# Patient Record
Sex: FEMALE | Race: WHITE | NOT HISPANIC OR LATINO | URBAN - METROPOLITAN AREA
[De-identification: names, ages, dates, MRNs, and addresses within clinical notes are randomized per-mention and may not be internally consistent; named-entity substitution may affect disease eponyms.]

---

## 2017-03-21 ENCOUNTER — OUTPATIENT (OUTPATIENT)
Dept: OUTPATIENT SERVICES | Facility: HOSPITAL | Age: 82
LOS: 1 days | Discharge: HOME | End: 2017-03-21

## 2017-06-27 DIAGNOSIS — R31.9 HEMATURIA, UNSPECIFIED: ICD-10-CM

## 2017-06-27 DIAGNOSIS — Z79.899 OTHER LONG TERM (CURRENT) DRUG THERAPY: ICD-10-CM

## 2017-07-11 ENCOUNTER — OUTPATIENT (OUTPATIENT)
Dept: OUTPATIENT SERVICES | Facility: HOSPITAL | Age: 82
LOS: 1 days | Discharge: HOME | End: 2017-07-11

## 2017-07-11 DIAGNOSIS — C22.0 LIVER CELL CARCINOMA: ICD-10-CM

## 2017-07-20 ENCOUNTER — OUTPATIENT (OUTPATIENT)
Dept: OUTPATIENT SERVICES | Facility: HOSPITAL | Age: 82
LOS: 1 days | Discharge: HOME | End: 2017-07-20

## 2017-07-20 DIAGNOSIS — Z02.9 ENCOUNTER FOR ADMINISTRATIVE EXAMINATIONS, UNSPECIFIED: ICD-10-CM

## 2017-07-20 DIAGNOSIS — C22.0 LIVER CELL CARCINOMA: ICD-10-CM

## 2017-07-25 ENCOUNTER — OUTPATIENT (OUTPATIENT)
Dept: OUTPATIENT SERVICES | Facility: HOSPITAL | Age: 82
LOS: 1 days | Discharge: HOME | End: 2017-07-25

## 2017-07-25 DIAGNOSIS — C22.0 LIVER CELL CARCINOMA: ICD-10-CM

## 2017-08-22 ENCOUNTER — OUTPATIENT (OUTPATIENT)
Dept: OUTPATIENT SERVICES | Facility: HOSPITAL | Age: 82
LOS: 1 days | Discharge: HOME | End: 2017-08-22

## 2017-08-22 DIAGNOSIS — R31.9 HEMATURIA, UNSPECIFIED: ICD-10-CM

## 2017-08-22 DIAGNOSIS — E03.9 HYPOTHYROIDISM, UNSPECIFIED: ICD-10-CM

## 2017-08-29 ENCOUNTER — EMERGENCY (EMERGENCY)
Facility: HOSPITAL | Age: 82
LOS: 0 days | Discharge: HOME | End: 2017-08-29
Admitting: INTERNAL MEDICINE

## 2017-08-29 DIAGNOSIS — E03.9 HYPOTHYROIDISM, UNSPECIFIED: ICD-10-CM

## 2017-08-29 DIAGNOSIS — I10 ESSENTIAL (PRIMARY) HYPERTENSION: ICD-10-CM

## 2017-08-29 DIAGNOSIS — Z79.899 OTHER LONG TERM (CURRENT) DRUG THERAPY: ICD-10-CM

## 2017-08-29 DIAGNOSIS — R06.02 SHORTNESS OF BREATH: ICD-10-CM

## 2017-08-29 DIAGNOSIS — K21.9 GASTRO-ESOPHAGEAL REFLUX DISEASE WITHOUT ESOPHAGITIS: ICD-10-CM

## 2017-08-29 DIAGNOSIS — Z91.041 RADIOGRAPHIC DYE ALLERGY STATUS: ICD-10-CM

## 2017-08-29 DIAGNOSIS — R07.9 CHEST PAIN, UNSPECIFIED: ICD-10-CM

## 2017-08-29 DIAGNOSIS — R35.0 FREQUENCY OF MICTURITION: ICD-10-CM

## 2017-11-14 ENCOUNTER — OUTPATIENT (OUTPATIENT)
Dept: OUTPATIENT SERVICES | Facility: HOSPITAL | Age: 82
LOS: 1 days | Discharge: HOME | End: 2017-11-14

## 2017-11-14 DIAGNOSIS — E56.9 VITAMIN DEFICIENCY, UNSPECIFIED: ICD-10-CM

## 2017-11-14 DIAGNOSIS — N18.2 CHRONIC KIDNEY DISEASE, STAGE 2 (MILD): ICD-10-CM

## 2017-11-14 DIAGNOSIS — Z02.9 ENCOUNTER FOR ADMINISTRATIVE EXAMINATIONS, UNSPECIFIED: ICD-10-CM

## 2017-11-14 DIAGNOSIS — Z79.899 OTHER LONG TERM (CURRENT) DRUG THERAPY: ICD-10-CM

## 2017-11-14 DIAGNOSIS — E11.9 TYPE 2 DIABETES MELLITUS WITHOUT COMPLICATIONS: ICD-10-CM

## 2017-12-05 ENCOUNTER — OUTPATIENT (OUTPATIENT)
Dept: OUTPATIENT SERVICES | Facility: HOSPITAL | Age: 82
LOS: 1 days | Discharge: HOME | End: 2017-12-05

## 2017-12-05 DIAGNOSIS — E03.9 HYPOTHYROIDISM, UNSPECIFIED: ICD-10-CM

## 2017-12-05 DIAGNOSIS — E78.00 PURE HYPERCHOLESTEROLEMIA, UNSPECIFIED: ICD-10-CM

## 2017-12-05 DIAGNOSIS — C22.0 LIVER CELL CARCINOMA: ICD-10-CM

## 2018-04-24 ENCOUNTER — OUTPATIENT (OUTPATIENT)
Dept: OUTPATIENT SERVICES | Facility: HOSPITAL | Age: 83
LOS: 1 days | Discharge: HOME | End: 2018-04-24

## 2018-04-24 DIAGNOSIS — C22.0 LIVER CELL CARCINOMA: ICD-10-CM

## 2018-04-24 DIAGNOSIS — E11.9 TYPE 2 DIABETES MELLITUS WITHOUT COMPLICATIONS: ICD-10-CM

## 2018-04-24 DIAGNOSIS — E03.9 HYPOTHYROIDISM, UNSPECIFIED: ICD-10-CM

## 2018-04-24 DIAGNOSIS — R31.9 HEMATURIA, UNSPECIFIED: ICD-10-CM

## 2018-04-24 DIAGNOSIS — Z79.899 OTHER LONG TERM (CURRENT) DRUG THERAPY: ICD-10-CM

## 2018-04-24 DIAGNOSIS — N18.2 CHRONIC KIDNEY DISEASE, STAGE 2 (MILD): ICD-10-CM

## 2018-07-17 ENCOUNTER — OUTPATIENT (OUTPATIENT)
Dept: OUTPATIENT SERVICES | Facility: HOSPITAL | Age: 83
LOS: 1 days | Discharge: HOME | End: 2018-07-17

## 2018-07-17 DIAGNOSIS — M12.9 ARTHROPATHY, UNSPECIFIED: ICD-10-CM

## 2018-07-17 DIAGNOSIS — E03.9 HYPOTHYROIDISM, UNSPECIFIED: ICD-10-CM

## 2018-07-17 DIAGNOSIS — Z13.9 ENCOUNTER FOR SCREENING, UNSPECIFIED: ICD-10-CM

## 2018-09-11 ENCOUNTER — OUTPATIENT (OUTPATIENT)
Dept: OUTPATIENT SERVICES | Facility: HOSPITAL | Age: 83
LOS: 1 days | Discharge: HOME | End: 2018-09-11

## 2018-09-11 DIAGNOSIS — Z79.899 OTHER LONG TERM (CURRENT) DRUG THERAPY: ICD-10-CM

## 2018-09-11 DIAGNOSIS — N18.2 CHRONIC KIDNEY DISEASE, STAGE 2 (MILD): ICD-10-CM

## 2018-09-11 DIAGNOSIS — M12.9 ARTHROPATHY, UNSPECIFIED: ICD-10-CM

## 2018-09-11 DIAGNOSIS — E56.9 VITAMIN DEFICIENCY, UNSPECIFIED: ICD-10-CM

## 2018-09-11 DIAGNOSIS — Z13.9 ENCOUNTER FOR SCREENING, UNSPECIFIED: ICD-10-CM

## 2018-09-11 DIAGNOSIS — R31.9 HEMATURIA, UNSPECIFIED: ICD-10-CM

## 2018-09-11 DIAGNOSIS — E03.9 HYPOTHYROIDISM, UNSPECIFIED: ICD-10-CM

## 2018-09-11 DIAGNOSIS — E53.8 DEFICIENCY OF OTHER SPECIFIED B GROUP VITAMINS: ICD-10-CM

## 2018-09-11 DIAGNOSIS — E78.00 PURE HYPERCHOLESTEROLEMIA, UNSPECIFIED: ICD-10-CM

## 2018-11-01 ENCOUNTER — OUTPATIENT (OUTPATIENT)
Dept: OUTPATIENT SERVICES | Facility: HOSPITAL | Age: 83
LOS: 1 days | Discharge: HOME | End: 2018-11-01

## 2018-11-01 DIAGNOSIS — Z02.9 ENCOUNTER FOR ADMINISTRATIVE EXAMINATIONS, UNSPECIFIED: ICD-10-CM

## 2018-11-06 ENCOUNTER — OUTPATIENT (OUTPATIENT)
Dept: OUTPATIENT SERVICES | Facility: HOSPITAL | Age: 83
LOS: 1 days | Discharge: HOME | End: 2018-11-06

## 2018-11-06 DIAGNOSIS — E53.8 DEFICIENCY OF OTHER SPECIFIED B GROUP VITAMINS: ICD-10-CM

## 2018-11-06 DIAGNOSIS — E55.9 VITAMIN D DEFICIENCY, UNSPECIFIED: ICD-10-CM

## 2018-11-06 DIAGNOSIS — E78.2 MIXED HYPERLIPIDEMIA: ICD-10-CM

## 2018-11-06 DIAGNOSIS — D63.8 ANEMIA IN OTHER CHRONIC DISEASES CLASSIFIED ELSEWHERE: ICD-10-CM

## 2018-11-06 DIAGNOSIS — E06.3 AUTOIMMUNE THYROIDITIS: ICD-10-CM

## 2019-03-05 ENCOUNTER — OUTPATIENT (OUTPATIENT)
Dept: OUTPATIENT SERVICES | Facility: HOSPITAL | Age: 84
LOS: 1 days | Discharge: HOME | End: 2019-03-05

## 2019-03-05 DIAGNOSIS — Z02.9 ENCOUNTER FOR ADMINISTRATIVE EXAMINATIONS, UNSPECIFIED: ICD-10-CM

## 2019-03-07 ENCOUNTER — OUTPATIENT (OUTPATIENT)
Dept: OUTPATIENT SERVICES | Facility: HOSPITAL | Age: 84
LOS: 1 days | Discharge: HOME | End: 2019-03-07

## 2019-03-07 DIAGNOSIS — Z79.899 OTHER LONG TERM (CURRENT) DRUG THERAPY: ICD-10-CM

## 2019-03-07 DIAGNOSIS — E78.00 PURE HYPERCHOLESTEROLEMIA, UNSPECIFIED: ICD-10-CM

## 2019-03-07 DIAGNOSIS — R31.9 HEMATURIA, UNSPECIFIED: ICD-10-CM

## 2019-03-07 DIAGNOSIS — N18.2 CHRONIC KIDNEY DISEASE, STAGE 2 (MILD): ICD-10-CM

## 2019-03-07 DIAGNOSIS — E03.9 HYPOTHYROIDISM, UNSPECIFIED: ICD-10-CM

## 2019-03-07 DIAGNOSIS — M81.0 AGE-RELATED OSTEOPOROSIS WITHOUT CURRENT PATHOLOGICAL FRACTURE: ICD-10-CM

## 2019-03-07 DIAGNOSIS — E53.8 DEFICIENCY OF OTHER SPECIFIED B GROUP VITAMINS: ICD-10-CM

## 2019-03-07 DIAGNOSIS — N39.0 URINARY TRACT INFECTION, SITE NOT SPECIFIED: ICD-10-CM

## 2019-03-12 ENCOUNTER — OUTPATIENT (OUTPATIENT)
Dept: OUTPATIENT SERVICES | Facility: HOSPITAL | Age: 84
LOS: 1 days | Discharge: HOME | End: 2019-03-12

## 2019-03-12 DIAGNOSIS — N18.2 CHRONIC KIDNEY DISEASE, STAGE 2 (MILD): ICD-10-CM

## 2019-03-12 DIAGNOSIS — E03.9 HYPOTHYROIDISM, UNSPECIFIED: ICD-10-CM

## 2019-03-12 DIAGNOSIS — Z79.899 OTHER LONG TERM (CURRENT) DRUG THERAPY: ICD-10-CM

## 2019-03-12 DIAGNOSIS — E53.8 DEFICIENCY OF OTHER SPECIFIED B GROUP VITAMINS: ICD-10-CM

## 2019-03-12 DIAGNOSIS — E78.00 PURE HYPERCHOLESTEROLEMIA, UNSPECIFIED: ICD-10-CM

## 2019-03-12 DIAGNOSIS — R31.9 HEMATURIA, UNSPECIFIED: ICD-10-CM

## 2019-03-12 DIAGNOSIS — M81.0 AGE-RELATED OSTEOPOROSIS WITHOUT CURRENT PATHOLOGICAL FRACTURE: ICD-10-CM

## 2019-08-20 ENCOUNTER — OUTPATIENT (OUTPATIENT)
Dept: OUTPATIENT SERVICES | Facility: HOSPITAL | Age: 84
LOS: 1 days | Discharge: HOME | End: 2019-08-20

## 2019-08-20 DIAGNOSIS — E78.2 MIXED HYPERLIPIDEMIA: ICD-10-CM

## 2019-08-20 DIAGNOSIS — E06.3 AUTOIMMUNE THYROIDITIS: ICD-10-CM

## 2019-08-20 DIAGNOSIS — E11.65 TYPE 2 DIABETES MELLITUS WITH HYPERGLYCEMIA: ICD-10-CM

## 2019-08-20 DIAGNOSIS — D53.9 NUTRITIONAL ANEMIA, UNSPECIFIED: ICD-10-CM

## 2019-08-20 DIAGNOSIS — E53.8 DEFICIENCY OF OTHER SPECIFIED B GROUP VITAMINS: ICD-10-CM

## 2019-09-09 ENCOUNTER — OUTPATIENT (OUTPATIENT)
Dept: OUTPATIENT SERVICES | Facility: HOSPITAL | Age: 84
LOS: 1 days | Discharge: HOME | End: 2019-09-09

## 2019-09-09 DIAGNOSIS — E06.3 AUTOIMMUNE THYROIDITIS: ICD-10-CM

## 2019-09-09 DIAGNOSIS — Z02.9 ENCOUNTER FOR ADMINISTRATIVE EXAMINATIONS, UNSPECIFIED: ICD-10-CM

## 2019-09-30 ENCOUNTER — OUTPATIENT (OUTPATIENT)
Dept: OUTPATIENT SERVICES | Facility: HOSPITAL | Age: 84
LOS: 1 days | Discharge: HOME | End: 2019-09-30

## 2019-09-30 DIAGNOSIS — D53.9 NUTRITIONAL ANEMIA, UNSPECIFIED: ICD-10-CM

## 2019-09-30 DIAGNOSIS — E78.2 MIXED HYPERLIPIDEMIA: ICD-10-CM

## 2019-09-30 DIAGNOSIS — E55.9 VITAMIN D DEFICIENCY, UNSPECIFIED: ICD-10-CM

## 2019-09-30 DIAGNOSIS — E53.8 DEFICIENCY OF OTHER SPECIFIED B GROUP VITAMINS: ICD-10-CM

## 2019-09-30 DIAGNOSIS — E11.65 TYPE 2 DIABETES MELLITUS WITH HYPERGLYCEMIA: ICD-10-CM

## 2019-10-04 ENCOUNTER — OUTPATIENT (OUTPATIENT)
Dept: OUTPATIENT SERVICES | Facility: HOSPITAL | Age: 84
LOS: 1 days | Discharge: HOME | End: 2019-10-04

## 2019-10-04 DIAGNOSIS — E11.65 TYPE 2 DIABETES MELLITUS WITH HYPERGLYCEMIA: ICD-10-CM

## 2019-10-04 DIAGNOSIS — Z02.9 ENCOUNTER FOR ADMINISTRATIVE EXAMINATIONS, UNSPECIFIED: ICD-10-CM

## 2020-01-07 ENCOUNTER — EMERGENCY (EMERGENCY)
Facility: HOSPITAL | Age: 85
LOS: 0 days | Discharge: HOME | End: 2020-01-07
Attending: EMERGENCY MEDICINE | Admitting: EMERGENCY MEDICINE
Payer: MEDICARE

## 2020-01-07 VITALS
TEMPERATURE: 98 F | OXYGEN SATURATION: 100 % | WEIGHT: 130.07 LBS | RESPIRATION RATE: 18 BRPM | DIASTOLIC BLOOD PRESSURE: 71 MMHG | HEART RATE: 88 BPM | SYSTOLIC BLOOD PRESSURE: 147 MMHG

## 2020-01-07 DIAGNOSIS — Z23 ENCOUNTER FOR IMMUNIZATION: ICD-10-CM

## 2020-01-07 DIAGNOSIS — S00.03XA CONTUSION OF SCALP, INITIAL ENCOUNTER: ICD-10-CM

## 2020-01-07 DIAGNOSIS — S01.81XA LACERATION WITHOUT FOREIGN BODY OF OTHER PART OF HEAD, INITIAL ENCOUNTER: ICD-10-CM
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
## 2020-01-07 DIAGNOSIS — S01.01XA LACERATION WITHOUT FOREIGN BODY OF SCALP, INITIAL ENCOUNTER: ICD-10-CM
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
## 2020-01-07 PROCEDURE — 99284 EMERGENCY DEPT VISIT MOD MDM: CPT | Mod: 25,GC

## 2020-01-07 PROCEDURE — 71046 X-RAY EXAM CHEST 2 VIEWS: CPT | Mod: 26

## 2020-01-07 PROCEDURE — 12002 RPR S/N/AX/GEN/TRNK2.6-7.5CM: CPT | Mod: GC

## 2020-01-07 PROCEDURE — 71045 X-RAY EXAM CHEST 1 VIEW: CPT | Mod: 26,59

## 2020-01-07 PROCEDURE — 70450 CT HEAD/BRAIN W/O DYE: CPT | Mod: 26

## 2020-01-07 PROCEDURE — 72125 CT NECK SPINE W/O DYE: CPT | Mod: 26

## 2020-01-07 RX ORDER — TETANUS TOXOID, REDUCED DIPHTHERIA TOXOID AND ACELLULAR PERTUSSIS VACCINE, ADSORBED 5; 2.5; 8; 8; 2.5 [IU]/.5ML; [IU]/.5ML; UG/.5ML; UG/.5ML; UG/.5ML
0.5 SUSPENSION INTRAMUSCULAR ONCE
Refills: 0 | Status: COMPLETED | OUTPATIENT
Start: 2020-01-07 | End: 2020-01-07

## 2020-01-07 RX ADMIN — TETANUS TOXOID, REDUCED DIPHTHERIA TOXOID AND ACELLULAR PERTUSSIS VACCINE, ADSORBED 0.5 MILLILITER(S): 5; 2.5; 8; 8; 2.5 SUSPENSION INTRAMUSCULAR at 05:28

## 2020-01-07 NOTE — ED PROVIDER NOTE - PROGRESS NOTE DETAILS
Tetanus updated. Lac repaired. Tetanus updated. Will repair posterior scalp lac. Patient is refusing to stand for CXR. States she has outpatient rehab. SPOKEN TO FAMILY, shared decision making took place pt has family support and outpatient PT. will discharge and follow up

## 2020-01-07 NOTE — ED PROVIDER NOTE - CLINICAL SUMMARY MEDICAL DECISION MAKING FREE TEXT BOX
scalp hematoma & lac s/p mechanical fall - wound care & lac repaired - all results incl incidental findings d/w pt & son @ bedside, copies given, strict return precautions discussed, rec outpt PMD v ED f/u for suture removal

## 2020-01-07 NOTE — ED PROVIDER NOTE - ATTENDING CONTRIBUTION TO CARE
86y f h.o htn no blood thinners/asa p/w head trauma s/p fall. Tried to get up from recliner and hit R post parietal skull against a piece of furniture. No loc. SUstained large hematoma w/overlying lac. Son lives downstairs, renetta her fall and found her sitting up on floor. Denies blurry vision, neck pain, cp/sob, nv, abd pain, back or hip pain, focal numbness or weakness. Currently uunderoing OP PT/Rehab.    PE:  nad  skin- see head exam below, otherwise warm, dry  head- large R post parietal scal phematoma w/overlying lac, no depressions  eac/tms clear  neck supple nt  chest atraumatic  rrr nl s1s2 no mrg  ctab no wrr  abd soft ntnd no palpable masses no rgr  back non-tender no cvat  ext no cce dpi  neuro aaox3 grossly nf exam

## 2020-01-07 NOTE — ED ADULT TRIAGE NOTE - CHIEF COMPLAINT QUOTE
pt biba; pt is AXOX4, sts she was getting up to go to the bathroom, tried reaching for her walker and fell and hit head on her entertainment center; pt with approx 4 inch laceration to right side of head; denies LOC, not on blood thinners, no other injuries or pain

## 2020-01-07 NOTE — ED PROVIDER NOTE - PHYSICAL EXAMINATION
PHYSICAL EXAM: I have reviewed current vital signs.  GENERAL: NAD, elderly.  HEAD:  Normocephalic, large posterior scalp laceration, large scalp hematoma.  EYES: EOMI, PERRL, conjunctiva and sclera clear.  ENT: MMM, no erythema/exudates, no dental/tongue trauma.  NECK: Supple, no midline TTP.  CHEST/LUNG: Clear to auscultation bilaterally; no wheezes, rales, or rhonchi.  HEART: Regular rate and rhythm, normal S1 and S2; no murmurs, rubs, or gallops.  ABDOMEN: Soft, nontender, nondistended.  EXTREMITIES:  2+ peripheral pulses; FROM.  MSK: No spinal midline TTP.  NEUROLOGY: A&O x 3. Motor 5/5. No focal neurological deficits. CN II - XII grossly intact.  SKIN: Warm and dry.

## 2020-01-07 NOTE — ED ADULT NURSE NOTE - OBJECTIVE STATEMENT
BIBA from home post fall. A,Ox4, no SOB or distress noted.  States she was getting up to go to the bathroom, tried reaching for her walker and fell sidewise. Hit head on her entertainment center. Laceration noticed to right side of head. Denies LOC. States she is not on blood thinners, denies any other injury.

## 2020-01-07 NOTE — ED PROVIDER NOTE - CARE PLAN
Principal Discharge DX:	Fall  Secondary Diagnosis:	Scalp hematoma  Secondary Diagnosis:	Scalp laceration

## 2020-01-07 NOTE — ED PROVIDER NOTE - OBJECTIVE STATEMENT
85yo F with PMH of thyroid disease, HTN, and overactive bladder presenting to ED s/p mechanical fall that occurred just PTA. Patient was leaning forward out of recliner and hit her head on a piece of furniture sustaining laceration/hematoma to R posterior scalp. No LOC, f/c, CP, SOB, abd pain, neck/back pain, extremity pain, or dental/tongue trauma. Able to ambulate. 87yo F with PMH of thyroid disease, HTN, and overactive bladder presenting to ED s/p mechanical fall that occurred just PTA. Patient was leaning forward out of recliner and hit her head on a piece of furniture sustaining laceration/hematoma to R posterior scalp. No LOC, f/c, CP, SOB, abd pain, neck/back pain, extremity pain, or dental/tongue trauma. Moving all extremities. States she has outpatient rehab.

## 2020-01-07 NOTE — ED PROVIDER NOTE - NS ED ROS FT
Constitutional:  No fevers or chills.  Eyes:  No visual changes, eye pain, or discharge.  ENT:  No sore throat.  Neck:  No neck pain.  Cardiac:  No CP or edema.  Resp:  No cough or SOB.   GI:  No nausea, vomiting, diarrhea, or abdominal pain.  :  No dysuria, frequency, or hematuria.  MSK:  No myalgias or joint pain/swelling.  Neuro:  No headache, dizziness, or weakness.   Skin:  No skin rash. +Scalp hematoma w/ lac.

## 2020-01-07 NOTE — ED PROVIDER NOTE - NSFOLLOWUPINSTRUCTIONS_ED_ALL_ED_FT
Please follow-up with your doctor and have staples removed in 5-7 days.    Laceration    A laceration is a cut that goes through all of the layers of the skin and into the tissue that is right under the skin. Some lacerations heal on their own. Others need to be closed with skin adhesive strips, skin glue, stitches (sutures), or staples. Proper laceration care minimizes the risk of infection and helps the laceration to heal better.  If non-absorbable stitches or staples have been placed, they must be taken out within the time frame instructed by your healthcare provider.    SEEK IMMEDIATE MEDICAL CARE IF YOU HAVE ANY OF THE FOLLOWING SYMPTOMS: swelling around the wound, worsening pain, drainage from the wound, red streaking going away from your wound, inability to move finger or toe near the laceration, or discoloration of skin near the laceration.      Hematoma    A hematoma is a collection of blood under the skin, in an organ, in a body space, in a joint space, or in other tissue. The blood can clot to form a lump that you can see and feel. The lump is often firm and may sometimes become sore and tender. Most hematomas get better in a few days to weeks. However, some hematomas may be serious and require medical care. Hematomas can range in size from very small to very large.     CAUSES  A hematoma can be caused by a blunt or penetrating injury. It can also be caused by spontaneous leakage from a blood vessel under the skin. Spontaneous leakage from a blood vessel is more likely to occur in older people, especially those taking blood thinners. Sometimes, a hematoma can develop after certain medical procedures.    SIGNS AND SYMPTOMS  A firm lump on the body.   Possible pain and tenderness in the area.  Bruising. Blue, dark blue, purple-red, or yellowish skin may appear at the site of the hematoma if the hematoma is close to the surface of the skin.     For hematomas in deeper tissues or body spaces, the signs and symptoms may be subtle. For example, an intra-abdominal hematoma may cause abdominal pain, weakness, fainting, and shortness of breath. An intracranial hematoma may cause a headache or symptoms such as weakness, trouble speaking, or a change in consciousness.    DIAGNOSIS  A hematoma can usually be diagnosed based on your medical history and a physical exam. Imaging tests may be needed if your health care provider suspects a hematoma in deeper tissues or body spaces, such as the abdomen, head, or chest. These tests may include ultrasonography or a CT scan.     TREATMENT  Hematomas usually go away on their own over time. Rarely does the blood need to be drained out of the body. Large hematomas or those that may affect vital organs will sometimes need surgical drainage or monitoring.    HOME CARE INSTRUCTIONS  Apply ice to the injured area:    Put ice in a plastic bag.    Place a towel between your skin and the bag.    Leave the ice on for 20 minutes, 2–3 times a day for the first 1 to 2 days.    After the first 2 days, switch to using warm compresses on the hematoma.    Elevate the injured area to help decrease pain and swelling. Wrapping the area with an elastic bandage may also be helpful. Compression helps to reduce swelling and promotes shrinking of the hematoma. Make sure the bandage is not wrapped too tight.    If your hematoma is on a lower extremity and is painful, crutches may be helpful for a couple days.    Only take over-the-counter or prescription medicines as directed by your health care provider.     SEEK IMMEDIATE MEDICAL CARE IF:  You have increasing pain, or your pain is not controlled with medicine.    You have a fever.    You have worsening swelling or discoloration.    Your skin over the hematoma breaks or starts bleeding.    Your hematoma is in your chest or abdomen and you have weakness, shortness of breath, or a change in consciousness.  Your hematoma is on your scalp (caused by a fall or injury) and you have a worsening headache or a change in alertness or consciousness.    MAKE SURE YOU:  Understand these instructions.   Will watch your condition.  Will get help right away if you are not doing well or get worse.    ADDITIONAL NOTES AND INSTRUCTIONS    Please follow up with your Primary MD in 24-48 hr.  Seek immediate medical care for any new/worsening signs or symptoms.      Contusion  A contusion is a deep bruise. Contusions are the result of a blunt injury to tissues and muscle fibers under the skin. The skin overlying the contusion may turn blue, purple, or yellow. Symptoms also include pain and swelling in the injured area.    SEEK IMMEDIATE MEDICAL CARE IF YOU HAVE ANY OF THE FOLLOWING SYMPTOMS: severe pain, numbness, tingling, pain, weakness, or skin color/temperature change in any part of your body distal to the injury. Laceration- You have 8 sutures. Please have them removed in 10 days.     A laceration is a cut that goes through all of the layers of the skin and into the tissue that is right under the skin. Some lacerations heal on their own. Others need to be closed with skin adhesive strips, skin glue, stitches (sutures), or staples. Proper laceration care minimizes the risk of infection and helps the laceration to heal better.  If non-absorbable stitches or staples have been placed, they must be taken out within the time frame instructed by your healthcare provider.    SEEK IMMEDIATE MEDICAL CARE IF YOU HAVE ANY OF THE FOLLOWING SYMPTOMS: swelling around the wound, worsening pain, drainage from the wound, red streaking going away from your wound, inability to move finger or toe near the laceration, or discoloration of skin near the laceration.        Hematoma    A hematoma is a collection of blood under the skin, in an organ, in a body space, in a joint space, or in other tissue. The blood can clot to form a lump that you can see and feel. The lump is often firm and may sometimes become sore and tender. Most hematomas get better in a few days to weeks. However, some hematomas may be serious and require medical care. Hematomas can range in size from very small to very large.     CAUSES  A hematoma can be caused by a blunt or penetrating injury. It can also be caused by spontaneous leakage from a blood vessel under the skin. Spontaneous leakage from a blood vessel is more likely to occur in older people, especially those taking blood thinners. Sometimes, a hematoma can develop after certain medical procedures.    SIGNS AND SYMPTOMS  A firm lump on the body.   Possible pain and tenderness in the area.  Bruising. Blue, dark blue, purple-red, or yellowish skin may appear at the site of the hematoma if the hematoma is close to the surface of the skin.     For hematomas in deeper tissues or body spaces, the signs and symptoms may be subtle. For example, an intra-abdominal hematoma may cause abdominal pain, weakness, fainting, and shortness of breath. An intracranial hematoma may cause a headache or symptoms such as weakness, trouble speaking, or a change in consciousness.    DIAGNOSIS  A hematoma can usually be diagnosed based on your medical history and a physical exam. Imaging tests may be needed if your health care provider suspects a hematoma in deeper tissues or body spaces, such as the abdomen, head, or chest. These tests may include ultrasonography or a CT scan.     TREATMENT  Hematomas usually go away on their own over time. Rarely does the blood need to be drained out of the body. Large hematomas or those that may affect vital organs will sometimes need surgical drainage or monitoring.    HOME CARE INSTRUCTIONS  Apply ice to the injured area:    Put ice in a plastic bag.    Place a towel between your skin and the bag.    Leave the ice on for 20 minutes, 2–3 times a day for the first 1 to 2 days.    After the first 2 days, switch to using warm compresses on the hematoma.    Elevate the injured area to help decrease pain and swelling. Wrapping the area with an elastic bandage may also be helpful. Compression helps to reduce swelling and promotes shrinking of the hematoma. Make sure the bandage is not wrapped too tight.    If your hematoma is on a lower extremity and is painful, crutches may be helpful for a couple days.    Only take over-the-counter or prescription medicines as directed by your health care provider.     SEEK IMMEDIATE MEDICAL CARE IF:  You have increasing pain, or your pain is not controlled with medicine.    You have a fever.    You have worsening swelling or discoloration.    Your skin over the hematoma breaks or starts bleeding.    Your hematoma is in your chest or abdomen and you have weakness, shortness of breath, or a change in consciousness.  Your hematoma is on your scalp (caused by a fall or injury) and you have a worsening headache or a change in alertness or consciousness.    MAKE SURE YOU:  Understand these instructions.   Will watch your condition.  Will get help right away if you are not doing well or get worse.    ADDITIONAL NOTES AND INSTRUCTIONS    Please follow up with your Primary MD in 24-48 hr.  Seek immediate medical care for any new/worsening signs or symptoms.      Contusion  A contusion is a deep bruise. Contusions are the result of a blunt injury to tissues and muscle fibers under the skin. The skin overlying the contusion may turn blue, purple, or yellow. Symptoms also include pain and swelling in the injured area.    SEEK IMMEDIATE MEDICAL CARE IF YOU HAVE ANY OF THE FOLLOWING SYMPTOMS: severe pain, numbness, tingling, pain, weakness, or skin color/temperature change in any part of your body distal to the injury.

## 2021-05-16 ENCOUNTER — INPATIENT (INPATIENT)
Facility: HOSPITAL | Age: 86
LOS: 13 days | Discharge: HOME HEALTH | End: 2021-05-30
Attending: INTERNAL MEDICINE | Admitting: INTERNAL MEDICINE
Payer: MEDICARE

## 2021-05-16 VITALS
OXYGEN SATURATION: 100 % | WEIGHT: 130.07 LBS | SYSTOLIC BLOOD PRESSURE: 136 MMHG | DIASTOLIC BLOOD PRESSURE: 63 MMHG | HEART RATE: 96 BPM | TEMPERATURE: 97 F | RESPIRATION RATE: 18 BRPM | HEIGHT: 64 IN

## 2021-05-16 DIAGNOSIS — Z90.49 ACQUIRED ABSENCE OF OTHER SPECIFIED PARTS OF DIGESTIVE TRACT: Chronic | ICD-10-CM

## 2021-05-16 DIAGNOSIS — Z86.79 PERSONAL HISTORY OF OTHER DISEASES OF THE CIRCULATORY SYSTEM: Chronic | ICD-10-CM

## 2021-05-16 PROBLEM — I10 ESSENTIAL (PRIMARY) HYPERTENSION: Chronic | Status: ACTIVE | Noted: 2020-01-07

## 2021-05-16 LAB
ALBUMIN SERPL ELPH-MCNC: 3.7 G/DL — SIGNIFICANT CHANGE UP (ref 3.5–5.2)
ALP SERPL-CCNC: 99 U/L — SIGNIFICANT CHANGE UP (ref 30–115)
ALT FLD-CCNC: 19 U/L — SIGNIFICANT CHANGE UP (ref 0–41)
ANION GAP SERPL CALC-SCNC: 10 MMOL/L — SIGNIFICANT CHANGE UP (ref 7–14)
APTT BLD: 26.4 SEC — LOW (ref 27–39.2)
AST SERPL-CCNC: 53 U/L — HIGH (ref 0–41)
BASE EXCESS BLDV CALC-SCNC: 1.7 MMOL/L — SIGNIFICANT CHANGE UP (ref -2–2)
BASOPHILS # BLD AUTO: 0.01 K/UL — SIGNIFICANT CHANGE UP (ref 0–0.2)
BASOPHILS NFR BLD AUTO: 0.2 % — SIGNIFICANT CHANGE UP (ref 0–1)
BILIRUB SERPL-MCNC: 0.4 MG/DL — SIGNIFICANT CHANGE UP (ref 0.2–1.2)
BUN SERPL-MCNC: 37 MG/DL — HIGH (ref 10–20)
CA-I SERPL-SCNC: 1.24 MMOL/L — SIGNIFICANT CHANGE UP (ref 1.12–1.3)
CALCIUM SERPL-MCNC: 9.2 MG/DL — SIGNIFICANT CHANGE UP (ref 8.5–10.1)
CHLORIDE SERPL-SCNC: 105 MMOL/L — SIGNIFICANT CHANGE UP (ref 98–110)
CO2 SERPL-SCNC: 23 MMOL/L — SIGNIFICANT CHANGE UP (ref 17–32)
CREAT SERPL-MCNC: 0.8 MG/DL — SIGNIFICANT CHANGE UP (ref 0.7–1.5)
D DIMER BLD IA.RAPID-MCNC: 505 NG/ML DDU — HIGH (ref 0–230)
EOSINOPHIL # BLD AUTO: 0.02 K/UL — SIGNIFICANT CHANGE UP (ref 0–0.7)
EOSINOPHIL NFR BLD AUTO: 0.3 % — SIGNIFICANT CHANGE UP (ref 0–8)
FLUAV AG NPH QL: NEGATIVE COUNTS — SIGNIFICANT CHANGE UP
FLUBV AG NPH QL: NEGATIVE COUNTS — SIGNIFICANT CHANGE UP
GAS PNL BLDV: 142 MMOL/L — SIGNIFICANT CHANGE UP (ref 136–145)
GAS PNL BLDV: SIGNIFICANT CHANGE UP
GLUCOSE SERPL-MCNC: 119 MG/DL — HIGH (ref 70–99)
HCO3 BLDV-SCNC: 27 MMOL/L — SIGNIFICANT CHANGE UP (ref 22–29)
HCT VFR BLD CALC: 29.9 % — LOW (ref 37–47)
HCT VFR BLDA CALC: 33.7 % — LOW (ref 34–44)
HGB BLD CALC-MCNC: 11 G/DL — LOW (ref 14–18)
HGB BLD-MCNC: 9.5 G/DL — LOW (ref 12–16)
HOROWITZ INDEX BLDV+IHG-RTO: 21 — SIGNIFICANT CHANGE UP
IMM GRANULOCYTES NFR BLD AUTO: 0.3 % — SIGNIFICANT CHANGE UP (ref 0.1–0.3)
INR BLD: 1.11 RATIO — SIGNIFICANT CHANGE UP (ref 0.65–1.3)
LACTATE BLDV-MCNC: 1.2 MMOL/L — SIGNIFICANT CHANGE UP (ref 0.5–1.6)
LYMPHOCYTES # BLD AUTO: 0.5 K/UL — LOW (ref 1.2–3.4)
LYMPHOCYTES # BLD AUTO: 8.5 % — LOW (ref 20.5–51.1)
MCHC RBC-ENTMCNC: 29.9 PG — SIGNIFICANT CHANGE UP (ref 27–31)
MCHC RBC-ENTMCNC: 31.8 G/DL — LOW (ref 32–37)
MCV RBC AUTO: 94 FL — SIGNIFICANT CHANGE UP (ref 81–99)
MONOCYTES # BLD AUTO: 0.34 K/UL — SIGNIFICANT CHANGE UP (ref 0.1–0.6)
MONOCYTES NFR BLD AUTO: 5.8 % — SIGNIFICANT CHANGE UP (ref 1.7–9.3)
NEUTROPHILS # BLD AUTO: 5.01 K/UL — SIGNIFICANT CHANGE UP (ref 1.4–6.5)
NEUTROPHILS NFR BLD AUTO: 84.9 % — HIGH (ref 42.2–75.2)
NRBC # BLD: 0 /100 WBCS — SIGNIFICANT CHANGE UP (ref 0–0)
NT-PROBNP SERPL-SCNC: 5291 PG/ML — HIGH (ref 0–300)
PCO2 BLDV: 43 MMHG — SIGNIFICANT CHANGE UP (ref 41–51)
PH BLDV: 7.4 — SIGNIFICANT CHANGE UP (ref 7.26–7.43)
PLATELET # BLD AUTO: 151 K/UL — SIGNIFICANT CHANGE UP (ref 130–400)
PO2 BLDV: 26 MMHG — SIGNIFICANT CHANGE UP (ref 20–40)
POTASSIUM BLDV-SCNC: 4 MMOL/L — SIGNIFICANT CHANGE UP (ref 3.3–5.6)
POTASSIUM SERPL-MCNC: 6 MMOL/L — CRITICAL HIGH (ref 3.5–5)
POTASSIUM SERPL-SCNC: 6 MMOL/L — CRITICAL HIGH (ref 3.5–5)
PROT SERPL-MCNC: 6.9 G/DL — SIGNIFICANT CHANGE UP (ref 6–8)
PROTHROM AB SERPL-ACNC: 12.8 SEC — SIGNIFICANT CHANGE UP (ref 9.95–12.87)
RBC # BLD: 3.18 M/UL — LOW (ref 4.2–5.4)
RBC # FLD: 13.6 % — SIGNIFICANT CHANGE UP (ref 11.5–14.5)
RSV RNA NPH QL NAA+NON-PROBE: NEGATIVE COUNTS — SIGNIFICANT CHANGE UP
SAO2 % BLDV: 42 % — SIGNIFICANT CHANGE UP
SARS-COV-2 RNA SPEC QL NAA+PROBE: NEGATIVE COUNTS — SIGNIFICANT CHANGE UP
SODIUM SERPL-SCNC: 138 MMOL/L — SIGNIFICANT CHANGE UP (ref 135–146)
TROPONIN T SERPL-MCNC: 0.12 NG/ML — CRITICAL HIGH
WBC # BLD: 5.9 K/UL — SIGNIFICANT CHANGE UP (ref 4.8–10.8)
WBC # FLD AUTO: 5.9 K/UL — SIGNIFICANT CHANGE UP (ref 4.8–10.8)

## 2021-05-16 PROCEDURE — ZZZZZ: CPT

## 2021-05-16 PROCEDURE — 71275 CT ANGIOGRAPHY CHEST: CPT | Mod: 26,MA

## 2021-05-16 PROCEDURE — 71045 X-RAY EXAM CHEST 1 VIEW: CPT | Mod: 26

## 2021-05-16 PROCEDURE — 99285 EMERGENCY DEPT VISIT HI MDM: CPT | Mod: CS

## 2021-05-16 PROCEDURE — 99221 1ST HOSP IP/OBS SF/LOW 40: CPT

## 2021-05-16 PROCEDURE — 99222 1ST HOSP IP/OBS MODERATE 55: CPT

## 2021-05-16 RX ORDER — LEVOTHYROXINE SODIUM 125 MCG
100 TABLET ORAL DAILY
Refills: 0 | Status: DISCONTINUED | OUTPATIENT
Start: 2021-05-16 | End: 2021-05-30

## 2021-05-16 RX ORDER — ENOXAPARIN SODIUM 100 MG/ML
30 INJECTION SUBCUTANEOUS DAILY
Refills: 0 | Status: DISCONTINUED | OUTPATIENT
Start: 2021-05-16 | End: 2021-05-30

## 2021-05-16 RX ORDER — FUROSEMIDE 40 MG
20 TABLET ORAL ONCE
Refills: 0 | Status: COMPLETED | OUTPATIENT
Start: 2021-05-16 | End: 2021-05-16

## 2021-05-16 RX ORDER — PANTOPRAZOLE SODIUM 20 MG/1
40 TABLET, DELAYED RELEASE ORAL
Refills: 0 | Status: DISCONTINUED | OUTPATIENT
Start: 2021-05-16 | End: 2021-05-18

## 2021-05-16 RX ORDER — CHLORHEXIDINE GLUCONATE 213 G/1000ML
1 SOLUTION TOPICAL EVERY 12 HOURS
Refills: 0 | Status: DISCONTINUED | OUTPATIENT
Start: 2021-05-16 | End: 2021-05-30

## 2021-05-16 RX ADMIN — Medication 20 MILLIGRAM(S): at 23:11

## 2021-05-16 NOTE — ED ADULT NURSE NOTE - NSIMPLEMENTINTERV_GEN_ALL_ED
Implemented All Fall Risk Interventions:  [place] to call system. Call bell, personal items and telephone within reach. Instruct patient to call for assistance. Room bathroom lighting operational. Non-slip footwear when patient is off stretcher. Physically safe environment: no spills, clutter or unnecessary equipment. Stretcher in lowest position, wheels locked, appropriate side rails in place. Provide visual cue, wrist band, yellow gown, etc. Monitor gait and stability. Monitor for mental status changes and reorient to person, place, and time. Review medications for side effects contributing to fall risk. Reinforce activity limits and safety measures with patient and family.

## 2021-05-16 NOTE — ED PROVIDER NOTE - CARE PLAN
Principal Discharge DX:	Pleural effusion  Secondary Diagnosis:	Shortness of breath  Secondary Diagnosis:	Elevated troponin  Secondary Diagnosis:	Anemia

## 2021-05-16 NOTE — ED PROVIDER NOTE - PROGRESS NOTE DETAILS
Rene- Patient hemodynamically stable. Signed out pending ICU eval. +Pleural effusion left side and R pulm nodule. Rene- Patient hemodynamically stable. Signed out pending ICU eval. +Pleural effusion b/l (left greater than right) and R pulm nodule.

## 2021-05-16 NOTE — ED PROVIDER NOTE - PMH
Acid reflux    Arthritis    Diverticulosis    Enlarged heart    Hiatal hernia    HTN (hypertension)    HTN (hypertension)    Hyperthyroidism    Liver cancer    Lung cancer    Mitral valve prolapse    Murmur

## 2021-05-16 NOTE — H&P ADULT - ASSESSMENT
1. bilateral pleural effusions L >R. w/ elevated troponins, BNP,  CHF exacerbation vs AMI  2. hx- hypothyroidism, CHF,  severe aortic stenosis (aortic valve replacement suggested by cardiologist @ [facility])       discussed w/ Dr carpio.  admit to ICU telemetry   trend troponins,   pt is non compliant to diuretics due to urinary incontinence  restart diuretisc, trend troponins  cardio eval  pulmonary eval for possible need for thoracentesis  cont. usual meds for hypothyroidism,   GI, DVT prophilaxis

## 2021-05-16 NOTE — PATIENT PROFILE ADULT - NSTRANSFERDENTURES_GEN_A_NUR
Patient does not have his own PCP and is not interested at this time in establishing care  upper/lower

## 2021-05-16 NOTE — ED PROVIDER NOTE - CLINICAL SUMMARY MEDICAL DECISION MAKING FREE TEXT BOX
Pt with pleural effusion on CXR.  Ct obtained.  Result noted.  Family has copy of old labs from March.  Hgb was 11.8 at that time.  ICU consulted,  will admit

## 2021-05-16 NOTE — H&P ADULT - HISTORY OF PRESENT ILLNESS
87 yo W female w/PMH as noted below.  Pt has hx severe aortic valve disease, that requires replacement but pt. refused.   Pt c/o progressive dyspnea that  started 2 months ago , but today pt could no longer ambulate and tolerated her SOB.  so pt family called EMS.  In ER pt CXR showed bilateral pleural effusions( L>R)  requiring thoracentesis in past. Pt w/ elevated : BNP, troponins,.  Ct angio chest did not show any PE .

## 2021-05-16 NOTE — H&P ADULT - NSICDXPASTMEDICALHX_GEN_ALL_CORE_FT
PAST MEDICAL HISTORY:  Acid reflux     Aortic stenosis     Arthritis     Diverticulosis     Enlarged heart     Hiatal hernia     HTN (hypertension)     HTN (hypertension)     Hyperthyroidism     Liver cancer s/p resection and s/p chemo and radiation    Mitral valve prolapse     Murmur

## 2021-05-16 NOTE — H&P ADULT - NSHPLABSRESULTS_GEN_ALL_CORE
9.5    5.90  )-----------( 151      ( 16 May 2021 14:43 )             29.9       05-16    138  |  105  |  37<H>  ----------------------------<  119<H>  6.0<HH>   |  23  |  0.8    Ca    9.2      16 May 2021 14:43    TPro  6.9  /  Alb  3.7  /  TBili  0.4  /  DBili  x   /  AST  53<H>  /  ALT  19  /  AlkPhos  99  05-16                  PT/INR - ( 16 May 2021 14:43 )   PT: 12.80 sec;   INR: 1.11 ratio         PTT - ( 16 May 2021 14:43 )  PTT:26.4 sec    Lactate Trend      CARDIAC MARKERS ( 16 May 2021 14:43 )  x     / 0.12 ng/mL / x     / x     / x            CAPILLARY BLOOD GLUCOSE

## 2021-05-16 NOTE — ED PROVIDER NOTE - OBJECTIVE STATEMENT
89yo F with PMH of heart murmur, cardiomegaly, pulmonary nodule, HTN, scoliosis, overactive bladder, GERD, diverticulosis, hyperthyroidism, HCC s/p partial liver resection, and MVP presenting to ED with increased SOB x 1-2 weeks. Patient recently being worked up outpt for pulmonary nodule on R side and pleural effusion on left. Also states she had episode of mild substernal CP last night that lasted several hours, no CP currently in ED. No f/c, abd pain, back pain, n/v/d, calf pain/swelling, or injuries/traumas/falls/syncope.

## 2021-05-16 NOTE — ED ADULT TRIAGE NOTE - ACCOMPANIED BY
EMT/paramedic I have personally performed a face to face diagnostic evaluation on this patient. I have reviewed the ACP note and agree with the history, exam and plan of care, except as noted.

## 2021-05-16 NOTE — ED PROVIDER NOTE - NS ED ROS FT
Constitutional:  No fevers or chills.  Eyes:  No visual changes.  ENT:  No sore throat.  Neck:  No neck pain or stiffness.  Cardiac:  +CP.   Resp:  +SOB.  GI:  No nausea, vomiting, diarrhea, or abdominal pain.  :  No dysuria, frequency, or hematuria.  MSK:  No myalgias.  Neuro:  No headache, dizziness, or weakness.  Skin:  No skin rash.

## 2021-05-16 NOTE — ED PROVIDER NOTE - EKG ADDITIONAL QUESTION - PERFORMED INDEPENDENT VISUALIZATION
Message was sent to call center from e-message from patient regarding blood in stool.  Office Rn requested to call and triage patient.    Onset: 2 weeks ago  Location / description:  Has had twice today.  going on still and is each time has a BM, stool is soft.  Happens every time has a BM, maybe about twice a day.  Blood is not dark black or maroon, is red.  Does show in toilet water and with wiping on paper.  Toilet water color varies pink to red each time and did have red toilet water today  Precipitating Factors: with BM, had internal and external hemorrhoids, but that had been with straining and now things are soft and no straining  Pain Scale (1 - 10), 10 highest: not painful  Associated Symptoms: tired more than usual  What improves/worsens symptoms: nothing, stools are already normal and soft with no straining so pt is concerned  Symptom specifc medications: none  LMP : No LMP recorded. Patient has had an ablation.  Are you pregnant or breast feeding:   Recent Care: 2/24/17    Pt advised per care advice per protocol and after triage informed RN she is on vacation.  Pt still advised to find Urgent Care or ER near her [facility] for evaluation.  Pt verbalized understanding and encouraged to return a call to contact center for worsening symptoms, questions, or concerns.  Pt verbalized understanding and aware not to let symptoms go especially since she stated she will still be gone a while.      Reason for Disposition  • Toilet water turned red  (Exceptions: turned a little pink, few drops of blood)    Protocols used: RECTAL BLEEDING-A-AH      
Yes

## 2021-05-16 NOTE — ED PROVIDER NOTE - PHYSICAL EXAMINATION
PHYSICAL EXAM: I have reviewed current vital signs.  GENERAL: NAD, well-nourished; well-developed.  HEAD:  Normocephalic, atraumatic.  EYES: Conjunctiva and sclera clear.  ENT: MMM, no erythema/exudates.  NECK: Supple, FROM.  CHEST/LUNG: Clear to auscultation bilaterally; no wheezes. Decreased BS left lower lung field. Symmetric expansion, good respiratory effort. Speaking in full sentences.  HEART: Regular rate and rhythm, normal S1 and S2; no murmurs, rubs, or gallops.  ABDOMEN: Soft, nontender, nondistended.  EXTREMITIES:  2+ peripheral pulses; FROM, no calf pain or edema.  PSYCH: Cooperative, appropriate, normal mood and affect.  NEUROLOGY: A&O x 3. Motor 5/5. No focal neurological deficits.   SKIN: Warm and dry. PHYSICAL EXAM: I have reviewed current vital signs.  GENERAL: NAD, elderly.  HEAD:  Normocephalic, atraumatic.  EYES: Conjunctiva and sclera clear.  ENT: MMM, no erythema/exudates.  NECK: Supple, FROM.  CHEST/LUNG: Clear to auscultation bilaterally; no wheezes. Decreased BS left lower lung field. Symmetric expansion, good respiratory effort. Speaking in full sentences. No hypoxia.  HEART: Regular rate and rhythm, normal S1 and S2; no murmurs, rubs, or gallops.  ABDOMEN: Soft, nontender, nondistended.  EXTREMITIES:  2+ peripheral pulses; FROM, no calf pain or edema.  PSYCH: Cooperative, appropriate, normal mood and affect.  NEUROLOGY: A&O x 3. Motor 5/5. No focal neurological deficits.   SKIN: Warm and dry.

## 2021-05-16 NOTE — ED PROVIDER NOTE - ATTENDING CONTRIBUTION TO CARE
89 yo F PMHx noted presents with son with c/o progressive SOB over last few weeks.  Pt had outpatient CT in April that revealed pleural effusion.  Per son pt required thoracocentesis in the past, Had some chest pressure yesterday.  no fevers, no cough.  On exam pt in NAD AAO x 3, able to speak full clear sentences, Lungs with decreased BS b/l L>R, + mur, no edema, chronic skin changes RLE, abd soft nt nd

## 2021-05-17 LAB
AFP-TM SERPL-MCNC: 3.6 NG/ML — SIGNIFICANT CHANGE UP
ALBUMIN SERPL ELPH-MCNC: 3.8 G/DL — SIGNIFICANT CHANGE UP (ref 3.5–5.2)
ALP SERPL-CCNC: 106 U/L — SIGNIFICANT CHANGE UP (ref 30–115)
ALT FLD-CCNC: 14 U/L — SIGNIFICANT CHANGE UP (ref 0–41)
ANION GAP SERPL CALC-SCNC: 12 MMOL/L — SIGNIFICANT CHANGE UP (ref 7–14)
AST SERPL-CCNC: 22 U/L — SIGNIFICANT CHANGE UP (ref 0–41)
BASOPHILS # BLD AUTO: 0.02 K/UL — SIGNIFICANT CHANGE UP (ref 0–0.2)
BASOPHILS NFR BLD AUTO: 0.4 % — SIGNIFICANT CHANGE UP (ref 0–1)
BILIRUB SERPL-MCNC: 0.5 MG/DL — SIGNIFICANT CHANGE UP (ref 0.2–1.2)
BUN SERPL-MCNC: 31 MG/DL — HIGH (ref 10–20)
CALCIUM SERPL-MCNC: 9.2 MG/DL — SIGNIFICANT CHANGE UP (ref 8.5–10.1)
CHLORIDE SERPL-SCNC: 104 MMOL/L — SIGNIFICANT CHANGE UP (ref 98–110)
CO2 SERPL-SCNC: 25 MMOL/L — SIGNIFICANT CHANGE UP (ref 17–32)
COVID-19 SPIKE DOMAIN AB INTERP: NEGATIVE — SIGNIFICANT CHANGE UP
COVID-19 SPIKE DOMAIN ANTIBODY RESULT: 0.4 U/ML — SIGNIFICANT CHANGE UP
CREAT SERPL-MCNC: 0.9 MG/DL — SIGNIFICANT CHANGE UP (ref 0.7–1.5)
EOSINOPHIL # BLD AUTO: 0.09 K/UL — SIGNIFICANT CHANGE UP (ref 0–0.7)
EOSINOPHIL NFR BLD AUTO: 1.8 % — SIGNIFICANT CHANGE UP (ref 0–8)
GLUCOSE SERPL-MCNC: 85 MG/DL — SIGNIFICANT CHANGE UP (ref 70–99)
HCT VFR BLD CALC: 30.5 % — LOW (ref 37–47)
HGB BLD-MCNC: 9.6 G/DL — LOW (ref 12–16)
IMM GRANULOCYTES NFR BLD AUTO: 0.2 % — SIGNIFICANT CHANGE UP (ref 0.1–0.3)
LIDOCAIN IGE QN: 28 U/L — SIGNIFICANT CHANGE UP (ref 7–60)
LYMPHOCYTES # BLD AUTO: 0.72 K/UL — LOW (ref 1.2–3.4)
LYMPHOCYTES # BLD AUTO: 14.5 % — LOW (ref 20.5–51.1)
MCHC RBC-ENTMCNC: 29.6 PG — SIGNIFICANT CHANGE UP (ref 27–31)
MCHC RBC-ENTMCNC: 31.5 G/DL — LOW (ref 32–37)
MCV RBC AUTO: 94.1 FL — SIGNIFICANT CHANGE UP (ref 81–99)
MONOCYTES # BLD AUTO: 0.49 K/UL — SIGNIFICANT CHANGE UP (ref 0.1–0.6)
MONOCYTES NFR BLD AUTO: 9.9 % — HIGH (ref 1.7–9.3)
NEUTROPHILS # BLD AUTO: 3.62 K/UL — SIGNIFICANT CHANGE UP (ref 1.4–6.5)
NEUTROPHILS NFR BLD AUTO: 73.2 % — SIGNIFICANT CHANGE UP (ref 42.2–75.2)
NRBC # BLD: 0 /100 WBCS — SIGNIFICANT CHANGE UP (ref 0–0)
PHOSPHATE SERPL-MCNC: 3.8 MG/DL — SIGNIFICANT CHANGE UP (ref 2.1–4.9)
PLATELET # BLD AUTO: 154 K/UL — SIGNIFICANT CHANGE UP (ref 130–400)
POTASSIUM SERPL-MCNC: 4.5 MMOL/L — SIGNIFICANT CHANGE UP (ref 3.5–5)
POTASSIUM SERPL-SCNC: 4.5 MMOL/L — SIGNIFICANT CHANGE UP (ref 3.5–5)
PROT SERPL-MCNC: 6.5 G/DL — SIGNIFICANT CHANGE UP (ref 6–8)
RBC # BLD: 3.24 M/UL — LOW (ref 4.2–5.4)
RBC # FLD: 13.4 % — SIGNIFICANT CHANGE UP (ref 11.5–14.5)
SARS-COV-2 IGG+IGM SERPL QL IA: 0.4 U/ML — SIGNIFICANT CHANGE UP
SARS-COV-2 IGG+IGM SERPL QL IA: NEGATIVE — SIGNIFICANT CHANGE UP
SODIUM SERPL-SCNC: 141 MMOL/L — SIGNIFICANT CHANGE UP (ref 135–146)
TROPONIN T SERPL-MCNC: 0.13 NG/ML — CRITICAL HIGH
WBC # BLD: 4.95 K/UL — SIGNIFICANT CHANGE UP (ref 4.8–10.8)
WBC # FLD AUTO: 4.95 K/UL — SIGNIFICANT CHANGE UP (ref 4.8–10.8)

## 2021-05-17 PROCEDURE — 99497 ADVNCD CARE PLAN 30 MIN: CPT | Mod: 25

## 2021-05-17 PROCEDURE — 99223 1ST HOSP IP/OBS HIGH 75: CPT

## 2021-05-17 PROCEDURE — 93306 TTE W/DOPPLER COMPLETE: CPT | Mod: 26

## 2021-05-17 PROCEDURE — 99233 SBSQ HOSP IP/OBS HIGH 50: CPT

## 2021-05-17 PROCEDURE — 93010 ELECTROCARDIOGRAM REPORT: CPT

## 2021-05-17 PROCEDURE — 71045 X-RAY EXAM CHEST 1 VIEW: CPT | Mod: 26

## 2021-05-17 RX ORDER — FUROSEMIDE 40 MG
40 TABLET ORAL DAILY
Refills: 0 | Status: DISCONTINUED | OUTPATIENT
Start: 2021-05-18 | End: 2021-05-19

## 2021-05-17 RX ORDER — ACETAMINOPHEN 500 MG
650 TABLET ORAL EVERY 6 HOURS
Refills: 0 | Status: DISCONTINUED | OUTPATIENT
Start: 2021-05-17 | End: 2021-05-30

## 2021-05-17 RX ORDER — SENNA PLUS 8.6 MG/1
2 TABLET ORAL AT BEDTIME
Refills: 0 | Status: DISCONTINUED | OUTPATIENT
Start: 2021-05-17 | End: 2021-05-30

## 2021-05-17 RX ORDER — FUROSEMIDE 40 MG
40 TABLET ORAL ONCE
Refills: 0 | Status: COMPLETED | OUTPATIENT
Start: 2021-05-17 | End: 2021-05-17

## 2021-05-17 RX ORDER — LACTULOSE 10 G/15ML
5 SOLUTION ORAL
Refills: 0 | Status: COMPLETED | OUTPATIENT
Start: 2021-05-17 | End: 2021-05-18

## 2021-05-17 RX ADMIN — Medication 40 MILLIGRAM(S): at 09:44

## 2021-05-17 RX ADMIN — PANTOPRAZOLE SODIUM 40 MILLIGRAM(S): 20 TABLET, DELAYED RELEASE ORAL at 05:47

## 2021-05-17 RX ADMIN — Medication 10 MILLIGRAM(S): at 18:34

## 2021-05-17 RX ADMIN — CHLORHEXIDINE GLUCONATE 1 APPLICATION(S): 213 SOLUTION TOPICAL at 05:46

## 2021-05-17 RX ADMIN — Medication 650 MILLIGRAM(S): at 23:10

## 2021-05-17 RX ADMIN — CHLORHEXIDINE GLUCONATE 1 APPLICATION(S): 213 SOLUTION TOPICAL at 18:34

## 2021-05-17 RX ADMIN — LACTULOSE 5 GRAM(S): 10 SOLUTION ORAL at 14:19

## 2021-05-17 RX ADMIN — Medication 650 MILLIGRAM(S): at 22:26

## 2021-05-17 RX ADMIN — ENOXAPARIN SODIUM 30 MILLIGRAM(S): 100 INJECTION SUBCUTANEOUS at 11:58

## 2021-05-17 RX ADMIN — Medication 100 MICROGRAM(S): at 05:46

## 2021-05-17 RX ADMIN — SENNA PLUS 2 TABLET(S): 8.6 TABLET ORAL at 21:08

## 2021-05-17 NOTE — CONSULT NOTE ADULT - ASSESSMENT
IMPRESSION:  acute resp failure improved secondary CHF exacerbation   pleural effusion       PLAN:    CNS: no sedation     HEENT: oral care     PULMONARY: keep pox > 92 % cxr [place] morning   start lasix 40 mg Q 12 hrs if no improvement in pleural effusion will talk to patient and family for thoracentesis   now comfortable will diurese     CARDIOVASCULAR: lasix   cardiology consult   BNP   echo     GI: GI prophylaxis.  Feeding     RENAL: follow is and os lytes     INFECTIOUS DISEASE: no abx for now   follow procal     HEMATOLOGICAL:  DVT prophylaxis.    ENDOCRINE:  Follow up FS.  Insulin protocol if needed.       IMPRESSION:  acute resp failure improved secondary CHF exacerbation   pleural effusion       PLAN:    CNS: no sedation     HEENT: oral care     PULMONARY: keep pox > 92 % cxr [place] morning   start lasix 40 mg Q 24 hrs if no improvement in pleural effusion will talk to patient and family for thoracentesis   now comfortable will diurese     CARDIOVASCULAR: lasix   cardiology consult   BNP   echo     GI: GI prophylaxis.  Feeding     RENAL: follow is and os lytes     INFECTIOUS DISEASE: no abx for now   follow procal     HEMATOLOGICAL:  DVT prophylaxis.    ENDOCRINE:  Follow up FS.  Insulin protocol if needed.

## 2021-05-17 NOTE — PHYSICAL THERAPY INITIAL EVALUATION ADULT - GENERAL OBSERVATIONS, REHAB EVAL
13:05 - 13:30. Chart reviewed. Patient available to be seen for physical therapy, confirmed with nurse. Patient encountered semi-reclined in bed, +4L/min O2 via nasal cannula, +tele, +prima fit. Denies pain at rest and is ready to get OOB with PT now , son at bedside.

## 2021-05-17 NOTE — PHYSICAL THERAPY INITIAL EVALUATION ADULT - GAIT DEVIATIONS NOTED, PT EVAL
stooped posture , unsteady gait , decreased heel strike / push off/decreased carin/increased time in double stance/decreased step length/decreased weight-shifting ability

## 2021-05-17 NOTE — PROGRESS NOTE ADULT - SUBJECTIVE AND OBJECTIVE BOX
DILIP LUCAS 88y Female  MRN#: [id number]       SUBJECTIVE  88 year old female PMH of hepatocellular carcinoma and severe aortic valve disease presents with dyspnea on exertion and sometimes at rest.  Pt has history of severe aortic valve disease, that requires replacement but patient  refused in the past. She presented with progressive dyspnea that started 2 months ago , but yesterday patient could no longer ambulate so the family called EMS.  Patient today remains to be on nasal canula, feeling slightly improved. She says that given her incontinence and frequent urinary problems, her dose of lasix was changes to only tues and thurs. The son attributes her symptoms to be related to lowering the lasix which contributed to her SOB as fluid accumulated rapidly.     Currently she feels mildly constipated and requesting suppository.       OBJECTIVE  PAST MEDICAL & SURGICAL HISTORY  HTN (hypertension)    Mitral valve prolapse    Enlarged heart    Murmur    Hyperthyroidism    Arthritis    HTN (hypertension)    Diverticulosis    Hiatal hernia    Acid reflux    Liver cancer  s/p resection and s/p chemo and radiation    Aortic stenosis    H/O resection of liver    Status post cholecystectomy      ALLERGIES:  Cipro (Other)  Levaquin (Rash)  tetracycline (Hives)    MEDICATIONS:  STANDING MEDICATIONS  chlorhexidine 4% Liquid 1 Application(s) Topical every 12 hours  enalapril 2.5 milliGRAM(s) Oral daily  enoxaparin Injectable 30 milliGRAM(s) SubCutaneous daily  lactulose Syrup 5 Gram(s) Oral two times a day  levothyroxine 100 MICROGram(s) Oral daily  pantoprazole    Tablet 40 milliGRAM(s) Oral before breakfast    PRN MEDICATIONS      VITAL SIGNS: Last 24 Hours  T(C): 36.2 (17 May 2021 07:05), Max: 37.1 (16 May 2021 22:19)  T(F): 97.2 (17 May 2021 07:05), Max: 98.8 (16 May 2021 22:19)  HR: 68 (17 May 2021 07:19) (68 - 96)  BP: 99/67 (17 May 2021 07:19) (99/67 - 140/60)  BP(mean): 78 (17 May 2021 07:19) (76 - 84)  RR: 21 (17 May 2021 07:19) (18 - 26)  SpO2: 100% (17 May 2021 07:19) (98% - 100%)    LABS:                        9.6    4.95  )-----------( 154      ( 17 May 2021 06:29 )             30.5     05-17    141  |  104  |  31<H>  ----------------------------<  85  4.5   |  25  |  0.9    Ca    9.2      17 May 2021 06:29  Phos  3.8     05-17    TPro  6.5  /  Alb  3.8  /  TBili  0.5  /  DBili  x   /  AST  22  /  ALT  14  /  AlkPhos  106  05-17    PT/INR - ( 16 May 2021 14:43 )   PT: 12.80 sec;   INR: 1.11 ratio         PTT - ( 16 May 2021 14:43 )  PTT:26.4 sec      Troponin T, Serum: 0.13 ng/mL *HH* (05-17-21 @ 06:29)      CARDIAC MARKERS ( 17 May 2021 06:29 )  x     / 0.13 ng/mL / x     / x     / x      CARDIAC MARKERS ( 16 May 2021 14:43 )  x     / 0.12 ng/mL / x     / x     / x          RADIOLOGY:  < from: CT Angio Chest PE Protocol w/ IV Cont (05.16.21 @ 18:41) >  Cardiomegaly.    Right upper lobe pulmonary micronodules measuring up to 2-3 mm (3-164), may represent small airways disease.    Biapical lung calcified pleural plaques.    < end of copied text >  < from: CT Angio Chest PE Protocol w/ IV Cont (05.16.21 @ 18:41) >  IMPRESSION:    No CTA evidence of acute pulmonary embolus.    Bilateral pleural effusions, left greater than right, as well as loculated effusions within the right major and minor fissures. Near complete collapse of the left lung lower lobe.    < end of copied text >    < from: TTE Echo Complete w/ Contrast w/ Doppler (05.17.21 @ 09:31) >  Summary:   1. Severe aortic valve stenosis.   2. Left ventricular ejection fraction, by visual estimation, is 55 to 60%.   3. Mild left ventricular hypertrophy.   4. Mild to moderate mitral valve regurgitation.   5. Mild aortic regurgitation.    < end of copied text >    PHYSICAL EXAM:  GENERAL: NAD, AAOx3, frail looking  HEENT:  Atraumatic, Normocephalic.  PULMONARY: Bilateral pulmonary congestion L>R  CARDIOVASCULAR: Systolic murmur  GASTROINTESTINAL: Soft, Nontender, Nondistended  MUSCULOSKELETAL:  2+ Peripheral Pulses, No edema      ADMISSION SUMMARY  Patient is a 88y old Female who presents with a chief complaint of acute on chronic CHF (17 May 2021 08:47)      ASSESSMENT & PLAN    # Acute respiratory failure secondary to fluid overload in light of severe aortic stenosis.  -Bilateral pleural effusions L >R. w/ elevated troponin- Possible NSTEMI type 2  -Elevated BNP >5,000  -Continue with strict intake and output.   -Echo consistent with severe aortic stenosis.  -Continue with IV diuresis. Repeat Chest Xray in AM. If not improving, may need a thoracentesis.     # History of hypothyroidism,   -Continue with levothyroxine    # Anemia- stable at baseline  -Normocytic. Can check iron profile.   -Maintain active type and screen.     # DVT prophylaxis  -On lovenox     Discussed with the son and patient at bedside. Patient is adamant not to undergo any invasive procedure such as valve replacement. Informed the patient about options suggested by cardiology- such as valvular repair. Patient understands she is a poor candidate for any invasive procedures. Also informed about palliative care and initiated goals of care conversation. Son says he will discuss with his sister as well.    DILIP LUCAS 88y Female  MRN#: [id number]       SUBJECTIVE  88 year old female PMH of hepatocellular carcinoma and severe aortic valve disease presents with dyspnea on exertion and sometimes at rest.  Pt has history of severe aortic valve disease, that requires replacement but patient  refused in the past. She presented with progressive dyspnea that started 2 months ago , but yesterday patient could no longer ambulate so the family called EMS.  Patient today remains to be on nasal canula, feeling slightly improved. She says that given her incontinence and frequent urinary problems, her dose of lasix was changes to only tues and thurs. The son attributes her symptoms to be related to lowering the lasix which contributed to her SOB as fluid accumulated rapidly.     Currently she feels mildly constipated and requesting suppository. Offers no new complains. States her sob is improving.       OBJECTIVE  PAST MEDICAL & SURGICAL HISTORY  HTN (hypertension)    Mitral valve prolapse    Enlarged heart    Murmur    Hyperthyroidism    Arthritis    HTN (hypertension)    Diverticulosis    Hiatal hernia    Acid reflux    Liver cancer  s/p resection and s/p chemo and radiation    Aortic stenosis    H/O resection of liver    Status post cholecystectomy      ALLERGIES:  Cipro (Other)  Levaquin (Rash)  tetracycline (Hives)    MEDICATIONS:  STANDING MEDICATIONS  chlorhexidine 4% Liquid 1 Application(s) Topical every 12 hours  enalapril 2.5 milliGRAM(s) Oral daily  enoxaparin Injectable 30 milliGRAM(s) SubCutaneous daily  lactulose Syrup 5 Gram(s) Oral two times a day  levothyroxine 100 MICROGram(s) Oral daily  pantoprazole    Tablet 40 milliGRAM(s) Oral before breakfast    PRN MEDICATIONS      VITAL SIGNS: Last 24 Hours  T(C): 36.2 (17 May 2021 07:05), Max: 37.1 (16 May 2021 22:19)  T(F): 97.2 (17 May 2021 07:05), Max: 98.8 (16 May 2021 22:19)  HR: 68 (17 May 2021 07:19) (68 - 96)  BP: 99/67 (17 May 2021 07:19) (99/67 - 140/60)  BP(mean): 78 (17 May 2021 07:19) (76 - 84)  RR: 21 (17 May 2021 07:19) (18 - 26)  SpO2: 100% (17 May 2021 07:19) (98% - 100%)    LABS:                        9.6    4.95  )-----------( 154      ( 17 May 2021 06:29 )             30.5     05-17    141  |  104  |  31<H>  ----------------------------<  85  4.5   |  25  |  0.9    Ca    9.2      17 May 2021 06:29  Phos  3.8     05-17    TPro  6.5  /  Alb  3.8  /  TBili  0.5  /  DBili  x   /  AST  22  /  ALT  14  /  AlkPhos  106  05-17    PT/INR - ( 16 May 2021 14:43 )   PT: 12.80 sec;   INR: 1.11 ratio         PTT - ( 16 May 2021 14:43 )  PTT:26.4 sec      Troponin T, Serum: 0.13 ng/mL *HH* (05-17-21 @ 06:29)      CARDIAC MARKERS ( 17 May 2021 06:29 )  x     / 0.13 ng/mL / x     / x     / x      CARDIAC MARKERS ( 16 May 2021 14:43 )  x     / 0.12 ng/mL / x     / x     / x          RADIOLOGY:  < from: CT Angio Chest PE Protocol w/ IV Cont (05.16.21 @ 18:41) >  Cardiomegaly.    Right upper lobe pulmonary micronodules measuring up to 2-3 mm (3-164), may represent small airways disease.    Biapical lung calcified pleural plaques.    < end of copied text >  < from: CT Angio Chest PE Protocol w/ IV Cont (05.16.21 @ 18:41) >  IMPRESSION:    No CTA evidence of acute pulmonary embolus.    Bilateral pleural effusions, left greater than right, as well as loculated effusions within the right major and minor fissures. Near complete collapse of the left lung lower lobe.    < end of copied text >    < from: TTE Echo Complete w/ Contrast w/ Doppler (05.17.21 @ 09:31) >  Summary:   1. Severe aortic valve stenosis.   2. Left ventricular ejection fraction, by visual estimation, is 55 to 60%.   3. Mild left ventricular hypertrophy.   4. Mild to moderate mitral valve regurgitation.   5. Mild aortic regurgitation.    < end of copied text >    PHYSICAL EXAM:  GENERAL: NAD, AAOx3, frail looking  HEENT:  Atraumatic, Normocephalic.  PULMONARY: Bilateral pulmonary congestion L>R  CARDIOVASCULAR: Systolic murmur  GASTROINTESTINAL: Soft, Nontender, Nondistended  MUSCULOSKELETAL:  2+ Peripheral Pulses, No edema

## 2021-05-17 NOTE — PHYSICAL THERAPY INITIAL EVALUATION ADULT - ADDITIONAL COMMENTS
As per patient, resides in a private home with her son.  4 steps to enter , patient remains on first floor of home.  Ambulates with a rollator at baseline.

## 2021-05-17 NOTE — PROGRESS NOTE ADULT - ASSESSMENT
88 yr old male with hx of HTN, severe AS, hypothyroid, HFpEF admitted for progressive sob for past few days.     # AHRF secondary to Acute on chronic HFpEF likely secondary to severe aortic stenosis.  - euvolemic on exam   - CXR: Bilateral pleural effusions L >R  - Elevated BNP >5,000  - Patient is adamant not to undergo any invasive procedure such as valve replacement.   -Continue with strict intake and output.   -Echo consistent with severe aortic stenosis.  -Continue with IV diuresis. Repeat Chest Xray in AM. If not improving, may need a thoracentesis.     # NSTEMI type 2  - no need to trend trop    # History of hypothyroidism  - c/w levothyroxine    # Chronic Anemia  - stable at baseline    # DVT prophylaxis  - on Lovenox     # Full Code  - son updated

## 2021-05-17 NOTE — PROGRESS NOTE ADULT - CONVERSATION DETAILS
Spoke with pt regarding living will and what her wishes would be if his heart were to stop beating or her respiratory status worsened. pt states at this point she would want all life sustaining methods to be carried out and therefore remain FULL CODE

## 2021-05-17 NOTE — CONSULT NOTE ADULT - SUBJECTIVE AND OBJECTIVE BOX
Patient is a 88y old  Female who presents with a chief complaint of acute on chronic CHF (17 May 2021 07:33)      HPI:  87 yo W female w/PMH as noted below.  Pt has hx severe aortic valve disease, that requires replacement but pt. refused.   Pt c/o progressive dyspnea that  started 2 months ago , but today pt could no longer ambulate and tolerated her SOB.  so pt family called EMS.  In ER pt CXR showed bilateral pleural effusions( L>R)  requiring thoracentesis in past. Pt w/ elevated : BNP, troponins,.  Ct angio chest did not show any PE .  (16 May 2021 23:00)  now on 3 liter comfortable     PAST MEDICAL & SURGICAL HISTORY:  HTN (hypertension)    Mitral valve prolapse    Enlarged heart    Murmur    Hyperthyroidism    Arthritis    HTN (hypertension)    Diverticulosis    Hiatal hernia    Acid reflux    Liver cancer  s/p resection and s/p chemo and radiation    Aortic stenosis    H/O resection of liver    Status post cholecystectomy      Allergies    Cipro (Other)  Levaquin (Rash)  tetracycline (Hives)    Intolerances      Family history : no cardiovscular family history   Home Medications:  enalapril 2.5 mg oral tablet: 1 tab(s) orally once a day (16 May 2021 14:29)  furosemide 20 mg oral tablet: 1 tab(s) orally once a day (16 May 2021 14:29)  levothyroxine 100 mcg (0.1 mg) oral tablet: 1 tab(s) orally once a day (16 May 2021 14:29)    Occupation:  Alochol: Denied  Smoking: Denied  Drug Use: Denied  Marital Status:         ROS: as in HPI; All other systems reviewed are negative    ICU Vital Signs Last 24 Hrs  T(C): 36.2 (17 May 2021 07:05), Max: 37.1 (16 May 2021 22:19)  T(F): 97.2 (17 May 2021 07:05), Max: 98.8 (16 May 2021 22:19)  HR: 68 (17 May 2021 07:19) (68 - 96)  BP: 99/67 (17 May 2021 07:19) (99/67 - 140/60)  BP(mean): 78 (17 May 2021 07:19) (76 - 84)  ABP: --  ABP(mean): --  RR: 21 (17 May 2021 07:19) (18 - 26)  SpO2: 100% (17 May 2021 07:19) (98% - 100%)        Physical Examination:    General: No acute distress.  Alert, oriented, interactive, nonfocal    HEENT: Pupils equal, reactive to light.  Symmetric.    PULM: decrease LLL    CVS: Regular rate and rhythm, no murmurs, rubs, or gallops    ABD: Soft, nondistended, nontender, normoactive bowel sounds, no masses    EXT: 1 edema, nontender, no clubbing     SKIN: Warm and well perfused, no rashes noted.    Neurology : no motor or sensory deficit     Musculoskeletal : move all extremity     Lymphatic system: no Palpable node               I&O's Detail    16 May 2021 07:01  -  17 May 2021 07:00  --------------------------------------------------------  IN:  Total IN: 0 mL    OUT:    Voided (mL): 1650 mL  Total OUT: 1650 mL    Total NET: -1650 mL            LABS:                        9.6    4.95  )-----------( 154      ( 17 May 2021 06:29 )             30.5     17 May 2021 06:29    141    |  104    |  31     ----------------------------<  85     4.5     |  25     |  0.9      Ca    9.2        17 May 2021 06:29  Phos  3.8       17 May 2021 06:29    TPro  6.5    /  Alb  3.8    /  TBili  0.5    /  DBili  x      /  AST  22     /  ALT  14     /  AlkPhos  106    17 May 2021 06:29  Amylase x     lipase 28         CARDIAC MARKERS ( 17 May 2021 06:29 )  x     / 0.13 ng/mL / x     / x     / x      CARDIAC MARKERS ( 16 May 2021 14:43 )  x     / 0.12 ng/mL / x     / x     / x          CAPILLARY BLOOD GLUCOSE        PT/INR - ( 16 May 2021 14:43 )   PT: 12.80 sec;   INR: 1.11 ratio         PTT - ( 16 May 2021 14:43 )  PTT:26.4 sec    Culture        MEDICATIONS  (STANDING):  chlorhexidine 4% Liquid 1 Application(s) Topical every 12 hours  enalapril 2.5 milliGRAM(s) Oral daily  enoxaparin Injectable 30 milliGRAM(s) SubCutaneous daily  levothyroxine 100 MICROGram(s) Oral daily  pantoprazole    Tablet 40 milliGRAM(s) Oral before breakfast    MEDICATIONS  (PRN):        RADIOLOGY: ***   b/l effusion more left side   CXR:  TLC:  OG:  ET tube:        [facility] ICU:  ECHO:

## 2021-05-17 NOTE — CONSULT NOTE ADULT - SUBJECTIVE AND OBJECTIVE BOX
CARDIOLOGY CONSULT NOTE     CHIEF COMPLAINT/REASON FOR CONSULT:    HPI:  87 yo W female w/PMH as noted below.  Pt has hx severe aortic valve disease, that requires replacement but pt. refused.   Pt c/o progressive dyspnea that  started 2 months ago , but yesterday pt could no longer ambulate and tolerated her SOB.  so pt family called EMS.  In ER pt CXR showed bilateral pleural effusions( L>R)  requiring thoracentesis in past. Pt w/ elevated : BNP, troponins,.  Ct angio chest did not show any PE .  (16 May 2021 23:00)      PAST MEDICAL & SURGICAL HISTORY:  HTN (hypertension)    Mitral valve prolapse    Enlarged heart    Murmur    Hyperthyroidism    Arthritis    HTN (hypertension)    Diverticulosis    Hiatal hernia    Acid reflux    Liver cancer  s/p resection and s/p chemo and radiation    Aortic stenosis    H/O resection of liver    Status post cholecystectomy        Cardiac Risks:   [x ]HTN, [ ] DM, [ ] Smoking, [ x] FH,  [x ] Lipids        MEDICATIONS:  MEDICATIONS  (STANDING):  chlorhexidine 4% Liquid 1 Application(s) Topical every 12 hours  enalapril 2.5 milliGRAM(s) Oral daily  enoxaparin Injectable 30 milliGRAM(s) SubCutaneous daily  levothyroxine 100 MICROGram(s) Oral daily  pantoprazole    Tablet 40 milliGRAM(s) Oral before breakfast      FAMILY HISTORY:      SOCIAL HISTORY:      [ ] Marital status   Allergies    Cipro (Other)  Levaquin (Rash)  tetracycline (Hives)        	    REVIEW OF SYSTEMS:  CONSTITUTIONAL: No fever, weight loss, or fatigue  EYES: No eye pain, visual disturbances, or discharge  ENMT:  No difficulty hearing, tinnitus, vertigo; No sinus or throat pain  NECK: No pain or stiffness  RESPIRATORY: No cough, wheezing, chills or hemoptysis  CARDIOVASCULAR: See above  GASTROINTESTINAL: No abdominal or epigastric pain. No nausea, vomiting, or hematemesis; No diarrhea or constipation. No melena or hematochezia.  GENITOURINARY: No dysuria, frequency, hematuria, or incontinence  NEUROLOGICAL: No headaches, memory loss, loss of strength, numbness, or tremors  SKIN: No itching, burning, rashes, or lesions   	      PHYSICAL EXAM:  T(C): 36.4 (05-16-21 @ 23:00), Max: 37.1 (05-16-21 @ 22:19)  HR: 79 (05-17-21 @ 05:45) (79 - 96)  BP: 106/67 (05-17-21 @ 05:45) (106/67 - 140/60)  RR: 26 (05-17-21 @ 05:45) (18 - 26)  SpO2: 98% (05-17-21 @ 05:45) (98% - 100%)  Wt(kg): --  I&O's Summary    16 May 2021 07:01  -  17 May 2021 07:00  --------------------------------------------------------  IN: 0 mL / OUT: 1650 mL / NET: -1650 mL        Appearance: Normal	  Psychiatry: A & O x 3, Mood & affect appropriate  HEENT:   Normal oral mucosa, PERRL, EOMI	  Lymphatic: No lymphadenopathy  Cardiovascular: Normal S1 Decreased s2 ,RRR, No JVD, II/VI july aortic area  Respiratory: Lungs clear to auscultation	Decreased bs bases  Gastrointestinal:  Soft, Non-tender, + BS	  Skin: No rashes, No ecchymoses, No cyanosis	  Neurologic: Non-focal  Extremities: Normal range of motion, No clubbing, cyanosis or edema  Vascular: Peripheral pulses palpable 2+ bilaterally      ECG:  	ST  lvh tucker rbbb    	  LABS:	 	    CARDIAC MARKERS:          Serum Pro-Brain Natriuretic Peptide: 5291 pg/mL (05-16 @ 14:43)                            9.6    4.95  )-----------( 154      ( 17 May 2021 06:29 )             30.5     05-16    138  |  105  |  37<H>  ----------------------------<  119<H>  6.0<HH>   |  23  |  0.8    Ca    9.2      16 May 2021 14:43    TPro  6.9  /  Alb  3.7  /  TBili  0.4  /  DBili  x   /  AST  53<H>  /  ALT  19  /  AlkPhos  99  05-16    PT/INR - ( 16 May 2021 14:43 )   PT: 12.80 sec;   INR: 1.11 ratio         PTT - ( 16 May 2021 14:43 )  PTT:26.4 sec  proBNP: Serum Pro-Brain Natriuretic Peptide: 5291 pg/mL (05-16 @ 14:43)

## 2021-05-17 NOTE — CONSULT NOTE ADULT - ASSESSMENT
Patient with hx liver ca. She had resection and chemo. She was 2 years ago told at [facility] she needs avr. She refused. Recent VALENTINE on minimal exertion Also vague chest pain and light headed on minimal exertion. Her symptoms suggest severe as. Need review cxr. Check caty feta protein , She needs a echo. prognosis guarded patient aware

## 2021-05-18 LAB
ALBUMIN SERPL ELPH-MCNC: 3.9 G/DL — SIGNIFICANT CHANGE UP (ref 3.5–5.2)
ALP SERPL-CCNC: 117 U/L — HIGH (ref 30–115)
ALT FLD-CCNC: 19 U/L — SIGNIFICANT CHANGE UP (ref 0–41)
ANION GAP SERPL CALC-SCNC: 12 MMOL/L — SIGNIFICANT CHANGE UP (ref 7–14)
AST SERPL-CCNC: 31 U/L — SIGNIFICANT CHANGE UP (ref 0–41)
BASOPHILS # BLD AUTO: 0.02 K/UL — SIGNIFICANT CHANGE UP (ref 0–0.2)
BASOPHILS NFR BLD AUTO: 0.2 % — SIGNIFICANT CHANGE UP (ref 0–1)
BILIRUB SERPL-MCNC: 0.6 MG/DL — SIGNIFICANT CHANGE UP (ref 0.2–1.2)
BLD GP AB SCN SERPL QL: SIGNIFICANT CHANGE UP
BUN SERPL-MCNC: 33 MG/DL — HIGH (ref 10–20)
CALCIUM SERPL-MCNC: 9.4 MG/DL — SIGNIFICANT CHANGE UP (ref 8.5–10.1)
CHLORIDE SERPL-SCNC: 100 MMOL/L — SIGNIFICANT CHANGE UP (ref 98–110)
CO2 SERPL-SCNC: 27 MMOL/L — SIGNIFICANT CHANGE UP (ref 17–32)
CREAT SERPL-MCNC: 1.2 MG/DL — SIGNIFICANT CHANGE UP (ref 0.7–1.5)
EOSINOPHIL # BLD AUTO: 0.01 K/UL — SIGNIFICANT CHANGE UP (ref 0–0.7)
EOSINOPHIL NFR BLD AUTO: 0.1 % — SIGNIFICANT CHANGE UP (ref 0–8)
FERRITIN SERPL-MCNC: 22 NG/ML — SIGNIFICANT CHANGE UP (ref 15–150)
FOLATE SERPL-MCNC: 8.3 NG/ML — SIGNIFICANT CHANGE UP
GLUCOSE SERPL-MCNC: 113 MG/DL — HIGH (ref 70–99)
HCT VFR BLD CALC: 31.9 % — LOW (ref 37–47)
HGB BLD-MCNC: 10.4 G/DL — LOW (ref 12–16)
IMM GRANULOCYTES NFR BLD AUTO: 0.3 % — SIGNIFICANT CHANGE UP (ref 0.1–0.3)
IRON SATN MFR SERPL: 14 % — LOW (ref 15–50)
IRON SATN MFR SERPL: 43 UG/DL — SIGNIFICANT CHANGE UP (ref 35–150)
LYMPHOCYTES # BLD AUTO: 0.56 K/UL — LOW (ref 1.2–3.4)
LYMPHOCYTES # BLD AUTO: 4.5 % — LOW (ref 20.5–51.1)
MCHC RBC-ENTMCNC: 30.1 PG — SIGNIFICANT CHANGE UP (ref 27–31)
MCHC RBC-ENTMCNC: 32.6 G/DL — SIGNIFICANT CHANGE UP (ref 32–37)
MCV RBC AUTO: 92.2 FL — SIGNIFICANT CHANGE UP (ref 81–99)
MONOCYTES # BLD AUTO: 0.71 K/UL — HIGH (ref 0.1–0.6)
MONOCYTES NFR BLD AUTO: 5.8 % — SIGNIFICANT CHANGE UP (ref 1.7–9.3)
NEUTROPHILS # BLD AUTO: 10.98 K/UL — HIGH (ref 1.4–6.5)
NEUTROPHILS NFR BLD AUTO: 89.1 % — HIGH (ref 42.2–75.2)
NRBC # BLD: 0 /100 WBCS — SIGNIFICANT CHANGE UP (ref 0–0)
PLATELET # BLD AUTO: 129 K/UL — LOW (ref 130–400)
POTASSIUM SERPL-MCNC: 4.1 MMOL/L — SIGNIFICANT CHANGE UP (ref 3.5–5)
POTASSIUM SERPL-SCNC: 4.1 MMOL/L — SIGNIFICANT CHANGE UP (ref 3.5–5)
PROT SERPL-MCNC: 6.5 G/DL — SIGNIFICANT CHANGE UP (ref 6–8)
RBC # BLD: 3.46 M/UL — LOW (ref 4.2–5.4)
RBC # FLD: 13.3 % — SIGNIFICANT CHANGE UP (ref 11.5–14.5)
SODIUM SERPL-SCNC: 139 MMOL/L — SIGNIFICANT CHANGE UP (ref 135–146)
T4 AB SER-ACNC: 9.2 UG/DL — SIGNIFICANT CHANGE UP (ref 4.6–12)
TIBC SERPL-MCNC: 316 UG/DL — SIGNIFICANT CHANGE UP (ref 220–430)
TRANSFERRIN SERPL-MCNC: 259 MG/DL — SIGNIFICANT CHANGE UP (ref 200–360)
TSH SERPL-MCNC: 4.14 UIU/ML — SIGNIFICANT CHANGE UP (ref 0.27–4.2)
UIBC SERPL-MCNC: 273 UG/DL — SIGNIFICANT CHANGE UP (ref 110–370)
VIT B12 SERPL-MCNC: 1262 PG/ML — HIGH (ref 232–1245)
WBC # BLD: 12.32 K/UL — HIGH (ref 4.8–10.8)
WBC # FLD AUTO: 12.32 K/UL — HIGH (ref 4.8–10.8)

## 2021-05-18 PROCEDURE — 99232 SBSQ HOSP IP/OBS MODERATE 35: CPT

## 2021-05-18 PROCEDURE — 71045 X-RAY EXAM CHEST 1 VIEW: CPT | Mod: 26

## 2021-05-18 PROCEDURE — 99233 SBSQ HOSP IP/OBS HIGH 50: CPT

## 2021-05-18 RX ADMIN — LACTULOSE 5 GRAM(S): 10 SOLUTION ORAL at 05:19

## 2021-05-18 RX ADMIN — ENOXAPARIN SODIUM 30 MILLIGRAM(S): 100 INJECTION SUBCUTANEOUS at 11:54

## 2021-05-18 RX ADMIN — CHLORHEXIDINE GLUCONATE 1 APPLICATION(S): 213 SOLUTION TOPICAL at 18:30

## 2021-05-18 RX ADMIN — Medication 2.5 MILLIGRAM(S): at 05:19

## 2021-05-18 RX ADMIN — Medication 40 MILLIGRAM(S): at 05:19

## 2021-05-18 RX ADMIN — SENNA PLUS 2 TABLET(S): 8.6 TABLET ORAL at 22:01

## 2021-05-18 RX ADMIN — PANTOPRAZOLE SODIUM 40 MILLIGRAM(S): 20 TABLET, DELAYED RELEASE ORAL at 06:06

## 2021-05-18 RX ADMIN — Medication 100 MICROGRAM(S): at 05:19

## 2021-05-18 NOTE — PROGRESS NOTE ADULT - SUBJECTIVE AND OBJECTIVE BOX
SUBJECTIVE:    Patient is a 88y old Female who presents with a chief complaint of acute on chronic CHF (18 May 2021 08:43)    Currently admitted to medicine with the primary diagnosis of Pleural effusion    Today is hospital day 2d. This morning she is resting comfortably in bed and reports no new issues or overnight events. Patient states she feels better today. Wants to ambulate.     PAST MEDICAL & SURGICAL HISTORY  HTN (hypertension)    Mitral valve prolapse    Enlarged heart    Murmur    Hyperthyroidism    Arthritis    HTN (hypertension)    Diverticulosis    Hiatal hernia    Acid reflux    Liver cancer  s/p resection and s/p chemo and radiation    Aortic stenosis    H/O resection of liver    Status post cholecystectomy      SOCIAL HISTORY:  Negative for smoking/alcohol/drug use.     ALLERGIES:  Cipro (Other)  Levaquin (Rash)  tetracycline (Hives)    MEDICATIONS:  STANDING MEDICATIONS  chlorhexidine 4% Liquid 1 Application(s) Topical every 12 hours  enalapril 2.5 milliGRAM(s) Oral daily  enoxaparin Injectable 30 milliGRAM(s) SubCutaneous daily  furosemide   Injectable 40 milliGRAM(s) IV Push daily  levothyroxine 100 MICROGram(s) Oral daily  senna 2 Tablet(s) Oral at bedtime    PRN MEDICATIONS  acetaminophen   Tablet .. 650 milliGRAM(s) Oral every 6 hours PRN  bisacodyl Suppository 10 milliGRAM(s) Rectal daily PRN    VITALS:   T(F): 99.4  HR: 86  BP: 110/61  RR: 24  SpO2: 98%    LABS:                        10.4   12.32 )-----------( 129      ( 18 May 2021 05:38 )             31.9     05-18    139  |  100  |  33<H>  ----------------------------<  113<H>  4.1   |  27  |  1.2    Ca    9.4      18 May 2021 05:38  Phos  3.8     05-17    TPro  6.5  /  Alb  3.9  /  TBili  0.6  /  DBili  x   /  AST  31  /  ALT  19  /  AlkPhos  117<H>  05-18    PT/INR - ( 16 May 2021 14:43 )   PT: 12.80 sec;   INR: 1.11 ratio         PTT - ( 16 May 2021 14:43 )  PTT:26.4 sec      CARDIAC MARKERS ( 17 May 2021 06:29 )  x     / 0.13 ng/mL / x     / x     / x      CARDIAC MARKERS ( 16 May 2021 14:43 )  x     / 0.12 ng/mL / x     / x     / x          RADIOLOGY:  Xray Chest 1 View- PORTABLE-Routine (Xray Chest 1 View- PORTABLE-Routine in AM.) (05.18.21 @ 06:48) >  Right opacity/pleural effusion, decreased. Right minor fissure fluid loculation, decreased. Right major fissure fluid loculation, unchanged. Left opacity/pleural effusion, decreased.      PHYSICAL EXAM:  GEN: No acute distress  LUNGS: Clear to auscultation bilaterally   HEART: S1/S2 present. RRR.   ABD: Soft, non-tender, non-distended. Bowel sounds present  EXT: NC/NC/NE/2+PP/BEASLEY  NEURO: AAOX3

## 2021-05-18 NOTE — ADVANCED PRACTICE NURSE CONSULT - RECOMMEDATIONS
Plan: Discontinue pressure injury perimeter   Pressure ulcer preventive  measures  skin care   Asses wound and inform primary provider of any changes   Case discussed with primary Rn  Wound/ ostomy specialist  to f/u as needed   Offloading: [ ] Frequent position changes [ ] Devices/Equipment  Cleansing: [ ] Saline [ x] Soap/Water [ ] Other: ______  Topicals: [x ] Barrier Cream [ ] Antimicrobial [ ] Enzymatic Wound Debridement  Dressings: [ ] Dry, sterile [ ] Foam [ ] Absorbant Pads [ ] Collagenase

## 2021-05-18 NOTE — PROGRESS NOTE ADULT - ASSESSMENT
Patient by echo severe as. Told patient she needs valvuloplasty if she is a candidate. She is considering. She is aware poor prognosis with severe as

## 2021-05-18 NOTE — ADVANCED PRACTICE NURSE CONSULT - ASSESSMENT
Patient is a t88 yo W female w/PMH as noted below.  Pt has hx severe aortic valve disease, that requires replacement but pt. refused.  Pt c/o progressive dyspnea that  started 2 months ago , but today pt could no longer ambulate and tolerated her SOB.  so pt family called EMS.  In ER pt CXR showed bilateral pleural effusions( L>R)  requiring thoracentesis in past. Pt w/ elevated : BNP, troponins,.  Ct angio chest did not show any PE .   PAST MEDICAL & SURGICAL HISTORY:  HTN (hypertension)  Mitral valve prolapse  Enlarged heart  Murmur  Hyperthyroidism  Arthritis  HTN (hypertension)  Diverticulosis  Hiatal hernia  Acid reflux  Liver cancer  s/p resection and s/p chemo and radiation  Aortic stenosis  H/O resection of liver  Status post cholecystectomy    Assessment:  Patient received in chair. A/O responds appropriately to verbal command.                      Skin assessed-   B/L buttock healed ulcer with epithelium and scare tissue formation .                                              No pressure injury noted at time of assessment , but pt at high risk of developing pressure injury due to incontinence and history of pressure injury.     Wound #1  Location: ****  Size: Length: **** Width: **** Depth: ****    Tissue Description (Place "x" if applicable/present):   [ ] Necrotic   [ ] Slough   [ ] Infected   [ ] Granulation (firm, beefy red tissue)   [ ] Hypergranulation (soft, gelatinous)  [ ] Poor-Quality Granulation (pale, grey/brown/red granulation tissue)   [ ] Epithelium (pink/mauve at wound edges)  [ ] Macerated  [ ] Other: _______  Wound Exudate :   Wound Edge):   [ ] Epithelisation [ ] Maceration [ ] Dehydration [ ] Undermining (use clock position) [ ] Rolled Edges [ ] Other: _____  Periwound Condition (area that extends 4cm from the edge of the wound):   [ ] Maceration [ ] Excoriation [ ] Dry skin [ ] Hyperkeratosis [ ] Callus [ ] Eczema [ ] Other: _______    Pressure due to: [ ] Immobility [ ] Medical Device   [ ] Stage I  [ ]  Stage II  [ ]  Stage III  [ ]  Stage VI  [ ]  Suspected Deep Tissue Injury (DTI)  [ ]  Unstageable    Other Etiology:  [ ] Aterial  [ ] Venous   [ ] Surgical Incision  [ ] Other: ________

## 2021-05-18 NOTE — PROGRESS NOTE ADULT - ASSESSMENT
88 yr old male with hx of HTN, severe AS, hypothyroid, HFpEF admitted for progressive sob for past few days.     # AHRF secondary to Acute on chronic HFpEF likely secondary to severe aortic stenosis.  - euvolemic on exam   - Elevated BNP >5,000  - Echo consistent with severe aortic stenosis.  - Xray Chest (05.18.21): Right opacity/pleural effusion, decreased. Right minor fissure fluid loculation, decreased. Right major fissure fluid loculation, unchanged. Left opacity/pleural effusion, decreased.  - Cr slightly rising --> decrease Lasix to daily  - strict intake and output.   - pt considering valve replacement.   - cardiology following     # NSTEMI type 2  - no need to trend trop    # History of hypothyroidism  - c/w levothyroxine    # Chronic Anemia  - stable at baseline    # DVT prophylaxis  - on Lovenox     # Full Code  - son updated  88 yr old male with hx of HTN, severe AS, hypothyroid, HFpEF admitted for progressive sob for past few days.     # AHRF secondary to Acute on chronic HFpEF likely secondary to severe aortic stenosis.  - euvolemic on exam   - Elevated BNP >5,000  - Echo consistent with severe aortic stenosis.  - Xray Chest (05.18.21): Right opacity/pleural effusion, decreased. Right minor fissure fluid loculation, decreased. Right major fissure fluid loculation, unchanged. Left opacity/pleural effusion, decreased.  - Cr slightly rising --> decrease Lasix to daily  - strict intake and output.   - pt considering valve replacement.   - cardiology following     # NSTEMI type 2  - no need to trend trop    # Mild ANNETTE  -Creatinine trended up slightly.   -Will D/C enalapril for now.   -Continue with gentle diuresis for now.     # History of hypothyroidism  - c/w levothyroxine    # Chronic Anemia  - stable at baseline    # DVT prophylaxis  - on Lovenox     # Full Code  - son updated

## 2021-05-18 NOTE — PROGRESS NOTE ADULT - ASSESSMENT
IMPRESSION:  acute resp failure improved secondary CHF exacerbation   pleural effusion   severe aortic stenosis     PLAN:    CNS: no sedation     HEENT: oral care     PULMONARY: keep pox > 92 % cxr chelsea morning   continue asix 40 mg Q 24 hrs cxr improved   now comfortable will diurese     CARDIOVASCULAR: lasix   cardiology consult   BNP   echo     GI: GI prophylaxis.  Feeding     RENAL: follow is and os lytes     INFECTIOUS DISEASE: no abx for now       HEMATOLOGICAL:  DVT prophylaxis.    ENDOCRINE:  Follow up FS.  Insulin protocol if needed.       IMPRESSION:  acute resp failure improved secondary CHF exacerbation   pleural effusion   severe aortic stenosis     PLAN:    CNS: no sedation     HEENT: oral care     PULMONARY: keep pox > 92 % cxr chelsea morning   lower Lasix 40 mg Q 24 hrs cxr improved   now comfortable will diurese     CARDIOVASCULAR: lasix   cardiology consult   BNP   echo     GI: GI prophylaxis.  Feeding     RENAL: follow is and os lytes     INFECTIOUS DISEASE: no abx for now       HEMATOLOGICAL:  DVT prophylaxis.    ENDOCRINE:  Follow up FS.  Insulin protocol if needed.

## 2021-05-18 NOTE — PROGRESS NOTE ADULT - SUBJECTIVE AND OBJECTIVE BOX
Patient is a 88y old  Female who presents with a chief complaint of acute on chronic CHF (18 May 2021 08:34)      Over Night Events:  Patient seen and examined.   feel better     ROS:  See HPI    PHYSICAL EXAM    ICU Vital Signs Last 24 Hrs  T(C): 37.4 (18 May 2021 07:01), Max: 37.4 (18 May 2021 07:01)  T(F): 99.4 (18 May 2021 07:01), Max: 99.4 (18 May 2021 07:01)  HR: 86 (18 May 2021 07:10) (69 - 86)  BP: 110/61 (18 May 2021 07:10) (102/62 - 110/61)  BP(mean): 78 (18 May 2021 07:10) (71 - 78)  ABP: --  ABP(mean): --  RR: 24 (18 May 2021 07:10) (20 - 25)  SpO2: 98% (18 May 2021 07:10) (98% - 100%)      General:awake   HEENT:      criss          Lymph Nodes: NO cervical LN   Lungs: Bilateral   decreased   Cardiovascular: Regular   Abdomen: Soft, Positive BS  Extremities: No clubbing   Skin: warm   Neurological: no focal   Musculoskeletal: move all ext     I&O's Detail    17 May 2021 07:01  -  18 May 2021 07:00  --------------------------------------------------------  IN:    Oral Fluid: 740 mL  Total IN: 740 mL    OUT:    Voided (mL): 2050 mL  Total OUT: 2050 mL    Total NET: -1310 mL          LABS:                          10.4   12.32 )-----------( 129      ( 18 May 2021 05:38 )             31.9         18 May 2021 05:38    139    |  100    |  33     ----------------------------<  113    4.1     |  27     |  1.2      Ca    9.4        18 May 2021 05:38  Phos  3.8       17 May 2021 06:29    TPro  6.5    /  Alb  3.9    /  TBili  0.6    /  DBili  x      /  AST  31     /  ALT  19     /  AlkPhos  117    18 May 2021 05:38  Amylase x     lipase x                                                 PT/INR - ( 16 May 2021 14:43 )   PT: 12.80 sec;   INR: 1.11 ratio         PTT - ( 16 May 2021 14:43 )  PTT:26.4 sec                                             CARDIAC MARKERS ( 17 May 2021 06:29 )  x     / 0.13 ng/mL / x     / x     / x      CARDIAC MARKERS ( 16 May 2021 14:43 )  x     / 0.12 ng/mL / x     / x     / x                                                                                                                                                 MEDICATIONS  (STANDING):  chlorhexidine 4% Liquid 1 Application(s) Topical every 12 hours  enalapril 2.5 milliGRAM(s) Oral daily  enoxaparin Injectable 30 milliGRAM(s) SubCutaneous daily  furosemide   Injectable 40 milliGRAM(s) IV Push daily  levothyroxine 100 MICROGram(s) Oral daily  pantoprazole    Tablet 40 milliGRAM(s) Oral before breakfast  senna 2 Tablet(s) Oral at bedtime    MEDICATIONS  (PRN):  acetaminophen   Tablet .. 650 milliGRAM(s) Oral every 6 hours PRN Temp greater or equal to 38C (100.4F), Mild Pain (1 - 3)  bisacodyl Suppository 10 milliGRAM(s) Rectal daily PRN Constipation          Xrays:  TLC:  OG:  ET tube:                                                                                    decrease left effusion    ECHO:  [facility] ICU:

## 2021-05-18 NOTE — PROGRESS NOTE ADULT - SUBJECTIVE AND OBJECTIVE BOX
Patient is a 88y old  Female who presents with a chief complaint of acute on chronic CHF (17 May 2021 14:43)      T(F): 99.4 (05-18-21 @ 07:01), Max: 99.4 (05-18-21 @ 07:01)  HR: 86 (05-18-21 @ 07:10)  BP: 110/61 (05-18-21 @ 07:10)  RR: 24 (05-18-21 @ 07:10)  SpO2: 98% (05-18-21 @ 07:10) (98% - 100%)    PHYSICAL EXAM:  GENERAL: NAD, well-groomed, well-developed  HEAD:  Atraumatic, Normocephalic  EYES: EOMI, PERRLA, conjunctiva and sclera clear  ENMT: No tonsillar erythema, exudates, or enlargement; Moist mucous membranes, Good dentition, No lesions  NECK: Supple, No JVD, Normal thyroid  NERVOUS SYSTEM:  Alert & Oriented X3,  Motor Strength 5/5 B/L upper and lower extremities  CHEST/LUNG: Clear to percussion bilaterally; No rales, rhonchi, wheezing, or rubs  HEART: Regular rate and rhythm; II/VI july aortic area.  rubs, or gallops  ABDOMEN: Soft, Nontender, Nondistended; Bowel sounds present  EXTREMITIES:   No clubbing, cyanosis, or edema  LYMPH: No lymphadenopathy noted  SKIN: No rashes or lesions    labs  05-18    139  |  100  |  33<H>  ----------------------------<  113<H>  4.1   |  27  |  1.2    Ca    9.4      18 May 2021 05:38  Phos  3.8     05-17    TPro  6.5  /  Alb  3.9  /  TBili  0.6  /  DBili  x   /  AST  31  /  ALT  19  /  AlkPhos  117<H>  05-18                          10.4   12.32 )-----------( 129      ( 18 May 2021 05:38 )             31.9       PT/INR - ( 16 May 2021 14:43 )   PT: 12.80 sec;   INR: 1.11 ratio         PTT - ( 16 May 2021 14:43 )  PTT:26.4 sec        acetaminophen   Tablet .. 650 milliGRAM(s) Oral every 6 hours PRN  bisacodyl Suppository 10 milliGRAM(s) Rectal daily PRN  chlorhexidine 4% Liquid 1 Application(s) Topical every 12 hours  enalapril 2.5 milliGRAM(s) Oral daily  enoxaparin Injectable 30 milliGRAM(s) SubCutaneous daily  furosemide   Injectable 40 milliGRAM(s) IV Push daily  levothyroxine 100 MICROGram(s) Oral daily  pantoprazole    Tablet 40 milliGRAM(s) Oral before breakfast  senna 2 Tablet(s) Oral at bedtime

## 2021-05-19 LAB
ANION GAP SERPL CALC-SCNC: 12 MMOL/L — SIGNIFICANT CHANGE UP (ref 7–14)
BUN SERPL-MCNC: 36 MG/DL — HIGH (ref 10–20)
CALCIUM SERPL-MCNC: 8.8 MG/DL — SIGNIFICANT CHANGE UP (ref 8.5–10.1)
CHLORIDE SERPL-SCNC: 98 MMOL/L — SIGNIFICANT CHANGE UP (ref 98–110)
CO2 SERPL-SCNC: 27 MMOL/L — SIGNIFICANT CHANGE UP (ref 17–32)
CREAT SERPL-MCNC: 0.9 MG/DL — SIGNIFICANT CHANGE UP (ref 0.7–1.5)
GLUCOSE SERPL-MCNC: 100 MG/DL — HIGH (ref 70–99)
HCT VFR BLD CALC: 30.2 % — LOW (ref 37–47)
HGB BLD-MCNC: 9.7 G/DL — LOW (ref 12–16)
MCHC RBC-ENTMCNC: 29.3 PG — SIGNIFICANT CHANGE UP (ref 27–31)
MCHC RBC-ENTMCNC: 32.1 G/DL — SIGNIFICANT CHANGE UP (ref 32–37)
MCV RBC AUTO: 91.2 FL — SIGNIFICANT CHANGE UP (ref 81–99)
NRBC # BLD: 0 /100 WBCS — SIGNIFICANT CHANGE UP (ref 0–0)
PLATELET # BLD AUTO: 126 K/UL — LOW (ref 130–400)
POTASSIUM SERPL-MCNC: 3.5 MMOL/L — SIGNIFICANT CHANGE UP (ref 3.5–5)
POTASSIUM SERPL-SCNC: 3.5 MMOL/L — SIGNIFICANT CHANGE UP (ref 3.5–5)
RBC # BLD: 3.31 M/UL — LOW (ref 4.2–5.4)
RBC # FLD: 13.2 % — SIGNIFICANT CHANGE UP (ref 11.5–14.5)
SODIUM SERPL-SCNC: 137 MMOL/L — SIGNIFICANT CHANGE UP (ref 135–146)
WBC # BLD: 10.33 K/UL — SIGNIFICANT CHANGE UP (ref 4.8–10.8)
WBC # FLD AUTO: 10.33 K/UL — SIGNIFICANT CHANGE UP (ref 4.8–10.8)

## 2021-05-19 PROCEDURE — 99232 SBSQ HOSP IP/OBS MODERATE 35: CPT

## 2021-05-19 PROCEDURE — 99233 SBSQ HOSP IP/OBS HIGH 50: CPT

## 2021-05-19 PROCEDURE — 71045 X-RAY EXAM CHEST 1 VIEW: CPT | Mod: 26

## 2021-05-19 PROCEDURE — 93010 ELECTROCARDIOGRAM REPORT: CPT

## 2021-05-19 RX ORDER — FUROSEMIDE 40 MG
20 TABLET ORAL DAILY
Refills: 0 | Status: DISCONTINUED | OUTPATIENT
Start: 2021-05-20 | End: 2021-05-22

## 2021-05-19 RX ADMIN — Medication 40 MILLIGRAM(S): at 05:10

## 2021-05-19 RX ADMIN — ENOXAPARIN SODIUM 30 MILLIGRAM(S): 100 INJECTION SUBCUTANEOUS at 11:25

## 2021-05-19 RX ADMIN — Medication 100 MICROGRAM(S): at 05:10

## 2021-05-19 NOTE — PROGRESS NOTE ADULT - ASSESSMENT
IMPRESSION:  acute resp failure improved secondary CHF exacerbation   pleural effusion   severe aortic stenosis     PLAN:    CNS: no sedation     HEENT: oral care     PULMONARY: keep pox > 92 % cxr chelsea morning   lasix 40 mg po   now comfortable will diurese     CARDIOVASCULAR: lasix Po as per cardiology   cardiology follow        GI: GI prophylaxis.  Feeding     RENAL: follow is and os lytes     INFECTIOUS DISEASE: no abx for now       HEMATOLOGICAL:  DVT prophylaxis.    ENDOCRINE:  Follow up FS.  Insulin protocol if needed.  from pulmonary can be transferred to floor follow cardiology

## 2021-05-19 NOTE — PROGRESS NOTE ADULT - SUBJECTIVE AND OBJECTIVE BOX
DILIP LUCAS 88y Female  MRN#: [id number]       SUBJECTIVE  Patient is a 88y old Female who presents with a chief complaint of acute on chronic CHF (19 May 2021 08:46)    OBJECTIVE  PAST MEDICAL & SURGICAL HISTORY  HTN (hypertension)    Mitral valve prolapse    Enlarged heart    Murmur    Hyperthyroidism    Arthritis    HTN (hypertension)    Diverticulosis    Hiatal hernia    Acid reflux    Liver cancer  s/p resection and s/p chemo and radiation    Aortic stenosis    H/O resection of liver    Status post cholecystectomy      ALLERGIES:  Cipro (Other)  Levaquin (Rash)  tetracycline (Hives)    MEDICATIONS:  STANDING MEDICATIONS  chlorhexidine 4% Liquid 1 Application(s) Topical every 12 hours  enoxaparin Injectable 30 milliGRAM(s) SubCutaneous daily  levothyroxine 100 MICROGram(s) Oral daily  senna 2 Tablet(s) Oral at bedtime    PRN MEDICATIONS  acetaminophen   Tablet .. 650 milliGRAM(s) Oral every 6 hours PRN  bisacodyl Suppository 10 milliGRAM(s) Rectal daily PRN      VITAL SIGNS: Last 24 Hours  T(C): 36.6 (19 May 2021 07:01), Max: 37.3 (18 May 2021 15:45)  T(F): 97.9 (19 May 2021 07:01), Max: 99.1 (18 May 2021 15:45)  HR: 79 (19 May 2021 07:36) (72 - 89)  BP: 90/52 (19 May 2021 07:36) (90/52 - 112/57)  BP(mean): 65 (19 May 2021 07:36) (65 - 81)  RR: 31 (19 May 2021 07:36) (18 - 31)  SpO2: 95% (19 May 2021 07:36) (95% - 99%)    LABS:                        9.7    10.33 )-----------( 126      ( 19 May 2021 05:35 )             30.2     05-19    137  |  98  |  36<H>  ----------------------------<  100<H>  3.5   |  27  |  0.9    Ca    8.8      19 May 2021 05:35    TPro  6.5  /  Alb  3.9  /  TBili  0.6  /  DBili  x   /  AST  31  /  ALT  19  /  AlkPhos  117<H>  05-18      RADIOLOGY:  < from: Xray Chest 1 View- PORTABLE-Routine (Xray Chest 1 View- PORTABLE-Routine in AM.) (05.19.21 @ 07:09) >  Findings:    Support devices: None.    Cardiac/mediastinum/hilum: Unchanged    Lung parenchyma/Pleura: Left basilar opacity/effusion similar to prior. Right-sided pleural effusion also noted with likely loculated fluid along the fissure. No pneumothorax.    Skeleton/soft tissues: Unchanged    Impression:    Left basilar opacity/effusion similar to prior. Right-sided pleural effusion also noted with likely loculated fluid along the fissure.    < end of copied text >      PHYSICAL EXAM:  GEN: No acute distress  LUNGS: Clear to auscultation bilaterally   HEART: S1/S2 present. RRR.   ABD: Soft, non-tender, non-distended. Bowel sounds present  EXT: NC/NC/NE/2+PP/BEASLEY  NEURO: AAOX3    Assessment and Plan:   · Assessment	  88 yr old male with hx of HTN, severe AS, hypothyroid, HFpEF admitted for progressive sob for past few days.     # AHRF secondary to Acute on chronic HFpEF likely secondary to severe aortic stenosis.  - euvolemic on exam   - Elevated BNP >5,000  - Echo consistent with severe aortic stenosis.  - Cr slightly rising --> decrease Lasix to daily  - strict intake and output.   - pt considering valve replacement.   - cardiology following     # NSTEMI type 2  - no need to trend trop    # Mild ANNETTE  -Keep holding enalapril for now.   -Continue with gentle diuresis for now.     # Mild Thrombocytopenia  -Trend CBC for now    # History of hypothyroidism  - c/w levothyroxine    # Chronic Anemia  - stable at baseline    # DVT prophylaxis  - on Lovenox     # Full Code  - son updated

## 2021-05-19 NOTE — PROGRESS NOTE ADULT - ASSESSMENT
88 yr old male with hx of HTN, severe AS, hypothyroid, HFpEF admitted for progressive sob for past few days.     #  Acute on chronic HFpEF secondary to severe aortic stenosis / NSTEMI possibly type 2    - Elevated BNP >5,000  - Echo consistent with severe aortic stenosis.  - continue oral Lasix  - strict intake and output.   - pt is agreeable for [place] and evaluation by TAVR team  - transfer to [facility] tele as per Dr Kwon   - case signed out to hospitalist [facility] (Dr Silevstre)        # History of hypothyroidism  - c/w levothyroxine    # Chronic Anemia  - stable at baseline    # DVT prophylaxis  - on Lovenox     # Full Code

## 2021-05-19 NOTE — CONSULT NOTE ADULT - SUBJECTIVE AND OBJECTIVE BOX
HPI:  87 yo W female w/PMH as noted below.  Pt has hx severe aortic valve disease, that requires replacement but pt. refused.   Pt c/o progressive dyspnea that  started 2 months ago , but today pt could no longer ambulate and tolerated her SOB.  so pt family called EMS.  In ER pt CXR showed bilateral pleural effusions( L>R)  requiring thoracentesis in past. Pt w/ elevated : BNP, troponins,.  Ct angio chest did not show any PE .  (16 May 2021 23:00)    Cardiology HPI:  Pt. with hx of severe AS, presented from [facility] with cc of sob. Pt. reportedly was told that she needed AVR 2 years ago at [facility] but she refused at the time as she was afraid of the procedure. Pt. reports that she has been progressively getting worse, and reports having a recent admission 6 weeks ago fo [facility]. Pt. lives in  with her son, and she is having difficulty doing her activities of daily living, including cooking, cleaning, washing, bathing, and eating. Denies any hx of prior MI.     PAST MEDICAL & SURGICAL HISTORY  HTN (hypertension)    Mitral valve prolapse    Enlarged heart    Murmur    Hyperthyroidism    Arthritis    HTN (hypertension)    Diverticulosis    Hiatal hernia    Acid reflux    Liver cancer  s/p resection and s/p chemo and radiation    Aortic stenosis    H/O resection of liver    Status post cholecystectomy        FAMILY HISTORY:  FAMILY HISTORY:  no hx of premature CAD    SOCIAL HISTORY:  [-]smoker  [-]Alcohol  [-]Drug    ALLERGIES:  Cipro (Other)  Levaquin (Rash)  tetracycline (Hives)      MEDICATIONS:  MEDICATIONS  (STANDING):  chlorhexidine 4% Liquid 1 Application(s) Topical every 12 hours  enoxaparin Injectable 30 milliGRAM(s) SubCutaneous daily  levothyroxine 100 MICROGram(s) Oral daily  senna 2 Tablet(s) Oral at bedtime    MEDICATIONS  (PRN):  acetaminophen   Tablet .. 650 milliGRAM(s) Oral every 6 hours PRN Temp greater or equal to 38C (100.4F), Mild Pain (1 - 3)  bisacodyl Suppository 10 milliGRAM(s) Rectal daily PRN Constipation      HOME MEDICATIONS:  Home Medications:  enalapril 2.5 mg oral tablet: 1 tab(s) orally once a day (16 May 2021 14:29)  furosemide 20 mg oral tablet: 1 tab(s) orally once a day (16 May 2021 14:29)  levothyroxine 100 mcg (0.1 mg) oral tablet: 1 tab(s) orally once a day (16 May 2021 14:29)      VITALS:   T(F): 98.5 ( @ 15:06), Max: 99.4 ( @ 07:01)  HR: 88 ( @ 15:06) (68 - 96)  BP: 113/52 ( @ 15:06) (90/52 - 140/60)  BP(mean): 65 ( @ 07:36) (65 - 84)  RR: 22 ( @ 15:06) (18 - 31)  SpO2: 95% ( @ 07:36) (95% - 100%)    I&O's Summary    18 May 2021 07:01  -  19 May 2021 07:00  --------------------------------------------------------  IN: 420 mL / OUT: 950 mL / NET: -530 mL    19 May 2021 07:01  -  19 May 2021 19:58  --------------------------------------------------------  IN: 450 mL / OUT: 700 mL / NET: -250 mL        REVIEW OF SYSTEMS:  CONSTITUTIONAL: No weakness, fevers or chills  EYES/ENT: No visual changes;  No vertigo or throat pain   NECK: No pain or stiffness  RESPIRATORY: No cough, wheezing, hemoptysis  CARDIOVASCULAR: No chest pain or palpitations  GASTROINTESTINAL: No abdominal or epigastric pain. No nausea, vomiting, or hematemesis; No diarrhea or constipation. No melena or hematochezia.  GENITOURINARY: No dysuria, frequency or hematuria  NEUROLOGICAL: No numbness or weakness  SKIN: No itching, no rashes    PHYSICAL EXAM:  NEURO: patient is awake , alert and oriented  GEN: Not in acute distress  NECK: no thyroid enlargement, no JVD  LUNGS: decreased air entry bilaterally   CARDIOVASCULAR: S1/S2 present, RRR , no murmurs or rubs, no carotid bruits,  + PP bilaterally  ABD: Soft, non-tender, non-distended, +BS  EXT: No MICHELLE  SKIN: Intact    LABS:                        9.7    10.33 )-----------( 126      ( 19 May 2021 05:35 )             30.2         137  |  98  |  36<H>  ----------------------------<  100<H>  3.5   |  27  |  0.9    Ca    8.8      19 May 2021 05:35    TPro  6.5  /  Alb  3.9  /  TBili  0.6  /  DBili  x   /  AST  31  /  ALT  19  /  AlkPhos  117<H>  05          RADIOLOGY:  -CXR:  < from: Xray Chest 1 View- PORTABLE-Routine (Xray Chest 1 View- PORTABLE-Routine in AM.) (21 @ 07:09) >  Impression:    Left basilar opacity/effusion similar to prior. Right-sided pleural effusion also noted with likely loculated fluid along the fissure.          -TTE:  < from: TTE Echo Complete w/ Contrast w/ Doppler (21 @ 09:31) >  Summary:   1. Severe aortic valve stenosis.   2. Left ventricular ejection fraction, by visual estimation, is 55 to 60%.   3. Mild left ventricular hypertrophy.   4. Mild to moderate mitral valve regurgitation.   5. Mild aortic regurgitation.    PHYSICIAN INTERPRETATION:  Left Ventricle: The left ventricular internal cavity size is normal. There is mild left ventricular hypertrophy. Left ventricular ejection fraction, by visual estimation, is 55 to 60%.  Mitral Valve: Mild to moderate mitral valve regurgitation is seen.  Aortic Valve: Severe aortic stenosis is present. Mild aortic valve regurgitation is seen.      2D AND M-MODE MEASUREMENTS (normal ranges within parentheses):  Left                  Normal   Aorta/Left             Normal  Ventricle:                     Atrium:  IVSd        0.99 cm  (0.7-1.1) AoV Cusp       0.62  (1.5-2.6)  (Mmode):                       Separation:     cm  LVPWd       0.74 cm  (0.7-1.1) Left Atrium    3.61  (1.9-4.0)  (Mmode):                       (Mmode):        cm  LVIDd       4.78 cm(3.4-5.7) LA Volume      36.1  (Mmode):                       Index         ml/m²  LVIDs       3.63 cm  (Mmode):  LV FS       24.0 %    (>25%)  (Mmode):  Rel. Wall    0.31     (<0.42)  Thickness  Mm  LV Mass    90.1 g/m²  Index:  Mmode    SPECTRAL DOPPLER ANALYSIS:  LV DIASTOLIC FUNCTION:  MV Peak E: 0.87 m/s Decel Time: 266 msec  MV Peak A: 0.87 m/s  E/A Ratio: 0.99    Aortic Valve:  AoV VMax:    5.88 m/s   AoV Area, Vmax: 0.38 cm² Vmax Indx: 0.25 cm²/m²  AoV VTI:     1.46 m     AoV Area, VTI:  0.35 cm² VTI Indx:  0.23 cm²/m²  AoV Pk Grad: 138.1 mmHg  AoV Mn Grad: 86.2 mmHg    LVOT Vmax: 0.70 m/s  LVOT VTI:  0.16 m  LVOT Diam: 2.03 cm    Aortic Insufficiency:  AI Half-time:  481 msec  AI Decel Rate: 2.69 m/s²    Mitral Valve:  MV P1/2 Time: 77.21 msec  MV Area, PHT: 2.85 cm²    Tricuspid Valve and PA/RV Systolic Pressure: TR Max Velocity: 2.90 m/s RA Pressure:  RVSP/PASP: 23.3 mmHg    Pulmonic Valve:  PV Max Velocity: 0.76 m/s PV Max P.3 mmHg PV Mean PG:      [id number] Juan Antonio Kwon M.D., Electronically signed on 2021 at 1:20:00 PM      *** Final ***          -CCTA:  -STRESS TEST:  -CATHETERIZATION:    ECG:  NSR@74bpm, LAFB, RBBB, PAC's    TELEMETRY EVENTS:  SVT 6 beats

## 2021-05-19 NOTE — PROGRESS NOTE ADULT - SUBJECTIVE AND OBJECTIVE BOX
Patient feels less SOB today      T(F): 97.9 (05-19-21 @ 07:01), Max: 99.1 (05-18-21 @ 15:45)  HR: 79 (05-19-21 @ 07:36)  BP: 90/52 (05-19-21 @ 07:36)  RR: 18  SpO2: 95% (05-19-21 @ 07:36) (95% - 99%)    PHYSICAL EXAM:  GENERAL: NAD  HEAD:  Atraumatic, Normocephalic  NERVOUS SYSTEM:  no focal deficits   CHEST/LUNG:  bilateral rhonchi  HEART: Regular rate and rhythm; PRIYA murmur  ABDOMEN: Soft, Nontender, Nondistended; Bowel sounds present  EXTREMITIES:  2+ Peripheral Pulses, No clubbing, cyanosis, or edema      LABS  05-19    137  |  98  |  36<H>  ----------------------------<  100<H>  3.5   |  27  |  0.9    Ca    8.8      19 May 2021 05:35    TPro  6.5  /  Alb  3.9  /  TBili  0.6  /  DBili  x   /  AST  31  /  ALT  19  /  AlkPhos  117<H>  05-18                          9.7    10.33 )-----------( 126      ( 19 May 2021 05:35 )             30.2     < from: 12 Lead ECG (05.17.21 @ 10:09) >  Diagnosis Line Normal sinus rhythm  Right bundle branch block  Left anterior fascicular block  *** Bifascicular block ***  Voltage criteria for left ventricular hypertrophy  Abnormal ECG    < end of copied text >      CARDIAC ENZYMES      Troponin T, Serum: 0.13 ng/mL (05-17-21 @ 06:29)  Troponin T, Serum: 0.12 ng/mL (05-16-21 @ 14:43)    RADIOLOGY  < from: Xray Chest 1 View- PORTABLE-Routine (Xray Chest 1 View- PORTABLE-Routine in AM.) (05.19.21 @ 07:09) >  Impression:    Left basilar opacity/effusion similar to prior. Right-sided pleural effusion also noted with likely loculated fluid along the fissure.      < end of copied text >    MEDICATIONS  (STANDING):  chlorhexidine 4% Liquid 1 Application(s) Topical every 12 hours  enoxaparin Injectable 30 milliGRAM(s) SubCutaneous daily  levothyroxine 100 MICROGram(s) Oral daily  senna 2 Tablet(s) Oral at bedtime    MEDICATIONS  (PRN):  acetaminophen   Tablet .. 650 milliGRAM(s) Oral every 6 hours PRN Temp greater or equal to 38C (100.4F), Mild Pain (1 - 3)  bisacodyl Suppository 10 milliGRAM(s) Rectal daily PRN Constipation

## 2021-05-19 NOTE — CONSULT NOTE ADULT - ATTENDING COMMENTS
Seen / examined and above reviewed.    Severe AS  Evaluated at [facility].  TAVR offered / declined.    Comorbidities as above.  Notably, liver cancer (last treated 2003).    Progressive decline in functional capacity.  Now experiencing dyspnea, chest discomfort, and lightheadedness walking short distances.  Lives with son.  Independent ADL's.  Apparently good memory / provided detailed history.    Hemodynamics stable.  Brief SVT on telemetry (likely AT).    ECHO: nL LVSF.  Severe AS.    Plan for CTA +/- cath.  Obtain records from [facility].  Continue low-dose Lasix to maintain euvolemic.  BAV v. TAVR pending testing / clinical course. Seen / examined and above reviewed.    Severe AS  Evaluated at [facility].  TAVR offered / declined.    Comorbidities as above.  Notably, liver cancer (last treated 2003).    Progressive decline in functional capacity.  Now experiencing dyspnea, chest discomfort, and lightheadedness walking short distances.  Lives with son.  Independent ADL's.  Apparently good memory / provided detailed history.    Hemodynamics stable.  Brief SVT on telemetry (likely AT).  Left pleural effusion.  ECHO: nL LVSF.  Severe AS.    Plan for CTA +/- cath.  Obtain records from [facility].  Continue low-dose Lasix to maintain euvolemic.  US / evaluate for thoracentesis.  BAV v. TAVR pending testing / clinical course. Seen / examined and above reviewed.    Severe AS  Evaluated at [facility].  TAVR offered / declined.    Comorbidities as above.  Notably, liver cancer (last treated 2003).    Progressive decline in functional capacity.  Now experiencing dyspnea, chest discomfort, and lightheadedness walking short distances.  Lives with son.  Independent ADL's.  Severe scoliosis.  Bladder incontinence.  Apparently good memory / provided detailed history.    Hemodynamics stable.  Brief SVT on telemetry (likely AT).  Left pleural effusion.  ECHO: nL LVSF.  Severe AS.    Plan for CTA +/- cath.  Obtain records from [facility].  Continue low-dose Lasix to maintain euvolemic.  US / evaluate for thoracentesis.  BAV v. TAVR pending testing / clinical course.    Long discussion with sonJoão. Seen / examined and above reviewed.    Severe AS  Evaluated at [facility].  TAVR offered / declined.    Comorbidities as above.  Notably, liver cancer (last treated 2009 / 10).    Progressive decline in functional capacity.  Now experiencing dyspnea, chest discomfort, and lightheadedness walking short distances.  Lives with son.  Independent ADL's.  Severe scoliosis.  Bladder incontinence.  Apparently good memory / provided detailed history.    Hemodynamics stable.  Brief SVT on telemetry (likely AT).  Left pleural effusion.  ECHO: nL LVSF.  Severe AS.    Plan for CTA +/- cath.  Obtain records from [facility].  Continue low-dose Lasix to maintain euvolemic.  US / evaluate for thoracentesis.  BAV v. TAVR pending testing / clinical course.    Long discussion with sonJoão.

## 2021-05-19 NOTE — PROGRESS NOTE ADULT - ASSESSMENT
Patient by echo severe as. She now will consider intervention for as. Will contact TAVR team . Correct k if needed. Lasix

## 2021-05-19 NOTE — PROGRESS NOTE ADULT - SUBJECTIVE AND OBJECTIVE BOX
Patient is a 88y old  Female who presents with a chief complaint of acute on chronic CHF (18 May 2021 10:24)      T(F): 97.9 (05-19-21 @ 07:01), Max: 99.1 (05-18-21 @ 15:45)  HR: 79 (05-19-21 @ 05:38)  BP: 103/51 (05-19-21 @ 05:38)  RR: 20 (05-19-21 @ 07:01)  SpO2: 97% (05-19-21 @ 05:38) (96% - 99%)    PHYSICAL EXAM:  GENERAL: NAD, well-groomed, well-developed  HEAD:  Atraumatic, Normocephalic  EYES: EOMI, PERRLA, conjunctiva and sclera clear  ENMT: No tonsillar erythema, exudates, or enlargement; Moist mucous membranes, Good dentition, No lesions  NECK: Supple, No JVD, Normal thyroid  NERVOUS SYSTEM:  Alert & Oriented X3,  Motor Strength 5/5 B/L upper and lower extremities  CHEST/LUNG: Clear to percussion bilaterally; No rales, rhonchi, wheezing, or rubs  HEART: Regular rate and rhythm; II/VI july lsb, rubs, or gallops  ABDOMEN: Soft, Nontender, Nondistended; Bowel sounds present  EXTREMITIES:   No clubbing, cyanosis, or edema  LYMPH: No lymphadenopathy noted  SKIN: No rashes or lesions    labs  05-19    137  |  98  |  36<H>  ----------------------------<  100<H>  3.5   |  27  |  0.9    Ca    8.8      19 May 2021 05:35    TPro  6.5  /  Alb  3.9  /  TBili  0.6  /  DBili  x   /  AST  31  /  ALT  19  /  AlkPhos  117<H>  05-18                          9.7    10.33 )-----------( 126      ( 19 May 2021 05:35 )             30.2               acetaminophen   Tablet .. 650 milliGRAM(s) Oral every 6 hours PRN  bisacodyl Suppository 10 milliGRAM(s) Rectal daily PRN  chlorhexidine 4% Liquid 1 Application(s) Topical every 12 hours  enoxaparin Injectable 30 milliGRAM(s) SubCutaneous daily  furosemide   Injectable 40 milliGRAM(s) IV Push daily  levothyroxine 100 MICROGram(s) Oral daily  senna 2 Tablet(s) Oral at bedtime

## 2021-05-19 NOTE — CONSULT NOTE ADULT - ASSESSMENT
Impression:    Symptomatic severe high gradient Aortic stenosis  (CESIA 0.4cm2, mean PG 94mmHg, Vmax 5.8m/sec)  Subacute decompensated heart failure - improving  Poor functional status at baseline, METS ~2, now needs help with activities of daily living    Recommend:    - Continue with lasix 20mg PO Q24  - Monitor renal function, strict input and out put  - Monitor electrolytes, keep K >4, and Mg >2  - Advanced age, with poor functional status, will possibly need BAV  - Will discuss with Attending

## 2021-05-19 NOTE — PROGRESS NOTE ADULT - SUBJECTIVE AND OBJECTIVE BOX
Patient is a 88y old  Female who presents with a chief complaint of acute on chronic CHF (19 May 2021 08:11)      Over Night Events:  Patient seen and examined.   feel better on RA pox 95%    ROS:  See HPI    PHYSICAL EXAM    ICU Vital Signs Last 24 Hrs  T(C): 36.6 (19 May 2021 07:01), Max: 37.3 (18 May 2021 15:45)  T(F): 97.9 (19 May 2021 07:01), Max: 99.1 (18 May 2021 15:45)  HR: 79 (19 May 2021 07:36) (72 - 89)  BP: 90/52 (19 May 2021 07:36) (90/52 - 112/57)  BP(mean): 65 (19 May 2021 07:36) (65 - 81)  ABP: --  ABP(mean): --  RR: 31 (19 May 2021 07:36) (18 - 31)  SpO2: 95% (19 May 2021 07:36) (95% - 99%)      General: aOx3  HEENT:  criss              Lymph Nodes: NO cervical LN   Lungs: Bilateral BS  Cardiovascular: Regular   Abdomen: Soft, Positive BS  Extremities: No clubbing   Skin: warm   Neurological: no focal   Musculoskeletal: move all ext     I&O's Detail    18 May 2021 07:01  -  19 May 2021 07:00  --------------------------------------------------------  IN:    Oral Fluid: 420 mL  Total IN: 420 mL    OUT:    Voided (mL): 950 mL  Total OUT: 950 mL    Total NET: -530 mL          LABS:                          9.7    10.33 )-----------( 126      ( 19 May 2021 05:35 )             30.2         19 May 2021 05:35    137    |  98     |  36     ----------------------------<  100    3.5     |  27     |  0.9      Ca    8.8        19 May 2021 05:35                                                                                                                                                                                                                                       MEDICATIONS  (STANDING):  chlorhexidine 4% Liquid 1 Application(s) Topical every 12 hours  enoxaparin Injectable 30 milliGRAM(s) SubCutaneous daily  furosemide   Injectable 40 milliGRAM(s) IV Push daily  levothyroxine 100 MICROGram(s) Oral daily  senna 2 Tablet(s) Oral at bedtime    MEDICATIONS  (PRN):  acetaminophen   Tablet .. 650 milliGRAM(s) Oral every 6 hours PRN Temp greater or equal to 38C (100.4F), Mild Pain (1 - 3)  bisacodyl Suppository 10 milliGRAM(s) Rectal daily PRN Constipation          Xrays:  TLC:  OG:  ET tube:                                                                                    decrease left effusion can visualize the border of the heart    ECHO:  [facility] ICU:

## 2021-05-20 LAB
ANION GAP SERPL CALC-SCNC: 13 MMOL/L — SIGNIFICANT CHANGE UP (ref 7–14)
BLD GP AB SCN SERPL QL: SIGNIFICANT CHANGE UP
BUN SERPL-MCNC: 44 MG/DL — HIGH (ref 10–20)
CALCIUM SERPL-MCNC: 9.1 MG/DL — SIGNIFICANT CHANGE UP (ref 8.5–10.1)
CHLORIDE SERPL-SCNC: 96 MMOL/L — LOW (ref 98–110)
CO2 SERPL-SCNC: 28 MMOL/L — SIGNIFICANT CHANGE UP (ref 17–32)
CREAT SERPL-MCNC: 1.1 MG/DL — SIGNIFICANT CHANGE UP (ref 0.7–1.5)
GLUCOSE SERPL-MCNC: 101 MG/DL — HIGH (ref 70–99)
HCT VFR BLD CALC: 32.8 % — LOW (ref 37–47)
HGB BLD-MCNC: 10.7 G/DL — LOW (ref 12–16)
MAGNESIUM SERPL-MCNC: 2.2 MG/DL — SIGNIFICANT CHANGE UP (ref 1.8–2.4)
MCHC RBC-ENTMCNC: 29.6 PG — SIGNIFICANT CHANGE UP (ref 27–31)
MCHC RBC-ENTMCNC: 32.6 G/DL — SIGNIFICANT CHANGE UP (ref 32–37)
MCV RBC AUTO: 90.9 FL — SIGNIFICANT CHANGE UP (ref 81–99)
NRBC # BLD: 0 /100 WBCS — SIGNIFICANT CHANGE UP (ref 0–0)
PLATELET # BLD AUTO: 149 K/UL — SIGNIFICANT CHANGE UP (ref 130–400)
POTASSIUM SERPL-MCNC: 3.4 MMOL/L — LOW (ref 3.5–5)
POTASSIUM SERPL-SCNC: 3.4 MMOL/L — LOW (ref 3.5–5)
RBC # BLD: 3.61 M/UL — LOW (ref 4.2–5.4)
RBC # FLD: 13.3 % — SIGNIFICANT CHANGE UP (ref 11.5–14.5)
SODIUM SERPL-SCNC: 137 MMOL/L — SIGNIFICANT CHANGE UP (ref 135–146)
WBC # BLD: 6.91 K/UL — SIGNIFICANT CHANGE UP (ref 4.8–10.8)
WBC # FLD AUTO: 6.91 K/UL — SIGNIFICANT CHANGE UP (ref 4.8–10.8)

## 2021-05-20 PROCEDURE — 71045 X-RAY EXAM CHEST 1 VIEW: CPT | Mod: 26

## 2021-05-20 PROCEDURE — 99233 SBSQ HOSP IP/OBS HIGH 50: CPT

## 2021-05-20 PROCEDURE — 99222 1ST HOSP IP/OBS MODERATE 55: CPT

## 2021-05-20 RX ORDER — IPRATROPIUM/ALBUTEROL SULFATE 18-103MCG
3 AEROSOL WITH ADAPTER (GRAM) INHALATION ONCE
Refills: 0 | Status: COMPLETED | OUTPATIENT
Start: 2021-05-20 | End: 2021-05-20

## 2021-05-20 RX ORDER — POTASSIUM CHLORIDE 20 MEQ
20 PACKET (EA) ORAL
Refills: 0 | Status: COMPLETED | OUTPATIENT
Start: 2021-05-20 | End: 2021-05-20

## 2021-05-20 RX ORDER — FUROSEMIDE 40 MG
40 TABLET ORAL ONCE
Refills: 0 | Status: COMPLETED | OUTPATIENT
Start: 2021-05-20 | End: 2021-05-20

## 2021-05-20 RX ADMIN — Medication 20 MILLIGRAM(S): at 05:41

## 2021-05-20 RX ADMIN — CHLORHEXIDINE GLUCONATE 1 APPLICATION(S): 213 SOLUTION TOPICAL at 05:38

## 2021-05-20 RX ADMIN — CHLORHEXIDINE GLUCONATE 1 APPLICATION(S): 213 SOLUTION TOPICAL at 16:09

## 2021-05-20 RX ADMIN — Medication 3 MILLILITER(S): at 12:06

## 2021-05-20 RX ADMIN — Medication 50 MILLIEQUIVALENT(S): at 14:35

## 2021-05-20 RX ADMIN — Medication 50 MILLIEQUIVALENT(S): at 11:57

## 2021-05-20 RX ADMIN — ENOXAPARIN SODIUM 30 MILLIGRAM(S): 100 INJECTION SUBCUTANEOUS at 12:14

## 2021-05-20 RX ADMIN — Medication 100 MICROGRAM(S): at 05:41

## 2021-05-20 RX ADMIN — Medication 40 MILLIGRAM(S): at 11:57

## 2021-05-20 RX ADMIN — SENNA PLUS 2 TABLET(S): 8.6 TABLET ORAL at 21:28

## 2021-05-20 RX ADMIN — Medication 50 MILLIEQUIVALENT(S): at 16:09

## 2021-05-20 NOTE — PROGRESS NOTE ADULT - ASSESSMENT
89 yo M PMHx HTN, severe AS, hypothyroid, HFpEF admitted for progressive sob. Pt found to have acute on chronic HFpEF in setting of severe AS. Pt was admitted to [facility] CCU and transferred to [facility] for TAVR evaluation.    Acute on chronic HFpEF in setting severe aortic stenosis / NSTEMI possibly type 2  - pending structural cardiology evaluation  - Elevated BNP >5,000  - Echo consistent with severe aortic stenosis.  - continue oral Lasix, appears euvolemic  - strict intake and output.     NSTEMI  - more likely type 2   - cardiology on board  - not on aspirin or plavix-discuss with cardiology    SVT   - seen on telemetry  - check TSH   - no on any rate controlling agents  - speak with cardiology    Hypothyroidism  - c/w levothyroxine    Chronic Anemia  - unclear etiology  - stable at baseline    DVT prophylaxis  - on Lovenox     #Progress Note Handoff:  Pending (specify):  Structural cardiology eval, HR monitoring,   Family discussion: discussed pending structural cardiology evaluation  Disposition: Home__x_/SNF___/Other________/Unknown at this time________

## 2021-05-20 NOTE — PROGRESS NOTE ADULT - SUBJECTIVE AND OBJECTIVE BOX
DILIP LUCAS   [id number]  88y   Female   SUBJECTIVE:  Patient is a 88y old Female who presents with a chief complaint of acute on chronic CHF (19 May 2021 19:57)    Today is hospital day 4d. This morning she is resting comfortably in bed and reports no new issues or overnight events.   Currently admitted to medicine with the primary diagnosis of Pleural effusion       PAST MEDICAL & SURGICAL HISTORY  HTN (hypertension)    Mitral valve prolapse    Enlarged heart    Murmur    Hyperthyroidism    Arthritis    HTN (hypertension)    Diverticulosis    Hiatal hernia    Acid reflux    Liver cancer  s/p resection and s/p chemo and radiation    Aortic stenosis    H/O resection of liver    Status post cholecystectomy    ALLERGIES:  Cipro (Other)  Levaquin (Rash)  tetracycline (Hives)    MEDICATIONS:  STANDING MEDICATIONS  albuterol/ipratropium for Nebulization. 3 milliLiter(s) Nebulizer once  chlorhexidine 4% Liquid 1 Application(s) Topical every 12 hours  enoxaparin Injectable 30 milliGRAM(s) SubCutaneous daily  furosemide    Tablet 20 milliGRAM(s) Oral daily  levothyroxine 100 MICROGram(s) Oral daily  senna 2 Tablet(s) Oral at bedtime    PRN MEDICATIONS  acetaminophen   Tablet .. 650 milliGRAM(s) Oral every 6 hours PRN  bisacodyl Suppository 10 milliGRAM(s) Rectal daily PRN    VITALS:   T(F): 97.1 (05-20-21 @ 05:51)  HR: 76 (05-20-21 @ 05:51)  BP: 125/70 (05-20-21 @ 05:51)  BP(mean): --  RR: 14 (05-20-21 @ 05:51)  SpO2: --    LABS:                        10.7   6.91  )-----------( 149      ( 20 May 2021 06:42 )             32.8     Hemoglobin: 10.7 g/dL (05-20 @ 06:42)  Hemoglobin: 9.7 g/dL (05-19 @ 05:35)  Hemoglobin: 10.4 g/dL (05-18 @ 05:38)  Hemoglobin: 9.6 g/dL (05-17 @ 06:29)  Hemoglobin: 9.5 g/dL (05-16 @ 14:43)    05-20    137  |  96<L>  |  44<H>  ----------------------------<  101<H>  3.4<L>   |  28  |  1.1    Ca    9.1      20 May 2021 06:42  Mg     2.2     05-20      Potassium Trend: 3.4<--, 3.5<--, 4.1<--, 4.5<--, 6.0<--  SODIUM TREND:  Sodium 137 [05-20 @ 06:42]  Sodium 137 [05-19 @ 05:35]  Sodium 139 [05-18 @ 05:38]  Sodium 141 [05-17 @ 06:29]  Sodium 138 [05-16 @ 14:43]    Creatinine Trend: 1.1<--, 0.9<--, 1.2<--, 0.9<--, 0.8<--        Calcium, Total Serum: 9.1 mg/dL (05-20-21 @ 06:42)  Magnesium, Serum: 2.2 mg/dL (05-20-21 @ 06:42)          COVID      05-19-21 @ 07:01  -  05-20-21 @ 07:00  --------------------------------------------------------  IN: 450 mL / OUT: 700 mL / NET: -250 mL    05-20-21 @ 07:01  -  05-20-21 @ 10:57  --------------------------------------------------------  IN: 0 mL / OUT: 150 mL / NET: -150 mL      RADIOLOGY:  < from: CT Angio Chest PE Protocol w/ IV Cont (05.16.21 @ 18:41) >  IMPRESSION:    No CTA evidence of acute pulmonary embolus.    Bilateral pleural effusions, left greater than right, as well as loculated effusions within the right major and minor fissures. Near complete collapse of the left lung lower lobe.    Cardiomegaly.    Right upper lobe pulmonary micronodules measuring up to 2-3 mm (3-164), may represent small airways disease.    Biapical lung calcified pleural plaques.    < end of copied text >  < from: Xray Chest 1 View- PORTABLE-Routine (Xray Chest 1 View- PORTABLE-Routine in AM.) (05.20.21 @ 06:01) >  Impression:    Unchanged left basilar opacity/effusion and effusion in the horizontal fissure. No pneumothorax.    < end of copied text >    CARDIOLOGY IMAGING:  < from: TTE Echo Complete w/ Contrast w/ Doppler (05.17.21 @ 09:31) >  Summary:   1. Severe aortic valve stenosis.   2. Left ventricular ejection fraction, by visual estimation, is 55 to 60%.   3. Mild left ventricular hypertrophy.   4. Mild to moderate mitral valve regurgitation.   5. Mild aortic regurgitation.    PHYSICIAN INTERPRETATION:  Left Ventricle: The left ventricular internal cavity size is normal. There is mild left ventricular hypertrophy. Left ventricular ejection fraction, by visual estimation, is 55 to 60%.  Mitral Valve: Mild to moderate mitral valve regurgitation is seen.  Aortic Valve: Severe aortic stenosis is present. Mild aortic valve regurgitation is seen.    < end of copied text >  < from: 12 Lead ECG (05.17.21 @ 10:09) >    Ventricular Rate 78 BPM    Atrial Rate 78 BPM    P-R Interval 156 ms    QRS Duration 126 ms    Q-T Interval 444 ms    QTC Calculation(Bazett) 506 ms    P Axis 37 degrees    R Axis -48 degrees    T Axis 11 degrees    Diagnosis Line Normal sinus rhythm  Right bundle branch block  Left anterior fascicular block  *** Bifascicular block ***  Voltage criteria for left ventricular hypertrophy  Abnormal ECG    < end of copied text >  PHYSICAL EXAM:  GEN: Anxious  HEENT: normocephalic, atraumatic, aniceteric  LUNGS: bibasilar crackles, + scattered end-exp wheezing bilat   HEART: S1/S2 present. RRR, + III/VI systolic ejection murmur heard best at R 2ICS @ RSB  ABD: Soft, non-tender, non-distended. + hepatic resection scar noted   EXT: Warm, well perfused, skin color appropriate for ethnicity  NEURO: AAOX3, normal affect

## 2021-05-20 NOTE — PROGRESS NOTE ADULT - SUBJECTIVE AND OBJECTIVE BOX
CHIEF COMPLAINT:    Patient is a 88y old  Female who presents with a chief complaint of acute on chronic CHF    INTERVAL HPI/OVERNIGHT EVENTS:    Patient seen and examined at bedside. No acute overnight events occurred.    ROS: Reports SOB with movement. All other systems are negative.    Vital Signs:    T(F): 98.2 (21 @ 14:16), Max: 99.1 (21 @ 21:26)  HR: 84 (21 @ 14:16) (76 - 88)  BP: 114/68 (21 @ 14:16) (102/54 - 125/70)  RR: 18 (21 @ 14:16) (14 - 22)  SpO2: 94% (21 @ 14:16) (94% - 94%)  I&O's Summary    19 May 2021 07:01  -  20 May 2021 07:00  --------------------------------------------------------  IN: 450 mL / OUT: 700 mL / NET: -250 mL    20 May 2021 07:01  -  20 May 2021 14:58  --------------------------------------------------------  IN: 0 mL / OUT: 150 mL / NET: -150 mL      Daily Height in cm: 152.4 (19 May 2021 15:06)    Daily Weight in k.7 (20 May 2021 05:51)  CAPILLARY BLOOD GLUCOSE          PHYSICAL EXAM:  GENERAL:  NAD  SKIN: No rashes or lesions  HEENT: Atraumatic. Normocephalic. Anicteric  NECK:  No JVD.   PULMONARY: Clear to ausculation bilaterally. No wheezing. No rales  CVS: Normal S1, S2. Regular rate and rhythm. systolic murmurs.  ABDOMEN/GI: Soft, Nontender, Nondistended; Bowel sounds are present  EXTREMITIES:  No edema B/L LE.  NEUROLOGIC:  No motor deficit.  PSYCH: Alert & oriented x 3, normal affect        LABS:                        10.7   6.91  )-----------( 149      ( 20 May 2021 06:42 )             32.8     05    137  |  96<L>  |  44<H>  ----------------------------<  101<H>  3.4<L>   |  28  |  1.1    Ca    9.1      20 May 2021 06:42  Mg     2.2             Serum Pro-Brain Natriuretic Peptide: 5291 pg/mL (21 @ 14:43)          RADIOLOGY & ADDITIONAL TESTS:  Imaging or report Personally Reviewed:  [ ] YES  [ ] NO    Telemetry reviewed independnetly- SVT noted    Medications:  Standing  chlorhexidine 4% Liquid 1 Application(s) Topical every 12 hours  enoxaparin Injectable 30 milliGRAM(s) SubCutaneous daily  furosemide    Tablet 20 milliGRAM(s) Oral daily  levothyroxine 100 MICROGram(s) Oral daily  potassium chloride  20 mEq/100 mL IVPB 20 milliEquivalent(s) IV Intermittent every 2 hours  senna 2 Tablet(s) Oral at bedtime    PRN Meds  acetaminophen   Tablet .. 650 milliGRAM(s) Oral every 6 hours PRN  bisacodyl Suppository 10 milliGRAM(s) Rectal daily PRN      Case discussed with resident  Care discussed with pt

## 2021-05-20 NOTE — PROGRESS NOTE ADULT - ASSESSMENT
ASSESSMENT:  88 yr old male with hx of HTN, severe AS, hypothyroid, HFpEF admitted for progressive sob for past few days.     PLAN/ACTIVE PROBLEMS:  # AHRF secondary to Acute on chronic HFpEF likely secondary to severe aortic stenosis.  - bibasilar crackles on exam, IVC not distended, is collapsible with inspiration on POCUS  - Elevated BNP >5,000  - Echo consistent with severe aortic stenosis.  - strict intake and output.   - structural cardiology following   - c/w IV Diuresis in moderation to avoid excessive preload reduction in the setting of severe aortic stenosis    # Suspected Obstructive Lung Disease -   - CT w/findings suspicious of such  - wheezing on exam this AM   - + smoking Hx   - Duonebs PRN  - Pulm follow up ( was seen by [facility] pulmonologist)  - will need PFT's for definitive diagnosis     # NSTEMI type 2  - no need to trend trop    # Mild ANNETTE - Prerenal vs. Cardiorenal II  - Creatinine Trend: 1.1<--, 0.9<--, 1.2<--, 0.9<--, 0.8<--  - continue holding enalapril for now.   - Continue with gentle diuresis for now.   - Replete K+    # Mild Thrombocytopenia  -Trend CBC for now    # History of hypothyroidism  - c/w levothyroxine    # Chronic Anemia  - stable at baseline    # DVT prophylaxis  - on Lovenox     # Full Code  DISPOSITION: acute

## 2021-05-21 LAB
ALBUMIN SERPL ELPH-MCNC: 4 G/DL — SIGNIFICANT CHANGE UP (ref 3.5–5.2)
ALP SERPL-CCNC: 132 U/L — HIGH (ref 30–115)
ALT FLD-CCNC: 23 U/L — SIGNIFICANT CHANGE UP (ref 0–41)
ANION GAP SERPL CALC-SCNC: 18 MMOL/L — HIGH (ref 7–14)
APPEARANCE UR: CLEAR — SIGNIFICANT CHANGE UP
AST SERPL-CCNC: 28 U/L — SIGNIFICANT CHANGE UP (ref 0–41)
BACTERIA # UR AUTO: NEGATIVE — SIGNIFICANT CHANGE UP
BASOPHILS # BLD AUTO: 0.02 K/UL — SIGNIFICANT CHANGE UP (ref 0–0.2)
BASOPHILS NFR BLD AUTO: 0.3 % — SIGNIFICANT CHANGE UP (ref 0–1)
BILIRUB SERPL-MCNC: 0.4 MG/DL — SIGNIFICANT CHANGE UP (ref 0.2–1.2)
BILIRUB UR-MCNC: NEGATIVE — SIGNIFICANT CHANGE UP
BUN SERPL-MCNC: 47 MG/DL — HIGH (ref 10–20)
CALCIUM SERPL-MCNC: 9.6 MG/DL — SIGNIFICANT CHANGE UP (ref 8.5–10.1)
CHLORIDE SERPL-SCNC: 95 MMOL/L — LOW (ref 98–110)
CO2 SERPL-SCNC: 28 MMOL/L — SIGNIFICANT CHANGE UP (ref 17–32)
COLOR SPEC: YELLOW — SIGNIFICANT CHANGE UP
CREAT SERPL-MCNC: 1 MG/DL — SIGNIFICANT CHANGE UP (ref 0.7–1.5)
DIFF PNL FLD: NEGATIVE — SIGNIFICANT CHANGE UP
EOSINOPHIL # BLD AUTO: 0.09 K/UL — SIGNIFICANT CHANGE UP (ref 0–0.7)
EOSINOPHIL NFR BLD AUTO: 1.3 % — SIGNIFICANT CHANGE UP (ref 0–8)
EPI CELLS # UR: 7 /HPF — HIGH (ref 0–5)
GLUCOSE SERPL-MCNC: 98 MG/DL — SIGNIFICANT CHANGE UP (ref 70–99)
GLUCOSE UR QL: NEGATIVE — SIGNIFICANT CHANGE UP
HCT VFR BLD CALC: 36.3 % — LOW (ref 37–47)
HGB BLD-MCNC: 11.5 G/DL — LOW (ref 12–16)
HYALINE CASTS # UR AUTO: 7 /LPF — SIGNIFICANT CHANGE UP (ref 0–7)
IMM GRANULOCYTES NFR BLD AUTO: 0.3 % — SIGNIFICANT CHANGE UP (ref 0.1–0.3)
KETONES UR-MCNC: SIGNIFICANT CHANGE UP
LEUKOCYTE ESTERASE UR-ACNC: NEGATIVE — SIGNIFICANT CHANGE UP
LYMPHOCYTES # BLD AUTO: 1.05 K/UL — LOW (ref 1.2–3.4)
LYMPHOCYTES # BLD AUTO: 15.7 % — LOW (ref 20.5–51.1)
MAGNESIUM SERPL-MCNC: 2.3 MG/DL — SIGNIFICANT CHANGE UP (ref 1.8–2.4)
MCHC RBC-ENTMCNC: 29.2 PG — SIGNIFICANT CHANGE UP (ref 27–31)
MCHC RBC-ENTMCNC: 31.7 G/DL — LOW (ref 32–37)
MCV RBC AUTO: 92.1 FL — SIGNIFICANT CHANGE UP (ref 81–99)
MONOCYTES # BLD AUTO: 0.54 K/UL — SIGNIFICANT CHANGE UP (ref 0.1–0.6)
MONOCYTES NFR BLD AUTO: 8.1 % — SIGNIFICANT CHANGE UP (ref 1.7–9.3)
MRSA PCR RESULT.: NEGATIVE — SIGNIFICANT CHANGE UP
NEUTROPHILS # BLD AUTO: 4.95 K/UL — SIGNIFICANT CHANGE UP (ref 1.4–6.5)
NEUTROPHILS NFR BLD AUTO: 74.3 % — SIGNIFICANT CHANGE UP (ref 42.2–75.2)
NITRITE UR-MCNC: NEGATIVE — SIGNIFICANT CHANGE UP
NRBC # BLD: 0 /100 WBCS — SIGNIFICANT CHANGE UP (ref 0–0)
PH UR: 7 — SIGNIFICANT CHANGE UP (ref 5–8)
PHOSPHATE SERPL-MCNC: 3.6 MG/DL — SIGNIFICANT CHANGE UP (ref 2.1–4.9)
PLATELET # BLD AUTO: 174 K/UL — SIGNIFICANT CHANGE UP (ref 130–400)
POTASSIUM SERPL-MCNC: 4.1 MMOL/L — SIGNIFICANT CHANGE UP (ref 3.5–5)
POTASSIUM SERPL-SCNC: 4.1 MMOL/L — SIGNIFICANT CHANGE UP (ref 3.5–5)
PROT SERPL-MCNC: 7.4 G/DL — SIGNIFICANT CHANGE UP (ref 6–8)
PROT UR-MCNC: ABNORMAL
RBC # BLD: 3.94 M/UL — LOW (ref 4.2–5.4)
RBC # FLD: 13.2 % — SIGNIFICANT CHANGE UP (ref 11.5–14.5)
RBC CASTS # UR COMP ASSIST: 4 /HPF — SIGNIFICANT CHANGE UP (ref 0–4)
SODIUM SERPL-SCNC: 141 MMOL/L — SIGNIFICANT CHANGE UP (ref 135–146)
SP GR SPEC: >1.05 (ref 1.01–1.03)
UROBILINOGEN FLD QL: SIGNIFICANT CHANGE UP
WBC # BLD: 6.67 K/UL — SIGNIFICANT CHANGE UP (ref 4.8–10.8)
WBC # FLD AUTO: 6.67 K/UL — SIGNIFICANT CHANGE UP (ref 4.8–10.8)
WBC UR QL: 3 /HPF — SIGNIFICANT CHANGE UP (ref 0–5)

## 2021-05-21 PROCEDURE — 75574 CT ANGIO HRT W/3D IMAGE: CPT | Mod: 26

## 2021-05-21 PROCEDURE — 99232 SBSQ HOSP IP/OBS MODERATE 35: CPT

## 2021-05-21 PROCEDURE — 93880 EXTRACRANIAL BILAT STUDY: CPT | Mod: 26

## 2021-05-21 PROCEDURE — 99233 SBSQ HOSP IP/OBS HIGH 50: CPT

## 2021-05-21 PROCEDURE — 99223 1ST HOSP IP/OBS HIGH 75: CPT

## 2021-05-21 PROCEDURE — 74174 CTA ABD&PLVS W/CONTRAST: CPT | Mod: 26

## 2021-05-21 RX ADMIN — Medication 100 MICROGRAM(S): at 06:10

## 2021-05-21 RX ADMIN — SENNA PLUS 2 TABLET(S): 8.6 TABLET ORAL at 21:51

## 2021-05-21 RX ADMIN — CHLORHEXIDINE GLUCONATE 1 APPLICATION(S): 213 SOLUTION TOPICAL at 06:10

## 2021-05-21 RX ADMIN — ENOXAPARIN SODIUM 30 MILLIGRAM(S): 100 INJECTION SUBCUTANEOUS at 14:27

## 2021-05-21 RX ADMIN — CHLORHEXIDINE GLUCONATE 1 APPLICATION(S): 213 SOLUTION TOPICAL at 17:07

## 2021-05-21 NOTE — PROGRESS NOTE ADULT - SUBJECTIVE AND OBJECTIVE BOX
CHIEF COMPLAINT:    Patient is a 88y old  Female who presents with a chief complaint of acute on chronic CHF (21 May 2021 11:55)      INTERVAL HPI/OVERNIGHT EVENTS:    Patient seen and examined at bedside. No acute overnight events occurred.    ROS: Reports feeling anxious about being transported to CT scan. All other systems are negative.    Vital Signs:    T(F): 96.7 (21 @ 12:43), Max: 98.2 (21 @ 14:16)  HR: 94 (21 @ 12:43) (80 - 94)  BP: 138/63 (21 @ 12:43) (111/89 - 138/63)  RR: 18 (21 @ 12:43) (18 - 20)  SpO2: 99% (21 @ 06:10) (94% - 99%)  I&O's Summary    20 May 2021 07:01  -  21 May 2021 07:00  --------------------------------------------------------  IN: 710 mL / OUT: 275 mL / NET: 435 mL      Daily     Daily Weight in k.2 (21 May 2021 04:59)  CAPILLARY BLOOD GLUCOSE    PHYSICAL EXAM:  GENERAL:  NAD  SKIN: No rashes or lesions  HEENT: Atraumatic. Normocephalic. Anicteric  NECK:  No JVD.   PULMONARY: Clear to ausculation bilaterally. No wheezing. No rales  CVS: Normal S1, S2. Regular rate and rhythm. + murmurs.  ABDOMEN/GI: Soft, Nontender, Nondistended; Bowel sounds are present  EXTREMITIES:  No edema B/L LE.  NEUROLOGIC:  No motor deficit.  PSYCH: Alert & oriented x 3, normal affect    Consultant(s) Notes Reviewed:  [x ] YES  [ ] NO  Care Discussed with Consultants/Other Providers [ x] YES  [ ] NO - cardiology    LABS:                        11.5   6.67  )-----------( 174      ( 21 May 2021 05:35 )             36.3     -    141  |  95<L>  |  47<H>  ----------------------------<  98  4.1   |  28  |  1.0    Ca    9.6      21 May 2021 05:35  Phos  3.6       Mg     2.3         TPro  7.4  /  Alb  4.0  /  TBili  0.4  /  DBili  x   /  AST  28  /  ALT  23  /  AlkPhos  132<H>        Serum Pro-Brain Natriuretic Peptide: 5291 pg/mL (21 @ 14:43)          RADIOLOGY & ADDITIONAL TESTS:  Imaging or report Personally Reviewed:  [ ] YES  [ ] NO    Telemetry reviewed independently - No further episodes of SVT noted    Medications:  Standing  chlorhexidine 4% Liquid 1 Application(s) Topical every 12 hours  enoxaparin Injectable 30 milliGRAM(s) SubCutaneous daily  furosemide    Tablet 20 milliGRAM(s) Oral daily  levothyroxine 100 MICROGram(s) Oral daily  senna 2 Tablet(s) Oral at bedtime    PRN Meds  acetaminophen   Tablet .. 650 milliGRAM(s) Oral every 6 hours PRN  bisacodyl Suppository 10 milliGRAM(s) Rectal daily PRN      Case discussed with resident  Care discussed with pt

## 2021-05-21 NOTE — CONSULT NOTE ADULT - SUBJECTIVE AND OBJECTIVE BOX
Patient is a 88y old  Female who presents with a chief complaint of acute on chronic CHF (21 May 2021 13:04)    HPI:  87 yo W female w/PMH as noted below.  Pt has hx severe aortic valve disease, that requires replacement but pt. refused.   Pt c/o progressive dyspnea that  started 2 months ago , but today pt could no longer ambulate and tolerated her SOB.  so pt family called EMS.  In ER pt CXR showed bilateral pleural effusions( L>R)  requiring thoracentesis in past. Pt w/ elevated : BNP, troponins,.  Ct angio chest did not show any PE .  (16 May 2021 23:00)    * DENTAL CONSULT PRE OP AVR     PAST MEDICAL & SURGICAL HISTORY:  HTN (hypertension)  Mitral valve prolapse  Enlarged heart  Murmur  Hyperthyroidism  Arthritis  HTN (hypertension)  Diverticulosis  Hiatal hernia  Acid reflux  Liver cancer  s/p resection and s/p chemo and radiation  Aortic stenosis  H/O resection of liver  Status post cholecystectomy    MEDICATIONS  (STANDING):  chlorhexidine 4% Liquid 1 Application(s) Topical every 12 hours  enoxaparin Injectable 30 milliGRAM(s) SubCutaneous daily  furosemide    Tablet 20 milliGRAM(s) Oral daily  levothyroxine 100 MICROGram(s) Oral daily  senna 2 Tablet(s) Oral at bedtime    MEDICATIONS  (PRN):  acetaminophen   Tablet .. 650 milliGRAM(s) Oral every 6 hours PRN Temp greater or equal to 38C (100.4F), Mild Pain (1 - 3)  bisacodyl Suppository 10 milliGRAM(s) Rectal daily PRN Constipation    Allergies  Cipro (Other)  Levaquin (Rash)  tetracycline (Hives)    Vital Signs Last 24 Hrs  T(C): 35.9 (21 May 2021 12:43), Max: 36.7 (20 May 2021 20:22)  T(F): 96.7 (21 May 2021 12:43), Max: 98.1 (20 May 2021 20:22)  HR: 94 (21 May 2021 12:43) (80 - 94)  BP: 138/63 (21 May 2021 12:43) (111/89 - 138/63)  BP(mean): --  RR: 18 (21 May 2021 12:43) (18 - 20)  SpO2: 99% (21 May 2021 06:10) (97% - 99%)    LABS:                        11.5   6.67  )-----------( 174      ( 21 May 2021 05:35 )             36.3     05-21    141  |  95<L>  |  47<H>  ----------------------------<  98  4.1   |  28  |  1.0    Ca    9.6      21 May 2021 05:35  Phos  3.6     05-21  Mg     2.3     05-21    TPro  7.4  /  Alb  4.0  /  TBili  0.4  /  DBili  x   /  AST  28  /  ALT  23  /  AlkPhos  132<H>  05-21    WBC Count: 6.67 K/uL [4.80 - 10.80] (05-21 @ 05:35)  Platelet Count - Automated: 174 K/uL [130 - 400] (05-21 @ 05:35)  WBC Count: 6.91 K/uL [4.80 - 10.80] (05-20 @ 06:42)  Platelet Count - Automated: 149 K/uL [130 - 400] (05-20 @ 06:42)  WBC Count: 10.33 K/uL [4.80 - 10.80] (05-19 @ 05:35)  Platelet Count - Automated: 126 K/uL *L* [130 - 400] (05-19 @ 05:35)    Urinalysis Basic - ( 21 May 2021 14:55 )    Color: Yellow / Appearance: Clear / SG: >1.050 / pH: x  Gluc: x / Ketone: Trace  / Bili: Negative / Urobili: <2 mg/dL   Blood: x / Protein: 30 mg/dL / Nitrite: Negative   Leuk Esterase: Negative / RBC: 4 /HPF / WBC 3 /HPF   Sq Epi: x / Non Sq Epi: 7 /HPF / Bacteria: Negative    EOE:  TMJ ( -  ) clicks                     ( -  ) pops                     ( -  ) crepitus             Mandible <<FROM>>             Facial bones and MOM <<grossly intact>>             ( -  ) trismus             ( -  ) lymphadenopathy             ( -  ) swelling             ( -  ) asymmetry             ( -  ) palpation             ( -  ) dyspnea             ( -  ) dysphagia             ( -  ) loss of consciousness    IOE:  <<permanent>> dentition:  <<multiple missing teeth>>           hard/soft palate:  ( -  ) palatal torus, <<No pathology noted>>           tongue/FOM <<No pathology noted>>           labial/buccal mucosa <<No pathology noted>>           ( -  ) percussion           ( -  ) palpation           ( -  ) swelling            ( -  ) abscess           ( -  ) sinus tract    *DENTAL RADIOGRAPHS: 7 periapical radiographs taken     *ASSESSMENT: Bridge 3-X-X-6. Restorations presents on 7, 8, 9, 10, 11. Crowns on 12, 13. Lower mandibular partial denture. Crowns on 21, 28. No sign of infection, periapical pathology. Generalized radiographic calculus. Patient is cleared from dental standpoint.     RECOMMENDATIONS:  Dental F/U with outpatient dentist for comprehensive dental care.     Resident Name, pager #  Nitin Mae, DDS   [phone]

## 2021-05-21 NOTE — PROGRESS NOTE ADULT - SUBJECTIVE AND OBJECTIVE BOX
Patient is a 88y old  Female who presents with a chief complaint of acute on chronic CHF (21 May 2021 11:09)    SUBJECTIVE:  Comfortable on room air, POx 99%    REVIEW OF SYSTEMS:  All Pertinent ROS are negative except per HPI       PHYSICAL EXAM  Vital Signs Last 24 Hrs  T(C): 36 (21 May 2021 04:59), Max: 36.8 (20 May 2021 14:16)  T(F): 96.8 (21 May 2021 04:59), Max: 98.2 (20 May 2021 14:16)  HR: 81 (21 May 2021 04:59) (80 - 84)  BP: 122/80 (21 May 2021 04:59) (111/89 - 122/80)  BP(mean): --  RR: 20 (21 May 2021 04:59) (18 - 20)  SpO2: 99% (21 May 2021 06:10) (94% - 99%)    CONSTITUTIONAL:  Well nourished.  NAD    ENT:   Airway patent,   No thrush    CARDIAC:   Normal rate,   regular rhythm.    no edema      RESPIRATORY:   Bilateral breath sounds  No Wheezing  Normal chest expansion  Not tachypneic,  No use of accessory muscles    GASTROINTESTINAL:  Abdomen soft,   non-tender,   no guarding,   + BS    MUSCULOSKELETAL:   range of motion is not limited,  no clubbing, cyanosis    NEUROLOGICAL:   Alert and oriented   no motor or deficits.    SKIN:   Skin normal color for race,   warm, dry   No evidence of rash.      05-20-21 @ 07:01  -  05-21-21 @ 07:00  --------------------------------------------------------  IN:    Oral Fluid: 710 mL  Total IN: 710 mL    OUT:    Voided (mL): 275 mL  Total OUT: 275 mL    Total NET: 435 mL          LABS:                          11.5   6.67  )-----------( 174      ( 21 May 2021 05:35 )             36.3                                               05-21    141  |  95<L>  |  47<H>  ----------------------------<  98  4.1   |  28  |  1.0    Ca    9.6      21 May 2021 05:35  Phos  3.6     05-21  Mg     2.3     05-21    TPro  7.4  /  Alb  4.0  /  TBili  0.4  /  DBili  x   /  AST  28  /  ALT  23  /  AlkPhos  132<H>  05-21    LIVER FUNCTIONS - ( 21 May 2021 05:35 )  Alb: 4.0 g/dL / Pro: 7.4 g/dL / ALK PHOS: 132 U/L / ALT: 23 U/L / AST: 28 U/L / GGT: x                                              MEDICATIONS  (STANDING):  chlorhexidine 4% Liquid 1 Application(s) Topical every 12 hours  enoxaparin Injectable 30 milliGRAM(s) SubCutaneous daily  furosemide    Tablet 20 milliGRAM(s) Oral daily  levothyroxine 100 MICROGram(s) Oral daily  senna 2 Tablet(s) Oral at bedtime    MEDICATIONS  (PRN):  acetaminophen   Tablet .. 650 milliGRAM(s) Oral every 6 hours PRN Temp greater or equal to 38C (100.4F), Mild Pain (1 - 3)  bisacodyl Suppository 10 milliGRAM(s) Rectal daily PRN Constipation

## 2021-05-21 NOTE — PROGRESS NOTE ADULT - ASSESSMENT
ASSESSMENT:  88 yr old male with hx of HTN, severe AS, hypothyroid, HFpEF admitted for progressive sob for past few days.     PLAN/ACTIVE PROBLEMS:  # AHRF secondary to Acute on chronic HFpEF likely secondary to severe aortic stenosis.  - bibasilar crackles on exam, IVC not distended, is collapsible with inspiration on POCUS  - Elevated BNP >5,000  - Echo consistent with severe aortic stenosis.  - strict intake and output.   - structural cardiology following   - c/w IV Diuresis in moderation to avoid excessive preload reduction in the setting of severe aortic stenosis    # Suspected Obstructive Lung Disease -  # L Pleural Effusion  - CT w/findings suspicious of such  - wheezing on exam this AM   - + smoking Hx   - Duonebs PRN  - Pulm consult  - will need PFT's for definitive diagnosis       # NSTEMI type 2  - no need to trend trop    # Mild ANNETTE - Prerenal vs. Cardiorenal II  - Creatinine Trend: 1.1<--, 0.9<--, 1.2<--, 0.9<--, 0.8<--  - continue holding enalapril for now.   - Continue with gentle diuresis for now.   - Replete K+    # Mild Thrombocytopenia  -Trend CBC for now    # History of hypothyroidism  - c/w levothyroxine    # Chronic Anemia  - stable at baseline    # DVT prophylaxis  - on Lovenox     # Full Code  DISPOSITION: acute

## 2021-05-21 NOTE — PROGRESS NOTE ADULT - ASSESSMENT
89 yo M PMHx HTN, severe AS, hypothyroid, HFpEF admitted for progressive sob. Pt found to have acute on chronic HFpEF in setting of severe AS. Pt was admitted to [facility] CCU and transferred to [facility] for TAVR evaluation.    Acute on chronic HFpEF in setting severe aortic stenosis / NSTEMI possibly type 2  - structural cardiology evaluation appreciated-performing pre-TAVR evaluation  - Elevated BNP >5,000  - Echo consistent with severe aortic stenosis.  - change PO lasix to IV given pleural effusion (need to be careful given AS)  - strict intake and output.     NSTEMI  - more likely type 2   - cardiology on board  - not on aspirin or plavix-cardiology aware    SVT   - seen on telemetry  - check TSH   - resolved  - d/w cardiology, hold off beta blockade    ANxiety  - pt declining anxiolytic agents/psychiatry    Hypothyroidism  - c/w levothyroxine    Chronic Anemia  - unclear etiology  - stable at baseline    Left pleural effusion  - underwent prior thoracentesis  - pulm evaluation appreciated-diuresis for now    DVT prophylaxis  - on Lovenox     #Progress Note Handoff:  Pending (specify):  Structural cardiology eval, HR monitoring,   Family discussion: discussed pending pre tavr evaluation  Disposition: Home__x_/SNF___/Other________/Unknown at this time________

## 2021-05-21 NOTE — CONSULT NOTE ADULT - SUBJECTIVE AND OBJECTIVE BOX
Surgeon: /Roxanne/ Grant    Consult requesting by: Chun    HISTORY OF PRESENT ILLNESS:  89 yo W female w/PMH as noted below.  Pt has hx severe aortic valve disease, that requires replacement but pt. refused.   Pt c/o progressive dyspnea that  started 2 months ago , but today pt could no longer ambulate and tolerated her SOB.  so pt family called EMS.  In ER pt CXR showed bilateral pleural effusions( L>R)  requiring thoracentesis in past. Pt w/ elevated : BNP, troponins,.  Ct angio chest did not show any PE .  (16 May 2021 23:00)    CTS HPI:  hx of severe AS, presented from [facility] with cc of sob. Needed AVR 2 years ago at [facility] but she refused. Pt. state's that she has been progressively getting worse, and reports having a recent admission 6 weeks ago fo PNA. Pt. lives with her son, and she is having difficulty doing her activities of daily living, including cooking, cleaning, washing, bathing, and eating. Denies any hx of prior MI. No CP at current time breathing better. Anxious about getting CTA of chest abdomen and pelvis this AM. Called by structural heart team for surgical evaluation of AS    Home Medications:  enalapril 2.5 mg oral tablet: 1 tab(s) orally once a day (16 May 2021 14:29)  furosemide 20 mg oral tablet: 1 tab(s) orally once a day (16 May 2021 14:29)  levothyroxine 100 mcg (0.1 mg) oral tablet: 1 tab(s) orally once a day (16 May 2021 14:29)    NYHA functional class    [ ] Class I (no limitation) [ ] Class II (slight limitation) [ x ] Class III (marked limitation) [ ] Class IV (symptoms at rest)    PAST MEDICAL & SURGICAL HISTORY:  h/o Pleural effusions  HTN (hypertension)  Mitral valve prolapse  Enlarged heart  Hyperthyroidism  Arthritis  Diverticulosis  Hiatal hernia  GERD  Liver cancer - s/p resection and s/p chemo and radiation  Aortic stenosis  Status post cholecystectomy    MEDICATIONS  (STANDING):  chlorhexidine 4% Liquid 1 Application(s) Topical every 12 hours  enoxaparin Injectable 30 milliGRAM(s) SubCutaneous daily  furosemide    Tablet 20 milliGRAM(s) Oral daily  levothyroxine 100 MICROGram(s) Oral daily  senna 2 Tablet(s) Oral at bedtime    MEDICATIONS  (PRN):  acetaminophen   Tablet .. 650 milliGRAM(s) Oral every 6 hours PRN Temp greater or equal to 38C (100.4F), Mild Pain (1 - 3)  bisacodyl Suppository 10 milliGRAM(s) Rectal daily PRN Constipation    Allergies    Cipro (Other)  Levaquin (Rash)  tetracycline (Hives)    Intolerances    SOCIAL HISTORY:  denies recent drug, alcohol or tobacco abuse  Lives with: with son  Assisted device use: yes rolling walker    FAMILY HISTORY: none contrib       Review of Systems  CONSTITUTIONAL: no fever, chills or night sweats]   NEURO:  denies seizures, paralysis or paresthesias                                                                                EYES: wears glasses, no double vision, no blurry vision                                                                          ENMT: no difficulty hearing, vertigo, dysphagia, epistaxis recent dental work [ ]                                      CV:  denies chest pain, + VALENTINE                                                                                             RESPIRATORY:  no cough or hemoptysis                                                                GI:  no nausea, vomiting, constipation or diarrhea   : denies dysuria, hematuria, + incontinence or retention                                                                                           MUSKULOSKELETAL:  denies joint swelling or muscle weakness, + joint pain  PSYCH:  denies dementia, depression anxiety   ENDOCRINE:  cold intolerance[ ] heat intolerance[ ] polydipsia[ ]                                                                                                                                                                                                PHYSICAL EXAM  Vital Signs Last 24 Hrs  T(C): 36 (21 May 2021 04:59), Max: 36.8 (20 May 2021 14:16)  T(F): 96.8 (21 May 2021 04:59), Max: 98.2 (20 May 2021 14:16)  HR: 81 (21 May 2021 04:59) (80 - 84)  BP: 122/80 (21 May 2021 04:59) (111/89 - 122/80)  RR: 20 (21 May 2021 04:59) (18 - 20)  SpO2: 99% (21 May 2021 06:10) (94% - 99%)  Right arm bp: Left arm bp;    CONSTITUTIONAL:    Thin female of stated age in NAD                                                                       Neuro: oriented to person/place & time with no focal motor or sensory  deficits     Eyes: PERRLA, EOMI, no nystagmus, sclera anicteric  ENT:  nasal/oral mucosa with absence of cyanosis, fair dentition  Neck: no jugular vein distention, trachea midline, no goiter   Chest: bilateral breath sounds decreased at bases L>R                                                                        CV:  RSR, S1S2, + significant murmur appreciated  GI:  soft, non-tender non-distended, + bowel sounds, + scar                                                                                                         Extremities:  no evidence of cyanosis or deformity, no pedal edema   Extremity Pulses: right / left:  femorals 2+/ 2+; DP's +/+; radials +/+      SKIN : no rashes                                                          LABS:                        11.5   6.67  )-----------( 174      ( 21 May 2021 05:35 )             36.3     05-21    141  |  95<L>  |  47<H>  ----------------------------<  98  4.1   |  28  |  1.0    Ca    9.6      21 May 2021 05:35  Phos  3.6     05-21  Mg     2.3     05-21    TPro  7.4  /  Alb  4.0  /  TBili  0.4  /  DBili  x   /  AST  28  /  ALT  23  /  AlkPhos  132<H>  05-21    COVID-19:  negative on admission    Cardiac Cath: awaiting results from [place]    Xray Chest 1 View- PORTABLE-Routine (Xray Chest 1 View- PORTABLE-Routine in AM.) (05.20.21 @ 06:01) >  Unchanged left basilar opacity/effusion and effusion in the horizontal fissure. No pneumothorax.      < from: CT Angio Chest PE Protocol w/ IV Cont (05.16.21 @ 18:41) >  No CTA evidence of acute pulmonary embolus.    Bilateral pleural effusions, left greater than right, as well as loculated effusions within the right major and minor fissures. Near complete collapse of the left lung lower lobe.    Cardiomegaly.    Right upper lobe pulmonary micronodules measuring up to 2-3 mm (3-164), may represent small airways disease.    Biapical lung calcified pleural plaques.    < from: TTE Echo Complete w/ Contrast w/ Doppler (05.17.21 @ 09:31) >  Summary:   1. Severe aortic valve stenosis.   2. Left ventricular ejection fraction, by visual estimation, is 55 to 60%.   3. Mild left ventricular hypertrophy.   4. Mild to moderate mitral valve regurgitation.   5. Mild aortic regurgitation.    PHYSICIAN INTERPRETATION:  Left Ventricle: The left ventricular internal cavity size is normal. There is mild left ventricular hypertrophy. Left ventricular ejection fraction, by visual estimation, is 55 to 60%.  Mitral Valve: Mild to moderate mitral valve regurgitation is seen.  Aortic Valve: Severe aortic stenosis is present. Mild aortic valve regurgitation is seen.    < from: 12 Lead ECG (05.19.21 @ 07:27) >  Ventricular Rate 74 BPM    Atrial Rate 74 BPM    P-R Interval 102 ms    QRS Duration 138 ms    Q-T Interval 462 ms    QTC Calculation(Bazett) 512 ms    P Axis -73 degrees    R Axis -50 degrees    T Axis 1 degrees    Diagnosis Line Unusual P axis and short IA, probable junctional rhythm with Premature atrial  complexes  Right bundle branch block  Left anterior fascicular block  *** Bifascicular block ***  Voltage criteria for left ventricular hypertrophy  Abnormal ECG    < end of copied text >        STS Score:   Isolated AVR  Risk of Mortality:  8.088%  Renal Failure:  4.851%  Permanent Stroke:  1.486%  Prolonged Ventilation:  18.799%  DSW Infection:  0.118%  Reoperation:  5.757%  Morbidity or Mortality:  24.963%  Short Length of Stay:  12.855%  Long Length of Stay:  20.219%    Assessment/ Plan:    89 yo female with multiple medical problems being evaluated for possible TAVR vs BAV. Was told she needed procedure 2 years ago but didn't go due to fear.  symptoms of HF have become worse now agreeable to a procedue  Seen by structural heart cardiology  - note reviewed  For CTA of chest / abdomen / and pelvis utilizing TAVR protocol today  Will need dental evaluation prior to surgery  Needs PFT;s and carotid duplex  Needs Hemoglobin A1c and Thyroid profile   Will need cardiac catheterization results from [facility] -  if unable to obtain may need repeat Cath  Will discuss with structural heart.  Attending note to follow             Surgeon: /Roxanne/ Grant    Consult requesting by: Chun    HISTORY OF PRESENT ILLNESS:  89 yo W female w/PMH as noted below.  Pt has hx severe aortic valve disease, that requires replacement but pt. refused.   Pt c/o progressive dyspnea that  started 2 months ago , but today pt could no longer ambulate and tolerated her SOB.  so pt family called EMS.  In ER pt CXR showed bilateral pleural effusions( L>R)  requiring thoracentesis in past. Pt w/ elevated : BNP, troponins,.  Ct angio chest did not show any PE .  (16 May 2021 23:00)    CTS HPI:  hx of severe AS, presented from [facility] with cc of sob. Needed AVR 2 years ago at [facility] but she refused. Pt. state's that she has been progressively getting worse, and reports having a recent admission 6 weeks ago fo PNA. Pt. lives with her son, and she is having difficulty doing her activities of daily living, including cooking, cleaning, washing, bathing, and eating. Denies any hx of prior MI. No CP at current time breathing better. Anxious about getting CTA of chest abdomen and pelvis this AM. Called by structural heart team for surgical evaluation of AS    Home Medications:  enalapril 2.5 mg oral tablet: 1 tab(s) orally once a day (16 May 2021 14:29)  furosemide 20 mg oral tablet: 1 tab(s) orally once a day (16 May 2021 14:29)  levothyroxine 100 mcg (0.1 mg) oral tablet: 1 tab(s) orally once a day (16 May 2021 14:29)    NYHA functional class    [ ] Class I (no limitation) [ ] Class II (slight limitation) [ x ] Class III (marked limitation) [ ] Class IV (symptoms at rest)    PAST MEDICAL & SURGICAL HISTORY:  h/o Pleural effusions  HTN (hypertension)  Mitral valve prolapse  Enlarged heart  Hyperthyroidism  Arthritis  Diverticulosis  Hiatal hernia  GERD  Liver cancer - s/p resection and s/p chemo and radiation  Aortic stenosis  Status post cholecystectomy    MEDICATIONS  (STANDING):  chlorhexidine 4% Liquid 1 Application(s) Topical every 12 hours  enoxaparin Injectable 30 milliGRAM(s) SubCutaneous daily  furosemide    Tablet 20 milliGRAM(s) Oral daily  levothyroxine 100 MICROGram(s) Oral daily  senna 2 Tablet(s) Oral at bedtime    MEDICATIONS  (PRN):  acetaminophen   Tablet .. 650 milliGRAM(s) Oral every 6 hours PRN Temp greater or equal to 38C (100.4F), Mild Pain (1 - 3)  bisacodyl Suppository 10 milliGRAM(s) Rectal daily PRN Constipation    Allergies    Cipro (Other)  Levaquin (Rash)  tetracycline (Hives)    Intolerances    SOCIAL HISTORY:  denies recent drug, alcohol or tobacco abuse  Lives with: with son  Assisted device use: yes rolling walker    FAMILY HISTORY: none contrib       Review of Systems  CONSTITUTIONAL: no fever, chills or night sweats]   NEURO:  denies seizures, paralysis or paresthesias                                                                                EYES: wears glasses, no double vision, no blurry vision                                                                          ENMT: no difficulty hearing, vertigo, dysphagia, epistaxis recent dental work [ ]                                      CV:  denies chest pain, + VALENTINE                                                                                             RESPIRATORY:  no cough or hemoptysis                                                                GI:  no nausea, vomiting, constipation or diarrhea   : denies dysuria, hematuria, + incontinence or retention                                                                                           MUSKULOSKELETAL:  denies joint swelling or muscle weakness, + joint pain  PSYCH:  denies dementia, depression anxiety   ENDOCRINE:  cold intolerance[ ] heat intolerance[ ] polydipsia[ ]                                                                                                                                                                                                PHYSICAL EXAM  Vital Signs Last 24 Hrs  T(C): 36 (21 May 2021 04:59), Max: 36.8 (20 May 2021 14:16)  T(F): 96.8 (21 May 2021 04:59), Max: 98.2 (20 May 2021 14:16)  HR: 81 (21 May 2021 04:59) (80 - 84)  BP: 122/80 (21 May 2021 04:59) (111/89 - 122/80)  RR: 20 (21 May 2021 04:59) (18 - 20)  SpO2: 99% (21 May 2021 06:10) (94% - 99%)  Right arm bp: Left arm bp;    CONSTITUTIONAL:    Thin female of stated age in NAD                                                                       Neuro: oriented to person/place & time with no focal motor or sensory  deficits     Eyes: PERRLA, EOMI, no nystagmus, sclera anicteric  ENT:  nasal/oral mucosa with absence of cyanosis, fair dentition  Neck: no jugular vein distention, trachea midline, no goiter   Chest: bilateral breath sounds decreased at bases L>R                                                                        CV:  RSR, S1S2, + significant murmur appreciated  GI:  soft, non-tender non-distended, + bowel sounds, + scar                                                                                                         Extremities:  no evidence of cyanosis or deformity, no pedal edema   Extremity Pulses: right / left:  femorals 2+/ 2+; DP's +/+; radials +/+      SKIN : no rashes                                                          LABS:                        11.5   6.67  )-----------( 174      ( 21 May 2021 05:35 )             36.3     05-21    141  |  95<L>  |  47<H>  ----------------------------<  98  4.1   |  28  |  1.0    Ca    9.6      21 May 2021 05:35  Phos  3.6     05-21  Mg     2.3     05-21    TPro  7.4  /  Alb  4.0  /  TBili  0.4  /  DBili  x   /  AST  28  /  ALT  23  /  AlkPhos  132<H>  05-21    COVID-19:  negative on admission    Cardiac Cath: awaiting results from [place]    Xray Chest 1 View- PORTABLE-Routine (Xray Chest 1 View- PORTABLE-Routine in AM.) (05.20.21 @ 06:01) >  Unchanged left basilar opacity/effusion and effusion in the horizontal fissure. No pneumothorax.      < from: CT Angio Chest PE Protocol w/ IV Cont (05.16.21 @ 18:41) >  No CTA evidence of acute pulmonary embolus.    Bilateral pleural effusions, left greater than right, as well as loculated effusions within the right major and minor fissures. Near complete collapse of the left lung lower lobe.    Cardiomegaly.    Right upper lobe pulmonary micronodules measuring up to 2-3 mm (3-164), may represent small airways disease.    Biapical lung calcified pleural plaques.    < from: TTE Echo Complete w/ Contrast w/ Doppler (05.17.21 @ 09:31) >  Summary:   1. Severe aortic valve stenosis.   2. Left ventricular ejection fraction, by visual estimation, is 55 to 60%.   3. Mild left ventricular hypertrophy.   4. Mild to moderate mitral valve regurgitation.   5. Mild aortic regurgitation.    PHYSICIAN INTERPRETATION:  Left Ventricle: The left ventricular internal cavity size is normal. There is mild left ventricular hypertrophy. Left ventricular ejection fraction, by visual estimation, is 55 to 60%.  Mitral Valve: Mild to moderate mitral valve regurgitation is seen.  Aortic Valve: Severe aortic stenosis is present. Mild aortic valve regurgitation is seen.    < from: 12 Lead ECG (05.19.21 @ 07:27) >  Ventricular Rate 74 BPM    Atrial Rate 74 BPM    P-R Interval 102 ms    QRS Duration 138 ms    Q-T Interval 462 ms    QTC Calculation(Bazett) 512 ms    P Axis -73 degrees    R Axis -50 degrees    T Axis 1 degrees    Diagnosis Line Unusual P axis and short SC, probable junctional rhythm with Premature atrial  complexes  Right bundle branch block  Left anterior fascicular block  *** Bifascicular block ***  Voltage criteria for left ventricular hypertrophy  Abnormal ECG    < end of copied text >        STS Score:   Isolated AVR  Risk of Mortality:  8.088%  Renal Failure:  4.851%  Permanent Stroke:  1.486%  Prolonged Ventilation:  18.799%  DSW Infection:  0.118%  Reoperation:  5.757%  Morbidity or Mortality:  24.963%  Short Length of Stay:  12.855%  Long Length of Stay:  20.219%    Assessment/ Plan:    89 yo female with multiple medical problems being evaluated for possible TAVR vs BAV. Was told she needed procedure 2 years ago but didn't go due to fear.  symptoms of HF have become worse now agreeable to a procedue  Seen by structural heart cardiology  - note reviewed  For CTA of chest / abdomen / and pelvis utilizing TAVR protocol today  Will need dental evaluation prior to surgery  Needs PFT;s and carotid duplex  Needs Hemoglobin A1c   Pulmonary evaluation for pleural effusion  Will need cardiac catheterization results from [facility] -  if unable to obtain may need repeat Cath  Will discuss with structural heart.  Attending note to follow

## 2021-05-21 NOTE — PROGRESS NOTE ADULT - SUBJECTIVE AND OBJECTIVE BOX
DILIP LUCAS   [id number]  88y   Female   SUBJECTIVE:  Patient is a 88y old Female who presents with a chief complaint of acute on chronic CHF (20 May 2021 14:58)    Today is hospital day 5d. This morning she is resting comfortably in bed and reports no new issues or overnight events.   Currently admitted to medicine with the primary diagnosis of Pleural effusion       PAST MEDICAL & SURGICAL HISTORY  HTN (hypertension)    Mitral valve prolapse    Enlarged heart    Murmur    Hyperthyroidism    Arthritis    HTN (hypertension)    Diverticulosis    Hiatal hernia    Acid reflux    Liver cancer  s/p resection and s/p chemo and radiation    Aortic stenosis    H/O resection of liver    Status post cholecystectomy      FAMILY HISTORY:    SOCIAL HISTORY:  Marital Status:  Living Situation:  Occupation:  Tobacco Use:  Alcohol Use:  Drug Use:  Sexual History:    ALLERGIES:  Cipro (Other)  Levaquin (Rash)  tetracycline (Hives)    MEDICATIONS:  STANDING MEDICATIONS  chlorhexidine 4% Liquid 1 Application(s) Topical every 12 hours  enoxaparin Injectable 30 milliGRAM(s) SubCutaneous daily  furosemide    Tablet 20 milliGRAM(s) Oral daily  levothyroxine 100 MICROGram(s) Oral daily  senna 2 Tablet(s) Oral at bedtime    PRN MEDICATIONS  acetaminophen   Tablet .. 650 milliGRAM(s) Oral every 6 hours PRN  bisacodyl Suppository 10 milliGRAM(s) Rectal daily PRN    VITALS:   T(F): 96.8 (05-21-21 @ 04:59)  HR: 81 (05-21-21 @ 04:59)  BP: 122/80 (05-21-21 @ 04:59)  BP(mean): --  RR: 20 (05-21-21 @ 04:59)  SpO2: 99% (05-21-21 @ 06:10)    LABS:                        11.5   6.67  )-----------( 174      ( 21 May 2021 05:35 )             36.3     Hemoglobin: 11.5 g/dL (05-21 @ 05:35)  Hemoglobin: 10.7 g/dL (05-20 @ 06:42)  Hemoglobin: 9.7 g/dL (05-19 @ 05:35)  Hemoglobin: 10.4 g/dL (05-18 @ 05:38)  Hemoglobin: 9.6 g/dL (05-17 @ 06:29)    05-21    141  |  95<L>  |  47<H>  ----------------------------<  98  4.1   |  28  |  1.0    Ca    9.6      21 May 2021 05:35  Phos  3.6     05-21  Mg     2.3     05-21    TPro  7.4  /  Alb  4.0  /  TBili  0.4  /  DBili  x   /  AST  28  /  ALT  23  /  AlkPhos  132<H>  05-21    Potassium Trend: 4.1<--, 3.4<--, 3.5<--, 4.1<--, 4.5<--  SODIUM TREND:  Sodium 141 [05-21 @ 05:35]  Sodium 137 [05-20 @ 06:42]  Sodium 137 [05-19 @ 05:35]  Sodium 139 [05-18 @ 05:38]  Sodium 141 [05-17 @ 06:29]  Sodium 138 [05-16 @ 14:43]    Creatinine Trend: 1.0<--, 1.1<--, 0.9<--, 1.2<--, 0.9<--, 0.8<--        Calcium, Total Serum: 9.6 mg/dL (05-21-21 @ 05:35)  Phosphorus Level, Serum: 3.6 mg/dL (05-21-21 @ 05:35)  Magnesium, Serum: 2.3 mg/dL (05-21-21 @ 05:35)          COVID      05-20-21 @ 07:01  -  05-21-21 @ 07:00  --------------------------------------------------------  IN: 710 mL / OUT: 275 mL / NET: 435 mL    RADIOLOGY:  < from: CT Angio Chest PE Protocol w/ IV Cont (05.16.21 @ 18:41) >  IMPRESSION:    No CTA evidence of acute pulmonary embolus.    Bilateral pleural effusions, left greater than right, as well as loculated effusions within the right major and minor fissures. Near complete collapse of the left lung lower lobe.    Cardiomegaly.    Right upper lobe pulmonary micronodules measuring up to 2-3 mm (3-164), may represent small airways disease.    Biapical lung calcified pleural plaques.    < end of copied text >  < from: Xray Chest 1 View- PORTABLE-Routine (Xray Chest 1 View- PORTABLE-Routine in AM.) (05.20.21 @ 06:01) >  Impression:    Unchanged left basilar opacity/effusion and effusion in the horizontal fissure. No pneumothorax.    < end of copied text >    CARDIOLOGY IMAGING:  < from: TTE Echo Complete w/ Contrast w/ Doppler (05.17.21 @ 09:31) >  Summary:   1. Severe aortic valve stenosis.   2. Left ventricular ejection fraction, by visual estimation, is 55 to 60%.   3. Mild left ventricular hypertrophy.   4. Mild to moderate mitral valve regurgitation.   5. Mild aortic regurgitation.    PHYSICIAN INTERPRETATION:  Left Ventricle: The left ventricular internal cavity size is normal. There is mild left ventricular hypertrophy. Left ventricular ejection fraction, by visual estimation, is 55 to 60%.  Mitral Valve: Mild to moderate mitral valve regurgitation is seen.  Aortic Valve: Severe aortic stenosis is present. Mild aortic valve regurgitation is seen.    < end of copied text >  < from: 12 Lead ECG (05.17.21 @ 10:09) >    Ventricular Rate 78 BPM    Atrial Rate 78 BPM    P-R Interval 156 ms    QRS Duration 126 ms    Q-T Interval 444 ms    QTC Calculation(Bazett) 506 ms    P Axis 37 degrees    R Axis -48 degrees    T Axis 11 degrees    Diagnosis Line Normal sinus rhythm  Right bundle branch block  Left anterior fascicular block  *** Bifascicular block ***  Voltage criteria for left ventricular hypertrophy  Abnormal ECG    < end of copied text >  PHYSICAL EXAM:  GEN: Anxious  HEENT: normocephalic, atraumatic, aniceteric  LUNGS: bibasilar crackles, + scattered end-exp wheezing bilat   HEART: S1/S2 present. RRR, + III/VI systolic ejection murmur heard best at R 2ICS @ RSB  ABD: Soft, non-tender, non-distended. + hepatic resection scar noted   EXT: Warm, well perfused, skin color appropriate for ethnicity  NEURO: AAOX3, normal affect

## 2021-05-22 LAB
A1C WITH ESTIMATED AVERAGE GLUCOSE RESULT: 5.2 % — SIGNIFICANT CHANGE UP (ref 4–5.6)
ESTIMATED AVERAGE GLUCOSE: 103 MG/DL — SIGNIFICANT CHANGE UP (ref 68–114)

## 2021-05-22 PROCEDURE — 99233 SBSQ HOSP IP/OBS HIGH 50: CPT

## 2021-05-22 PROCEDURE — 99232 SBSQ HOSP IP/OBS MODERATE 35: CPT

## 2021-05-22 PROCEDURE — 99231 SBSQ HOSP IP/OBS SF/LOW 25: CPT

## 2021-05-22 RX ORDER — FUROSEMIDE 40 MG
40 TABLET ORAL ONCE
Refills: 0 | Status: COMPLETED | OUTPATIENT
Start: 2021-05-22 | End: 2021-05-22

## 2021-05-22 RX ORDER — FUROSEMIDE 40 MG
20 TABLET ORAL ONCE
Refills: 0 | Status: DISCONTINUED | OUTPATIENT
Start: 2021-05-22 | End: 2021-05-22

## 2021-05-22 RX ADMIN — Medication 40 MILLIGRAM(S): at 11:15

## 2021-05-22 RX ADMIN — ENOXAPARIN SODIUM 30 MILLIGRAM(S): 100 INJECTION SUBCUTANEOUS at 11:15

## 2021-05-22 RX ADMIN — CHLORHEXIDINE GLUCONATE 1 APPLICATION(S): 213 SOLUTION TOPICAL at 06:00

## 2021-05-22 RX ADMIN — Medication 20 MILLIGRAM(S): at 06:01

## 2021-05-22 RX ADMIN — Medication 100 MICROGRAM(S): at 06:01

## 2021-05-22 RX ADMIN — CHLORHEXIDINE GLUCONATE 1 APPLICATION(S): 213 SOLUTION TOPICAL at 17:31

## 2021-05-22 NOTE — PROGRESS NOTE ADULT - SUBJECTIVE AND OBJECTIVE BOX
CHIEF COMPLAINT:    Patient is a 88y old  Female who presents with a chief complaint of acute on chronic CHF (22 May 2021 11:00)      INTERVAL HPI/OVERNIGHT EVENTS:    Patient seen and examined at bedside. No acute overnight events occurred.    ROS: All other systems are negative.    Vital Signs:    T(F): 97.8 (21 @ 13:18), Max: 97.8 (21 @ 20:17)  HR: 77 (21 @ 13:18) (70 - 86)  BP: 124/56 (21 @ 13:18) (109/56 - 131/62)  RR: 18 (21 @ 13:18) (18 - 18)  SpO2: 99% (21 @ 06:04) (98% - 99%)  I&O's Summary    21 May 2021 07:01  -  22 May 2021 07:00  --------------------------------------------------------  IN: 830 mL / OUT: 500 mL / NET: 330 mL      Daily     Daily Weight in k (22 May 2021 05:05)  CAPILLARY BLOOD GLUCOSE          PHYSICAL EXAM:  GENERAL:  NAD  SKIN: No rashes or lesions  HEENT: Atraumatic. Normocephalic. Anicteric  NECK:  No JVD.   PULMONARY: Clear to ausculation bilaterally. No wheezing. No rales  CVS: Normal S1, S2. Regular rate and rhythm. No murmurs.  ABDOMEN/GI: Soft, Nontender, Nondistended; Bowel sounds are present  EXTREMITIES:  No edema B/L LE.  NEUROLOGIC:  No motor deficit.  PSYCH: Alert & oriented x 3, normal affect    Consultant(s) Notes Reviewed:  [x ] YES  [ ] NO  Care Discussed with Consultants/Other Providers [ x] YES  [ ] NO    LABS:                        11.5   6.67  )-----------( 174      ( 21 May 2021 05:35 )             36.3         141  |  95<L>  |  47<H>  ----------------------------<  98  4.1   |  28  |  1.0    Ca    9.6      21 May 2021 05:35  Phos  3.6       Mg     2.3         TPro  7.4  /  Alb  4.0  /  TBili  0.4  /  DBili  x   /  AST  28  /  ALT  23  /  AlkPhos  132<H>        Serum Pro-Brain Natriuretic Peptide: 5291 pg/mL (21 @ 14:43)          RADIOLOGY & ADDITIONAL TESTS:  Imaging or report Personally Reviewed:  [ ] YES  [ ] NO    Telemetry reviewed independently - SVT    Medications:  Standing  chlorhexidine 4% Liquid 1 Application(s) Topical every 12 hours  enoxaparin Injectable 30 milliGRAM(s) SubCutaneous daily  furosemide   Injectable 20 milliGRAM(s) IV Push once  levothyroxine 100 MICROGram(s) Oral daily  senna 2 Tablet(s) Oral at bedtime    PRN Meds  acetaminophen   Tablet .. 650 milliGRAM(s) Oral every 6 hours PRN  bisacodyl Suppository 10 milliGRAM(s) Rectal daily PRN      Case discussed with resident  Care discussed with pt

## 2021-05-22 NOTE — PROGRESS NOTE ADULT - SUBJECTIVE AND OBJECTIVE BOX
Patient is a 88y old  Female who presents with a chief complaint of acute on chronic CHF (22 May 2021 09:51)      Over Night Events:  Patient seen and examined.   on RA feel good she want to walk   ct small b/l effusion left more then right     ROS:  See HPI    PHYSICAL EXAM    ICU Vital Signs Last 24 Hrs  T(C): 36.4 (22 May 2021 05:05), Max: 36.6 (21 May 2021 20:17)  T(F): 97.5 (22 May 2021 05:05), Max: 97.8 (21 May 2021 20:17)  HR: 70 (22 May 2021 05:05) (70 - 94)  BP: 114/57 (22 May 2021 05:05) (114/57 - 138/63)  BP(mean): --  ABP: --  ABP(mean): --  RR: 18 (21 May 2021 20:17) (18 - 18)  SpO2: 99% (22 May 2021 06:04) (98% - 99%)      General: AO  HEENT: criss               Lymph Nodes: NO cervical LN   Lungs: Bilateral BS  Cardiovascular: Regular   Abdomen: Soft, Positive BS  Extremities: No clubbing   Skin: warm   Neurological:   Musculoskeletal: move all ext     I&O's Detail    21 May 2021 07:01  -  22 May 2021 07:00  --------------------------------------------------------  IN:    Oral Fluid: 830 mL  Total IN: 830 mL    OUT:    Voided (mL): 500 mL  Total OUT: 500 mL    Total NET: 330 mL          LABS:                          11.5   6.67  )-----------( 174      ( 21 May 2021 05:35 )             36.3         21 May 2021 05:35    141    |  95     |  47     ----------------------------<  98     4.1     |  28     |  1.0      Ca    9.6        21 May 2021 05:35  Phos  3.6       21 May 2021 05:35  Mg     2.3       21 May 2021 05:35                                                                                      Urinalysis Basic - ( 21 May 2021 14:55 )    Color: Yellow / Appearance: Clear / SG: >1.050 / pH: x  Gluc: x / Ketone: Trace  / Bili: Negative / Urobili: <2 mg/dL   Blood: x / Protein: 30 mg/dL / Nitrite: Negative   Leuk Esterase: Negative / RBC: 4 /HPF / WBC 3 /HPF   Sq Epi: x / Non Sq Epi: 7 /HPF / Bacteria: Negative                                                                                                                                                     MEDICATIONS  (STANDING):  chlorhexidine 4% Liquid 1 Application(s) Topical every 12 hours  enoxaparin Injectable 30 milliGRAM(s) SubCutaneous daily  furosemide    Tablet 20 milliGRAM(s) Oral daily  furosemide   Injectable 40 milliGRAM(s) IV Push once  levothyroxine 100 MICROGram(s) Oral daily  senna 2 Tablet(s) Oral at bedtime    MEDICATIONS  (PRN):  acetaminophen   Tablet .. 650 milliGRAM(s) Oral every 6 hours PRN Temp greater or equal to 38C (100.4F), Mild Pain (1 - 3)  bisacodyl Suppository 10 milliGRAM(s) Rectal daily PRN Constipation          Xrays:  TLC:  OG:  ET tube:                                                                                       ECHO:  [facility] ICU:

## 2021-05-22 NOTE — PROGRESS NOTE ADULT - SUBJECTIVE AND OBJECTIVE BOX
DILIP LUCAS   [id number]  88y   Female   SUBJECTIVE:  Patient is a 88y old Female who presents with a chief complaint of CHF (22 May 2021 08:23)    Today is hospital day 6d. This morning she is resting comfortably in bed and reports no new issues or overnight events.   Currently admitted to medicine with the primary diagnosis of Pleural effusion       PAST MEDICAL & SURGICAL HISTORY  HTN (hypertension)    Mitral valve prolapse    Enlarged heart    Murmur    Hyperthyroidism    Arthritis    HTN (hypertension)    Diverticulosis    Hiatal hernia    Acid reflux    Liver cancer  s/p resection and s/p chemo and radiation    Aortic stenosis    H/O resection of liver    Status post cholecystectomy      ALLERGIES:  Cipro (Other)  Levaquin (Rash)  tetracycline (Hives)    MEDICATIONS:  STANDING MEDICATIONS  chlorhexidine 4% Liquid 1 Application(s) Topical every 12 hours  enoxaparin Injectable 30 milliGRAM(s) SubCutaneous daily  furosemide    Tablet 20 milliGRAM(s) Oral daily  levothyroxine 100 MICROGram(s) Oral daily  senna 2 Tablet(s) Oral at bedtime    PRN MEDICATIONS  acetaminophen   Tablet .. 650 milliGRAM(s) Oral every 6 hours PRN  bisacodyl Suppository 10 milliGRAM(s) Rectal daily PRN    VITALS:   T(F): 97.5 (05-22-21 @ 05:05)  HR: 70 (05-22-21 @ 05:05)  BP: 114/57 (05-22-21 @ 05:05)  BP(mean): --  RR: 18 (05-21-21 @ 20:17)  SpO2: 99% (05-22-21 @ 06:04)    LABS:                        11.5   6.67  )-----------( 174      ( 21 May 2021 05:35 )             36.3     Hemoglobin: 11.5 g/dL (05-21 @ 05:35)  Hemoglobin: 10.7 g/dL (05-20 @ 06:42)  Hemoglobin: 9.7 g/dL (05-19 @ 05:35)  Hemoglobin: 10.4 g/dL (05-18 @ 05:38)    05-21    141  |  95<L>  |  47<H>  ----------------------------<  98  4.1   |  28  |  1.0    Ca    9.6      21 May 2021 05:35  Phos  3.6     05-21  Mg     2.3     05-21    TPro  7.4  /  Alb  4.0  /  TBili  0.4  /  DBili  x   /  AST  28  /  ALT  23  /  AlkPhos  132<H>  05-21    Potassium Trend: 4.1<--, 3.4<--, 3.5<--, 4.1<--, 4.5<--  SODIUM TREND:  Sodium 141 [05-21 @ 05:35]  Sodium 137 [05-20 @ 06:42]  Sodium 137 [05-19 @ 05:35]  Sodium 139 [05-18 @ 05:38]  Sodium 141 [05-17 @ 06:29]  Sodium 138 [05-16 @ 14:43]    Creatinine Trend: 1.0<--, 1.1<--, 0.9<--, 1.2<--, 0.9<--, 0.8<--    Urinalysis Basic - ( 21 May 2021 14:55 )    Color: Yellow / Appearance: Clear / SG: >1.050 / pH: x  Gluc: x / Ketone: Trace  / Bili: Negative / Urobili: <2 mg/dL   Blood: x / Protein: 30 mg/dL / Nitrite: Negative   Leuk Esterase: Negative / RBC: 4 /HPF / WBC 3 /HPF   Sq Epi: x / Non Sq Epi: 7 /HPF / Bacteria: Negative                COVID      05-21-21 @ 07:01  -  05-22-21 @ 07:00  --------------------------------------------------------  IN: 830 mL / OUT: 500 mL / NET: 330 mL    RADIOLOGY:  < from: CT Angio Chest PE Protocol w/ IV Cont (05.16.21 @ 18:41) >  IMPRESSION:    No CTA evidence of acute pulmonary embolus.    Bilateral pleural effusions, left greater than right, as well as loculated effusions within the right major and minor fissures. Near complete collapse of the left lung lower lobe.    Cardiomegaly.    Right upper lobe pulmonary micronodules measuring up to 2-3 mm (3-164), may represent small airways disease.    Biapical lung calcified pleural plaques.    < end of copied text >  < from: Xray Chest 1 View- PORTABLE-Routine (Xray Chest 1 View- PORTABLE-Routine in AM.) (05.20.21 @ 06:01) >  Impression:    Unchanged left basilar opacity/effusion and effusion in the horizontal fissure. No pneumothorax.    < end of copied text >  CARDIAC TELEMETRY: NO EVENTS  CARDIOLOGY IMAGING:  < from: TTE Echo Complete w/ Contrast w/ Doppler (05.17.21 @ 09:31) >  Summary:   1. Severe aortic valve stenosis.   2. Left ventricular ejection fraction, by visual estimation, is 55 to 60%.   3. Mild left ventricular hypertrophy.   4. Mild to moderate mitral valve regurgitation.   5. Mild aortic regurgitation.    PHYSICIAN INTERPRETATION:  Left Ventricle: The left ventricular internal cavity size is normal. There is mild left ventricular hypertrophy. Left ventricular ejection fraction, by visual estimation, is 55 to 60%.  Mitral Valve: Mild to moderate mitral valve regurgitation is seen.  Aortic Valve: Severe aortic stenosis is present. Mild aortic valve regurgitation is seen.    < end of copied text >  < from: 12 Lead ECG (05.17.21 @ 10:09) >    Ventricular Rate 78 BPM    Atrial Rate 78 BPM    P-R Interval 156 ms    QRS Duration 126 ms    Q-T Interval 444 ms    QTC Calculation(Bazett) 506 ms    P Axis 37 degrees    R Axis -48 degrees    T Axis 11 degrees    Diagnosis Line Normal sinus rhythm  Right bundle branch block  Left anterior fascicular block  *** Bifascicular block ***  Voltage criteria for left ventricular hypertrophy  Abnormal ECG    < end of copied text >  PHYSICAL EXAM:  GEN: Anxious  HEENT: normocephalic, atraumatic, aniceteric  LUNGS: bibasilar crackles, + scattered end-exp wheezing bilat   HEART: S1/S2 present. RRR, + III/VI systolic ejection murmur heard best at R 2ICS @ RSB  ABD: Soft, non-tender, non-distended. + hepatic resection scar noted   EXT: Warm, well perfused, skin color appropriate for ethnicity  NEURO: AAOX3, normal affect

## 2021-05-22 NOTE — PROGRESS NOTE ADULT - SUBJECTIVE AND OBJECTIVE BOX
CTA reviewed.    Anatomy suitable for BAV.  Access is marginal for transfemoral TAVR secondary to PAD.  May require alternative access.    Plan for BAV next week (likely Monday or Tuesday).  Will consider elective high-risk TAVR pending clinical course.

## 2021-05-22 NOTE — PROGRESS NOTE ADULT - ASSESSMENT
ASSESSMENT:  88 yr old male with hx of HTN, severe AS, hypothyroid, HFpEF admitted for progressive sob for past few days.     PLAN/ACTIVE PROBLEMS:  # AHRF secondary to Acute on chronic HFpEF likely secondary to severe aortic stenosis.  - Echo consistent with severe aortic stenosis.  - strict intake and output.   - structural cardiology following   - c/w IV Diuresis in moderation to avoid excessive preload reduction in the setting of severe aortic stenosis  - anatomy favorable for valvuloplasty however potential high-risk TAVR pending SDM between cardio and patient    # Suspected Obstructive Lung Disease -  # L Pleural Effusion  - CT w/findings suspicious of such  - + smoking Hx   - Duonebs PRN  - Pulm consult appreciated, no tap  - will need PFT's for definitive diagnosis       # NSTEMI type 2  - no need to trend trop    # Mild ANNETTE - Prerenal vs. Cardiorenal II  - Creatinine Trend: 1.0<--, 1.1<--, 0.9<--, 1.2<--, 0.9<--, 0.8<--  - continue holding enalapril for now.   - Continue with gentle diuresis for now.     # Mild Thrombocytopenia  -Trend CBC for now    # History of hypothyroidism  - c/w levothyroxine    # Chronic Anemia  - stable at baseline    # DVT prophylaxis  - on Lovenox     # Full Code

## 2021-05-22 NOTE — PROGRESS NOTE ADULT - ASSESSMENT
89 yo M PMHx HTN, severe AS, hypothyroid, HFpEF admitted for progressive sob. Pt found to have acute on chronic HFpEF in setting of severe AS. Pt was admitted to [facility] CCU and transferred to [facility] for TAVR evaluation.    Acute on chronic HFpEF in setting severe aortic stenosis / NSTEMI possibly type 2  - BAV planned for 5/24 or 5/25, cardiology considering TAVR  - Elevated BNP >5,000  - Echo consistent with severe aortic stenosis.  - change PO lasix to IV given pleural effusion (need to be careful given AS)  - strict intake and output.     NSTEMI  - more likely type 2   - cardiology on board  - not on aspirin or plavix-cardiology aware    SVT   - present again today  - as per cardiology hold off beta blockade  - TSH 4.14    Anxiety  - pt declining anxiolytic agents/psychiatry    Hypothyroidism  - c/w levothyroxine    Chronic Anemia  - unclear etiology  - stable at baseline    Left pleural effusion  - underwent prior thoracentesis  - pulm evaluation appreciated-diuresis for now-repeat cxr in AM    DVT prophylaxis  - on Lovenox     #Progress Note Handoff:  Pending (specify): BAV  Family discussion: discussed pending BAV  Disposition: Home__x_/SNF___/Other________/Unknown at this time________

## 2021-05-22 NOTE — PROGRESS NOTE ADULT - ASSESSMENT
IMPRESSION:  acute resp failure improved secondary CHF exacerbation   pleural effusion improved with lasix   severe aortic stenosis     PLAN:    CNS: no sedation     HEENT: oral care     PULMONARY: keep pox > 92 %   consider lasix 40 mg iv once and then switch to PO if ok by cardiology   effusion improved with lasix beginning of this week also on RA and patient refuse thoracentesis before  cxr [place]  will follow   no wheezing     CARDIOVASCULAR: l  cardiology follow        GI: GI prophylaxis.  Feeding     RENAL: follow is and os lytes     INFECTIOUS DISEASE: no abx for now       HEMATOLOGICAL:  DVT prophylaxis.    ENDOCRINE:  Follow up FS.  Insulin protocol if needed

## 2021-05-23 PROCEDURE — 99233 SBSQ HOSP IP/OBS HIGH 50: CPT

## 2021-05-23 PROCEDURE — 99232 SBSQ HOSP IP/OBS MODERATE 35: CPT

## 2021-05-23 PROCEDURE — 71045 X-RAY EXAM CHEST 1 VIEW: CPT | Mod: 26

## 2021-05-23 RX ORDER — FUROSEMIDE 40 MG
20 TABLET ORAL DAILY
Refills: 0 | Status: DISCONTINUED | OUTPATIENT
Start: 2021-05-23 | End: 2021-05-30

## 2021-05-23 RX ADMIN — ENOXAPARIN SODIUM 30 MILLIGRAM(S): 100 INJECTION SUBCUTANEOUS at 11:09

## 2021-05-23 RX ADMIN — CHLORHEXIDINE GLUCONATE 1 APPLICATION(S): 213 SOLUTION TOPICAL at 05:56

## 2021-05-23 RX ADMIN — CHLORHEXIDINE GLUCONATE 1 APPLICATION(S): 213 SOLUTION TOPICAL at 17:00

## 2021-05-23 RX ADMIN — SENNA PLUS 2 TABLET(S): 8.6 TABLET ORAL at 22:08

## 2021-05-23 RX ADMIN — Medication 100 MICROGRAM(S): at 05:55

## 2021-05-23 RX ADMIN — Medication 20 MILLIGRAM(S): at 11:09

## 2021-05-23 NOTE — PROGRESS NOTE ADULT - SUBJECTIVE AND OBJECTIVE BOX
CHIEF COMPLAINT:    Patient is a 88y old  Female who presents with a chief complaint of acute on chronic CHF     INTERVAL HPI/OVERNIGHT EVENTS:    Patient seen and examined at bedside. No acute overnight events occurred.    ROS: Reports sob with movement. All other systems are negative.    Vital Signs:    T(F): 98.9 (05-23-21 @ 05:17), Max: 98.9 (05-23-21 @ 05:17)  HR: 67 (05-23-21 @ 05:17) (67 - 77)  BP: 101/53 (05-23-21 @ 05:17) (99/52 - 124/56)  RR: 18 (05-23-21 @ 05:17) (18 - 18)  SpO2: --  I&O's Summary    22 May 2021 07:01  -  23 May 2021 07:00  --------------------------------------------------------  IN: 360 mL / OUT: 800 mL / NET: -440 mL    PHYSICAL EXAM:  GENERAL:  NAD  SKIN: No rashes or lesions  HEENT: Atraumatic. Normocephalic. Anicteric  NECK:  No JVD.   PULMONARY: Clear to ausculation bilaterally. No wheezing. No rales  CVS: Normal S1, S2. Regular rate and rhythm. murmur  ABDOMEN/GI: Soft, Nontender, Nondistended; Bowel sounds are present  EXTREMITIES:  No edema B/L LE.  NEUROLOGIC:  No motor deficit.  PSYCH: Alert & oriented x 3, normal affect    Consultant(s) Notes Reviewed:  [x ] YES  [ ] NO  Care Discussed with Consultants/Other Providers [ x] YES  [ ] NO    LABS:            Serum Pro-Brain Natriuretic Peptide: 5291 pg/mL (05-16-21 @ 14:43)          RADIOLOGY & ADDITIONAL TESTS:  Imaging or report Personally Reviewed:  [ ] YES  [ ] NO    Telemetry reviewed independently - svt    Medications:  Standing  chlorhexidine 4% Liquid 1 Application(s) Topical every 12 hours  enoxaparin Injectable 30 milliGRAM(s) SubCutaneous daily  levothyroxine 100 MICROGram(s) Oral daily  senna 2 Tablet(s) Oral at bedtime    PRN Meds  acetaminophen   Tablet .. 650 milliGRAM(s) Oral every 6 hours PRN  bisacodyl Suppository 10 milliGRAM(s) Rectal daily PRN      Case discussed with resident  Care discussed with pt         CHIEF COMPLAINT:    Patient is a 88y old  Female who presents with a chief complaint of acute on chronic CHF     INTERVAL HPI/OVERNIGHT EVENTS:    Patient seen and examined at bedside. No acute overnight events occurred.    ROS: Reports sob with movement. All other systems are negative.    Vital Signs:    T(F): 98.9 (05-23-21 @ 05:17), Max: 98.9 (05-23-21 @ 05:17)  HR: 67 (05-23-21 @ 05:17) (67 - 77)  BP: 101/53 (05-23-21 @ 05:17) (99/52 - 124/56)  RR: 18 (05-23-21 @ 05:17) (18 - 18)  SpO2: --  I&O's Summary    22 May 2021 07:01  -  23 May 2021 07:00  --------------------------------------------------------  IN: 360 mL / OUT: 800 mL / NET: -440 mL    PHYSICAL EXAM:  GENERAL:  NAD  SKIN: No rashes or lesions  HEENT: Atraumatic. Normocephalic. Anicteric  NECK:  No JVD.   PULMONARY: Clear to ausculation bilaterally. No wheezing. No rales  CVS: Normal S1, S2. Regular rate and rhythm. murmur  ABDOMEN/GI: Soft, Nontender, Nondistended; Bowel sounds are present  EXTREMITIES:  No edema B/L LE.  NEUROLOGIC:  No motor deficit.  PSYCH: Alert & oriented x 3, normal affect    Consultant(s) Notes Reviewed:  [x ] YES  [ ] NO  Care Discussed with Consultants/Other Providers [ x] YES  [ ] NO    LABS:      Serum Pro-Brain Natriuretic Peptide: 5291 pg/mL (05-16-21 @ 14:43)      RADIOLOGY & ADDITIONAL TESTS:  Imaging or report Personally Reviewed:  [ ] YES  [ ] NO    Telemetry reviewed independently - svt    Medications:  Standing  chlorhexidine 4% Liquid 1 Application(s) Topical every 12 hours  enoxaparin Injectable 30 milliGRAM(s) SubCutaneous daily  levothyroxine 100 MICROGram(s) Oral daily  senna 2 Tablet(s) Oral at bedtime    PRN Meds  acetaminophen   Tablet .. 650 milliGRAM(s) Oral every 6 hours PRN  bisacodyl Suppository 10 milliGRAM(s) Rectal daily PRN

## 2021-05-23 NOTE — PROGRESS NOTE ADULT - ASSESSMENT
87 yo M PMHx HTN, severe AS, hypothyroid, HFpEF admitted for progressive sob. Pt found to have acute on chronic HFpEF in setting of severe AS. Pt was admitted to [facility] CCU and transferred to [facility] for TAVR evaluation.    Acute on chronic HFpEF in setting severe aortic stenosis / NSTEMI possibly type 2  - BAV planned for 5/24 or 5/25, cardiology considering TAVR  - Elevated BNP >5,000  - Echo consistent with severe aortic stenosis.  - strict intake and output.   - resume PO lasix (received IV yesterday)    NSTEMI  - more likely type 2   - cardiology on board  - not on aspirin or plavix-cardiology aware    SVT   - present again today  - as per cardiology hold off beta blockade  - TSH 4.14    Anxiety  - pt declining anxiolytic agents/psychiatry    Hypothyroidism  - c/w levothyroxine    Chronic Anemia  - unclear etiology  - stable at baseline    Left pleural effusion  - underwent prior thoracentesis  - improved on CXR this morning  - hold IV lasix, can resume PO    DVT prophylaxis  - on Lovenox     #Progress Note Handoff:  Pending (specify): BAV  Family discussion: discussed pending BAV  Disposition: Home__x_/SNF___/Other________/Unknown at this time________ 87 yo M PMHx HTN, severe AS, hypothyroid, HFpEF admitted for progressive sob. Pt found to have acute on chronic HFpEF in setting of severe AS. Pt was admitted to [facility] CCU and transferred to [facility] for TAVR evaluation.    Acute on chronic HFpEF in setting severe aortic stenosis / NSTEMI possibly type 2  - BAV planned for 5/24 or 5/25, cardiology considering TAVR  - Elevated BNP >5,000  - Echo consistent with severe aortic stenosis.  - strict intake and output.   - resume PO lasix (received IV yesterday)    NSTEMI  - more likely type 2   - cardiology on board  - not on aspirin or plavix-cardiology aware    SVT   - present again overnight  - as per cardiology hold off beta blockade, a/c  - TSH 4.14    Anxiety  - pt declining anxiolytic agents/psychiatry    Hypothyroidism  - c/w levothyroxine    Chronic Anemia  - unclear etiology  - stable at baseline    Left pleural effusion  - underwent prior thoracentesis  - improved on CXR this morning  - hold IV lasix, can resume PO    DVT prophylaxis  - on Lovenox     #Progress Note Handoff:  Pending (specify): BAV  Family discussion: discussed pending BAV  Disposition: Home__x_/SNF___/Other________/Unknown at this time________

## 2021-05-23 NOTE — DIETITIAN INITIAL EVALUATION ADULT. - OTHER CALCULATIONS
Estimated Calorie Needs: [id number] x AF 1.2-1.3=1087-1178kcal/day;  Estimated Protein Needs: 55-66grams/day (1-1.2grams/kg of admit weight) -Due to age;  Estimated Fluid Needs: Fluids per LIP -Due to CHF

## 2021-05-23 NOTE — PROGRESS NOTE ADULT - SUBJECTIVE AND OBJECTIVE BOX
CHIEF COMPLAINT:    Patient is a 88y old  Female who presents with a chief complaint of acute on chronic CHF     INTERVAL HPI/OVERNIGHT EVENTS:    Patient seen and examined at bedside. No acute overnight events occurred.    ROS: Denies dizziness, lightheadedness. All other systems are negative.    Vital Signs:    T(F): 98.9 (05-23-21 @ 05:17), Max: 98.9 (05-23-21 @ 05:17)  HR: 67 (05-23-21 @ 05:17) (67 - 77)  BP: 101/53 (05-23-21 @ 05:17) (99/52 - 124/56)  RR: 18 (05-23-21 @ 05:17) (18 - 18)  SpO2: --  I&O's Summary    22 May 2021 07:01  -  23 May 2021 07:00  --------------------------------------------------------  IN: 360 mL / OUT: 800 mL / NET: -440 mL      Daily     Daily   CAPILLARY BLOOD GLUCOSE          PHYSICAL EXAM:  GENERAL:  NAD  SKIN: No rashes or lesions  HEENT: Atraumatic. Normocephalic. Anicteric  NECK:  No JVD.   PULMONARY: Clear to ausculation bilaterally. No wheezing. No rales  CVS: Normal S1, S2. Regular rate and rhythm. No murmurs.  ABDOMEN/GI: Soft, Nontender, Nondistended; Bowel sounds are present  EXTREMITIES:  No edema B/L LE.  NEUROLOGIC:  No motor deficit.  PSYCH: Alert & oriented x 3, normal affect    Consultant(s) Notes Reviewed:  [x ] YES  [ ] NO  Care Discussed with Consultants/Other Providers [ x] YES  [ ] NO    LABS:            Serum Pro-Brain Natriuretic Peptide: 5291 pg/mL (05-16-21 @ 14:43)          RADIOLOGY & ADDITIONAL TESTS:  Imaging or report Personally Reviewed:  [ ] YES  [ ] NO    EKG reviewed independently    Medications:  Standing  chlorhexidine 4% Liquid 1 Application(s) Topical every 12 hours  enoxaparin Injectable 30 milliGRAM(s) SubCutaneous daily  furosemide    Tablet 20 milliGRAM(s) Oral daily  levothyroxine 100 MICROGram(s) Oral daily  senna 2 Tablet(s) Oral at bedtime    PRN Meds  acetaminophen   Tablet .. 650 milliGRAM(s) Oral every 6 hours PRN  bisacodyl Suppository 10 milliGRAM(s) Rectal daily PRN      Case discussed with resident  Care discussed with pt

## 2021-05-23 NOTE — DIETITIAN INITIAL EVALUATION ADULT. - OTHER INFO
Dx: 89y/o female with pmhx noted above admitted with acute on chronic CHF. Noted to have a Stage I Pressure Injury located at the buttocks (Luis Score-18).

## 2021-05-23 NOTE — PROGRESS NOTE ADULT - ASSESSMENT
IMPRESSION:  acute resp failure improved secondary CHF exacerbation   pleural effusion improved with lasix   severe aortic stenosis     PLAN:  no need for thoracentesis cxr improved   adjust lasix as per cardiology alvin with aortic stenosis   follow cardiology for TAVR ??  oob to chair    DVT prophylaxis.  case discussed with hospitalist and spoke to son over the phone

## 2021-05-23 NOTE — PROGRESS NOTE ADULT - SUBJECTIVE AND OBJECTIVE BOX
Patient is a 88y old  Female who presents with a chief complaint of acute on chronic CHF (23 May 2021 07:58)      INTERVAL HISTORY/overnight events  in bed comfortable no sob on RA      Vital Signs Last 24 Hrs  T(C): 37.2 (23 May 2021 05:17), Max: 37.2 (23 May 2021 05:17)  T(F): 98.9 (23 May 2021 05:17), Max: 98.9 (23 May 2021 05:17)  HR: 67 (23 May 2021 05:17) (67 - 77)  BP: 101/53 (23 May 2021 05:17) (99/52 - 124/56)  BP(mean): --  RR: 18 (23 May 2021 05:17) (18 - 18)  SpO2: --  Daily     Daily   I&O's Summary    22 May 2021 07:01  -  23 May 2021 07:00  --------------------------------------------------------  IN: 360 mL / OUT: 800 mL / NET: -440 mL        Physical Examination:    General: No acute distress.  Alert, oriented, interactive, nonfocal    HEENT: Pupils equal, reactive to light.  Symmetric.    PULM: Clear to auscultation bilaterally, no significant sputum production    CVS: Regular rate and rhythm, no murmurs, rubs, or gallops    ABD: Soft, nondistended, nontender, normoactive bowel sounds, no masses    EXT: No edema, nontender    SKIN: Warm and well perfused, no rashes noted.    Neurology:    Musculoskeletal : Move all extremety         Lab Results:            Urinalysis Basic - ( 21 May 2021 14:55 )    Color: Yellow / Appearance: Clear / SG: >1.050 / pH: x  Gluc: x / Ketone: Trace  / Bili: Negative / Urobili: <2 mg/dL   Blood: x / Protein: 30 mg/dL / Nitrite: Negative   Leuk Esterase: Negative / RBC: 4 /HPF / WBC 3 /HPF   Sq Epi: x / Non Sq Epi: 7 /HPF / Bacteria: Negative            Microbiology      Medication:  MEDICATIONS  (STANDING):  chlorhexidine 4% Liquid 1 Application(s) Topical every 12 hours  enoxaparin Injectable 30 milliGRAM(s) SubCutaneous daily  furosemide    Tablet 20 milliGRAM(s) Oral daily  levothyroxine 100 MICROGram(s) Oral daily  senna 2 Tablet(s) Oral at bedtime    MEDICATIONS  (PRN):  acetaminophen   Tablet .. 650 milliGRAM(s) Oral every 6 hours PRN Temp greater or equal to 38C (100.4F), Mild Pain (1 - 3)  bisacodyl Suppository 10 milliGRAM(s) Rectal daily PRN Constipation        IMAGING STUDIES:  cxr reviewed by me significant decrease in left effusion almost resolved

## 2021-05-23 NOTE — PROGRESS NOTE ADULT - SUBJECTIVE AND OBJECTIVE BOX
OPERATIVE PROCEDURE(s): pre-op for tavr/bav                SURGEON(s):     SUBJECTIVE ASSESSMENT: pt is requesting to ambulate more and complains she has been in bed for days    Vital Signs Last 24 Hrs  T(C): 36.5 (23 May 2021 13:13), Max: 37.2 (23 May 2021 05:17)  T(F): 97.7 (23 May 2021 13:13), Max: 98.9 (23 May 2021 05:17)  HR: 74 (23 May 2021 13:13) (67 - 74)  BP: 104/67 (23 May 2021 13:13) (99/52 - 108/62)  BP(mean): --  RR: 18 (23 May 2021 13:13) (18 - 18)  SpO2: --  21 @ 07:01  -  21 @ 07:00  --------------------------------------------------------  IN: 360 mL / OUT: 800 mL / NET: -440 mL    21 @ 07:01  -  21 @ 17:36  --------------------------------------------------------  IN: 360 mL / OUT: 350 mL / NET: 10 mL            PHYSICAL EXAM:  GENERAL:  NAD  SKIN: No rashes or lesions  HEENT: Atraumatic. Normocephalic. Anicteric  NECK:  No JVD.   PULMONARY: Clear to ausculation bilaterally. No wheezing. No rales  CVS: Normal S1, S2. Regular rate and rhythm. No murmurs.  ABDOMEN/GI: Soft, Nontender, Nondistended; Bowel sounds are present  EXTREMITIES:  No edema B/L LE.  NEUROLOGIC:  No motor deficit.  PSYCH: Alert & oriented x 3, normal affect    MEDICATIONS  (STANDING):  chlorhexidine 4% Liquid 1 Application(s) Topical every 12 hours  enoxaparin Injectable 30 milliGRAM(s) SubCutaneous daily  furosemide    Tablet 20 milliGRAM(s) Oral daily  levothyroxine 100 MICROGram(s) Oral daily  senna 2 Tablet(s) Oral at bedtime    MEDICATIONS  (PRN):  acetaminophen   Tablet .. 650 milliGRAM(s) Oral every 6 hours PRN Temp greater or equal to 38C (100.4F), Mild Pain (1 - 3)  bisacodyl Suppository 10 milliGRAM(s) Rectal daily PRN Constipation      Allergies    Cipro (Other)  Levaquin (Rash)  tetracycline (Hives)    Intolerances        Ambulation/Activity Status:    ambulates with min assistance     RADIOLOGY & ADDITIONAL TESTS:  EXAM:  CT ANGIO ABD PELV (W)AW IC        EXAM:  CT ANGIO HEART STRUCTURAL IC            PROCEDURE DATE:  2021            INTERPRETATION:  Clinical History / Reason for exam: CLINICAL INDICATION: Aortic stenosis, evaluate for transcatheter aortic valve implantation.    TECHNIQUE: CT angiogram of the chest, abdomen and pelvis was performed after administration of intravenous contrast.  ECG-gated acquisition through the chest and ungated acquisition through the abdomen and pelvis in the arterial phase of contrast enhancement. Sagittal and coronal reformats were performed as well as 3D reconstructions.    COMPARISON: CT chest 2021. CT abdomen 2007    FINDINGS:    ANNULUS/AORTA:  Annular measurements were performed using images reconstructed during systole - RR cycle - 20%    Annulus area is 459 mm2, bi-plane diameter of elliptical cross section 27 x 22 mm and annulus perimeter is 79 mm.      The aortic valve is trileaflet.    VALVULAR CALCIFICATION:  The aortic valve is severely calcified.  Protrusion of aortic valve calcifications into the outflow tract: No  Calcifications of the aorto-mitral continuity: Yes  Mitral annular calcifications: No    The left main coronary artery arises 7 mm from the aortic annulus.  The right coronary artery arises 15 mm from the aortic annulus.    AORTA:  The ascending aorta is moderately calcified.    Mean Sinuses of Valsalva diameter= 30 mm (sinus to commissure)  Left ventricular outflow tract = 26 x 21 mm  Sinotubular junction:  26mm  Mid ascending aorta: 29 mm  Mid descending aorta: 23 mm  Aorta at the hiatus: 22 mm  Aorta at renal arteries: 16 mm  Aorta at the terminus: 14 mm    AXILLARY & ILIOFEMORAL RUNOFF:  Right axillary/subclavian minimum diameter: 5 mm    Left axillary/subclavian minimum diameter: 5 mm    Right-sided tortuosity: mild  Right-sided calcification: severe    Right common iliac minimum diameter: 6 mm  Right external iliac minimum diameter: 4 mm  Right common femoral diameter:4 mm    Left-sided tortuosity: moderate  Left-sided calcification: severe      Left common iliac minimum diameter: 7 mm  Left external iliac minimum diameter: 4 mm  Left common femoral diameter:3 mm    AIRWAYS AND LUNGS: The central tracheobronchial tree is patent.  There is biapical scarring.    MEDIASTINUM AND PLEURA: There are no enlarged mediastinal, hilar or axillary lymph nodes. There is no pneumothorax. There is interval resolution of the right pleural effusion. There is interval decrease of left pleural effusion. There is decreased adjacent left lower lobe atelectasis/consolidation.  There is a 7 mm irregular nodule within the right upper lobe, .    HEART AND CORONARY ARTERIES: The heart is normal in size. Mild left ventricular hypertrophy. There is no pericardial effusion. There is coronary atherosclerosis.    LIVER: Partial hepatectomy. Pneumobilia.  BILIARY SYSTEM: There is no biliary ductal dilatation.  GALLBLADDER: Not visualized    PANCREAS: Within normal limits.  SPLEEN: Within normal limits.  ADRENALS: Mild nonspecific thickening of the adrenal glands.    KIDNEYS/URETERS: No hydronephrosis or obstructing stones. Right renal cyst.    BOWEL/MESENTERY: No bowel obstruction. There is nonspecific mild thickening of the rectum and adjacent fat stranding    URINARY BLADDER: Within normal limits.  REPRODUCTIVE ORGANS: Calcified uterine fibroid    PERITONEUM/RETROPERITONEUM: No free air. No free or loculated fluid.  LYMPH NODES: Nonspecific 9 mm right external iliac lymph node.  VESSELS: Atherosclerotic calcifications are present.    BONES: There are degenerative changes of the spine.  SOFT TISSUES: Fatty replacement of the pelvic musculature.        IMPRESSION:    1.  Annulus area is 459 mm2, bi-plane diameter of elliptical cross section 27 x 22 mm and annulus perimeter is 79 mm.  2. The smallest vessel diameter in the pelvis is 4 mm on the right and 3 mm on the left.  3. The smallest vessel diameter in the axilla is 5 mm on the right and 5 mm on the left.  4. There is interval resolution of the right pleural effusion. There is interval decrease of left pleural effusion. There is decreased adjacent left lower lobe atelectasis/consolidation.  5. There is a 7 mm irregular nodule within the right upper lobe. Noncontrast CT chest recommended in 3-6 months to assess for stability.  6. Nonspecific mild thickening of the rectum and adjacent fat stranding.      Spoke with KACY ACEVES on 2021 5:43 PM with readback.         EXAM:  ECHO TTE WITH CON COMP W DOPP        PROCEDURE DATE:  2021      INTERPRETATION:  REPORT:  TRANSTHORACIC ECHOCARDIOGRAM REPORT        Patient Name:   DILIP LUCAS Accession #: [id number]  Medical Rec #:  [id number]    Height:      60.0 in 152.4 cm  YOB: 1933   Weight:      130.0 lb 58.97 kg  Patient Age:    88 years    BSA:         1.55 m²  Patient Gender: F           BP:          106/66 mmHg      Date of Exam:        2021 9:31:41 AM  Referring Physician: [id number]  Sonographer:         JACKELYN  Reading Physician:   Juan Antonio Kwon M.D.    Procedure:   2D Echo/Doppler/Color Doppler Complete.  Indications: R06.00 - Dyspnea, unspecified  Diagnosis:   Aortic stenosis        Summary:   1. Severe aortic valve stenosis.   2. Left ventricular ejection fraction, by visual estimation, is 55 to 60%.   3. Mild left ventricular hypertrophy.   4. Mild to moderate mitral valve regurgitation.   5. Mild aortic regurgitation.    PHYSICIAN INTERPRETATION:  Left Ventricle: The left ventricular internal cavity size is normal. There is mild left ventricular hypertrophy. Left ventricular ejection fraction, by visual estimation, is 55 to 60%.  Mitral Valve: Mild to moderate mitral valve regurgitation is seen.  Aortic Valve: Severe aortic stenosis is present. Mild aortic valve regurgitation is seen.      2D AND M-MODE MEASUREMENTS (normal ranges within parentheses):  Left                  Normal   Aorta/Left             Normal  Ventricle:                     Atrium:  IVSd        0.99 cm  (0.7-1.1) AoV Cusp       0.62  (1.5-2.6)  (Mmode):                       Separation:     cm  LVPWd       0.74 cm  (0.7-1.1) Left Atrium    3.61  (1.9-4.0)  (Mmode):                       (Mmode):        cm  LVIDd       4.78 cm(3.4-5.7) LA Volume      36.1  (Mmode):                       Index         ml/m²  LVIDs       3.63 cm  (Mmode):  LV FS       24.0 %    (>25%)  (Mmode):  Rel. Wall    0.31     (<0.42)  Thickness  Mm  LV Mass    90.1 g/m²  Index:  Mmode    SPECTRAL DOPPLER ANALYSIS:  LV DIASTOLIC FUNCTION:  MV Peak E: 0.87 m/s Decel Time: 266 msec  MV Peak A: 0.87 m/s  E/A Ratio: 0.99    Aortic Valve:  AoV VMax:    5.88 m/s   AoV Area, Vmax: 0.38 cm² Vmax Indx: 0.25 cm²/m²  AoV VTI:     1.46 m     AoV Area, VTI:  0.35 cm² VTI Indx:  0.23 cm²/m²  AoV Pk Grad: 138.1 mmHg  AoV Mn Grad: 86.2 mmHg    LVOT Vmax: 0.70 m/s  LVOT VTI:  0.16 m  LVOT Diam: 2.03 cm    Aortic Insufficiency:  AI Half-time:  481 msec  AI Decel Rate: 2.69 m/s²    Mitral Valve:  MV P1/2 Time: 77.21 msec  MV Area, PHT: 2.85 cm²    Tricuspid Valve and PA/RV Systolic Pressure: TR Max Velocity: 2.90 m/s RA Pressure:  RVSP/PASP: 23.3 mmHg    Pulmonic Valve:  PV Max Velocity: 0.76 m/s PV Max P.3 mmHg PV Mean PG:      [id number] Juan Antonio Kwon M.D., Electronically signed on 2021 at 1:20:00 PM      *** Final ***        Plan:  Patient is a 89 yo female with severe aortic stenosis who is scheduled for TAVR or BAV this week with Dr Mccollum, most likely Wed   cont present tx as per medicine and card  pt was encouraged to ambulate and rn was asked to make sure pt is walking regularly to maximize her prior to procedure    Social Service Disposition:

## 2021-05-23 NOTE — DIETITIAN INITIAL EVALUATION ADULT. - ORAL INTAKE PTA/DIET HISTORY
The patient reports following a regular diet at home; consumed two meals at 100%; took Vitamin D, Vitamin E and a probiotic daily.

## 2021-05-23 NOTE — DIETITIAN INITIAL EVALUATION ADULT. - RD TO REMAIN AVAILABLE
Intervention: 1.Meals and Snacks 2.Coordination of Care    Monitor/Evaluate: Diet order, energy intake, nutrition focused physical findings, anemia profile    Goals: 1.Continue to consume/tolerate 75%-100% of meals in 7 days/yes

## 2021-05-23 NOTE — DIETITIAN INITIAL EVALUATION ADULT. - FACTORS AFF FOOD INTAKE
The patient reports consuming >75% of meals. C/o swallowing difficulty secondary to thyroid nodule; agreed to soft textured diet. C/o constipation (5/22)./difficulty swallowing/persistent constipation

## 2021-05-24 LAB
ALBUMIN SERPL ELPH-MCNC: 3.8 G/DL — SIGNIFICANT CHANGE UP (ref 3.5–5.2)
ALP SERPL-CCNC: 109 U/L — SIGNIFICANT CHANGE UP (ref 30–115)
ALT FLD-CCNC: 17 U/L — SIGNIFICANT CHANGE UP (ref 0–41)
ANION GAP SERPL CALC-SCNC: 9 MMOL/L — SIGNIFICANT CHANGE UP (ref 7–14)
AST SERPL-CCNC: 23 U/L — SIGNIFICANT CHANGE UP (ref 0–41)
BASOPHILS # BLD AUTO: 0.02 K/UL — SIGNIFICANT CHANGE UP (ref 0–0.2)
BASOPHILS NFR BLD AUTO: 0.4 % — SIGNIFICANT CHANGE UP (ref 0–1)
BILIRUB SERPL-MCNC: 0.3 MG/DL — SIGNIFICANT CHANGE UP (ref 0.2–1.2)
BUN SERPL-MCNC: 43 MG/DL — HIGH (ref 10–20)
CALCIUM SERPL-MCNC: 9.3 MG/DL — SIGNIFICANT CHANGE UP (ref 8.5–10.1)
CHLORIDE SERPL-SCNC: 96 MMOL/L — LOW (ref 98–110)
CO2 SERPL-SCNC: 31 MMOL/L — SIGNIFICANT CHANGE UP (ref 17–32)
CREAT SERPL-MCNC: 0.9 MG/DL — SIGNIFICANT CHANGE UP (ref 0.7–1.5)
EOSINOPHIL # BLD AUTO: 0.14 K/UL — SIGNIFICANT CHANGE UP (ref 0–0.7)
EOSINOPHIL NFR BLD AUTO: 3.1 % — SIGNIFICANT CHANGE UP (ref 0–8)
GLUCOSE SERPL-MCNC: 92 MG/DL — SIGNIFICANT CHANGE UP (ref 70–99)
HCT VFR BLD CALC: 32.1 % — LOW (ref 37–47)
HGB BLD-MCNC: 10.4 G/DL — LOW (ref 12–16)
IMM GRANULOCYTES NFR BLD AUTO: 0.2 % — SIGNIFICANT CHANGE UP (ref 0.1–0.3)
LYMPHOCYTES # BLD AUTO: 1.06 K/UL — LOW (ref 1.2–3.4)
LYMPHOCYTES # BLD AUTO: 23.5 % — SIGNIFICANT CHANGE UP (ref 20.5–51.1)
MCHC RBC-ENTMCNC: 29.3 PG — SIGNIFICANT CHANGE UP (ref 27–31)
MCHC RBC-ENTMCNC: 32.4 G/DL — SIGNIFICANT CHANGE UP (ref 32–37)
MCV RBC AUTO: 90.4 FL — SIGNIFICANT CHANGE UP (ref 81–99)
MONOCYTES # BLD AUTO: 0.44 K/UL — SIGNIFICANT CHANGE UP (ref 0.1–0.6)
MONOCYTES NFR BLD AUTO: 9.8 % — HIGH (ref 1.7–9.3)
NEUTROPHILS # BLD AUTO: 2.84 K/UL — SIGNIFICANT CHANGE UP (ref 1.4–6.5)
NEUTROPHILS NFR BLD AUTO: 63 % — SIGNIFICANT CHANGE UP (ref 42.2–75.2)
NRBC # BLD: 0 /100 WBCS — SIGNIFICANT CHANGE UP (ref 0–0)
PLATELET # BLD AUTO: 147 K/UL — SIGNIFICANT CHANGE UP (ref 130–400)
POTASSIUM SERPL-MCNC: 4.2 MMOL/L — SIGNIFICANT CHANGE UP (ref 3.5–5)
POTASSIUM SERPL-SCNC: 4.2 MMOL/L — SIGNIFICANT CHANGE UP (ref 3.5–5)
PROT SERPL-MCNC: 6.5 G/DL — SIGNIFICANT CHANGE UP (ref 6–8)
RBC # BLD: 3.55 M/UL — LOW (ref 4.2–5.4)
RBC # FLD: 13 % — SIGNIFICANT CHANGE UP (ref 11.5–14.5)
SARS-COV-2 RNA SPEC QL NAA+PROBE: SIGNIFICANT CHANGE UP
SODIUM SERPL-SCNC: 136 MMOL/L — SIGNIFICANT CHANGE UP (ref 135–146)
WBC # BLD: 4.51 K/UL — LOW (ref 4.8–10.8)
WBC # FLD AUTO: 4.51 K/UL — LOW (ref 4.8–10.8)

## 2021-05-24 PROCEDURE — 99233 SBSQ HOSP IP/OBS HIGH 50: CPT

## 2021-05-24 PROCEDURE — 99232 SBSQ HOSP IP/OBS MODERATE 35: CPT

## 2021-05-24 RX ADMIN — ENOXAPARIN SODIUM 30 MILLIGRAM(S): 100 INJECTION SUBCUTANEOUS at 11:53

## 2021-05-24 RX ADMIN — CHLORHEXIDINE GLUCONATE 1 APPLICATION(S): 213 SOLUTION TOPICAL at 05:31

## 2021-05-24 RX ADMIN — Medication 20 MILLIGRAM(S): at 05:31

## 2021-05-24 RX ADMIN — Medication 100 MICROGRAM(S): at 05:31

## 2021-05-24 NOTE — PROGRESS NOTE ADULT - ASSESSMENT
IMPRESSION:  acute resp failure improved secondary CHF exacerbation   pleural effusion improved with lasix   severe aortic stenosis     PLAN:  no need for thoracentesis cxr improved   adjust lasix as per cardiology alvin with aortic stenosis   follow cardiology for TAVR ??  oob to chair    DVT prophylaxis.  recall prn

## 2021-05-24 NOTE — PROGRESS NOTE ADULT - SUBJECTIVE AND OBJECTIVE BOX
Hospital Day:  8d    Subjective: Patient is a 88y old  Female who presents with a chief complaint of acute on chronic CHF (24 May 2021 08:02)      Pt seen and evaluated at bedside.   Complaints: none   Over the night Events: none      Past Medical Hx:   HTN (hypertension)    Lung cancer    Mitral valve prolapse    Enlarged heart    Murmur    Hyperthyroidism    Arthritis    HTN (hypertension)    Diverticulosis    Hiatal hernia    Acid reflux    Liver cancer    Liver cancer    Aortic stenosis      Past Sx:  No significant past surgical history    H/O resection of liver    Status post cholecystectomy    H/O aortic valve stenosis      Allergies:  Cipro (Other)  Levaquin (Rash)  tetracycline (Hives)    Current Meds:   Standng Meds:  chlorhexidine 4% Liquid 1 Application(s) Topical every 12 hours  enoxaparin Injectable 30 milliGRAM(s) SubCutaneous daily  furosemide    Tablet 20 milliGRAM(s) Oral daily  levothyroxine 100 MICROGram(s) Oral daily  senna 2 Tablet(s) Oral at bedtime    PRN Meds:  acetaminophen   Tablet .. 650 milliGRAM(s) Oral every 6 hours PRN Temp greater or equal to 38C (100.4F), Mild Pain (1 - 3)  bisacodyl Suppository 10 milliGRAM(s) Rectal daily PRN Constipation      Vital Signs:   T(F): 96.9 (05-24-21 @ 04:58), Max: 97.8 (05-23-21 @ 19:51)  HR: 65 (05-24-21 @ 04:58) (65 - 75)  BP: 110/56 (05-24-21 @ 04:58) (101/55 - 110/56)  RR: 18 (05-24-21 @ 04:58) (18 - 18)  SpO2: 96% (05-23-21 @ 20:52) (96% - 96%)    Physical Exam:   GEN: Anxious  HEENT: normocephalic, atraumatic, aniceteric  LUNGS: bibasilar crackles, + scattered end-exp wheezing bilat   HEART: S1/S2 present. RRR, + III/VI systolic ejection murmur heard best at R 2ICS @ RSB  ABD: Soft, non-tender, non-distended. + hepatic resection scar noted   EXT: Warm, well perfused, skin color appropriate for ethnicity  NEURO: AAOX3, normal affect      FLUID BALANCE    05-22-21 @ 07:01 - 05-23-21 @ 07:00  --------------------------------------------------------  IN: 360 mL / OUT: 800 mL / NET: -440 mL    05-23-21 @ 07:01  -  05-24-21 @ 07:00  --------------------------------------------------------  IN: 760 mL / OUT: 550 mL / NET: 210 mL        Labs:                         10.4   4.51  )-----------( 147      ( 24 May 2021 06:12 )             32.1     Neutophil% 63.0, Lymphocyte% 23.5, Monocyte% 9.8, Bands% 0.2 05-24-21 @ 06:12    24 May 2021 06:12    136    |  96     |  43     ----------------------------<  92     4.2     |  31     |  0.9      Ca    9.3        24 May 2021 06:12    TPro  6.5    /  Alb  3.8    /  TBili  0.3    /  DBili  x      /  AST  23     /  ALT  17     /  AlkPhos  109    24 May 2021 06:12            Serum Pro-Brain Natriuretic Peptide: 5291 pg/mL (05-16-21 @ 14:43)          Urinalysis Basic - ( 21 May 2021 14:55 )    Color: Yellow / Appearance: Clear / SG: >1.050 / pH: x  Gluc: x / Ketone: Trace  / Bili: Negative / Urobili: <2 mg/dL   Blood: x / Protein: 30 mg/dL / Nitrite: Negative   Leuk Esterase: Negative / RBC: 4 /HPF / WBC 3 /HPF   Sq Epi: x / Non Sq Epi: 7 /HPF / Bacteria: Negative              Ferritin, Serum: 22 ng/mL (05-18-21 @ 05:38)

## 2021-05-24 NOTE — PROGRESS NOTE ADULT - ASSESSMENT
89 yo M PMHx HTN, severe AS, hypothyroid, HFpEF admitted for progressive sob. Pt found to have acute on chronic HFpEF in setting of severe AS. Pt was admitted to [facility] CCU and transferred to [facility] ([place] for TAVR evaluation.    Acute on chronic HFpEF in setting severe aortic stenosis / NSTEMI possibly type 2  - BAV planned for 5/24 or 5/25, cardiology considering TAVR  - Elevated BNP >5,000  - Echo consistent with severe aortic stenosis.  - strict intake and output.   - resume PO lasix (received IV yesterday)    NSTEMI  - more likely type 2   - cardiology on board  - not on aspirin or plavix-cardiology aware    SVT   - present again overnight  - as per cardiology hold off beta blockade, a/c  - TSH 4.14    Anxiety  - pt declining anxiolytic agents/psychiatry    Hypothyroidism  - c/w levothyroxine    Chronic Anemia  - unclear etiology  - stable at baseline    Left pleural effusion  - improved on CXR   - c/w home PO lasix    DVT prophylaxis  - on Lovenox     #Progress Note Handoff:  Pending (specify): BAV  Family discussion: discussed pending BAV  Disposition: Home__x_/SNF___/Other________/Unknown at this time________

## 2021-05-24 NOTE — PROGRESS NOTE ADULT - ASSESSMENT
88 yr old male with hx of HTN, severe AS, hypothyroid, HFpEF admitted for progressive sob for past few days.     # AHRF secondary to Acute on chronic HFpEF likely secondary to severe aortic stenosis.  - Echo consistent with severe aortic stenosis.  - strict intake and output.   - structural cardiology following   - switch to po lasix  - BAV likely on 5/26    # Suspected Obstructive Lung Disease -  # L Pleural Effusion  - CT w/findings suspicious of such  - + smoking Hx   - Duonebs PRN  - Pulm consult appreciated, no tap  - will need PFT's for definitive diagnosis     # NSTEMI type 2  - no need to trend trop    # Mild ANNETTE - Prerenal vs. Cardiorenal II  - Creatinine Trend: 1.0<--, 1.1<--, 0.9<--, 1.2<--, 0.9<--, 0.8<--  - continue holding enalapril for now.   - Continue with gentle diuresis for now.     # Mild Thrombocytopenia  -Trend CBC for now    # History of hypothyroidism  - c/w levothyroxine    # Chronic Anemia  - stable at baseline    Diet: soft, high fiber, dash   DVT ppx: lovenox   GI ppx: none  Code Status: Full Code  Dispo: BAV most likely 5/26 wednesday

## 2021-05-24 NOTE — PROGRESS NOTE ADULT - SUBJECTIVE AND OBJECTIVE BOX
Patient is a 88y old  Female who presents with a chief complaint of acute on chronic CHF (23 May 2021 23:46)      Over Night Events:  Patient seen and examined.   on RA no sob     ROS:  See HPI    PHYSICAL EXAM    ICU Vital Signs Last 24 Hrs  T(C): 36.1 (24 May 2021 04:58), Max: 36.6 (23 May 2021 19:51)  T(F): 96.9 (24 May 2021 04:58), Max: 97.8 (23 May 2021 19:51)  HR: 65 (24 May 2021 04:58) (65 - 75)  BP: 110/56 (24 May 2021 04:58) (101/55 - 110/56)  BP(mean): --  ABP: --  ABP(mean): --  RR: 18 (24 May 2021 04:58) (18 - 18)  SpO2: 96% (23 May 2021 20:52) (96% - 96%)      General: aOx3  HEENT:       criss         Lymph Nodes: NO cervical LN   Lungs: Bilateral BS  Cardiovascular: Regular   Abdomen: Soft, Positive BS  Extremities: No clubbing   Skin: warm   Neurological: no focal   Musculoskeletal: move all ext     I&O's Detail    23 May 2021 07:01  -  24 May 2021 07:00  --------------------------------------------------------  IN:    Oral Fluid: 760 mL  Total IN: 760 mL    OUT:    Voided (mL): 550 mL  Total OUT: 550 mL    Total NET: 210 mL          LABS:                          10.4   4.51  )-----------( 147      ( 24 May 2021 06:12 )             32.1                                                                                                                                                                                                                                                MEDICATIONS  (STANDING):  chlorhexidine 4% Liquid 1 Application(s) Topical every 12 hours  enoxaparin Injectable 30 milliGRAM(s) SubCutaneous daily  furosemide    Tablet 20 milliGRAM(s) Oral daily  levothyroxine 100 MICROGram(s) Oral daily  senna 2 Tablet(s) Oral at bedtime    MEDICATIONS  (PRN):  acetaminophen   Tablet .. 650 milliGRAM(s) Oral every 6 hours PRN Temp greater or equal to 38C (100.4F), Mild Pain (1 - 3)  bisacodyl Suppository 10 milliGRAM(s) Rectal daily PRN Constipation          Xrays:  TLC:  OG:  ET tube:                                                                                       ECHO:  [facility] ICU:

## 2021-05-24 NOTE — PROGRESS NOTE ADULT - SUBJECTIVE AND OBJECTIVE BOX
CHIEF COMPLAINT:    Patient is a 88y old  Female who presents with a chief complaint of acute on chronic CHF     INTERVAL HPI/OVERNIGHT EVENTS:    Patient seen and examined at bedside. No acute overnight events occurred.    ROS: All other systems are negative.    Vital Signs:    T(F): 96.9 (05-24-21 @ 04:58), Max: 97.8 (05-23-21 @ 19:51)  HR: 65 (05-24-21 @ 04:58) (65 - 75)  BP: 110/56 (05-24-21 @ 04:58) (101/55 - 110/56)  RR: 18 (05-24-21 @ 04:58) (18 - 18)  SpO2: 97% (05-24-21 @ 08:00) (96% - 97%)  I&O's Summary    23 May 2021 07:01  -  24 May 2021 07:00  --------------------------------------------------------  IN: 760 mL / OUT: 550 mL / NET: 210 mL      PHYSICAL EXAM:  GENERAL:  NAD  SKIN: No rashes or lesions  HEENT: Atraumatic. Normocephalic. Anicteric  NECK:  No JVD.   PULMONARY: Clear to ausculation bilaterally. No wheezing. No rales  CVS: Normal S1, S2. Regular rate and rhythm. No murmurs.  ABDOMEN/GI: Soft, Nontender, Nondistended; Bowel sounds are present  EXTREMITIES:  No edema B/L LE.  NEUROLOGIC:  No motor deficit.  PSYCH: Alert & oriented x 3, normal affect    LABS:                        10.4   4.51  )-----------( 147      ( 24 May 2021 06:12 )             32.1     05-24    136  |  96<L>  |  43<H>  ----------------------------<  92  4.2   |  31  |  0.9    Ca    9.3      24 May 2021 06:12    TPro  6.5  /  Alb  3.8  /  TBili  0.3  /  DBili  x   /  AST  23  /  ALT  17  /  AlkPhos  109  05-24    RADIOLOGY & ADDITIONAL TESTS:  Imaging or report Personally Reviewed:  [ ] YES  [ ] NO    Telemetry reviewed independently - SVT intermittently    Medications:  Standing  chlorhexidine 4% Liquid 1 Application(s) Topical every 12 hours  enoxaparin Injectable 30 milliGRAM(s) SubCutaneous daily  furosemide    Tablet 20 milliGRAM(s) Oral daily  levothyroxine 100 MICROGram(s) Oral daily  senna 2 Tablet(s) Oral at bedtime    PRN Meds  acetaminophen   Tablet .. 650 milliGRAM(s) Oral every 6 hours PRN  bisacodyl Suppository 10 milliGRAM(s) Rectal daily PRN      Case discussed with resident  Care discussed with pt

## 2021-05-25 LAB
ANION GAP SERPL CALC-SCNC: 12 MMOL/L — SIGNIFICANT CHANGE UP (ref 7–14)
BLD GP AB SCN SERPL QL: SIGNIFICANT CHANGE UP
BLD GP AB SCN SERPL QL: SIGNIFICANT CHANGE UP
BUN SERPL-MCNC: 40 MG/DL — HIGH (ref 10–20)
CALCIUM SERPL-MCNC: 9.6 MG/DL — SIGNIFICANT CHANGE UP (ref 8.5–10.1)
CHLORIDE SERPL-SCNC: 97 MMOL/L — LOW (ref 98–110)
CO2 SERPL-SCNC: 30 MMOL/L — SIGNIFICANT CHANGE UP (ref 17–32)
CREAT SERPL-MCNC: 1 MG/DL — SIGNIFICANT CHANGE UP (ref 0.7–1.5)
GLUCOSE SERPL-MCNC: 98 MG/DL — SIGNIFICANT CHANGE UP (ref 70–99)
HCT VFR BLD CALC: 32.7 % — LOW (ref 37–47)
HGB BLD-MCNC: 10.4 G/DL — LOW (ref 12–16)
MAGNESIUM SERPL-MCNC: 2.3 MG/DL — SIGNIFICANT CHANGE UP (ref 1.8–2.4)
MCHC RBC-ENTMCNC: 29 PG — SIGNIFICANT CHANGE UP (ref 27–31)
MCHC RBC-ENTMCNC: 31.8 G/DL — LOW (ref 32–37)
MCV RBC AUTO: 91.1 FL — SIGNIFICANT CHANGE UP (ref 81–99)
NRBC # BLD: 0 /100 WBCS — SIGNIFICANT CHANGE UP (ref 0–0)
PLATELET # BLD AUTO: 134 K/UL — SIGNIFICANT CHANGE UP (ref 130–400)
POTASSIUM SERPL-MCNC: 4.2 MMOL/L — SIGNIFICANT CHANGE UP (ref 3.5–5)
POTASSIUM SERPL-SCNC: 4.2 MMOL/L — SIGNIFICANT CHANGE UP (ref 3.5–5)
RBC # BLD: 3.59 M/UL — LOW (ref 4.2–5.4)
RBC # FLD: 13.1 % — SIGNIFICANT CHANGE UP (ref 11.5–14.5)
SODIUM SERPL-SCNC: 139 MMOL/L — SIGNIFICANT CHANGE UP (ref 135–146)
WBC # BLD: 5.05 K/UL — SIGNIFICANT CHANGE UP (ref 4.8–10.8)
WBC # FLD AUTO: 5.05 K/UL — SIGNIFICANT CHANGE UP (ref 4.8–10.8)

## 2021-05-25 PROCEDURE — 92986 REVISION OF AORTIC VALVE: CPT

## 2021-05-25 PROCEDURE — 99233 SBSQ HOSP IP/OBS HIGH 50: CPT

## 2021-05-25 RX ORDER — PHENYLEPHRINE HYDROCHLORIDE 10 MG/ML
0.3 INJECTION INTRAVENOUS
Qty: 40 | Refills: 0 | Status: DISCONTINUED | OUTPATIENT
Start: 2021-05-25 | End: 2021-05-28

## 2021-05-25 RX ORDER — ASPIRIN/CALCIUM CARB/MAGNESIUM 324 MG
81 TABLET ORAL DAILY
Refills: 0 | Status: DISCONTINUED | OUTPATIENT
Start: 2021-05-25 | End: 2021-05-30

## 2021-05-25 RX ADMIN — Medication 20 MILLIGRAM(S): at 06:14

## 2021-05-25 RX ADMIN — SENNA PLUS 2 TABLET(S): 8.6 TABLET ORAL at 21:09

## 2021-05-25 RX ADMIN — Medication 100 MICROGRAM(S): at 06:14

## 2021-05-25 RX ADMIN — CHLORHEXIDINE GLUCONATE 1 APPLICATION(S): 213 SOLUTION TOPICAL at 06:12

## 2021-05-25 RX ADMIN — CHLORHEXIDINE GLUCONATE 1 APPLICATION(S): 213 SOLUTION TOPICAL at 19:02

## 2021-05-25 NOTE — PROGRESS NOTE ADULT - SUBJECTIVE AND OBJECTIVE BOX
Hospital Day:  9d    Subjective: Patient is a 88y old  Female who presents with a chief complaint of acute on chronic CHF (24 May 2021 13:22)      Pt seen and evaluated at bedside.   Complaints: none  Over the night Events: none    Past Medical Hx:   HTN (hypertension)    Lung cancer    Mitral valve prolapse    Enlarged heart    Murmur    Hyperthyroidism    Arthritis    HTN (hypertension)    Diverticulosis    Hiatal hernia    Acid reflux    Liver cancer    Liver cancer    Aortic stenosis      Past Sx:  No significant past surgical history    H/O resection of liver    Status post cholecystectomy    H/O aortic valve stenosis      Allergies:  Cipro (Other)  Levaquin (Rash)  tetracycline (Hives)    Current Meds:   Standng Meds:  chlorhexidine 4% Liquid 1 Application(s) Topical every 12 hours  enoxaparin Injectable 30 milliGRAM(s) SubCutaneous daily  furosemide    Tablet 20 milliGRAM(s) Oral daily  levothyroxine 100 MICROGram(s) Oral daily  senna 2 Tablet(s) Oral at bedtime    PRN Meds:  acetaminophen   Tablet .. 650 milliGRAM(s) Oral every 6 hours PRN Temp greater or equal to 38C (100.4F), Mild Pain (1 - 3)  bisacodyl Suppository 10 milliGRAM(s) Rectal daily PRN Constipation      Vital Signs:   T(F): 96.1 (05-25-21 @ 05:00), Max: 97.5 (05-24-21 @ 13:00)  HR: 67 (05-25-21 @ 05:00) (67 - 74)  BP: 109/55 (05-25-21 @ 05:00) (109/55 - 114/53)  RR: 19 (05-24-21 @ 14:24) (19 - 20)  SpO2: 98% (05-25-21 @ 05:00) (97% - 98%)    Physical Exam:   GEN: Anxious  HEENT: normocephalic, atraumatic, aniceteric  LUNGS: bibasilar crackles, + scattered end-exp wheezing bilat   HEART: S1/S2 present. RRR, + III/VI systolic ejection murmur heard best at R 2ICS @ RSB  ABD: Soft, non-tender, non-distended. + hepatic resection scar noted   EXT: Warm, well perfused, skin color appropriate for ethnicity  NEURO: AAOX3, normal affect    FLUID BALANCE    05-22-21 @ 07:01  -  05-23-21 @ 07:00  --------------------------------------------------------  IN: 360 mL / OUT: 800 mL / NET: -440 mL    05-23-21 @ 07:01  -  05-24-21 @ 07:00  --------------------------------------------------------  IN: 760 mL / OUT: 550 mL / NET: 210 mL    05-24-21 @ 07:01  -  05-25-21 @ 06:14  --------------------------------------------------------  IN: 0 mL / OUT: 1050 mL / NET: -1050 mL        Labs:                         10.4   4.51  )-----------( 147      ( 24 May 2021 06:12 )             32.1       24 May 2021 06:12    136    |  96     |  43     ----------------------------<  92     4.2     |  31     |  0.9      Ca    9.3        24 May 2021 06:12    TPro  6.5    /  Alb  3.8    /  TBili  0.3    /  DBili  x      /  AST  23     /  ALT  17     /  AlkPhos  109    24 May 2021 06:12            Serum Pro-Brain Natriuretic Peptide: 5291 pg/mL (05-16-21 @ 14:43)          Urinalysis Basic - ( 21 May 2021 14:55 )    Color: Yellow / Appearance: Clear / SG: >1.050 / pH: x  Gluc: x / Ketone: Trace  / Bili: Negative / Urobili: <2 mg/dL   Blood: x / Protein: 30 mg/dL / Nitrite: Negative   Leuk Esterase: Negative / RBC: 4 /HPF / WBC 3 /HPF   Sq Epi: x / Non Sq Epi: 7 /HPF / Bacteria: Negative                       Hospital Day:  9d    Subjective: Patient is a 88y old  Female who presents with a chief complaint of acute on chronic CHF (24 May 2021 13:22)      Pt seen and evaluated at bedside.   Complaints: none; patient to go for BAV today (not tomorrow)   Over the night Events: none    Past Medical Hx:   HTN (hypertension)    Lung cancer    Mitral valve prolapse    Enlarged heart    Murmur    Hyperthyroidism    Arthritis    HTN (hypertension)    Diverticulosis    Hiatal hernia    Acid reflux    Liver cancer    Liver cancer    Aortic stenosis      Past Sx:  No significant past surgical history    H/O resection of liver    Status post cholecystectomy    H/O aortic valve stenosis      Allergies:  Cipro (Other)  Levaquin (Rash)  tetracycline (Hives)    Current Meds:   Standng Meds:  chlorhexidine 4% Liquid 1 Application(s) Topical every 12 hours  enoxaparin Injectable 30 milliGRAM(s) SubCutaneous daily  furosemide    Tablet 20 milliGRAM(s) Oral daily  levothyroxine 100 MICROGram(s) Oral daily  senna 2 Tablet(s) Oral at bedtime    PRN Meds:  acetaminophen   Tablet .. 650 milliGRAM(s) Oral every 6 hours PRN Temp greater or equal to 38C (100.4F), Mild Pain (1 - 3)  bisacodyl Suppository 10 milliGRAM(s) Rectal daily PRN Constipation      Vital Signs:   T(F): 96.1 (05-25-21 @ 05:00), Max: 97.5 (05-24-21 @ 13:00)  HR: 67 (05-25-21 @ 05:00) (67 - 74)  BP: 109/55 (05-25-21 @ 05:00) (109/55 - 114/53)  RR: 19 (05-24-21 @ 14:24) (19 - 20)  SpO2: 98% (05-25-21 @ 05:00) (97% - 98%)    Physical Exam:   GEN: Anxious  HEENT: normocephalic, atraumatic, aniceteric  LUNGS: bibasilar crackles, + scattered end-exp wheezing bilat   HEART: S1/S2 present. RRR, + III/VI systolic ejection murmur heard best at R 2ICS @ RSB  ABD: Soft, non-tender, non-distended. + hepatic resection scar noted   EXT: Warm, well perfused, skin color appropriate for ethnicity  NEURO: AAOX3, normal affect    FLUID BALANCE    05-22-21 @ 07:01  -  05-23-21 @ 07:00  --------------------------------------------------------  IN: 360 mL / OUT: 800 mL / NET: -440 mL    05-23-21 @ 07:01  -  05-24-21 @ 07:00  --------------------------------------------------------  IN: 760 mL / OUT: 550 mL / NET: 210 mL    05-24-21 @ 07:01  -  05-25-21 @ 06:14  --------------------------------------------------------  IN: 0 mL / OUT: 1050 mL / NET: -1050 mL        Labs:                         10.4   4.51  )-----------( 147      ( 24 May 2021 06:12 )             32.1       24 May 2021 06:12    136    |  96     |  43     ----------------------------<  92     4.2     |  31     |  0.9      Ca    9.3        24 May 2021 06:12    TPro  6.5    /  Alb  3.8    /  TBili  0.3    /  DBili  x      /  AST  23     /  ALT  17     /  AlkPhos  109    24 May 2021 06:12            Serum Pro-Brain Natriuretic Peptide: 5291 pg/mL (05-16-21 @ 14:43)          Urinalysis Basic - ( 21 May 2021 14:55 )    Color: Yellow / Appearance: Clear / SG: >1.050 / pH: x  Gluc: x / Ketone: Trace  / Bili: Negative / Urobili: <2 mg/dL   Blood: x / Protein: 30 mg/dL / Nitrite: Negative   Leuk Esterase: Negative / RBC: 4 /HPF / WBC 3 /HPF   Sq Epi: x / Non Sq Epi: 7 /HPF / Bacteria: Negative

## 2021-05-25 NOTE — PROGRESS NOTE ADULT - ASSESSMENT
87 yo M PMHx HTN, severe AS, hypothyroid, HFpEF admitted for progressive sob. Pt found to have acute on chronic HFpEF in setting of severe AS. Pt was admitted to [facility] CCU and transferred to [facility] for TAVR evaluation.    1. Acute on chronic HFpEF in setting of severe aortic stenosis / NSTEMI possibly type 2  - BAV planned for 5/25, cardiology considering TAVR  - strict intake and output.   - resumed PO lasix     2. NSTEMI  - more likely type 2   - cardiology on board  - not on aspirin or plavix-cardiology aware    3. paroxysmal SVT   - as per cardiology hold off beta blockade, a/c  - TSH 4.14    4. Anxiety  - pt declining anxiolytic agents/psychiatry    5. Hypothyroidism  - c/w levothyroxine    6. Chronic Anemia  - stable at baseline    7. Left pleural effusion  - improved on CXR   - c/w home PO lasix    8. DVT prophylaxis  - on Lovenox       PROGRESS NOTE HANDOFF    Pending: BAV    pt aware of plan of care    Disposition: to be determined

## 2021-05-25 NOTE — PRE-ANESTHESIA EVALUATION ADULT - NSANTHOSAYNRD_GEN_A_CORE
No. ZEKE screening performed.  STOP BANG Legend: 0-2 = LOW Risk; 3-4 = INTERMEDIATE Risk; 5-8 = HIGH Risk
+ abdominal pain, + diarrhea, + nausea and + vomiting.

## 2021-05-25 NOTE — PROGRESS NOTE ADULT - SUBJECTIVE AND OBJECTIVE BOX
LUCASJUAN MANUEL MARIEA  88y Female    INTERVAL HPI/OVERNIGHT EVENTS:    No complaints - awaiting BAV.     T(F): 96.1 (05-25-21 @ 06:32), Max: 97.5 (05-24-21 @ 14:24)  HR: 67 (05-25-21 @ 06:32) (67 - 74)  BP: 109/55 (05-25-21 @ 06:32) (109/55 - 114/53)  RR: 19 (05-24-21 @ 14:24) (19 - 19)  SpO2: 98% (05-25-21 @ 06:32) (98% - 98%) on RA    I&O's Summary    24 May 2021 07:01  -  25 May 2021 07:00  --------------------------------------------------------  IN: 0 mL / OUT: 1150 mL / NET: -1150 mL      PHYSICAL EXAM:  GENERAL: NAD  HEAD:  Normocephalic  EYES:  conjunctiva and sclera clear  ENMT: Moist mucous membranes  NECK: Supple  NERVOUS SYSTEM:  Alert, awake, Good concentration  CHEST/LUNG: Clear to percussion bilaterally  HEART: Regular rate and rhythm; + 3/6 PRIYA  ABDOMEN: Soft, Nontender, Nondistended  EXTREMITIES:  No edema       Consultant(s) Notes Reviewed:  [x ] YES  [ ] NO  Care Discussed with Consultants/Other Providers [ x] YES  [ ] NO    MEDICATIONS  (STANDING):  chlorhexidine 4% Liquid 1 Application(s) Topical every 12 hours  enoxaparin Injectable 30 milliGRAM(s) SubCutaneous daily  furosemide    Tablet 20 milliGRAM(s) Oral daily  levothyroxine 100 MICROGram(s) Oral daily  senna 2 Tablet(s) Oral at bedtime    MEDICATIONS  (PRN):  acetaminophen   Tablet .. 650 milliGRAM(s) Oral every 6 hours PRN Temp greater or equal to 38C (100.4F), Mild Pain (1 - 3)  bisacodyl Suppository 10 milliGRAM(s) Rectal daily PRN Constipation    Telemetry reviewed    LABS:                        10.4   5.05  )-----------( 134      ( 25 May 2021 06:13 )             32.7     05-25    139  |  97<L>  |  40<H>  ----------------------------<  98  4.2   |  30  |  1.0    Ca    9.6      25 May 2021 06:13  Mg     2.3     05-25    TPro  6.5  /  Alb  3.8  /  TBili  0.3  /  DBili  x   /  AST  23  /  ALT  17  /  AlkPhos  109  05-24              RADIOLOGY & ADDITIONAL TESTS:    Imaging or report Personally Reviewed:  [ ] YES  [ ] NO    < from: TTE Echo Complete w/ Contrast w/ Doppler (05.17.21 @ 09:31) >  Summary:   1. Severe aortic valve stenosis.   2. Left ventricular ejection fraction, by visual estimation, is 55 to 60%.   3. Mild left ventricular hypertrophy.   4. Mild to moderate mitral valve regurgitation.   5. Mild aortic regurgitation.    < end of copied text >      Case discussed with residents    Care discussed with pt

## 2021-05-25 NOTE — PROGRESS NOTE ADULT - ASSESSMENT
88 yr old male with hx of HTN, severe AS, hypothyroid, HFpEF admitted for progressive sob for past few days.     # AHRF secondary to Acute on chronic HFpEF likely secondary to severe aortic stenosis.  - Echo 5/17: EF 55-60%, severe aortic stenosis.  - strict intake and output.   - structural cardiology following   - switch to po lasix  - BAV likely on 5/26    # Suspected Obstructive Lung Disease   # L Pleural Effusion  - CT w/findings suspicious of such  - + smoking Hx   - Duonebs PRN  - Pulm consult appreciated, no tap  - PFTs done pending read      # NSTEMI type 2  - no need to trend trop    # Mild ANNETTE - Prerenal vs. Cardiorenal - resolved   - Creatinine stable  - continue holding enalapril for now.   - Continue with gentle diuresis for now.     # Mild Thrombocytopenia  -Trend CBC for now    # History of hypothyroidism  - c/w levothyroxine    # Chronic Anemia  - stable at baseline    Diet: soft, high fiber, dash   DVT ppx: lovenox   GI ppx: none  Code Status: Full Code  Dispo: BAV most likely 5/26 wednesday            88 yr old male with hx of HTN, severe AS, hypothyroid, HFpEF admitted for progressive sob for past few days.     # AHRF secondary to Acute on chronic HFpEF likely secondary to severe aortic stenosis.  - Echo 5/17: EF 55-60%, severe aortic stenosis.  - BNP 5291  - strict intake and output, daily weight   - structural cardiology following   - switched to po lasix 20mg daily   - BAV today, transfer to CCU after   - repeat PT eval after BAV (patient wants home care/home PT)     # Suspected Obstructive Lung Disease   # L Pleural Effusion  - CT w/findings suspicious of such; + smoking Hx   - Duonebs PRN  - Pulm consult appreciated, no thora needed   - PFTs done pending read      # NSTEMI type 2  - no need to trend trop    # Mild ANNETTE - Prerenal vs. Cardiorenal - resolved   - Creatinine stable  - continue holding enalapril for now.   - Continue with gentle diuresis for now.     # Mild Thrombocytopenia  -Trend CBC for now    # History of hypothyroidism  - c/w levothyroxine    # Chronic Anemia  - stable at baseline    Diet: soft, high fiber, dash   DVT ppx: lovenox   GI ppx: none  Code Status: Full Code  Dispo: BAV today, transfer to CCU today; f/u PFTs; get repeat PT eval after procedure as patient wants home care/home PT

## 2021-05-26 LAB
ANION GAP SERPL CALC-SCNC: 9 MMOL/L — SIGNIFICANT CHANGE UP (ref 7–14)
BASOPHILS # BLD AUTO: 0.03 K/UL — SIGNIFICANT CHANGE UP (ref 0–0.2)
BASOPHILS NFR BLD AUTO: 0.4 % — SIGNIFICANT CHANGE UP (ref 0–1)
BUN SERPL-MCNC: 30 MG/DL — HIGH (ref 10–20)
CALCIUM SERPL-MCNC: 8.6 MG/DL — SIGNIFICANT CHANGE UP (ref 8.5–10.1)
CHLORIDE SERPL-SCNC: 103 MMOL/L — SIGNIFICANT CHANGE UP (ref 98–110)
CO2 SERPL-SCNC: 27 MMOL/L — SIGNIFICANT CHANGE UP (ref 17–32)
CREAT SERPL-MCNC: 0.7 MG/DL — SIGNIFICANT CHANGE UP (ref 0.7–1.5)
EOSINOPHIL # BLD AUTO: 0.13 K/UL — SIGNIFICANT CHANGE UP (ref 0–0.7)
EOSINOPHIL NFR BLD AUTO: 1.6 % — SIGNIFICANT CHANGE UP (ref 0–8)
GLUCOSE SERPL-MCNC: 82 MG/DL — SIGNIFICANT CHANGE UP (ref 70–99)
HCT VFR BLD CALC: 31.6 % — LOW (ref 37–47)
HGB BLD-MCNC: 10.1 G/DL — LOW (ref 12–16)
IMM GRANULOCYTES NFR BLD AUTO: 0.5 % — HIGH (ref 0.1–0.3)
LYMPHOCYTES # BLD AUTO: 0.89 K/UL — LOW (ref 1.2–3.4)
LYMPHOCYTES # BLD AUTO: 10.9 % — LOW (ref 20.5–51.1)
MAGNESIUM SERPL-MCNC: 2.2 MG/DL — SIGNIFICANT CHANGE UP (ref 1.8–2.4)
MCHC RBC-ENTMCNC: 29.1 PG — SIGNIFICANT CHANGE UP (ref 27–31)
MCHC RBC-ENTMCNC: 32 G/DL — SIGNIFICANT CHANGE UP (ref 32–37)
MCV RBC AUTO: 91.1 FL — SIGNIFICANT CHANGE UP (ref 81–99)
MONOCYTES # BLD AUTO: 0.63 K/UL — HIGH (ref 0.1–0.6)
MONOCYTES NFR BLD AUTO: 7.7 % — SIGNIFICANT CHANGE UP (ref 1.7–9.3)
NEUTROPHILS # BLD AUTO: 6.47 K/UL — SIGNIFICANT CHANGE UP (ref 1.4–6.5)
NEUTROPHILS NFR BLD AUTO: 78.9 % — HIGH (ref 42.2–75.2)
NRBC # BLD: 0 /100 WBCS — SIGNIFICANT CHANGE UP (ref 0–0)
PLATELET # BLD AUTO: 213 K/UL — SIGNIFICANT CHANGE UP (ref 130–400)
POTASSIUM SERPL-MCNC: 4 MMOL/L — SIGNIFICANT CHANGE UP (ref 3.5–5)
POTASSIUM SERPL-SCNC: 4 MMOL/L — SIGNIFICANT CHANGE UP (ref 3.5–5)
RBC # BLD: 3.47 M/UL — LOW (ref 4.2–5.4)
RBC # FLD: 13.2 % — SIGNIFICANT CHANGE UP (ref 11.5–14.5)
SODIUM SERPL-SCNC: 139 MMOL/L — SIGNIFICANT CHANGE UP (ref 135–146)
WBC # BLD: 8.19 K/UL — SIGNIFICANT CHANGE UP (ref 4.8–10.8)
WBC # FLD AUTO: 8.19 K/UL — SIGNIFICANT CHANGE UP (ref 4.8–10.8)

## 2021-05-26 PROCEDURE — 93306 TTE W/DOPPLER COMPLETE: CPT | Mod: 26

## 2021-05-26 PROCEDURE — 93010 ELECTROCARDIOGRAM REPORT: CPT

## 2021-05-26 PROCEDURE — 99223 1ST HOSP IP/OBS HIGH 75: CPT

## 2021-05-26 PROCEDURE — 99231 SBSQ HOSP IP/OBS SF/LOW 25: CPT

## 2021-05-26 PROCEDURE — 71045 X-RAY EXAM CHEST 1 VIEW: CPT | Mod: 26

## 2021-05-26 PROCEDURE — 99232 SBSQ HOSP IP/OBS MODERATE 35: CPT

## 2021-05-26 RX ORDER — MIDODRINE HYDROCHLORIDE 2.5 MG/1
10 TABLET ORAL EVERY 8 HOURS
Refills: 0 | Status: DISCONTINUED | OUTPATIENT
Start: 2021-05-26 | End: 2021-05-28

## 2021-05-26 RX ORDER — MIDODRINE HYDROCHLORIDE 2.5 MG/1
10 TABLET ORAL EVERY 8 HOURS
Refills: 0 | Status: DISCONTINUED | OUTPATIENT
Start: 2021-05-26 | End: 2021-05-26

## 2021-05-26 RX ADMIN — MIDODRINE HYDROCHLORIDE 10 MILLIGRAM(S): 2.5 TABLET ORAL at 15:43

## 2021-05-26 RX ADMIN — CHLORHEXIDINE GLUCONATE 1 APPLICATION(S): 213 SOLUTION TOPICAL at 05:11

## 2021-05-26 RX ADMIN — Medication 100 MICROGRAM(S): at 05:12

## 2021-05-26 RX ADMIN — ENOXAPARIN SODIUM 30 MILLIGRAM(S): 100 INJECTION SUBCUTANEOUS at 11:28

## 2021-05-26 RX ADMIN — MIDODRINE HYDROCHLORIDE 10 MILLIGRAM(S): 2.5 TABLET ORAL at 22:00

## 2021-05-26 RX ADMIN — SENNA PLUS 2 TABLET(S): 8.6 TABLET ORAL at 22:00

## 2021-05-26 RX ADMIN — MIDODRINE HYDROCHLORIDE 10 MILLIGRAM(S): 2.5 TABLET ORAL at 10:43

## 2021-05-26 RX ADMIN — CHLORHEXIDINE GLUCONATE 1 APPLICATION(S): 213 SOLUTION TOPICAL at 17:19

## 2021-05-26 RX ADMIN — Medication 20 MILLIGRAM(S): at 05:12

## 2021-05-26 RX ADMIN — PHENYLEPHRINE HYDROCHLORIDE 6.17 MICROGRAM(S)/KG/MIN: 10 INJECTION INTRAVENOUS at 05:12

## 2021-05-26 RX ADMIN — Medication 81 MILLIGRAM(S): at 11:28

## 2021-05-26 NOTE — PROGRESS NOTE ADULT - SUBJECTIVE AND OBJECTIVE BOX
HPI:  87 yo W female w/PMH as noted below.  Pt has hx severe aortic valve disease, that requires replacement but pt. refused.   Pt c/o progressive dyspnea that  started 2 months ago , but today pt could no longer ambulate and tolerated her SOB.  so pt family called EMS.  In ER pt CXR showed bilateral pleural effusions( L>R)  requiring thoracentesis in past. Pt w/ elevated : BNP, troponins,.  Ct angio chest did not show any PE .  (16 May 2021 23:00)      INTERVAL HISTORY:    PAST MEDICAL & SURGICAL HISTORY  HTN (hypertension)    Mitral valve prolapse    Enlarged heart    Murmur    Hyperthyroidism    Arthritis    HTN (hypertension)    Diverticulosis    Hiatal hernia    Acid reflux    Liver cancer  s/p resection and s/p chemo and radiation    Aortic stenosis    H/O resection of liver    Status post cholecystectomy        ALLERGIES:  Cipro (Other)  Levaquin (Rash)  tetracycline (Hives)      MEDICATIONS:  MEDICATIONS  (STANDING):  aspirin  chewable 81 milliGRAM(s) Oral daily  chlorhexidine 4% Liquid 1 Application(s) Topical every 12 hours  enoxaparin Injectable 30 milliGRAM(s) SubCutaneous daily  furosemide    Tablet 20 milliGRAM(s) Oral daily  levothyroxine 100 MICROGram(s) Oral daily  midodrine 10 milliGRAM(s) Oral every 8 hours  phenylephrine    Infusion 0.3 MICROgram(s)/kG/Min (6.17 mL/Hr) IV Continuous <Continuous>  senna 2 Tablet(s) Oral at bedtime    MEDICATIONS  (PRN):  acetaminophen   Tablet .. 650 milliGRAM(s) Oral every 6 hours PRN Temp greater or equal to 38C (100.4F), Mild Pain (1 - 3)  bisacodyl Suppository 10 milliGRAM(s) Rectal daily PRN Constipation      HOME MEDICATIONS:  Home Medications:  enalapril 2.5 mg oral tablet: 1 tab(s) orally once a day (16 May 2021 14:29)  furosemide 20 mg oral tablet: 1 tab(s) orally once a day (16 May 2021 14:29)  levothyroxine 100 mcg (0.1 mg) oral tablet: 1 tab(s) orally once a day (16 May 2021 14:29)        OBJECTIVE:  ICU Vital Signs Last 24 Hrs  T(C): 36.6 (26 May 2021 08:00), Max: 36.6 (26 May 2021 06:00)  T(F): 97.9 (26 May 2021 08:00), Max: 97.9 (26 May 2021 08:00)  HR: 62 (26 May 2021 10:00) (58 - 70)  BP: 97/74 (25 May 2021 17:15) (97/74 - 127/51)  BP(mean): 86 (25 May 2021 17:15) (86 - 86)  ABP: 112/48 (26 May 2021 10:00) (86/44 - 148/68)  ABP(mean): 70 (26 May 2021 10:00) (58 - 96)  RR: 43 (26 May 2021 10:00) (16 - 44)  SpO2: 97% (26 May 2021 10:00) (94% - 100%)      Adult Advanced Hemodynamics Last 24 Hrs  CVP(mm Hg): --  CVP(cm H2O): --  CO: --  CI: --  PA: --  PA(mean): --  PCWP: --  SVR: --  SVRI: --  PVR: --  PVRI: --  I&O's Summary    25 May 2021 07:01  -  26 May 2021 07:00  --------------------------------------------------------  IN: 162 mL / OUT: 1400 mL / NET: -1238 mL    26 May 2021 07:01  -  26 May 2021 11:12  --------------------------------------------------------  IN: 288 mL / OUT: 0 mL / NET: 288 mL      Daily Height in cm: 152.4 (25 May 2021 13:42)    Daily Weight in k.7 (26 May 2021 04:00)    PHYSICAL EXAM:  NEURO: patient is awake , alert and oriented  GEN: Not in acute distress  NECK: no thyroid enlargement, no JVD  LUNGS: Clear to auscultation bilaterally   CARDIOVASCULAR: S1/S2 present, RRR , 3/6 systolic murmur at LUSB, no carotid bruits,  + PP bilaterally  ABD: Soft, non-tender, non-distended, +BS  EXT: No MICHELLE  SKIN: Intact  ACCESS Site: no hematoma, venous sheath in place with TVP    LABS:                        10.1   8.19  )-----------( 213      ( 26 May 2021 04:15 )             31.6         139  |  103  |  30<H>  ----------------------------<  82  4.0   |  27  |  0.7    Ca    8.6      26 May 2021 04:15  Mg     2.2               RADIOLOGY:  -CXR: L basilar opacity     -TTE:  < from: TTE Echo Complete w/ Contrast w/ Doppler (21 @ 09:31) >  Summary:   1. Severe aortic valve stenosis.   2. Left ventricular ejection fraction, by visual estimation, is 55 to 60%.   3. Mild left ventricular hypertrophy.   4. Mild to moderate mitral valve regurgitation.   5. Mild aortic regurgitation.    PHYSICIAN INTERPRETATION:  Left Ventricle: The left ventricular internal cavity size is normal. There is mild left ventricular hypertrophy. Left ventricular ejection fraction, by visual estimation, is 55 to 60%.  Mitral Valve: Mild to moderate mitral valve regurgitation is seen.  Aortic Valve: Severe aortic stenosis is present. Mild aortic valve regurgitation is seen.    < end of copied text >    ECG:  Sinus RBBB, LAFB    TELEMETRY EVENTS:  Sinus rhythm

## 2021-05-26 NOTE — CONSULT NOTE ADULT - ATTENDING COMMENTS
Transient complete HB requiring TVP, in the setting of valvuloplasty  TVP off; intact conduction   monitor on tely while inpatient; MCOT at discharge  PPM in case of recurrence of CHB

## 2021-05-26 NOTE — PROGRESS NOTE ADULT - ASSESSMENT
IMPRESSION:  acute resp failure improved secondary CHF exacerbation   pleural effusion improved with lasix   severe aortic stenosis     PLAN:  adjust lasix as per cardiology alvin with aortic stenosis   cxr improved   follow cardiology   oob to chair    DVT prophylaxis.  recall prn

## 2021-05-26 NOTE — PROGRESS NOTE ADULT - ASSESSMENT
IMPRESSION:    HFpEF 2/2 severe AS   Severe AS s/p BAV, complicated by transient CHB in cath lab  Bifascicular block   HTN    PLAN:    CNS: avoid sedatives     HEENT: Oral care    PULMONARY:  HOB @ 45 degrees    CARDIOVASCULAR:  - CCU monitoring  - wean down jess, start midodrine 5 mg q8h  - monitor for few hours, if no AVB will take out sheath and TVP  - c/w lasix 20 mg PO daily   - c/w ASA 81 mg daily   - EP evaluation for PPM     GI: GI prophylaxis.  Feeding DASH diet     RENAL:  Follow up lytes.  Correct as needed    INFECTIOUS DISEASE: Follow up cultures    HEMATOLOGICAL:  DVT prophylaxis.    ENDOCRINE:  Follow up FS.  Insulin protocol if needed.    MUSCULOSKELETAL: bedrest 4 h after sheath removal     DISPO: CCU, EP eval.

## 2021-05-26 NOTE — CONSULT NOTE ADULT - ASSESSMENT
88y Female with h/o HTN, CAD, hypothyroidism, MVP, severe AS, baseline bifascicular block  s/p BAV c/b CHB intraop, requiring TVP  now back at baseline    bifascicular block  CHB intraop  severe valvular disease  CAD    con't tele  back to baseline, no need for PPM at this time, unless develops another incident of CHB  will benefit form MCOT on discharge   patient is not sure she wants monitor at this time  Maintain electrolytes K>4.0 Mg >2.0  might require PPM in the future if proceed with TAVR      88y Female with h/o HTN, CAD, hypothyroidism, MVP, severe AS, baseline bifascicular block  s/p BAV c/b CHB intraop, requiring TVP  now back at baseline    bifascicular block  CHB intraop  severe valvular disease  CAD    con't tele  back to baseline, no need for PPM at this time, unless develops another incident of CHB  will benefit form MCOT on discharge   patient is not sure she wants monitor at this time  Maintain electrolytes K>4.0 Mg >2.0

## 2021-05-26 NOTE — CONSULT NOTE ADULT - REASON FOR ADMISSION
acute on chronic CHF
- HD as per nephrology appreciated

## 2021-05-26 NOTE — PROGRESS NOTE ADULT - SUBJECTIVE AND OBJECTIVE BOX
Patient is a 88y old  Female who presents with a chief complaint of acute on chronic CHF (25 May 2021 13:13)      Over Night Events:  Patient seen and examined.   s/p cath and balloon dilatation   has episodes of av block   now feel good no resp distress     ROS:  See HPI    PHYSICAL EXAM    ICU Vital Signs Last 24 Hrs  T(C): 36.6 (26 May 2021 06:00), Max: 36.6 (26 May 2021 06:00)  T(F): 97.8 (26 May 2021 06:00), Max: 97.8 (26 May 2021 06:00)  HR: 58 (26 May 2021 06:00) (58 - 70)  BP: 97/74 (25 May 2021 17:15) (97/74 - 127/51)  BP(mean): 86 (25 May 2021 17:15) (86 - 86)  ABP: 90/46 (26 May 2021 06:00) (86/44 - 148/68)  ABP(mean): 60 (26 May 2021 06:00) (58 - 96)  RR: 35 (26 May 2021 06:00) (16 - 44)  SpO2: 98% (26 May 2021 06:00) (94% - 99%)      General: Aox3  HEENT:       criss         Lymph Nodes: NO cervical LN   Lungs: Bilateral BS  Cardiovascular: Regular   Abdomen: Soft, Positive BS  Extremities: No clubbing   Skin: warm   Neurological: no focal   Musculoskeletal: move all ext     I&O's Detail    25 May 2021 07:01  -  26 May 2021 07:00  --------------------------------------------------------  IN:    Phenylephrine: 144 mL  Total IN: 144 mL    OUT:    Voided (mL): 1400 mL  Total OUT: 1400 mL    Total NET: -1256 mL          LABS:                          10.1   8.19  )-----------( 213      ( 26 May 2021 04:15 )             31.6         26 May 2021 04:15    139    |  103    |  30     ----------------------------<  82     4.0     |  27     |  0.7      Ca    8.6        26 May 2021 04:15  Mg     2.2       26 May 2021 04:15                                                                                                                                                                                                                                   ABG - ( 25 May 2021 15:54 )  pH, Arterial: 7.41  pH, Blood: x     /  pCO2: 51    /  pO2: 343   / HCO3: 33    / Base Excess: 6.8   /  SaO2: x                   MEDICATIONS  (STANDING):  aspirin  chewable 81 milliGRAM(s) Oral daily  chlorhexidine 4% Liquid 1 Application(s) Topical every 12 hours  enoxaparin Injectable 30 milliGRAM(s) SubCutaneous daily  furosemide    Tablet 20 milliGRAM(s) Oral daily  levothyroxine 100 MICROGram(s) Oral daily  phenylephrine    Infusion 0.3 MICROgram(s)/kG/Min (6.17 mL/Hr) IV Continuous <Continuous>  senna 2 Tablet(s) Oral at bedtime    MEDICATIONS  (PRN):  acetaminophen   Tablet .. 650 milliGRAM(s) Oral every 6 hours PRN Temp greater or equal to 38C (100.4F), Mild Pain (1 - 3)  bisacodyl Suppository 10 milliGRAM(s) Rectal daily PRN Constipation          Xrays:  TLC:  OG:  ET tube:                                                                                    decrease left effusion almost resolved    ECHO:  [facility] ICU:    I

## 2021-05-26 NOTE — CONSULT NOTE ADULT - CONSULT REQUESTED DATE/TIME
21-May-2021 11:31
17-May-2021 08:47
21-May-2021 16:24
17-May-2021 07:34
26-May-2021 10:44
19-May-2021 19:58

## 2021-05-26 NOTE — CONSULT NOTE ADULT - SUBJECTIVE AND OBJECTIVE BOX
Patient is a 88y old  Female who presents with a chief complaint of acute on chronic CHF (26 May 2021 11:11)        HPI:  89 yo W female w/PMH as noted below.  Pt has hx severe aortic valve disease, that requires replacement but pt. refused.   Pt c/o progressive dyspnea that  started 2 months ago , but today pt could no longer ambulate and tolerated her SOB.  so pt family called EMS.  In ER pt CXR showed bilateral pleural effusions( L>R)  requiring thoracentesis in past. Pt w/ elevated : BNP, troponins,.  Ct angio chest did not show any PE .  (16 May 2021 23:00)      Electrophysiology:  88y Female with h/o HTN, CAD, hypothyroidism, MVP, severe AS, presented to ED c/o progressively worsening dyspnea was found to have b/l effusions, noted to have severe AS. Underwent BAV as bridge to future TAVR, intraop during inflation noted to have CHB (no strips available, no report available). TVP was placed and patient was paced throughout the night post op. This AM TVP was turned down to back up at 40bpm and patient was  back at baseline since then. However baseline is bifascular block with: RBBB and LAFB block at baseline.    REVIEW OF SYSTEMS    [ x] A ten-point review of systems was otherwise negative except as noted.  [ ] Due to altered mental status/intubation, subjective information were not able to be obtained from the patient. History was obtained, to the extent possible, from review of the chart and collateral sources of information.      PAST MEDICAL & SURGICAL HISTORY:  HTN (hypertension)    Mitral valve prolapse    Enlarged heart    Murmur    Hyperthyroidism    Arthritis    HTN (hypertension)    Diverticulosis    Hiatal hernia    Acid reflux    Liver cancer  s/p resection and s/p chemo and radiation    Aortic stenosis    H/O resection of liver    Status post cholecystectomy        Home Medications:  enalapril 2.5 mg oral tablet: 1 tab(s) orally once a day (16 May 2021 14:29)  furosemide 20 mg oral tablet: 1 tab(s) orally once a day (16 May 2021 14:29)  levothyroxine 100 mcg (0.1 mg) oral tablet: 1 tab(s) orally once a day (16 May 2021 14:29)      Allergies:  Cipro (Other)  Levaquin (Rash)  tetracycline (Hives)      FAMILY HISTORY:      SOCIAL HISTORY:    CIGARETTES:  ALCOHOL:        PREVIOUS DIAGNOSTIC TESTING:      ECHO  FINDINGS:    < from: TTE Echo Complete w/o Contrast w/ Doppler (21 @ 08:32) >  Summary:   1. Left ventricular ejection fraction, by visual estimation, is 55 to 60%.   2. Mildly increased LV wall thickness.   3. Spectral Doppler shows impaired relaxation pattern of left ventricular myocardial filling (Grade I diastolic dysfunction).   4. Normal left atrial size.   5. Normal right atrial size.   6. Mild mitral annular calcification.   7. No evidence of mitral valve regurgitation.   8. Mild tricuspid regurgitation.   9. Aortic valve thickening with decreased leaflet opening.  10. Mild aortic regurgitation.  11. Peak transaortic gradient equals 135.1 mmHg, mean transaortic gradient equals 66.7 mmHg, the calculated aortic valve area equals 0.47 cm² by the continuity equation consistent with severe aortic stenosis.  12. There is mild aortic root calcification.    < end of copied text >    < from: TTE Echo Complete w/ Contrast w/ Doppler (21 @ 09:31) >  Summary:   1. Severe aortic valve stenosis.   2. Left ventricular ejection fraction, by visual estimation, is 55 to 60%.   3. Mild left ventricular hypertrophy.   4. Mild to moderate mitral valve regurgitation.   5. Mild aortic regurgitation.    PHYSICIAN INTERPRETATION:  Left Ventricle: The left ventricular internal cavity size is normal. There is mild left ventricular hypertrophy. Left ventricular ejection fraction, by visual estimation, is 55 to 60%.  Mitral Valve: Mild to moderate mitral valve regurgitation is seen.  Aortic Valve: Severe aortic stenosis is present. Mild aortic valve regurgitation is seen.    Aortic Valve:  AoV VMax:    5.88 m/s   AoV Area, Vmax: 0.38 cm² Vmax Indx: 0.25 cm²/m²  AoV VTI:     1.46 m     AoV Area, VTI:  0.35 cm² VTI Indx:  0.23 cm²/m²  AoV Pk Grad: 138.1 mmHg  AoV Mn Grad: 86.2 mmHg    < end of copied text >    STRESS  FINDINGS:    CATHETERIZATION  FINDINGS:    ELECTROPHYSIOLOGY STUDY  FINDINGS:    CAROTID ULTRASOUND:  FINDINGS  < from: VA Duplex Carotid, Bilat (21 @ 14:11) >  IMPRESSION: No significant hemodynamic stenosis of either carotid artery.    < end of copied text >    VENOUS DUPLEX SCAN:  FINDINGS:    CHEST CT PULMONARY ANGIO with IV Contrast:  FINDINGS:      MEDICATIONS  (STANDING):  aspirin  chewable 81 milliGRAM(s) Oral daily  chlorhexidine 4% Liquid 1 Application(s) Topical every 12 hours  enoxaparin Injectable 30 milliGRAM(s) SubCutaneous daily  furosemide    Tablet 20 milliGRAM(s) Oral daily  levothyroxine 100 MICROGram(s) Oral daily  midodrine 10 milliGRAM(s) Oral every 8 hours  phenylephrine    Infusion 0.3 MICROgram(s)/kG/Min (6.17 mL/Hr) IV Continuous <Continuous>  senna 2 Tablet(s) Oral at bedtime    MEDICATIONS  (PRN):  acetaminophen   Tablet .. 650 milliGRAM(s) Oral every 6 hours PRN Temp greater or equal to 38C (100.4F), Mild Pain (1 - 3)  bisacodyl Suppository 10 milliGRAM(s) Rectal daily PRN Constipation      Vital Signs Last 24 Hrs  T(C): 36.9 (26 May 2021 16:00), Max: 36.9 (26 May 2021 16:00)  T(F): 98.4 (26 May 2021 16:00), Max: 98.4 (26 May 2021 16:00)  HR: 66 (26 May 2021 18:00) (54 - 68)  BP: --  BP(mean): --  RR: 35 (26 May 2021 18:00) (18 - 44)  SpO2: 99% (26 May 2021 18:00) (94% - 100%)    PHYSICAL EXAM:    GENERAL: In no apparent distress, well nourished, and hydrated.  HEAD:  Atraumatic, Normocephalic  EYES: EOMI, PERRLA, conjunctiva and sclera clear  NECK: Supple and normal thyroid.  No JVD or carotid bruit.  Carotid pulse is 2+ bilaterally.  HEART: Regular rate and rhythm; No murmurs, rubs, or gallops.  PULMONARY: Clear to auscultation and perfusion.  No rales, wheezing, or rhonchi bilaterally.  ABDOMEN: Soft, Nontender, Nondistended; Bowel sounds present  EXTREMITIES:  2+ Peripheral Pulses, No clubbing, cyanosis, or edema  groins c/d/i  NEUROLOGICAL: Grossly nonfocal    I&O's Detail    25 May 2021 07:01  -  26 May 2021 07:00  --------------------------------------------------------  IN:    Phenylephrine: 162 mL  Total IN: 162 mL    OUT:    Voided (mL): 1400 mL  Total OUT: 1400 mL    Total NET: -1238 mL      26 May 2021 07:01  -  26 May 2021 18:44  --------------------------------------------------------  IN:    Oral Fluid: 720 mL    Phenylephrine: 178 mL  Total IN: 898 mL    OUT:    Voided (mL): 900 mL  Total OUT: 900 mL    Total NET: -2 mL        Daily     Daily Weight in k.7 (26 May 2021 04:00)    INTERPRETATION OF TELEMETRY:    EC Lead ECG:   Ventricular Rate 63 BPM    Atrial Rate 63 BPM    P-R Interval 168 ms    QRS Duration 138 ms    Q-T Interval 492 ms    QTC Calculation(Bazett) 503 ms    P Axis 54 degrees    R Axis -46 degrees    T Axis 33 degrees    Diagnosis Line Normal sinus rhythm  Right bundle branch block  Left anterior fascicular block  *** Bifascicular block ***  Voltage criteria for left ventricular hypertrophy  Abnormal ECG    Confirmed by Lino Moser ([id number]) on 2021 7:58:35 AM (21 @ 06:19)        LABS:                        10.1   8.19  )-----------( 213      ( 26 May 2021 04:15 )             31.6         139  |  103  |  30<H>  ----------------------------<  82  4.0   |  27  |  0.7    Ca    8.6      26 May 2021 04:15  Mg     2.2             Thyroid Stimulating Hormone, Serum: 4.14 uIU/mL (21 @ 05:38)          BNP            RADIOLOGY & ADDITIONAL STUDIES:

## 2021-05-27 LAB
ALBUMIN SERPL ELPH-MCNC: 3.4 G/DL — LOW (ref 3.5–5.2)
ALP SERPL-CCNC: 99 U/L — SIGNIFICANT CHANGE UP (ref 30–115)
ALT FLD-CCNC: 19 U/L — SIGNIFICANT CHANGE UP (ref 0–41)
ANION GAP SERPL CALC-SCNC: 9 MMOL/L — SIGNIFICANT CHANGE UP (ref 7–14)
AST SERPL-CCNC: 30 U/L — SIGNIFICANT CHANGE UP (ref 0–41)
BASOPHILS # BLD AUTO: 0.03 K/UL — SIGNIFICANT CHANGE UP (ref 0–0.2)
BASOPHILS NFR BLD AUTO: 0.4 % — SIGNIFICANT CHANGE UP (ref 0–1)
BILIRUB SERPL-MCNC: 0.3 MG/DL — SIGNIFICANT CHANGE UP (ref 0.2–1.2)
BUN SERPL-MCNC: 26 MG/DL — HIGH (ref 10–20)
CALCIUM SERPL-MCNC: 8.6 MG/DL — SIGNIFICANT CHANGE UP (ref 8.5–10.1)
CHLORIDE SERPL-SCNC: 105 MMOL/L — SIGNIFICANT CHANGE UP (ref 98–110)
CO2 SERPL-SCNC: 25 MMOL/L — SIGNIFICANT CHANGE UP (ref 17–32)
CREAT SERPL-MCNC: 0.7 MG/DL — SIGNIFICANT CHANGE UP (ref 0.7–1.5)
EOSINOPHIL # BLD AUTO: 0.23 K/UL — SIGNIFICANT CHANGE UP (ref 0–0.7)
EOSINOPHIL NFR BLD AUTO: 3.2 % — SIGNIFICANT CHANGE UP (ref 0–8)
GLUCOSE SERPL-MCNC: 85 MG/DL — SIGNIFICANT CHANGE UP (ref 70–99)
HCT VFR BLD CALC: 30.4 % — LOW (ref 37–47)
HGB BLD-MCNC: 9.6 G/DL — LOW (ref 12–16)
IMM GRANULOCYTES NFR BLD AUTO: 0.4 % — HIGH (ref 0.1–0.3)
LYMPHOCYTES # BLD AUTO: 1.22 K/UL — SIGNIFICANT CHANGE UP (ref 1.2–3.4)
LYMPHOCYTES # BLD AUTO: 17.2 % — LOW (ref 20.5–51.1)
MAGNESIUM SERPL-MCNC: 2.1 MG/DL — SIGNIFICANT CHANGE UP (ref 1.8–2.4)
MCHC RBC-ENTMCNC: 29.1 PG — SIGNIFICANT CHANGE UP (ref 27–31)
MCHC RBC-ENTMCNC: 31.6 G/DL — LOW (ref 32–37)
MCV RBC AUTO: 92.1 FL — SIGNIFICANT CHANGE UP (ref 81–99)
MONOCYTES # BLD AUTO: 0.79 K/UL — HIGH (ref 0.1–0.6)
MONOCYTES NFR BLD AUTO: 11.1 % — HIGH (ref 1.7–9.3)
NEUTROPHILS # BLD AUTO: 4.8 K/UL — SIGNIFICANT CHANGE UP (ref 1.4–6.5)
NEUTROPHILS NFR BLD AUTO: 67.7 % — SIGNIFICANT CHANGE UP (ref 42.2–75.2)
NRBC # BLD: 0 /100 WBCS — SIGNIFICANT CHANGE UP (ref 0–0)
PLATELET # BLD AUTO: 186 K/UL — SIGNIFICANT CHANGE UP (ref 130–400)
POTASSIUM SERPL-MCNC: 4.2 MMOL/L — SIGNIFICANT CHANGE UP (ref 3.5–5)
POTASSIUM SERPL-SCNC: 4.2 MMOL/L — SIGNIFICANT CHANGE UP (ref 3.5–5)
PROT SERPL-MCNC: 5.6 G/DL — LOW (ref 6–8)
RBC # BLD: 3.3 M/UL — LOW (ref 4.2–5.4)
RBC # FLD: 13.3 % — SIGNIFICANT CHANGE UP (ref 11.5–14.5)
SODIUM SERPL-SCNC: 139 MMOL/L — SIGNIFICANT CHANGE UP (ref 135–146)
WBC # BLD: 7.1 K/UL — SIGNIFICANT CHANGE UP (ref 4.8–10.8)
WBC # FLD AUTO: 7.1 K/UL — SIGNIFICANT CHANGE UP (ref 4.8–10.8)

## 2021-05-27 PROCEDURE — 99232 SBSQ HOSP IP/OBS MODERATE 35: CPT

## 2021-05-27 PROCEDURE — 93010 ELECTROCARDIOGRAM REPORT: CPT

## 2021-05-27 RX ADMIN — CHLORHEXIDINE GLUCONATE 1 APPLICATION(S): 213 SOLUTION TOPICAL at 05:22

## 2021-05-27 RX ADMIN — PHENYLEPHRINE HYDROCHLORIDE 6.17 MICROGRAM(S)/KG/MIN: 10 INJECTION INTRAVENOUS at 05:22

## 2021-05-27 RX ADMIN — Medication 81 MILLIGRAM(S): at 12:51

## 2021-05-27 RX ADMIN — CHLORHEXIDINE GLUCONATE 1 APPLICATION(S): 213 SOLUTION TOPICAL at 17:13

## 2021-05-27 RX ADMIN — MIDODRINE HYDROCHLORIDE 10 MILLIGRAM(S): 2.5 TABLET ORAL at 13:00

## 2021-05-27 RX ADMIN — Medication 20 MILLIGRAM(S): at 05:23

## 2021-05-27 RX ADMIN — Medication 100 MICROGRAM(S): at 05:23

## 2021-05-27 RX ADMIN — MIDODRINE HYDROCHLORIDE 10 MILLIGRAM(S): 2.5 TABLET ORAL at 21:56

## 2021-05-27 RX ADMIN — ENOXAPARIN SODIUM 30 MILLIGRAM(S): 100 INJECTION SUBCUTANEOUS at 12:51

## 2021-05-27 RX ADMIN — Medication 10 MILLIGRAM(S): at 16:15

## 2021-05-27 RX ADMIN — MIDODRINE HYDROCHLORIDE 10 MILLIGRAM(S): 2.5 TABLET ORAL at 05:23

## 2021-05-27 NOTE — PROGRESS NOTE ADULT - ATTENDING COMMENTS
patient seen and examined consider lasix 40 mg iv times one and then switch to PO   her effusion improved on iv lasix before   cxr cheslea
s/p BAV due to AS  Nl EF   will eventually need TAVR and if planned will likley need PPM  await structural
no pauses - no AV block  continue to monitor on Tely  MCOT at discharge
wean off jess and tx to tele and ambulate

## 2021-05-27 NOTE — PROGRESS NOTE ADULT - ASSESSMENT
IMPRESSION:    HFpEF 2/2 severe AS   Severe AS s/p BAV, complicated by transient CHB in cath lab  Bifascicular block   HTN    PLAN:    CNS: avoid sedatives     HEENT: Oral care    PULMONARY:  HOB @ 45 degrees    CARDIOVASCULAR:  - CCU monitoring  - wean down jess, c/w midodrine 10 mg q8h  - c/w lasix 20 mg PO daily   - c/w ASA 81 mg daily   - EP evaluation: no PPM for now, MCOT on dischrage    GI: GI prophylaxis.  Feeding DASH diet     RENAL:  Follow up lytes.  Correct as needed    INFECTIOUS DISEASE: Follow up cultures    HEMATOLOGICAL:  DVT prophylaxis.    ENDOCRINE:  Follow up FS.  Insulin protocol if needed.    MUSCULOSKELETAL: bedrest 4 h after sheath removal     DISPO: CCU, wean off jess, telemetry in 24h IMPRESSION:    HFpEF 2/2 severe AS   Severe AS s/p BAV, complicated by transient CHB in cath lab  Bifascicular block   HTN    PLAN:    CNS: avoid sedatives     HEENT: Oral care    PULMONARY:  HOB @ 45 degrees    CARDIOVASCULAR:  - CCU monitoring  - wean down jess, c/w midodrine 10 mg q8h then tx to tele  - c/w lasix 20 mg PO daily   - c/w ASA 81 mg daily   - EP evaluation: no PPM for now, MCOT on dischrage    GI: GI prophylaxis.  Feeding DASH diet     RENAL:  Follow up lytes.  Correct as needed    INFECTIOUS DISEASE: Follow up cultures    HEMATOLOGICAL:  DVT prophylaxis.    ENDOCRINE:  Follow up FS.  Insulin protocol if needed.    MUSCULOSKELETAL: bedrest 4 h after sheath removal     DISPO: CCU, wean off jess, telemetry in 24h

## 2021-05-27 NOTE — PROGRESS NOTE ADULT - ASSESSMENT
Assessment: 88y Female with h/o HTN, CAD, hypothyroidism, MVP, severe AS, baseline bifascicular block  s/p BAV c/b CHB intraop, requiring TVP  now back at baseline    Impression:  bifascicular block  CHB intraop  severe valvular disease  CAD    Plan:  con't tele  back to baseline, no need for PPM at this time, unless develops another incident of CHB  will benefit form MCOT on discharge   please call EP ~24 hours prior to discharge  Maintain electrolytes K>4.0 Mg >2.0

## 2021-05-27 NOTE — PROGRESS NOTE ADULT - SUBJECTIVE AND OBJECTIVE BOX
INTERVAL HPI/OVERNIGHT EVENTS: No acute tele events.     MEDICATIONS  (STANDING):  aspirin  chewable 81 milliGRAM(s) Oral daily  chlorhexidine 4% Liquid 1 Application(s) Topical every 12 hours  enoxaparin Injectable 30 milliGRAM(s) SubCutaneous daily  furosemide    Tablet 20 milliGRAM(s) Oral daily  levothyroxine 100 MICROGram(s) Oral daily  midodrine 10 milliGRAM(s) Oral every 8 hours  phenylephrine    Infusion 0.3 MICROgram(s)/kG/Min (6.17 mL/Hr) IV Continuous <Continuous>  senna 2 Tablet(s) Oral at bedtime    MEDICATIONS  (PRN):  acetaminophen   Tablet .. 650 milliGRAM(s) Oral every 6 hours PRN Temp greater or equal to 38C (100.4F), Mild Pain (1 - 3)  bisacodyl Suppository 10 milliGRAM(s) Rectal daily PRN Constipation      Allergies    Cipro (Other)  Levaquin (Rash)  tetracycline (Hives)    Intolerances    REVIEW OF SYSTEMS    [ x] A ten-point review of systems was otherwise negative except as noted.  [ ] Due to altered mental status/intubation, subjective information were not able to be obtained from the patient. History was obtained, to the extent possible, from review of the chart and collateral sources of information.      Vital Signs Last 24 Hrs  T(C): 36.4 (27 May 2021 12:00), Max: 36.9 (26 May 2021 16:00)  T(F): 97.6 (27 May 2021 12:00), Max: 98.4 (26 May 2021 16:00)  HR: 66 (27 May 2021 12:00) (42 - 66)  BP: 118/91 (27 May 2021 12:00) (82/56 - 142/71)  BP(mean): 112 (27 May 2021 12:00) (60 - 115)  RR: 38 (27 May 2021 12:00) (20 - 41)  SpO2: 96% (27 May 2021 12:00) (95% - 99%)    05-26-21 @ 07:01  -  05-27-21 @ 07:00  --------------------------------------------------------  IN: 1256 mL / OUT: 1500 mL / NET: -244 mL    05-27-21 @ 07:01  -  05-27-21 @ 12:50  --------------------------------------------------------  IN: 270 mL / OUT: 0 mL / NET: 270 mL        PHYSICAL EXAM:  GENERAL: In no apparent distress, well nourished, and hydrated.  HEAD:  Atraumatic, Normocephalic  EYES: EOMI, PERRLA, conjunctiva and sclera clear  NECK: Supple and normal thyroid.  No JVD or carotid bruit.  Carotid pulse is 2+ bilaterally.  HEART: Regular rate and rhythm; No murmurs, rubs, or gallops.  PULMONARY: Clear to auscultation and perfusion.  No rales, wheezing, or rhonchi bilaterally.  ABDOMEN: Soft, Nontender, Nondistended; Bowel sounds present  EXTREMITIES:  2+ Peripheral Pulses, No clubbing, cyanosis, or edema  groins c/d/i  NEUROLOGICAL: Grossly nonfocal  LABS:                        9.6    7.10  )-----------( 186      ( 27 May 2021 05:05 )             30.4     05-27    139  |  105  |  26<H>  ----------------------------<  85  4.2   |  25  |  0.7    Ca    8.6      27 May 2021 05:05  Mg     2.1     05-27    TPro  5.6<L>  /  Alb  3.4<L>  /  TBili  0.3  /  DBili  x   /  AST  30  /  ALT  19  /  AlkPhos  99  05-27          RADIOLOGY & ADDITIONAL TESTS:

## 2021-05-27 NOTE — PROGRESS NOTE ADULT - SUBJECTIVE AND OBJECTIVE BOX
Patient is a 88y old  Female who presents with a chief complaint of acute on chronic CHF (27 May 2021 06:51)      INTERVAL HISTORY/overnight events  on Ra no resp distress no sob       Vital Signs Last 24 Hrs  T(C): 36.3 (27 May 2021 08:00), Max: 36.9 (26 May 2021 16:00)  T(F): 97.4 (27 May 2021 08:00), Max: 98.4 (26 May 2021 16:00)  HR: 66 (27 May 2021 08:00) (42 - 66)  BP: 118/59 (27 May 2021 08:00) (82/56 - 142/71)  BP(mean): 70 (27 May 2021 08:00) (60 - 115)  RR: 30 (27 May 2021 08:00) (20 - 43)  SpO2: 99% (27 May 2021 08:00) (95% - 99%)  Daily     Daily Weight in k.1 (27 May 2021 06:00)  I&O's Summary    26 May 2021 07:01  -  27 May 2021 07:00  --------------------------------------------------------  IN: 1256 mL / OUT: 1500 mL / NET: -244 mL        Physical Examination:    General: No acute distress.  Alert, oriented, interactive, nonfocal    HEENT: Pupils equal, reactive to light.  Symmetric.    PULM: Clear to auscultation bilaterally, no significant sputum production    CVS: Regular rate and rhythm, no murmurs, rubs, or gallops    ABD: Soft, nondistended, nontender, normoactive bowel sounds, no masses    EXT: No edema, nontender    SKIN: Warm and well perfused, no rashes noted.    Neurology:    Musculoskeletal : Move all extremety     ABG - ( 25 May 2021 15:54 )  pH, Arterial: 7.41  pH, Blood: x     /  pCO2: 51    /  pO2: 343   / HCO3: 33    / Base Excess: 6.8   /  SaO2: x                   Lab Results:                        9.6    7.10  )-----------( 186      ( 27 May 2021 05:05 )             30.4     27 May 2021 05:05    139    |  105    |  26     ----------------------------<  85     4.2     |  25     |  0.7      Ca    8.6        27 May 2021 05:05  Mg     2.1       27 May 2021 05:05    TPro  5.6    /  Alb  3.4    /  TBili  0.3    /  DBili  x      /  AST  30     /  ALT  19     /  AlkPhos  99     27 May 2021 05:05  Amylase x     lipase x                  Microbiology      Medication:  MEDICATIONS  (STANDING):  aspirin  chewable 81 milliGRAM(s) Oral daily  chlorhexidine 4% Liquid 1 Application(s) Topical every 12 hours  enoxaparin Injectable 30 milliGRAM(s) SubCutaneous daily  furosemide    Tablet 20 milliGRAM(s) Oral daily  levothyroxine 100 MICROGram(s) Oral daily  midodrine 10 milliGRAM(s) Oral every 8 hours  phenylephrine    Infusion 0.3 MICROgram(s)/kG/Min (6.17 mL/Hr) IV Continuous <Continuous>  senna 2 Tablet(s) Oral at bedtime    MEDICATIONS  (PRN):  acetaminophen   Tablet .. 650 milliGRAM(s) Oral every 6 hours PRN Temp greater or equal to 38C (100.4F), Mild Pain (1 - 3)  bisacodyl Suppository 10 milliGRAM(s) Rectal daily PRN Constipation        IMAGING STUDIES:  mild increase in left effusion   Impression:    Plan:

## 2021-05-27 NOTE — PROGRESS NOTE ADULT - ASSESSMENT
IMPRESSION:  acute resp failure improved secondary CHF exacerbation   pleural effusion improved with lasix   severe aortic stenosis     PLAN:  try to keep gave lasix 40 mg today instead of 20   adjust lasix as per cardiology alvin with aortic stenosis   cxr  today   follow cardiology   oob to chair    DVT prophylaxis.

## 2021-05-27 NOTE — PROGRESS NOTE ADULT - SUBJECTIVE AND OBJECTIVE BOX
HPI:  87 yo W female w/PMH as noted below.  Pt has hx severe aortic valve disease, that requires replacement but pt. refused.   Pt c/o progressive dyspnea that  started 2 months ago , but today pt could no longer ambulate and tolerated her SOB.  so pt family called EMS.  In ER pt CXR showed bilateral pleural effusions( L>R)  requiring thoracentesis in past. Pt w/ elevated : BNP, troponins,.  Ct angio chest did not show any PE .  (16 May 2021 23:00)      INTERVAL HISTORY:    S/P BVP  no overnight events.    PAST MEDICAL & SURGICAL HISTORY  HTN (hypertension)    Mitral valve prolapse    Enlarged heart    Murmur    Hyperthyroidism    Arthritis    HTN (hypertension)    Diverticulosis    Hiatal hernia    Acid reflux    Liver cancer  s/p resection and s/p chemo and radiation    Aortic stenosis    H/O resection of liver    Status post cholecystectomy        ALLERGIES:  Cipro (Other)  Levaquin (Rash)  tetracycline (Hives)      MEDICATIONS:  MEDICATIONS  (STANDING):  aspirin  chewable 81 milliGRAM(s) Oral daily  chlorhexidine 4% Liquid 1 Application(s) Topical every 12 hours  enoxaparin Injectable 30 milliGRAM(s) SubCutaneous daily  furosemide    Tablet 20 milliGRAM(s) Oral daily  levothyroxine 100 MICROGram(s) Oral daily  midodrine 10 milliGRAM(s) Oral every 8 hours  phenylephrine    Infusion 0.3 MICROgram(s)/kG/Min (6.17 mL/Hr) IV Continuous <Continuous>  senna 2 Tablet(s) Oral at bedtime    MEDICATIONS  (PRN):  acetaminophen   Tablet .. 650 milliGRAM(s) Oral every 6 hours PRN Temp greater or equal to 38C (100.4F), Mild Pain (1 - 3)  bisacodyl Suppository 10 milliGRAM(s) Rectal daily PRN Constipation      HOME MEDICATIONS:  Home Medications:  enalapril 2.5 mg oral tablet: 1 tab(s) orally once a day (16 May 2021 14:29)  furosemide 20 mg oral tablet: 1 tab(s) orally once a day (16 May 2021 14:29)  levothyroxine 100 mcg (0.1 mg) oral tablet: 1 tab(s) orally once a day (16 May 2021 14:29)        OBJECTIVE:  ICU Vital Signs Last 24 Hrs  T(C): 36.2 (27 May 2021 04:00), Max: 36.9 (26 May 2021 16:00)  T(F): 97.1 (27 May 2021 04:00), Max: 98.4 (26 May 2021 16:00)  HR: 62 (27 May 2021 06:00) (42 - 66)  BP: 131/96 (27 May 2021 06:00) (82/56 - 131/96)  BP(mean): 115 (27 May 2021 06:00) (60 - 115)  ABP: 74/58 (26 May 2021 19:00) (74/58 - 148/62)  ABP(mean): 68 (26 May 2021 19:00) (62 - 92)  RR: 40 (27 May 2021 06:00) (20 - 43)  SpO2: 97% (27 May 2021 06:00) (95% - 100%)      Adult Advanced Hemodynamics Last 24 Hrs  CVP(mm Hg): --  CVP(cm H2O): --  CO: --  CI: --  PA: --  PA(mean): --  PCWP: --  SVR: --  SVRI: --  PVR: --  PVRI: --  I&O's Summary    25 May 2021 07:01  -  26 May 2021 07:00  --------------------------------------------------------  IN: 162 mL / OUT: 1400 mL / NET: -1238 mL    26 May 2021 07:01  -  27 May 2021 06:51  --------------------------------------------------------  IN: 1256 mL / OUT: 1500 mL / NET: -244 mL      Daily     Daily Weight in k.1 (27 May 2021 06:00)    PHYSICAL EXAM:  NEURO: patient is awake , alert and oriented  GEN: Not in acute distress  NECK: no thyroid enlargement, no JVD  LUNGS: Clear to auscultation bilaterally   CARDIOVASCULAR: S1/S2 present, RRR , no murmurs or rubs, no carotid bruits,  + PP bilaterally  ABD: Soft, non-tender, non-distended, +BS  EXT: No MICHELLE  SKIN: Intact.  former TVP access site with no hematoma or erythema.  ACCESS Site: Peripheral IV access    LABS:                        9.6    7.10  )-----------( 186      ( 27 May 2021 05:05 )             30.4         139  |  105  |  26<H>  ----------------------------<  85  4.2   |  25  |  0.7    Ca    8.6      27 May 2021 05:05  Mg     2.1         TPro  5.6<L>  /  Alb  3.4<L>  /  TBili  0.3  /  DBili  x   /  AST  30  /  ALT  19  /  AlkPhos  99                Troponin trend:            RADIOLOGY:  -CXR: < from: Xray Chest 1 View-PORTABLE IMMEDIATE (Xray Chest 1 View-PORTABLE IMMEDIATE .) (21 @ 09:33) >  Impression:    Lead arising from the abdomen projecting over the heart.    Left basilar opacity/effusion similar to prior.    < end of copied text >    -TTE:  < from: TTE Echo Complete w/o Contrast w/ Doppler (21 @ 08:32) >  Summary:   1. Left ventricular ejection fraction, by visual estimation, is 55 to 60%.   2. Mildly increased LV wall thickness.   3. Spectral Doppler shows impaired relaxation pattern of left ventricular myocardial filling (Grade I diastolic dysfunction).   4. Normal left atrial size.   5. Normal right atrial size.   6. Mild mitral annular calcification.   7. No evidence of mitral valve regurgitation.   8. Mild tricuspid regurgitation.   9. Aortic valve thickening with decreased leaflet opening.  10. Mild aortic regurgitation.  11. Peak transaortic gradient equals 135.1 mmHg, mean transaortic gradient equals 66.7 mmHg, the calculated aortic valve area equals 0.47 cm² by the continuity equation consistent with severe aortic stenosis.  12. There is mild aortic root calcification.    PHYSICIAN INTERPRETATION:  Left Ventricle: The left ventricular internal cavity size is normal. Left ventricular wall thickness is mildly increased. Left ventricular ejection fraction, by visual estimation, is 55 to 60%. Spectral Doppler shows impaired relaxation pattern of left ventricular myocardial filling (Grade I diastolic dysfunction).  Right Ventricle: Normal right ventricular size and function.  Left Atrium: Normal left atrial size.  Right Atrium: Normal right atrial size.  Pericardium: Trivial pericardial effusion is present. There is a small pleural effusion in the left lateral region.  Mitral Valve: Structurally normal mitral valve, with normal leaflet excursion. There is mild mitral annular calcification. No evidence of mitral valve regurgitation is seen.  Tricuspid Valve: Structurally normal tricuspid valve, with normal leaflet excursion. Mild tricuspid regurgitation is visualized.  Aortic Valve: The aortic valve is trileaflet. Aortic valve thickening with decreased leaflet opening. Peak transaortic gradient equals 135.1 mmHg, mean transaortic gradient equals 66.7 mmHg, the calculated aortic valve area equals 0.47 cm² by the continuity equation consistent with severe aortic stenosis. Mild aortic valve regurgitation is seen.  Pulmonic Valve: Structurally normal pulmonic valve, with normal leaflet excursion. The pulmonic valve was not well visualized. Trace pulmonic valve regurgitation.  Aorta: The aortic root is normal in size and structure. There is mild aortic root calcification.  Additional Comments: A venous catheter is visualized in the right atrium.    < end of copied text >      -STRESS TEST:  -CATHETERIZATION:    ECG:        TELEMETRY EVENTS:    no overnight tele events   HPI:  87 yo W female w/PMH as noted below.  Pt has hx severe aortic valve disease, that requires replacement but pt. refused.   Pt c/o progressive dyspnea that  started 2 months ago , but today pt could no longer ambulate and tolerated her SOB.  so pt family called EMS.  In ER pt CXR showed bilateral pleural effusions( L>R)  requiring thoracentesis in past. Pt w/ elevated : BNP, troponins,.  Ct angio chest did not show any PE .  (16 May 2021 23:00)      INTERVAL HISTORY:    S/P BAV  no overnight events.    PAST MEDICAL & SURGICAL HISTORY  HTN (hypertension)    Mitral valve prolapse    Enlarged heart    Murmur    Hyperthyroidism    Arthritis    HTN (hypertension)    Diverticulosis    Hiatal hernia    Acid reflux    Liver cancer  s/p resection and s/p chemo and radiation    Aortic stenosis    H/O resection of liver    Status post cholecystectomy        ALLERGIES:  Cipro (Other)  Levaquin (Rash)  tetracycline (Hives)      MEDICATIONS:  MEDICATIONS  (STANDING):  aspirin  chewable 81 milliGRAM(s) Oral daily  chlorhexidine 4% Liquid 1 Application(s) Topical every 12 hours  enoxaparin Injectable 30 milliGRAM(s) SubCutaneous daily  furosemide    Tablet 20 milliGRAM(s) Oral daily  levothyroxine 100 MICROGram(s) Oral daily  midodrine 10 milliGRAM(s) Oral every 8 hours  phenylephrine    Infusion 0.3 MICROgram(s)/kG/Min (6.17 mL/Hr) IV Continuous <Continuous>  senna 2 Tablet(s) Oral at bedtime    MEDICATIONS  (PRN):  acetaminophen   Tablet .. 650 milliGRAM(s) Oral every 6 hours PRN Temp greater or equal to 38C (100.4F), Mild Pain (1 - 3)  bisacodyl Suppository 10 milliGRAM(s) Rectal daily PRN Constipation      HOME MEDICATIONS:  Home Medications:  enalapril 2.5 mg oral tablet: 1 tab(s) orally once a day (16 May 2021 14:29)  furosemide 20 mg oral tablet: 1 tab(s) orally once a day (16 May 2021 14:29)  levothyroxine 100 mcg (0.1 mg) oral tablet: 1 tab(s) orally once a day (16 May 2021 14:29)        OBJECTIVE:  ICU Vital Signs Last 24 Hrs  T(C): 36.2 (27 May 2021 04:00), Max: 36.9 (26 May 2021 16:00)  T(F): 97.1 (27 May 2021 04:00), Max: 98.4 (26 May 2021 16:00)  HR: 62 (27 May 2021 06:00) (42 - 66)  BP: 131/96 (27 May 2021 06:00) (82/56 - 131/96)  BP(mean): 115 (27 May 2021 06:00) (60 - 115)  ABP: 74/58 (26 May 2021 19:00) (74/58 - 148/62)  ABP(mean): 68 (26 May 2021 19:00) (62 - 92)  RR: 40 (27 May 2021 06:00) (20 - 43)  SpO2: 97% (27 May 2021 06:00) (95% - 100%)      Adult Advanced Hemodynamics Last 24 Hrs  CVP(mm Hg): --  CVP(cm H2O): --  CO: --  CI: --  PA: --  PA(mean): --  PCWP: --  SVR: --  SVRI: --  PVR: --  PVRI: --  I&O's Summary    25 May 2021 07:01  -  26 May 2021 07:00  --------------------------------------------------------  IN: 162 mL / OUT: 1400 mL / NET: -1238 mL    26 May 2021 07:01  -  27 May 2021 06:51  --------------------------------------------------------  IN: 1256 mL / OUT: 1500 mL / NET: -244 mL      Daily     Daily Weight in k.1 (27 May 2021 06:00)    PHYSICAL EXAM:  NEURO: patient is awake , alert and oriented  GEN: Not in acute distress  NECK: no thyroid enlargement, no JVD  LUNGS: Clear to auscultation bilaterally   CARDIOVASCULAR: S1/S2 present, RRR , no murmurs or rubs, no carotid bruits,  + PP bilaterally  ABD: Soft, non-tender, non-distended, +BS  EXT: No MICHELLE  SKIN: Intact.  former TVP access site with no hematoma or erythema.  ACCESS Site: Peripheral IV access    LABS:                        9.6    7.10  )-----------( 186      ( 27 May 2021 05:05 )             30.4         139  |  105  |  26<H>  ----------------------------<  85  4.2   |  25  |  0.7    Ca    8.6      27 May 2021 05:05  Mg     2.1         TPro  5.6<L>  /  Alb  3.4<L>  /  TBili  0.3  /  DBili  x   /  AST  30  /  ALT  19  /  AlkPhos  99                RADIOLOGY:  -CXR: < from: Xray Chest 1 View-PORTABLE IMMEDIATE (Xray Chest 1 View-PORTABLE IMMEDIATE .) (21 @ 09:33) >  Impression:    Lead arising from the abdomen projecting over the heart.    Left basilar opacity/effusion similar to prior.    < end of copied text >    -TTE:  < from: TTE Echo Complete w/o Contrast w/ Doppler (21 @ 08:32) >  Summary:   1. Left ventricular ejection fraction, by visual estimation, is 55 to 60%.   2. Mildly increased LV wall thickness.   3. Spectral Doppler shows impaired relaxation pattern of left ventricular myocardial filling (Grade I diastolic dysfunction).   4. Normal left atrial size.   5. Normal right atrial size.   6. Mild mitral annular calcification.   7. No evidence of mitral valve regurgitation.   8. Mild tricuspid regurgitation.   9. Aortic valve thickening with decreased leaflet opening.  10. Mild aortic regurgitation.  11. Peak transaortic gradient equals 135.1 mmHg, mean transaortic gradient equals 66.7 mmHg, the calculated aortic valve area equals 0.47 cm² by the continuity equation consistent with severe aortic stenosis.  12. There is mild aortic root calcification.    PHYSICIAN INTERPRETATION:  Left Ventricle: The left ventricular internal cavity size is normal. Left ventricular wall thickness is mildly increased. Left ventricular ejection fraction, by visual estimation, is 55 to 60%. Spectral Doppler shows impaired relaxation pattern of left ventricular myocardial filling (Grade I diastolic dysfunction).  Right Ventricle: Normal right ventricular size and function.  Left Atrium: Normal left atrial size.  Right Atrium: Normal right atrial size.  Pericardium: Trivial pericardial effusion is present. There is a small pleural effusion in the left lateral region.  Mitral Valve: Structurally normal mitral valve, with normal leaflet excursion. There is mild mitral annular calcification. No evidence of mitral valve regurgitation is seen.  Tricuspid Valve: Structurally normal tricuspid valve, with normal leaflet excursion. Mild tricuspid regurgitation is visualized.  Aortic Valve: The aortic valve is trileaflet. Aortic valve thickening with decreased leaflet opening. Peak transaortic gradient equals 135.1 mmHg, mean transaortic gradient equals 66.7 mmHg, the calculated aortic valve area equals 0.47 cm² by the continuity equation consistent with severe aortic stenosis. Mild aortic valve regurgitation is seen.  Pulmonic Valve: Structurally normal pulmonic valve, with normal leaflet excursion. The pulmonic valve was not well visualized. Trace pulmonic valve regurgitation.  Aorta: The aortic root is normal in size and structure. There is mild aortic root calcification.  Additional Comments: A venous catheter is visualized in the right atrium.    < end of copied text >        ECG:  Sinus bradycardia, RBBB, LAFB      TELEMETRY EVENTS:    no overnight tele events, sinus clinton

## 2021-05-28 LAB
ALBUMIN SERPL ELPH-MCNC: 3.5 G/DL — SIGNIFICANT CHANGE UP (ref 3.5–5.2)
ALP SERPL-CCNC: 100 U/L — SIGNIFICANT CHANGE UP (ref 30–115)
ALT FLD-CCNC: 19 U/L — SIGNIFICANT CHANGE UP (ref 0–41)
ANION GAP SERPL CALC-SCNC: 8 MMOL/L — SIGNIFICANT CHANGE UP (ref 7–14)
AST SERPL-CCNC: 27 U/L — SIGNIFICANT CHANGE UP (ref 0–41)
BASOPHILS # BLD AUTO: 0.02 K/UL — SIGNIFICANT CHANGE UP (ref 0–0.2)
BASOPHILS NFR BLD AUTO: 0.4 % — SIGNIFICANT CHANGE UP (ref 0–1)
BILIRUB SERPL-MCNC: 0.2 MG/DL — SIGNIFICANT CHANGE UP (ref 0.2–1.2)
BUN SERPL-MCNC: 25 MG/DL — HIGH (ref 10–20)
CALCIUM SERPL-MCNC: 8.8 MG/DL — SIGNIFICANT CHANGE UP (ref 8.5–10.1)
CHLORIDE SERPL-SCNC: 103 MMOL/L — SIGNIFICANT CHANGE UP (ref 98–110)
CO2 SERPL-SCNC: 27 MMOL/L — SIGNIFICANT CHANGE UP (ref 17–32)
CREAT SERPL-MCNC: 0.7 MG/DL — SIGNIFICANT CHANGE UP (ref 0.7–1.5)
EOSINOPHIL # BLD AUTO: 0.13 K/UL — SIGNIFICANT CHANGE UP (ref 0–0.7)
EOSINOPHIL NFR BLD AUTO: 2.6 % — SIGNIFICANT CHANGE UP (ref 0–8)
GLUCOSE SERPL-MCNC: 85 MG/DL — SIGNIFICANT CHANGE UP (ref 70–99)
HCT VFR BLD CALC: 28.5 % — LOW (ref 37–47)
HGB BLD-MCNC: 9.1 G/DL — LOW (ref 12–16)
IMM GRANULOCYTES NFR BLD AUTO: 0.6 % — HIGH (ref 0.1–0.3)
LYMPHOCYTES # BLD AUTO: 0.86 K/UL — LOW (ref 1.2–3.4)
LYMPHOCYTES # BLD AUTO: 17.2 % — LOW (ref 20.5–51.1)
MAGNESIUM SERPL-MCNC: 2.1 MG/DL — SIGNIFICANT CHANGE UP (ref 1.8–2.4)
MCHC RBC-ENTMCNC: 29.3 PG — SIGNIFICANT CHANGE UP (ref 27–31)
MCHC RBC-ENTMCNC: 31.9 G/DL — LOW (ref 32–37)
MCV RBC AUTO: 91.6 FL — SIGNIFICANT CHANGE UP (ref 81–99)
MONOCYTES # BLD AUTO: 0.45 K/UL — SIGNIFICANT CHANGE UP (ref 0.1–0.6)
MONOCYTES NFR BLD AUTO: 9 % — SIGNIFICANT CHANGE UP (ref 1.7–9.3)
NEUTROPHILS # BLD AUTO: 3.51 K/UL — SIGNIFICANT CHANGE UP (ref 1.4–6.5)
NEUTROPHILS NFR BLD AUTO: 70.2 % — SIGNIFICANT CHANGE UP (ref 42.2–75.2)
NRBC # BLD: 0 /100 WBCS — SIGNIFICANT CHANGE UP (ref 0–0)
PLATELET # BLD AUTO: 131 K/UL — SIGNIFICANT CHANGE UP (ref 130–400)
POTASSIUM SERPL-MCNC: 4.7 MMOL/L — SIGNIFICANT CHANGE UP (ref 3.5–5)
POTASSIUM SERPL-SCNC: 4.7 MMOL/L — SIGNIFICANT CHANGE UP (ref 3.5–5)
PROT SERPL-MCNC: 5.7 G/DL — LOW (ref 6–8)
RBC # BLD: 3.11 M/UL — LOW (ref 4.2–5.4)
RBC # FLD: 13.4 % — SIGNIFICANT CHANGE UP (ref 11.5–14.5)
SODIUM SERPL-SCNC: 138 MMOL/L — SIGNIFICANT CHANGE UP (ref 135–146)
WBC # BLD: 5 K/UL — SIGNIFICANT CHANGE UP (ref 4.8–10.8)
WBC # FLD AUTO: 5 K/UL — SIGNIFICANT CHANGE UP (ref 4.8–10.8)

## 2021-05-28 PROCEDURE — 93010 ELECTROCARDIOGRAM REPORT: CPT

## 2021-05-28 RX ORDER — MIDODRINE HYDROCHLORIDE 2.5 MG/1
5 TABLET ORAL EVERY 8 HOURS
Refills: 0 | Status: DISCONTINUED | OUTPATIENT
Start: 2021-05-28 | End: 2021-05-29

## 2021-05-28 RX ADMIN — Medication 81 MILLIGRAM(S): at 12:13

## 2021-05-28 RX ADMIN — SENNA PLUS 2 TABLET(S): 8.6 TABLET ORAL at 21:53

## 2021-05-28 RX ADMIN — MIDODRINE HYDROCHLORIDE 10 MILLIGRAM(S): 2.5 TABLET ORAL at 06:12

## 2021-05-28 RX ADMIN — CHLORHEXIDINE GLUCONATE 1 APPLICATION(S): 213 SOLUTION TOPICAL at 18:23

## 2021-05-28 RX ADMIN — ENOXAPARIN SODIUM 30 MILLIGRAM(S): 100 INJECTION SUBCUTANEOUS at 12:13

## 2021-05-28 RX ADMIN — CHLORHEXIDINE GLUCONATE 1 APPLICATION(S): 213 SOLUTION TOPICAL at 06:12

## 2021-05-28 RX ADMIN — MIDODRINE HYDROCHLORIDE 5 MILLIGRAM(S): 2.5 TABLET ORAL at 21:53

## 2021-05-28 RX ADMIN — Medication 20 MILLIGRAM(S): at 06:12

## 2021-05-28 RX ADMIN — MIDODRINE HYDROCHLORIDE 5 MILLIGRAM(S): 2.5 TABLET ORAL at 15:18

## 2021-05-28 RX ADMIN — Medication 100 MICROGRAM(S): at 06:12

## 2021-05-28 NOTE — CHART NOTE - NSCHARTNOTEFT_GEN_A_CORE
CCU Transfer Note    Transfer from: CCU  Transfer to:  (  ) Medicine    ( X) Telemetry    (  ) RCU    (  ) Palliative    (  ) Stroke Unit    (  ) _______________  Accepting physican:      CCU COURSE:  87 yo W female w/PMH as noted below.  Pt has hx severe aortic valve disease, that requires replacement but pt. refused.   Pt c/o progressive dyspnea that  started 2 months ago , but today pt could no longer ambulate and tolerated her SOB.  so pt family called EMS.  In [facility] ER pt CXR showed bilateral pleural effusions( L>R)  requiring thoracentesis in past. Pt w/ elevated : BNP, troponins,.  Ct angio chest did not show any PE .  (16 May 2021 23:00)  chronic HFpEF in setting of severe AS with possible NSTEMI. Pt was admitted to [facility] CCU and transferred to [facility] for TAVR evaluation.    In CCU patient underwent BVP of aortic valve became midly hypotensive not fluid responsive post op placed on jess ggt titrated down and taken off am 5/28.  Need for TAVR discussed with patient does not have need for TAVR on this admission.          ASSESSMENT & PLAN:     patient requiring PT/OT evaluation prior to d/c.  no labs to follow today      For Follow-Up:      General: Not in distress; Pallor (-), Icterus (-), Cyanosis (-), Clubbing (-)  HEENT: Pupils equal, round and reactive to light symmetrically, EOM - Normal bilaterally; Hearing - b/l normal; No external discharge noted, JVD (-), Lymphadenopathy(-)  PULM: Bilaterally equal and clear breath sounds, no wheeze, rubs or crackles.   CVS: Normal S1 and S2, no murmurs, rubs, or gallops.   GI: Soft, nondistended, nontender, BS +  MSK: Edema (-), no joint or muscle tenderness  SKIN: Warm and well perfused, no rashes noted  NEURO:  Alert and Oriented x 3; No gross focal neurological deficit noted.      Vital Signs Last 24 Hrs  T(C): 36.4 (28 May 2021 08:00), Max: 36.9 (27 May 2021 16:00)  T(F): 97.5 (28 May 2021 08:00), Max: 98.4 (27 May 2021 16:00)  HR: 56 (28 May 2021 08:00) (44 - 66)  BP: 114/55 (28 May 2021 08:00) (96/57 - 145/69)  BP(mean): 78 (28 May 2021 08:00) (70 - 112)  RR: 22 (28 May 2021 08:00) (17 - 45)  SpO2: 99% (28 May 2021 08:00) (96% - 99%)    I&O's Summary    27 May 2021 07:01  -  28 May 2021 07:00  --------------------------------------------------------  IN: 1171 mL / OUT: 900 mL / NET: 271 mL          MEDICATIONS  (STANDING):  aspirin  chewable 81 milliGRAM(s) Oral daily  chlorhexidine 4% Liquid 1 Application(s) Topical every 12 hours  enoxaparin Injectable 30 milliGRAM(s) SubCutaneous daily  furosemide    Tablet 20 milliGRAM(s) Oral daily  levothyroxine 100 MICROGram(s) Oral daily  midodrine 10 milliGRAM(s) Oral every 8 hours  senna 2 Tablet(s) Oral at bedtime    MEDICATIONS  (PRN):  acetaminophen   Tablet .. 650 milliGRAM(s) Oral every 6 hours PRN Temp greater or equal to 38C (100.4F), Mild Pain (1 - 3)  bisacodyl Suppository 10 milliGRAM(s) Rectal daily PRN Constipation        LABS                                            9.1                   Neurophils% (auto):   70.2   (05-28 @ 05:09):    5.00 )-----------(131          Lymphocytes% (auto):  17.2                                          28.5                   Eosinphils% (auto):   2.6      Manual%: Neutrophils x    ; Lymphocytes x    ; Eosinophils x    ; Bands%: x    ; Blasts x                                    138    |  103    |  25                  Calcium: 8.8   / iCa: x      (05-28 @ 05:09)    ----------------------------<  85        Magnesium: 2.1                              4.7     |  27     |  0.7              Phosphorous: x        TPro  5.7    /  Alb  3.5    /  TBili  0.2    /  DBili  x      /  AST  27     /  ALT  19     /  AlkPhos  100    28 May 2021 05:09

## 2021-05-28 NOTE — PROGRESS NOTE ADULT - SUBJECTIVE AND OBJECTIVE BOX
HPI:  89 yo W female w/PMH as noted below.  Pt has hx severe aortic valve disease, that requires replacement but pt. refused.   Pt c/o progressive dyspnea that  started 2 months ago , but today pt could no longer ambulate and tolerated her SOB.  so pt family called EMS.  In ER pt CXR showed bilateral pleural effusions( L>R)  requiring thoracentesis in past. Pt w/ elevated : BNP, troponins,.  Ct angio chest did not show any PE .  (16 May 2021 23:00)      INTERVAL HISTORY:    no overnight events.  off jess    PAST MEDICAL & SURGICAL HISTORY  HTN (hypertension)    Mitral valve prolapse    Enlarged heart    Murmur    Hyperthyroidism    Arthritis    HTN (hypertension)    Diverticulosis    Hiatal hernia    Acid reflux    Liver cancer  s/p resection and s/p chemo and radiation    Aortic stenosis    H/O resection of liver    Status post cholecystectomy        ALLERGIES:  Cipro (Other)  Levaquin (Rash)  tetracycline (Hives)      MEDICATIONS:  MEDICATIONS  (STANDING):  aspirin  chewable 81 milliGRAM(s) Oral daily  chlorhexidine 4% Liquid 1 Application(s) Topical every 12 hours  enoxaparin Injectable 30 milliGRAM(s) SubCutaneous daily  furosemide    Tablet 20 milliGRAM(s) Oral daily  levothyroxine 100 MICROGram(s) Oral daily  midodrine 10 milliGRAM(s) Oral every 8 hours  phenylephrine    Infusion 0.3 MICROgram(s)/kG/Min (6.17 mL/Hr) IV Continuous <Continuous>  senna 2 Tablet(s) Oral at bedtime    MEDICATIONS  (PRN):  acetaminophen   Tablet .. 650 milliGRAM(s) Oral every 6 hours PRN Temp greater or equal to 38C (100.4F), Mild Pain (1 - 3)  bisacodyl Suppository 10 milliGRAM(s) Rectal daily PRN Constipation      HOME MEDICATIONS:  Home Medications:  enalapril 2.5 mg oral tablet: 1 tab(s) orally once a day (16 May 2021 14:29)  furosemide 20 mg oral tablet: 1 tab(s) orally once a day (16 May 2021 14:29)  levothyroxine 100 mcg (0.1 mg) oral tablet: 1 tab(s) orally once a day (16 May 2021 14:29)        OBJECTIVE:  ICU Vital Signs Last 24 Hrs  T(C): 36.5 (28 May 2021 04:00), Max: 36.9 (27 May 2021 16:00)  T(F): 97.7 (28 May 2021 04:00), Max: 98.4 (27 May 2021 16:00)  HR: 58 (28 May 2021 06:00) (44 - 66)  BP: 96/57 (28 May 2021 06:00) (96/57 - 145/69)  BP(mean): 74 (28 May 2021 06:00) (70 - 112)  ABP: --  ABP(mean): --  RR: 26 (28 May 2021 06:00) (17 - 45)  SpO2: 99% (28 May 2021 06:00) (96% - 99%)      Adult Advanced Hemodynamics Last 24 Hrs  CVP(mm Hg): --  CVP(cm H2O): --  CO: --  CI: --  PA: --  PA(mean): --  PCWP: --  SVR: --  SVRI: --  PVR: --  PVRI: --  I&O's Summary    26 May 2021 07:01  -  27 May 2021 07:00  --------------------------------------------------------  IN: 1256 mL / OUT: 1500 mL / NET: -244 mL    27 May 2021 07:01  -  28 May 2021 06:54  --------------------------------------------------------  IN: 1171 mL / OUT: 900 mL / NET: 271 mL      Daily     Daily Weight in k.3 (28 May 2021 06:00)    PHYSICAL EXAM:  NEURO: patient is awake , alert and oriented  GEN: Not in acute distress  NECK: no thyroid enlargement, no JVD  LUNGS: Clear to auscultation bilaterally   CARDIOVASCULAR: S1/S2 present, RRR , no murmurs or rubs, no carotid bruits,  + PP bilaterally  ABD: Soft, non-tender, non-distended, +BS  EXT: No MICHELLE  SKIN: Intact  ACCESS Site: peripheral iv access    LABS:                        9.1    5.00  )-----------( 131      ( 28 May 2021 05:09 )             28.5         138  |  103  |  25<H>  ----------------------------<  85  4.7   |  27  |  0.7    Ca    8.8      28 May 2021 05:09  Mg     2.1         TPro  5.7<L>  /  Alb  3.5  /  TBili  0.2  /  DBili  x   /  AST  27  /  ALT  19  /  AlkPhos  100                Troponin trend:            RADIOLOGY:  CXR: < from: Xray Chest 1 View-PORTABLE IMMEDIATE (Xray Chest 1 View-PORTABLE IMMEDIATE .) (21 @ 09:33) >  Impression:    Lead arising from the abdomen projecting over the heart.    Left basilar opacity/effusion similar to prior.    < end of copied text >    -TTE:  < from: TTE Echo Complete w/o Contrast w/ Doppler (21 @ 08:32) >  Summary:   1. Left ventricular ejection fraction, by visual estimation, is 55 to 60%.   2. Mildly increased LV wall thickness.   3. Spectral Doppler shows impaired relaxation pattern of left ventricular myocardial filling (Grade I diastolic dysfunction).   4. Normal left atrial size.   5. Normal right atrial size.   6. Mild mitral annular calcification.   7. No evidence of mitral valve regurgitation.   8. Mild tricuspid regurgitation.   9. Aortic valve thickening with decreased leaflet opening.  10. Mild aortic regurgitation.  11. Peak transaortic gradient equals 135.1 mmHg, mean transaortic gradient equals 66.7 mmHg, the calculated aortic valve area equals 0.47 cm² by the continuity equation consistent with severe aortic stenosis.  12. There is mild aortic root calcification.    PHYSICIAN INTERPRETATION:  Left Ventricle: The left ventricular internal cavity size is normal. Left ventricular wall thickness is mildly increased. Left ventricular ejection fraction, by visual estimation, is 55 to 60%. Spectral Doppler shows impaired relaxation pattern of left ventricular myocardial filling (Grade I diastolic dysfunction).  Right Ventricle: Normal right ventricular size and function.  Left Atrium: Normal left atrial size.  Right Atrium: Normal right atrial size.  Pericardium: Trivial pericardial effusion is present. There is a small pleural effusion in the left lateral region.  Mitral Valve: Structurally normal mitral valve, with normal leaflet excursion. There is mild mitral annular calcification. No evidence of mitral valve regurgitation is seen.  Tricuspid Valve: Structurally normal tricuspid valve, with normal leaflet excursion. Mild tricuspid regurgitation is visualized.  Aortic Valve: The aortic valve is trileaflet. Aortic valve thickening with decreased leaflet opening. Peak transaortic gradient equals 135.1 mmHg, mean transaortic gradient equals 66.7 mmHg, the calculated aortic valve area equals 0.47 cm² by the continuity equation consistent with severe aortic stenosis. Mild aortic valve regurgitation is seen.  Pulmonic Valve: Structurally normal pulmonic valve, with normal leaflet excursion. The pulmonic valve was not well visualized. Trace pulmonic valve regurgitation.  Aorta: The aortic root is normal in size and structure. There is mild aortic root calcification.  Additional Comments: A venous catheter is visualized in the right atrium.    < end of copied text >        ECG:    TELEMETRY EVENTS:    no overnight tele events.

## 2021-05-29 ENCOUNTER — TRANSCRIPTION ENCOUNTER (OUTPATIENT)
Age: 86
End: 2021-05-29

## 2021-05-29 LAB
ANION GAP SERPL CALC-SCNC: 11 MMOL/L — SIGNIFICANT CHANGE UP (ref 7–14)
BUN SERPL-MCNC: 28 MG/DL — HIGH (ref 10–20)
CALCIUM SERPL-MCNC: 9.4 MG/DL — SIGNIFICANT CHANGE UP (ref 8.5–10.1)
CHLORIDE SERPL-SCNC: 100 MMOL/L — SIGNIFICANT CHANGE UP (ref 98–110)
CO2 SERPL-SCNC: 27 MMOL/L — SIGNIFICANT CHANGE UP (ref 17–32)
CREAT SERPL-MCNC: 0.8 MG/DL — SIGNIFICANT CHANGE UP (ref 0.7–1.5)
GLUCOSE SERPL-MCNC: 96 MG/DL — SIGNIFICANT CHANGE UP (ref 70–99)
HCT VFR BLD CALC: 29 % — LOW (ref 37–47)
HGB BLD-MCNC: 9.1 G/DL — LOW (ref 12–16)
MAGNESIUM SERPL-MCNC: 2.1 MG/DL — SIGNIFICANT CHANGE UP (ref 1.8–2.4)
MCHC RBC-ENTMCNC: 28.8 PG — SIGNIFICANT CHANGE UP (ref 27–31)
MCHC RBC-ENTMCNC: 31.4 G/DL — LOW (ref 32–37)
MCV RBC AUTO: 91.8 FL — SIGNIFICANT CHANGE UP (ref 81–99)
NRBC # BLD: 0 /100 WBCS — SIGNIFICANT CHANGE UP (ref 0–0)
PLATELET # BLD AUTO: 139 K/UL — SIGNIFICANT CHANGE UP (ref 130–400)
POTASSIUM SERPL-MCNC: 5 MMOL/L — SIGNIFICANT CHANGE UP (ref 3.5–5)
POTASSIUM SERPL-SCNC: 5 MMOL/L — SIGNIFICANT CHANGE UP (ref 3.5–5)
RBC # BLD: 3.16 M/UL — LOW (ref 4.2–5.4)
RBC # FLD: 13.4 % — SIGNIFICANT CHANGE UP (ref 11.5–14.5)
SODIUM SERPL-SCNC: 138 MMOL/L — SIGNIFICANT CHANGE UP (ref 135–146)
WBC # BLD: 4.99 K/UL — SIGNIFICANT CHANGE UP (ref 4.8–10.8)
WBC # FLD AUTO: 4.99 K/UL — SIGNIFICANT CHANGE UP (ref 4.8–10.8)

## 2021-05-29 PROCEDURE — 99233 SBSQ HOSP IP/OBS HIGH 50: CPT

## 2021-05-29 RX ORDER — ASPIRIN/CALCIUM CARB/MAGNESIUM 324 MG
1 TABLET ORAL
Qty: 30 | Refills: 0
Start: 2021-05-29 | End: 2021-06-27

## 2021-05-29 RX ADMIN — Medication 100 MICROGRAM(S): at 05:48

## 2021-05-29 RX ADMIN — Medication 20 MILLIGRAM(S): at 05:48

## 2021-05-29 RX ADMIN — CHLORHEXIDINE GLUCONATE 1 APPLICATION(S): 213 SOLUTION TOPICAL at 18:08

## 2021-05-29 RX ADMIN — MIDODRINE HYDROCHLORIDE 5 MILLIGRAM(S): 2.5 TABLET ORAL at 05:48

## 2021-05-29 RX ADMIN — ENOXAPARIN SODIUM 30 MILLIGRAM(S): 100 INJECTION SUBCUTANEOUS at 13:21

## 2021-05-29 RX ADMIN — CHLORHEXIDINE GLUCONATE 1 APPLICATION(S): 213 SOLUTION TOPICAL at 05:48

## 2021-05-29 RX ADMIN — SENNA PLUS 2 TABLET(S): 8.6 TABLET ORAL at 21:34

## 2021-05-29 RX ADMIN — Medication 81 MILLIGRAM(S): at 11:44

## 2021-05-29 NOTE — DISCHARGE NOTE PROVIDER - CARE PROVIDER_API CALL
Talat Mccollum)  Cardiovascular Disease; Internal Medicine  [address] [place] [address]  Phone: [phone]  Fax: [phone]  Follow Up Time: 1 month    Mario Morales  Cardiology  [address] [place] [address]  Phone: [phone]  Fax: [phone]  Follow Up Time: 2 weeks

## 2021-05-29 NOTE — DISCHARGE NOTE PROVIDER - NSDCMRMEDTOKEN_GEN_ALL_CORE_FT
furosemide 20 mg oral tablet: 1 tab(s) orally once a day  levothyroxine 100 mcg (0.1 mg) oral tablet: 1 tab(s) orally once a day

## 2021-05-29 NOTE — DISCHARGE NOTE NURSING/CASE MANAGEMENT/SOCIAL WORK - PATIENT PORTAL LINK FT
You can access the FollowMyHealth Patient Portal offered by [facility] by registering at the following website: [email or web address]. By joining "[employer]’s FollowMyHealth portal, you will also be able to view your health information using other applications (apps) compatible with our system.

## 2021-05-29 NOTE — DISCHARGE NOTE PROVIDER - CARE PROVIDERS DIRECT ADDRESSES
[email or web address]

## 2021-05-29 NOTE — DISCHARGE NOTE PROVIDER - PROVIDER TOKENS
PROVIDER:[TOKEN:[[id number]],FOLLOWUP:[1 month]],PROVIDER:[TOKEN:[[id number]],FOLLOWUP:[2 weeks]]

## 2021-05-29 NOTE — PROGRESS NOTE ADULT - SUBJECTIVE AND OBJECTIVE BOX
DILIP LUCAS  88y, Female  Allergy: Cipro (Other)  Levaquin (Rash)  tetracycline (Hives)    Hospital Day: 13d    Patient seen and examined earlier today. No acute events overnight.    PMH/PSH:  PAST MEDICAL & SURGICAL HISTORY:  HTN (hypertension)    Mitral valve prolapse    Enlarged heart    Murmur    Hyperthyroidism    Arthritis    HTN (hypertension)    Diverticulosis    Hiatal hernia    Acid reflux    Liver cancer  s/p resection and s/p chemo and radiation    Aortic stenosis    H/O resection of liver    Status post cholecystectomy    VITALS:  T(F): 97.9 (05-29-21 @ 05:55), Max: 98.1 (05-28-21 @ 12:00)  HR: 60 (05-29-21 @ 05:55)  BP: 129/59 (05-29-21 @ 05:55) (103/77 - 148/72)  RR: 20 (05-29-21 @ 05:55)  SpO2: 100% (05-29-21 @ 05:55)    TESTS & MEASUREMENTS:  Weight (Kg): 59.1 (05-28-21 @ 20:47)  BMI (kg/m2): 25.4 (05-28)    05-27-21 @ 07:01  -  05-28-21 @ 07:00  --------------------------------------------------------  IN: 1171 mL / OUT: 900 mL / NET: 271 mL    05-28-21 @ 07:01  -  05-29-21 @ 07:00  --------------------------------------------------------  IN: 750 mL / OUT: 1650 mL / NET: -900 mL                       9.1    4.99  )-----------( 139      ( 29 May 2021 06:26 )             29.0     05-29    138  |  100  |  28<H>  ----------------------------<  96  5.0   |  27  |  0.8    Ca    9.4      29 May 2021 06:26  Mg     2.1     05-29    TPro  5.7<L>  /  Alb  3.5  /  TBili  0.2  /  DBili  x   /  AST  27  /  ALT  19  /  AlkPhos  100  05-28    LIVER FUNCTIONS - ( 28 May 2021 05:09 )  Alb: 3.5 g/dL / Pro: 5.7 g/dL / ALK PHOS: 100 U/L / ALT: 19 U/L / AST: 27 U/L / GGT: x           MEDICATIONS:  MEDICATIONS  (STANDING):  aspirin  chewable 81 milliGRAM(s) Oral daily  chlorhexidine 4% Liquid 1 Application(s) Topical every 12 hours  enoxaparin Injectable 30 milliGRAM(s) SubCutaneous daily  furosemide    Tablet 20 milliGRAM(s) Oral daily  levothyroxine 100 MICROGram(s) Oral daily  midodrine 5 milliGRAM(s) Oral every 8 hours  senna 2 Tablet(s) Oral at bedtime    MEDICATIONS  (PRN):  acetaminophen   Tablet .. 650 milliGRAM(s) Oral every 6 hours PRN Temp greater or equal to 38C (100.4F), Mild Pain (1 - 3)  bisacodyl Suppository 10 milliGRAM(s) Rectal daily PRN Constipation    HOME MEDICATIONS:  enalapril 2.5 mg oral tablet (05-16)  furosemide 20 mg oral tablet (05-16)  levothyroxine 100 mcg (0.1 mg) oral tablet (05-16)      REVIEW OF SYSTEMS:  All other review of systems is negative unless indicated above.     PHYSICAL EXAM:  GENERAL: NAD  HEENT: No Swelling  CHEST/LUNG: Good air entry, No wheezing  HEART: RRR, No murmurs  ABDOMEN: Soft, Bowel sounds present  EXTREMITIES:  No clubbing

## 2021-05-29 NOTE — PROGRESS NOTE ADULT - ASSESSMENT
87yo F with PMH of HFpEF, severe AS, HTN, hyperthyroidism, HCC s/p partial liver resection, and MVP presenting to ED with increased SOB x 1-2 weeks. She was admitted to [facility]U-S for acute decompensated HFpEF due to severe AS. In CCU patient underwent BVP of aortic valve became midly hypotensive not fluid responsive post op placed on jess ggt titrated down and taken off am 5/28. Hospital course was also complicated by CHB which required TVP. Now back to baseline. Pt was transferred to [facility] for TAVR evaluation, but at this time, she does not want the procedure.    #Acute HFpEF  #Severe AS  Pt does not want TAVR at this time  - Cont lasix 20mg qD 89yo F with PMH of HFpEF, severe AS, HTN, hyperthyroidism, HCC s/p partial liver resection, and MVP presenting to ED with increased SOB x 1-2 weeks. She was admitted to [facility] for acute decompensated HFpEF due to severe AS. In CCU patient underwent BVP of aortic valve became midly hypotensive not fluid responsive post op placed on jess ggt titrated down and taken off am 5/28. Hospital course was also complicated by CHB which required TVP. Now back to baseline. Pt was transferred to [facility] for TAVR evaluation, but at this time, she does not want the procedure.    #Acute HFpEF  #Severe AS  #Transient CHB  s/p BAV 05/26, c/b transient CHB requiring pressor support. Now off pressors  Pt does not want TAVR at this time  - Cont lasix 20mg qD  - Cont ASA 81mg qD  - Taper off midodrine  - PT/OT evaluation, DC planning  - No need for PPM per EP. Recommended MCOT placement, but pt refused at this time    #HTN  - Cont lasix as above  - DC midodrine and will restart home enalapril if needed    #Hypothyroidism  - Cont synthroid 100mcg qD    DVT PPX, Lovenox    #Progress Note Handoff  Pending (specify): DC planning, PT Eval  Family discussion: d/w pt regarding DC planning  Disposition: TBD based on PT eval

## 2021-05-29 NOTE — DISCHARGE NOTE PROVIDER - NSDCCPCAREPLAN_GEN_ALL_CORE_FT
PRINCIPAL DISCHARGE DIAGNOSIS  Diagnosis: Aortic stenosis  Assessment and Plan of Treatment: Continue lasix 20mg once a day and aspirin 81mg once a day. Follow-up with cardio, Dr. Mccollum within 1 month of discharge.      SECONDARY DISCHARGE DIAGNOSES  Diagnosis: Heart block AV complete  Assessment and Plan of Treatment: Follow up with EP cardio, Dr. Morales on scheduled date to evaluate MCOT.    Diagnosis: Elevated troponin  Assessment and Plan of Treatment:     Diagnosis: Anemia  Assessment and Plan of Treatment:

## 2021-05-29 NOTE — DISCHARGE NOTE PROVIDER - HOSPITAL COURSE
87yo F with PMH of HFpEF, severe AS, HTN, hyperthyroidism, HCC s/p partial liver resection, and MVP presenting to ED with increased SOB x 1-2 weeks. She was admitted to [facility] for acute decompensated HFpEF due to severe AS. In CCU patient underwent BVP of aortic valve became midly hypotensive not fluid responsive post op placed on jess ggt titrated down and taken off am 5/28. Hospital course was also complicated by CHB which required TVP. Now back to baseline. Pt was transferred to [facility] for TAVR evaluation, but at this time, she does not want the procedure. She will follow-up with cardiology o/p. Medically stable for discharge.

## 2021-05-30 VITALS
TEMPERATURE: 98 F | OXYGEN SATURATION: 98 % | SYSTOLIC BLOOD PRESSURE: 133 MMHG | DIASTOLIC BLOOD PRESSURE: 77 MMHG | HEART RATE: 65 BPM

## 2021-05-30 RX ADMIN — CHLORHEXIDINE GLUCONATE 1 APPLICATION(S): 213 SOLUTION TOPICAL at 06:05

## 2021-05-30 RX ADMIN — Medication 20 MILLIGRAM(S): at 06:05

## 2021-05-30 RX ADMIN — Medication 100 MICROGRAM(S): at 06:05

## 2021-05-30 NOTE — CHART NOTE - NSCHARTNOTEFT_GEN_A_CORE
Electrophysiology PA Note    Called by team about MCOT monitor on discharge as was recommended by us  Telemetry was followed by EP service on daily basis, no events since AM POD1 when TVP was decreased to back up rate 40bpm  Patient is in baseline bifascicular block, no events  Long discussion with patient and family, son and daughter, at the bedside.  Does not want monitor at this time  Patient and family advised to return to ED if develops dizziness, lightheadeness, weakness/tiredness, dyspnea  Patient and family will follow up with Dr Mccollum. They will research MCOT (all info provided) and will follow up as out patient is change their mind.  D/w Dr Hollis [phone]

## 2021-05-30 NOTE — PROGRESS NOTE ADULT - REASON FOR ADMISSION
acute on chronic CHF
CHF
acute on chronic CHF

## 2021-05-30 NOTE — PROGRESS NOTE ADULT - ASSESSMENT
87yo F with PMH of HFpEF, severe AS, HTN, hyperthyroidism, HCC s/p partial liver resection, and MVP presenting to ED with increased SOB x 1-2 weeks. She was admitted to CCU-S for acute decompensated HFpEF due to severe AS. In CCU patient underwent BVP of aortic valve became midly hypotensive not fluid responsive post op placed on jess ggt titrated down and taken off am 5/28. Hospital course was also complicated by CHB which required TVP. Now back to baseline. Pt was transferred to [facility] for TAVR evaluation, but at this time, she does not want the procedure.    #Acute HFpEF  #Severe AS  #Transient CHB  s/p BAV 05/26, c/b transient CHB requiring pressor support. Now off pressors  Pt does not want TAVR at this time  - Cont lasix 20mg qD  - Cont ASA 81mg qD  - Taper off midodrine  - PT/OT evaluation, DC planning  - No need for PPM per EP. Recommended MCOT placement, but pt refused at this time    #HTN  - Cont lasix as above  - DC midodrine and will restart home enalapril if needed    #Hypothyroidism  - Cont synthroid 100mcg qD    DVT PPX, Lovenox    DC home today with homecare

## 2021-05-30 NOTE — PROGRESS NOTE ADULT - SUBJECTIVE AND OBJECTIVE BOX
DILIP LUCAS  88y, Female  Allergy: Cipro (Other)  Levaquin (Rash)  tetracycline (Hives)    Hospital Day: 14d    Patient seen and examined earlier today. No acute events overnight.    PMH/PSH:  PAST MEDICAL & SURGICAL HISTORY:  HTN (hypertension)    Mitral valve prolapse    Enlarged heart    Murmur    Hyperthyroidism    Arthritis    HTN (hypertension)    Diverticulosis    Hiatal hernia    Acid reflux    Liver cancer  s/p resection and s/p chemo and radiation    Aortic stenosis    H/O resection of liver    Status post cholecystectomy        VITALS:  T(F): 97.6 (05-30-21 @ 05:15), Max: 97.8 (05-29-21 @ 21:00)  HR: 65 (05-30-21 @ 05:15)  BP: 133/77 (05-30-21 @ 05:15) (105/47 - 133/77)  RR: 18 (05-29-21 @ 21:00)  SpO2: 98% (05-30-21 @ 05:15)    TESTS & MEASUREMENTS:  Weight (Kg): 59.1 (05-28-21 @ 20:47)  BMI (kg/m2): 25.4 (05-28)    05-28-21 @ 07:01  -  05-29-21 @ 07:00  --------------------------------------------------------  IN: 750 mL / OUT: 1650 mL / NET: -900 mL    05-29-21 @ 07:01  -  05-30-21 @ 07:00  --------------------------------------------------------  IN: 420 mL / OUT: 1900 mL / NET: -1480 mL    05-30-21 @ 07:01  -  05-30-21 @ 12:35  --------------------------------------------------------  IN: 200 mL / OUT: 0 mL / NET: 200 mL                            9.1    4.99  )-----------( 139      ( 29 May 2021 06:26 )             29.0       05-29    138  |  100  |  28<H>  ----------------------------<  96  5.0   |  27  |  0.8    Ca    9.4      29 May 2021 06:26  Mg     2.1     05-29                  RADIOLOGY & ADDITIONAL TESTS:    RECENT DIAGNOSTIC ORDERS:      MEDICATIONS:  MEDICATIONS  (STANDING):  aspirin  chewable 81 milliGRAM(s) Oral daily  chlorhexidine 4% Liquid 1 Application(s) Topical every 12 hours  enoxaparin Injectable 30 milliGRAM(s) SubCutaneous daily  furosemide    Tablet 20 milliGRAM(s) Oral daily  levothyroxine 100 MICROGram(s) Oral daily  senna 2 Tablet(s) Oral at bedtime    MEDICATIONS  (PRN):  acetaminophen   Tablet .. 650 milliGRAM(s) Oral every 6 hours PRN Temp greater or equal to 38C (100.4F), Mild Pain (1 - 3)  bisacodyl Suppository 10 milliGRAM(s) Rectal daily PRN Constipation      HOME MEDICATIONS:  furosemide 20 mg oral tablet (05-16)  levothyroxine 100 mcg (0.1 mg) oral tablet (05-16)      REVIEW OF SYSTEMS:  All other review of systems is negative unless indicated above.     PHYSICAL EXAM:  GENERAL: NAD  HEENT: No Swelling  CHEST/LUNG: Good air entry, No wheezing  HEART: RRR, No murmurs  ABDOMEN: Soft, Bowel sounds present  EXTREMITIES:  No clubbing

## 2021-05-30 NOTE — PROGRESS NOTE ADULT - PROVIDER SPECIALTY LIST ADULT
Cardiology
Hospitalist
Internal Medicine
Pulmonology
Pulmonology
CT Surgery
Hospitalist
Internal Medicine
CCU
Cardiology
Electrophysiology
Hospitalist
Internal Medicine
Pulmonology
CCU
CCU
Hospitalist
Internal Medicine
Cardiology
Internal Medicine
Pulmonology

## 2021-05-30 NOTE — PROGRESS NOTE ADULT - NSICDXPILOT_GEN_ALL_CORE
[place]

## 2021-06-08 DIAGNOSIS — K21.9 GASTRO-ESOPHAGEAL REFLUX DISEASE WITHOUT ESOPHAGITIS: ICD-10-CM

## 2021-06-08 DIAGNOSIS — Z91.14 PATIENT'S OTHER NONCOMPLIANCE WITH MEDICATION REGIMEN: ICD-10-CM

## 2021-06-08 DIAGNOSIS — Z92.3 PERSONAL HISTORY OF IRRADIATION: ICD-10-CM

## 2021-06-08 DIAGNOSIS — Z92.21 PERSONAL HISTORY OF ANTINEOPLASTIC CHEMOTHERAPY: ICD-10-CM

## 2021-06-08 DIAGNOSIS — M41.9 SCOLIOSIS, UNSPECIFIED: ICD-10-CM

## 2021-06-08 DIAGNOSIS — I35.0 NONRHEUMATIC AORTIC (VALVE) STENOSIS: ICD-10-CM

## 2021-06-08 DIAGNOSIS — I50.33 ACUTE ON CHRONIC DIASTOLIC (CONGESTIVE) HEART FAILURE: ICD-10-CM

## 2021-06-08 DIAGNOSIS — I47.1 SUPRAVENTRICULAR TACHYCARDIA: ICD-10-CM

## 2021-06-08 DIAGNOSIS — R91.1 SOLITARY PULMONARY NODULE: ICD-10-CM

## 2021-06-08 DIAGNOSIS — I21.A1 MYOCARDIAL INFARCTION TYPE 2: ICD-10-CM

## 2021-06-08 DIAGNOSIS — D63.8 ANEMIA IN OTHER CHRONIC DISEASES CLASSIFIED ELSEWHERE: ICD-10-CM

## 2021-06-08 DIAGNOSIS — R01.1 CARDIAC MURMUR, UNSPECIFIED: ICD-10-CM

## 2021-06-08 DIAGNOSIS — E05.90 THYROTOXICOSIS, UNSPECIFIED WITHOUT THYROTOXIC CRISIS OR STORM: ICD-10-CM

## 2021-06-08 DIAGNOSIS — I25.10 ATHEROSCLEROTIC HEART DISEASE OF NATIVE CORONARY ARTERY WITHOUT ANGINA PECTORIS: ICD-10-CM

## 2021-06-08 DIAGNOSIS — I49.1 ATRIAL PREMATURE DEPOLARIZATION: ICD-10-CM

## 2021-06-08 DIAGNOSIS — J91.8 PLEURAL EFFUSION IN OTHER CONDITIONS CLASSIFIED ELSEWHERE: ICD-10-CM

## 2021-06-08 DIAGNOSIS — I95.9 HYPOTENSION, UNSPECIFIED: ICD-10-CM

## 2021-06-08 DIAGNOSIS — E03.9 HYPOTHYROIDISM, UNSPECIFIED: ICD-10-CM

## 2021-06-08 DIAGNOSIS — I97.790 OTHER INTRAOPERATIVE CARDIAC FUNCTIONAL DISTURBANCES DURING CARDIAC SURGERY: ICD-10-CM

## 2021-06-08 DIAGNOSIS — I45.2 BIFASCICULAR BLOCK: ICD-10-CM
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
## 2021-06-08 DIAGNOSIS — F41.9 ANXIETY DISORDER, UNSPECIFIED: ICD-10-CM

## 2021-06-08 DIAGNOSIS — I34.1 NONRHEUMATIC MITRAL (VALVE) PROLAPSE: ICD-10-CM

## 2021-06-08 DIAGNOSIS — I11.0 HYPERTENSIVE HEART DISEASE WITH HEART FAILURE: ICD-10-CM

## 2021-06-08 DIAGNOSIS — Z88.1 ALLERGY STATUS TO OTHER ANTIBIOTIC AGENTS STATUS: ICD-10-CM

## 2021-06-08 DIAGNOSIS — J96.01 ACUTE RESPIRATORY FAILURE WITH HYPOXIA: ICD-10-CM

## 2021-06-08 DIAGNOSIS — I44.2 ATRIOVENTRICULAR BLOCK, COMPLETE: ICD-10-CM

## 2021-06-10 PROBLEM — Z00.00 ENCOUNTER FOR PREVENTIVE HEALTH EXAMINATION: Status: ACTIVE | Noted: 2021-06-10

## 2021-06-11 NOTE — ED ADULT TRIAGE NOTE - IDEAL BODY WEIGHT(KG)
[FreeTextEntry1] : c/o increased fatigue x 3 months.\par Exhausted some days. Snores.\par Sleeps approx 5-6 hours/night uninterrupted.\par Falls asleep by early afternoon for up to 2 hours.\par Plans to see her cardiologist next month for routine f/u.\par No cp, palpitations or sob.
55

## 2021-07-03 DIAGNOSIS — K21.9 GASTRO-ESOPHAGEAL REFLUX DISEASE W/OUT ESOPHAGITIS: ICD-10-CM

## 2021-07-07 ENCOUNTER — INPATIENT (INPATIENT)
Facility: HOSPITAL | Age: 86
LOS: 6 days | Discharge: HOME HEALTH | End: 2021-07-14
Attending: STUDENT IN AN ORGANIZED HEALTH CARE EDUCATION/TRAINING PROGRAM | Admitting: STUDENT IN AN ORGANIZED HEALTH CARE EDUCATION/TRAINING PROGRAM
Payer: MEDICARE

## 2021-07-07 VITALS
DIASTOLIC BLOOD PRESSURE: 54 MMHG | HEIGHT: 60 IN | WEIGHT: 117.95 LBS | HEART RATE: 94 BPM | OXYGEN SATURATION: 96 % | TEMPERATURE: 98 F | SYSTOLIC BLOOD PRESSURE: 105 MMHG | RESPIRATION RATE: 24 BRPM

## 2021-07-07 DIAGNOSIS — L50.9 URTICARIA, UNSPECIFIED: ICD-10-CM

## 2021-07-07 DIAGNOSIS — C22.9 MALIGNANT NEOPLASM OF LIVER, NOT SPECIFIED AS PRIMARY OR SECONDARY: ICD-10-CM

## 2021-07-07 DIAGNOSIS — E05.90 THYROTOXICOSIS, UNSPECIFIED WITHOUT THYROTOXIC CRISIS OR STORM: ICD-10-CM

## 2021-07-07 DIAGNOSIS — D64.9 ANEMIA, UNSPECIFIED: ICD-10-CM

## 2021-07-07 DIAGNOSIS — L89.152 PRESSURE ULCER OF SACRAL REGION, STAGE 2: ICD-10-CM

## 2021-07-07 DIAGNOSIS — Z90.49 ACQUIRED ABSENCE OF OTHER SPECIFIED PARTS OF DIGESTIVE TRACT: Chronic | ICD-10-CM

## 2021-07-07 DIAGNOSIS — Z79.82 LONG TERM (CURRENT) USE OF ASPIRIN: ICD-10-CM

## 2021-07-07 DIAGNOSIS — K44.9 DIAPHRAGMATIC HERNIA WITHOUT OBSTRUCTION OR GANGRENE: ICD-10-CM

## 2021-07-07 DIAGNOSIS — I35.1 NONRHEUMATIC AORTIC (VALVE) INSUFFICIENCY: ICD-10-CM

## 2021-07-07 DIAGNOSIS — K92.2 GASTROINTESTINAL HEMORRHAGE, UNSPECIFIED: ICD-10-CM

## 2021-07-07 DIAGNOSIS — I21.A1 MYOCARDIAL INFARCTION TYPE 2: ICD-10-CM

## 2021-07-07 DIAGNOSIS — Z90.49 ACQUIRED ABSENCE OF OTHER SPECIFIED PARTS OF DIGESTIVE TRACT: ICD-10-CM

## 2021-07-07 DIAGNOSIS — E87.1 HYPO-OSMOLALITY AND HYPONATREMIA: ICD-10-CM

## 2021-07-07 DIAGNOSIS — I50.33 ACUTE ON CHRONIC DIASTOLIC (CONGESTIVE) HEART FAILURE: ICD-10-CM

## 2021-07-07 DIAGNOSIS — I73.9 PERIPHERAL VASCULAR DISEASE, UNSPECIFIED: ICD-10-CM

## 2021-07-07 DIAGNOSIS — Z85.05 PERSONAL HISTORY OF MALIGNANT NEOPLASM OF LIVER: ICD-10-CM

## 2021-07-07 DIAGNOSIS — Z87.891 PERSONAL HISTORY OF NICOTINE DEPENDENCE: ICD-10-CM

## 2021-07-07 DIAGNOSIS — I95.9 HYPOTENSION, UNSPECIFIED: ICD-10-CM

## 2021-07-07 DIAGNOSIS — K59.00 CONSTIPATION, UNSPECIFIED: ICD-10-CM

## 2021-07-07 DIAGNOSIS — Z88.1 ALLERGY STATUS TO OTHER ANTIBIOTIC AGENTS STATUS: ICD-10-CM

## 2021-07-07 DIAGNOSIS — I11.0 HYPERTENSIVE HEART DISEASE WITH HEART FAILURE: ICD-10-CM

## 2021-07-07 DIAGNOSIS — I34.1 NONRHEUMATIC MITRAL (VALVE) PROLAPSE: ICD-10-CM

## 2021-07-07 PROBLEM — K57.90 DIVERTICULOSIS OF INTESTINE, PART UNSPECIFIED, WITHOUT PERFORATION OR ABSCESS WITHOUT BLEEDING: Chronic | Status: ACTIVE | Noted: 2021-05-16

## 2021-07-07 PROBLEM — M19.90 UNSPECIFIED OSTEOARTHRITIS, UNSPECIFIED SITE: Chronic | Status: ACTIVE | Noted: 2021-05-16

## 2021-07-07 PROBLEM — I10 ESSENTIAL (PRIMARY) HYPERTENSION: Chronic | Status: ACTIVE | Noted: 2021-05-16

## 2021-07-07 PROBLEM — R01.1 CARDIAC MURMUR, UNSPECIFIED: Chronic | Status: ACTIVE | Noted: 2021-05-16

## 2021-07-07 PROBLEM — I51.7 CARDIOMEGALY: Chronic | Status: ACTIVE | Noted: 2021-05-16

## 2021-07-07 PROBLEM — K21.9 GASTRO-ESOPHAGEAL REFLUX DISEASE WITHOUT ESOPHAGITIS: Chronic | Status: ACTIVE | Noted: 2021-05-16

## 2021-07-07 LAB
ALBUMIN SERPL ELPH-MCNC: 3.9 G/DL — SIGNIFICANT CHANGE UP (ref 3.5–5.2)
ALP SERPL-CCNC: 100 U/L — SIGNIFICANT CHANGE UP (ref 30–115)
ALT FLD-CCNC: 15 U/L — SIGNIFICANT CHANGE UP (ref 0–41)
ANION GAP SERPL CALC-SCNC: 12 MMOL/L — SIGNIFICANT CHANGE UP (ref 7–14)
ANISOCYTOSIS BLD QL: SLIGHT — SIGNIFICANT CHANGE UP
APTT BLD: 25.3 SEC — LOW (ref 27–39.2)
AST SERPL-CCNC: 27 U/L — SIGNIFICANT CHANGE UP (ref 0–41)
BASE EXCESS BLDV CALC-SCNC: -0.3 MMOL/L — SIGNIFICANT CHANGE UP (ref -2–2)
BASOPHILS # BLD AUTO: 0.01 K/UL — SIGNIFICANT CHANGE UP (ref 0–0.2)
BASOPHILS NFR BLD AUTO: 0.1 % — SIGNIFICANT CHANGE UP (ref 0–1)
BILIRUB SERPL-MCNC: 0.2 MG/DL — SIGNIFICANT CHANGE UP (ref 0.2–1.2)
BLD GP AB SCN SERPL QL: SIGNIFICANT CHANGE UP
BUN SERPL-MCNC: 37 MG/DL — HIGH (ref 10–20)
CA-I SERPL-SCNC: 1.18 MMOL/L — SIGNIFICANT CHANGE UP (ref 1.12–1.3)
CALCIUM SERPL-MCNC: 9.2 MG/DL — SIGNIFICANT CHANGE UP (ref 8.5–10.1)
CHLORIDE SERPL-SCNC: 104 MMOL/L — SIGNIFICANT CHANGE UP (ref 98–110)
CO2 SERPL-SCNC: 23 MMOL/L — SIGNIFICANT CHANGE UP (ref 17–32)
CREAT SERPL-MCNC: 1 MG/DL — SIGNIFICANT CHANGE UP (ref 0.7–1.5)
EOSINOPHIL # BLD AUTO: 0.01 K/UL — SIGNIFICANT CHANGE UP (ref 0–0.7)
EOSINOPHIL NFR BLD AUTO: 0.1 % — SIGNIFICANT CHANGE UP (ref 0–8)
GAS PNL BLDV: 143 MMOL/L — SIGNIFICANT CHANGE UP (ref 136–145)
GAS PNL BLDV: SIGNIFICANT CHANGE UP
GLUCOSE SERPL-MCNC: 134 MG/DL — HIGH (ref 70–99)
HCO3 BLDV-SCNC: 25 MMOL/L — SIGNIFICANT CHANGE UP (ref 22–29)
HCT VFR BLD CALC: 18.2 % — LOW (ref 37–47)
HCT VFR BLDA CALC: 17.4 % — LOW (ref 34–44)
HGB BLD CALC-MCNC: 5.7 G/DL — LOW (ref 14–18)
HGB BLD-MCNC: 5.4 G/DL — CRITICAL LOW (ref 12–16)
HYPOCHROMIA BLD QL: SLIGHT — SIGNIFICANT CHANGE UP
IMM GRANULOCYTES NFR BLD AUTO: 0.3 % — SIGNIFICANT CHANGE UP (ref 0.1–0.3)
INR BLD: 1.04 RATIO — SIGNIFICANT CHANGE UP (ref 0.65–1.3)
LACTATE BLDV-MCNC: 3.1 MMOL/L — HIGH (ref 0.5–1.6)
LYMPHOCYTES # BLD AUTO: 0.46 K/UL — LOW (ref 1.2–3.4)
LYMPHOCYTES # BLD AUTO: 6.2 % — LOW (ref 20.5–51.1)
MACROCYTES BLD QL: SLIGHT — SIGNIFICANT CHANGE UP
MCHC RBC-ENTMCNC: 26.7 PG — LOW (ref 27–31)
MCHC RBC-ENTMCNC: 29.7 G/DL — LOW (ref 32–37)
MCV RBC AUTO: 90.1 FL — SIGNIFICANT CHANGE UP (ref 81–99)
MICROCYTES BLD QL: SLIGHT — SIGNIFICANT CHANGE UP
MONOCYTES # BLD AUTO: 0.61 K/UL — HIGH (ref 0.1–0.6)
MONOCYTES NFR BLD AUTO: 8.2 % — SIGNIFICANT CHANGE UP (ref 1.7–9.3)
NEUTROPHILS # BLD AUTO: 6.36 K/UL — SIGNIFICANT CHANGE UP (ref 1.4–6.5)
NEUTROPHILS NFR BLD AUTO: 85.1 % — HIGH (ref 42.2–75.2)
NRBC # BLD: 0 /100 WBCS — SIGNIFICANT CHANGE UP (ref 0–0)
NT-PROBNP SERPL-SCNC: 6605 PG/ML — HIGH (ref 0–300)
OVALOCYTES BLD QL SMEAR: SLIGHT — SIGNIFICANT CHANGE UP
PCO2 BLDV: 45 MMHG — SIGNIFICANT CHANGE UP (ref 41–51)
PH BLDV: 7.36 — SIGNIFICANT CHANGE UP (ref 7.26–7.43)
PLAT MORPH BLD: NORMAL — SIGNIFICANT CHANGE UP
PLATELET # BLD AUTO: 156 K/UL — SIGNIFICANT CHANGE UP (ref 130–400)
POTASSIUM BLDV-SCNC: 4.7 MMOL/L — SIGNIFICANT CHANGE UP (ref 3.3–5.6)
POTASSIUM SERPL-MCNC: 4.9 MMOL/L — SIGNIFICANT CHANGE UP (ref 3.5–5)
POTASSIUM SERPL-SCNC: 4.9 MMOL/L — SIGNIFICANT CHANGE UP (ref 3.5–5)
PROT SERPL-MCNC: 6.8 G/DL — SIGNIFICANT CHANGE UP (ref 6–8)
PROTHROM AB SERPL-ACNC: 12 SEC — SIGNIFICANT CHANGE UP (ref 9.95–12.87)
RBC # BLD: 2.02 M/UL — LOW (ref 4.2–5.4)
RBC # FLD: 15.6 % — HIGH (ref 11.5–14.5)
RBC BLD AUTO: ABNORMAL
SAO2 % BLDV: 29 % — SIGNIFICANT CHANGE UP
SARS-COV-2 RNA SPEC QL NAA+PROBE: SIGNIFICANT CHANGE UP
SODIUM SERPL-SCNC: 139 MMOL/L — SIGNIFICANT CHANGE UP (ref 135–146)
TROPONIN T SERPL-MCNC: 0.14 NG/ML — CRITICAL HIGH
TROPONIN T SERPL-MCNC: 0.14 NG/ML — CRITICAL HIGH
WBC # BLD: 7.47 K/UL — SIGNIFICANT CHANGE UP (ref 4.8–10.8)
WBC # FLD AUTO: 7.47 K/UL — SIGNIFICANT CHANGE UP (ref 4.8–10.8)

## 2021-07-07 PROCEDURE — 99222 1ST HOSP IP/OBS MODERATE 55: CPT

## 2021-07-07 PROCEDURE — 71045 X-RAY EXAM CHEST 1 VIEW: CPT | Mod: 26

## 2021-07-07 PROCEDURE — 76604 US EXAM CHEST: CPT | Mod: 26

## 2021-07-07 PROCEDURE — 99285 EMERGENCY DEPT VISIT HI MDM: CPT | Mod: 25

## 2021-07-07 PROCEDURE — 36556 INSERT NON-TUNNEL CV CATH: CPT

## 2021-07-07 PROCEDURE — ZZZZZ: CPT

## 2021-07-07 PROCEDURE — 93010 ELECTROCARDIOGRAM REPORT: CPT

## 2021-07-07 RX ORDER — HEPARIN SODIUM 5000 [USP'U]/ML
5000 INJECTION INTRAVENOUS; SUBCUTANEOUS EVERY 8 HOURS
Refills: 0 | Status: DISCONTINUED | OUTPATIENT
Start: 2021-07-07 | End: 2021-07-08

## 2021-07-07 RX ORDER — PANTOPRAZOLE SODIUM 20 MG/1
40 TABLET, DELAYED RELEASE ORAL
Refills: 0 | Status: DISCONTINUED | OUTPATIENT
Start: 2021-07-07 | End: 2021-07-08

## 2021-07-07 RX ORDER — CHLORHEXIDINE GLUCONATE 213 G/1000ML
1 SOLUTION TOPICAL EVERY 12 HOURS
Refills: 0 | Status: DISCONTINUED | OUTPATIENT
Start: 2021-07-07 | End: 2021-07-14

## 2021-07-07 RX ORDER — ALPRAZOLAM 0.25 MG
0.5 TABLET ORAL ONCE
Refills: 0 | Status: DISCONTINUED | OUTPATIENT
Start: 2021-07-07 | End: 2021-07-07

## 2021-07-07 RX ORDER — LEVOTHYROXINE SODIUM 125 MCG
100 TABLET ORAL DAILY
Refills: 0 | Status: DISCONTINUED | OUTPATIENT
Start: 2021-07-07 | End: 2021-07-14

## 2021-07-07 RX ORDER — FUROSEMIDE 40 MG
20 TABLET ORAL ONCE
Refills: 0 | Status: COMPLETED | OUTPATIENT
Start: 2021-07-07 | End: 2021-07-07

## 2021-07-07 RX ORDER — LACTULOSE 10 G/15ML
20 SOLUTION ORAL DAILY
Refills: 0 | Status: DISCONTINUED | OUTPATIENT
Start: 2021-07-07 | End: 2021-07-14

## 2021-07-07 RX ORDER — ASPIRIN/CALCIUM CARB/MAGNESIUM 324 MG
81 TABLET ORAL DAILY
Refills: 0 | Status: DISCONTINUED | OUTPATIENT
Start: 2021-07-07 | End: 2021-07-08

## 2021-07-07 RX ADMIN — Medication 20 MILLIGRAM(S): at 21:46

## 2021-07-07 RX ADMIN — CHLORHEXIDINE GLUCONATE 1 APPLICATION(S): 213 SOLUTION TOPICAL at 22:09

## 2021-07-07 RX ADMIN — Medication 0.5 MILLIGRAM(S): at 21:38

## 2021-07-07 RX ADMIN — HEPARIN SODIUM 5000 UNIT(S): 5000 INJECTION INTRAVENOUS; SUBCUTANEOUS at 21:57

## 2021-07-07 NOTE — H&P ADULT - ASSESSMENT
1. severe anemia requiring prbc transfusion   2. hypotension requiring central line  for prbc possible pressors    pt accepted to ICU by Dr Arechiga  BP now improved 100 systolic  CXR shows left pleural effusion  GI consult for possible EGD  clear liquid diet  cardio eval in am

## 2021-07-07 NOTE — ED ADULT NURSE NOTE - NSIMPLEMENTINTERV_GEN_ALL_ED
Implemented All Fall with Harm Risk Interventions:  [place] to call system. Call bell, personal items and telephone within reach. Instruct patient to call for assistance. Room bathroom lighting operational. Non-slip footwear when patient is off stretcher. Physically safe environment: no spills, clutter or unnecessary equipment. Stretcher in lowest position, wheels locked, appropriate side rails in place. Provide visual cue, wrist band, yellow gown, etc. Monitor gait and stability. Monitor for mental status changes and reorient to person, place, and time. Review medications for side effects contributing to fall risk. Reinforce activity limits and safety measures with patient and family. Provide visual clues: red socks.

## 2021-07-07 NOTE — H&P ADULT - HISTORY OF PRESENT ILLNESS
87 yo Female w/ extensive PMH as noted below.  Pt c/o worsening chest pressure discomfort this am & , worsening dyspnea (1-2 weeks) .  Pt is s/p aortic balloon valvuloplasty @ [facility] (5/25/21)   for bridging treatment for severe Aortic stenosis.  Pt had refused TAVR procedure in past.  Pt now w/ worsening dyspnea chest pressure pain. pt found to be severely anemic w/ Hgb-5.4 ,  Last Hgb was 9.1.  This is 3.7 gm drop since last adm. 2 months ago.  Pt stated she has constipation and went she has bowel movement she strains and sees blood in toilet paper. Pt denies- hemeatemesis, gross hematochezia or black stools, fever , chills, abdomenal pain.  Pt was brought to ICU for anemia, hypotension.  Pt recieved 2 units prbc in ER.

## 2021-07-07 NOTE — ED PROCEDURE NOTE - ATTENDING CONTRIBUTION TO CARE
I personally supervised the study.  I reviewed the images and interpretation by the resident/ACP and have edited where appropriate.
I personally supervised the study.  I reviewed the images and interpretation by the resident/ACP and have edited where appropriate.
I was present for and supervised the key critical aspects of the procedures performed during the care of the patient.

## 2021-07-07 NOTE — ED PROVIDER NOTE - PROGRESS NOTE DETAILS
ATTENDING NOTE: 87 y/o F PMHx as above presents with dyspnea on exertion and midsternal CP. No fever or chills. On exam: CON: ao x 3, no distress, anxious appearing, HENMT: clear oropharynx,  neck supple,  CV: s1s2 noted, equal pulses b/l, RESP: +diminished breath sounds at left lower lobe, GI:  soft, nontender, no rebound, no guarding, SKIN: no rash, MSK: no deformities, no leg swelling NEURO: no gross motor or sensory deficit Psychiatric: appropriate mood, appropriate affect. Imp: SOB and chest pain. Plan: pt found to have large left pleural effusion, but clinically no emergent tap needed. Additionally, pt with anemia of 5.4 and previously 9.1. Plan for transfusion slowly x2 units. Troponin elevated, but elevated to similar levels in the past. Admitted for further eval. pt resting comfortably, bp noted, aware blood for transfusion. pt no distress sat wnl off o2 pt being transfused, discussed with icu and hospitalist, initially tele, but with bp not improving, will place central line pt presented with chest pain, sob, found ot have trop of 0.14, anemia of 5.4 with negative PAVAN, and left large pleural effusion.  bp borderline systoic 80-90, being transfused, spoke with hospitalist and intensivist. central line placed, admit to icu. ATTENDING NOTE: 89 y/o F PMHx as above presents with dyspnea on exertion and midsternal CP. No fever or chills. On exam: CON: ao x 3, pale appearing, no distress, anxious appearing, HENMT: clear oropharynx,  neck supple,  CV: s1s2 noted, equal pulses b/l, RESP: +diminished breath sounds at left lower lobe, GI:  soft, nontender, no rebound, no guarding, SKIN: no rash, MSK: no deformities, no leg swelling NEURO: no gross motor or sensory deficit Psychiatric: appropriate mood, appropriate affect. Imp: SOB and chest pain. Plan: pt found to have large left pleural effusion, but clinically no emergent tap needed. Additionally, pt with anemia of 5.4 and previously 9.1. Plan for transfusion slowly x2 units. Troponin elevated, but elevated to similar levels in the past. Admitted for further eval.

## 2021-07-07 NOTE — ED PROVIDER NOTE - CLINICAL SUMMARY MEDICAL DECISION MAKING FREE TEXT BOX
pt presented with chest pain, sob, found ot have trop of 0.14, anemia of 5.4 with negative PAVAN, and left large pleural effusion.  bp borderline systoic 80-90, being transfused, spoke with hospitalist and intensivist. central line placed, admit to icu.

## 2021-07-07 NOTE — H&P ADULT - NSHPPHYSICALEXAM_GEN_ALL_CORE
GENERAL:  87y/o  white Female. pt is anxious agiatated SOB,   HEAD:  Atraumatic, Normocephalic  EYES: EOMI, PERRLA, conjunctiva and sclera clear  NECK: Supple, No JVD, no cervical lymphadenopathy, non-tender  CHEST/LUNG: diminished breath sounds bilaterally; No wheeze, rhonchi, or rales  HEART: Regular rate and rhythm; S1&S2  ABDOMEN: Soft, Nontender, Nondistended x 4 quadrants; Bowel sounds present  EXTREMITIES:   Peripheral Pulses Present, No clubbing, no cyanosis, or no edema, no calf tenderness  PSYCH: AAOx3, cooperative, appropriate  NEUROLOGY: WNL  SKIN: WNL

## 2021-07-07 NOTE — ED PROVIDER NOTE - PMH
Acid reflux    Aortic stenosis    Arthritis    Diverticulosis    Enlarged heart    Hiatal hernia    HTN (hypertension)    HTN (hypertension)    Hyperthyroidism    Liver cancer  s/p resection and s/p chemo and radiation  Mitral valve prolapse    Murmur

## 2021-07-07 NOTE — ED ADULT NURSE NOTE - CHIEF COMPLAINT QUOTE
BIBA via [facility] from home, for complaints of shortness of breath and chest pain, states present for "a couple of weeks" "I couldn't catch my breath"

## 2021-07-07 NOTE — ED PROVIDER NOTE - NS ED ROS FT
Review of Systems:  	•	CONSTITUTIONAL: no fever, no chills  	•	SKIN: no rash  	•	ENT: no sore throat  	•	RESPIRATORY: +shortness of breath, no cough  	•	CARDIAC: +chest pain, +palpitations  	•	GI: no abd pain, no nausea, no vomiting, no diarrhea, no bloody stools/dark stools   	•	GENITO-URINARY: no discharge, no dysuria; no hematuria, no increased urinary frequency  	•	MUSCULOSKELETAL: no joint paint, no swelling, no redness  	•	NEUROLOGIC: no headache, no syncope  	•	PSYCH: no anxiety, non suicidal, non homicidal, no hallucination, no depression

## 2021-07-07 NOTE — CONSULT NOTE ADULT - PROBLEM SELECTOR RECOMMENDATION 9
PRBC/ gi f/u/ hemo f/u/ anemia w/u/ PPI/ pt hemodynamically/ clinically stable/ no evidence of active bleeding/ hold antiplatelets/ AC

## 2021-07-07 NOTE — ED ADULT TRIAGE NOTE - CHIEF COMPLAINT QUOTE
BIBA via [facility] from home, for complaints of shortness of breath and chest pain, states present for "a couple of weeks" "I couldn't catch my breath"

## 2021-07-07 NOTE — ED PROVIDER NOTE - OBJECTIVE STATEMENT
88 year old female, past medical history as, chf, htn, mvf, who presents with chest pain/shortness of breath. patient endorses gradual worsening of VALENTINE as well as intermittent, non-radiating chest pressure. Patient with admission to [facility] 5/[facility]21, had CHB, TVP placed, no PPM placed at time. patient also w/ severe AS, recommended TAVR, however, patient refused. Patient follows w/ Dr Mccollum, cardiologist. reports hx blood transfusion during childbirth >30 years ago, denies dark stools/bloody stools. denies fever, chills, cough, URI symptoms, abd pain, nausea/vomiting/diarrhea, urinary symptoms.

## 2021-07-07 NOTE — ED PROVIDER NOTE - CARE PLAN
Principal Discharge DX:	Anemia  Secondary Diagnosis:	Hypotension  Secondary Diagnosis:	Shortness of breath  Secondary Diagnosis:	Pleural effusion

## 2021-07-07 NOTE — H&P ADULT - NSICDXPASTMEDICALHX_GEN_ALL_CORE_FT
PAST MEDICAL HISTORY:  Acid reflux     Aortic stenosis s/p ballon aortic valuloplasty 5/25/21    Arthritis     Diverticulosis     Enlarged heart     Hiatal hernia     HTN (hypertension)     HTN (hypertension)     Hyperthyroidism     Liver cancer s/p resection and s/p chemo and radiation    Mitral valve prolapse     Murmur

## 2021-07-07 NOTE — ED ADULT NURSE NOTE - NS ED NURSE RECORD ANOTHER HT AND WT
Spoke with Otis Killian to complete check out process from [id number] conducted today with Dr. Saige Connor. Next visit is scheduled for 7/1/20, lab orders mailed with appointment card. Yes

## 2021-07-07 NOTE — ED PROVIDER NOTE - PHYSICAL EXAMINATION
CONSTITUTIONAL: Well-developed; well-nourished; in no acute distress, nontoxic appearing  SKIN: pale appearing female  HEAD: Normocephalic; atraumatic.  EYES: PERRL, conjunctiva and sclera clear.  ENT: MMM  NECK: ROM intact.  CARD: S1, S2 normal, no murmur  RESP: Decreased BS L lower lobe, +increased work of breathing   ABD: soft; non-distended; non-tender. No Rebound, No guarding. PAVAN: brown stool in vault, no brbpr. no hemorrhoids. Chaperone: BEBE Freitas   EXT: Normal ROM.   NEURO: awake, alert, following commands, oriented, grossly unremarkable. No Focal deficits. GCS 15.   PSYCH: Cooperative, appropriate.

## 2021-07-08 ENCOUNTER — RESULT REVIEW (OUTPATIENT)
Age: 86
End: 2021-07-08

## 2021-07-08 LAB
ALBUMIN SERPL ELPH-MCNC: 3.5 G/DL — SIGNIFICANT CHANGE UP (ref 3.5–5.2)
ALP SERPL-CCNC: 86 U/L — SIGNIFICANT CHANGE UP (ref 30–115)
ALT FLD-CCNC: 13 U/L — SIGNIFICANT CHANGE UP (ref 0–41)
ANION GAP SERPL CALC-SCNC: 6 MMOL/L — LOW (ref 7–14)
APTT BLD: 31.4 SEC — SIGNIFICANT CHANGE UP (ref 27–39.2)
AST SERPL-CCNC: 18 U/L — SIGNIFICANT CHANGE UP (ref 0–41)
B PERT IGG+IGM PNL SER: ABNORMAL
BASOPHILS # BLD AUTO: 0.01 K/UL — SIGNIFICANT CHANGE UP (ref 0–0.2)
BASOPHILS NFR BLD AUTO: 0.2 % — SIGNIFICANT CHANGE UP (ref 0–1)
BILIRUB SERPL-MCNC: 0.7 MG/DL — SIGNIFICANT CHANGE UP (ref 0.2–1.2)
BUN SERPL-MCNC: 31 MG/DL — HIGH (ref 10–20)
CALCIUM SERPL-MCNC: 8.8 MG/DL — SIGNIFICANT CHANGE UP (ref 8.5–10.1)
CHLORIDE SERPL-SCNC: 102 MMOL/L — SIGNIFICANT CHANGE UP (ref 98–110)
CO2 SERPL-SCNC: 29 MMOL/L — SIGNIFICANT CHANGE UP (ref 17–32)
COLOR FLD: YELLOW — SIGNIFICANT CHANGE UP
COVID-19 SPIKE DOMAIN AB INTERP: POSITIVE
COVID-19 SPIKE DOMAIN ANTIBODY RESULT: >250 U/ML — HIGH
CREAT SERPL-MCNC: 0.8 MG/DL — SIGNIFICANT CHANGE UP (ref 0.7–1.5)
EOSINOPHIL # BLD AUTO: 0.06 K/UL — SIGNIFICANT CHANGE UP (ref 0–0.7)
EOSINOPHIL NFR BLD AUTO: 0.9 % — SIGNIFICANT CHANGE UP (ref 0–8)
FLUID INTAKE SUBSTANCE CLASS: SIGNIFICANT CHANGE UP
FLUID SEGMENTED GRANULOCYTES: 43 % — SIGNIFICANT CHANGE UP
GLUCOSE SERPL-MCNC: 92 MG/DL — SIGNIFICANT CHANGE UP (ref 70–99)
HCT VFR BLD CALC: 23.8 % — LOW (ref 37–47)
HCT VFR BLD CALC: 24.5 % — LOW (ref 37–47)
HCT VFR BLD CALC: 29 % — LOW (ref 37–47)
HGB BLD-MCNC: 7.7 G/DL — LOW (ref 12–16)
HGB BLD-MCNC: 7.7 G/DL — LOW (ref 12–16)
HGB BLD-MCNC: 9.3 G/DL — LOW (ref 12–16)
IMM GRANULOCYTES NFR BLD AUTO: 0.3 % — SIGNIFICANT CHANGE UP (ref 0.1–0.3)
INR BLD: 1.07 RATIO — SIGNIFICANT CHANGE UP (ref 0.65–1.3)
LDH SERPL L TO P-CCNC: 223 — SIGNIFICANT CHANGE UP (ref 50–242)
LYMPHOCYTES # BLD AUTO: 0.95 K/UL — LOW (ref 1.2–3.4)
LYMPHOCYTES # BLD AUTO: 14.7 % — LOW (ref 20.5–51.1)
LYMPHOCYTES # FLD: 39 % — SIGNIFICANT CHANGE UP
MCHC RBC-ENTMCNC: 27.8 PG — SIGNIFICANT CHANGE UP (ref 27–31)
MCHC RBC-ENTMCNC: 28.1 PG — SIGNIFICANT CHANGE UP (ref 27–31)
MCHC RBC-ENTMCNC: 28.5 PG — SIGNIFICANT CHANGE UP (ref 27–31)
MCHC RBC-ENTMCNC: 31.4 G/DL — LOW (ref 32–37)
MCHC RBC-ENTMCNC: 32.1 G/DL — SIGNIFICANT CHANGE UP (ref 32–37)
MCHC RBC-ENTMCNC: 32.4 G/DL — SIGNIFICANT CHANGE UP (ref 32–37)
MCV RBC AUTO: 87.6 FL — SIGNIFICANT CHANGE UP (ref 81–99)
MCV RBC AUTO: 88.1 FL — SIGNIFICANT CHANGE UP (ref 81–99)
MCV RBC AUTO: 88.4 FL — SIGNIFICANT CHANGE UP (ref 81–99)
MONOCYTES # BLD AUTO: 0.57 K/UL — SIGNIFICANT CHANGE UP (ref 0.1–0.6)
MONOCYTES NFR BLD AUTO: 8.8 % — SIGNIFICANT CHANGE UP (ref 1.7–9.3)
MONOS+MACROS # FLD: 18 % — SIGNIFICANT CHANGE UP
NEUTROPHILS # BLD AUTO: 4.87 K/UL — SIGNIFICANT CHANGE UP (ref 1.4–6.5)
NEUTROPHILS NFR BLD AUTO: 75.1 % — SIGNIFICANT CHANGE UP (ref 42.2–75.2)
NRBC # BLD: 0 /100 WBCS — SIGNIFICANT CHANGE UP (ref 0–0)
PLATELET # BLD AUTO: 115 K/UL — LOW (ref 130–400)
PLATELET # BLD AUTO: 118 K/UL — LOW (ref 130–400)
PLATELET # BLD AUTO: 120 K/UL — LOW (ref 130–400)
POTASSIUM SERPL-MCNC: 3.8 MMOL/L — SIGNIFICANT CHANGE UP (ref 3.5–5)
POTASSIUM SERPL-SCNC: 3.8 MMOL/L — SIGNIFICANT CHANGE UP (ref 3.5–5)
PROT SERPL-MCNC: 5.7 G/DL — LOW (ref 6–8)
PROTHROM AB SERPL-ACNC: 12.3 SEC — SIGNIFICANT CHANGE UP (ref 9.95–12.87)
RBC # BLD: 2.7 M/UL — LOW (ref 4.2–5.4)
RBC # BLD: 2.77 M/UL — LOW (ref 4.2–5.4)
RBC # BLD: 3.31 M/UL — LOW (ref 4.2–5.4)
RBC # FLD: 14.3 % — SIGNIFICANT CHANGE UP (ref 11.5–14.5)
RBC # FLD: 14.3 % — SIGNIFICANT CHANGE UP (ref 11.5–14.5)
RBC # FLD: 14.5 % — SIGNIFICANT CHANGE UP (ref 11.5–14.5)
RCV VOL RI: 0 /UL — SIGNIFICANT CHANGE UP (ref 0–0)
SARS-COV-2 IGG+IGM SERPL QL IA: >250 U/ML — HIGH
SARS-COV-2 IGG+IGM SERPL QL IA: POSITIVE
SODIUM SERPL-SCNC: 137 MMOL/L — SIGNIFICANT CHANGE UP (ref 135–146)
TOTAL NUCLEATED CELL COUNT, BODY FLUID: 120 /UL — SIGNIFICANT CHANGE UP
TROPONIN T SERPL-MCNC: 0.2 NG/ML — CRITICAL HIGH
TSH SERPL-MCNC: 3.54 UIU/ML — SIGNIFICANT CHANGE UP (ref 0.27–4.2)
TUBE TYPE: SIGNIFICANT CHANGE UP
WBC # BLD: 5.15 K/UL — SIGNIFICANT CHANGE UP (ref 4.8–10.8)
WBC # BLD: 5.86 K/UL — SIGNIFICANT CHANGE UP (ref 4.8–10.8)
WBC # BLD: 6.48 K/UL — SIGNIFICANT CHANGE UP (ref 4.8–10.8)
WBC # FLD AUTO: 5.15 K/UL — SIGNIFICANT CHANGE UP (ref 4.8–10.8)
WBC # FLD AUTO: 5.86 K/UL — SIGNIFICANT CHANGE UP (ref 4.8–10.8)
WBC # FLD AUTO: 6.48 K/UL — SIGNIFICANT CHANGE UP (ref 4.8–10.8)

## 2021-07-08 PROCEDURE — 93306 TTE W/DOPPLER COMPLETE: CPT | Mod: 26

## 2021-07-08 PROCEDURE — 93010 ELECTROCARDIOGRAM REPORT: CPT | Mod: 76

## 2021-07-08 PROCEDURE — 99232 SBSQ HOSP IP/OBS MODERATE 35: CPT

## 2021-07-08 PROCEDURE — 88112 CYTOPATH CELL ENHANCE TECH: CPT | Mod: 26

## 2021-07-08 PROCEDURE — 99222 1ST HOSP IP/OBS MODERATE 55: CPT

## 2021-07-08 PROCEDURE — 99233 SBSQ HOSP IP/OBS HIGH 50: CPT

## 2021-07-08 PROCEDURE — 71045 X-RAY EXAM CHEST 1 VIEW: CPT | Mod: 26

## 2021-07-08 PROCEDURE — 71250 CT THORAX DX C-: CPT | Mod: 26

## 2021-07-08 PROCEDURE — 88305 TISSUE EXAM BY PATHOLOGIST: CPT | Mod: 26

## 2021-07-08 PROCEDURE — 99221 1ST HOSP IP/OBS SF/LOW 40: CPT

## 2021-07-08 RX ORDER — IPRATROPIUM/ALBUTEROL SULFATE 18-103MCG
3 AEROSOL WITH ADAPTER (GRAM) INHALATION EVERY 6 HOURS
Refills: 0 | Status: DISCONTINUED | OUTPATIENT
Start: 2021-07-08 | End: 2021-07-14

## 2021-07-08 RX ORDER — FUROSEMIDE 40 MG
40 TABLET ORAL ONCE
Refills: 0 | Status: COMPLETED | OUTPATIENT
Start: 2021-07-08 | End: 2021-07-08

## 2021-07-08 RX ORDER — PANTOPRAZOLE SODIUM 20 MG/1
40 TABLET, DELAYED RELEASE ORAL EVERY 12 HOURS
Refills: 0 | Status: DISCONTINUED | OUTPATIENT
Start: 2021-07-08 | End: 2021-07-14

## 2021-07-08 RX ADMIN — CHLORHEXIDINE GLUCONATE 1 APPLICATION(S): 213 SOLUTION TOPICAL at 05:09

## 2021-07-08 RX ADMIN — LACTULOSE 20 GRAM(S): 10 SOLUTION ORAL at 05:09

## 2021-07-08 RX ADMIN — Medication 40 MILLIGRAM(S): at 11:12

## 2021-07-08 RX ADMIN — Medication 100 MICROGRAM(S): at 05:09

## 2021-07-08 RX ADMIN — CHLORHEXIDINE GLUCONATE 1 APPLICATION(S): 213 SOLUTION TOPICAL at 17:36

## 2021-07-08 RX ADMIN — PANTOPRAZOLE SODIUM 40 MILLIGRAM(S): 20 TABLET, DELAYED RELEASE ORAL at 06:05

## 2021-07-08 RX ADMIN — PANTOPRAZOLE SODIUM 40 MILLIGRAM(S): 20 TABLET, DELAYED RELEASE ORAL at 17:37

## 2021-07-08 RX ADMIN — HEPARIN SODIUM 5000 UNIT(S): 5000 INJECTION INTRAVENOUS; SUBCUTANEOUS at 05:08

## 2021-07-08 NOTE — PROGRESS NOTE ADULT - SUBJECTIVE AND OBJECTIVE BOX
SUBJECTIVE:    Patient is a 88y old Female who presents with a chief complaint of hypotensive w/ severe anemia (08 Jul 2021 10:46)    Currently admitted to medicine with the primary diagnosis of Anemia       Today is hospital day 1d. This morning she is resting comfortably in bed and reports no new issues or overnight events.     PAST MEDICAL & SURGICAL HISTORY  HTN (hypertension)  Mitral valve prolapse  Enlarged heart  Murmur  Hyperthyroidism  Arthritis  HTN (hypertension)  Diverticulosis  Hiatal hernia  Acid reflux  Liver cancer  s/p resection and s/p chemo and radiation  Aortic stenosis  s/p ballon aortic valuloplasty 5/25/21  H/O resection of liver  liver cancer  Status post cholecystectomy      SOCIAL HISTORY:  Negative for smoking/alcohol/drug use.     ALLERGIES:  Cipro (Other)  Levaquin (Rash)  tetracycline (Hives)    MEDICATIONS:  STANDING MEDICATIONS  chlorhexidine 4% Liquid 1 Application(s) Topical every 12 hours  levothyroxine 100 MICROGram(s) Oral daily  pantoprazole  Injectable 40 milliGRAM(s) IV Push every 12 hours    PRN MEDICATIONS  albuterol/ipratropium for Nebulization 3 milliLiter(s) Nebulizer every 6 hours PRN  lactulose Syrup 20 Gram(s) Oral daily PRN    VITALS:   T(F): 97.7  HR: 65  BP: 90/48  RR: 20  SpO2: 100%    LABS:                        7.7    5.15  )-----------( 118      ( 08 Jul 2021 11:00 )             24.5     07-08    137  |  102  |  31<H>  ----------------------------<  92  3.8   |  29  |  0.8    Ca    8.8      08 Jul 2021 01:50    TPro  5.7<L>  /  Alb  3.5  /  TBili  0.7  /  DBili  x   /  AST  18  /  ALT  13  /  AlkPhos  86  07-08    PT/INR - ( 08 Jul 2021 01:50 )   PT: 12.30 sec;   INR: 1.07 ratio         PTT - ( 08 Jul 2021 01:50 )  PTT:31.4 sec      Troponin T, Serum: 0.20 ng/mL *HH* (07-08-21 @ 05:49)  Troponin T, Serum: 0.14 ng/mL *HH* (07-07-21 @ 21:40)      CARDIAC MARKERS ( 08 Jul 2021 05:49 )  x     / 0.20 ng/mL / x     / x     / x      CARDIAC MARKERS ( 07 Jul 2021 21:40 )  x     / 0.14 ng/mL / x     / x     / x      CARDIAC MARKERS ( 07 Jul 2021 11:52 )  x     / 0.14 ng/mL / x     / x     / x          RADIOLOGY:  < from: Xray Chest 1 View- PORTABLE-Urgent (Xray Chest 1 View- PORTABLE-Urgent .) (07.08.21 @ 12:31) >  Impression:    New right midlung field rounded opacity measuring 5 cm. Further evaluation with chest CT is recommended.    Improved aeration at the left lung base with residual disease present.    Right lower lobe atelectasis    < end of copied text >    PHYSICAL EXAM:  GEN: No acute distress  LUNGS: Clear to auscultation bilaterally   HEART: S1/S2 present. RRR.   ABD: Soft, non-tender, non-distended. Bowel sounds present  EXT: NC/NC/NE/2+PP/BEASLEY  NEURO: AAOX3

## 2021-07-08 NOTE — PROGRESS NOTE ADULT - ASSESSMENT
88 years old Female with PMHx of HFpEF, severe AS s/p recent balloon valvuloplasty, HTN, hyperthyroidism, HCC s/p partial liver resection, and MVP c/o worsening chest pressure discomfort this am & , worsening dyspnea (1-2 weeks) .  Pt is s/p aortic balloon valvuloplasty @ [facility] (5/25/21)   for bridging treatment for severe Aortic stenosis.  Pt had refused TAVR procedure in past.  Pt now w/ worsening dyspnea chest pressure pain. pt found to be severely anemic w/ Hgb-5.4 ,  Last Hgb was 9.1.  This is 3.7 gm drop since last adm. 2 months ago.  Pt stated she has constipation and went she has bowel movement she strains and sees blood in toilet paper. Pt denies- hemeatemesis, gross hematochezia or black stools, fever , chills, abdomenal pain.  Pt was brought to ICU for anemia, hypotension.  Pt recieved 2 units prbc in ER.    88 years old Female with PMHx of HFpEF, severe AS s/p recent balloon valvuloplasty, HTN, hyperthyroidism, HCC s/p partial liver resection, and MVP c/o worsening chest pressure discomfort this am & , worsening dyspnea (1-2 weeks) . pt found to be severely anemic w/ Hgb-5.4 ,  Last Hgb was 9.1.  This is 3.7 gm drop since last adm. 2 months ago.  Pt stated she has constipation and went she has bowel movement she strains and sees blood in toilet paper. Pt denies- hemeatemesis, gross hematochezia or black stools, fever , chills, abdomenal pain.  Pt was brought to ICU for anemia, hypotension.  Pt recieved 2 units prbc in ER.     # Hypotension / Anemia ; r/o GI bleed vs hemolysis  - s/p 3 units PRBC  - check hemolyisis panel  - check b12, folate, iron studies  - GI on board   - 2 large bore IV, keep Hb > 8  - PPI bid  - pending cardio and pulmonary clearance  - lasix with PRBC transfusion]  - hold ASA for now, restart as per GI    # HFpEF / Severe AS s/p balloon valvuloplasty  - avoid volume overload  - repeat echo  - f/u cardio     # Left lung effusion  - s/o thora 670cc 7/8  - f/u fluid studies    # HO HCC - in remission  # HO HTN  # HO Hyperthyroidism    # Diet: clear liquids  # DVT Prophylaxis: SCD  # GI Prophylaxis: Protonix  # Activity: IAT  # Dispo: Acute  # Code Status: Fullcode  # CHG wash  88 years old Female with PMHx of HFpEF, severe AS s/p recent balloon valvuloplasty, HTN, hyperthyroidism, HCC s/p partial liver resection, and MVP c/o worsening chest pressure discomfort this am & , worsening dyspnea (1-2 weeks) . pt found to be severely anemic w/ Hgb-5.4 ,  Last Hgb was 9.1.  This is 3.7 gm drop since last adm. 2 months ago.  Pt stated she has constipation and went she has bowel movement she strains and sees blood in toilet paper. Pt denies- hemeatemesis, gross hematochezia or black stools, fever , chills, abdomenal pain.  Pt was brought to ICU for anemia, hypotension.  Pt recieved 2 units prbc in ER.     # Hypotension / Anemia ; r/o GI bleed vs hemolysis  - s/p 3 units PRBC  - check hemolyisis panel  - check b12, folate, iron studies  - GI on board   - 2 large bore IV, keep Hb > 8  - PPI bid  - pending cardio and pulmonary clearance  - lasix with PRBC transfusion]  - hold ASA for now, restart as per GI    # HFpEF / Severe AS s/p balloon valvuloplasty  - avoid volume overload  - repeat echo  - f/u cardio     # Left lung effusion  - s/o thora 670cc 7/8  - f/u fluid studies  - CT chest nc to evaluate for right lung opacity    # HO HCC - in remission  # HO HTN  # HO Hyperthyroidism    # Diet: clear liquids  # DVT Prophylaxis: SCD  # GI Prophylaxis: Protonix  # Activity: IAT  # Dispo: Acute  # Code Status: Fullcode  # CHG wash

## 2021-07-08 NOTE — CONSULT NOTE ADULT - ASSESSMENT
88yFemale female pmh MVP, HTN, liver cancer s/p resection and chemo and radiation about 20 years ago in remission, s/p aortic balloon valvuloplasty 5/25/21, severe aortic stenosis refused TAVR admitted with worsening dyspnea and chest pressure. GI consulted for anemia, patient reports table spoons of blood in toilet bowel when shes strains and blood on toilet paper when she wipes especially the past two months.    Problem 1-Anemia  blood on toilet paper when wiping  blood in stool when straining  sounds hemorrhoidal, r/o malignancy  Rec  - patient  will benefit from EGD/Colonoscopy but Patient will need  pulmonary risk stratification prior to GI workup. With pulm risk stratification we would like to know specifically if patient is at peak pulmonary optimization and if patient can be more optimized from a pulmonary standpoint for EGD/Colonoscopy as well as cardiac risk stratification   -Maintain Hemodynamic Stability   -Monitor CBC  -Negative COVID-19 Test within 3 days for intervention   -CMP,Optimize Electrolytes  -PT,PTT,INR  -EKG, Chest-Xray   -Transfuse prn to hgb >8  -Two large bore IV lines  -Continue PPI BID  -Monitor Vital Signs  -Monitor Stool For blood, frequency, consistency, melena  -Active Type and Screen      Problem 2-Left basilar opacity/moderate to large left pleural effusion, increased from 5/26/2021. Small right pleural effusion.  Rec  - Care as per primary team   88yFemale female pmh MVP, HTN, liver cancer s/p resection and chemo and radiation about 20 years ago in remission, s/p aortic balloon valvuloplasty 5/25/21, severe aortic stenosis refused TAVR admitted with worsening dyspnea and chest pressure. GI consulted for anemia, patient reports table spoons of blood in toilet bowel when shes strains and blood on toilet paper when she wipes especially the past two months.    Problem 1-Anemia  blood on toilet paper when wiping  blood in stool when straining  sounds hemorrhoidal, r/o malignancy  Rec  - patient  will benefit from EGD/Colonoscopy but Patient will need  pulmonary risk stratification prior to GI workup. With pulm risk stratification we would like to know specifically if patient is at peak pulmonary optimization and if patient can be more optimized from a pulmonary standpoint for EGD/Colonoscopy as well as cardiac risk stratification   -Optimize blood pressure   -Maintain Hemodynamic Stability   -Monitor CBC  -Negative COVID-19 Test within 3 days for intervention   -CMP,Optimize Electrolytes  -PT,PTT,INR  -EKG, Chest-Xray   -Transfuse prn to hgb >8  -Two large bore IV lines  -Continue PPI BID  -Monitor Vital Signs  -Monitor Stool For blood, frequency, consistency, melena  -Active Type and Screen      Problem 2-Left basilar opacity/moderate to large left pleural effusion, increased from 5/26/2021. Small right pleural effusion.  Rec  - Care as per primary team

## 2021-07-08 NOTE — PROCEDURE NOTE - NSPROCDETAILS_GEN_ALL_CORE
location identified, draped/prepped, sterile technique used, needle inserted/introduced/catheter inserted over needle/connection to syringe/connection to evacuated glass bottle/ultrasound assessment of effusion (localization)

## 2021-07-08 NOTE — CONSULT NOTE ADULT - ASSESSMENT
Patient with severe as. She refused TAVR. She had valvuloplasty in Mat 2021. She has been getting more VALENTINE. She presented anemic. She has l pleural effusion. Transfuse. Consider tap effusion. Lasix. Follow labs. Echo . When stable GI w/u. Prognosis guarded

## 2021-07-08 NOTE — PROGRESS NOTE ADULT - SUBJECTIVE AND OBJECTIVE BOX
Patient reports chest pressure associated with SOB with minimal exertion       T(F): 97.7 (07-08-21 @ 11:00), Max: 99.4 (07-07-21 @ 20:49)  HR: 65 (07-08-21 @ 11:01)  BP: 90/48 (07-08-21 @ 11:01)  RR: 20 (07-08-21 @ 11:01)  SpO2: 100% (07-08-21 @ 11:01) (96% - 100%)    PHYSICAL EXAM:  GENERAL: NAD  HEAD:  Atraumatic, Normocephalic  EYES: EOMI, PERRLA, conjunctiva and sclera clear  NERVOUS SYSTEM:  Alert & Oriented X3, no focal deficits   CHEST/LUNG:  bilateral rhonchi  HEART: PRIYA  ABDOMEN: Soft, Nontender, Nondistended; Bowel sounds present  EXTREMITIES:  2+ Peripheral Pulses, No clubbing, cyanosis, or edema    LABS  07-08    137  |  102  |  31<H>  ----------------------------<  92  3.8   |  29  |  0.8    Ca    8.8      08 Jul 2021 01:50    TPro  5.7<L>  /  Alb  3.5  /  TBili  0.7  /  DBili  x   /  AST  18  /  ALT  13  /  AlkPhos  86  07-08                          7.7    5.15  )-----------( 118      ( 08 Jul 2021 11:00 )             24.5     PT/INR - ( 08 Jul 2021 01:50 )   PT: 12.30 sec;   INR: 1.07 ratio         PTT - ( 08 Jul 2021 01:50 )  PTT:31.4 sec    CARDIAC ENZYMES      Troponin T, Serum: 0.20 ng/mL (07-08-21 @ 05:49)  Troponin T, Serum: 0.14 ng/mL (07-07-21 @ 21:40)  Troponin T, Serum: 0.14 ng/mL (07-07-21 @ 11:52)    < from: 12 Lead ECG (07.08.21 @ 11:15) >  Diagnosis Line Normal sinus rhythm  Left axis deviation  Right bundle branch block  Voltage criteria for left ventricular hypertrophy  Abnormal ECG    < end of copied text >  < from: TTE Echo Complete w/o Contrast w/ Doppler (05.26.21 @ 08:32) >  Summary:   1. Left ventricular ejection fraction, by visual estimation, is 55 to 60%.   2. Mildly increased LV wall thickness.   3. Spectral Doppler shows impaired relaxation pattern of left ventricular myocardial filling (Grade I diastolic dysfunction).   4. Normal left atrial size.   5. Normal right atrial size.   6. Mild mitral annular calcification.   7. No evidence of mitral valve regurgitation.   8. Mild tricuspid regurgitation.   9. Aortic valve thickening with decreased leaflet opening.  10. Mild aortic regurgitation.  11. Peak transaortic gradient equals 135.1 mmHg, mean transaortic gradient equals 66.7 mmHg, the calculated aortic valve area equals 0.47 cm² by the continuity equation consistent with severe aortic stenosis.  12. There is mild aortic root calcification.    < end of copied text >      RADIOLOGY  < from: Xray Chest 1 View- PORTABLE-Urgent (Xray Chest 1 View- PORTABLE-Urgent .) (07.08.21 @ 12:31) >  Impression:    New right midlung field rounded opacity measuring 5 cm. Further evaluation with chest CT is recommended.    Improved aeration at the left lung base with residual disease present.    Right lower lobe atelectasis      < end of copied text >    MEDICATIONS  (STANDING):  chlorhexidine 4% Liquid 1 Application(s) Topical every 12 hours  levothyroxine 100 MICROGram(s) Oral daily  pantoprazole  Injectable 40 milliGRAM(s) IV Push every 12 hours    MEDICATIONS  (PRN):  albuterol/ipratropium for Nebulization 3 milliLiter(s) Nebulizer every 6 hours PRN Shortness of Breath and/or Wheezing  lactulose Syrup 20 Gram(s) Oral daily PRN constipation

## 2021-07-08 NOTE — PROCEDURAL SAFETY CHECKLIST WITH OR WITHOUT SEDATION - NSPREANESCONSENT_GEN_ALL_CORE
Call to [facility] 
Wanted to advised she still haven't had any luck with her bowels moving.  States it has been 4 weeks
n/a
n/a

## 2021-07-08 NOTE — CONSULT NOTE ADULT - ASSESSMENT
IMPRESSION:  Severe anemia 2 units prbc  Severe AS s/p BAV (refused TAVR)  episode of CHB s/p valvulooplasty-resolved (refused loop)   HFpef  HO liver Ca in remission  HO PAD    PLAN:    CNS: avoid sedatives    HEENT: Oral care    PULMONARY:  HOB @ 45 degrees.  Aspiration precautions     CARDIOVASCULAR: lasix 40mg IV x1. ECHO    GI: GI prophylaxis.  Feeding with clear liquids.  Bowel regimen. GI eval, Check FOBT     RENAL:  Follow up lytes.  Correct as needed    INFECTIOUS DISEASE: Follow up cultures    HEMATOLOGICAL:  DVT prophylaxis with sequentials. Goal HGB. Check FOBT. Check hemolysis panel. check peripheral smear, hemolysis workup    ENDOCRINE:  Follow up FS.  Insulin protocol if needed.    MUSCULOSKELETAL: bedrest    Dispo: MICU                  IMPRESSION:  hypotension  Severe anemia 2 units prbc  Severe AS s/p BAV (refused TAVR)  episode of CHB s/p valvulooplasty-resolved (refused loop)   HFpef  HO liver Ca in remission  HO PAD  L pleural effusion due to above    PLAN:    CNS: avoid sedatives    HEENT: Oral care    PULMONARY:  HOB @ 45 degrees.  Aspiration precautions   consider thora    CARDIOVASCULAR: lasix 40mg IV x1. ECHO    GI: GI prophylaxis.  Feeding with clear liquids.  Bowel regimen. GI eval, Check FOBT     RENAL:  Follow up lytes.  Correct as needed    INFECTIOUS DISEASE: Follow up cultures    HEMATOLOGICAL:  DVT prophylaxis with sequentials.   Goal HGB >8.0  Check FOBT.   Check hemolysis panel.   check peripheral smear, hemolysis workup    ENDOCRINE:  Follow up FS.  Insulin protocol if needed.    MUSCULOSKELETAL: bedrest    Dispo: MICU

## 2021-07-08 NOTE — PROGRESS NOTE ADULT - ASSESSMENT
88 years old Female with PMHx of HFpEF, severe AS s/p recent balloon valvuloplasty, HTN, hyperthyroidism, HCC s/p partial liver resection, and MVP c/o worsening chest pressure discomfort  &  worsening dyspnea (1-2 weeks) . pt found to be severely anemic w/ Hgb-5.4 ,  Last Hgb was 9.1 ~ 2 months ago.  Pt stated she has constipation and when she has bowel movement she strains and sees blood in toilet paper. Pt denies- hematemesis, gross hematochezia or black stools, fever , chills, abdominal pain.      acute anemia / possible type 2 NSTEMI / pleural effusion / rounded lung mass / acute on chronic HFpEF / severe aortic stenosis     - s/p 2 units PRBC transfusion   - IV Protonix   - hold aspirin for now   - s/p thoracentesis    - transfuse and maintain HGB>8 give Lasix with blood   -  CT chest to evaluate rounded lung mass   - GI consult

## 2021-07-08 NOTE — PHARMACOTHERAPY INTERVENTION NOTE - COMMENTS
Recommended to d/c aspirin due to hgb being 7.7 as of 7/8, md wants to continue aspirin due to possibility of pt having CAD Pt Hg dropped yesterday requiring transfusion. Inquired if possible concern for bleeding. Informed the team that pt is on ASA, and may consider holding until bleeding ruled out.

## 2021-07-08 NOTE — PHARMACOTHERAPY INTERVENTION NOTE - COMMENTS
Recommended to change pantoprazole PO to IV BID Recommended to consider changing pantoprazole PO to IV BID while ruling out for possible bleed

## 2021-07-09 LAB
ALBUMIN FLD-MCNC: 1.4 G/DL — SIGNIFICANT CHANGE UP
ALBUMIN SERPL ELPH-MCNC: 3.3 G/DL — LOW (ref 3.5–5.2)
ALP SERPL-CCNC: 98 U/L — SIGNIFICANT CHANGE UP (ref 30–115)
ALT FLD-CCNC: 10 U/L — SIGNIFICANT CHANGE UP (ref 0–41)
ANION GAP SERPL CALC-SCNC: 7 MMOL/L — SIGNIFICANT CHANGE UP (ref 7–14)
AST SERPL-CCNC: 17 U/L — SIGNIFICANT CHANGE UP (ref 0–41)
BASOPHILS # BLD AUTO: 0.01 K/UL — SIGNIFICANT CHANGE UP (ref 0–0.2)
BASOPHILS NFR BLD AUTO: 0.2 % — SIGNIFICANT CHANGE UP (ref 0–1)
BILIRUB SERPL-MCNC: 0.7 MG/DL — SIGNIFICANT CHANGE UP (ref 0.2–1.2)
BUN SERPL-MCNC: 23 MG/DL — HIGH (ref 10–20)
CALCIUM SERPL-MCNC: 9.2 MG/DL — SIGNIFICANT CHANGE UP (ref 8.5–10.1)
CHLORIDE SERPL-SCNC: 99 MMOL/L — SIGNIFICANT CHANGE UP (ref 98–110)
CO2 SERPL-SCNC: 32 MMOL/L — SIGNIFICANT CHANGE UP (ref 17–32)
CREAT SERPL-MCNC: 0.8 MG/DL — SIGNIFICANT CHANGE UP (ref 0.7–1.5)
EOSINOPHIL # BLD AUTO: 0.12 K/UL — SIGNIFICANT CHANGE UP (ref 0–0.7)
EOSINOPHIL NFR BLD AUTO: 2.4 % — SIGNIFICANT CHANGE UP (ref 0–8)
GLUCOSE FLD-MCNC: 102 MG/DL — SIGNIFICANT CHANGE UP
GLUCOSE SERPL-MCNC: 86 MG/DL — SIGNIFICANT CHANGE UP (ref 70–99)
GRAM STN FLD: SIGNIFICANT CHANGE UP
HAPTOGLOB SERPL-MCNC: 149 MG/DL — SIGNIFICANT CHANGE UP (ref 34–200)
HAPTOGLOB SERPL-MCNC: 163 MG/DL — SIGNIFICANT CHANGE UP (ref 34–200)
HCT VFR BLD CALC: 30 % — LOW (ref 37–47)
HGB BLD-MCNC: 9.5 G/DL — LOW (ref 12–16)
IMM GRANULOCYTES NFR BLD AUTO: 0.4 % — HIGH (ref 0.1–0.3)
LDH SERPL L TO P-CCNC: 215 — SIGNIFICANT CHANGE UP (ref 50–242)
LDH SERPL L TO P-CCNC: 78 U/L — SIGNIFICANT CHANGE UP
LYMPHOCYTES # BLD AUTO: 0.83 K/UL — LOW (ref 1.2–3.4)
LYMPHOCYTES # BLD AUTO: 16.5 % — LOW (ref 20.5–51.1)
MAGNESIUM SERPL-MCNC: 2.2 MG/DL — SIGNIFICANT CHANGE UP (ref 1.8–2.4)
MCHC RBC-ENTMCNC: 27.8 PG — SIGNIFICANT CHANGE UP (ref 27–31)
MCHC RBC-ENTMCNC: 31.7 G/DL — LOW (ref 32–37)
MCV RBC AUTO: 87.7 FL — SIGNIFICANT CHANGE UP (ref 81–99)
MONOCYTES # BLD AUTO: 0.53 K/UL — SIGNIFICANT CHANGE UP (ref 0.1–0.6)
MONOCYTES NFR BLD AUTO: 10.6 % — HIGH (ref 1.7–9.3)
NEUTROPHILS # BLD AUTO: 3.51 K/UL — SIGNIFICANT CHANGE UP (ref 1.4–6.5)
NEUTROPHILS NFR BLD AUTO: 69.9 % — SIGNIFICANT CHANGE UP (ref 42.2–75.2)
NON-GYNECOLOGICAL CYTOLOGY STUDY: SIGNIFICANT CHANGE UP
NRBC # BLD: 0 /100 WBCS — SIGNIFICANT CHANGE UP (ref 0–0)
PLATELET # BLD AUTO: 117 K/UL — LOW (ref 130–400)
POTASSIUM SERPL-MCNC: 4 MMOL/L — SIGNIFICANT CHANGE UP (ref 3.5–5)
POTASSIUM SERPL-SCNC: 4 MMOL/L — SIGNIFICANT CHANGE UP (ref 3.5–5)
PROT FLD-MCNC: 2.3 G/DL — SIGNIFICANT CHANGE UP
PROT SERPL-MCNC: 5.8 G/DL — LOW (ref 6–8)
RBC # BLD: 3.42 M/UL — LOW (ref 4.2–5.4)
RBC # FLD: 14.2 % — SIGNIFICANT CHANGE UP (ref 11.5–14.5)
SODIUM SERPL-SCNC: 138 MMOL/L — SIGNIFICANT CHANGE UP (ref 135–146)
SPECIMEN SOURCE: SIGNIFICANT CHANGE UP
WBC # BLD: 5.02 K/UL — SIGNIFICANT CHANGE UP (ref 4.8–10.8)
WBC # FLD AUTO: 5.02 K/UL — SIGNIFICANT CHANGE UP (ref 4.8–10.8)

## 2021-07-09 PROCEDURE — 99233 SBSQ HOSP IP/OBS HIGH 50: CPT

## 2021-07-09 PROCEDURE — 93010 ELECTROCARDIOGRAM REPORT: CPT | Mod: 76

## 2021-07-09 PROCEDURE — 99232 SBSQ HOSP IP/OBS MODERATE 35: CPT

## 2021-07-09 PROCEDURE — 71045 X-RAY EXAM CHEST 1 VIEW: CPT | Mod: 26

## 2021-07-09 RX ORDER — DIPHENHYDRAMINE HCL 50 MG
25 CAPSULE ORAL ONCE
Refills: 0 | Status: DISCONTINUED | OUTPATIENT
Start: 2021-07-09 | End: 2021-07-09

## 2021-07-09 RX ORDER — DIPHENHYDRAMINE HCL 50 MG
25 CAPSULE ORAL EVERY 6 HOURS
Refills: 0 | Status: DISCONTINUED | OUTPATIENT
Start: 2021-07-09 | End: 2021-07-14

## 2021-07-09 RX ORDER — FUROSEMIDE 40 MG
40 TABLET ORAL ONCE
Refills: 0 | Status: COMPLETED | OUTPATIENT
Start: 2021-07-09 | End: 2021-07-09

## 2021-07-09 RX ORDER — SENNA PLUS 8.6 MG/1
2 TABLET ORAL AT BEDTIME
Refills: 0 | Status: DISCONTINUED | OUTPATIENT
Start: 2021-07-09 | End: 2021-07-14

## 2021-07-09 RX ORDER — POLYETHYLENE GLYCOL 3350 17 G/17G
17 POWDER, FOR SOLUTION ORAL DAILY
Refills: 0 | Status: DISCONTINUED | OUTPATIENT
Start: 2021-07-09 | End: 2021-07-14

## 2021-07-09 RX ORDER — HYDROCORTISONE 1 %
1 OINTMENT (GRAM) TOPICAL THREE TIMES A DAY
Refills: 0 | Status: DISCONTINUED | OUTPATIENT
Start: 2021-07-09 | End: 2021-07-14

## 2021-07-09 RX ORDER — DIPHENHYDRAMINE HCL 50 MG
25 CAPSULE ORAL ONCE
Refills: 0 | Status: COMPLETED | OUTPATIENT
Start: 2021-07-09 | End: 2021-07-09

## 2021-07-09 RX ADMIN — LACTULOSE 20 GRAM(S): 10 SOLUTION ORAL at 05:44

## 2021-07-09 RX ADMIN — CHLORHEXIDINE GLUCONATE 1 APPLICATION(S): 213 SOLUTION TOPICAL at 05:44

## 2021-07-09 RX ADMIN — PANTOPRAZOLE SODIUM 40 MILLIGRAM(S): 20 TABLET, DELAYED RELEASE ORAL at 17:13

## 2021-07-09 RX ADMIN — Medication 1 APPLICATION(S): at 21:29

## 2021-07-09 RX ADMIN — Medication 25 MILLIGRAM(S): at 10:40

## 2021-07-09 RX ADMIN — CHLORHEXIDINE GLUCONATE 1 APPLICATION(S): 213 SOLUTION TOPICAL at 17:12

## 2021-07-09 RX ADMIN — POLYETHYLENE GLYCOL 3350 17 GRAM(S): 17 POWDER, FOR SOLUTION ORAL at 12:20

## 2021-07-09 RX ADMIN — SENNA PLUS 2 TABLET(S): 8.6 TABLET ORAL at 12:20

## 2021-07-09 RX ADMIN — SENNA PLUS 2 TABLET(S): 8.6 TABLET ORAL at 21:29

## 2021-07-09 RX ADMIN — Medication 40 MILLIGRAM(S): at 11:21

## 2021-07-09 RX ADMIN — Medication 1 DROP(S): at 21:29

## 2021-07-09 RX ADMIN — Medication 1 APPLICATION(S): at 14:21

## 2021-07-09 RX ADMIN — PANTOPRAZOLE SODIUM 40 MILLIGRAM(S): 20 TABLET, DELAYED RELEASE ORAL at 05:44

## 2021-07-09 RX ADMIN — Medication 100 MICROGRAM(S): at 05:44

## 2021-07-09 NOTE — DIETITIAN INITIAL EVALUATION ADULT. - ADD RECOMMEND
continue current diet order as tolerated, encourage po intake, provide assistance with meals PRN. will consider the need for oral nutrition supplements if po intake consistently <50% upon f/u

## 2021-07-09 NOTE — PROGRESS NOTE ADULT - SUBJECTIVE AND OBJECTIVE BOX
Patient is a 88y old  Female who presents with a chief complaint of chest pain / Anemia (08 Jul 2021 14:21)      T(F): 97.6 (07-09-21 @ 07:01), Max: 98.1 (07-08-21 @ 23:00)  HR: 62 (07-09-21 @ 05:05)  BP: 97/50 (07-09-21 @ 05:05)  RR: 18 (07-09-21 @ 07:01)  SpO2: 96% (07-09-21 @ 05:09) (95% - 100%)    PHYSICAL EXAM:  GENERAL: NAD, well-groomed, well-developed  HEAD:  Atraumatic, Normocephalic  EYES: EOMI, PERRLA, conjunctiva and sclera clear  ENMT: No tonsillar erythema, exudates, or enlargement; Moist mucous membranes, Good dentition, No lesions  NECK: Supple, No JVD, Normal thyroid  NERVOUS SYSTEM:  Alert & Oriented X3,  Motor Strength 5/5 B/L upper and lower extremities  CHEST/LUNG: Clear to percussion bilaterally; No rales, rhonchi, wheezing, or rubs Decreased bs  HEART: Regular rate and rhythm; I-I/VI july lsb   ABDOMEN: Soft, Nontender, Nondistended; Bowel sounds present  EXTREMITIES:   No clubbing, cyanosis, or edema  LYMPH: No lymphadenopathy noted  SKIN: No rashes or lesions    labs  07-09    138  |  99  |  23<H>  ----------------------------<  86  4.0   |  32  |  0.8    Ca    9.2      09 Jul 2021 05:58  Mg     2.2     07-09    TPro  5.8<L>  /  Alb  3.3<L>  /  TBili  0.7  /  DBili  x   /  AST  17  /  ALT  10  /  AlkPhos  98  07-09                          9.5    5.02  )-----------( 117      ( 09 Jul 2021 05:58 )             30.0       Culture - Body Fluid with Gram Stain (collected 08 Jul 2021 12:21)  Source: .Body Fluid Pleural Fluid  Gram Stain (09 Jul 2021 02:22):    polymorphonuclear leukocytes seen    No organisms seen    by cytocentrifuge      PT/INR - ( 08 Jul 2021 01:50 )   PT: 12.30 sec;   INR: 1.07 ratio         PTT - ( 08 Jul 2021 01:50 )  PTT:31.4 sec        albuterol/ipratropium for Nebulization 3 milliLiter(s) Nebulizer every 6 hours PRN  chlorhexidine 4% Liquid 1 Application(s) Topical every 12 hours  lactulose Syrup 20 Gram(s) Oral daily PRN  levothyroxine 100 MICROGram(s) Oral daily  pantoprazole  Injectable 40 milliGRAM(s) IV Push every 12 hours

## 2021-07-09 NOTE — PROGRESS NOTE ADULT - ASSESSMENT
IMPRESSION:  hypotension  Severe anemia 2 units prbc  Critical AS AS s/p BAV (refused TAVR)  episode of CHB s/p valvulooplasty-resolved (refused loop)   HFpef  HO liver Ca in remission  HO PAD  L pleural effusion s/p thora 7/8- transudative    PLAN:    CNS: avoid sedatives    HEENT: Oral care    PULMONARY:  HOB @ 45 degrees.  Aspiration precautions     CARDIOVASCULAR: lasix 40mg IV x1. goal MAP >60.     GI: GI prophylaxis.  Feeding with clear liquids.  Bowel regimen. GI eval appreciated, fu fobt    RENAL:  Follow up lytes.  Correct as needed    INFECTIOUS DISEASE: Follow up cultures    HEMATOLOGICAL:  DVT prophylaxis with sequentials.  Goal HGB >8.0  F/U FOBT.   F/U peripheral smear, hemolysis workup    ENDOCRINE:  Follow up FS.  Insulin protocol if needed.    MUSCULOSKELETAL: bedrest    Dispo: MICU                  IMPRESSION:  hypotension  Severe anemia 2 units prbc  Critical AS AS s/p BAV (refused TAVR)  episode of CHB s/p valvulooplasty-resolved (refused loop)   HFpef  HO liver Ca in remission  HO PAD  Left pleural effusion s/p thora 7/8- transudative    PLAN:    CNS: avoid sedatives    HEENT: Oral care    PULMONARY:  HOB @ 45 degrees.  Aspiration precautions. Follow up offical ct read, appears to be pseudotumor.    CARDIOVASCULAR: lasix 40mg IV x1. goal MAP >60.     GI: GI prophylaxis.  Feeding with clear liquids.  Bowel regimen. GI eval appreciated, fu fobt    RENAL:  Follow up lytes.  Correct as needed    INFECTIOUS DISEASE: Follow up cultures    HEMATOLOGICAL:  DVT prophylaxis with sequentials.  Goal HGB >8.0  F/U FOBT.   F/U peripheral smear, hemolysis workup    ENDOCRINE:  Follow up FS.  Insulin protocol if needed. Benadryl, topical steroid on rash    MUSCULOSKELETAL: bedrest    Dispo: MICU    D/C TLC

## 2021-07-09 NOTE — PROGRESS NOTE ADULT - ASSESSMENT
88 years old Female with PMHx of HFpEF, severe AS s/p recent balloon valvuloplasty, HTN, hyperthyroidism, HCC s/p partial liver resection, and MVP c/o worsening chest pressure discomfort  &  worsening dyspnea (1-2 weeks) . pt found to be severely anemic w/ Hgb-5.4 ,  Last Hgb was 9.1 ~ 2 months ago.  Pt stated she has constipation and when she has bowel movement she strains and sees blood in toilet paper. Pt denies- hematemesis, gross hematochezia or black stools, fever , chills, abdominal pain.      acute anemia / possible type 2 NSTEMI / pleural effusion / rounded lung mass / acute on chronic HFpEF / severe aortic stenosis     - s/p 3 units PRBC transfusion   - IV Protonix   - hold aspirin for now   - s/p thoracentesis    - transfuse and maintain HGB>8 give Lasix with blood   -  CT chest to evaluate rounded lung mass   - GI consult   88 years old Female with PMHx of HFpEF, severe AS s/p recent balloon valvuloplasty, HTN, hyperthyroidism, HCC s/p partial liver resection, and MVP c/o worsening chest pressure discomfort  &  worsening dyspnea (1-2 weeks) . pt found to be severely anemic w/ Hgb-5.4 ,  Last Hgb was 9.1 ~ 2 months ago.  Pt stated she has constipation and when she has bowel movement she strains and sees blood in toilet paper. Pt denies- hematemesis, gross hematochezia or black stools, fever , chills, abdominal pain.      acute anemia / possible type 2 NSTEMI / pleural effusion / rounded lung mass / acute on chronic HFpEF / severe aortic stenosis     - s/p 3 units PRBC transfusion   - IV Protonix   - hold aspirin for now   - s/p thoracentesis    - transfuse and maintain HGB>8 give Lasix with blood   -  CT chest done - to evaluate rounded lung mass (not read yet) - appears as loculated effusion to me   - GI following

## 2021-07-09 NOTE — DIETITIAN INITIAL EVALUATION ADULT. - PERSON TAUGHT/METHOD
encouraged po intake; declined d/c diet education/verbal instruction/patient instructed/son instructed

## 2021-07-09 NOTE — PROGRESS NOTE ADULT - ASSESSMENT
Patient main complaint constipation. Echo severe as. But gradient less than pre valvuloplasty. Follow labs . Transfuse if needed. Check cxr. Will eventually need resume lasix

## 2021-07-09 NOTE — PROGRESS NOTE ADULT - SUBJECTIVE AND OBJECTIVE BOX
Patient is a 88y old  Female who presents with a chief complaint of hypotensive w/ severe anemia (09 Jul 2021 07:17)        Over Night Events:  no acute events overnight      ROS:     All ROS are negative except HPI         PHYSICAL EXAM    ICU Vital Signs Last 24 Hrs  T(C): 36.4 (09 Jul 2021 07:01), Max: 36.7 (08 Jul 2021 23:00)  T(F): 97.6 (09 Jul 2021 07:01), Max: 98.1 (08 Jul 2021 23:00)  HR: 57 (09 Jul 2021 08:00) (57 - 78)  BP: 94/44 (09 Jul 2021 08:00) (85/46 - 97/50)  BP(mean): 64 (09 Jul 2021 08:00) (61 - 71)  ABP: --  ABP(mean): --  RR: 16 (09 Jul 2021 08:00) (16 - 35)  SpO2: 99% (09 Jul 2021 08:00) (95% - 100%)      CONSTITUTIONAL:  Well nourished.  NAD    ENT:   Airway patent,   Mouth with normal mucosa.   No thrush    EYES:   Pupils equal,   Round and reactive to light.    CARDIAC:   Normal rate,   Regular rhythm.    No edema      Vascular:  Normal systolic impulse  No Carotid bruits    RESPIRATORY:   No wheezing  Bilateral BS  Normal chest expansion  Not tachypneic,  No use of accessory muscles    GASTROINTESTINAL:  Abdomen soft,   Non-tender,   No guarding,   + BS    MUSCULOSKELETAL:   Range of motion is not limited,  No clubbing, cyanosis    NEUROLOGICAL:   Alert and oriented   No motor  deficits.    SKIN:   Skin normal color for race,   Warm and dry and intact.   No evidence of rash.    PSYCHIATRIC:   Normal mood and affect.   No apparent risk to self or others.    HEMATOLOGICAL:  No cervical  lymphadenopathy.  no inguinal lymphadenopathy      07-08-21 @ 07:01  -  07-09-21 @ 07:00  --------------------------------------------------------  IN:    Oral Fluid: 100 mL    PRBCs (Packed Red Blood Cells): 278 mL  Total IN: 378 mL    OUT:    Voided (mL): 1400 mL  Total OUT: 1400 mL    Total NET: -1022 mL      07-09-21 @ 07:01  -  07-09-21 @ 09:56  --------------------------------------------------------  IN:    Oral Fluid: 210 mL  Total IN: 210 mL    OUT:    Voided (mL): 300 mL  Total OUT: 300 mL    Total NET: -90 mL          LABS:                            9.5    5.02  )-----------( 117      ( 09 Jul 2021 05:58 )             30.0                                               07-09    138  |  99  |  23<H>  ----------------------------<  86  4.0   |  32  |  0.8    Ca    9.2      09 Jul 2021 05:58  Mg     2.2     07-09    TPro  5.8<L>  /  Alb  3.3<L>  /  TBili  0.7  /  DBili  x   /  AST  17  /  ALT  10  /  AlkPhos  98  07-09      PT/INR - ( 08 Jul 2021 01:50 )   PT: 12.30 sec;   INR: 1.07 ratio         PTT - ( 08 Jul 2021 01:50 )  PTT:31.4 sec                                           CARDIAC MARKERS ( 08 Jul 2021 05:49 )  x     / 0.20 ng/mL / x     / x     / x      CARDIAC MARKERS ( 07 Jul 2021 21:40 )  x     / 0.14 ng/mL / x     / x     / x      CARDIAC MARKERS ( 07 Jul 2021 11:52 )  x     / 0.14 ng/mL / x     / x     / x                                                LIVER FUNCTIONS - ( 09 Jul 2021 05:58 )  Alb: 3.3 g/dL / Pro: 5.8 g/dL / ALK PHOS: 98 U/L / ALT: 10 U/L / AST: 17 U/L / GGT: x                                                  Culture - Fungal, Body Fluid (collected 08 Jul 2021 12:21)  Source: .Body Fluid Pleural Fluid  Preliminary Report (09 Jul 2021 07:53):    Testing in progress    Culture - Body Fluid with Gram Stain (collected 08 Jul 2021 12:21)  Source: .Body Fluid Pleural Fluid  Gram Stain (09 Jul 2021 02:22):    polymorphonuclear leukocytes seen    No organisms seen    by cytocentrifuge                                                                                           MEDICATIONS  (STANDING):  chlorhexidine 4% Liquid 1 Application(s) Topical every 12 hours  levothyroxine 100 MICROGram(s) Oral daily  pantoprazole  Injectable 40 milliGRAM(s) IV Push every 12 hours    MEDICATIONS  (PRN):  albuterol/ipratropium for Nebulization 3 milliLiter(s) Nebulizer every 6 hours PRN Shortness of Breath and/or Wheezing  lactulose Syrup 20 Gram(s) Oral daily PRN constipation      New X-rays reviewed:                                                                                  ECHO    CXR interpreted by me:       Patient is a 88y old  Female who presents with a chief complaint of hypotensive w/ severe anemia (09 Jul 2021 07:17)        Over Night Events:  no acute events overnight      ROS:     All ROS are negative except HPI         PHYSICAL EXAM    ICU Vital Signs Last 24 Hrs  T(C): 36.4 (09 Jul 2021 07:01), Max: 36.7 (08 Jul 2021 23:00)  T(F): 97.6 (09 Jul 2021 07:01), Max: 98.1 (08 Jul 2021 23:00)  HR: 57 (09 Jul 2021 08:00) (57 - 78)  BP: 94/44 (09 Jul 2021 08:00) (85/46 - 97/50)  BP(mean): 64 (09 Jul 2021 08:00) (61 - 71)  ABP: --  ABP(mean): --  RR: 16 (09 Jul 2021 08:00) (16 - 35)  SpO2: 99% (09 Jul 2021 08:00) (95% - 100%)      CONSTITUTIONAL:  Well nourished.  NAD    ENT:   Airway patent,   Mouth with normal mucosa.   No thrush    EYES:   Pupils equal,   Round and reactive to light.    CARDIAC:   Normal rate,   Regular rhythm.    No edema      Vascular:  Normal systolic impulse  No Carotid bruits    RESPIRATORY:   No wheezing  Bilateral BS  Normal chest expansion  Not tachypneic,  No use of accessory muscles    GASTROINTESTINAL:  Abdomen soft,   Non-tender,   No guarding,   + BS    MUSCULOSKELETAL:   Range of motion is not limited,  No clubbing, cyanosis    NEUROLOGICAL:   Alert and oriented   No motor  deficits.    SKIN:   hives on b/l forearm   Warm and dry and intact.   No evidence of rash.    PSYCHIATRIC:   Normal mood and affect.   No apparent risk to self or others.    HEMATOLOGICAL:  No cervical  lymphadenopathy.  no inguinal lymphadenopathy      07-08-21 @ 07:01  -  07-09-21 @ 07:00  --------------------------------------------------------  IN:    Oral Fluid: 100 mL    PRBCs (Packed Red Blood Cells): 278 mL  Total IN: 378 mL    OUT:    Voided (mL): 1400 mL  Total OUT: 1400 mL    Total NET: -1022 mL      07-09-21 @ 07:01  -  07-09-21 @ 09:56  --------------------------------------------------------  IN:    Oral Fluid: 210 mL  Total IN: 210 mL    OUT:    Voided (mL): 300 mL  Total OUT: 300 mL    Total NET: -90 mL          LABS:                            9.5    5.02  )-----------( 117      ( 09 Jul 2021 05:58 )             30.0                                               07-09    138  |  99  |  23<H>  ----------------------------<  86  4.0   |  32  |  0.8    Ca    9.2      09 Jul 2021 05:58  Mg     2.2     07-09    TPro  5.8<L>  /  Alb  3.3<L>  /  TBili  0.7  /  DBili  x   /  AST  17  /  ALT  10  /  AlkPhos  98  07-09      PT/INR - ( 08 Jul 2021 01:50 )   PT: 12.30 sec;   INR: 1.07 ratio         PTT - ( 08 Jul 2021 01:50 )  PTT:31.4 sec                                           CARDIAC MARKERS ( 08 Jul 2021 05:49 )  x     / 0.20 ng/mL / x     / x     / x      CARDIAC MARKERS ( 07 Jul 2021 21:40 )  x     / 0.14 ng/mL / x     / x     / x      CARDIAC MARKERS ( 07 Jul 2021 11:52 )  x     / 0.14 ng/mL / x     / x     / x                                                LIVER FUNCTIONS - ( 09 Jul 2021 05:58 )  Alb: 3.3 g/dL / Pro: 5.8 g/dL / ALK PHOS: 98 U/L / ALT: 10 U/L / AST: 17 U/L / GGT: x                                                  Culture - Fungal, Body Fluid (collected 08 Jul 2021 12:21)  Source: .Body Fluid Pleural Fluid  Preliminary Report (09 Jul 2021 07:53):    Testing in progress    Culture - Body Fluid with Gram Stain (collected 08 Jul 2021 12:21)  Source: .Body Fluid Pleural Fluid  Gram Stain (09 Jul 2021 02:22):    polymorphonuclear leukocytes seen    No organisms seen    by cytocentrifuge                                                                                           MEDICATIONS  (STANDING):  chlorhexidine 4% Liquid 1 Application(s) Topical every 12 hours  levothyroxine 100 MICROGram(s) Oral daily  pantoprazole  Injectable 40 milliGRAM(s) IV Push every 12 hours    MEDICATIONS  (PRN):  albuterol/ipratropium for Nebulization 3 milliLiter(s) Nebulizer every 6 hours PRN Shortness of Breath and/or Wheezing  lactulose Syrup 20 Gram(s) Oral daily PRN constipation      New X-rays reviewed:                                                                                  ECHO    CXR interpreted by me:

## 2021-07-09 NOTE — DIETITIAN INITIAL EVALUATION ADULT. - OTHER INFO
Pt admitted d/t anemia, hypotension, SOB and pleural effusion. CXR on 7/8 showed new R midlung rounded opacity, improved aeration @ L lung base, R lower lobe atelectasis. Pt is sp thoracentesis on 7/8 with 670 cc fluid removed. Pt is pending EGD/colonoscopy once medically optimized.

## 2021-07-09 NOTE — PROGRESS NOTE ADULT - ASSESSMENT
88 years old Female with PMHx of HFpEF, severe AS s/p recent balloon valvuloplasty, HTN, hyperthyroidism, HCC s/p partial liver resection, and MVP c/o worsening chest pressure discomfort this am & , worsening dyspnea (1-2 weeks) . pt found to be severely anemic w/ Hgb-5.4 ,  Last Hgb was 9.1.  This is 3.7 gm drop since last adm. 2 months ago.  Pt stated she has constipation and went she has bowel movement she strains and sees blood in toilet paper. Pt denies- hemeatemesis, gross hematochezia or black stools, fever , chills, abdomenal pain.  Pt was brought to ICU for anemia, hypotension.     # Hypotension / Anemia ; r/o GI bleed vs hemolysis  - s/p 3 units PRBC, negative for hemolysis, hemoglobin now stable, GI recommended observation for now, no scope scheduled as of now  - hold ASA for now, can restart if hgb continues to stay stable  - no bleeding noted, no melena/hematuria/vomiting blood reported  - FOBT pending    # HFpEF / Severe AS s/p balloon valvuloplasty  - avoid volume overload  - s/p Lasix 40mg x 1    # Left lung effusion. s/o thora 670cc 7/8 - transudative  - CT chest nc to evaluate for right lung opacity: pending official read    # Hives on arm - prn bendaryl/topical steroid  # constipation - c/w stool softener    # HO HCC - in remission  # HO HTN  # HO Hyperthyroidism    # Diet: clear liquids  # DVT Prophylaxis: SCD  # GI Prophylaxis: Protonix  # Activity: IAT  # Dispo: Acute  # Code Status: Fullcode  # CHG wash

## 2021-07-09 NOTE — DIETITIAN INITIAL EVALUATION ADULT. - RD TO REMAIN AVAILABLE
INTERVENTION: meals and snacks, coordination of care. ME: RD to monitor diet order, energy intake, body composition, NFPF, renal profile/yes

## 2021-07-09 NOTE — PROGRESS NOTE ADULT - ASSESSMENT
88yFemale female pmh MVP, HTN, liver cancer s/p resection and chemo and radiation about 20 years ago in remission, s/p aortic balloon valvuloplasty 5/25/21, severe aortic stenosis refused TAVR admitted with worsening dyspnea and chest pressure. GI consulted for anemia, patient reports table spoons of blood in toilet bowel when shes strains and blood on toilet paper when she wipes especially the past two months.    Problem 1-Anemia  blood on toilet paper when wiping  blood in stool when straining  sounds hemorrhoidal, r/o malignancy  Rec  -given high pulm and cardiac risk will not pursue EGD/Colonoscopy unless patient develops active GI bleeding, patient also not willing to pursue EGD/Colonoscopy given risks   -Diet as tolerated   -Optimize blood pressure   -Maintain Hemodynamic Stability   -Monitor CBC  -Negative COVID-19 Test within 3 days for intervention   -CMP,Optimize Electrolytes  -PT,PTT,INR  -EKG, Chest-Xray   -Transfuse prn to hgb >8  -Two large bore IV lines  -Continue PPI BID  -Monitor Vital Signs  -Monitor Stool For blood, frequency, consistency, melena  -Active Type and Screen      Problem 2-Left basilar opacity/moderate to large left pleural effusion, increased from 5/26/2021. Small right pleural effusion.  Rec  - Care as per primary team

## 2021-07-09 NOTE — DIETITIAN INITIAL EVALUATION ADULT. - FACTORS AFF FOOD INTAKE
Pt tolerating clear liquid diet well stating that she is hungry. Diet advanced today @ lunch time-- pt awaiting lunch tray. No nausea/abdominal pain at this time. Constipation noted; pt is on an aggressive bowel regimen./none

## 2021-07-09 NOTE — PROGRESS NOTE ADULT - SUBJECTIVE AND OBJECTIVE BOX
Patient is a 88y old  Female who presents with a chief complaint of hypotensive w/ severe anemia (09 Jul 2021 12:06)      T(F): 96.5 (07-09-21 @ 11:00), Max: 98.1 (07-08-21 @ 23:00)  HR: 77 (07-09-21 @ 13:00)  BP: 107/55 (07-09-21 @ 13:00)  RR: 38 (07-09-21 @ 13:00)  SpO2: 100% (07-09-21 @ 13:00) (95% - 100%)    PHYSICAL EXAM:  GENERAL: NAD  HEAD:  Atraumatic, Normocephalic  EYES: EOMI, PERRLA, conjunctiva and sclera clear  NERVOUS SYSTEM:  Alert & Oriented X3, no focal deficits   CHEST/LUNG: Clear to percussion bilaterally; No rales, rhonchi, wheezing, or rubs  HEART: Regular rate and rhythm; No murmurs, rubs, or gallops  ABDOMEN: Soft, Nontender, Nondistended; Bowel sounds present  EXTREMITIES:  2+ Peripheral Pulses, No clubbing, cyanosis, or edema    LABS  07-09    138  |  99  |  23<H>  ----------------------------<  86  4.0   |  32  |  0.8    Ca    9.2      09 Jul 2021 05:58  Mg     2.2     07-09    TPro  5.8<L>  /  Alb  3.3<L>  /  TBili  0.7  /  DBili  x   /  AST  17  /  ALT  10  /  AlkPhos  98  07-09                          9.5    5.02  )-----------( 117      ( 09 Jul 2021 05:58 )             30.0     PT/INR - ( 08 Jul 2021 01:50 )   PT: 12.30 sec;   INR: 1.07 ratio         PTT - ( 08 Jul 2021 01:50 )  PTT:31.4 sec    CARDIAC ENZYMES      Troponin T, Serum: 0.20 ng/mL (07-08-21 @ 05:49)  Troponin T, Serum: 0.14 ng/mL (07-07-21 @ 21:40)  Troponin T, Serum: 0.14 ng/mL (07-07-21 @ 11:52)      Culture Results:   Testing in progress (07-08-21)    RADIOLOGY  < from: Xray Chest 1 View- PORTABLE-Routine (Xray Chest 1 View- PORTABLE-Routine in AM.) (07.09.21 @ 06:14) >  Impression:    Stable left basilar opacity and effusion.    Loculated right pleural fluid, unchanged.    < end of copied text >    MEDICATIONS  (STANDING):  chlorhexidine 4% Liquid 1 Application(s) Topical every 12 hours  hydrocortisone 1% Cream 1 Application(s) Topical three times a day  levothyroxine 100 MICROGram(s) Oral daily  pantoprazole  Injectable 40 milliGRAM(s) IV Push every 12 hours  polyethylene glycol 3350 17 Gram(s) Oral daily  senna 2 Tablet(s) Oral at bedtime    MEDICATIONS  (PRN):  albuterol/ipratropium for Nebulization 3 milliLiter(s) Nebulizer every 6 hours PRN Shortness of Breath and/or Wheezing  diphenhydrAMINE 25 milliGRAM(s) Oral every 6 hours PRN Rash and/or Itching  lactulose Syrup 20 Gram(s) Oral daily PRN constipation      Patient seen and evaluated this am, feels improved but still sob      T(F): 96.5 (07-09-21 @ 11:00), Max: 98.1 (07-08-21 @ 23:00)  HR: 77 (07-09-21 @ 13:00)  BP: 107/55 (07-09-21 @ 13:00)  RR: 20  SpO2: 100% (07-09-21 @ 13:00) (95% - 100%)    PHYSICAL EXAM:  GENERAL: NAD  HEAD:  Atraumatic, Normocephalic  EYES: EOMI, PERRLA, conjunctiva and sclera clear  NERVOUS SYSTEM:  Alert & Oriented X3, no focal deficits   CHEST/LUNG:  bilateral rhonchi  HEART: Regular rate and rhythm; No murmurs, rubs, or gallops  ABDOMEN: Soft, Nontender, Nondistended; Bowel sounds present  EXTREMITIES:  2+ Peripheral Pulses, No clubbing, cyanosis, or edema    LABS  07-09    138  |  99  |  23<H>  ----------------------------<  86  4.0   |  32  |  0.8    Ca    9.2      09 Jul 2021 05:58  Mg     2.2     07-09    TPro  5.8<L>  /  Alb  3.3<L>  /  TBili  0.7  /  DBili  x   /  AST  17  /  ALT  10  /  AlkPhos  98  07-09                          9.5    5.02  )-----------( 117      ( 09 Jul 2021 05:58 )             30.0     PT/INR - ( 08 Jul 2021 01:50 )   PT: 12.30 sec;   INR: 1.07 ratio         PTT - ( 08 Jul 2021 01:50 )  PTT:31.4 sec    CARDIAC ENZYMES      Troponin T, Serum: 0.20 ng/mL (07-08-21 @ 05:49)  Troponin T, Serum: 0.14 ng/mL (07-07-21 @ 21:40)  Troponin T, Serum: 0.14 ng/mL (07-07-21 @ 11:52)    < from: 12 Lead ECG (07.09.21 @ 07:44) >  Diagnosis Line Normal sinus rhythm  Right bundle branch block  Left anterior fascicular block  *** Bifascicular block ***  Voltage criteria for left ventricular hypertrophy  Abnormal ECG    < end of copied text >  < from: TTE Echo Complete w/o Contrast w/ Doppler (07.08.21 @ 10:46) >    Summary:   1. Left ventricular ejection fraction, by visual estimation, is 50 to 55%.   2. Mild left ventricular hypertrophy.   3. Severe aortic valve stenosis.   4. Mildly enlarged left atrium.   5. Mildly enlarged right atrium.   6. Mild mitral annular calcification.   7. Mild to moderate mitral valve regurgitation.   8. Moderate tricuspid regurgitation.   9. Mild aortic regurgitation.  10. Estimated pulmonary artery systolic pressure is 56.0 mmHg assuming a right atrial pressure of 3 mmHg, which is consistent with moderate pulmonary hypertension.  11. Peak transaortic gradient equals 91.8 mmHg, mean transaortic gradient equals 44.9 mmHg, the calculated aortic valve area equals 0.45 cm² by the continuity equation consistent with severe aortic stenosis.  12. There is mild aortic root calcification.    < end of copied text >    Culture Results:   Testing in progress (07-08-21)    RADIOLOGY  < from: Xray Chest 1 View- PORTABLE-Routine (Xray Chest 1 View- PORTABLE-Routine in AM.) (07.09.21 @ 06:14) >  Impression:    Stable left basilar opacity and effusion.    Loculated right pleural fluid, unchanged.    < end of copied text >    MEDICATIONS  (STANDING):  chlorhexidine 4% Liquid 1 Application(s) Topical every 12 hours  hydrocortisone 1% Cream 1 Application(s) Topical three times a day  levothyroxine 100 MICROGram(s) Oral daily  pantoprazole  Injectable 40 milliGRAM(s) IV Push every 12 hours  polyethylene glycol 3350 17 Gram(s) Oral daily  senna 2 Tablet(s) Oral at bedtime    MEDICATIONS  (PRN):  albuterol/ipratropium for Nebulization 3 milliLiter(s) Nebulizer every 6 hours PRN Shortness of Breath and/or Wheezing  diphenhydrAMINE 25 milliGRAM(s) Oral every 6 hours PRN Rash and/or Itching  lactulose Syrup 20 Gram(s) Oral daily PRN constipation

## 2021-07-09 NOTE — DIETITIAN INITIAL EVALUATION ADULT. - ORAL INTAKE PTA/DIET HISTORY
Pt has a decent appetite at baseline, typically consumes 2 larger meals with snacks in between. NKFA/intolerances. No cultural/Adventism food preferences. Follows low sodium diet; daily wts taken + on lasix. Takes probiotic, benefiber, vitamin D, E and magnesium supplements. Prefer soft/easy to chew foods.

## 2021-07-09 NOTE — DIETITIAN INITIAL EVALUATION ADULT. - ENERGY INTAKE
appetite is adequate per pt; has not yet had a meal since advanced to solids from clear liquid diet.

## 2021-07-09 NOTE — PROGRESS NOTE ADULT - SUBJECTIVE AND OBJECTIVE BOX
SUBJECTIVE:    Patient is a 88y old Female who presents with a chief complaint of hypotensive w/ severe anemia (08 Jul 2021 10:46)    Currently admitted to medicine with the primary diagnosis of Anemia     No overnight events    PAST MEDICAL & SURGICAL HISTORY  HTN (hypertension)  Mitral valve prolapse  Enlarged heart  Murmur  Hyperthyroidism  Arthritis  HTN (hypertension)  Diverticulosis  Hiatal hernia  Acid reflux  Liver cancer  s/p resection and s/p chemo and radiation  Aortic stenosis  s/p ballon aortic valuloplasty 5/25/21  H/O resection of liver  liver cancer  Status post cholecystectomy      SOCIAL HISTORY:  Negative for smoking/alcohol/drug use.     ALLERGIES:  Cipro (Other)  Levaquin (Rash)  tetracycline (Hives)    MEDICATIONS:  MEDICATIONS  (STANDING):  chlorhexidine 4% Liquid 1 Application(s) Topical every 12 hours  hydrocortisone 1% Cream 1 Application(s) Topical three times a day  levothyroxine 100 MICROGram(s) Oral daily  pantoprazole  Injectable 40 milliGRAM(s) IV Push every 12 hours  polyethylene glycol 3350 17 Gram(s) Oral daily  senna 2 Tablet(s) Oral at bedtime    MEDICATIONS  (PRN):  albuterol/ipratropium for Nebulization 3 milliLiter(s) Nebulizer every 6 hours PRN Shortness of Breath and/or Wheezing  diphenhydrAMINE 25 milliGRAM(s) Oral every 6 hours PRN Rash and/or Itching  lactulose Syrup 20 Gram(s) Oral daily PRN constipation    VITALS:   Vital Signs Last 24 Hrs  T(C): 35.8 (09 Jul 2021 11:00), Max: 36.7 (08 Jul 2021 23:00)  T(F): 96.5 (09 Jul 2021 11:00), Max: 98.1 (08 Jul 2021 23:00)  HR: 57 (09 Jul 2021 08:00) (57 - 78)  BP: 94/44 (09 Jul 2021 08:00) (85/46 - 97/50)  BP(mean): 64 (09 Jul 2021 08:00) (61 - 71)  RR: 16 (09 Jul 2021 11:00) (16 - 31)  SpO2: 99% (09 Jul 2021 08:00) (95% - 100%)    LABS:                                   9.5    5.02  )-----------( 117      ( 09 Jul 2021 05:58 )             30.0                  7.7    5.15  )-----------( 118      ( 08 Jul 2021 11:00 )             24.5     07-09    138  |  99  |  23<H>  ----------------------------<  86  4.0   |  32  |  0.8    Ca    9.2      09 Jul 2021 05:58  Mg     2.2     07-09    TPro  5.8<L>  /  Alb  3.3<L>  /  TBili  0.7  /  DBili  x   /  AST  17  /  ALT  10  /  AlkPhos  98  07-09 07-08    137  |  102  |  31<H>  ----------------------------<  92  3.8   |  29  |  0.8    Ca    8.8      08 Jul 2021 01:50    TPro  5.7<L>  /  Alb  3.5  /  TBili  0.7  /  DBili  x   /  AST  18  /  ALT  13  /  AlkPhos  86  07-08    PT/INR - ( 08 Jul 2021 01:50 )   PT: 12.30 sec;   INR: 1.07 ratio         PTT - ( 08 Jul 2021 01:50 )  PTT:31.4 sec      Troponin T, Serum: 0.20 ng/mL *HH* (07-08-21 @ 05:49)  Troponin T, Serum: 0.14 ng/mL *HH* (07-07-21 @ 21:40)      CARDIAC MARKERS ( 08 Jul 2021 05:49 )  x     / 0.20 ng/mL / x     / x     / x      CARDIAC MARKERS ( 07 Jul 2021 21:40 )  x     / 0.14 ng/mL / x     / x     / x      CARDIAC MARKERS ( 07 Jul 2021 11:52 )  x     / 0.14 ng/mL / x     / x     / x          RADIOLOGY:  < from: Xray Chest 1 View- PORTABLE-Urgent (Xray Chest 1 View- PORTABLE-Urgent .) (07.08.21 @ 12:31) >  Impression:    New right midlung field rounded opacity measuring 5 cm. Further evaluation with chest CT is recommended.    Improved aeration at the left lung base with residual disease present.    Right lower lobe atelectasis    < end of copied text >    PHYSICAL EXAM:  GEN: No acute distress  LUNGS: Clear to auscultation bilaterally   HEART: S1/S2 present. RRR.   ABD: Soft, non-tender, non-distended. Bowel sounds present  EXT: NC/NC/NE/2+PP/BEASLEY  NEURO: [id number]

## 2021-07-09 NOTE — PROGRESS NOTE ADULT - SUBJECTIVE AND OBJECTIVE BOX
88yFemale  Being followed for anemia  Interval history: Patient denies nausea, vomiting, hematemesis, melena, blood in stool, diarrhea, constipation, abdominal pain. Patient hungry for food.      PAST MEDICAL & SURGICAL HISTORY:   HTN (hypertension)    Mitral valve prolapse    Enlarged heart    Murmur    Hyperthyroidism    Arthritis    HTN (hypertension)    Diverticulosis    Hiatal hernia    Acid reflux    Liver cancer  s/p resection and s/p chemo and radiation    Aortic stenosis  s/p ballon aortic valuloplasty 5/25/21    H/O resection of liver  liver cancer    Status post cholecystectomy            Social History: No smoking. No alcohol. No illegal drug use.            MEDICATIONS  (STANDING):  chlorhexidine 4% Liquid 1 Application(s) Topical every 12 hours  hydrocortisone 1% Cream 1 Application(s) Topical three times a day  levothyroxine 100 MICROGram(s) Oral daily  pantoprazole  Injectable 40 milliGRAM(s) IV Push every 12 hours  polyethylene glycol 3350 17 Gram(s) Oral daily  senna 2 Tablet(s) Oral at bedtime    MEDICATIONS  (PRN):  albuterol/ipratropium for Nebulization 3 milliLiter(s) Nebulizer every 6 hours PRN Shortness of Breath and/or Wheezing  diphenhydrAMINE 25 milliGRAM(s) Oral every 6 hours PRN Rash and/or Itching  lactulose Syrup 20 Gram(s) Oral daily PRN constipation      Allergies:   Cipro (Other)  Levaquin (Rash)  tetracycline (Hives)              REVIEW OF SYSTEMS:  General:  No weight loss, fevers, or chills.  Eyes:  No reported pain or visual changes  ENT:  No sore throat or runny nose.  NECK: No stiffness   CV:  No chest pain or palpitations.  Resp:  + shortness of breath, No cough  GI:  No abdominal pain, nausea, vomiting, dysphagia, diarrhea or constipation. No rectal bleeding, melena, or hematemesis.  Neuro:  No tingling, numbness         VITAL SIGNS:   T(F): 96.5 (07-09-21 @ 11:00), Max: 98.1 (07-08-21 @ 23:00)  HR: 57 (07-09-21 @ 08:00) (57 - 78)  BP: 94/44 (07-09-21 @ 08:00) (85/46 - 97/50)  RR: 16 (07-09-21 @ 11:00) (16 - 31)  SpO2: 99% (07-09-21 @ 08:00) (95% - 100%)    PHYSICAL EXAM:  GENERAL: AAOx3, no acute distress.  HEAD:  Atraumatic, Normocephalic  EYES: conjunctiva and sclera clear  NECK: Supple, no JVD or thyromegaly  CHEST/LUNG: b/l rales   HEART: Regular rate and rhythm; normal S1, S2, No murmurs.  ABDOMEN: Soft, nontender, nondistended; Bowel sounds present  NEUROLOGY: No asterixis or tremor.   SKIN: Intact, no jaundice            LABS:                        9.5    5.02  )-----------( 117      ( 09 Jul 2021 05:58 )             30.0     07-09    138  |  99  |  23<H>  ----------------------------<  86  4.0   |  32  |  0.8    Ca    9.2      09 Jul 2021 05:58  Mg     2.2     07-09    TPro  5.8<L>  /  Alb  3.3<L>  /  TBili  0.7  /  DBili  x   /  AST  17  /  ALT  10  /  AlkPhos  98  07-09    LIVER FUNCTIONS - ( 09 Jul 2021 05:58 )  Alb: 3.3 g/dL / Pro: 5.8 g/dL / ALK PHOS: 98 U/L / ALT: 10 U/L / AST: 17 U/L / GGT: x           PT/INR - ( 08 Jul 2021 01:50 )   PT: 12.30 sec;   INR: 1.07 ratio         PTT - ( 08 Jul 2021 01:50 )  PTT:31.4 sec    IMAGING:    < from: Xray Chest 1 View- PORTABLE-Routine (Xray Chest 1 View- PORTABLE-Routine in AM.) (07.09.21 @ 06:14) >    EXAM:  XR CHEST PORTABLE  ROUTINE 1V            PROCEDURE DATE:  07/09/2021            INTERPRETATION:  Clinical History / Reason for exam: Effusion    Comparison : Chest radiograph July 8, 2021. Correlation is made with CT chest July 8, 2021.    Technique/Positioning: Single AP view of the chest.    Findings:    Support devices: None.    Cardiac/mediastinum/hilum: Unchanged.    Lung parenchyma/Pleura: Stable left basilar opacity and effusion. Loculated right pleural fluid, unchanged.. No pneumothorax.    Skeleton/soft tissues: Unchanged.    Impression:    Stable left basilar opacity and effusion.    Loculated right pleural fluid, unchanged.    --- End of Report ---              ELLIOT LANDAU MD; Attending Radiologist  This document has been electronically signed. Jul 9 2021 10:25AM    < end of copied text >

## 2021-07-09 NOTE — DIETITIAN INITIAL EVALUATION ADULT. - OTHER CALCULATIONS
wt used: dry wt 52.1 kg; Kcal: 2828-8776 (30-35 kcal/kg) pressure ulcer considered; Protein: 63-73 g (1.2-1.4 g/kg) same as above; Fluid: per CCU team.

## 2021-07-10 LAB
ANION GAP SERPL CALC-SCNC: 9 MMOL/L — SIGNIFICANT CHANGE UP (ref 7–14)
BUN SERPL-MCNC: 32 MG/DL — HIGH (ref 10–20)
CALCIUM SERPL-MCNC: 9.4 MG/DL — SIGNIFICANT CHANGE UP (ref 8.5–10.1)
CHLORIDE SERPL-SCNC: 96 MMOL/L — LOW (ref 98–110)
CO2 SERPL-SCNC: 29 MMOL/L — SIGNIFICANT CHANGE UP (ref 17–32)
CREAT SERPL-MCNC: 1.3 MG/DL — SIGNIFICANT CHANGE UP (ref 0.7–1.5)
GLUCOSE SERPL-MCNC: 109 MG/DL — HIGH (ref 70–99)
HCT VFR BLD CALC: 33.3 % — LOW (ref 37–47)
HGB BLD-MCNC: 10.5 G/DL — LOW (ref 12–16)
MCHC RBC-ENTMCNC: 28.1 PG — SIGNIFICANT CHANGE UP (ref 27–31)
MCHC RBC-ENTMCNC: 31.5 G/DL — LOW (ref 32–37)
MCV RBC AUTO: 89 FL — SIGNIFICANT CHANGE UP (ref 81–99)
NRBC # BLD: 0 /100 WBCS — SIGNIFICANT CHANGE UP (ref 0–0)
PLATELET # BLD AUTO: 130 K/UL — SIGNIFICANT CHANGE UP (ref 130–400)
POTASSIUM SERPL-MCNC: 4.5 MMOL/L — SIGNIFICANT CHANGE UP (ref 3.5–5)
POTASSIUM SERPL-SCNC: 4.5 MMOL/L — SIGNIFICANT CHANGE UP (ref 3.5–5)
RBC # BLD: 3.74 M/UL — LOW (ref 4.2–5.4)
RBC # FLD: 14.1 % — SIGNIFICANT CHANGE UP (ref 11.5–14.5)
SODIUM SERPL-SCNC: 134 MMOL/L — LOW (ref 135–146)
WBC # BLD: 6.18 K/UL — SIGNIFICANT CHANGE UP (ref 4.8–10.8)
WBC # FLD AUTO: 6.18 K/UL — SIGNIFICANT CHANGE UP (ref 4.8–10.8)

## 2021-07-10 PROCEDURE — 99233 SBSQ HOSP IP/OBS HIGH 50: CPT

## 2021-07-10 PROCEDURE — 99232 SBSQ HOSP IP/OBS MODERATE 35: CPT

## 2021-07-10 RX ADMIN — Medication 1 DROP(S): at 17:12

## 2021-07-10 RX ADMIN — CHLORHEXIDINE GLUCONATE 1 APPLICATION(S): 213 SOLUTION TOPICAL at 17:10

## 2021-07-10 RX ADMIN — LACTULOSE 20 GRAM(S): 10 SOLUTION ORAL at 05:52

## 2021-07-10 RX ADMIN — Medication 1 DROP(S): at 05:50

## 2021-07-10 RX ADMIN — Medication 1 APPLICATION(S): at 21:08

## 2021-07-10 RX ADMIN — CHLORHEXIDINE GLUCONATE 1 APPLICATION(S): 213 SOLUTION TOPICAL at 05:51

## 2021-07-10 RX ADMIN — PANTOPRAZOLE SODIUM 40 MILLIGRAM(S): 20 TABLET, DELAYED RELEASE ORAL at 05:50

## 2021-07-10 RX ADMIN — Medication 100 MICROGRAM(S): at 05:50

## 2021-07-10 RX ADMIN — Medication 1 APPLICATION(S): at 14:33

## 2021-07-10 RX ADMIN — Medication 1 APPLICATION(S): at 05:49

## 2021-07-10 NOTE — PROGRESS NOTE ADULT - ASSESSMENT
Patient constapated.  Echo severe AS. But gradient less than pre valvuloplasty. Follow labs . Transfuse if needed. Check cxr. Will eventually need resume lasix. Out greater in. OOB chair. When able  ambulate with assistance.

## 2021-07-10 NOTE — PHYSICAL THERAPY INITIAL EVALUATION ADULT - GENERAL OBSERVATIONS, REHAB EVAL
13:20-13:50. Chart reviewed; confirmed with RN to see the pt for PT. Pt ready for PT; received in chair with no complain of pain and in NAD. +hep lock R UE, +tele, +pulse ox, +prima fit. Agreeable for PT evaluation.

## 2021-07-10 NOTE — PROGRESS NOTE ADULT - SUBJECTIVE AND OBJECTIVE BOX
DILIP LUCAS  88y  Female      Patient is a 88y old  Female who presents with a chief complaint of hypotensive w/ severe anemia (10 Jul 2021 07:57)      INTERVAL HPI/OVERNIGHT EVENTS:  pt seen and examined early this morning in CCU  -Hb stable  -rehab/pt c/s; oob to chair as tolerated   -care as per ICU/CCU team     REVIEW OF SYSTEMS:  -no complaints     Vital Signs Last 24 Hrs  T(C): 36.1 (10 Jul 2021 11:00), Max: 36.8 (09 Jul 2021 19:01)  T(F): 96.9 (10 Jul 2021 11:00), Max: 98.3 (09 Jul 2021 23:00)  HR: 83 (10 Jul 2021 14:12) (62 - 89)  BP: 132/92 (10 Jul 2021 14:12) (82/45 - 132/92)  BP(mean): 107 (10 Jul 2021 14:12) (60 - 107)  RR: 28 (10 Jul 2021 14:12) (18 - 35)  SpO2: 98% (10 Jul 2021 14:12) (97% - 100%)    PHYSICAL EXAM:  GENERAL: NAD, sitting up in chair and speaking in full sentences   HEAD:  Atraumatic, Normocephalic  EYES: EOMI, PERRLA, conjunctiva and sclera clear  NERVOUS SYSTEM:  Alert & Oriented X 3  CHEST/LUNG: bibasilar crackles   CV/HEART: Regular rate and rhythm; No murmurs, rubs, or gallops  GI/ABDOMEN: Soft, Nontender, Nondistended; Bowel sounds present  EXTREMITIES:  2+ Peripheral Pulses, No clubbing, cyanosis, or edema  SKIN: No rashes or lesions    LAB:                        10.5   6.18  )-----------( 130      ( 10 Jul 2021 06:14 )             33.3     07-10    134<L>  |  96<L>  |  32<H>  ----------------------------<  109<H>  4.5   |  29  |  1.3    Ca    9.4      10 Jul 2021 06:14  Mg     2.2     07-09    TPro  5.8<L>  /  Alb  3.3<L>  /  TBili  0.7  /  DBili  x   /  AST  17  /  ALT  10  /  AlkPhos  98  07-09      Daily     Daily   CAPILLARY BLOOD GLUCOSE    LIVER FUNCTIONS - ( 09 Jul 2021 05:58 )  Alb: 3.3 g/dL / Pro: 5.8 g/dL / ALK PHOS: 98 U/L / ALT: 10 U/L / AST: 17 U/L / GGT: x               RADIOLOGY:    Imaging Personally Reviewed:  [ ] YES  [ ] NO    HEALTH ISSUES - PROBLEM Dx:  Anemia  Anemia    Liver cancer  Liver cancer    MEDS:  albuterol/ipratropium for Nebulization 3 milliLiter(s) Nebulizer every 6 hours PRN  artificial  tears Solution 1 Drop(s) Both EYES two times a day  chlorhexidine 4% Liquid 1 Application(s) Topical every 12 hours  diphenhydrAMINE 25 milliGRAM(s) Oral every 6 hours PRN  hydrocortisone 1% Cream 1 Application(s) Topical three times a day  lactulose Syrup 20 Gram(s) Oral daily PRN  levothyroxine 100 MICROGram(s) Oral daily  pantoprazole  Injectable 40 milliGRAM(s) IV Push every 12 hours  polyethylene glycol 3350 17 Gram(s) Oral daily  senna 2 Tablet(s) Oral at bedtime

## 2021-07-10 NOTE — PROGRESS NOTE ADULT - ASSESSMENT
IMPRESSION:  hypotension  Severe anemia 2 units prbc  Critical AS AS s/p BAV (refused TAVR)  episode of CHB s/p valvulooplasty-resolved (refused loop)   HFpef  HO liver Ca in remission  HO PAD  Left pleural effusion s/p thora 7/8- transudative  loculted rounded effusion on R in fissure    PLAN:    CNS: avoid sedatives    HEENT: Oral care    PULMONARY:  HOB @ 45 degrees.    Aspiration precautions.       CARDIOVASCULAR:   lasix 40mg IV x1.   goal MAP >60.   cont = to neg balance    GI: GI prophylaxis.  Feeding with clear liquids.  Bowel regimen. GI eval appreciated, fu fobt    RENAL:  Follow up lytes.  Correct as needed    INFECTIOUS DISEASE: Follow up cultures    HEMATOLOGICAL:  DVT prophylaxis with sequentials.  Goal HGB >8.0  F/U FOBT.   F/U peripheral smear, hemolysis workup    ENDOCRINE:  Follow up FS.  Insulin protocol if needed. Benadryl, topical steroid on rash    MUSCULOSKELETAL: bedrest    Dispo: MICU    D/C TLC

## 2021-07-10 NOTE — PROGRESS NOTE ADULT - SUBJECTIVE AND OBJECTIVE BOX
Patient is a 88y old  Female who presents with a chief complaint of hypotensive w/ severe anemia (10 Jul 2021 06:36)        HPI:  87 yo Female w/ extensive PMH as noted below.  Pt c/o worsening chest pressure discomfort this am & , worsening dyspnea (1-2 weeks) .  Pt is s/p aortic balloon valvuloplasty @ [facility] (5/25/21)   for bridging treatment for severe Aortic stenosis.  Pt had refused TAVR procedure in past.  Pt now w/ worsening dyspnea chest pressure pain. pt found to be severely anemic w/ Hgb-5.4 ,  Last Hgb was 9.1.  This is 3.7 gm drop since last adm. 2 months ago.  Pt stated she has constipation and went she has bowel movement she strains and sees blood in toilet paper. Pt denies- hemeatemesis, gross hematochezia or black stools, fever , chills, abdomenal pain.  Pt was brought to ICU for anemia, hypotension.  Pt recieved 2 units prbc in ER.  (07 Jul 2021 21:37)      Pt evaluated on rounds.  I reviewed the radiology tests and hospital record.    I reviewed my previous notes on this patient.    Interval Events: No overnight events.    REVIEW OF SYSTEMS:   see HPI      OBJECTIVE:  ICU Vital Signs Last 24 Hrs  T(C): 36.3 (10 Jul 2021 07:01), Max: 36.8 (09 Jul 2021 19:01)  T(F): 97.4 (10 Jul 2021 07:01), Max: 98.3 (09 Jul 2021 23:00)  HR: 66 (10 Jul 2021 07:01) (57 - 84)  BP: 97/52 (10 Jul 2021 07:01) (82/45 - 128/59)  BP(mean): 70 (10 Jul 2021 07:01) (60 - 85)  ABP: --  ABP(mean): --  RR: 25 (10 Jul 2021 07:01) (16 - 38)  SpO2: 98% (10 Jul 2021 07:01) (97% - 100%)        07-09 @ 07:01  -  07-10 @ 07:00  --------------------------------------------------------  IN: 520 mL / OUT: 2101 mL / NET: -1581 mL      CAPILLARY BLOOD GLUCOSE            PHYSICAL EXAM:     · CONSTITUTIONAL:   not septic appearing,   well nourished,   NAD    · ENMT:   Airway patent,   Nasal mucosa clear.  Mouth with normal mucosa.   No thrush    · EYES:   Clear bilaterally,   pupils equal,   round and reactive to light.    · CARDIAC:   Normal rate,   regular rhythm.    Heart sounds S1, S2.   No murmurs, no rubs or gallops on auscultation  no edema        CAROTID:   normal systolic impulse  no bruits    · RESPIRATORY:   rales  normal chest expansion  no retractions or use of accessory muscles  palpation of chest is normal with no fremitus  percussion of chest demonstrates no hyperresonance or dullness    · GASTROINTESTINAL:  Abdomen soft,   non-tender,   + BS  liver/spleen not palpable    · MUSCULOSKELETAL:   no clubbing, cyanosis      · SKIN:   Skin normal color for race,   warm, dry   No evidence of rash.        · HEME LYMPH:   no splenomegaly.  No cervical  lymphadenopathy.  no inguinal lymphadenopathy    HOSPITAL MEDICATIONS:  MEDICATIONS  (STANDING):  artificial  tears Solution 1 Drop(s) Both EYES two times a day  chlorhexidine 4% Liquid 1 Application(s) Topical every 12 hours  hydrocortisone 1% Cream 1 Application(s) Topical three times a day  levothyroxine 100 MICROGram(s) Oral daily  pantoprazole  Injectable 40 milliGRAM(s) IV Push every 12 hours  polyethylene glycol 3350 17 Gram(s) Oral daily  senna 2 Tablet(s) Oral at bedtime    MEDICATIONS  (PRN):  albuterol/ipratropium for Nebulization 3 milliLiter(s) Nebulizer every 6 hours PRN Shortness of Breath and/or Wheezing  diphenhydrAMINE 25 milliGRAM(s) Oral every 6 hours PRN Rash and/or Itching  lactulose Syrup 20 Gram(s) Oral daily PRN constipation        LABS:                        9.5    5.02  )-----------( 117      ( 09 Jul 2021 05:58 )             30.0     07-10    134<L>  |  96<L>  |  32<H>  ----------------------------<  109<H>  4.5   |  29  |  1.3    Ca    9.4      10 Jul 2021 06:14  Mg     2.2     07-09    TPro  5.8<L>  /  Alb  3.3<L>  /  TBili  0.7  /  DBili  x   /  AST  17  /  ALT  10  /  AlkPhos  98  07-09                COVID-19 PCR: NotDetec (07 Jul 2021 11:52)  COVID-19 PCR: NotDetec (24 May 2021 10:50)              RADIOLOGY: Today I personally interpreted the latest CXR and other pertinent films.

## 2021-07-10 NOTE — PROGRESS NOTE ADULT - SUBJECTIVE AND OBJECTIVE BOX
Patient is a 88y old  Female who presents with a chief complaint of hypotensive w/ severe anemia (09 Jul 2021 14:28)      T(F): 97.6 (07-10-21 @ 03:00), Max: 98.3 (07-09-21 @ 23:00)  HR: 66 (07-10-21 @ 03:01)  BP: 94/46 (07-10-21 @ 03:01)  RR: 20 (07-10-21 @ 05:00)  SpO2: 97% (07-10-21 @ 05:00) (97% - 100%)    PHYSICAL EXAM:  GENERAL: NAD, well-groomed, well-developed  HEAD:  Atraumatic, Normocephalic  EYES: EOMI, PERRLA, conjunctiva and sclera clear  ENMT: No tonsillar erythema, exudates, or enlargement; Moist mucous membranes, Good dentition, No lesions  NECK: Supple, No JVD, Normal thyroid  NERVOUS SYSTEM:  Alert & Oriented X3,  Motor Strength 5/5 B/L upper and lower extremities  CHEST/LUNG: Clear to percussion bilaterally; No rales, rhonchi, wheezing, or rubs   HEART: Regular rate and rhythm; II/VI PRIYA LSB  ABDOMEN: Soft, Nontender, Nondistended; Bowel sounds present  EXTREMITIES:   No clubbing, cyanosis, or edema  LYMPH: No lymphadenopathy noted  SKIN: No rashes or lesions    labs  07-09    138  |  99  |  23<H>  ----------------------------<  86  4.0   |  32  |  0.8    Ca    9.2      09 Jul 2021 05:58  Mg     2.2     07-09    TPro  5.8<L>  /  Alb  3.3<L>  /  TBili  0.7  /  DBili  x   /  AST  17  /  ALT  10  /  AlkPhos  98  07-09                          9.5    5.02  )-----------( 117      ( 09 Jul 2021 05:58 )             30.0       Culture - Fungal, Body Fluid (collected 08 Jul 2021 12:21)  Source: .Body Fluid Pleural Fluid  Preliminary Report (09 Jul 2021 07:53):    Testing in progress    Culture - Body Fluid with Gram Stain (collected 08 Jul 2021 12:21)  Source: .Body Fluid Pleural Fluid  Gram Stain (09 Jul 2021 02:22):    polymorphonuclear leukocytes seen    No organisms seen    by cytocentrifuge  Preliminary Report (09 Jul 2021 19:17):    No growth              albuterol/ipratropium for Nebulization 3 milliLiter(s) Nebulizer every 6 hours PRN  artificial  tears Solution 1 Drop(s) Both EYES two times a day  chlorhexidine 4% Liquid 1 Application(s) Topical every 12 hours  diphenhydrAMINE 25 milliGRAM(s) Oral every 6 hours PRN  hydrocortisone 1% Cream 1 Application(s) Topical three times a day  lactulose Syrup 20 Gram(s) Oral daily PRN  levothyroxine 100 MICROGram(s) Oral daily  pantoprazole  Injectable 40 milliGRAM(s) IV Push every 12 hours  polyethylene glycol 3350 17 Gram(s) Oral daily  senna 2 Tablet(s) Oral at bedtime

## 2021-07-10 NOTE — PHYSICAL THERAPY INITIAL EVALUATION ADULT - PHYSICAL ASSIST/NONPHYSICAL ASSIST: GAIT, REHAB EVAL
proper sequencing with rolling walker, upright posture, heel-toe pattern/verbal cues/1 person assist

## 2021-07-10 NOTE — PROGRESS NOTE ADULT - ASSESSMENT
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  -CBC stable; patient needs close monitoring with PMD and GI in the community; c/w PPI twice daily   -Lasix/Diuretics as per Cardiology   -s/p Thoracentesis this admission; follow up on sent out labs  -cardiology follow up for AS  -repeat lung imaging and outpatient follow up with Pulmonary   -monitor Kidney function, serum Cr 1.3 this am from 0.8 yest  -rehab/pt c/s placed; oob to chair with assistance   -overall prognosis guarded       # Progress Note Handoff  PENDING as follows  consults: Cardio and Pulm in CCU  Test: CBC  Family discussion: discussed with patient who comprehends her medical care   Disposition: rehab/pt eval for STR    Attending Physician Dr. 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  ·	Hyponatremia (check in am; encourage oral intake)     -CBC stable; patient needs close monitoring with PMD and GI in the community; c/w PPI twice daily   -Lasix/Diuretics as per Cardiology   -s/p Thoracentesis this admission; follow up on sent out labs  -cardiology follow up for AS  -repeat lung imaging and outpatient follow up with Pulmonary   -monitor Kidney function, serum Cr 1.3 this am from 0.8 yest  -rehab/pt c/s placed; oob to chair with assistance   -overall prognosis guarded       # Progress Note Handoff  PENDING as follows  consults: Cardio and Pulm in CCU  Test: CBC  Family discussion: discussed with patient who comprehends her medical care   Disposition: rehab/pt eval for STR    Attending Physician Dr. Deidra Aldridge # [id number]   *** care as per CCU team; discussed plan of care with Unit Resident

## 2021-07-11 LAB
ALBUMIN SERPL ELPH-MCNC: 3.2 G/DL — LOW (ref 3.5–5.2)
ALP SERPL-CCNC: 102 U/L — SIGNIFICANT CHANGE UP (ref 30–115)
ALT FLD-CCNC: 11 U/L — SIGNIFICANT CHANGE UP (ref 0–41)
ANION GAP SERPL CALC-SCNC: 8 MMOL/L — SIGNIFICANT CHANGE UP (ref 7–14)
AST SERPL-CCNC: 19 U/L — SIGNIFICANT CHANGE UP (ref 0–41)
BASOPHILS # BLD AUTO: 0.01 K/UL — SIGNIFICANT CHANGE UP (ref 0–0.2)
BASOPHILS NFR BLD AUTO: 0.2 % — SIGNIFICANT CHANGE UP (ref 0–1)
BILIRUB SERPL-MCNC: 0.3 MG/DL — SIGNIFICANT CHANGE UP (ref 0.2–1.2)
BUN SERPL-MCNC: 30 MG/DL — HIGH (ref 10–20)
CALCIUM SERPL-MCNC: 8.6 MG/DL — SIGNIFICANT CHANGE UP (ref 8.5–10.1)
CHLORIDE SERPL-SCNC: 101 MMOL/L — SIGNIFICANT CHANGE UP (ref 98–110)
CO2 SERPL-SCNC: 29 MMOL/L — SIGNIFICANT CHANGE UP (ref 17–32)
CREAT SERPL-MCNC: 0.9 MG/DL — SIGNIFICANT CHANGE UP (ref 0.7–1.5)
EOSINOPHIL # BLD AUTO: 0.13 K/UL — SIGNIFICANT CHANGE UP (ref 0–0.7)
EOSINOPHIL NFR BLD AUTO: 2.6 % — SIGNIFICANT CHANGE UP (ref 0–8)
GLUCOSE SERPL-MCNC: 87 MG/DL — SIGNIFICANT CHANGE UP (ref 70–99)
HCT VFR BLD CALC: 31.6 % — LOW (ref 37–47)
HGB BLD-MCNC: 9.9 G/DL — LOW (ref 12–16)
IMM GRANULOCYTES NFR BLD AUTO: 0.4 % — HIGH (ref 0.1–0.3)
LYMPHOCYTES # BLD AUTO: 0.86 K/UL — LOW (ref 1.2–3.4)
LYMPHOCYTES # BLD AUTO: 17 % — LOW (ref 20.5–51.1)
MCHC RBC-ENTMCNC: 27.9 PG — SIGNIFICANT CHANGE UP (ref 27–31)
MCHC RBC-ENTMCNC: 31.3 G/DL — LOW (ref 32–37)
MCV RBC AUTO: 89 FL — SIGNIFICANT CHANGE UP (ref 81–99)
MONOCYTES # BLD AUTO: 0.62 K/UL — HIGH (ref 0.1–0.6)
MONOCYTES NFR BLD AUTO: 12.3 % — HIGH (ref 1.7–9.3)
NEUTROPHILS # BLD AUTO: 3.42 K/UL — SIGNIFICANT CHANGE UP (ref 1.4–6.5)
NEUTROPHILS NFR BLD AUTO: 67.5 % — SIGNIFICANT CHANGE UP (ref 42.2–75.2)
NRBC # BLD: 0 /100 WBCS — SIGNIFICANT CHANGE UP (ref 0–0)
PLATELET # BLD AUTO: 128 K/UL — LOW (ref 130–400)
POTASSIUM SERPL-MCNC: 4.3 MMOL/L — SIGNIFICANT CHANGE UP (ref 3.5–5)
POTASSIUM SERPL-SCNC: 4.3 MMOL/L — SIGNIFICANT CHANGE UP (ref 3.5–5)
PROT SERPL-MCNC: 5.8 G/DL — LOW (ref 6–8)
RBC # BLD: 3.55 M/UL — LOW (ref 4.2–5.4)
RBC # FLD: 14.1 % — SIGNIFICANT CHANGE UP (ref 11.5–14.5)
SODIUM SERPL-SCNC: 138 MMOL/L — SIGNIFICANT CHANGE UP (ref 135–146)
WBC # BLD: 5.06 K/UL — SIGNIFICANT CHANGE UP (ref 4.8–10.8)
WBC # FLD AUTO: 5.06 K/UL — SIGNIFICANT CHANGE UP (ref 4.8–10.8)

## 2021-07-11 PROCEDURE — 93010 ELECTROCARDIOGRAM REPORT: CPT | Mod: 76

## 2021-07-11 PROCEDURE — 99233 SBSQ HOSP IP/OBS HIGH 50: CPT

## 2021-07-11 PROCEDURE — 99232 SBSQ HOSP IP/OBS MODERATE 35: CPT

## 2021-07-11 RX ORDER — FUROSEMIDE 40 MG
20 TABLET ORAL DAILY
Refills: 0 | Status: DISCONTINUED | OUTPATIENT
Start: 2021-07-11 | End: 2021-07-14

## 2021-07-11 RX ORDER — ALPRAZOLAM 0.25 MG
0.25 TABLET ORAL ONCE
Refills: 0 | Status: DISCONTINUED | OUTPATIENT
Start: 2021-07-11 | End: 2021-07-11

## 2021-07-11 RX ADMIN — Medication 1 APPLICATION(S): at 05:20

## 2021-07-11 RX ADMIN — CHLORHEXIDINE GLUCONATE 1 APPLICATION(S): 213 SOLUTION TOPICAL at 05:20

## 2021-07-11 RX ADMIN — CHLORHEXIDINE GLUCONATE 1 APPLICATION(S): 213 SOLUTION TOPICAL at 17:33

## 2021-07-11 RX ADMIN — Medication 100 MICROGRAM(S): at 05:19

## 2021-07-11 RX ADMIN — POLYETHYLENE GLYCOL 3350 17 GRAM(S): 17 POWDER, FOR SOLUTION ORAL at 12:50

## 2021-07-11 RX ADMIN — Medication 20 MILLIGRAM(S): at 12:51

## 2021-07-11 RX ADMIN — Medication 1 APPLICATION(S): at 21:31

## 2021-07-11 RX ADMIN — PANTOPRAZOLE SODIUM 40 MILLIGRAM(S): 20 TABLET, DELAYED RELEASE ORAL at 05:20

## 2021-07-11 RX ADMIN — SENNA PLUS 2 TABLET(S): 8.6 TABLET ORAL at 21:31

## 2021-07-11 RX ADMIN — Medication 1 DROP(S): at 05:19

## 2021-07-11 RX ADMIN — Medication 0.25 MILLIGRAM(S): at 22:49

## 2021-07-11 RX ADMIN — PANTOPRAZOLE SODIUM 40 MILLIGRAM(S): 20 TABLET, DELAYED RELEASE ORAL at 17:33

## 2021-07-11 NOTE — PROGRESS NOTE ADULT - SUBJECTIVE AND OBJECTIVE BOX
Patient is a 88y old  Female who presents with a chief complaint of hypotensive w/ severe anemia (11 Jul 2021 06:30)      T(F): 95.3 (07-11-21 @ 03:05), Max: 99.2 (07-10-21 @ 19:00)  HR: 60 (07-11-21 @ 05:00)  BP: 112/55 (07-11-21 @ 05:00)  RR: 35 (07-11-21 @ 05:00)  SpO2: 98% (07-11-21 @ 05:00) (97% - 100%)    PHYSICAL EXAM:  GENERAL: NAD, well-groomed, well-developed  HEAD:  Atraumatic, Normocephalic  EYES: EOMI, PERRLA, conjunctiva and sclera clear  ENMT: No tonsillar erythema, exudates, or enlargement; Moist mucous membranes, Good dentition, No lesions  NECK: Supple, No JVD, Normal thyroid  NERVOUS SYSTEM:  Alert & Oriented X3,  Motor Strength 5/5 B/L upper and lower extremities  CHEST/LUNG: Clear to percussion bilaterally; No rales, rhonchi, wheezing, or rubs  HEART: Regular rate and rhythm; II/Vi PRIYA lsb    ABDOMEN: Soft, Nontender, Nondistended; Bowel sounds present  EXTREMITIES:   No clubbing, cyanosis, or edema  LYMPH: No lymphadenopathy noted  SKIN: No rashes or lesions    labs  07-10    134<L>  |  96<L>  |  32<H>  ----------------------------<  109<H>  4.5   |  29  |  1.3    Ca    9.4      10 Jul 2021 06:14                            10.5   6.18  )-----------( 130      ( 10 Jul 2021 06:14 )             33.3       Culture - Fungal, Body Fluid (collected 08 Jul 2021 12:21)  Source: .Body Fluid Pleural Fluid  Preliminary Report (09 Jul 2021 07:53):    Testing in progress    Culture - Body Fluid with Gram Stain (collected 08 Jul 2021 12:21)  Source: .Body Fluid Pleural Fluid  Gram Stain (09 Jul 2021 02:22):    polymorphonuclear leukocytes seen    No organisms seen    by cytocentrifuge  Preliminary Report (09 Jul 2021 19:17):    No growth              albuterol/ipratropium for Nebulization 3 milliLiter(s) Nebulizer every 6 hours PRN  artificial  tears Solution 1 Drop(s) Both EYES two times a day  chlorhexidine 4% Liquid 1 Application(s) Topical every 12 hours  diphenhydrAMINE 25 milliGRAM(s) Oral every 6 hours PRN  hydrocortisone 1% Cream 1 Application(s) Topical three times a day  lactulose Syrup 20 Gram(s) Oral daily PRN  levothyroxine 100 MICROGram(s) Oral daily  pantoprazole  Injectable 40 milliGRAM(s) IV Push every 12 hours  polyethylene glycol 3350 17 Gram(s) Oral daily  senna 2 Tablet(s) Oral at bedtime

## 2021-07-11 NOTE — PROGRESS NOTE ADULT - ASSESSMENT
IMPRESSION:  hypotension  Severe anemia 2 units prbc  Critical AS AS s/p BAV (refused TAVR)  episode of CHB s/p valvulooplasty-resolved (refused loop)   HFpef  HO liver Ca in remission  HO PAD  Left pleural effusion s/p thora 7/8- transudative  loculted rounded effusion on R in fissure    PLAN:    CNS: avoid sedatives    HEENT: Oral care    PULMONARY:  HOB @ 45 degrees.    Aspiration precautions.   repeat CXR      CARDIOVASCULAR:   lasix per card  cont = to neg balance    GI: GI prophylaxis.  Feeding with clear liquids.  Bowel regimen. GI eval appreciated, fu fobt    RENAL:  Follow up lytes.  Correct as needed    INFECTIOUS DISEASE: Follow up cultures    HEMATOLOGICAL:  DVT prophylaxis with sequentials.  Goal HGB >8.0  F/U FOBT.   F/U peripheral smear, hemolysis workup    ENDOCRINE:  Follow up FS.  Insulin protocol if needed. Benadryl, topical steroid on rash    MUSCULOSKELETAL: bedrest    Dispo: MICU

## 2021-07-11 NOTE — PROGRESS NOTE ADULT - SUBJECTIVE AND OBJECTIVE BOX
Patient is a 88y old  Female who presents with a chief complaint of hypotensive w/ severe anemia (10 Jul 2021 15:04)        HPI:  87 yo Female w/ extensive PMH as noted below.  Pt c/o worsening chest pressure discomfort this am & , worsening dyspnea (1-2 weeks) .  Pt is s/p aortic balloon valvuloplasty @ [facility] (5/25/21)   for bridging treatment for severe Aortic stenosis.  Pt had refused TAVR procedure in past.  Pt now w/ worsening dyspnea chest pressure pain. pt found to be severely anemic w/ Hgb-5.4 ,  Last Hgb was 9.1.  This is 3.7 gm drop since last adm. 2 months ago.  Pt stated she has constipation and went she has bowel movement she strains and sees blood in toilet paper. Pt denies- hemeatemesis, gross hematochezia or black stools, fever , chills, abdomenal pain.  Pt was brought to ICU for anemia, hypotension.  Pt recieved 2 units prbc in ER.  (07 Jul 2021 21:37)      Pt evaluated on rounds.  I reviewed the radiology tests and hospital record.    I reviewed my previous notes on this patient.    Interval Events: No overnight events.    REVIEW OF SYSTEMS:   see HPI      OBJECTIVE:  ICU Vital Signs Last 24 Hrs  T(C): 35.2 (11 Jul 2021 03:05), Max: 37.3 (10 Jul 2021 19:00)  T(F): 95.3 (11 Jul 2021 03:05), Max: 99.2 (10 Jul 2021 19:00)  HR: 60 (11 Jul 2021 05:00) (60 - 89)  BP: 112/55 (11 Jul 2021 05:00) (93/53 - 132/92)  BP(mean): 79 (11 Jul 2021 05:00) (65 - 107)  ABP: --  ABP(mean): --  RR: 35 (11 Jul 2021 05:00) (17 - 35)  SpO2: 98% (11 Jul 2021 05:00) (97% - 100%)        07-09 @ 07:01  -  07-10 @ 07:00  --------------------------------------------------------  IN: 520 mL / OUT: 2101 mL / NET: -1581 mL    07-10 @ 07:01 - 07-11 @ 06:30  --------------------------------------------------------  IN: 0 mL / OUT: 800 mL / NET: -800 mL      CAPILLARY BLOOD GLUCOSE            PHYSICAL EXAM:     · CONSTITUTIONAL:   not septic appearing,   well nourished,   NAD    · ENMT:   Airway patent,   Nasal mucosa clear.  Mouth with normal mucosa.   No thrush    · EYES:   Clear bilaterally,   pupils equal,   round and reactive to light.    · CARDIAC:   Normal rate,   regular rhythm.    Heart sounds S1, S2.   No murmurs, no rubs or gallops on auscultation  no edema        CAROTID:   normal systolic impulse  no bruits    · RESPIRATORY:   rales  normal chest expansion  no retractions or use of accessory muscles  palpation of chest is normal with no fremitus  percussion of chest demonstrates no hyperresonance or dullness    · GASTROINTESTINAL:  Abdomen soft,   non-tender,   + BS  liver/spleen not palpable    · MUSCULOSKELETAL:   no clubbing, cyanosis      · SKIN:   Skin normal color for race,   warm, dry   No evidence of rash.        · HEME LYMPH:   no splenomegaly.  No cervical  lymphadenopathy.  no inguinal lymphadenopathy    HOSPITAL MEDICATIONS:  MEDICATIONS  (STANDING):  artificial  tears Solution 1 Drop(s) Both EYES two times a day  chlorhexidine 4% Liquid 1 Application(s) Topical every 12 hours  hydrocortisone 1% Cream 1 Application(s) Topical three times a day  levothyroxine 100 MICROGram(s) Oral daily  pantoprazole  Injectable 40 milliGRAM(s) IV Push every 12 hours  polyethylene glycol 3350 17 Gram(s) Oral daily  senna 2 Tablet(s) Oral at bedtime    MEDICATIONS  (PRN):  albuterol/ipratropium for Nebulization 3 milliLiter(s) Nebulizer every 6 hours PRN Shortness of Breath and/or Wheezing  diphenhydrAMINE 25 milliGRAM(s) Oral every 6 hours PRN Rash and/or Itching  lactulose Syrup 20 Gram(s) Oral daily PRN constipation        LABS:                        10.5   6.18  )-----------( 130      ( 10 Jul 2021 06:14 )             33.3     07-10    134<L>  |  96<L>  |  32<H>  ----------------------------<  109<H>  4.5   |  29  |  1.3    Ca    9.4      10 Jul 2021 06:14                  COVID-19 PCR: NotDetec (07 Jul 2021 11:52)  COVID-19 PCR: NotDetec (24 May 2021 10:50)              RADIOLOGY: Today I personally interpreted the latest CXR and other pertinent films.

## 2021-07-11 NOTE — PROGRESS NOTE ADULT - ASSESSMENT
Echo severe AS. But gradient less than pre valvuloplasty. Follow labs . Transfuse if needed. H/H good.  Will eventually need resume lasix. Out greater in. OOB chair. When able  ambulate with assistance.

## 2021-07-11 NOTE — PROGRESS NOTE ADULT - SUBJECTIVE AND OBJECTIVE BOX
Patient is feeling anxious about leaving hospital       T(F): 96.2 (07-11-21 @ 11:00), Max: 99.2 (07-10-21 @ 19:00)  HR: 67 (07-11-21 @ 07:11)  BP: 115/55 (07-11-21 @ 07:11)  RR: 21 (07-11-21 @ 11:00)  SpO2: 100% (07-11-21 @ 07:11) (97% - 100%)    PHYSICAL EXAM:  GENERAL: NAD  HEAD:  Atraumatic, Normocephalic  EYES: EOMI, PERRLA, conjunctiva and sclera clear  NERVOUS SYSTEM:  Alert & Oriented X3, no focal deficits   CHEST/LUNG:  bilateral rhonchi  HEART: Regular rate and rhythm; No murmurs, rubs, or gallops  ABDOMEN: Soft, Nontender, Nondistended; Bowel sounds present  EXTREMITIES:  2+ Peripheral Pulses, No clubbing, cyanosis, or edema    LABS  07-11    138  |  101  |  30<H>  ----------------------------<  87  4.3   |  29  |  0.9    Ca    8.6      11 Jul 2021 06:13    TPro  5.8<L>  /  Alb  3.2<L>  /  TBili  0.3  /  DBili  x   /  AST  19  /  ALT  11  /  AlkPhos  102  07-11                          9.9    5.06  )-----------( 128      ( 11 Jul 2021 06:13 )             31.6         CARDIAC ENZYMES    Troponin T, Serum: 0.20: Critical value:previously called ng/mL (07.08.21 @ 05:49)   < from: 12 Lead ECG (07.11.21 @ 09:00) >  Diagnosis Line Sinus rhythm with Premature atrial complexes  Left axis deviation  Right bundle branch block  Voltage criteria for left ventricular hypertrophy  Abnormal ECG      < end of copied text >  < from: TTE Echo Complete w/o Contrast w/ Doppler (07.08.21 @ 10:46) >  Summary:   1. Left ventricular ejection fraction, by visual estimation, is 50 to 55%.   2. Mild left ventricular hypertrophy.   3. Severe aortic valve stenosis.   4. Mildly enlarged left atrium.   5. Mildly enlarged right atrium.   6. Mild mitral annular calcification.   7. Mild to moderate mitral valve regurgitation.   8. Moderate tricuspid regurgitation.   9. Mild aortic regurgitation.  10. Estimated pulmonary artery systolic pressure is 56.0 mmHg assuming a right atrial pressure of 3 mmHg, which is consistent with moderate pulmonary hypertension.  11. Peak transaortic gradient equals 91.8 mmHg, mean transaortic gradient equals 44.9 mmHg, the calculated aortic valve area equals 0.45 cm² by the continuity equation consistent with severe aortic stenosis.  12. There is mild aortic root calcification.    PHYSICIAN INTERPRETATION:  Left Ventricle: There is mild left ventricular hypertrophy. Left ventricular ejection fraction, by visual estimation, is 50 to 55%.  Right Ventricle: Normal right ventricular size and function.  Left Atrium: Mildly enlarged left atrium.  Right Atrium: Mildly enlarged right atrium. RA Area is 18.03 cm² cm2.  Pericardium: There is no evidence of pericardial effusion.  Mitral Valve: There is mild mitral annular calcification. Peak transmitral mean gradient equals 2.2 mmHg, calculated mitral valve area by pressure halftime equals 5.45 cm² consistent with No evidence of mitral stenosis. Mild to moderate mitral valve regurgitation is seen. Mitral valve mean gradient is 2.2 mmHg consistent with mild mitral stenosis.  Tricuspid Valve: The tricuspid valve is normal in structure. Moderate tricuspid regurgitation is visualized. Estimated pulmonary artery systolic pressure is 56.0 mmHg assuming a right atrial pressure of 3 mmHg, which is consistent with moderate pulmonary hypertension.  Aortic Valve: Severe aortic stenosis is present. Peak transaortic gradient equals 91.8 mmHg, mean transaortic gradient equals 44.9 mmHg, the calculated aortic valve area equals 0.45 cm² by the continuity equation consistent with severe aortic stenosis. Mild aortic valve regurgitation is seen.  Pulmonic Valve: Structurally normal pulmonic valve, with normal leaflet excursion. No indication of pulmonic valve regurgitation.  Aorta: The aortic root and ascending aorta are structurally normal, with no evidence of dilitation. There is mild aortic root calcification.  Pulmonary Artery: The main pulmonary artery is normal in size.      < end of copied text >    Culture Results:   No growth (07-08-21)  Culture Results:   Testing in progress (07-08-21)    RADIOLOGY  < from: CT Chest No Cont (07.08.21 @ 16:37) >    IMPRESSION:  Since the prior CTA chest dated 5/21/2021, new small right effusion partially loculated within the right major fissure and interval decrease in size of left pleural effusion now small in size.    Otherwise, no significant change since prior exam.    < end of copied text >    MEDICATIONS  (STANDING):  artificial  tears Solution 1 Drop(s) Both EYES two times a day  chlorhexidine 4% Liquid 1 Application(s) Topical every 12 hours  furosemide    Tablet 20 milliGRAM(s) Oral daily  hydrocortisone 1% Cream 1 Application(s) Topical three times a day  levothyroxine 100 MICROGram(s) Oral daily  pantoprazole  Injectable 40 milliGRAM(s) IV Push every 12 hours  polyethylene glycol 3350 17 Gram(s) Oral daily  senna 2 Tablet(s) Oral at bedtime    MEDICATIONS  (PRN):  albuterol/ipratropium for Nebulization 3 milliLiter(s) Nebulizer every 6 hours PRN Shortness of Breath and/or Wheezing  diphenhydrAMINE 25 milliGRAM(s) Oral every 6 hours PRN Rash and/or Itching  lactulose Syrup 20 Gram(s) Oral daily PRN constipation

## 2021-07-11 NOTE — PROGRESS NOTE ADULT - ASSESSMENT
88 years old Female with PMHx of HFpEF, severe AS s/p recent balloon valvuloplasty, HTN, hyperthyroidism, HCC s/p partial liver resection, and MVP c/o worsening chest pressure discomfort  &  worsening dyspnea (1-2 weeks) . pt found to be severely anemic w/ Hgb-5.4 ,  Last Hgb was 9.1 ~ 2 months ago.  Pt stated she has constipation and when she has bowel movement she strains and sees blood in toilet paper. Pt denies- hematemesis, gross hematochezia or black stools, fever , chills, abdominal pain.      acute anemia / possible type 2 NSTEMI / pleural effusion / rounded lung mass / acute on chronic HFpEF / severe aortic stenosis     - s/p 3 units PRBC transfusion   - IV Protonix   - hold aspirin for now   - s/p thoracentesis    - hgb now stable    -  CT chest done - to evaluate rounded lung mass (not read yet) - appears as loculated effusion    - GI following    - resume oral Lasix today   - DC planning    - poor prognosis

## 2021-07-12 ENCOUNTER — APPOINTMENT (OUTPATIENT)
Dept: CARDIOLOGY | Facility: CLINIC | Age: 86
End: 2021-07-12

## 2021-07-12 LAB
ALBUMIN SERPL ELPH-MCNC: 3.4 G/DL — LOW (ref 3.5–5.2)
ALP SERPL-CCNC: 103 U/L — SIGNIFICANT CHANGE UP (ref 30–115)
ALT FLD-CCNC: 14 U/L — SIGNIFICANT CHANGE UP (ref 0–41)
ANION GAP SERPL CALC-SCNC: 8 MMOL/L — SIGNIFICANT CHANGE UP (ref 7–14)
AST SERPL-CCNC: 23 U/L — SIGNIFICANT CHANGE UP (ref 0–41)
BASOPHILS # BLD AUTO: 0.02 K/UL — SIGNIFICANT CHANGE UP (ref 0–0.2)
BASOPHILS NFR BLD AUTO: 0.4 % — SIGNIFICANT CHANGE UP (ref 0–1)
BILIRUB SERPL-MCNC: 0.3 MG/DL — SIGNIFICANT CHANGE UP (ref 0.2–1.2)
BUN SERPL-MCNC: 33 MG/DL — HIGH (ref 10–20)
CALCIUM SERPL-MCNC: 8.9 MG/DL — SIGNIFICANT CHANGE UP (ref 8.5–10.1)
CHLORIDE SERPL-SCNC: 102 MMOL/L — SIGNIFICANT CHANGE UP (ref 98–110)
CO2 SERPL-SCNC: 30 MMOL/L — SIGNIFICANT CHANGE UP (ref 17–32)
CREAT SERPL-MCNC: 0.7 MG/DL — SIGNIFICANT CHANGE UP (ref 0.7–1.5)
EOSINOPHIL # BLD AUTO: 0.13 K/UL — SIGNIFICANT CHANGE UP (ref 0–0.7)
EOSINOPHIL NFR BLD AUTO: 2.6 % — SIGNIFICANT CHANGE UP (ref 0–8)
GLUCOSE SERPL-MCNC: 83 MG/DL — SIGNIFICANT CHANGE UP (ref 70–99)
HCT VFR BLD CALC: 32.7 % — LOW (ref 37–47)
HGB BLD-MCNC: 10.1 G/DL — LOW (ref 12–16)
IMM GRANULOCYTES NFR BLD AUTO: 0.2 % — SIGNIFICANT CHANGE UP (ref 0.1–0.3)
LYMPHOCYTES # BLD AUTO: 0.97 K/UL — LOW (ref 1.2–3.4)
LYMPHOCYTES # BLD AUTO: 19.5 % — LOW (ref 20.5–51.1)
MAGNESIUM SERPL-MCNC: 2.3 MG/DL — SIGNIFICANT CHANGE UP (ref 1.8–2.4)
MCHC RBC-ENTMCNC: 28 PG — SIGNIFICANT CHANGE UP (ref 27–31)
MCHC RBC-ENTMCNC: 30.9 G/DL — LOW (ref 32–37)
MCV RBC AUTO: 90.6 FL — SIGNIFICANT CHANGE UP (ref 81–99)
MONOCYTES # BLD AUTO: 0.55 K/UL — SIGNIFICANT CHANGE UP (ref 0.1–0.6)
MONOCYTES NFR BLD AUTO: 11.1 % — HIGH (ref 1.7–9.3)
NEUTROPHILS # BLD AUTO: 3.29 K/UL — SIGNIFICANT CHANGE UP (ref 1.4–6.5)
NEUTROPHILS NFR BLD AUTO: 66.2 % — SIGNIFICANT CHANGE UP (ref 42.2–75.2)
NRBC # BLD: 0 /100 WBCS — SIGNIFICANT CHANGE UP (ref 0–0)
PLATELET # BLD AUTO: 128 K/UL — LOW (ref 130–400)
POTASSIUM SERPL-MCNC: 4.6 MMOL/L — SIGNIFICANT CHANGE UP (ref 3.5–5)
POTASSIUM SERPL-SCNC: 4.6 MMOL/L — SIGNIFICANT CHANGE UP (ref 3.5–5)
PROT SERPL-MCNC: 6.1 G/DL — SIGNIFICANT CHANGE UP (ref 6–8)
RBC # BLD: 3.61 M/UL — LOW (ref 4.2–5.4)
RBC # FLD: 14.2 % — SIGNIFICANT CHANGE UP (ref 11.5–14.5)
SODIUM SERPL-SCNC: 140 MMOL/L — SIGNIFICANT CHANGE UP (ref 135–146)
WBC # BLD: 4.97 K/UL — SIGNIFICANT CHANGE UP (ref 4.8–10.8)
WBC # FLD AUTO: 4.97 K/UL — SIGNIFICANT CHANGE UP (ref 4.8–10.8)

## 2021-07-12 PROCEDURE — 99232 SBSQ HOSP IP/OBS MODERATE 35: CPT

## 2021-07-12 PROCEDURE — 71045 X-RAY EXAM CHEST 1 VIEW: CPT | Mod: 26

## 2021-07-12 PROCEDURE — 93010 ELECTROCARDIOGRAM REPORT: CPT | Mod: 76

## 2021-07-12 RX ORDER — ASPIRIN/CALCIUM CARB/MAGNESIUM 324 MG
81 TABLET ORAL DAILY
Refills: 0 | Status: DISCONTINUED | OUTPATIENT
Start: 2021-07-12 | End: 2021-07-14

## 2021-07-12 RX ORDER — ENOXAPARIN SODIUM 100 MG/ML
40 INJECTION SUBCUTANEOUS DAILY
Refills: 0 | Status: DISCONTINUED | OUTPATIENT
Start: 2021-07-12 | End: 2021-07-14

## 2021-07-12 RX ADMIN — Medication 1 APPLICATION(S): at 21:36

## 2021-07-12 RX ADMIN — Medication 100 MICROGRAM(S): at 05:15

## 2021-07-12 RX ADMIN — Medication 1 DROP(S): at 05:15

## 2021-07-12 RX ADMIN — Medication 1 APPLICATION(S): at 05:15

## 2021-07-12 RX ADMIN — Medication 81 MILLIGRAM(S): at 11:03

## 2021-07-12 RX ADMIN — POLYETHYLENE GLYCOL 3350 17 GRAM(S): 17 POWDER, FOR SOLUTION ORAL at 11:03

## 2021-07-12 RX ADMIN — ENOXAPARIN SODIUM 40 MILLIGRAM(S): 100 INJECTION SUBCUTANEOUS at 11:02

## 2021-07-12 RX ADMIN — PANTOPRAZOLE SODIUM 40 MILLIGRAM(S): 20 TABLET, DELAYED RELEASE ORAL at 18:18

## 2021-07-12 RX ADMIN — Medication 20 MILLIGRAM(S): at 05:15

## 2021-07-12 RX ADMIN — CHLORHEXIDINE GLUCONATE 1 APPLICATION(S): 213 SOLUTION TOPICAL at 05:16

## 2021-07-12 RX ADMIN — PANTOPRAZOLE SODIUM 40 MILLIGRAM(S): 20 TABLET, DELAYED RELEASE ORAL at 05:17

## 2021-07-12 NOTE — PROGRESS NOTE ADULT - SUBJECTIVE AND OBJECTIVE BOX
Patient is a 88y old  Female who presents with a chief complaint of anemia (11 Jul 2021 12:48)      T(F): 95.5 (07-12-21 @ 05:07), Max: 97.9 (07-11-21 @ 19:00)  HR: 56 (07-12-21 @ 05:07)  BP: 104/51 (07-12-21 @ 05:07)  RR: 15 (07-12-21 @ 05:07)  SpO2: 100% (07-12-21 @ 05:07) (96% - 100%)    PHYSICAL EXAM:  GENERAL: NAD, well-groomed, well-developed  HEAD:  Atraumatic, Normocephalic  EYES: EOMI, PERRLA, conjunctiva and sclera clear  ENMT: No tonsillar erythema, exudates, or enlargement; Moist mucous membranes, Good dentition, No lesions  NECK: Supple, No JVD, Normal thyroid  NERVOUS SYSTEM:  Alert & Oriented X3,  Motor Strength 5/5 B/L upper and lower extremities  CHEST/LUNG: Clear to percussion bilaterally; No rales, rhonchi, wheezing, or rubs  HEART: Regular rate and rhythm; No murmurs, rubs, or gallops  ABDOMEN: Soft, Nontender, Nondistended; Bowel sounds present  EXTREMITIES:   No clubbing, cyanosis, or edema  LYMPH: No lymphadenopathy noted  SKIN: No rashes or lesions    labs  07-11    138  |  101  |  30<H>  ----------------------------<  87  4.3   |  29  |  0.9    Ca    8.6      11 Jul 2021 06:13    TPro  5.8<L>  /  Alb  3.2<L>  /  TBili  0.3  /  DBili  x   /  AST  19  /  ALT  11  /  AlkPhos  102  07-11                          9.9    5.06  )-----------( 128      ( 11 Jul 2021 06:13 )             31.6               albuterol/ipratropium for Nebulization 3 milliLiter(s) Nebulizer every 6 hours PRN  artificial  tears Solution 1 Drop(s) Both EYES two times a day  chlorhexidine 4% Liquid 1 Application(s) Topical every 12 hours  diphenhydrAMINE 25 milliGRAM(s) Oral every 6 hours PRN  furosemide    Tablet 20 milliGRAM(s) Oral daily  hydrocortisone 1% Cream 1 Application(s) Topical three times a day  lactulose Syrup 20 Gram(s) Oral daily PRN  levothyroxine 100 MICROGram(s) Oral daily  pantoprazole  Injectable 40 milliGRAM(s) IV Push every 12 hours  polyethylene glycol 3350 17 Gram(s) Oral daily  senna 2 Tablet(s) Oral at bedtime

## 2021-07-12 NOTE — PROGRESS NOTE ADULT - ASSESSMENT
Echo severe AS. But gradient less than pre valvuloplasty. Follow labs .  H/H good.  Out greater in. OOB chair. When able  ambulate with assistance. May need rehab . Out patient f/u when stable. Prognosis guarded.

## 2021-07-12 NOTE — CHART NOTE - NSCHARTNOTEFT_GEN_A_CORE
ICU Transfer Note    Transfer from:  ICU    Transfer to: ( *  ) Medicine    (  ) Telemetry    (  ) RCU                               (  ) Palliative    (  ) Stroke Unit    (  ) MICU    (  ) CCU    Signout given to:     ----------------------------------------------------------------------------------------------------------  HPI / ICU COURSE:  88 years old Female with PMHx of HFpEF, severe AS s/p recent balloon valvuloplasty, HTN, hyperthyroidism, HCC s/p partial liver resection, and MVP c/o worsening chest pressure discomfort this am & , worsening dyspnea (1-2 weeks) . pt found to be severely anemic w/ Hgb-5.4 ,  Last Hgb was 9.1.  This is 3.7 gm drop since last adm. 2 months ago.  Pt stated she has constipation and went she has bowel movement she strains and sees blood in toilet paper. Pt denies- hemeatemesis, gross hematochezia or black stools, fever , chills, abdominal pain.  Pt was brought to ICU for anemia, hypotension. Pt received multiple units of PRBC, ASA was held, and GI consulted, pt was deemed high risk for scope so scope was not done, Hb stable righrt now, no active bleeding. Thoracenetesis for left pleural effusion showed transudative effusion, Pt to be downgraded to floor.     --------------------------------------------------------------------------------------------------------  PMH/PSH:  PAST MEDICAL & SURGICAL HISTORY:  HTN (hypertension)    Mitral valve prolapse    Enlarged heart    Murmur    Hyperthyroidism    Arthritis    HTN (hypertension)    Diverticulosis    Hiatal hernia    Acid reflux    Liver cancer  s/p resection and s/p chemo and radiation    Aortic stenosis  s/p ballon aortic valuloplasty 5/25/21    H/O resection of liver  liver cancer    Status post cholecystectomy        -------------------------------------------------------------------------------------------------------  PHYSICAL EXAM:  General: NAD  HEENT: Atraumatic  Respiratory: CTAB  Cardiac: RRR  Abdomen: soft, non-tender, non-distended  Extremities: warm and well-perfused  Neuro: A+Ox4. No FND    Vital Signs Last 24 Hrs  T(C): 35.6 (12 Jul 2021 07:10), Max: 36.6 (11 Jul 2021 19:00)  T(F): 96 (12 Jul 2021 07:10), Max: 97.9 (11 Jul 2021 19:00)  HR: 57 (12 Jul 2021 07:06) (56 - 88)  BP: 105/52 (12 Jul 2021 07:06) (104/51 - 134/57)  BP(mean): 75 (12 Jul 2021 07:06) (64 - 98)  RR: 23 (12 Jul 2021 11:00) (15 - 49)  SpO2: 100% (12 Jul 2021 07:06) (96% - 100%)    I&O's Summary    11 Jul 2021 07:01  -  12 Jul 2021 07:00  --------------------------------------------------------  IN: 0 mL / OUT: 1300 mL / NET: -1300 mL    12 Jul 2021 07:01  -  12 Jul 2021 13:23  --------------------------------------------------------  IN: 0 mL / OUT: 1000 mL / NET: -1000 mL        --------------------------------------------------------------------------------------------------------  LABS:                               10.1   4.97  )-----------( 128      ( 12 Jul 2021 05:36 )             32.7       07-12    140  |  102  |  33<H>  ----------------------------<  83  4.6   |  30  |  0.7    Ca    8.9      12 Jul 2021 05:36  Mg     2.3     07-12    TPro  6.1  /  Alb  3.4<L>  /  TBili  0.3  /  DBili  x   /  AST  23  /  ALT  14  /  AlkPhos  103  07-12              CULTURE RESULTS:                07-08-21 @ 12:21  Specimen Source: --  Method Type: --  Gram Stain - RRL: --  Gram Stain - Wound: --  Bacteria: --  Culture Results:   No growth      Specimen Source:   Method Type:   Gram Stain:   Culture Results: Culture Results:   No growth (07-08-21 @ 12:21)  Culture Results:   Testing in progress (07-08-21 @ 12:21)    Bacteria:       -------------------------------------------------------------------------------------------------  RADIOLOGY:      ---------------------------------------------------------------------------------------------------  ASSESSMENT & PLAN:       # Hypotension / Anemia ; r/o GI bleed vs hemolysis  - s/p 3 units PRBC, negative for hemolysis, hemoglobin now stable, GI recommended observation for now, no scope scheduled as of now  - hold ASA for now, can restart if hgb continues to stay stable  - no bleeding noted, no melena/hematuria/vomiting blood reported  - FOBT pending    # HFpEF / Severe AS s/p balloon valvuloplasty  - avoid volume overload  - s/p Lasix 40mg x 1    # Left lung effusion. s/o thora 670cc 7/8 - transudative  - CT chest nc to evaluate for right lung opacity: pending official read    # Hives on arm - prn bendaryl/topical steroid  # constipation - c/w stool softener    # HO HCC - in remission  # HO HTN  # HO Hyperthyroidism    # Diet: clear liquids  # DVT Prophylaxis: SCD  # GI Prophylaxis: Protonix  # Activity: IAT  # Dispo: Acute  # Code Status: Fullcode  # CHG wash

## 2021-07-12 NOTE — PROGRESS NOTE ADULT - SUBJECTIVE AND OBJECTIVE BOX
Patient is comfortable in bed, no CP or SOB       T(F): 96 (07-12-21 @ 07:10), Max: 97.9 (07-11-21 @ 19:00)  HR: 57 (07-12-21 @ 07:06)  BP: 105/52 (07-12-21 @ 07:06)  RR: 20  SpO2: 100% (07-12-21 @ 07:06) (96% - 100%)    PHYSICAL EXAM:  GENERAL: NAD  HEAD:  Atraumatic, Normocephalic  EYES: EOMI, PERRLA, conjunctiva and sclera clear  NERVOUS SYSTEM:  Alert & Oriented X3, no focal deficits   CHEST/LUNG:  bilateral rhonchi  HEART: PRIYA  ABDOMEN: Soft, Nontender, Nondistended; Bowel sounds present  EXTREMITIES:  2+ Peripheral Pulses, No clubbing, cyanosis, or edema    LABS  07-12    140  |  102  |  33<H>  ----------------------------<  83  4.6   |  30  |  0.7    Ca    8.9      12 Jul 2021 05:36  Mg     2.3     07-12    TPro  6.1  /  Alb  3.4<L>  /  TBili  0.3  /  DBili  x   /  AST  23  /  ALT  14  /  AlkPhos  103  07-12                          10.1   4.97  )-----------( 128      ( 12 Jul 2021 05:36 )             32.7     < from: TTE Echo Complete w/o Contrast w/ Doppler (07.08.21 @ 10:46) >  Summary:   1. Left ventricular ejection fraction, by visual estimation, is 50 to 55%.   2. Mild left ventricular hypertrophy.   3. Severe aortic valve stenosis.   4. Mildly enlarged left atrium.   5. Mildly enlarged right atrium.   6. Mild mitral annular calcification.   7. Mild to moderate mitral valve regurgitation.   8. Moderate tricuspid regurgitation.   9. Mild aortic regurgitation.  10. Estimated pulmonary artery systolic pressure is 56.0 mmHg assuming a right atrial pressure of 3 mmHg, which is consistent with moderate pulmonary hypertension.  11. Peak transaortic gradient equals 91.8 mmHg, mean transaortic gradient equals 44.9 mmHg, the calculated aortic valve area equals 0.45 cm² by the continuity equation consistent with severe aortic stenosis.  12. There is mild aortic root calcification.    < end of copied text >      Culture Results:   No growth (07-08-21)  Culture Results:   Testing in progress (07-08-21)    RADIOLOGY  < from: CT Chest No Cont (07.08.21 @ 16:37) >  IMPRESSION:  Since the prior CTA chest dated 5/21/2021, new small right effusion partially loculated within the right major fissure and interval decrease in size of left pleural effusion now small in size.    Otherwise, no significant change since prior exam.    < end of copied text >    MEDICATIONS  (STANDING):  artificial  tears Solution 1 Drop(s) Both EYES two times a day  aspirin  chewable 81 milliGRAM(s) Oral daily  chlorhexidine 4% Liquid 1 Application(s) Topical every 12 hours  enoxaparin Injectable 40 milliGRAM(s) SubCutaneous daily  furosemide    Tablet 20 milliGRAM(s) Oral daily  hydrocortisone 1% Cream 1 Application(s) Topical three times a day  levothyroxine 100 MICROGram(s) Oral daily  pantoprazole  Injectable 40 milliGRAM(s) IV Push every 12 hours  polyethylene glycol 3350 17 Gram(s) Oral daily  senna 2 Tablet(s) Oral at bedtime    MEDICATIONS  (PRN):  albuterol/ipratropium for Nebulization 3 milliLiter(s) Nebulizer every 6 hours PRN Shortness of Breath and/or Wheezing  diphenhydrAMINE 25 milliGRAM(s) Oral every 6 hours PRN Rash and/or Itching  lactulose Syrup 20 Gram(s) Oral daily PRN constipation

## 2021-07-12 NOTE — PROGRESS NOTE ADULT - SUBJECTIVE AND OBJECTIVE BOX
Patient is a 88y old  Female who presents with a chief complaint of hypotensive w/ severe anemia (12 Jul 2021 07:28)        SUBJECTIVE:  no acute events overnight  hgb stable x 3 days  no episodes of bleeding, but FOBT positive    REVIEW OF SYSTEMS:    All Pertinent ROS are negative except per HPI       PHYSICAL EXAM  Vital Signs Last 24 Hrs  T(C): 35.6 (12 Jul 2021 07:10), Max: 36.6 (11 Jul 2021 19:00)  T(F): 96 (12 Jul 2021 07:10), Max: 97.9 (11 Jul 2021 19:00)  HR: 57 (12 Jul 2021 07:06) (56 - 88)  BP: 105/52 (12 Jul 2021 07:06) (104/51 - 145/63)  BP(mean): 75 (12 Jul 2021 07:06) (64 - 98)  RR: 18 (12 Jul 2021 07:10) (15 - 49)  SpO2: 100% (12 Jul 2021 07:06) (96% - 100%)    CONSTITUTIONAL:  frail, elderly in NAD    ENT:   Airway patent,   No thrush  on room air    CARDIAC:   Normal rate,   regular rhythm.    no edema      RESPIRATORY:   NO Wheezing  Nnormal chest expansion  Nnot tachypneic,  No use of accessory muscles    GASTROINTESTINAL:  Abdomen soft,   non-tender,   no guarding,     MUSCULOSKELETAL:   range of motion is limited,  no clubbing, cyanosis    NEUROLOGICAL:   Alert and oriented   no motor or deficits.    SKIN:   Skin normal color for race,   warm, dry   No evidence of rash.      07-11-21 @ 07:01  -  07-12-21 @ 07:00  --------------------------------------------------------  IN:  Total IN: 0 mL    OUT:    Voided (mL): 1300 mL  Total OUT: 1300 mL    Total NET: -1300 mL          LABS:                          10.1   4.97  )-----------( 128      ( 12 Jul 2021 05:36 )             32.7                                               07-12    140  |  102  |  33<H>  ----------------------------<  83  4.6   |  30  |  0.7    Ca    8.9      12 Jul 2021 05:36  Mg     2.3     07-12    TPro  6.1  /  Alb  3.4<L>  /  TBili  0.3  /  DBili  x   /  AST  23  /  ALT  14  /  AlkPhos  103  07-12                                                                                           LIVER FUNCTIONS - ( 12 Jul 2021 05:36 )  Alb: 3.4 g/dL / Pro: 6.1 g/dL / ALK PHOS: 103 U/L / ALT: 14 U/L / AST: 23 U/L / GGT: x                                                                                                MEDICATIONS  (STANDING):  artificial  tears Solution 1 Drop(s) Both EYES two times a day  chlorhexidine 4% Liquid 1 Application(s) Topical every 12 hours  furosemide    Tablet 20 milliGRAM(s) Oral daily  hydrocortisone 1% Cream 1 Application(s) Topical three times a day  levothyroxine 100 MICROGram(s) Oral daily  pantoprazole  Injectable 40 milliGRAM(s) IV Push every 12 hours  polyethylene glycol 3350 17 Gram(s) Oral daily  senna 2 Tablet(s) Oral at bedtime    MEDICATIONS  (PRN):  albuterol/ipratropium for Nebulization 3 milliLiter(s) Nebulizer every 6 hours PRN Shortness of Breath and/or Wheezing  diphenhydrAMINE 25 milliGRAM(s) Oral every 6 hours PRN Rash and/or Itching  lactulose Syrup 20 Gram(s) Oral daily PRN constipation      X-Rays reviewed    CXR interpreted by me:

## 2021-07-12 NOTE — PROGRESS NOTE ADULT - ATTENDING COMMENTS
-Anemia  blood on toilet paper when wiping  blood in stool when straining  sounds hemorrhoidal, r/o malignancy  Rec  -given high pulm and cardiac risk will not pursue EGD/Colonoscopy unless patient develops active GI bleeding, patient also not willing to pursue EGD/Colonoscopy given risks   -Diet as tolerated   -Optimize blood pressure   -Maintain Hemodynamic Stability   -Monitor CBC  -Negative COVID-19 Test within 3 days for intervention   -CMP,Optimize Electrolytes  -PT,PTT,INR  -EKG, Chest-Xray   -Transfuse prn to hgb >8  -Two large bore IV lines  -Continue PPI BID  -Monitor Vital Signs  -Monitor Stool For blood, frequency, consistency, melena  -Active Type and Screen      Problem 2-Left basilar opacity/moderate to large left pleural effusion, increased from 5/26/2021. Small right pleural effusion.  Rec  - Care as per primary team
patient seen and examined   h/h stable   agree with start heparin SQ   follow h/h   follow GI   downgrade to floor
Attending Statement: I have personally performed a face to face diagnostic evaluation on this patient. The patient is suffering from hypotension  Severe anemia .  I have reviewed the above note and agree with the history, exam and suggestions for care, except as I have noted in the text. I have amended the treatment plans as necessary.

## 2021-07-12 NOTE — PROGRESS NOTE ADULT - ASSESSMENT
88 years old Female with PMHx of HFpEF, severe AS s/p recent balloon valvuloplasty, HTN, hyperthyroidism, HCC s/p partial liver resection, and MVP c/o worsening chest pressure discomfort  &  worsening dyspnea (1-2 weeks) . pt found to be severely anemic w/ Hgb-5.4 ,  Last Hgb was 9.1 ~ 2 months ago.  Pt stated she has constipation and when she has bowel movement she strains and sees blood in toilet paper. Pt denies- hematemesis, gross hematochezia or black stools, fever , chills, abdominal pain.      acute anemia / possible type 2 NSTEMI / pleural effusion  / acute on chronic HFpEF -resolved / severe aortic stenosis     - s/p 3 units PRBC transfusion   - IV Protonix   - no EGD/Colonoscopy unless actively bleeding secondary to high risk pt as per GI   -  aspirin resumed    - s/p thoracentesis    - hgb now stable    - DC planning    - poor prognosis     I spoke with pt and son at bedside regarding pts current status and plan, all questions answered

## 2021-07-12 NOTE — PROGRESS NOTE ADULT - ASSESSMENT
IMPRESSION:  hypotension-resolved  Severe anemia sp 3 units prbc  Critical AS AS s/p BAV (refused TAVR)  episode of CHB s/p valvulooplasty-resolved (refused loop)   HFpef  HO liver Ca in remission  HO PAD  Left pleural effusion s/p thora 7/8- transudative  pseudotumor- resolved    PLAN:    CNS: avoid sedatives    HEENT: Oral care    PULMONARY:  HOB @ 45 degrees.    Aspiration precautions.       CARDIOVASCULAR:  keep equal to negative balance  cont = to neg balance  restart aspirin    GI: GI prophylaxis.  advance diet as tolerated.  Bowel regimen. OP GI follow up    RENAL:  Follow up lytes.  Correct as needed    INFECTIOUS DISEASE: Follow up cultures    HEMATOLOGICAL:  DVT prophylaxis ,  Goal HGB >8.0  monitor cbc and coags    ENDOCRINE:  Follow up FS.  Insulin protocol if needed.     MUSCULOSKELETAL: oobtc, pt/ot follow up    Dispo: DG to floor

## 2021-07-13 LAB
ALBUMIN SERPL ELPH-MCNC: 3.4 G/DL — LOW (ref 3.5–5.2)
ALP SERPL-CCNC: 109 U/L — SIGNIFICANT CHANGE UP (ref 30–115)
ALT FLD-CCNC: 16 U/L — SIGNIFICANT CHANGE UP (ref 0–41)
ANION GAP SERPL CALC-SCNC: 11 MMOL/L — SIGNIFICANT CHANGE UP (ref 7–14)
AST SERPL-CCNC: 26 U/L — SIGNIFICANT CHANGE UP (ref 0–41)
BASOPHILS # BLD AUTO: 0.02 K/UL — SIGNIFICANT CHANGE UP (ref 0–0.2)
BASOPHILS NFR BLD AUTO: 0.3 % — SIGNIFICANT CHANGE UP (ref 0–1)
BILIRUB SERPL-MCNC: 0.3 MG/DL — SIGNIFICANT CHANGE UP (ref 0.2–1.2)
BUN SERPL-MCNC: 34 MG/DL — HIGH (ref 10–20)
CALCIUM SERPL-MCNC: 9.3 MG/DL — SIGNIFICANT CHANGE UP (ref 8.5–10.1)
CHLORIDE SERPL-SCNC: 99 MMOL/L — SIGNIFICANT CHANGE UP (ref 98–110)
CO2 SERPL-SCNC: 28 MMOL/L — SIGNIFICANT CHANGE UP (ref 17–32)
CREAT SERPL-MCNC: 0.8 MG/DL — SIGNIFICANT CHANGE UP (ref 0.7–1.5)
CULTURE RESULTS: SIGNIFICANT CHANGE UP
EOSINOPHIL # BLD AUTO: 0.13 K/UL — SIGNIFICANT CHANGE UP (ref 0–0.7)
EOSINOPHIL NFR BLD AUTO: 1.9 % — SIGNIFICANT CHANGE UP (ref 0–8)
GLUCOSE SERPL-MCNC: 88 MG/DL — SIGNIFICANT CHANGE UP (ref 70–99)
HCT VFR BLD CALC: 32.5 % — LOW (ref 37–47)
HGB BLD-MCNC: 10.2 G/DL — LOW (ref 12–16)
IMM GRANULOCYTES NFR BLD AUTO: 0.3 % — SIGNIFICANT CHANGE UP (ref 0.1–0.3)
LYMPHOCYTES # BLD AUTO: 0.8 K/UL — LOW (ref 1.2–3.4)
LYMPHOCYTES # BLD AUTO: 11.6 % — LOW (ref 20.5–51.1)
MAGNESIUM SERPL-MCNC: 2.1 MG/DL — SIGNIFICANT CHANGE UP (ref 1.8–2.4)
MCHC RBC-ENTMCNC: 27.9 PG — SIGNIFICANT CHANGE UP (ref 27–31)
MCHC RBC-ENTMCNC: 31.4 G/DL — LOW (ref 32–37)
MCV RBC AUTO: 88.8 FL — SIGNIFICANT CHANGE UP (ref 81–99)
MONOCYTES # BLD AUTO: 0.61 K/UL — HIGH (ref 0.1–0.6)
MONOCYTES NFR BLD AUTO: 8.8 % — SIGNIFICANT CHANGE UP (ref 1.7–9.3)
NEUTROPHILS # BLD AUTO: 5.33 K/UL — SIGNIFICANT CHANGE UP (ref 1.4–6.5)
NEUTROPHILS NFR BLD AUTO: 77.1 % — HIGH (ref 42.2–75.2)
NRBC # BLD: 0 /100 WBCS — SIGNIFICANT CHANGE UP (ref 0–0)
PLATELET # BLD AUTO: 131 K/UL — SIGNIFICANT CHANGE UP (ref 130–400)
POTASSIUM SERPL-MCNC: 4.5 MMOL/L — SIGNIFICANT CHANGE UP (ref 3.5–5)
POTASSIUM SERPL-SCNC: 4.5 MMOL/L — SIGNIFICANT CHANGE UP (ref 3.5–5)
PROT SERPL-MCNC: 6.2 G/DL — SIGNIFICANT CHANGE UP (ref 6–8)
RBC # BLD: 3.66 M/UL — LOW (ref 4.2–5.4)
RBC # FLD: 14.3 % — SIGNIFICANT CHANGE UP (ref 11.5–14.5)
SARS-COV-2 RNA SPEC QL NAA+PROBE: SIGNIFICANT CHANGE UP
SODIUM SERPL-SCNC: 138 MMOL/L — SIGNIFICANT CHANGE UP (ref 135–146)
SPECIMEN SOURCE: SIGNIFICANT CHANGE UP
WBC # BLD: 6.91 K/UL — SIGNIFICANT CHANGE UP (ref 4.8–10.8)
WBC # FLD AUTO: 6.91 K/UL — SIGNIFICANT CHANGE UP (ref 4.8–10.8)

## 2021-07-13 PROCEDURE — 71045 X-RAY EXAM CHEST 1 VIEW: CPT | Mod: 26

## 2021-07-13 PROCEDURE — 99232 SBSQ HOSP IP/OBS MODERATE 35: CPT

## 2021-07-13 RX ADMIN — Medication 100 MICROGRAM(S): at 05:16

## 2021-07-13 RX ADMIN — Medication 81 MILLIGRAM(S): at 13:01

## 2021-07-13 RX ADMIN — Medication 1 DROP(S): at 05:16

## 2021-07-13 RX ADMIN — ENOXAPARIN SODIUM 40 MILLIGRAM(S): 100 INJECTION SUBCUTANEOUS at 13:02

## 2021-07-13 RX ADMIN — Medication 20 MILLIGRAM(S): at 05:16

## 2021-07-13 RX ADMIN — PANTOPRAZOLE SODIUM 40 MILLIGRAM(S): 20 TABLET, DELAYED RELEASE ORAL at 17:35

## 2021-07-13 RX ADMIN — POLYETHYLENE GLYCOL 3350 17 GRAM(S): 17 POWDER, FOR SOLUTION ORAL at 13:01

## 2021-07-13 RX ADMIN — Medication 1 APPLICATION(S): at 05:17

## 2021-07-13 RX ADMIN — PANTOPRAZOLE SODIUM 40 MILLIGRAM(S): 20 TABLET, DELAYED RELEASE ORAL at 05:16

## 2021-07-13 RX ADMIN — CHLORHEXIDINE GLUCONATE 1 APPLICATION(S): 213 SOLUTION TOPICAL at 05:18

## 2021-07-13 NOTE — CONSULT NOTE ADULT - SUBJECTIVE AND OBJECTIVE BOX
HPI: 89 yo Female w/ extensive PMH as noted below.  Pt c/o worsening chest pressure discomfort this am & , worsening dyspnea (1-2 weeks) .  Pt is s/p aortic balloon valvuloplasty @ [facility] (5/25/21)   for bridging treatment for severe Aortic stenosis.  Pt had refused TAVR procedure in past.  Pt now w/ worsening dyspnea chest pressure pain. pt found to be severely anemic w/ Hgb-5.4 ,  Last Hgb was 9.1.  This is 3.7 gm drop since last adm. 2 months ago.  Pt stated she has constipation and went she has bowel movement she strains and sees blood in toilet paper. Pt denies- hemeatemesis, gross hematochezia or black stools, fever , chills, abdomenal pain.  Pt was brought to ICU for anemia, hypotension.  Pt recieved 2 units prbc in ER.  ptn  seen at  bed side nad    command no  new  c/o      PTN  REFERRED TO ACUTE  REHAB  FOR  EVAL AND  TX   PAST MEDICAL & SURGICAL HISTORY:  HTN (hypertension)    Mitral valve prolapse    Enlarged heart    Murmur    Hyperthyroidism    Arthritis    HTN (hypertension)    Diverticulosis    Hiatal hernia    Acid reflux    Liver cancer  s/p resection and s/p chemo and radiation    Aortic stenosis  s/p ballon aortic valuloplasty 5/25/21    H/O resection of liver  liver cancer    Status post cholecystectomy        Hospital Course:    TODAY'S SUBJECTIVE & REVIEW OF SYMPTOMS:     Constitutional WNL   Cardio WNL   Resp WNL   GI WNL  Heme WNL  Endo WNL  Skin WNL  MSK WNL  Neuro WNL  Cognitive WNL  Psych WNL      MEDICATIONS  (STANDING):  artificial  tears Solution 1 Drop(s) Both EYES two times a day  aspirin  chewable 81 milliGRAM(s) Oral daily  chlorhexidine 4% Liquid 1 Application(s) Topical every 12 hours  enoxaparin Injectable 40 milliGRAM(s) SubCutaneous daily  furosemide    Tablet 20 milliGRAM(s) Oral daily  hydrocortisone 1% Cream 1 Application(s) Topical three times a day  levothyroxine 100 MICROGram(s) Oral daily  pantoprazole  Injectable 40 milliGRAM(s) IV Push every 12 hours  polyethylene glycol 3350 17 Gram(s) Oral daily  senna 2 Tablet(s) Oral at bedtime    MEDICATIONS  (PRN):  albuterol/ipratropium for Nebulization 3 milliLiter(s) Nebulizer every 6 hours PRN Shortness of Breath and/or Wheezing  diphenhydrAMINE 25 milliGRAM(s) Oral every 6 hours PRN Rash and/or Itching  lactulose Syrup 20 Gram(s) Oral daily PRN constipation      FAMILY HISTORY:      Allergies    Cipro (Other)  Levaquin (Rash)  tetracycline (Hives)    Intolerances        SOCIAL HISTORY:    [  ] Etoh  [  ] Smoking  [  ] Substance abuse     Home Environment:  [ x ] Home Alone  [  ] Lives with Family  [  ] Home Health Aid    Dwelling:  [x  ] Apartment  [  x Private House  [  ] Adult Home  [  ] Skilled Nursing Facility      [  ] Short Term  [  ] Long Term  [x  ] Stairs       Elevator [  ]    FUNCTIONAL STATUS PTA: (Check all that apply)  Ambulation: [  x ]Independent    [  ] Dependent     [  ] Non-Ambulatory  Assistive Device: [ x ] SA Cane  [  ]  Q Cane  [ x ] Walker  [  ]  Wheelchair  ADL : [ x ] Independent  [  ]  Dependent       Vital Signs Last 24 Hrs  T(C): 36.3 (13 Jul 2021 04:50), Max: 36.8 (12 Jul 2021 20:42)  T(F): 97.3 (13 Jul 2021 04:50), Max: 98.3 (12 Jul 2021 20:42)  HR: 67 (13 Jul 2021 04:50) (67 - 72)  BP: 102/51 (13 Jul 2021 04:50) (102/51 - 124/51)  BP(mean): --  RR: 18 (13 Jul 2021 04:50) (18 - 23)  SpO2: --      PHYSICAL EXAM: Alert & Oriented X3  GENERAL: NAD, well-groomed, well-developed  HEAD:  Atraumatic, Normocephalic  EYES: EOMI, PERRLA, conjunctiva and sclera clear  NECK: Supple, No JVD, Normal thyroid  CHEST/LUNG: Clear to percussion bilaterally; No rales, rhonchi, wheezing, or rubs  HEART: Regular rate and rhythm; No murmurs, rubs, or gallops  ABDOMEN: Soft, Nontender, Nondistended; Bowel sounds present  EXTREMITIES:  2+ Peripheral Pulses, No clubbing, cyanosis, or edema    NERVOUS SYSTEM:  Cranial Nerves 2-12 intact [x  ] Abnormal  [  ]  ROM: WFL all extremities [  ]  Abnormal [ p ]  Motor Strength: WFL all extremities  [  ]  Abnormal [ x ]  Sensation: intact to light touch [  ] Abnormal [ x ]  Reflexes: Symmetric [  ]  Abnormal [ x ]    FUNCTIONAL STATUS:  Bed Mobility: Independent [  ]  Supervision [  ]  Needs Assistance [x  ]  N/A [  ]  Transfers: Independent [  ]  Supervision [  ]  Needs Assistance [ x ]  N/A [  ]   Ambulation: Independent [  ]  Supervision [  ]  Needs Assistance [ x ]  N/A [  ]  ADL: Independent [  ] Requires Assistance [  ] N/A [x  ]  SEE PT/OT IE NOTES    LABS:                        10.2   6.91  )-----------( 131      ( 13 Jul 2021 06:27 )             32.5     07-13    138  |  99  |  34<H>  ----------------------------<  88  4.5   |  28  |  0.8    Ca    9.3      13 Jul 2021 06:27  Mg     2.1     07-13    TPro  6.2  /  Alb  3.4<L>  /  TBili  0.3  /  DBili  x   /  AST  26  /  ALT  16  /  AlkPhos  109  07-13          RADIOLOGY & ADDITIONAL STUDIES:    Assesment:
CARDIOLOGY CONSULT NOTE     CHIEF COMPLAINT/REASON FOR CONSULT:    HPI:  89 yo Female w/ extensive PMH as noted below.  Pt c/o worsening chest pressure discomfort this am & , worsening dyspnea (1-2 weeks) .  Pt is s/p aortic balloon valvuloplasty @ [facility] (5/25/21)   for bridging treatment for severe Aortic stenosis.  Pt had refused TAVR procedure in past.  Pt now w/ worsening dyspnea chest pressure pain. pt found to be severely anemic w/ Hgb-5.4 ,  Last Hgb was 9.1.  This is 3.7 gm drop since last adm. 2 months ago.  Pt stated she has constipation and went she has bowel movement she strains and sees blood in toilet paper. Pt denies- hemeatemesis, gross hematochezia or black stools, fever , chills, abdomenal pain.  Pt was brought to ICU for anemia, hypotension.  Pt recieved 2 units prbc in ER.  (07 Jul 2021 21:37)      PAST MEDICAL & SURGICAL HISTORY:  HTN (hypertension)    Mitral valve prolapse    Enlarged heart    Murmur    Hyperthyroidism    Arthritis    HTN (hypertension)    Diverticulosis    Hiatal hernia    Acid reflux    Liver cancer  s/p resection and s/p chemo and radiation    Aortic stenosis  s/p ballon aortic valuloplasty 5/25/21    H/O resection of liver  liver cancer    Status post cholecystectomy        Cardiac Risks:   [x ]HTN, [ ] DM, [ ] Smoking, [ ] FH,  [ ] Lipids        MEDICATIONS:  MEDICATIONS  (STANDING):  aspirin  chewable 81 milliGRAM(s) Oral daily  chlorhexidine 4% Liquid 1 Application(s) Topical every 12 hours  furosemide   Injectable 40 milliGRAM(s) IV Push once  heparin   Injectable 5000 Unit(s) SubCutaneous every 8 hours  levothyroxine 100 MICROGram(s) Oral daily  pantoprazole  Injectable 40 milliGRAM(s) IV Push every 12 hours      FAMILY HISTORY:      SOCIAL HISTORY:      [ ] Marital status  Single  Allergies    Cipro (Other)  Levaquin (Rash)  tetracycline (Hives)      	    REVIEW OF SYSTEMS:  CONSTITUTIONAL: No fever, weight loss, or fatigue  EYES: No eye pain, visual disturbances, or discharge  ENMT:  No difficulty hearing, tinnitus, vertigo; No sinus or throat pain  NECK: No pain or stiffness  RESPIRATORY: No cough, wheezing, chills or hemoptysis; No Shortness of Breath  CARDIOVASCULAR: See above  GASTROINTESTINAL: No abdominal or epigastric pain. No nausea, vomiting, or hematemesis; No diarrhea or constipation. No melena or hematochezia.  GENITOURINARY: No dysuria, frequency, hematuria, or incontinence  NEUROLOGICAL: No headaches, memory loss, loss of strength, numbness, or tremors  SKIN: No itching, burning, rashes, or lesions   	        PHYSICAL EXAM:  T(C): 36.3 (07-08-21 @ 07:01), Max: 37.4 (07-07-21 @ 20:49)  HR: 80 (07-08-21 @ 08:05) (63 - 95)  BP: 93/54 (07-08-21 @ 08:05) (82/41 - 133/56)  RR: 20 (07-08-21 @ 08:05) (16 - 24)  SpO2: 100% (07-08-21 @ 08:05) (96% - 100%)  Wt(kg): --  I&O's Summary    07 Jul 2021 07:01  -  08 Jul 2021 07:00  --------------------------------------------------------  IN: 690 mL / OUT: 1450 mL / NET: -760 mL        Appearance: Normal	  Psychiatry: A & O x 3, Mood & affect appropriate  HEENT:   Normal oral mucosa, PERRL, EOMI	  Lymphatic: No lymphadenopathy  Cardiovascular: Normal S1 S2,RRR, No JVD, II/VI july lsb  Respiratory: Lungs clear to auscultation	decreased bs l  Gastrointestinal:  Soft, Non-tender, + BS	  Skin: No rashes, No ecchymoses, No cyanosis	  Neurologic: Non-focal  Extremities: Normal range of motion, No clubbing, cyanosis or edema  Vascular: Peripheral pulses palpable 2+ bilaterally      ECG:  	nsr lad rbbb lvh    	  LABS:	 	    CARDIAC MARKERS:          Serum Pro-Brain Natriuretic Peptide: 6605 pg/mL (07-07 @ 11:52)                            7.7    6.48  )-----------( 120      ( 08 Jul 2021 01:50 )             23.8     07-08    137  |  102  |  31<H>  ----------------------------<  92  3.8   |  29  |  0.8    Ca    8.8      08 Jul 2021 01:50    TPro  5.7<L>  /  Alb  3.5  /  TBili  0.7  /  DBili  x   /  AST  18  /  ALT  13  /  AlkPhos  86  07-08    PT/INR - ( 08 Jul 2021 01:50 )   PT: 12.30 sec;   INR: 1.07 ratio         PTT - ( 08 Jul 2021 01:50 )  PTT:31.4 sec  proBNP: Serum Pro-Brain Natriuretic Peptide: 6605 pg/mL (07-07 @ 11:52)              
Chief complaint/Reason for consult: hypotensive and anemia    HPI:  89 yo Female w/ extensive PMH as noted below.  Pt c/o worsening chest pressure discomfort this am & , worsening dyspnea (1-2 weeks) .  Pt is s/p aortic balloon valvuloplasty @ [facility] (5/25/21)   for bridging treatment for severe Aortic stenosis.  Pt had refused TAVR procedure in past.  Pt now w/ worsening dyspnea chest pressure pain. pt found to be severely anemic w/ Hgb-5.4 ,  Last Hgb was 9.1.  This is 3.7 gm drop since last adm. 2 months ago.  Pt stated she has constipation and went she has bowel movement she strains and sees blood in toilet paper. Pt denies- hematemesis gross hematochezia or black stools, fever chills, abdominal pain.  Pt was brought to ICU for anemia, hypotension.  Pt received 2 units prbc in ER.  (07 Jul 2021 21:37)    Gi Updates: 88yFemale female pmh MVP, HTN, liver cancer s/p resection and chemo and radiation about 20 years ago in remission, s/p aortic balloon valvuloplasty 5/25/21, severe aortic stenosis refused TAVR admitted with worsening dyspnea and chest pressure. GI consulted for anemia, patient reports table spoons of blood in toilet bowel when shes strains and blood on toilet paper when she wipes especially the past two months. Currently Patient denies nausea, vomiting, hematemesis, melena, blood in stool, diarrhea, constipation, abdominal pain.      PAST MEDICAL & SURGICAL HISTORY:   HTN (hypertension)    Mitral valve prolapse    Enlarged heart    Murmur    Hyperthyroidism    Arthritis    HTN (hypertension)    Diverticulosis    Hiatal hernia    Acid reflux    Liver cancer  s/p resection and s/p chemo and radiation    Aortic stenosis  s/p ballon aortic valuloplasty 5/25/21    H/O resection of liver  liver cancer    Status post cholecystectomy          Family history:  FAMILY HISTORY:    No GI cancers in first or second degree relatives    Social History: No smoking. No alcohol. No illegal drug use.    Allergies:   Cipro (Other)  Levaquin (Rash)  tetracycline (Hives)  Intolerances    MEDICATIONS: Home Medications:  furosemide 20 mg oral tablet: 1 tab(s) orally once a day (07 Jul 2021 13:12)  levothyroxine 100 mcg (0.1 mg) oral tablet: 1 tab(s) orally once a day (07 Jul 2021 13:12)  pantoprazole 40 mg oral delayed release tablet: for 15 days (07 Jul 2021 13:12)    MEDICATIONS  (STANDING):  aspirin  chewable 81 milliGRAM(s) Oral daily  chlorhexidine 4% Liquid 1 Application(s) Topical every 12 hours  furosemide   Injectable 40 milliGRAM(s) IV Push once  heparin   Injectable 5000 Unit(s) SubCutaneous every 8 hours  levothyroxine 100 MICROGram(s) Oral daily  pantoprazole  Injectable 40 milliGRAM(s) IV Push every 12 hours    MEDICATIONS  (PRN):  albuterol/ipratropium for Nebulization 3 milliLiter(s) Nebulizer every 6 hours PRN Shortness of Breath and/or Wheezing  lactulose Syrup 20 Gram(s) Oral daily PRN constipation        REVIEW OF SYSTEMS  General:  No weight loss, fevers, or chills.  Eyes:  No reported pain or visual changes  ENT:  No sore throat or runny nose.  NECK: No stiffness or lymphadenopathy  CV:  No chest pain or palpitations.  Resp:  +shortness of breath, No cough, wheezing or hemoptysis  GI:  No abdominal pain, nausea, vomiting, dysphagia, diarrhea or constipation. +intermittent rectal bleeding, No melena, or hematemesis.  Muscle:  No aches or weakness  Neuro:  No tingling, numbness       VITALS:   T(F): 97.3 (07-08-21 @ 07:01), Max: 99.4 (07-07-21 @ 20:49)  HR: 80 (07-08-21 @ 08:05) (63 - 95)  BP: 93/54 (07-08-21 @ 08:05) (82/41 - 133/56)  RR: 20 (07-08-21 @ 08:05) (16 - 24)  SpO2: 100% (07-08-21 @ 08:05) (96% - 100%)    PHYSICAL EXAM:  GENERAL: AAOx3, no acute distress.  HEAD:  Atraumatic, Normocephalic  EYES: conjunctiva and sclera clear  NECK: Supple, No thyromegaly   CHEST/LUNG: +left sided rhonchi  HEART: Regular rate and rhythm; normal S1, S2, No murmurs.  ABDOMEN: Soft, nontender, nondistended; Bowel sounds present  NEUROLOGY: No asterixis or tremor  SKIN: Intact, no jaundice  Rectal exam-patient refused        LABS:  07-08    137  |  102  |  31<H>  ----------------------------<  92  3.8   |  29  |  0.8    Ca    8.8      08 Jul 2021 01:50    TPro  5.7<L>  /  Alb  3.5  /  TBili  0.7  /  DBili  x   /  AST  18  /  ALT  13  /  AlkPhos  86  07-08                          7.7    6.48  )-----------( 120      ( 08 Jul 2021 01:50 )             23.8     LIVER FUNCTIONS - ( 08 Jul 2021 01:50 )  Alb: 3.5 g/dL / Pro: 5.7 g/dL / ALK PHOS: 86 U/L / ALT: 13 U/L / AST: 18 U/L / GGT: x           PT/INR - ( 08 Jul 2021 01:50 )   PT: 12.30 sec;   INR: 1.07 ratio         PTT - ( 08 Jul 2021 01:50 )  PTT:31.4 sec    IMAGING:    < from: Xray Chest 1 View- PORTABLE-Urgent (Xray Chest 1 View- PORTABLE-Urgent .) (07.07.21 @ 12:46) >    EXAM:  XR CHEST PORTABLE URGENT 1V            PROCEDURE DATE:  07/07/2021            INTERPRETATION:  Clinical History / Reason for exam: Shortness of breath    Comparison : Chest radiograph 5/26/2021.    Technique/Positioning: Single frontal chestradiograph.    Findings:    Support devices: None.    Cardiac/mediastinum/hilum: Obscured    Lung parenchyma/Pleura: Left basilar opacity/moderate to large left pleural effusion, increased from 5/26/2021. Small right pleural effusion. No pneumothorax. Calcified pleural plaques.    Skeleton/soft tissues: Stable    Impression:    Left basilar opacity/moderate to large left pleural effusion, increased from 5/26/2021. Small right pleural effusion.    --- End of Report ---              CHAPARRITA AMADOR MD; Attending Radiologist  This document has been electronically signed. Jul 7 2021 12:50PM    < end of copied text >      
Patient is a 88y old  Female who presents with a chief complaint of hypotensive w/ severe anemia (07 Jul 2021 21:37)        Over Night Events:        ROS:     All ROS are negative except HPI         PHYSICAL EXAM    ICU Vital Signs Last 24 Hrs  T(C): 36.3 (08 Jul 2021 07:01), Max: 37.4 (07 Jul 2021 20:49)  T(F): 97.3 (08 Jul 2021 07:01), Max: 99.4 (07 Jul 2021 20:49)  HR: 80 (08 Jul 2021 08:05) (63 - 95)  BP: 93/54 (08 Jul 2021 08:05) (82/41 - 133/56)  BP(mean): 70 (08 Jul 2021 08:05) (59 - 88)  ABP: --  ABP(mean): --  RR: 20 (08 Jul 2021 08:05) (16 - 24)  SpO2: 100% (08 Jul 2021 08:05) (96% - 100%)      CONSTITUTIONAL:  Well nourished.  NAD    ENT:   Airway patent,   Mouth with normal mucosa.   No thrush    EYES:   Pupils equal,   Round and reactive to light.    CARDIAC:   Normal rate,   Regular rhythm.    No edema      Vascular:  Normal systolic impulse  No Carotid bruits    RESPIRATORY:   No wheezing  Bilateral BS  Normal chest expansion  Not tachypneic,  No use of accessory muscles    GASTROINTESTINAL:  Abdomen soft,   Non-tender,   No guarding,   + BS    MUSCULOSKELETAL:   Range of motion is not limited,  No clubbing, cyanosis    NEUROLOGICAL:   Alert and oriented   No motor  deficits.    SKIN:   Skin normal color for race,   Warm and dry and intact.   No evidence of rash.    PSYCHIATRIC:   Normal mood and affect.   No apparent risk to self or others.    HEMATOLOGICAL:  No cervical  lymphadenopathy.  no inguinal lymphadenopathy      07-07-21 @ 07:01  -  07-08-21 @ 07:00  --------------------------------------------------------  IN:    Oral Fluid: 340 mL    PRBCs (Packed Red Blood Cells): 350 mL  Total IN: 690 mL    OUT:    Voided (mL): 1450 mL  Total OUT: 1450 mL    Total NET: -760 mL          LABS:                            7.7    6.48  )-----------( 120      ( 08 Jul 2021 01:50 )             23.8                                               07-08    137  |  102  |  31<H>  ----------------------------<  92  3.8   |  29  |  0.8    Ca    8.8      08 Jul 2021 01:50    TPro  5.7<L>  /  Alb  3.5  /  TBili  0.7  /  DBili  x   /  AST  18  /  ALT  13  /  AlkPhos  86  07-08      PT/INR - ( 08 Jul 2021 01:50 )   PT: 12.30 sec;   INR: 1.07 ratio         PTT - ( 08 Jul 2021 01:50 )  PTT:31.4 sec                                           CARDIAC MARKERS ( 08 Jul 2021 05:49 )  x     / 0.20 ng/mL / x     / x     / x      CARDIAC MARKERS ( 07 Jul 2021 21:40 )  x     / 0.14 ng/mL / x     / x     / x      CARDIAC MARKERS ( 07 Jul 2021 11:52 )  x     / 0.14 ng/mL / x     / x     / x                                                LIVER FUNCTIONS - ( 08 Jul 2021 01:50 )  Alb: 3.5 g/dL / Pro: 5.7 g/dL / ALK PHOS: 86 U/L / ALT: 13 U/L / AST: 18 U/L / GGT: x                                                                                                                                       MEDICATIONS  (STANDING):  aspirin  chewable 81 milliGRAM(s) Oral daily  chlorhexidine 4% Liquid 1 Application(s) Topical every 12 hours  heparin   Injectable 5000 Unit(s) SubCutaneous every 8 hours  levothyroxine 100 MICROGram(s) Oral daily  pantoprazole    Tablet 40 milliGRAM(s) Oral before breakfast    MEDICATIONS  (PRN):  albuterol/ipratropium for Nebulization 3 milliLiter(s) Nebulizer every 6 hours PRN Shortness of Breath and/or Wheezing  lactulose Syrup 20 Gram(s) Oral daily PRN constipation      New X-rays reviewed:                                                                                  ECHO    CXR interpreted by me:      
 Patient is a 88y old  Female who presents with a chief complaint of c/o; sob - 1 day     T(F): 98.5 (07-07-21 @ 11:49), Max: 98.5 (07-07-21 @ 11:49)  HR: 92 (07-07-21 @ 16:47)  BP: 95/52 (07-07-21 @ 16:47)  RR: 20 (07-07-21 @ 15:56)  SpO2: 100% (07-07-21 @ 15:56) (96% - 100%)    PHYSICAL EXAM:  GENERAL: NAD, well-groomed, well-developed  HEAD:  Atraumatic, Normocephalic  EYES: EOMI, PERRLA, conjunctiva and sclera clear  ENMT: No tonsillar erythema, exudates, or enlargement; Moist mucous membranes, Good dentition, No lesions  NECK: Supple, No JVD, Normal thyroid  NERVOUS SYSTEM:  Alert & Oriented X3, Good concentration; Motor Strength 5/5 B/L upper and lower extremities; DTRs 2+ intact and symmetric  CHEST/LUNG: Clear to percussion bilaterally; No rales, rhonchi, wheezing, or rubs  HEART: Regular rate and rhythm; No murmurs, rubs, or gallops  ABDOMEN: Soft, Nontender, Nondistended; Bowel sounds present  EXTREMITIES:  2+ Peripheral Pulses, No clubbing, cyanosis, or edema  LYMPH: No lymphadenopathy noted  SKIN: No rashes or lesions    labs  07-07    139  |  104  |  37<H>  ----------------------------<  134<H>  4.9   |  23  |  1.0    Ca    9.2      07 Jul 2021 11:52    TPro  6.8  /  Alb  3.9  /  TBili  0.2  /  DBili  x   /  AST  27  /  ALT  15  /  AlkPhos  100  07-07                          5.4    7.47  )-----------( 156      ( 07 Jul 2021 11:52 )             18.2         PT/INR - ( 07 Jul 2021 11:52 )   PT: 12.00 sec;   INR: 1.04 ratio         PTT - ( 07 Jul 2021 11:52 )  PTT:25.3 sec      radiology

## 2021-07-13 NOTE — PROGRESS NOTE ADULT - ASSESSMENT
88 years old Female with PMHx of HFpEF, severe AS s/p recent balloon valvuloplasty, HTN, hyperthyroidism, HCC s/p partial liver resection, and MVP c/o worsening chest pressure discomfort  &  worsening dyspnea (1-2 weeks) . pt found to be severely anemic w/ Hgb-5.4 ,  Last Hgb was 9.1 ~ 2 months ago.  Pt stated she has constipation and when she has bowel movement she strains and sees blood in toilet paper. Pt denies- hematemesis, gross hematochezia or black stools, fever , chills, abdominal pain.      acute anemia / possible type 2 NSTEMI / pleural effusion  / acute on chronic HFpEF -resolved / severe aortic stenosis     - s/p 3 units PRBC transfusion   - HGB has been stable for 3 days   - change  Protonix to oral   - no EGD/Colonoscopy unless actively bleeding secondary to high risk pt as per GI   -  aspirin resumed    - s/p thoracentesis    - DC planning    - poor prognosis

## 2021-07-13 NOTE — PROGRESS NOTE ADULT - NSICDXPILOT_GEN_ALL_CORE
[place]

## 2021-07-13 NOTE — PROGRESS NOTE ADULT - REASON FOR ADMISSION
hypotensive w/ severe anemia
anemia
hypotensive w/ severe anemia
chest pain / Anemia
hypotensive w/  anemia
hypotensive w/ severe anemia

## 2021-07-13 NOTE — PROGRESS NOTE ADULT - PROVIDER SPECIALTY LIST ADULT
Cardiology
Critical Care
Pulmonology
Gastroenterology
Cardiology
Hospitalist
Pulmonology
Pulmonology
Cardiology
Cardiology
Hospitalist
Hospitalist
Internal Medicine
Pulmonology
Hospitalist

## 2021-07-13 NOTE — CONSULT NOTE ADULT - ASSESSMENT
IMPRESSION: Rehab of 87 y/o  f  rehab  for debility anemia      PRECAUTIONS: [ xx ] Cardiac  [  ] Respiratory  [  ] Seizures [  ] Contact Isolation  [  ] Droplet Isolation  [ FALL ] Other    Weight Bearing Status:     RECOMMENDATION:    Out of Bed to Chair     DVT/Decubiti Prophylaxis    REHAB PLAN:     [  xx ] Bedside P/T 3-5 times a week   [   ]   Bedside O/T  2-3 times a week             [   ] No Rehab Therapy Indicated                   [   ]  Speech Therapy   Conditioning/ROM                                    ADL  Bed Mobility                                               Conditioning/ROM  Transfers                                                     Bed Mobility  Sitting /Standing Balance                         Transfers                                        Gait Training                                               Sitting/Standing Balance  Stair Training [   ]Applicable                    Home equipment Eval                                                                        Splinting  [   ] Only      GOALS:   ADL   [ x  ]   Independent                    Transfers  [ x ] Independent                          Ambulation  [x   ] Independent     [ x   ] With device                            [x ]  CG                                                         [ x  ]  CG                                                                  [  x ] CG                            [    ] Min A                                                   [   ] Min A                                                              [   ] Min  A          DISCHARGE PLAN:   [   ]  Good candidate for Intensive Rehabilitation/Hospital based-[facility]A [facility]                                             Will tolerate 3hrs Intensive Rehab Daily                                       [   xx ]  Short Term Rehab in Skilled Nursing Facility and cont current care                                       [    ]  Home with Outpatient or VN services                                         [    ]  Possible Candidate for Intensive Hospital based Rehab

## 2021-07-13 NOTE — PROGRESS NOTE ADULT - SUBJECTIVE AND OBJECTIVE BOX
Patient is comfortable in bed, no SOB, no bleeding      T(F): 97.3 (07-13-21 @ 04:50), Max: 98.3 (07-12-21 @ 20:42)  HR: 67 (07-13-21 @ 04:50)  BP: 102/51 (07-13-21 @ 04:50)  RR: 18 (07-13-21 @ 04:50)      PHYSICAL EXAM:  GENERAL: NAD  HEAD:  Atraumatic, Normocephalic  EYES: EOMI, PERRLA, conjunctiva and sclera clear  NERVOUS SYSTEM:  Alert & Oriented X3, no focal deficits   CHEST/LUNG: mild bilateral rhonchi  HEART: PRIYA  ABDOMEN: Soft, Nontender, Nondistended; Bowel sounds present  EXTREMITIES:  2+ Peripheral Pulses, No clubbing, cyanosis, or edema    LABS  07-13    138  |  99  |  34<H>  ----------------------------<  88  4.5   |  28  |  0.8    Ca    9.3      13 Jul 2021 06:27  Mg     2.1     07-13    TPro  6.2  /  Alb  3.4<L>  /  TBili  0.3  /  DBili  x   /  AST  26  /  ALT  16  /  AlkPhos  109  07-13                          10.2   6.91  )-----------( 131      ( 13 Jul 2021 06:27 )             32.5         Culture Results:   No growth (07-08-21)  Culture Results:   Testing in progress (07-08-21)      MEDICATIONS  (STANDING):  artificial  tears Solution 1 Drop(s) Both EYES two times a day  aspirin  chewable 81 milliGRAM(s) Oral daily  chlorhexidine 4% Liquid 1 Application(s) Topical every 12 hours  enoxaparin Injectable 40 milliGRAM(s) SubCutaneous daily  furosemide    Tablet 20 milliGRAM(s) Oral daily  hydrocortisone 1% Cream 1 Application(s) Topical three times a day  levothyroxine 100 MICROGram(s) Oral daily  pantoprazole  Injectable 40 milliGRAM(s) IV Push every 12 hours  polyethylene glycol 3350 17 Gram(s) Oral daily  senna 2 Tablet(s) Oral at bedtime    MEDICATIONS  (PRN):  albuterol/ipratropium for Nebulization 3 milliLiter(s) Nebulizer every 6 hours PRN Shortness of Breath and/or Wheezing  diphenhydrAMINE 25 milliGRAM(s) Oral every 6 hours PRN Rash and/or Itching  lactulose Syrup 20 Gram(s) Oral daily PRN constipation

## 2021-07-13 NOTE — CONSULT NOTE ADULT - REASON FOR ADMISSION
hypotensive w/ severe anemia

## 2021-07-14 ENCOUNTER — TRANSCRIPTION ENCOUNTER (OUTPATIENT)
Age: 86
End: 2021-07-14

## 2021-07-14 VITALS
SYSTOLIC BLOOD PRESSURE: 97 MMHG | DIASTOLIC BLOOD PRESSURE: 47 MMHG | HEART RATE: 72 BPM | TEMPERATURE: 97 F | RESPIRATION RATE: 18 BRPM

## 2021-07-14 LAB
HCT VFR BLD CALC: 31 % — LOW (ref 37–47)
HGB BLD-MCNC: 9.6 G/DL — LOW (ref 12–16)
MCHC RBC-ENTMCNC: 27.4 PG — SIGNIFICANT CHANGE UP (ref 27–31)
MCHC RBC-ENTMCNC: 31 G/DL — LOW (ref 32–37)
MCV RBC AUTO: 88.3 FL — SIGNIFICANT CHANGE UP (ref 81–99)
NRBC # BLD: 0 /100 WBCS — SIGNIFICANT CHANGE UP (ref 0–0)
PLATELET # BLD AUTO: 115 K/UL — LOW (ref 130–400)
RBC # BLD: 3.51 M/UL — LOW (ref 4.2–5.4)
RBC # FLD: 14.6 % — HIGH (ref 11.5–14.5)
WBC # BLD: 5.95 K/UL — SIGNIFICANT CHANGE UP (ref 4.8–10.8)
WBC # FLD AUTO: 5.95 K/UL — SIGNIFICANT CHANGE UP (ref 4.8–10.8)

## 2021-07-14 PROCEDURE — 99239 HOSP IP/OBS DSCHRG MGMT >30: CPT

## 2021-07-14 RX ORDER — POLYETHYLENE GLYCOL 3350 17 G/17G
17 POWDER, FOR SOLUTION ORAL
Qty: 0 | Refills: 0 | DISCHARGE
Start: 2021-07-14

## 2021-07-14 RX ORDER — LACTULOSE 10 G/15ML
30 SOLUTION ORAL
Qty: 0 | Refills: 0 | DISCHARGE
Start: 2021-07-14

## 2021-07-14 RX ORDER — SENNA PLUS 8.6 MG/1
2 TABLET ORAL
Qty: 0 | Refills: 0 | DISCHARGE
Start: 2021-07-14

## 2021-07-14 RX ORDER — ALPRAZOLAM 0.25 MG
1 TABLET ORAL
Qty: 3 | Refills: 0
Start: 2021-07-14 | End: 2021-07-16

## 2021-07-14 RX ORDER — FERROUS SULFATE 325(65) MG
1 TABLET ORAL
Qty: 30 | Refills: 0
Start: 2021-07-14 | End: 2021-08-12

## 2021-07-14 RX ADMIN — PANTOPRAZOLE SODIUM 40 MILLIGRAM(S): 20 TABLET, DELAYED RELEASE ORAL at 06:52

## 2021-07-14 RX ADMIN — Medication 20 MILLIGRAM(S): at 06:52

## 2021-07-14 RX ADMIN — Medication 100 MICROGRAM(S): at 06:52

## 2021-07-14 RX ADMIN — Medication 81 MILLIGRAM(S): at 14:05

## 2021-07-14 NOTE — DISCHARGE NOTE PROVIDER - NSDCPNSUBOBJ_GEN_ALL_CORE
Stefany Manrique MD  Hospitalist   Spectra: [phone]    Patient is a 88y old  Female who presents with a chief complaint of hypotensive w/ severe anemia (14 Jul 2021 10:31)      INTERVAL HPI/OVERNIGHT EVENTS: no acute overnight events noted   comfortable in chair     REVIEW OF SYSTEMS: negative  Vital Signs Last 24 Hrs  T(C): 36.3 (14 Jul 2021 05:00), Max: 36.5 (13 Jul 2021 14:16)  T(F): 97.4 (14 Jul 2021 05:00), Max: 97.7 (13 Jul 2021 14:16)  HR: 66 (14 Jul 2021 12:29) (66 - 83)  BP: 100/60 (14 Jul 2021 12:29) (94/58 - 117/53)  BP(mean): --  RR: 18 (14 Jul 2021 05:00) (18 - 18)  SpO2: 96% (13 Jul 2021 22:00) (96% - 96%)    PHYSICAL EXAM:   NAD; Normocephalic;   LUNGS - no wheezing  HEART: S1 S2+   ABDOMEN: Soft, Nontender, non distended  EXTREMITIES: no cyanosis; no edema  NERVOUS SYSTEM:  Awake and alert; no focal neuro deficits appreciated    LABS:                        9.6    5.95  )-----------( 115      ( 14 Jul 2021 05:57 )             31.0     07-13    138  |  99  |  34<H>  ----------------------------<  88  4.5   |  28  |  0.8    Ca    9.3      13 Jul 2021 06:27  Mg     2.1     07-13    TPro  6.2  /  Alb  3.4<L>  /  TBili  0.3  /  DBili  x   /  AST  26  /  ALT  16  /  AlkPhos  109  07-13        CAPILLARY BLOOD GLUCOSE          Medications:  MEDICATIONS  (STANDING):  artificial  tears Solution 1 Drop(s) Both EYES two times a day  aspirin  chewable 81 milliGRAM(s) Oral daily  chlorhexidine 4% Liquid 1 Application(s) Topical every 12 hours  enoxaparin Injectable 40 milliGRAM(s) SubCutaneous daily  furosemide    Tablet 20 milliGRAM(s) Oral daily  hydrocortisone 1% Cream 1 Application(s) Topical three times a day  levothyroxine 100 MICROGram(s) Oral daily  pantoprazole  Injectable 40 milliGRAM(s) IV Push every 12 hours  polyethylene glycol 3350 17 Gram(s) Oral daily  senna 2 Tablet(s) Oral at bedtime    MEDICATIONS  (PRN):  albuterol/ipratropium for Nebulization 3 milliLiter(s) Nebulizer every 6 hours PRN Shortness of Breath and/or Wheezing  diphenhydrAMINE 25 milliGRAM(s) Oral every 6 hours PRN Rash and/or Itching  lactulose Syrup 20 Gram(s) Oral daily PRN constipation       Plan:   # Hypotension / Anemia ; r/o GI bleed vs hemolysis  - s/p 3 units PRBC, negative for hemolysis, hemoglobin now stable, GI recommended observation for now, no scope scheduled as of now  - aspirin restarted - hb stable   - no bleeding noted, no melena/hematuria/vomiting blood reported  - discharge on iron supplement     # HFpEF / Severe AS s/p balloon valvuloplasty  - avoid volume overload  - s/p Lasix 40mg x 1  - discharge on lasix 20 mg daily     # Left lung effusion. s/o thora 670cc 7/8 - transudative  - CT chest prior CTA chest dated 5/21/2021, new small right effusion partially loculated within the right major fissure and interval decrease in size of left pleural effusion now small in size.Otherwise, no significant change since prior exam.    # Hives on arm - prn bendaryl/topical steroid  # constipation - c/w stool softener    # HO HCC - in remission  # HO HTN  # HO Hyperthyroidism - continues synthroid     # Diet: clear liquids  # DVT Prophylaxis: SCD  # GI Prophylaxis: Protonix  # Activity: IAT  # Dispo: Acute  # Code Status: Fullcode    Plan discussed with son and daughter at bedside

## 2021-07-14 NOTE — DISCHARGE NOTE NURSING/CASE MANAGEMENT/SOCIAL WORK - NSDCVIVACCINE_GEN_ALL_CORE_FT
Tdap; 07-Jan-2020 05:28; Kristan Lenz (RN); [employer]; [id number] (Exp. Date: 23-Oct-2020); IntraMuscular; Deltoid Left.; 0.5 milliLiter(s); VIS (VIS Published: 09-May-2013, VIS Presented: 07-Jan-2020);

## 2021-07-14 NOTE — ADVANCED PRACTICE NURSE CONSULT - ASSESSMENT
Patient is a 89 yo Female w/ extensive PMH as noted below.  Pt c/o worsening chest pressure discomfort this am & , worsening dyspnea (1-2 weeks) .  Pt is s/p aortic balloon valvuloplasty @ [facility] (5/25/21)   for bridging treatment for severe Aortic stenosis.  Pt had refused TAVR procedure in past.  Pt now w/ worsening dyspnea chest pressure pain. pt found to be severely anemic w/ Hgb-5.4 ,  Last Hgb was 9.1.  This is 3.7 gm drop since last adm. 2 months ago.  Pt stated she has constipation and went she has bowel movement she strains and sees blood in toilet paper. Pt denies- hematemesis gross hematochezia or black stools, fever , chills, abdominal pain.  Pt was brought to ICU for anemia, hypotension.  Pt received 2 units PRBC in ER.   PAST MEDICAL & SURGICAL HISTORY:  HTN (hypertension)  Mitral valve prolapse  Enlarged heart  Murmur  Hyperthyroidism  Arthritis  HTN (hypertension)  Diverticulosis  Hiatal hernia  Acid reflux  Liver cancer  s/p resection and s/p chemo and radiation  Aortic stenosis  H/O resection of liver  Status post cholecystectomy    Assessment:  Patient received in chair. A/O responds appropriately to verbal command.                      Skin assessed-   B/L buttock healed ulcer with epithelium and scare tissue formation .                                               Patient mostly bedbound incontinence to stool and urine  at high risk of developing pressure injury due to and history of pressure injury.     Pressure Injury  #1 Present on admission   Location:  Sacrum   Size:1x1  Tissue Description :   [ ] Necrotic   [ ] Slough   [ ] Infected   [ ] Granulation (firm, beefy red tissue)   [ ] Hypergranulation (soft, gelatinous)  [x ] Poor-Quality Granulation (pale, grey/brown/red granulation tissue)   [ ] Epithelium (pink/mauve at wound edges)  [ ] Macerated  [ ] Other: _______  Wound Exudate : None    Wound Edge): Intact  Periwound Condition (area that extends 4cm from the edge of the wound):   [x ] Maceration [ ] Excoriation [ ] Dry skin [ ] Hyperkeratosis [ ] Callus [ ] Eczema [ ] Other: _______  Pressure Injury Stage    [ ] Stage I  [ x]  Stage II  [ ]  Stage III  [ ]  Stage VI  [ ]  Suspected Deep Tissue Injury (DTI)  [ ]  Unstageable    Other Etiology:  [ ] Aterial  [ ] Venous   [ ] Surgical Incision  [ ] Other: ________

## 2021-07-14 NOTE — ADVANCED PRACTICE NURSE CONSULT - RECOMMEDATIONS
Plan: Clean sacrum with soap an water , pat dry then apply triad hydrophilic dressing    Continue Pressure ulcer preventive  measures   Continue skin care   Asses wound and inform primary provider of any changes   Case discussed with primary Rn  Wound/ ostomy specialist  to f/u as needed   Offloading: [ ] Frequent position changes [ ] Devices/Equipment  Cleansing: [ ] Saline [ x] Soap/Water [ ] Other: ______  Topicals: [x ] Barrier Cream [ ] Antimicrobial [ ] Enzymatic Wound Debridement  Dressings: [ ] Dry, sterile [ ] Foam [ ] Absorbant Pads [ ] Collagenase

## 2021-07-14 NOTE — DISCHARGE NOTE PROVIDER - CARE PROVIDER_API CALL
Mejia Cardenas  INTERNAL MEDICINE  [address] [place] [address]  Phone: [phone]  Fax: [phone]  Follow Up Time:

## 2021-07-14 NOTE — DISCHARGE NOTE PROVIDER - NSDCCPCAREPLAN_GEN_ALL_CORE_FT
PRINCIPAL DISCHARGE DIAGNOSIS  Diagnosis: Anemia  Assessment and Plan of Treatment: You were found to have anemia, no cute bleeding was noted so no EGD/Colonscopy was performed.  Monitor your stool for dark colors or brightred blood.  If it occurs please come to the ED for evaluation.      SECONDARY DISCHARGE DIAGNOSES  Diagnosis: Hypotension  Assessment and Plan of Treatment: resolved    Diagnosis: Shortness of breath  Assessment and Plan of Treatment: resolved     PRINCIPAL DISCHARGE DIAGNOSIS  Diagnosis: Anemia  Assessment and Plan of Treatment: You were found to have anemia, no acute bleeding was noted so no EGD/Colonscopy was performed.  Monitor your stool for dark colors or brightred blood.  If it occurs please come to the ED for evaluation.  Get Repeat Hb level checked in 7-10 days. Follow up with PCP with in 1-2 weeks.      SECONDARY DISCHARGE DIAGNOSES  Diagnosis: Hypotension  Assessment and Plan of Treatment: resolved    Diagnosis: Shortness of breath  Assessment and Plan of Treatment: resolved

## 2021-07-14 NOTE — CHART NOTE - NSCHARTNOTEFT_GEN_A_CORE
Registered Dietitian Follow-Up     Patient Profile Reviewed                           Yes [x]   No []     Nutrition History Previously Obtained        Yes [x]  No []       Pertinent Subjective Information: Spoke to patient who reports nice diet had advanced has been consuming very close to 100% of all her meals     Pertinent Medical Interventions:    acute anemia / possible type 2 NSTEMI / pleural effusion  / acute on chronic HFpEF -resolved / severe aortic stenosis       Diet order: Diet, DASH/TLC:   Sodium & Cholesterol Restricted (07-09-21 @ 10:32) [Active]     Anthropometrics:  - Ht. 152.4 cm  - Wt. 55.3 kg --- dosing weight. Will continue to monitor weight trend  7/9: 55.8 kg  7/11: 55.4 kg  7/12: 54.8 kg   - %wt change  - BMI 23.8 using dosing weight   - IBW 45 kg      Pertinent Lab Data: 7/14: RBC: 3.51, H/H: 9.6/31.0  7/13: BUN: 34     MEDICATIONS  (STANDING):  furosemide    Tablet 20 milliGRAM(s) Oral daily  levothyroxine 100 MICROGram(s) Oral daily  pantoprazole  Injectable 40 milliGRAM(s) IV Push every 12 hours  polyethylene glycol 3350 17 Gram(s) Oral daily  senna 2 Tablet(s) Oral at bedtime    MEDICATIONS  (PRN):  lactulose Syrup 20 Gram(s) Oral daily PRN constipation       Physical Findings:  - Appearance: alert and oriented per RN flow sheets   - GI function:  no gastrointestinal signs/symptoms, Last bowel movement documented 7/9 per RN flow sheets   - Tubes: N/A  - Oral/Mouth cavity: No chewing/ swallowing difficulties at this time   - Skin: sacral spine stage 2/ generalized edema +1 per RN flow sheets      Nutrition Requirements  Weight Used: dry wt 52.1 kg --- needs used per initial dietitian assessment on 7/9      Kcal: 4749-9565 (30-35 kcal/kg) pressure ulcer considered  Protein: 63-73 g (1.2-1.4 g/kg) same as above  Fluid: per LIP      Nutrient Intake: Patients PO intake ~100% of meals         [] Previous Nutrition Diagnosis:  Increased Nutrient Needs.            [x] Ongoing          [] Resolved       Nutrition Intervention : meals and snacks, coordination of care     Goal/Expected Outcome: Patient to continue to consume ~% of meals during the next 7 days.      RD to monitor diet order, energy intake, body composition, NFPF, renal profile  not at risk. Please consult or call [phone] if pt needs to be seen by an RD     Recommendations:    1) Continue current diet order

## 2021-07-14 NOTE — DISCHARGE NOTE NURSING/CASE MANAGEMENT/SOCIAL WORK - PATIENT PORTAL LINK FT
You can access the FollowMyHealth Patient Portal offered by [facility] by registering at the following website: [email or web address]. By joining [employer]’s FollowMyHealth portal, you will also be able to view your health information using other applications (apps) compatible with our system.

## 2021-07-14 NOTE — DISCHARGE NOTE PROVIDER - NSDCMRMEDTOKEN_GEN_ALL_CORE_FT
aspirin 81 mg oral tablet, chewable: 1 tab(s) orally once a day  furosemide 20 mg oral tablet: 1 tab(s) orally once a day  lactulose 10 g/15 mL oral syrup: 30 milliliter(s) orally once a day, As needed, constipation  levothyroxine 100 mcg (0.1 mg) oral tablet: 1 tab(s) orally once a day  pantoprazole 40 mg oral delayed release tablet: for 15 days  polyethylene glycol 3350 oral powder for reconstitution: 17 gram(s) orally once a day  senna oral tablet: 2 tab(s) orally once a day (at bedtime)   aspirin 81 mg oral tablet, chewable: 1 tab(s) orally once a day  FeroSul 325 mg (65 mg elemental iron) oral tablet: 1 tab(s) orally once a day   furosemide 20 mg oral tablet: 1 tab(s) orally once a day  lactulose 10 g/15 mL oral syrup: 30 milliliter(s) orally once a day, As needed, constipation  levothyroxine 100 mcg (0.1 mg) oral tablet: 1 tab(s) orally once a day  pantoprazole 40 mg oral delayed release tablet: for 15 days  polyethylene glycol 3350 oral powder for reconstitution: 17 gram(s) orally once a day  senna oral tablet: 2 tab(s) orally once a day (at bedtime)  Xanax 0.25 mg oral tablet: 1 tab(s) orally once a day, As Needed for severe anxiety MDD:1 tab/day

## 2021-07-23 ENCOUNTER — APPOINTMENT (OUTPATIENT)
Dept: CARDIOLOGY | Facility: CLINIC | Age: 86
End: 2021-07-23

## 2021-08-07 LAB
CULTURE RESULTS: SIGNIFICANT CHANGE UP
SPECIMEN SOURCE: SIGNIFICANT CHANGE UP

## 2021-08-12 ENCOUNTER — APPOINTMENT (OUTPATIENT)
Dept: CARDIOLOGY | Facility: CLINIC | Age: 86
End: 2021-08-12
Payer: MEDICARE

## 2021-08-12 ENCOUNTER — APPOINTMENT (OUTPATIENT)
Dept: CARDIOTHORACIC SURGERY | Facility: CLINIC | Age: 86
End: 2021-08-12
Payer: MEDICARE

## 2021-08-12 ENCOUNTER — NON-APPOINTMENT (OUTPATIENT)
Age: 86
End: 2021-08-12

## 2021-08-12 VITALS
WEIGHT: 117 LBS | SYSTOLIC BLOOD PRESSURE: 145 MMHG | HEART RATE: 82 BPM | RESPIRATION RATE: 16 BRPM | OXYGEN SATURATION: 96 % | DIASTOLIC BLOOD PRESSURE: 75 MMHG | BODY MASS INDEX: 21.53 KG/M2 | TEMPERATURE: 98.5 F | HEIGHT: 62 IN

## 2021-08-12 DIAGNOSIS — Z87.19 PERSONAL HISTORY OF OTHER DISEASES OF THE DIGESTIVE SYSTEM: ICD-10-CM

## 2021-08-12 DIAGNOSIS — Z86.39 PERSONAL HISTORY OF OTHER ENDOCRINE, NUTRITIONAL AND METABOLIC DISEASE: ICD-10-CM

## 2021-08-12 DIAGNOSIS — Z86.79 PERSONAL HISTORY OF OTHER DISEASES OF THE CIRCULATORY SYSTEM: ICD-10-CM

## 2021-08-12 DIAGNOSIS — Z15.89 GENETIC SUSCEPTIBILITY TO OTHER DISEASE: ICD-10-CM

## 2021-08-12 PROCEDURE — 99024 POSTOP FOLLOW-UP VISIT: CPT

## 2021-08-12 PROCEDURE — 99213 OFFICE O/P EST LOW 20 MIN: CPT

## 2021-08-12 NOTE — PHYSICAL EXAM
[Sclera] : the sclera and conjunctiva were normal [Auscultation Breath Sounds / Voice Sounds] : lungs were clear to auscultation bilaterally [Normal Rate] : normal [IV] : a grade 4 [Bowel Sounds] : normal bowel sounds [Abdomen Soft] : soft [Abdomen Tenderness] : non-tender [Abdomen Mass (___ Cm)] : no abdominal mass palpated [Skin Color & Pigmentation] : normal skin color and pigmentation [Skin Turgor] : normal skin turgor [] : no rash [Deep Tendon Reflexes (DTR)] : deep tendon reflexes were 2+ and symmetric [Sensation] : the sensory exam was normal to light touch and pinprick [No Focal Deficits] : no focal deficits [Oriented To Time, Place, And Person] : oriented to person, place, and time [Impaired Insight] : insight and judgment were intact [Affect] : the affect was normal

## 2021-08-12 NOTE — HISTORY OF PRESENT ILLNESS
[Dyslipidemia] : Dyslipidemia [Hypertension] : Hypertension [FreeTextEntry1] : Mrs. Tara Silverio is a 88 years old Female with PMHx of hypothyroidism, HFpEF, severe AS s/p recent balloon valvuloplasty, HTN, hyperthyroidism, HCC s/p partial liver resection, and MVP. Recently in ED with c/o worsening chest pressure discomfort and worsening dyspnea x 1-2 weeks. pt found to be severely anemic w/ Hgb-5.4, s/p 3u PRBCs. Pt refused TAVR 2 years ago at [facility]. \par \par Peak: 91.8\par Mean. 44.9\par CESIA: 0.45cm \par \par PMD: none (NP from [facility] calls)\par Cardio: formerly [facility], not seen in 2 years.

## 2021-08-12 NOTE — DATA REVIEWED
[FreeTextEntry1] : Echo 7/8/21\par \par Summary:\par  1. Left ventricular ejection fraction, by visual estimation, is 50 to 55%.\par  2. Mild left ventricular hypertrophy.\par  3. Severe aortic valve stenosis.\par  4. Mildly enlarged left atrium.\par  5. Mildly enlarged right atrium.\par  6. Mild mitral annular calcification.\par  7. Mild to moderate mitral valve regurgitation.\par  8. Moderate tricuspid regurgitation.\par  9. Mild aortic regurgitation.\par 10. Estimated pulmonary artery systolic pressure is 56.0 mmHg assuming a right atrial pressure of 3 mmHg, which is consistent with moderate pulmonary hypertension.\par 11. Peak transaortic gradient equals 91.8 mmHg, mean transaortic gradient equals 44.9 mmHg, the calculated aortic valve area equals 0.45 cm² by the continuity equation consistent with severe aortic stenosis.\par 12. There is mild aortic root calcification.\par \par PHYSICIAN INTERPRETATION:\par Left Ventricle: There is mild left ventricular hypertrophy. Left ventricular ejection fraction, by visual estimation, is 50 to 55%.\par Right Ventricle: Normal right ventricular size and function.\par Left Atrium: Mildly enlarged left atrium.\par Right Atrium: Mildly enlarged right atrium. RA Area is 18.03 cm² cm2.\par Pericardium: There is no evidence of pericardial effusion.\par Mitral Valve: There is mild mitral annular calcification. Peak transmitral mean gradient equals 2.2 mmHg, calculated mitral valve area by pressure half time equals 5.45 cm² consistent with No evidence of mitral stenosis. Mild to moderate mitral valve regurgitation is seen. Mitral valve mean gradient is 2.2 mmHg consistent with mild mitral stenosis.\par Tricuspid Valve: The tricuspid valve is normal in structure. Moderate tricuspid regurgitation is visualized. Estimated pulmonary artery systolic pressure is 56.0 mmHg assuming a right atrial pressure of 3 mmHg, which is consistent with moderate pulmonary hypertension.\par Aortic Valve: Severe aortic stenosis is present. Peak transaortic gradient equals 91.8 mmHg, mean transaortic gradient equals 44.9 mmHg, the calculated aortic valve area equals 0.45 cm² by the continuity equation consistent with severe aortic stenosis. Mild aortic valve regurgitation is seen.\par Pulmonic Valve: Structurally normal pulmonic valve, with normal leaflet excursion. No indication of pulmonic valve regurgitation.\par Aorta: The aortic root and ascending aorta are structurally normal, with no evidence of dilitation. There is mild aortic root calcification.\par Pulmonary Artery: The main pulmonary artery is normal in size.\par \par Cath 5/2021\par COMPLICATIONS: No complications.\par DIAGNOSTIC IMPRESSIONS:\par 1. Successful Balloon Aortic Valvuloplasty.\par 2. 1-vessel coronary artery disease (mid LAD).\par  \par DIAGNOSTIC RECOMMENDATIONS: Routine post-BAV care.\par CCU.\par Consider TAVR pending clinical course.\par INTERVENTIONAL IMPRESSIONS:\par 1. Successful Balloon Aortic Valvuloplasty.\par 2. 1-vessel coronary artery disease (mid LAD).\par  \par INTERVENTIONAL RECOMMENDATIONS: Routine post-BAV care.\par CCU.\par Consider TAVR pending clinical course.\par \par EXAM: CAROTID DUPLEX COMPLETE BILAT\par \par \par PROCEDURE DATE: 05/21/2021\par \par \par \par \par INTERPRETATION: CLINICAL INFORMATION: 88-year-old female with dizziness\par \par COMPARISON: None available.\par \par TECHNIQUE: Grayscale, color and spectral Doppler examination of both carotid arteries was performed.\par \par FINDINGS:\par \par No elevated velocities or abnormal waveforms are encountered.\par \par Peak systolic velocities are as follows:\par \par RIGHT:\par PROX CCA = 106 cm/s\par DIST CCA = 92 cm/s\par PROX ICA = 142 cm/s\par DIST ICA = 125 cm/s\par ECA = 203 cm/s\par \par LEFT:\par PROX CCA = 114 cm/s\par DIST CCA = 116 cm/s\par PROX ICA = 149 cm/s\par DIST ICA = 112 cm/s\par ECA = 108 cm/s\par \par Antegrade flow is noted within both vertebral arteries.\par \par IMPRESSION: No significant hemodynamic stenosis of either carotid artery.\par \par Measurement of carotid stenosis is based on velocity parameters that correlate the residual internal carotid diameter with that of the more distal vessel in accordance with a method such as the [place] Symptomatic Carotid Endarterectomy Trial (NASCET).\par \par EXAM: CT ANGIO ABD PELV (W)AW IC\par EXAM: CT ANGIO HEART STRUCTURAL IC\par \par PROCEDURE DATE: 05/21/2021\par \par INTERPRETATION: Clinical History / Reason for exam: CLINICAL INDICATION: Aortic stenosis, evaluate for transcatheter aortic valve implantation.\par \par TECHNIQUE: CT angiogram of the chest, abdomen and pelvis was performed after administration of intravenous contrast. ECG-gated acquisition through the chest and ungated acquisition through the abdomen and pelvis in the arterial phase of contrast enhancement. Sagittal and coronal reformats were performed as well as 3D reconstructions.\par \par COMPARISON: CT chest 5/16/2021. CT abdomen 12/22/2007\par \par FINDINGS:\par \par ANNULUS/AORTA:\par Annular measurements were performed using images reconstructed during systole - RR cycle - 20%\par \par Annulus area is 459 mm2, bi-plane diameter of elliptical cross section 27 x 22 mm and annulus perimeter is 79 mm.\par \par \par The aortic valve is trileaflet.\par \par VALVULAR CALCIFICATION:\par The aortic valve is severely calcified.\par Protrusion of aortic valve calcifications into the outflow tract: No\par Calcifications of the aorto-mitral continuity: Yes\par Mitral annular calcifications: No\par \par The left main coronary artery arises 7 mm from the aortic annulus.\par The right coronary artery arises 15 mm from the aortic annulus.\par \par AORTA:\par The ascending aorta is moderately calcified.\par \par Mean Sinuses of Valsalva diameter= 30 mm (sinus to commissure)\par Left ventricular outflow tract = 26 x 21 mm\par Sinotubular junction: 26 mm\par Mid ascending aorta: 29 mm\par Mid descending aorta: 23 mm\par Aorta at the hiatus: 22 mm\par Aorta at renal arteries: 16 mm\par Aorta at the terminus: 14 mm\par \par AXILLARY & ILIOFEMORAL RUNOFF:\par Right axillary/subclavian minimum diameter: 5 mm\par \par Left axillary/subclavian minimum diameter: 5 mm\par \par Right-sided tortuosity: mild\par Right-sided calcification: severe\par \par Right common iliac minimum diameter: 6 mm\par Right external iliac minimum diameter: 4 mm\par Right common femoral diameter:4 mm\par \par Left-sided tortuosity: moderate\par Left-sided calcification: severe\par \par \par Left common iliac minimum diameter: 7 mm\par Left external iliac minimum diameter: 4 mm\par Left common femoral diameter:3 mm\par \par AIRWAYS AND LUNGS: The central tracheobronchial tree is patent. There is biapical scarring.\par \par MEDIASTINUM AND PLEURA: There are no enlarged mediastinal, hilar or axillary lymph nodes. There is no pneumothorax. There is interval resolution of the right pleural effusion. There is interval decrease of left pleural effusion. There is decreased adjacent left lower lobe atelectasis/consolidation. There is a 7 mm irregular nodule within the right upper lobe, 9/41.\par \par HEART AND CORONARY ARTERIES: The heart is normal in size. Mild left ventricular hypertrophy. There is no pericardial effusion. There is coronary atherosclerosis.\par \par LIVER: Partial hepatectomy. Pneumobilia.\par BILIARY SYSTEM: There is no biliary ductal dilatation.\par GALLBLADDER: Not visualized\par \par PANCREAS: Within normal limits.\par SPLEEN: Within normal limits.\par ADRENALS: Mild nonspecific thickening of the adrenal glands.\par \par KIDNEYS/URETERS: No hydronephrosis or obstructing stones. Right renal cyst.\par \par BOWEL/MESENTERY: No bowel obstruction. There is nonspecific mild thickening of the rectum and adjacent fat stranding\par \par URINARY BLADDER: Within normal limits.\par REPRODUCTIVE ORGANS: Calcified uterine fibroid\par \par PERITONEUM/RETROPERITONEUM: No free air. No free or loculated fluid.\par LYMPH NODES: Nonspecific 9 mm right external iliac lymph node.\par VESSELS: Atherosclerotic calcifications are present.\par \par BONES: There are degenerative changes of the spine.\par SOFT TISSUES: Fatty replacement of the pelvic musculature.\par \par \par \par IMPRESSION:\par \par 1. Annulus area is 459 mm2, bi-plane diameter of elliptical cross section 27 x 22 mm and annulus perimeter is 79 mm.\par 2. The smallest vessel diameter in the pelvis is 4 mm on the right and 3 mm on the left.\par 3. The smallest vessel diameter in the axilla is 5 mm on the right and 5 mm on the left.\par 4. There is interval resolution of the right pleural effusion. There is interval decrease of left pleural effusion. There is decreased adjacent left lower lobe atelectasis/consolidation.\par 5. There is a 7 mm irregular nodule within the right upper lobe. Noncontrast CT chest recommended in 3-6 months to assess for stability.\par 6. Nonspecific mild thickening of the rectum and adjacent fat stranding.\par

## 2021-08-12 NOTE — ASSESSMENT
[FreeTextEntry1] : Ms. DILIP LUCAS 88 year F arrives  today for evaluation of their aortic stenosis. Patient PMH include  hypothyroidism, HFpEF, severe AS s/p recent balloon valvuloplasty, HTN, hyperthyroidism, HCC s/p partial liver resection, and MVP. Symptoms include Fatigue. All questions and concerns were addressed with the patient. Pre-op planning was discussed with the patient, including dental clearance. Patient was educated on the risks and alternatives to surgery.\par \par Pt arrives with daughter and son\par Pt has 4 steps into house, unable to climb steps, uses ambulate for transportation into and out of home. \par Pt unable to do 5 meter walk. \par \par NYHA class III\par Pt lives home no aid. \par \par 5 Meter walk:  Unable On Wheelchair \par \par Frailty \par Right 2.6, 3.5, 2. 6\par Left: 7.0, 5.6, 4.8\par \par Patient is very concerned about quality of life. Family is very aggressive and upset. Unsure about care management. \par Will call office when decides if they want to proceed.  \par \par \par [facility] am acting as the scribe for Dr. Rodriguez\par \par needs TAVR,. undecided. understands all the risks. will get back to us.\par \par

## 2021-08-13 NOTE — END OF VISIT
[FreeTextEntry3] : Seen / examined and above reviewed.\par Known from hospital.\par \par Severe AS s/p BAV.\par Comorbidities as above.\par \par Progressive exertional dyspnea.\par Mildly hypertensive.\par Relatively compensated / euvolemic.\par \par Long discussion with patient, son, daughter.  Natural history of AS and risk / benefit of TAVR again discussed, including poor prognosis without intervention.  She remains uncertain (decline [facility]) and need to discuss further with children.\par \par - Cont Lasix 20mg q24.\par - Expeditious follow-up if opt for TAVR.

## 2021-08-13 NOTE — REASON FOR VISIT
[Symptom and Test Evaluation] : symptom and test evaluation [Structural Heart and Valve Disease] : structural heart and valve disease [Family Member] : family member [FreeTextEntry1] : Ms. DILIP LUCAS 88 year F arrives  today for evaluation of their Aortic Stenosis. Patient PMH include hypothyroidism, HFpEF, severe AS s/p recent balloon valvuloplasty, HTN, hyperthyroidism, HCC s/p partial liver resection, and MVP. Symptoms include shortness of breath and palpitations. NYHA class IV. \par \par All questions and concerns were addressed with the patient.\par They're healthcare team includes the following\par PMD: none\par Cardio: Gabby \par \par \par

## 2021-08-24 NOTE — ED ADULT NURSE NOTE - SEPSIS REFERENCE DATA CRITERIA 1
Detail Level: Simple
Abormal VS: Temp > 100F or < 96.8F; SBP < 90 mmHG; HR > 120bpm; Resp > 24/min
Quality 137: Melanoma: Continuity Of Care - Recall System: Documentation of system reason(s) for not entering patient's information into a recall system (eg, melanoma being monitored by another physician provider)

## 2022-06-18 ENCOUNTER — INPATIENT (INPATIENT)
Facility: HOSPITAL | Age: 87
LOS: 3 days | Discharge: HOME HEALTH | End: 2022-06-22
Attending: FAMILY MEDICINE | Admitting: FAMILY MEDICINE
Payer: MEDICARE

## 2022-06-18 VITALS
HEIGHT: 60 IN | SYSTOLIC BLOOD PRESSURE: 104 MMHG | HEART RATE: 89 BPM | DIASTOLIC BLOOD PRESSURE: 55 MMHG | OXYGEN SATURATION: 98 % | WEIGHT: 139.99 LBS | RESPIRATION RATE: 25 BRPM | TEMPERATURE: 98 F

## 2022-06-18 DIAGNOSIS — Z90.49 ACQUIRED ABSENCE OF OTHER SPECIFIED PARTS OF DIGESTIVE TRACT: Chronic | ICD-10-CM

## 2022-06-18 LAB
ALBUMIN SERPL ELPH-MCNC: 4 G/DL — SIGNIFICANT CHANGE UP (ref 3.5–5.2)
ALP SERPL-CCNC: 117 U/L — HIGH (ref 30–115)
ALT FLD-CCNC: 13 U/L — SIGNIFICANT CHANGE UP (ref 0–41)
ANION GAP SERPL CALC-SCNC: 13 MMOL/L — SIGNIFICANT CHANGE UP (ref 7–14)
AST SERPL-CCNC: 22 U/L — SIGNIFICANT CHANGE UP (ref 0–41)
BASOPHILS # BLD AUTO: 0.02 K/UL — SIGNIFICANT CHANGE UP (ref 0–0.2)
BASOPHILS NFR BLD AUTO: 0.3 % — SIGNIFICANT CHANGE UP (ref 0–1)
BILIRUB SERPL-MCNC: 0.4 MG/DL — SIGNIFICANT CHANGE UP (ref 0.2–1.2)
BUN SERPL-MCNC: 30 MG/DL — HIGH (ref 10–20)
CA-I SERPL-SCNC: 1.22 MMOL/L — SIGNIFICANT CHANGE UP (ref 1.15–1.33)
CALCIUM SERPL-MCNC: 9.5 MG/DL — SIGNIFICANT CHANGE UP (ref 8.5–10.1)
CHLORIDE SERPL-SCNC: 101 MMOL/L — SIGNIFICANT CHANGE UP (ref 98–110)
CK MB CFR SERPL CALC: 8.8 NG/ML — HIGH (ref 0.6–6.3)
CK SERPL-CCNC: 116 U/L — SIGNIFICANT CHANGE UP (ref 0–225)
CO2 SERPL-SCNC: 24 MMOL/L — SIGNIFICANT CHANGE UP (ref 17–32)
CREAT SERPL-MCNC: 1 MG/DL — SIGNIFICANT CHANGE UP (ref 0.7–1.5)
EGFR: 54 ML/MIN/1.73M2 — LOW
EOSINOPHIL # BLD AUTO: 0.03 K/UL — SIGNIFICANT CHANGE UP (ref 0–0.7)
EOSINOPHIL NFR BLD AUTO: 0.5 % — SIGNIFICANT CHANGE UP (ref 0–8)
FLUAV AG NPH QL: SIGNIFICANT CHANGE UP
FLUBV AG NPH QL: SIGNIFICANT CHANGE UP
GAS PNL BLDV: 137 MMOL/L — SIGNIFICANT CHANGE UP (ref 136–145)
GAS PNL BLDV: SIGNIFICANT CHANGE UP
GLUCOSE SERPL-MCNC: 123 MG/DL — HIGH (ref 70–99)
HCO3 BLDV-SCNC: 29 MMOL/L — SIGNIFICANT CHANGE UP (ref 22–29)
HCT VFR BLD CALC: 29.2 % — LOW (ref 37–47)
HCT VFR BLDA CALC: 29 % — LOW (ref 39–51)
HGB BLD CALC-MCNC: 9.7 G/DL — LOW (ref 12.6–17.4)
HGB BLD-MCNC: 9.1 G/DL — LOW (ref 12–16)
IMM GRANULOCYTES NFR BLD AUTO: 0.3 % — SIGNIFICANT CHANGE UP (ref 0.1–0.3)
LACTATE BLDV-MCNC: 2.6 MMOL/L — HIGH (ref 0.5–2)
LYMPHOCYTES # BLD AUTO: 0.59 K/UL — LOW (ref 1.2–3.4)
LYMPHOCYTES # BLD AUTO: 9.6 % — LOW (ref 20.5–51.1)
MCHC RBC-ENTMCNC: 29.4 PG — SIGNIFICANT CHANGE UP (ref 27–31)
MCHC RBC-ENTMCNC: 31.2 G/DL — LOW (ref 32–37)
MCV RBC AUTO: 94.5 FL — SIGNIFICANT CHANGE UP (ref 81–99)
MONOCYTES # BLD AUTO: 0.44 K/UL — SIGNIFICANT CHANGE UP (ref 0.1–0.6)
MONOCYTES NFR BLD AUTO: 7.2 % — SIGNIFICANT CHANGE UP (ref 1.7–9.3)
NEUTROPHILS # BLD AUTO: 5.04 K/UL — SIGNIFICANT CHANGE UP (ref 1.4–6.5)
NEUTROPHILS NFR BLD AUTO: 82.1 % — HIGH (ref 42.2–75.2)
NRBC # BLD: 0 /100 WBCS — SIGNIFICANT CHANGE UP (ref 0–0)
NT-PROBNP SERPL-SCNC: 6509 PG/ML — HIGH (ref 0–300)
PCO2 BLDV: 49 MMHG — HIGH (ref 39–42)
PH BLDV: 7.38 — SIGNIFICANT CHANGE UP (ref 7.32–7.43)
PLATELET # BLD AUTO: 145 K/UL — SIGNIFICANT CHANGE UP (ref 130–400)
PO2 BLDV: 34 MMHG — SIGNIFICANT CHANGE UP
POTASSIUM BLDV-SCNC: 4 MMOL/L — SIGNIFICANT CHANGE UP (ref 3.5–5.1)
POTASSIUM SERPL-MCNC: 4.1 MMOL/L — SIGNIFICANT CHANGE UP (ref 3.5–5)
POTASSIUM SERPL-SCNC: 4.1 MMOL/L — SIGNIFICANT CHANGE UP (ref 3.5–5)
PROT SERPL-MCNC: 6.7 G/DL — SIGNIFICANT CHANGE UP (ref 6–8)
RBC # BLD: 3.09 M/UL — LOW (ref 4.2–5.4)
RBC # FLD: 13.2 % — SIGNIFICANT CHANGE UP (ref 11.5–14.5)
RSV RNA NPH QL NAA+NON-PROBE: SIGNIFICANT CHANGE UP
SAO2 % BLDV: 53 % — SIGNIFICANT CHANGE UP
SARS-COV-2 RNA SPEC QL NAA+PROBE: SIGNIFICANT CHANGE UP
SODIUM SERPL-SCNC: 138 MMOL/L — SIGNIFICANT CHANGE UP (ref 135–146)
TROPONIN T SERPL-MCNC: 0.12 NG/ML — CRITICAL HIGH
TROPONIN T SERPL-MCNC: 0.14 NG/ML — CRITICAL HIGH
WBC # BLD: 6.14 K/UL — SIGNIFICANT CHANGE UP (ref 4.8–10.8)
WBC # FLD AUTO: 6.14 K/UL — SIGNIFICANT CHANGE UP (ref 4.8–10.8)

## 2022-06-18 PROCEDURE — 93010 ELECTROCARDIOGRAM REPORT: CPT

## 2022-06-18 PROCEDURE — 99222 1ST HOSP IP/OBS MODERATE 55: CPT | Mod: AI

## 2022-06-18 PROCEDURE — 71045 X-RAY EXAM CHEST 1 VIEW: CPT | Mod: 26

## 2022-06-18 PROCEDURE — 99285 EMERGENCY DEPT VISIT HI MDM: CPT | Mod: FS

## 2022-06-18 RX ORDER — SENNA PLUS 8.6 MG/1
2 TABLET ORAL AT BEDTIME
Refills: 0 | Status: DISCONTINUED | OUTPATIENT
Start: 2022-06-18 | End: 2022-06-22

## 2022-06-18 RX ORDER — ASPIRIN/CALCIUM CARB/MAGNESIUM 324 MG
81 TABLET ORAL DAILY
Refills: 0 | Status: DISCONTINUED | OUTPATIENT
Start: 2022-06-18 | End: 2022-06-22

## 2022-06-18 RX ORDER — LEVOTHYROXINE SODIUM 125 MCG
100 TABLET ORAL DAILY
Refills: 0 | Status: DISCONTINUED | OUTPATIENT
Start: 2022-06-18 | End: 2022-06-22

## 2022-06-18 RX ORDER — CEFTRIAXONE 500 MG/1
1000 INJECTION, POWDER, FOR SOLUTION INTRAMUSCULAR; INTRAVENOUS ONCE
Refills: 0 | Status: COMPLETED | OUTPATIENT
Start: 2022-06-18 | End: 2022-06-18

## 2022-06-18 RX ORDER — AZITHROMYCIN 500 MG/1
500 TABLET, FILM COATED ORAL EVERY 24 HOURS
Refills: 0 | Status: DISCONTINUED | OUTPATIENT
Start: 2022-06-19 | End: 2022-06-19

## 2022-06-18 RX ORDER — CEFTRIAXONE 500 MG/1
1000 INJECTION, POWDER, FOR SOLUTION INTRAMUSCULAR; INTRAVENOUS EVERY 24 HOURS
Refills: 0 | Status: DISCONTINUED | OUTPATIENT
Start: 2022-06-19 | End: 2022-06-19

## 2022-06-18 RX ORDER — FUROSEMIDE 40 MG
20 TABLET ORAL DAILY
Refills: 0 | Status: DISCONTINUED | OUTPATIENT
Start: 2022-06-19 | End: 2022-06-20

## 2022-06-18 RX ORDER — AZITHROMYCIN 500 MG/1
500 TABLET, FILM COATED ORAL ONCE
Refills: 0 | Status: COMPLETED | OUTPATIENT
Start: 2022-06-18 | End: 2022-06-18

## 2022-06-18 RX ORDER — FUROSEMIDE 40 MG
40 TABLET ORAL ONCE
Refills: 0 | Status: COMPLETED | OUTPATIENT
Start: 2022-06-18 | End: 2022-06-18

## 2022-06-18 RX ORDER — HEPARIN SODIUM 5000 [USP'U]/ML
5000 INJECTION INTRAVENOUS; SUBCUTANEOUS EVERY 12 HOURS
Refills: 0 | Status: DISCONTINUED | OUTPATIENT
Start: 2022-06-18 | End: 2022-06-22

## 2022-06-18 RX ORDER — PANTOPRAZOLE SODIUM 20 MG/1
40 TABLET, DELAYED RELEASE ORAL
Refills: 0 | Status: DISCONTINUED | OUTPATIENT
Start: 2022-06-18 | End: 2022-06-22

## 2022-06-18 RX ADMIN — Medication 40 MILLIGRAM(S): at 18:05

## 2022-06-18 RX ADMIN — AZITHROMYCIN 255 MILLIGRAM(S): 500 TABLET, FILM COATED ORAL at 16:11

## 2022-06-18 RX ADMIN — CEFTRIAXONE 100 MILLIGRAM(S): 500 INJECTION, POWDER, FOR SOLUTION INTRAMUSCULAR; INTRAVENOUS at 16:11

## 2022-06-18 RX ADMIN — SENNA PLUS 2 TABLET(S): 8.6 TABLET ORAL at 21:16

## 2022-06-18 NOTE — ED ADULT TRIAGE NOTE - DOMESTIC TRAVEL HIGH RISK QUESTION
Detail Level: Detailed Patient Specific Counseling (Will Not Stick From Patient To Patient): OK to continue HC 2.5 % lotion QD-BID (has). Can d/c once spots are resolved. No

## 2022-06-18 NOTE — H&P ADULT - NSHPLABSRESULTS_GEN_ALL_CORE
9.1    6.14  )-----------( 145      ( 18 Jun 2022 14:45 )             29.2       06-18    138  |  101  |  30<H>  ----------------------------<  123<H>  4.1   |  24  |  1.0    Ca    9.5      18 Jun 2022 14:45    TPro  6.7  /  Alb  4.0  /  TBili  0.4  /  DBili  x   /  AST  22  /  ALT  13  /  AlkPhos  117<H>  06-18        CARDIAC MARKERS ( 18 Jun 2022 14:45 )  x     / 0.12 ng/mL / x     / x     / x        RADIOLOGY:  Rui read

## 2022-06-18 NOTE — ED PROVIDER NOTE - CARE PLAN
Principal Discharge DX:	SOB (shortness of breath)  Secondary Diagnosis:	Pleural effusion  Secondary Diagnosis:	Abnormality of lung on chest x-ray  Secondary Diagnosis:	Pneumonia   1

## 2022-06-18 NOTE — H&P ADULT - ASSESSMENT
89-year-old female with PMH of HFpEF, severe AS s/p balloon valvuloplasty, MVP, HTN, OAB, HCC (s/p partial liver resection in 2001), hypothyroidism, GERD presents with SOB for over 2 weeks.     # Acute on chronic CHF exacerbation likely 2/2 superimposed PNA  # Hx severe AS s/p balloon valvuloplasty  BNP 6509  - admit to tele  - s/p lasix 40 mg IV in the ED  - will continue Lasix 20 mg IV qd starting tomorrow  - EKG today and in AM  - cardiology consult  - daily weight  - strict I/O  - keep head of bed elevated to 30 degrees  - supplemental oxygen in needed    # Elevated troponin  - troponin 0.12  - pt asymptomatic  - will trend cardiac enzymes x 3  - cardiology eval  - ECHO    # Suspected CAP  - s/p Ceftriaxone/Azithro in ED  - continue Abx  - obtain procalcitonin  - urine legionella, strep pna  - f/u CXR read  - ID consult    # Normocytic anemia  - trend H/H    #Hypothyroidism  - Continue synthroid     # GERD   - continue PPI    # HCC (s/p partial liver resection in 2001)  - outpatient follow up    DVT ppx: Heparin  GI ppx: PPI   89-year-old female with PMH of HFpEF, severe AS s/p balloon valvuloplasty, MVP, HTN, OAB, HCC (s/p partial liver resection in 2001), hypothyroidism, GERD presents with SOB for over 2 weeks.     # Acute on chronic CHF exacerbation likely 2/2 superimposed PNA  # Hx severe AS s/p balloon valvuloplasty  BNP 6509  - admit to tele  - s/p lasix 40 mg IV in the ED  - will continue Lasix 20 mg IV qd starting tomorrow  - EKG today and in AM  - cardiology consult  - daily weight  - strict I/O  - keep head of bed elevated to 30 degrees  - supplemental oxygen in needed    # Elevated troponin  - troponin 0.12  - pt asymptomatic  - will trend cardiac enzymes x 3  - cardiology eval  - ECHO  - start ASA    # Suspected CAP  - s/p Ceftriaxone/Azithro in ED  - continue Abx  - obtain procalcitonin  - urine legionella, strep pna  - f/u CXR read  - ID consult    # Normocytic anemia  - trend H/H    #Hypothyroidism  - Continue synthroid     # GERD   - continue PPI    # HCC (s/p partial liver resection in 2001)  - outpatient follow up    DVT ppx: Heparin  GI ppx: PPI

## 2022-06-18 NOTE — H&P ADULT - NSHPPHYSICALEXAM_GEN_ALL_CORE
VITALS:   T(C): 36.7 (06-18-22 @ 13:30), Max: 36.7 (06-18-22 @ 13:30)  HR: 90 (06-18-22 @ 16:17) (89 - 90)  BP: 102/74 (06-18-22 @ 16:17) (102/74 - 104/55)  RR: 20 (06-18-22 @ 16:17) (20 - 25)  SpO2: 100% (06-18-22 @ 16:17) (98% - 100%)    GENERAL: NAD, lying in bed comfortably  HEAD:  Atraumatic, Normocephalic  EYES: EOMI  ENT: Moist mucous membranes  NECK: Supple  CHEST/LUNG: Decreased breath sounds to left lung base, no wheezing, unlabored respirations  HEART: regular rate and rhythm, no gallops  ABDOMEN: Bowel sounds present; Soft, Nontender, Nondistended  EXTREMITIES: No LE edema b/l  NERVOUS SYSTEM:  Alert & Oriented X3, speech clear. No gross deficits   SKIN: No rashes or lesions

## 2022-06-18 NOTE — ED PROVIDER NOTE - OBJECTIVE STATEMENT
89-year-old female with a past medical history of lung cancer status post lung resection GERD, hiatal hernia, hypertension, CHF, hypothyroid, and aortic valve angioplasty by Dr. Mccollum presents to the ED for evaluation of worsening shortness of breath x1 week.  As per patient's daughter patient was supposed to get a valve repair at [facility] about a year ago but refused.  Patient is on 40 mg of furosemide daily.  Patient reports she has not seen her cardiologist since the angioplasty about a year ago.  Patient reports the last time she had the symptoms about a year ago she had a thoracentesis performed. Patient denies fever, cough, chest pain, back pain, abdominal pain, nausea, vomiting, diarrhea, recent travel, recent sick contacts, leg pain, leg swelling, recent hospitalizations, recent surgeries, history of blood clots, use of hormones, or cigarette smoking.

## 2022-06-18 NOTE — ED PROVIDER NOTE - ATTENDING APP SHARED VISIT CONTRIBUTION OF CARE
89 yo F PMHx noted including GERD, hiatal hernia, HTN, CHF AS, presents with daughter for evaluation of increasing SOB x 1 week.  no chest pain, no fever or chills.  Pt states she has h/o fluid in lungs and required thoracentesis in  the past.  On exam pt in NAD AAO x 3, able to speak full clear sentences, Lungs with decreased AE b/l, no wrr, abd soft nt nd, no edema

## 2022-06-18 NOTE — ED PROVIDER NOTE - PHYSICAL EXAMINATION
Physical Exam    Vital Signs: I have reviewed the initial vital signs.  Constitutional: appears stated age, no acute distress  Eyes: Conjunctiva pink, Sclera clear  Cardiovascular: S1 and S2, regular rate, regular rhythm, well-perfused extremities, radial pulses equal and 2+ b/l.   Respiratory: unlabored respiratory effort, (+) decrease left breath sounds, pt is speaking full sentences. no accessory muscle use.   Gastrointestinal: soft, non-tender, nondistended abdomen, no pulsatile mass, normal bowl sounds, no rebound, no guarding  Musculoskeletal: supple neck, no lower extremity edema, no calf tenderness  Integumentary: warm, dry, no rash  Neurologic: awake, alert, cranial nerves II-XII grossly intact, extremities’ motor and sensory functions grossly intact.  Psychiatric: appropriate mood, appropriate affect

## 2022-06-18 NOTE — PATIENT PROFILE ADULT - FALL HARM RISK - HARM RISK INTERVENTIONS

## 2022-06-18 NOTE — ED PROVIDER NOTE - NS ED ATTENDING STATEMENT MOD
This was a shared visit with the KIESHA. I reviewed and verified the documentation and independently performed the documented:

## 2022-06-18 NOTE — H&P ADULT - HISTORY OF PRESENT ILLNESS
89-year-old female with PMH of HFpEF, severe AS s/p balloon valvuloplasty, MVP, HTN, OAB, HCC (s/p partial liver resection in 2001), hypothyroidism, GERD presents to the ED c/o difficulty breathing for over 2 weeks. Pt reports worsening SOB at rest and with ambulation. She also reports dry cough. Patient is on 20 mg of furosemide daily. Patient last saw cardiologist over a year ago. Pt denies fever, chills, CP, dizziness, dysuria, N/V/D/C, leg pain or swelling, sick contacts.

## 2022-06-18 NOTE — ED ADULT NURSE REASSESSMENT NOTE - NS ED NURSE REASSESS COMMENT FT1
pts daughter at bedside instructed it is best to take pts belongings home, pt and daughter refused. pt and daughter instructed that the hospital and staff is not responsible for the belongings, pt and family verbalized understanding, continues to refuse to take belongings home.

## 2022-06-18 NOTE — H&P ADULT - NSHPSOCIALHISTORY_GEN_ALL_CORE
Tobacco use: denies  EtOH use: denies  Illicit drug use: denies  Lives with son, ambulates with a rolling walker

## 2022-06-19 LAB
ALBUMIN SERPL ELPH-MCNC: 4.2 G/DL — SIGNIFICANT CHANGE UP (ref 3.5–5.2)
ALP SERPL-CCNC: 121 U/L — HIGH (ref 30–115)
ALT FLD-CCNC: 13 U/L — SIGNIFICANT CHANGE UP (ref 0–41)
ANION GAP SERPL CALC-SCNC: 14 MMOL/L — SIGNIFICANT CHANGE UP (ref 7–14)
AST SERPL-CCNC: 21 U/L — SIGNIFICANT CHANGE UP (ref 0–41)
BILIRUB SERPL-MCNC: 0.4 MG/DL — SIGNIFICANT CHANGE UP (ref 0.2–1.2)
BUN SERPL-MCNC: 27 MG/DL — HIGH (ref 10–20)
CALCIUM SERPL-MCNC: 9.6 MG/DL — SIGNIFICANT CHANGE UP (ref 8.5–10.1)
CHLORIDE SERPL-SCNC: 98 MMOL/L — SIGNIFICANT CHANGE UP (ref 98–110)
CK MB CFR SERPL CALC: 7.5 NG/ML — HIGH (ref 0.6–6.3)
CK SERPL-CCNC: 89 U/L — SIGNIFICANT CHANGE UP (ref 0–225)
CO2 SERPL-SCNC: 27 MMOL/L — SIGNIFICANT CHANGE UP (ref 17–32)
CREAT SERPL-MCNC: 0.9 MG/DL — SIGNIFICANT CHANGE UP (ref 0.7–1.5)
EGFR: 61 ML/MIN/1.73M2 — SIGNIFICANT CHANGE UP
GLUCOSE SERPL-MCNC: 94 MG/DL — SIGNIFICANT CHANGE UP (ref 70–99)
HCT VFR BLD CALC: 31.6 % — LOW (ref 37–47)
HGB BLD-MCNC: 9.8 G/DL — LOW (ref 12–16)
MCHC RBC-ENTMCNC: 28.9 PG — SIGNIFICANT CHANGE UP (ref 27–31)
MCHC RBC-ENTMCNC: 31 G/DL — LOW (ref 32–37)
MCV RBC AUTO: 93.2 FL — SIGNIFICANT CHANGE UP (ref 81–99)
NRBC # BLD: 0 /100 WBCS — SIGNIFICANT CHANGE UP (ref 0–0)
PLATELET # BLD AUTO: 160 K/UL — SIGNIFICANT CHANGE UP (ref 130–400)
POTASSIUM SERPL-MCNC: 3.9 MMOL/L — SIGNIFICANT CHANGE UP (ref 3.5–5)
POTASSIUM SERPL-SCNC: 3.9 MMOL/L — SIGNIFICANT CHANGE UP (ref 3.5–5)
PROCALCITONIN SERPL-MCNC: 0.07 NG/ML — SIGNIFICANT CHANGE UP (ref 0.02–0.1)
PROT SERPL-MCNC: 6.9 G/DL — SIGNIFICANT CHANGE UP (ref 6–8)
RBC # BLD: 3.39 M/UL — LOW (ref 4.2–5.4)
RBC # FLD: 13.3 % — SIGNIFICANT CHANGE UP (ref 11.5–14.5)
SODIUM SERPL-SCNC: 139 MMOL/L — SIGNIFICANT CHANGE UP (ref 135–146)
TROPONIN T SERPL-MCNC: 0.17 NG/ML — CRITICAL HIGH
WBC # BLD: 4.62 K/UL — LOW (ref 4.8–10.8)
WBC # FLD AUTO: 4.62 K/UL — LOW (ref 4.8–10.8)

## 2022-06-19 PROCEDURE — 93010 ELECTROCARDIOGRAM REPORT: CPT

## 2022-06-19 PROCEDURE — 71250 CT THORAX DX C-: CPT | Mod: 26

## 2022-06-19 PROCEDURE — 99232 SBSQ HOSP IP/OBS MODERATE 35: CPT

## 2022-06-19 PROCEDURE — 99222 1ST HOSP IP/OBS MODERATE 55: CPT

## 2022-06-19 RX ADMIN — PANTOPRAZOLE SODIUM 40 MILLIGRAM(S): 20 TABLET, DELAYED RELEASE ORAL at 05:23

## 2022-06-19 RX ADMIN — HEPARIN SODIUM 5000 UNIT(S): 5000 INJECTION INTRAVENOUS; SUBCUTANEOUS at 18:04

## 2022-06-19 RX ADMIN — HEPARIN SODIUM 5000 UNIT(S): 5000 INJECTION INTRAVENOUS; SUBCUTANEOUS at 05:23

## 2022-06-19 RX ADMIN — Medication 100 MICROGRAM(S): at 05:23

## 2022-06-19 RX ADMIN — Medication 20 MILLIGRAM(S): at 05:23

## 2022-06-19 RX ADMIN — SENNA PLUS 2 TABLET(S): 8.6 TABLET ORAL at 21:31

## 2022-06-19 RX ADMIN — Medication 81 MILLIGRAM(S): at 13:03

## 2022-06-19 NOTE — CONSULT NOTE ADULT - ASSESSMENT
ASSESSMENT  89-year-old female with PMH of HFpEF, severe AS s/p balloon valvuloplasty, MVP, HTN, OAB, HCC (s/p partial liver resection in 2001), hypothyroidism, GERD presents to the ED c/o difficulty breathing for over 2 weeks.       IMPRESSION  #Pleural effusion in the setting of Volume overload, no clinical evidence of PNA    CXR Moderate-to-large left pleural effusion. Fluid collecting within right   fissures.    Afebrile     WBC 6    BNP elevated    Procalcitonin, Serum: 0.07 ng/mL (06-18-22 @ 21:32)  #Sepsis ruled out on admission   #Abx allergy: Cipro (Other)  Levaquin (Rash)  tetracycline (Hives)    Creatinine, Serum: 1.0 (06-18-22 @ 14:45)    Weight (kg): 63.5 (06-18-22 @ 13:30)    RECOMMENDATIONS  This is an incomplete consult note. All final recommendations to follow after interview and examination of the patient. Please follow recommendations noted below.  - D/C ABX and monitor   If any questions, please call or send a message on [employer] Teams  Please continue to update ID with any pertinent new laboratory or radiographic findings  Spectra [id number] ASSESSMENT  89-year-old female with PMH of HFpEF, severe AS s/p balloon valvuloplasty, MVP, HTN, OAB, HCC (s/p partial liver resection in 2001), hypothyroidism, GERD presents to the ED c/o difficulty breathing for over 2 weeks.       IMPRESSION  #Pleural effusion , no clinical evidence of PNA    CXR Moderate-to-large left pleural effusion. Fluid collecting within right   fissures.    Afebrile     WBC 6    BNP elevated    Procalcitonin, Serum: 0.07 ng/mL (06-18-22 @ 21:32)  #Sepsis ruled out on admission   #Abx allergy: Cipro (Other)  Levaquin (Rash)  tetracycline (Hives)    Creatinine, Serum: 1.0 (06-18-22 @ 14:45)    Weight (kg): 63.5 (06-18-22 @ 13:30)    RECOMMENDATIONS    - D/C ABX and monitor   - Diagnostic thoracentesis  - pending CT    If any questions, please call or send a message on [employer] Teams  Please continue to update ID with any pertinent new laboratory or radiographic findings  Spectra [id number]

## 2022-06-19 NOTE — PHYSICAL THERAPY INITIAL EVALUATION ADULT - GENERAL OBSERVATIONS, REHAB EVAL
14:15-14:45 Chart reviewed. Pt encountered semireclined in bed, may be seen by Physical Therapist as confirmed with Nurse. Patent denied pain at rest and agreed ti try to get up but "afraid to fall"; +tele; +heplock; +[place]

## 2022-06-19 NOTE — CONSULT NOTE ADULT - SUBJECTIVE AND OBJECTIVE BOX
DILIP LUCAS  89y, Female  Allergy: Cipro (Other)  Levaquin (Rash)  tetracycline (Hives)      CHIEF COMPLAINT:       LOS  1d    HPI  HPI:  89-year-old female with PMH of HFpEF, severe AS s/p balloon valvuloplasty, MVP, HTN, OAB, HCC (s/p partial liver resection in 2001), hypothyroidism, GERD presents to the ED c/o difficulty breathing for over 2 weeks. Pt reports worsening SOB at rest and with ambulation. She also reports dry cough. Patient is on 20 mg of furosemide daily. Patient last saw cardiologist over a year ago. Pt denies fever, chills, CP, dizziness, dysuria, N/V/D/C, leg pain or swelling, sick contacts.   (18 Jun 2022 18:11)      INFECTIOUS DISEASE HISTORY:  ID consulted for PNA  Afebrile No leukocytosis       PMH  PAST MEDICAL & SURGICAL HISTORY:  HTN (hypertension)      Mitral valve prolapse      Enlarged heart      Murmur      Hyperthyroidism      Arthritis      HTN (hypertension)      Diverticulosis      Hiatal hernia      Acid reflux      Liver cancer  s/p resection and s/p chemo and radiation      Aortic stenosis  s/p ballon aortic valuloplasty 5/25/21      H/O resection of liver  liver cancer      Status post cholecystectomy          FAMILY HISTORY  No pertinent family history in first degree relatives        SOCIAL HISTORY  Social History:  Tobacco use: denies  EtOH use: denies  Illicit drug use: denies  Lives with son, ambulates with a rolling walker (18 Jun 2022 18:11)        ROS  ***    VITALS:  T(F): 96.3, Max: 98.3 (06-18-22 @ 18:25)  HR: 71  BP: 120/53  RR: 16Vital Signs Last 24 Hrs  T(C): 35.7 (19 Jun 2022 05:15), Max: 36.8 (18 Jun 2022 18:25)  T(F): 96.3 (19 Jun 2022 05:15), Max: 98.3 (18 Jun 2022 18:25)  HR: 71 (19 Jun 2022 05:15) (71 - 95)  BP: 120/53 (19 Jun 2022 05:15) (102/74 - 124/55)  BP(mean): --  RR: 16 (19 Jun 2022 05:15) (16 - 25)  SpO2: 97% (18 Jun 2022 18:25) (97% - 100%)    PHYSICAL EXAM:  ***    TESTS & MEASUREMENTS:                        9.1    6.14  )-----------( 145      ( 18 Jun 2022 14:45 )             29.2     06-18    138  |  101  |  30<H>  ----------------------------<  123<H>  4.1   |  24  |  1.0    Ca    9.5      18 Jun 2022 14:45    TPro  6.7  /  Alb  4.0  /  TBili  0.4  /  DBili  x   /  AST  22  /  ALT  13  /  AlkPhos  117<H>  06-18      LIVER FUNCTIONS - ( 18 Jun 2022 14:45 )  Alb: 4.0 g/dL / Pro: 6.7 g/dL / ALK PHOS: 117 U/L / ALT: 13 U/L / AST: 22 U/L / GGT: x               Culture - Fungal, Body Fluid (collected 07-08-21 @ 12:21)  Source: .Body Fluid Pleural Fluid  Final Report (08-07-21 @ 15:00):    No fungus isolated after 4 weeks.    Culture - Body Fluid with Gram Stain (collected 07-08-21 @ 12:21)  Source: .Body Fluid Pleural Fluid  Gram Stain (07-09-21 @ 02:22):    polymorphonuclear leukocytes seen    No organisms seen    by cytocentrifuge  Final Report (07-13-21 @ 17:58):    No growth at 5 days        Blood Gas Venous - Lactate: 2.60 mmol/L (06-18-22 @ 15:10)      INFECTIOUS DISEASES TESTING  Procalcitonin, Serum: 0.07 ng/mL (06-18-22 @ 21:32)  COVID-19 PCR: NotDetec (07-13-21 @ 13:14)  COVID-19 PCR: NotDetec (07-07-21 @ 11:52)      INFLAMMATORY MARKERS      RADIOLOGY & ADDITIONAL TESTS:  I have personally reviewed the last Chest xray  CXR  Xray Chest 1 View- PORTABLE-Urgent:   ACC: [id number] EXAM:  XR CHEST PORTABLE URGENT 1V                          PROCEDURE DATE:  06/18/2022          INTERPRETATION:  Clinical History / Reason for exam: Shortness of breath.    Comparison : Chest radiograph July 13, 2021.    Technique/Positioning: Single frontal chest x-ray obtained.    Findings:    Support devices: None.    Cardiac/mediastinum/hilum: Obscured.    Lung parenchyma/Pleura: Moderate-to-large left pleural effusion. Fluid   collecting within right fissures. No pneumothorax.    Skeleton/soft tissues: Unchanged.    Impression:  Moderate-to-large left pleural effusion. Fluid collecting within right   fissures.    --- End of Report ---            MARU SCHILLING MD; Attending Radiologist  This document has been electronicallysigned. Jun 18 2022  6:38PM (06-18-22 @ 15:30)      CT      CARDIOLOGY TESTING      MEDICATIONS  aspirin  chewable 81 Oral daily  azithromycin  IVPB 500 IV Intermittent every 24 hours  cefTRIAXone   IVPB 1000 IV Intermittent every 24 hours  furosemide   Injectable 20 IV Push daily  heparin   Injectable 5000 SubCutaneous every 12 hours  levothyroxine 100 Oral daily  pantoprazole    Tablet 40 Oral before breakfast  senna 2 Oral at bedtime        ANTIBIOTICS:  azithromycin  IVPB 500 milliGRAM(s) IV Intermittent every 24 hours  cefTRIAXone   IVPB 1000 milliGRAM(s) IV Intermittent every 24 hours      ALLERGIES:  Cipro (Other)  Levaquin (Rash)  tetracycline (Hives)           DILIP LUCAS  89y, Female  Allergy: Cipro (Other)  Levaquin (Rash)  tetracycline (Hives)      CHIEF COMPLAINT:       LOS  1d    HPI  HPI:  89-year-old female with PMH of HFpEF, severe AS s/p balloon valvuloplasty, MVP, HTN, OAB, HCC (s/p partial liver resection in 2001), hypothyroidism, GERD presents to the ED c/o difficulty breathing for over 2 weeks. Pt reports worsening SOB at rest and with ambulation. She also reports dry cough. Patient is on 20 mg of furosemide daily. Patient last saw cardiologist over a year ago. Pt denies fever, chills, CP, dizziness, dysuria, N/V/D/C, leg pain or swelling, sick contacts.   (18 Jun 2022 18:11)      INFECTIOUS DISEASE HISTORY:  ID consulted for PNA  Afebrile No leukocytosis   non-productive cough      PMH  PAST MEDICAL & SURGICAL HISTORY:  HTN (hypertension)      Mitral valve prolapse      Enlarged heart      Murmur      Hyperthyroidism      Arthritis      HTN (hypertension)      Diverticulosis      Hiatal hernia      Acid reflux      Liver cancer  s/p resection and s/p chemo and radiation      Aortic stenosis  s/p ballon aortic valuloplasty 5/25/21      H/O resection of liver  liver cancer      Status post cholecystectomy          FAMILY HISTORY  No pertinent family history in first degree relatives        SOCIAL HISTORY  Social History:  Tobacco use: denies  EtOH use: denies  Illicit drug use: denies  Lives with son, ambulates with a rolling walker (18 Jun 2022 18:11)        ROS  General: Denies rigors, nightsweats  HEENT: Denies headache, rhinorrhea, sore throat, eye pain  CV: Denies CP, palpitations  PULM: Denies wheezing, hemoptysis  GI: Denies hematemesis, hematochezia, melena  : Denies discharge, hematuria  MSK: Denies arthralgias, myalgias  SKIN: Denies rash, lesions  NEURO: Denies paresthesias, weakness  PSYCH: Denies depression, anxiety     VITALS:  T(F): 96.3, Max: 98.3 (06-18-22 @ 18:25)  HR: 71  BP: 120/53  RR: 16Vital Signs Last 24 Hrs  T(C): 35.7 (19 Jun 2022 05:15), Max: 36.8 (18 Jun 2022 18:25)  T(F): 96.3 (19 Jun 2022 05:15), Max: 98.3 (18 Jun 2022 18:25)  HR: 71 (19 Jun 2022 05:15) (71 - 95)  BP: 120/53 (19 Jun 2022 05:15) (102/74 - 124/55)  BP(mean): --  RR: 16 (19 Jun 2022 05:15) (16 - 25)  SpO2: 97% (18 Jun 2022 18:25) (97% - 100%)    PHYSICAL EXAM:  Gen: NAD, resting in bed  HEENT: Normocephalic, atraumatic  Neck: supple, no lymphadenopathy  CV: Regular rate & regular rhythm  Lungs: decreased BS at base L  Abdomen: Soft, BS present  Ext: Warm, well perfused  Neuro: non focal, awake  Skin: no rash, no erythema  Lines: no phlebitis     TESTS & MEASUREMENTS:                        9.1    6.14  )-----------( 145      ( 18 Jun 2022 14:45 )             29.2     06-18    138  |  101  |  30<H>  ----------------------------<  123<H>  4.1   |  24  |  1.0    Ca    9.5      18 Jun 2022 14:45    TPro  6.7  /  Alb  4.0  /  TBili  0.4  /  DBili  x   /  AST  22  /  ALT  13  /  AlkPhos  117<H>  06-18      LIVER FUNCTIONS - ( 18 Jun 2022 14:45 )  Alb: 4.0 g/dL / Pro: 6.7 g/dL / ALK PHOS: 117 U/L / ALT: 13 U/L / AST: 22 U/L / GGT: x               Culture - Fungal, Body Fluid (collected 07-08-21 @ 12:21)  Source: .Body Fluid Pleural Fluid  Final Report (08-07-21 @ 15:00):    No fungus isolated after 4 weeks.    Culture - Body Fluid with Gram Stain (collected 07-08-21 @ 12:21)  Source: .Body Fluid Pleural Fluid  Gram Stain (07-09-21 @ 02:22):    polymorphonuclear leukocytes seen    No organisms seen    by cytocentrifuge  Final Report (07-13-21 @ 17:58):    No growth at 5 days        Blood Gas Venous - Lactate: 2.60 mmol/L (06-18-22 @ 15:10)      INFECTIOUS DISEASES TESTING  Procalcitonin, Serum: 0.07 ng/mL (06-18-22 @ 21:32)  COVID-19 PCR: NotDetec (07-13-21 @ 13:14)  COVID-19 PCR: NotDetec (07-07-21 @ 11:52)      INFLAMMATORY MARKERS      RADIOLOGY & ADDITIONAL TESTS:  I have personally reviewed the last Chest xray  CXR  Xray Chest 1 View- PORTABLE-Urgent:   ACC: [id number] EXAM:  XR CHEST PORTABLE URGENT 1V                          PROCEDURE DATE:  06/18/2022          INTERPRETATION:  Clinical History / Reason for exam: Shortness of breath.    Comparison : Chest radiograph July 13, 2021.    Technique/Positioning: Single frontal chest x-ray obtained.    Findings:    Support devices: None.    Cardiac/mediastinum/hilum: Obscured.    Lung parenchyma/Pleura: Moderate-to-large left pleural effusion. Fluid   collecting within right fissures. No pneumothorax.    Skeleton/soft tissues: Unchanged.    Impression:  Moderate-to-large left pleural effusion. Fluid collecting within right   fissures.    --- End of Report ---            MARU SCHILLING MD; Attending Radiologist  This document has been electronicallysigned. Jun 18 2022  6:38PM (06-18-22 @ 15:30)      CT      CARDIOLOGY TESTING      MEDICATIONS  aspirin  chewable 81 Oral daily  azithromycin  IVPB 500 IV Intermittent every 24 hours  cefTRIAXone   IVPB 1000 IV Intermittent every 24 hours  furosemide   Injectable 20 IV Push daily  heparin   Injectable 5000 SubCutaneous every 12 hours  levothyroxine 100 Oral daily  pantoprazole    Tablet 40 Oral before breakfast  senna 2 Oral at bedtime        ANTIBIOTICS:  azithromycin  IVPB 500 milliGRAM(s) IV Intermittent every 24 hours  cefTRIAXone   IVPB 1000 milliGRAM(s) IV Intermittent every 24 hours      ALLERGIES:  Cipro (Other)  Levaquin (Rash)  tetracycline (Hives)

## 2022-06-19 NOTE — PHYSICAL THERAPY INITIAL EVALUATION ADULT - LEVEL OF INDEPENDENCE: STAIR NEGOTIATION, REHAB EVAL
secondary to c/o fatigue and SOB after taking s few steps with rolling walker on level/unable to perform

## 2022-06-19 NOTE — CONSULT NOTE ADULT - ASSESSMENT
89-year-old female with PMH of HFpEF, severe AS s/p balloon valvuloplasty, MVP, HTN, OAB, HCC (s/p partial liver resection in 2001), hypothyroidism, GERD presents to the ED c/o difficulty breathing for over 2 weeks. Patient's troponin chronically elevated, Hx pleural effusion , Not seen by cardiology one year. IV lasix for now. Watch lytes. Po lasix soon. Repeat cxr.

## 2022-06-19 NOTE — PHYSICAL THERAPY INITIAL EVALUATION ADULT - ADDITIONAL COMMENTS
Per patient and son at bedside, they [place] with 4 steps outside with 2 rails to enter home then no further steps inside home; was using a rollator at home

## 2022-06-19 NOTE — PHYSICAL THERAPY INITIAL EVALUATION ADULT - GAIT DEVIATIONS NOTED, PT EVAL
stooped posture, dec heel strike/pushoff/decreased carin/decreased step length/decreased weight-shifting ability

## 2022-06-19 NOTE — CONSULT NOTE ADULT - SUBJECTIVE AND OBJECTIVE BOX
CARDIOLOGY CONSULT NOTE     CHIEF COMPLAINT/REASON FOR CONSULT:    HPI:  89-year-old female with PMH of HFpEF, severe AS s/p balloon valvuloplasty, MVP, HTN, OAB, HCC (s/p partial liver resection in 2001), hypothyroidism, GERD presents to the ED c/o difficulty breathing for over 2 weeks. Pt reports worsening SOB at rest and with ambulation. She also reports dry cough. Patient is on 20 mg of furosemide daily. Patient last saw cardiologist over a year ago. Pt denies fever, chills, CP, dizziness, dysuria, N/V/D/C, leg pain or swelling, sick contacts.   (18 Jun 2022 18:11)      PAST MEDICAL & SURGICAL HISTORY:  HTN (hypertension)      Mitral valve prolapse      Enlarged heart      Murmur      Hyperthyroidism      Arthritis      HTN (hypertension)      Diverticulosis      Hiatal hernia      Acid reflux      Liver cancer  s/p resection and s/p chemo and radiation      Aortic stenosis  s/p ballon aortic valuloplasty 5/25/21      H/O resection of liver  liver cancer      Status post cholecystectomy          Cardiac Risks:   [x ]HTN, [ ] DM, [ ] Smoking, [ ] FH,  [ ] Lipids        MEDICATIONS:  MEDICATIONS  (STANDING):  aspirin  chewable 81 milliGRAM(s) Oral daily  azithromycin  IVPB 500 milliGRAM(s) IV Intermittent every 24 hours  cefTRIAXone   IVPB 1000 milliGRAM(s) IV Intermittent every 24 hours  furosemide   Injectable 20 milliGRAM(s) IV Push daily  heparin   Injectable 5000 Unit(s) SubCutaneous every 12 hours  levothyroxine 100 MICROGram(s) Oral daily  pantoprazole    Tablet 40 milliGRAM(s) Oral before breakfast  senna 2 Tablet(s) Oral at bedtime      FAMILY HISTORY:  No pertinent family history in first degree relatives        SOCIAL HISTORY:      [ ] Marital status  Single  Allergies    Cipro (Other)  Levaquin (Rash)  tetracycline (Hives)      	    REVIEW OF SYSTEMS:  CONSTITUTIONAL: No fever, weight loss, or fatigue  EYES: No eye pain, visual disturbances, or discharge  ENMT:  No difficulty hearing, tinnitus, vertigo; No sinus or throat pain  NECK: No pain or stiffness  RESPIRATORY: No cough, wheezing, chills or hemoptysis; No Shortness of Breath  CARDIOVASCULAR: See above  GASTROINTESTINAL: No abdominal or epigastric pain. No nausea, vomiting, or hematemesis; No diarrhea or constipation. No melena or hematochezia.  GENITOURINARY: No dysuria, frequency, hematuria, or incontinence  NEUROLOGICAL: No headaches, memory loss, loss of strength, numbness, or tremors  SKIN: No itching, burning, rashes, or lesions   	    PHYSICAL EXAM:  T(C): 35.7 (06-19-22 @ 05:15), Max: 36.8 (06-18-22 @ 18:25)  HR: 71 (06-19-22 @ 05:15) (71 - 95)  BP: 120/53 (06-19-22 @ 05:15) (102/74 - 124/55)  RR: 16 (06-19-22 @ 05:15) (16 - 25)  SpO2: 97% (06-18-22 @ 18:25) (97% - 100%)  Wt(kg): --  I&O's Summary    18 Jun 2022 07:01  -  19 Jun 2022 07:00  --------------------------------------------------------  IN: 0 mL / OUT: 700 mL / NET: -700 mL        Appearance: Normal	  Psychiatry: A & O x 3, Mood & affect appropriate  HEENT:   Normal oral mucosa, PERRL, EOMI	  Lymphatic: No lymphadenopathy  Cardiovascular: Normal S1 S2,RRR, No JVD, II/VI PRIYA LSB  Respiratory: Lungs clear to auscultation	  Gastrointestinal:  Soft, Non-tender, + BS	  Skin: No rashes, No ecchymoses, No cyanosis	  Neurologic: Non-focal  Extremities: Normal range of motion, No clubbing, cyanosis or edema  Vascular: Peripheral pulses palpable 2+ bilaterally      ECG:  Not available	    	  LABS:	 	    CARDIAC MARKERS:          Serum Pro-Brain Natriuretic Peptide: 6509 pg/mL (06-18 @ 14:45)                            9.1    6.14  )-----------( 145      ( 18 Jun 2022 14:45 )             29.2     06-18    138  |  101  |  30<H>  ----------------------------<  123<H>  4.1   |  24  |  1.0    Ca    9.5      18 Jun 2022 14:45    TPro  6.7  /  Alb  4.0  /  TBili  0.4  /  DBili  x   /  AST  22  /  ALT  13  /  AlkPhos  117<H>  06-18      proBNP: Serum Pro-Brain Natriuretic Peptide: 6509 pg/mL (06-18 @ 14:45)

## 2022-06-19 NOTE — PHYSICAL THERAPY INITIAL EVALUATION ADULT - ASSISTIVE DEVICE FOR TRANSFER: GAIT, REHAB EVAL
----- Message from Adrianne Gross RN sent at 11/11/2019  8:05 AM CST -----  Regarding: CHF Weekly [facility] 30D  CHF weekly and PRN follow up. DC 11/8/19.  Follow up acutely through 12/8/19.  DC to home with [facility].          
Called patient for update today since Paloma saw her in the office last week.     She reports she has started to be able to weigh herself. In the last week she has gained 5 lbs.     Was 245 lbs.     12/4/2019: 250 lbs.     She has increased swelling/puffiness to BLE.No compression socks. No SOB at rest, but becomes very dyspneic with little exertion. Sleeps in a recliner and has for 30 years. No orthopnea or PND. Has a \"gurgly\" cough. Produces white phlegm.  Using PRN Mucinex which is helpful. No chest pain, pressure, palpitations, LH or dizziness.     She is also bloated. No NVD or constipation.     Will review with eH and get back to patient.     Medications are updated and correct in Epic.  
Called patient.  Left message to call back.    
Metolzaone 2.5 mg THursday and Friday per He.    Check with patient on Monday 12/9/19.     Patient advised.        
Weight is back down to 245 lbs. Abdomen is less bloated and SOB improved.  
rolling walker

## 2022-06-20 LAB
ALBUMIN SERPL ELPH-MCNC: 4.2 G/DL — SIGNIFICANT CHANGE UP (ref 3.5–5.2)
ALP SERPL-CCNC: 115 U/L — SIGNIFICANT CHANGE UP (ref 30–115)
ALT FLD-CCNC: 12 U/L — SIGNIFICANT CHANGE UP (ref 0–41)
ANION GAP SERPL CALC-SCNC: 14 MMOL/L — SIGNIFICANT CHANGE UP (ref 7–14)
AST SERPL-CCNC: 19 U/L — SIGNIFICANT CHANGE UP (ref 0–41)
BILIRUB SERPL-MCNC: 0.3 MG/DL — SIGNIFICANT CHANGE UP (ref 0.2–1.2)
BUN SERPL-MCNC: 35 MG/DL — HIGH (ref 10–20)
CALCIUM SERPL-MCNC: 9.7 MG/DL — SIGNIFICANT CHANGE UP (ref 8.5–10.1)
CHLORIDE SERPL-SCNC: 100 MMOL/L — SIGNIFICANT CHANGE UP (ref 98–110)
CO2 SERPL-SCNC: 25 MMOL/L — SIGNIFICANT CHANGE UP (ref 17–32)
CREAT SERPL-MCNC: 1 MG/DL — SIGNIFICANT CHANGE UP (ref 0.7–1.5)
EGFR: 54 ML/MIN/1.73M2 — LOW
GLUCOSE SERPL-MCNC: 84 MG/DL — SIGNIFICANT CHANGE UP (ref 70–99)
HCT VFR BLD CALC: 31.9 % — LOW (ref 37–47)
HGB BLD-MCNC: 10 G/DL — LOW (ref 12–16)
LEGIONELLA AG UR QL: NEGATIVE — SIGNIFICANT CHANGE UP
MCHC RBC-ENTMCNC: 29.5 PG — SIGNIFICANT CHANGE UP (ref 27–31)
MCHC RBC-ENTMCNC: 31.3 G/DL — LOW (ref 32–37)
MCV RBC AUTO: 94.1 FL — SIGNIFICANT CHANGE UP (ref 81–99)
NRBC # BLD: 0 /100 WBCS — SIGNIFICANT CHANGE UP (ref 0–0)
PLATELET # BLD AUTO: 167 K/UL — SIGNIFICANT CHANGE UP (ref 130–400)
POTASSIUM SERPL-MCNC: 4.4 MMOL/L — SIGNIFICANT CHANGE UP (ref 3.5–5)
POTASSIUM SERPL-SCNC: 4.4 MMOL/L — SIGNIFICANT CHANGE UP (ref 3.5–5)
PROT SERPL-MCNC: 7 G/DL — SIGNIFICANT CHANGE UP (ref 6–8)
RBC # BLD: 3.39 M/UL — LOW (ref 4.2–5.4)
RBC # FLD: 13.2 % — SIGNIFICANT CHANGE UP (ref 11.5–14.5)
S PNEUM AG UR QL: NEGATIVE — SIGNIFICANT CHANGE UP
SODIUM SERPL-SCNC: 139 MMOL/L — SIGNIFICANT CHANGE UP (ref 135–146)
TSH SERPL-MCNC: 4.36 UIU/ML — HIGH (ref 0.27–4.2)
WBC # BLD: 5.19 K/UL — SIGNIFICANT CHANGE UP (ref 4.8–10.8)
WBC # FLD AUTO: 5.19 K/UL — SIGNIFICANT CHANGE UP (ref 4.8–10.8)

## 2022-06-20 PROCEDURE — 99232 SBSQ HOSP IP/OBS MODERATE 35: CPT

## 2022-06-20 PROCEDURE — 99231 SBSQ HOSP IP/OBS SF/LOW 25: CPT

## 2022-06-20 PROCEDURE — 93306 TTE W/DOPPLER COMPLETE: CPT | Mod: 26

## 2022-06-20 RX ORDER — FUROSEMIDE 40 MG
20 TABLET ORAL DAILY
Refills: 0 | Status: DISCONTINUED | OUTPATIENT
Start: 2022-06-20 | End: 2022-06-22

## 2022-06-20 RX ADMIN — HEPARIN SODIUM 5000 UNIT(S): 5000 INJECTION INTRAVENOUS; SUBCUTANEOUS at 06:13

## 2022-06-20 RX ADMIN — SENNA PLUS 2 TABLET(S): 8.6 TABLET ORAL at 22:10

## 2022-06-20 RX ADMIN — Medication 81 MILLIGRAM(S): at 11:24

## 2022-06-20 RX ADMIN — PANTOPRAZOLE SODIUM 40 MILLIGRAM(S): 20 TABLET, DELAYED RELEASE ORAL at 07:05

## 2022-06-20 RX ADMIN — Medication 100 MICROGRAM(S): at 06:14

## 2022-06-20 RX ADMIN — Medication 20 MILLIGRAM(S): at 06:14

## 2022-06-20 NOTE — CONSULT NOTE ADULT - ASSESSMENT
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    SUGGEST:  will return for POCUS to determine size of effusion   thora if adequate size and not responding to diuresis  doubt infection given low procal  continue O2 as necessary to maintain sats > 90%<94  DVT/Gastritis prophylaxis  check CXR in AM after diuresis

## 2022-06-20 NOTE — CONSULT NOTE ADULT - SUBJECTIVE AND OBJECTIVE BOX
HPI:  89-year-old female with PMH of HFpEF, severe AS s/p balloon valvuloplasty, MVP, HTN, OAB, HCC (s/p partial liver resection in 2001), hypothyroidism, GERD presents to the ED c/o difficulty breathing for over 2 weeks. Pt reports worsening SOB at rest and with ambulation. She also reports dry cough. Patient is on 20 mg of furosemide daily. Patient last saw cardiologist over a year ago. Pt denies fever, chills, CP, dizziness, dysuria, N/V/D/C, leg pain or swelling, sick contacts.   (18 Jun 2022 18:11)         I reviewed the radiology tests and hospital record including the ED chart.    I reviewed the other consultants comments that are available in the chart.    CC/ HPI Patient is a 89y old  Female who presents with a chief complaint of SOB (19 Jun 2022 09:04)      SOB(SHORTNESS OF BREATH)PLEURAL EFFUSION,ABNORMALLY OF LUNG CANCER      ^SOB    No pertinent family history in first degree relatives      HTN (hypertension)    Lung cancer    Mitral valve prolapse    Enlarged heart    Murmur    Hyperthyroidism    Arthritis    HTN (hypertension)    Diverticulosis    Hiatal hernia    Acid reflux    Liver cancer    Liver cancer    Aortic stenosis    SOB (shortness of breath)    No significant past surgical history    H/O resection of liver    Status post cholecystectomy    H/O aortic valve stenosis    SOB      Pleural effusion    Abnormality of lung on chest x-ray    Pneumonia    SysAdmin_[id number]        FAMILY HISTORY:  No pertinent family history in first degree relatives        Cipro (Other)  Levaquin (Rash)  tetracycline (Hives)      Soc:  see Hpi.    Home prescriptions  furosemide 20 mg oral tablet: 1 tab(s) orally once a day  levothyroxine 100 mcg (0.1 mg) oral tablet: 1 tab(s) orally once a day  pantoprazole 40 mg oral delayed release tablet: for 15 days  senna oral tablet: 2 tab(s) orally once a day (at bedtime)      MEDICATIONS  (STANDING):  aspirin  chewable 81 milliGRAM(s) Oral daily  furosemide   Injectable 20 milliGRAM(s) IV Push daily  heparin   Injectable 5000 Unit(s) SubCutaneous every 12 hours  levothyroxine 100 MICROGram(s) Oral daily  pantoprazole    Tablet 40 milliGRAM(s) Oral before breakfast  senna 2 Tablet(s) Oral at bedtime    MEDICATIONS  (PRN):          T(C): 36.1 (06-20-22 @ 05:30), Max: 36.4 (06-19-22 @ 20:36)  HR: 76 (06-20-22 @ 05:30) (76 - 79)  BP: 102/53 (06-20-22 @ 05:30) (102/53 - 130/53)  RR: 18 (06-20-22 @ 05:30) (16 - 18)  SpO2: 92% (06-19-22 @ 20:36) (92% - 92%)  ICU Vital Signs Last 24 Hrs  T(C): 36.1 (20 Jun 2022 05:30), Max: 36.4 (19 Jun 2022 20:36)  T(F): 97 (20 Jun 2022 05:30), Max: 97.5 (19 Jun 2022 20:36)  HR: 76 (20 Jun 2022 05:30) (76 - 79)  BP: 102/53 (20 Jun 2022 05:30) (102/53 - 130/53)  RR: 18 (20 Jun 2022 05:30) (16 - 18)  SpO2: 92% (19 Jun 2022 20:36) (92% - 92%)      I&O's Summary    19 Jun 2022 07:01  -  20 Jun 2022 07:00  --------------------------------------------------------  IN: 0 mL / OUT: 1150 mL / NET: -1150 mL        I&O's Detail    19 Jun 2022 07:01  -  20 Jun 2022 07:00  --------------------------------------------------------  IN:  Total IN: 0 mL    OUT:    Voided (mL): 1150 mL  Total OUT: 1150 mL    Total NET: -1150 mL          Drug Dosing Weight  Height (cm): 152.4 (18 Jun 2022 13:30)  Weight (kg): 63.5 (18 Jun 2022 13:30)  BMI (kg/m2): 27.3 (18 Jun 2022 13:30)  BSA (m2): 1.6 (18 Jun 2022 13:30)    LABS:                          9.8    4.62  )-----------( 160      ( 19 Jun 2022 07:18 )             31.6       06-19    139  |  98  |  27<H>  ----------------------------<  94  3.9   |  27  |  0.9    Ca    9.6      19 Jun 2022 07:18    TPro  6.9  /  Alb  4.2  /  TBili  0.4  /  DBili  x   /  AST  21  /  ALT  13  /  AlkPhos  121<H>  06-19      LIVER FUNCTIONS - ( 19 Jun 2022 07:18 )  Alb: 4.2 g/dL / Pro: 6.9 g/dL / ALK PHOS: 121 U/L / ALT: 13 U/L / AST: 21 U/L / GGT: x             CARDIAC MARKERS ( 19 Jun 2022 07:18 )  x     / 0.17 ng/mL / 89 U/L / x     / 7.5 ng/mL  CARDIAC MARKERS ( 18 Jun 2022 21:32 )  x     / 0.14 ng/mL / 116 U/L / x     / 8.8 ng/mL  CARDIAC MARKERS ( 18 Jun 2022 14:45 )  x     / 0.12 ng/mL / x     / x     / x                Serum Pro-Brain Natriuretic Peptide: 6509 pg/mL (06-18-22 @ 14:45)          Procalcitonin, Serum: 0.07 ng/mL (06-18-22 @ 21:32)      RADIOLOGY:  I have personally reviewed all chest and other pertinent radiology films.        REVIEW OF SYSTEMS:   see Hpi.    PHYSICAL EXAM:       · ENMT:   Airway patent,   Nasal mucosa clear.  Mouth with normal mucosa.   No thrush    · EYES:   Clear bilaterally,   pupils equal,   round and reactive to light.    · CARDIAC:   Normal rate,   regular rhythm.    Heart sounds S1, S2.   no thrills or bruits on palpitation  normal  cardiac impulse  No murmurs, no rubs or gallops on auscultation  no edema        CAROTID:   normal systolic impulse  no bruits    · RESPIRATORY:   no w/r/r/,   normal chest expansion  not tachypneic,  no retractions or use of accessory muscles  palpation of chest is normal with no fremitus  percussion of chest demonstrates dullness L base    · GASTROINTESTINAL:  Abdomen soft,   non-tender,   no guarding,   + BS  liver spleen not palpable      · MUSCULOSKELETAL:   range of motion is not limited,  no clubbing, cyanosis  no petechiae      · SKIN:   Skin normal color for race,   warm,   dry and intact.       · HEME LYMPH:   no splenomegaly.  No cervical  lymphadenopathy.  no inguinal lymphadenopathy

## 2022-06-21 ENCOUNTER — TRANSCRIPTION ENCOUNTER (OUTPATIENT)
Age: 87
End: 2022-06-21

## 2022-06-21 PROCEDURE — 99232 SBSQ HOSP IP/OBS MODERATE 35: CPT

## 2022-06-21 PROCEDURE — 71045 X-RAY EXAM CHEST 1 VIEW: CPT | Mod: 26

## 2022-06-21 RX ORDER — POLYETHYLENE GLYCOL 3350 17 G/17G
17 POWDER, FOR SOLUTION ORAL
Qty: 510 | Refills: 0
Start: 2022-06-21 | End: 2022-07-20

## 2022-06-21 RX ORDER — ASPIRIN/CALCIUM CARB/MAGNESIUM 324 MG
1 TABLET ORAL
Qty: 0 | Refills: 0 | DISCHARGE
Start: 2022-06-21

## 2022-06-21 RX ORDER — POLYETHYLENE GLYCOL 3350 17 G/17G
17 POWDER, FOR SOLUTION ORAL DAILY
Refills: 0 | Status: DISCONTINUED | OUTPATIENT
Start: 2022-06-21 | End: 2022-06-22

## 2022-06-21 RX ORDER — LACTULOSE 10 G/15ML
10 SOLUTION ORAL EVERY 6 HOURS
Refills: 0 | Status: DISCONTINUED | OUTPATIENT
Start: 2022-06-21 | End: 2022-06-22

## 2022-06-21 RX ADMIN — HEPARIN SODIUM 5000 UNIT(S): 5000 INJECTION INTRAVENOUS; SUBCUTANEOUS at 06:29

## 2022-06-21 RX ADMIN — HEPARIN SODIUM 5000 UNIT(S): 5000 INJECTION INTRAVENOUS; SUBCUTANEOUS at 17:35

## 2022-06-21 RX ADMIN — Medication 100 MICROGRAM(S): at 06:23

## 2022-06-21 RX ADMIN — Medication 81 MILLIGRAM(S): at 12:20

## 2022-06-21 RX ADMIN — PANTOPRAZOLE SODIUM 40 MILLIGRAM(S): 20 TABLET, DELAYED RELEASE ORAL at 06:23

## 2022-06-21 RX ADMIN — POLYETHYLENE GLYCOL 3350 17 GRAM(S): 17 POWDER, FOR SOLUTION ORAL at 17:34

## 2022-06-21 RX ADMIN — Medication 20 MILLIGRAM(S): at 06:28

## 2022-06-21 RX ADMIN — SENNA PLUS 2 TABLET(S): 8.6 TABLET ORAL at 21:36

## 2022-06-21 RX ADMIN — LACTULOSE 10 GRAM(S): 10 SOLUTION ORAL at 21:37

## 2022-06-21 NOTE — PROGRESS NOTE ADULT - SUBJECTIVE AND OBJECTIVE BOX
CC.  SOB  HPI.  Patient reports that she feels a bit better.  SOB improving.  afebrile  Offers no other complaints               Constitutional: No fever, fatigue or weight loss.  Skin: No rash.  Eyes: No recent vision problems or eye pain.  ENT: No congestion, ear pain, or sore throat.  Endocrine: No thyroid problems.  Cardiovascular: No chest pain or palpation.  Respiratory: No  , congestion, or wheezing.  Gastrointestinal: No abdominal pain, nausea, vomiting, or diarrhea.  Genitourinary: No dysuria.  Musculoskeletal: No joint swelling.  Neurologic: No headache.      Vital Signs Last 24 Hrs  T(C): 35.7 (06-19-22 @ 05:15), Max: 36.8 (06-18-22 @ 18:25)  T(F): 96.3 (06-19-22 @ 05:15), Max: 98.3 (06-18-22 @ 18:25)  HR: 71 (06-19-22 @ 05:15) (71 - 95)  BP: 120/53 (06-19-22 @ 05:15) (102/74 - 124/55)  BP(mean): --  RR: 16 (06-19-22 @ 05:15) (16 - 25)  SpO2: 97% (06-18-22 @ 18:25) (97% - 100%)        PHYSICAL EXAM-  GENERAL: NAD,   EYES: EOMI, PERRLA, conjunctiva and sclera clear  NECK: Supple, No JVD, Normal thyroid  NERVOUS SYSTEM:  Alert & Oriented X3, Moving all extremities  CHEST/LUNG: + crackles with good air entry  HEART: Regular rate and rhythm; No murmurs, rubs, or gallops  ABDOMEN: Soft, Nontender, Nondistended; Bowel sounds present  EXTREMITIES:    No clubbing, cyanosis, or edema  SKIN: No rashes or lesions                                  9.8    4.62  )-----------( 160      ( 19 Jun 2022 07:18 )             31.6     06-18    138  |  101  |  30<H>  ----------------------------<  123<H>  4.1   |  24  |  1.0    Ca    9.5      18 Jun 2022 14:45    TPro  6.7  /  Alb  4.0  /  TBili  0.4  /  DBili  x   /  AST  22  /  ALT  13  /  AlkPhos  117<H>  06-18    CARDIAC MARKERS ( 18 Jun 2022 21:32 )  x     / 0.14 ng/mL / 116 U/L / x     / 8.8 ng/mL  CARDIAC MARKERS ( 18 Jun 2022 14:45 )  x     / 0.12 ng/mL / x     / x     / x                      MEDICATIONS  (STANDING):  aspirin  chewable 81 milliGRAM(s) Oral daily  furosemide   Injectable 20 milliGRAM(s) IV Push daily  heparin   Injectable 5000 Unit(s) SubCutaneous every 12 hours  levothyroxine 100 MICROGram(s) Oral daily  pantoprazole    Tablet 40 milliGRAM(s) Oral before breakfast  senna 2 Tablet(s) Oral at bedtime    MEDICATIONS  (PRN):        Imaging Personally Reviewed:     [x ] YES  [ ] NO    Consultant(s) Notes Reviewed:  [x ] YES  [ ] NO    Care Discussed with Consultants/Other Providers [x ] YES  [ ] No medical contraindication for discharge  
CC.  SOB  HPI.  Patient reports that she feels a bit better.  SOB improving.  afebrile  Offers no other complaints               Constitutional: No fever, fatigue or weight loss.  Skin: No rash.  Eyes: No recent vision problems or eye pain.  ENT: No congestion, ear pain, or sore throat.  Endocrine: No thyroid problems.  Cardiovascular: No chest pain or palpation.  Respiratory: No  , congestion, or wheezing.  Gastrointestinal: No abdominal pain, nausea, vomiting, or diarrhea.  Genitourinary: No dysuria.  Musculoskeletal: No joint swelling.  Neurologic: No headache.    Vital Signs Last 24 Hrs  T(C): 36.1 (21 Jun 2022 05:00), Max: 37.3 (20 Jun 2022 13:30)  T(F): 96.9 (21 Jun 2022 05:00), Max: 99.1 (20 Jun 2022 13:30)  HR: 80 (21 Jun 2022 05:00) (69 - 88)  BP: 108/63 (21 Jun 2022 05:00) (107/58 - 128/58)  BP(mean): --  RR: 18 (21 Jun 2022 05:00) (18 - 18)  SpO2: 95% (20 Jun 2022 21:09) (95% - 95%)      PHYSICAL EXAM-  GENERAL: NAD,   EYES: EOMI, PERRLA, conjunctiva and sclera clear  NECK: Supple, No JVD, Normal thyroid  NERVOUS SYSTEM:  Alert & Oriented X3, Moving all extremities  CHEST/LUNG: + min crackles with good air entry  HEART: Regular rate and rhythm; No murmurs, rubs, or gallops  ABDOMEN: Soft, Nontender, Nondistended; Bowel sounds present  EXTREMITIES:    No clubbing, cyanosis, or edema  SKIN: No rashes or lesions                                MEDICATIONS  (STANDING):  aspirin  chewable 81 milliGRAM(s) Oral daily  furosemide   Injectable 20 milliGRAM(s) IV Push daily  heparin   Injectable 5000 Unit(s) SubCutaneous every 12 hours  levothyroxine 100 MICROGram(s) Oral daily  pantoprazole    Tablet 40 milliGRAM(s) Oral before breakfast  senna 2 Tablet(s) Oral at bedtime    MEDICATIONS  (PRN):        Imaging Personally Reviewed:     [x ] YES  [ ] NO    Consultant(s) Notes Reviewed:  [x ] YES  [ ] NO    Care Discussed with Consultants/Other Providers [x ] YES  [ ] No medical contraindication for discharge  
Subjective/Objective:     89-year-old female with PMH of HFpEF, severe AS s/p balloon valvuloplasty, MVP, HTN, OAB, HCC (s/p partial liver resection in 2001), hypothyroidism, GERD admitted for sob, cardiology consulted for suspected chf, and elevated troponin    pt evaluated in bed this am, has no acute complaints at this time. Denies CP, sob, palpitation    MEDICATIONS  (STANDING):  aspirin  chewable 81 milliGRAM(s) Oral daily  furosemide   Injectable 20 milliGRAM(s) IV Push daily  heparin   Injectable 5000 Unit(s) SubCutaneous every 12 hours  levothyroxine 100 MICROGram(s) Oral daily  pantoprazole    Tablet 40 milliGRAM(s) Oral before breakfast  senna 2 Tablet(s) Oral at bedtime    MEDICATIONS  (PRN):          Vital Signs Last 24 Hrs  T(C): 36.1 (20 Jun 2022 05:30), Max: 36.4 (19 Jun 2022 20:36)  T(F): 97 (20 Jun 2022 05:30), Max: 97.5 (19 Jun 2022 20:36)  HR: 76 (20 Jun 2022 05:30) (76 - 79)  BP: 102/53 (20 Jun 2022 05:30) (102/53 - 130/53)  BP(mean): --  RR: 18 (20 Jun 2022 05:30) (16 - 18)  SpO2: 92% (19 Jun 2022 20:36) (92% - 92%)  I&O's Detail    19 Jun 2022 07:01  -  20 Jun 2022 07:00  --------------------------------------------------------  IN:  Total IN: 0 mL    OUT:    Voided (mL): 1150 mL  Total OUT: 1150 mL    Total NET: -1150 mL            GENERAL:  88y/o Female NAD, resting comfortably.  HEAD:  Atraumatic, Normocephalic  EYES: EOMI, PERRLA, conjunctiva and sclera clear  NECK: Supple, No JVD, no cervical lymphadenopathy, non-tender  CHEST/LUNG: Clear to auscultation bilaterally; No wheeze, rhonchi, or rales  HEART: Regular rate and rhythm; S1&S2  ABDOMEN: Soft, Nontender, Nondistended x 4 quadrants; Bowel sounds present  EXTREMITIES:   Peripheral Pulses Present, No clubbing, no cyanosis, or no edema, no calf tenderness  PSYCH: AAOx3, cooperative, appropriate  NEUROLOGY: WNL  SKIN: WNL        EKG/ TELEM:    LABS:                          10.0   5.19  )-----------( 167      ( 20 Jun 2022 06:43 )             31.9       20 Jun 2022 06:43    139    |  100    |  35<H>  ----------------------------<  84     4.4     |  25     |  1.0    19 Jun 2022 07:18    139    |  98     |  27<H>  ----------------------------<  94     3.9     |  27     |  0.9      Ca    9.7        20 Jun 2022 06:43  Ca    9.6        19 Jun 2022 07:18    TPro  7.0    /  Alb  4.2    /  TBili  0.3    /  DBili  x      /  AST  19     /  ALT  12     /  AlkPhos  115    20 Jun 2022 06:43  TPro  6.9    /  Alb  4.2    /  TBili  0.4    /  DBili  x      /  AST  21     /  ALT  13     /  AlkPhos  121<H>  19 Jun 2022 07:18    CARDIAC MARKERS ( 19 Jun 2022 07:18 )  x     / 0.17 ng/mL / 89 U/L / x     / 7.5 ng/mL  CARDIAC MARKERS ( 18 Jun 2022 21:32 )  x     / 0.14 ng/mL / 116 U/L / x     / 8.8 ng/mL  CARDIAC MARKERS ( 18 Jun 2022 14:45 )  x     / 0.12 ng/mL / x     / x     / x            Creatine Kinase, Serum: 89 U/L (06-19-22 @ 07:18)  Creatine Kinase, Serum: 116 U/L (06-18-22 @ 21:32)      Diagnostic testing:  TTE  < from: TTE Echo Complete w/o Contrast w/ Doppler (07.08.21 @ 10:46) >  Summary:   1. Left ventricular ejection fraction, by visual estimation, is 50 to 55%.   2. Mild left ventricular hypertrophy.   3. Severe aortic valve stenosis.   4. Mildly enlarged left atrium.   5. Mildly enlarged right atrium.   6. Mild mitral annular calcification.   7. Mild to moderate mitral valve regurgitation.   8. Moderate tricuspid regurgitation.   9. Mild aortic regurgitation.  10. Estimated pulmonary artery systolic pressure is 56.0 mmHg assuming a right atrial pressure of 3 mmHg, which is consistent with moderate pulmonary hypertension.  11. Peak transaortic gradient equals 91.8 mmHg, mean transaortic gradient equals 44.9 mmHg, the calculated aortic valve area equals 0.45 cm² by the continuity equation consistent with severe aortic stenosis.  12. There is mild aortic root calcification.    < end of copied text >        Assessment and Plan:        
CC.  SOB  HPI.  Patient reports that she feels a bit better.  SOB improving.  afebrile  Offers no other complaints               Constitutional: No fever, fatigue or weight loss.  Skin: No rash.  Eyes: No recent vision problems or eye pain.  ENT: No congestion, ear pain, or sore throat.  Endocrine: No thyroid problems.  Cardiovascular: No chest pain or palpation.  Respiratory: No  , congestion, or wheezing.  Gastrointestinal: No abdominal pain, nausea, vomiting, or diarrhea.  Genitourinary: No dysuria.  Musculoskeletal: No joint swelling.  Neurologic: No headache.    Vital Signs Last 24 Hrs  T(C): 36.1 (20 Jun 2022 05:30), Max: 36.4 (19 Jun 2022 20:36)  T(F): 97 (20 Jun 2022 05:30), Max: 97.5 (19 Jun 2022 20:36)  HR: 76 (20 Jun 2022 05:30) (76 - 79)  BP: 102/53 (20 Jun 2022 05:30) (102/53 - 130/53)  BP(mean): --  RR: 18 (20 Jun 2022 05:30) (16 - 18)  SpO2: 92% (19 Jun 2022 20:36) (92% - 92%)      PHYSICAL EXAM-  GENERAL: NAD,   EYES: EOMI, PERRLA, conjunctiva and sclera clear  NECK: Supple, No JVD, Normal thyroid  NERVOUS SYSTEM:  Alert & Oriented X3, Moving all extremities  CHEST/LUNG: + crackles with good air entry  HEART: Regular rate and rhythm; No murmurs, rubs, or gallops  ABDOMEN: Soft, Nontender, Nondistended; Bowel sounds present  EXTREMITIES:    No clubbing, cyanosis, or edema  SKIN: No rashes or lesions                              10.0   5.19  )-----------( 167      ( 20 Jun 2022 06:43 )             31.9     06-20    139  |  100  |  35<H>  ----------------------------<  84  4.4   |  25  |  1.0    Ca    9.7      20 Jun 2022 06:43    TPro  7.0  /  Alb  4.2  /  TBili  0.3  /  DBili  x   /  AST  19  /  ALT  12  /  AlkPhos  115  06-20    CARDIAC MARKERS ( 19 Jun 2022 07:18 )  x     / 0.17 ng/mL / 89 U/L / x     / 7.5 ng/mL  CARDIAC MARKERS ( 18 Jun 2022 21:32 )  x     / 0.14 ng/mL / 116 U/L / x     / 8.8 ng/mL  CARDIAC MARKERS ( 18 Jun 2022 14:45 )  x     / 0.12 ng/mL / x     / x     / x                                 MEDICATIONS  (STANDING):  aspirin  chewable 81 milliGRAM(s) Oral daily  furosemide   Injectable 20 milliGRAM(s) IV Push daily  heparin   Injectable 5000 Unit(s) SubCutaneous every 12 hours  levothyroxine 100 MICROGram(s) Oral daily  pantoprazole    Tablet 40 milliGRAM(s) Oral before breakfast  senna 2 Tablet(s) Oral at bedtime    MEDICATIONS  (PRN):        Imaging Personally Reviewed:     [x ] YES  [ ] NO    Consultant(s) Notes Reviewed:  [x ] YES  [ ] NO    Care Discussed with Consultants/Other Providers [x ] YES  [ ] No medical contraindication for discharge  
DILIP LUCAS  89y, Female  Allergy: Cipro (Other)  Levaquin (Rash)  tetracycline (Hives)      LOS  2d    CHIEF COMPLAINT: CHF (20 Jun 2022 09:15)      INTERVAL EVENTS/HPI  - No acute events overnight  - T(F): , Max: 97.5 (06-19-22 @ 20:36)  - Denies any worsening symptoms  - Tolerating medication  - WBC Count: 5.19 (06-20-22 @ 06:43)  WBC Count: 4.62 (06-19-22 @ 07:18)     - Creatinine, Serum: 1.0 (06-20-22 @ 06:43)  Creatinine, Serum: 0.9 (06-19-22 @ 07:18)       ROS  General: Denies rigors, nightsweats  HEENT: Denies headache, rhinorrhea, sore throat, eye pain  CV: Denies CP, palpitations  PULM: Denies wheezing, hemoptysis  GI: Denies hematemesis, hematochezia, melena  : Denies discharge, hematuria  MSK: Denies arthralgias, myalgias  SKIN: Denies rash, lesions  NEURO: Denies paresthesias, weakness  PSYCH: Denies depression, anxiety    VITALS:  T(F): 97, Max: 97.5 (06-19-22 @ 20:36)  HR: 76  BP: 102/53  RR: 18Vital Signs Last 24 Hrs  T(C): 36.1 (20 Jun 2022 05:30), Max: 36.4 (19 Jun 2022 20:36)  T(F): 97 (20 Jun 2022 05:30), Max: 97.5 (19 Jun 2022 20:36)  HR: 76 (20 Jun 2022 05:30) (76 - 79)  BP: 102/53 (20 Jun 2022 05:30) (102/53 - 130/53)  BP(mean): --  RR: 18 (20 Jun 2022 05:30) (16 - 18)  SpO2: 92% (19 Jun 2022 20:36) (92% - 92%)    PHYSICAL EXAM:  Gen: NAD, resting in bed  HEENT: Normocephalic, atraumatic  Neck: supple, no lymphadenopathy  CV: Regular rate & regular rhythm  Lungs: decreased BS at bases, no fremitus  Abdomen: Soft, BS present  Ext: Warm, well perfused  Neuro: non focal, awake  Skin: no rash, no erythema  Lines: no phlebitis    FH: Non-contributory  Social Hx: Non-contributory    TESTS & MEASUREMENTS:                        10.0   5.19  )-----------( 167      ( 20 Jun 2022 06:43 )             31.9     06-20    139  |  100  |  35<H>  ----------------------------<  84  4.4   |  25  |  1.0    Ca    9.7      20 Jun 2022 06:43    TPro  7.0  /  Alb  4.2  /  TBili  0.3  /  DBili  x   /  AST  19  /  ALT  12  /  AlkPhos  115  06-20      LIVER FUNCTIONS - ( 20 Jun 2022 06:43 )  Alb: 4.2 g/dL / Pro: 7.0 g/dL / ALK PHOS: 115 U/L / ALT: 12 U/L / AST: 19 U/L / GGT: x                 Blood Gas Venous - Lactate: 2.60 mmol/L (06-18-22 @ 15:10)      INFECTIOUS DISEASES TESTING  Procalcitonin, Serum: 0.07 (06-18-22 @ 21:32)  COVID-19 PCR: NotDetec (07-13-21 @ 13:14)  COVID-19 PCR: NotDetec (07-07-21 @ 11:52)      INFLAMMATORY MARKERS      RADIOLOGY & ADDITIONAL TESTS:  I have personally reviewed the last available Chest xray  CXR  Xray Chest 1 View- PORTABLE-Urgent:   ACC: [id number] EXAM:  XR CHEST PORTABLE URGENT 1V                          PROCEDURE DATE:  06/18/2022          INTERPRETATION:  Clinical History / Reason for exam: Shortness of breath.    Comparison : Chest radiograph July 13, 2021.    Technique/Positioning: Single frontal chest x-ray obtained.    Findings:    Support devices: None.    Cardiac/mediastinum/hilum: Obscured.    Lung parenchyma/Pleura: Moderate-to-large left pleural effusion. Fluid   collecting within right fissures. No pneumothorax.    Skeleton/soft tissues: Unchanged.    Impression:  Moderate-to-large left pleural effusion. Fluid collecting within right   fissures.    --- End of Report ---            MARU SCHILLING MD; Attending Radiologist  This document has been electronicallysigned. Jun 18 2022  6:38PM (06-18-22 @ 15:30)      CT  CT Chest No Cont:   ACC: [id number] EXAM:  CT CHEST                          PROCEDURE DATE:  06/19/2022          INTERPRETATION:  CLINICAL INDICATION: pleural effusion.    TECHNIQUE:  A noncontrast CT of the thorax was performed. Sagittal and   coronal reformats were performed as well as MIP reconstructions.    COMPARISON CT: 7/8/2021    INTERPRETATION:    AIRWAYS, LUNGS AND PLEURA: The central tracheobronchial tree is patent.   There is a moderate left pleural effusion with adjacent   atelectasis/consolidation predominantly of the basilar left lower lobe.   There is no pneumothorax. There is biapical scarring. Irregular opacity   within the right upper lobe without significant change, 4/116. Loculated   fluid within the right fissure without significant change.    MEDIASTINUM: There are no enlarged mediastinal, hilar or axillary lymph   nodes.    HEART AND VESSELS: The heart is normal in size.  There is no pericardial   effusion.    PARTIALLY IMAGED UPPER ABDOMEN: Right renal cyst    Postsurgical changes of the liver with pneumobilia.    BONES AND SOFT TISSUES: There are degenerative changes of the spine.  The   soft tissues are unremarkable.    IMPRESSION:  Moderate left pleural effusion with adjacent atelectasis/consolidation   predominantly of the basilar left lower lobe. This is increased in size   since CT chest 7/8/2021.    No significant change in partially loculated small right pleural effusion.    There is an unchanged right upper lobe irregular nodule    --- End of Report ---            CANDICE JUNE MD; Attending Radiologist  This document has been electronically signed. Jun 19 2022  7:05PM (06-19-22 @ 10:25)      CARDIOLOGY TESTING  12 Lead ECG:   Ventricular Rate 62 BPM    Atrial Rate 62 BPM    P-R Interval 124 ms    QRS Duration 130 ms    Q-T Interval 494 ms    QTC Calculation(Bazett) 501 ms    P Axis -79 degrees    R Axis -59 degrees    T Axis 5 degrees    Diagnosis Line Unusual P axis and short MT, probable junctional rhythm with Premature atrial  complexes  Right bundle branch block  Left anterior fascicular block  *** Bifascicular block ***  Voltage criteria for left ventricular hypertrophy  Abnormal ECG    Confirmed by RACHAEL HANSEN MD ([id number]) on 6/20/2022 9:21:11 AM (06-19-22 @ 08:07)  12 Lead ECG:   Ventricular Rate 88 BPM    Atrial Rate 88 BPM    P-R Interval 170 ms    QRS Duration 134 ms    Q-T Interval 424 ms    QTC Calculation(Bazett) 513 ms    P Axis 56 degrees    R Axis -52 degrees    T Axis 32 degrees    Diagnosis Line Normal sinus rhythm  Right bundle branch block  Left anterior fascicular block  *** Bifascicular block ***  Voltage criteria for left ventricular hypertrophy  Abnormal ECG    Confirmed by RACHAEL HANSEN MD ([id number]) on 6/19/2022 8:41:39 AM (06-18-22 @ 14:12)      MEDICATIONS  aspirin  chewable 81 Oral daily  furosemide   Injectable 20 IV Push daily  heparin   Injectable 5000 SubCutaneous every 12 hours  levothyroxine 100 Oral daily  pantoprazole    Tablet 40 Oral before breakfast  senna 2 Oral at bedtime      WEIGHT  Weight (kg): 63.5 (06-18-22 @ 13:30)  Creatinine, Serum: 1.0 mg/dL (06-20-22 @ 06:43)      ANTIBIOTICS:      All available historical records have been reviewed

## 2022-06-21 NOTE — DISCHARGE NOTE PROVIDER - NSDCCPCAREPLAN_GEN_ALL_CORE_FT
PRINCIPAL DISCHARGE DIAGNOSIS  Diagnosis: SOB (shortness of breath)  Assessment and Plan of Treatment: outpatient follow up with pulmonary, and cardiology      SECONDARY DISCHARGE DIAGNOSES  Diagnosis: Pleural effusion  Assessment and Plan of Treatment: improving.  outpatient follow up with pulmonary    Diagnosis: Pulmonary nodule  Assessment and Plan of Treatment: outpatient follow up with pulmonary in one week

## 2022-06-21 NOTE — DISCHARGE NOTE PROVIDER - NSDCFUADDAPPT_GEN_ALL_CORE_FT
Please follow up with your doctor, and pulmonary in one week   Please come back to the hospital If you develop any new symptoms or concerns

## 2022-06-21 NOTE — DISCHARGE NOTE PROVIDER - NSDCMRMEDTOKEN_GEN_ALL_CORE_FT
aspirin 81 mg oral tablet, chewable: 1 tab(s) orally once a day  furosemide 20 mg oral tablet: 1 tab(s) orally once a day  levothyroxine 100 mcg (0.1 mg) oral tablet: 1 tab(s) orally once a day  pantoprazole 40 mg oral delayed release tablet: for 15 days  polyethylene glycol 3350 oral powder for reconstitution: 17 gram(s) orally once a day  senna oral tablet: 2 tab(s) orally once a day (at bedtime)

## 2022-06-21 NOTE — DISCHARGE NOTE PROVIDER - ATTENDING DISCHARGE PHYSICAL EXAMINATION:
pt is awake, alert, oriented x 3  ATNC  RRR, systolic murmur  no labored breathing, no use of accessory mucles, no wheezing, no jvd, no distress  soft non distended abdomen  no remarkable edema or cyanosis of lower ext b/l

## 2022-06-21 NOTE — PROGRESS NOTE ADULT - ASSESSMENT
89-year-old female with PMH of HFpEF, severe AS s/p balloon valvuloplasty, MVP, HTN, OAB, HCC (s/p partial liver resection in 2001), hypothyroidism, GERD presents to the ED c/o difficulty breathing for over 2 weeks. cardiology consulted for suspected chf, and elevated troponin.    Plan  - pt breathing comfortably on room air, denies cp, sob, palpitation  - chronic Troponemia doubt acute atherothrombotic event. Hx pleural effusion ,   - repeat cxr this am  -received lasix 20mg IV this am, should switch lasix to po tmrw  - I&O goal of net positive to even  -Watch lytes.     
89-year-old female with PMH of HFpEF, severe AS s/p balloon valvuloplasty, MVP, HTN, OAB, HCC (s/p partial liver resection in 2001), hypothyroidism, GERD presents with SOB for over 2 weeks.     # Acute on chronic CHF exacerbation li  # Hx severe AS s/p balloon valvuloplasty  BNP 6509.  -Doubtful for infection.  Procalcitonin negative.  F/U culture  -Hold off on antibiotic for now   -Continue with IV lasix  -Trop level flat  -EKG shows no changes from previous EKG  -TTE  -Chest CT scan  -Pulmonary consult   -Cardiology to follow up      # Elevated troponin  - troponin 0.12  - pt asymptomatic  - will trend cardiac enzymes x 3  - cardiology eval  - ECHO  -   ASA     # Normocytic anemia  - trend H/H    #Hypothyroidism  - Continue synthroid     # GERD   - continue PPI    # HCC (s/p partial liver resection in 2001)  - outpatient follow up    DVT ppx: Heparin  GI ppx: PPI      #Progress Note Handoff  Pending (specify):  as above   Family discussion:  plan of care was discussed with patient   in details.  all questions were answered.  seems to understand, and in agreement  Disposition:  PT  
89-year-old female with PMH of HFpEF, severe AS s/p balloon valvuloplasty, MVP, HTN, OAB, HCC (s/p partial liver resection in 2001), hypothyroidism, GERD presents with SOB for over 2 weeks.     # Acute on chronic CHF exacerbation li  # Hx severe AS s/p balloon valvuloplasty  -BNP 6509.    -Doubtful for infection.  Procalcitonin negative.  F/U culture  -Hold off on antibiotic for now   -Switch to po lasix  -Trop level flat  -EKG shows no changes from previous EKG  -TTE pending  -Chest CT scan shows Moderate left pleural effusion with adjacent atelectasis/consolidation   predominantly of the basilar left lower lobe. This is increased in size   since CT chest 7/8/2021.  No significant change in partially loculated small right pleural effusion.  There is an unchanged right upper lobe irregular nodule  -Pulmonary and Cardiology to follow up      # Elevated troponin  - troponin 0.12  - pt asymptomatic  - will trend cardiac enzymes x 3  - cardiology eval  - ECHO  -   ASA     # Normocytic anemia  - trend H/H    #Hypothyroidism  - Continue synthroid     # GERD   - continue PPI    # HCC (s/p partial liver resection in 2001)  - outpatient follow up    DVT ppx: Heparin  GI ppx: PPI      #Progress Note Handoff  Pending (specify):  as above   Family discussion:  plan of care was discussed with patient   in details.  all questions were answered.  seems to understand, and in agreement  Disposition:  PT  
ASSESSMENT  89-year-old female with PMH of HFpEF, severe AS s/p balloon valvuloplasty, MVP, HTN, OAB, HCC (s/p partial liver resection in 2001), hypothyroidism, GERD presents to the ED c/o difficulty breathing for over 2 weeks.       IMPRESSION  #Pleural effusion , no clinical evidence of PNA    CXR Moderate-to-large left pleural effusion. Fluid collecting within right     CT Chest No Cont (06.19.22 @ 10:25): moderate left pleural effusion with adjacent atelectasis/consolidation  predominantly of the basilar left lower lobe. This is increased in size      since CT chest 7/8/2021. No significant change in partially loculated small right pleural effusion.  Afebrile     WBC 6    BNP elevated    Procalcitonin, Serum: 0.07 ng/mL (06-18-22 @ 21:32)  #Sepsis ruled out on admission   #Abx allergy: Cipro (Other)  Levaquin (Rash)  tetracycline (Hives)    Creatinine, Serum: 1.0 (06-18-22 @ 14:45)    Weight (kg): 63.5 (06-18-22 @ 13:30)    RECOMMENDATIONS  - continue to monitor off antibiotics   - planned for possible POCUS guided thoracetentesis -- will follow results     Please call or message on Microsoft Teams if with any questions.  Spectra [phone]  
89-year-old female with PMH of HFpEF, severe AS s/p balloon valvuloplasty, MVP, HTN, OAB, HCC (s/p partial liver resection in 2001), hypothyroidism, GERD presents with SOB for over 2 weeks.     # Acute on chronic CHF exacerbation li  # Hx severe AS s/p balloon valvuloplasty  -BNP 6509.    -Doubtful for infection.  Procalcitonin negative.  F/U culture  -Hold off on antibiotic for now   -continue with po lasix  -Trop level flat  -EKG shows no changes from previous EKG  -TTE shows NL EF  -Chest CT scan shows Moderate left pleural effusion with adjacent atelectasis/consolidation   predominantly of the basilar left lower lobe. This is increased in size   since CT chest 7/8/2021.  No significant change in partially loculated small right pleural effusion.  There is an unchanged right upper lobe irregular nodule  -Pulmonary and Cardiology to follow up      # Elevated troponin  - troponin 0.12  - pt asymptomatic  - will trend cardiac enzymes x 3  - cardiology eval  - ECHO shows NL EF  -   ASA     # Normocytic anemia  - trend H/H    #Hypothyroidism  - Continue synthroid     # GERD   - continue PPI    # HCC (s/p partial liver resection in 2001)  - outpatient follow up    DVT ppx: Heparin  GI ppx: PPI      #Progress Note Handoff  Pending (specify):  anticipate for tomorrow rehab VS home with services  Family discussion:  plan of care was discussed with patient   in details.  all questions were answered.  seems to understand, and in agreement  Disposition:  PT

## 2022-06-21 NOTE — DISCHARGE NOTE PROVIDER - CARE PROVIDER_API CALL
Nicholas Cox (DO)  Critical Care Medicine; Pulmonary Disease; Sleep Medicine  [address] [place] [address], [place] [address]  Phone: [phone]  Fax: [phone]  Follow Up Time: 1 week

## 2022-06-21 NOTE — CHART NOTE - NSCHARTNOTEFT_GEN_A_CORE
POCUS was performed and did NOT reveal any significant pleural effusion on the Right lung. It was significantly improved after the diuresis.

## 2022-06-21 NOTE — DISCHARGE NOTE PROVIDER - HOSPITAL COURSE
89-year-old female with PMH of HFpEF, severe AS s/p balloon valvuloplasty, MVP, HTN, OAB, HCC (s/p partial liver resection in 2001), hypothyroidism, GERD presents with SOB for over 2 weeks.     # Acute on chronic CHF exacerbation li  # Hx severe AS s/p balloon valvuloplasty  -BNP 6509.    -Doubtful for infection.  Procalcitonin negative.  F/U culture  -Hold off on antibiotic for now   -continue with po lasix  -Trop level flat  -EKG shows no changes from previous EKG  -TTE shows NL EF  -Chest CT scan shows Moderate left pleural effusion with adjacent atelectasis/consolidation   predominantly of the basilar left lower lobe. This is increased in size   since CT chest 7/8/2021.  No significant change in partially loculated small right pleural effusion.  There is an unchanged right upper lobe irregular nodule  -outpatient follow up with pulmonary      # Elevated troponin  - troponin 0.12  - pt asymptomatic  - will trend cardiac enzymes x 3  - cardiology eval  - ECHO shows NL EF  -   ASA     # Normocytic anemia  - trend H/H    #Hypothyroidism  - Continue synthroid     # GERD   - continue PPI    # HCC (s/p partial liver resection in 2001)  - outpatient follow up  Hospital course, and discharge planning were discussed with patient, and niece in details.  all questions were answered.  seems to understand, and in agreement.  time 70 min   refused rehab.  prefers home with service .  refused rehab, all risks, benefits, and alternatives were discussed.  seems to understand, and in agreement 89-year-old female with PMH of HFpEF, severe AS s/p balloon valvuloplasty, MVP, HTN, OAB, HCC (s/p partial liver resection in 2001), hypothyroidism, GERD presents with SOB for over 2 weeks.     # Acute on chronic CHF exacerbation li secondary to severe AS   - she has a history of s/p balloon valvuloplasty, she was offered TAVR but she declined   - she was diuresed with IV lasix and condition improved  -iv lasix switched to po lasix  -pt is on room air and no chest pain or sob  -she is stable to dc today     # Elevated troponin -> nstemi type ii sec to chf      # Normocytic anemia  - hg stable , no bleeding noted     #Hypothyroidism  - Continue synthroid     # GERD   - continue PPI    # HCC (s/p partial liver resection in 2001)  - outpatient follow up    Disp  condition improved, discussed with cardiology, pt was offered TAVR before and I discussed with her again today and she declined again, volume status improved and she is stable to dc on po lasix.  follow up with cardiology and pulmonary as outpatient

## 2022-06-22 ENCOUNTER — TRANSCRIPTION ENCOUNTER (OUTPATIENT)
Age: 87
End: 2022-06-22

## 2022-06-22 VITALS
SYSTOLIC BLOOD PRESSURE: 126 MMHG | DIASTOLIC BLOOD PRESSURE: 61 MMHG | RESPIRATION RATE: 18 BRPM | HEART RATE: 64 BPM | TEMPERATURE: 98 F

## 2022-06-22 LAB
ALBUMIN SERPL ELPH-MCNC: 4.1 G/DL — SIGNIFICANT CHANGE UP (ref 3.5–5.2)
ALP SERPL-CCNC: 112 U/L — SIGNIFICANT CHANGE UP (ref 30–115)
ALT FLD-CCNC: 13 U/L — SIGNIFICANT CHANGE UP (ref 0–41)
ANION GAP SERPL CALC-SCNC: 12 MMOL/L — SIGNIFICANT CHANGE UP (ref 7–14)
AST SERPL-CCNC: 23 U/L — SIGNIFICANT CHANGE UP (ref 0–41)
BILIRUB SERPL-MCNC: 0.3 MG/DL — SIGNIFICANT CHANGE UP (ref 0.2–1.2)
BUN SERPL-MCNC: 45 MG/DL — HIGH (ref 10–20)
CALCIUM SERPL-MCNC: 9.5 MG/DL — SIGNIFICANT CHANGE UP (ref 8.5–10.1)
CHLORIDE SERPL-SCNC: 99 MMOL/L — SIGNIFICANT CHANGE UP (ref 98–110)
CO2 SERPL-SCNC: 28 MMOL/L — SIGNIFICANT CHANGE UP (ref 17–32)
CREAT SERPL-MCNC: 1.1 MG/DL — SIGNIFICANT CHANGE UP (ref 0.7–1.5)
EGFR: 48 ML/MIN/1.73M2 — LOW
GLUCOSE SERPL-MCNC: 96 MG/DL — SIGNIFICANT CHANGE UP (ref 70–99)
HCT VFR BLD CALC: 29.7 % — LOW (ref 37–47)
HGB BLD-MCNC: 9.3 G/DL — LOW (ref 12–16)
MCHC RBC-ENTMCNC: 29.1 PG — SIGNIFICANT CHANGE UP (ref 27–31)
MCHC RBC-ENTMCNC: 31.3 G/DL — LOW (ref 32–37)
MCV RBC AUTO: 92.8 FL — SIGNIFICANT CHANGE UP (ref 81–99)
NRBC # BLD: 0 /100 WBCS — SIGNIFICANT CHANGE UP (ref 0–0)
PLATELET # BLD AUTO: 152 K/UL — SIGNIFICANT CHANGE UP (ref 130–400)
POTASSIUM SERPL-MCNC: 4.5 MMOL/L — SIGNIFICANT CHANGE UP (ref 3.5–5)
POTASSIUM SERPL-SCNC: 4.5 MMOL/L — SIGNIFICANT CHANGE UP (ref 3.5–5)
PROT SERPL-MCNC: 6.5 G/DL — SIGNIFICANT CHANGE UP (ref 6–8)
RBC # BLD: 3.2 M/UL — LOW (ref 4.2–5.4)
RBC # FLD: 12.9 % — SIGNIFICANT CHANGE UP (ref 11.5–14.5)
SODIUM SERPL-SCNC: 139 MMOL/L — SIGNIFICANT CHANGE UP (ref 135–146)
WBC # BLD: 5.22 K/UL — SIGNIFICANT CHANGE UP (ref 4.8–10.8)
WBC # FLD AUTO: 5.22 K/UL — SIGNIFICANT CHANGE UP (ref 4.8–10.8)

## 2022-06-22 PROCEDURE — 99238 HOSP IP/OBS DSCHRG MGMT 30/<: CPT

## 2022-06-22 RX ADMIN — Medication 20 MILLIGRAM(S): at 06:12

## 2022-06-22 RX ADMIN — Medication 81 MILLIGRAM(S): at 11:08

## 2022-06-22 RX ADMIN — HEPARIN SODIUM 5000 UNIT(S): 5000 INJECTION INTRAVENOUS; SUBCUTANEOUS at 06:12

## 2022-06-22 RX ADMIN — PANTOPRAZOLE SODIUM 40 MILLIGRAM(S): 20 TABLET, DELAYED RELEASE ORAL at 06:12

## 2022-06-22 RX ADMIN — Medication 100 MICROGRAM(S): at 06:12

## 2022-06-22 NOTE — DISCHARGE NOTE NURSING/CASE MANAGEMENT/SOCIAL WORK - NSDCVIVACCINE_GEN_ALL_CORE_FT
Tdap; 07-Jan-2020 05:28; Kristan Lenz (RN); [employer]; [id number] (Exp. Date: 23-Oct-2020); IntraMuscular; Deltoid Left.; 0.5 milliLiter(s); VIS (VIS Published: 09-May-2013, VIS Presented: 07-Jan-2020);

## 2022-06-22 NOTE — DISCHARGE NOTE NURSING/CASE MANAGEMENT/SOCIAL WORK - PATIENT PORTAL LINK FT
You can access the FollowMyHealth Patient Portal offered by [facility] by registering at the following website: [email or web address]. By joining [facility]’s FollowMyHealth portal, you will also be able to view your health information using other applications (apps) compatible with our system.

## 2022-06-24 LAB
CULTURE RESULTS: SIGNIFICANT CHANGE UP
SPECIMEN SOURCE: SIGNIFICANT CHANGE UP

## 2022-06-25 LAB
CULTURE RESULTS: SIGNIFICANT CHANGE UP
SPECIMEN SOURCE: SIGNIFICANT CHANGE UP

## 2022-06-28 DIAGNOSIS — K21.9 GASTRO-ESOPHAGEAL REFLUX DISEASE WITHOUT ESOPHAGITIS: ICD-10-CM

## 2022-06-28 DIAGNOSIS — J98.11 ATELECTASIS: ICD-10-CM

## 2022-06-28 DIAGNOSIS — Z85.118 PERSONAL HISTORY OF OTHER MALIGNANT NEOPLASM OF BRONCHUS AND LUNG: ICD-10-CM

## 2022-06-28 DIAGNOSIS — I11.0 HYPERTENSIVE HEART DISEASE WITH HEART FAILURE: ICD-10-CM

## 2022-06-28 DIAGNOSIS — Z90.49 ACQUIRED ABSENCE OF OTHER SPECIFIED PARTS OF DIGESTIVE TRACT: ICD-10-CM

## 2022-06-28 DIAGNOSIS — R06.02 SHORTNESS OF BREATH: ICD-10-CM

## 2022-06-28 DIAGNOSIS — E03.9 HYPOTHYROIDISM, UNSPECIFIED: ICD-10-CM

## 2022-06-28 DIAGNOSIS — I50.33 ACUTE ON CHRONIC DIASTOLIC (CONGESTIVE) HEART FAILURE: ICD-10-CM

## 2022-06-28 DIAGNOSIS — Z85.05 PERSONAL HISTORY OF MALIGNANT NEOPLASM OF LIVER: ICD-10-CM

## 2022-06-28 DIAGNOSIS — D64.9 ANEMIA, UNSPECIFIED: ICD-10-CM

## 2022-06-28 DIAGNOSIS — Z79.82 LONG TERM (CURRENT) USE OF ASPIRIN: ICD-10-CM

## 2022-06-28 DIAGNOSIS — Z79.890 HORMONE REPLACEMENT THERAPY: ICD-10-CM

## 2022-06-28 DIAGNOSIS — I35.0 NONRHEUMATIC AORTIC (VALVE) STENOSIS: ICD-10-CM

## 2022-06-28 DIAGNOSIS — Z90.89 ACQUIRED ABSENCE OF OTHER ORGANS: ICD-10-CM

## 2022-06-28 DIAGNOSIS — Z88.1 ALLERGY STATUS TO OTHER ANTIBIOTIC AGENTS STATUS: ICD-10-CM

## 2022-07-08 ENCOUNTER — INPATIENT (INPATIENT)
Facility: HOSPITAL | Age: 87
LOS: 37 days | Discharge: HOME HEALTH | End: 2022-08-15
Attending: STUDENT IN AN ORGANIZED HEALTH CARE EDUCATION/TRAINING PROGRAM | Admitting: STUDENT IN AN ORGANIZED HEALTH CARE EDUCATION/TRAINING PROGRAM
Payer: MEDICARE

## 2022-07-08 VITALS
OXYGEN SATURATION: 92 % | TEMPERATURE: 98 F | HEIGHT: 60 IN | RESPIRATION RATE: 18 BRPM | DIASTOLIC BLOOD PRESSURE: 58 MMHG | HEART RATE: 85 BPM | SYSTOLIC BLOOD PRESSURE: 127 MMHG

## 2022-07-08 DIAGNOSIS — D64.9 ANEMIA, UNSPECIFIED: ICD-10-CM

## 2022-07-08 DIAGNOSIS — Z90.49 ACQUIRED ABSENCE OF OTHER SPECIFIED PARTS OF DIGESTIVE TRACT: Chronic | ICD-10-CM

## 2022-07-08 DIAGNOSIS — E03.9 HYPOTHYROIDISM, UNSPECIFIED: ICD-10-CM

## 2022-07-08 DIAGNOSIS — I35.0 NONRHEUMATIC AORTIC (VALVE) STENOSIS: ICD-10-CM

## 2022-07-08 DIAGNOSIS — I50.33 ACUTE ON CHRONIC DIASTOLIC (CONGESTIVE) HEART FAILURE: ICD-10-CM

## 2022-07-08 DIAGNOSIS — K55.21 ANGIODYSPLASIA OF COLON WITH HEMORRHAGE: ICD-10-CM

## 2022-07-08 DIAGNOSIS — Z20.822 CONTACT WITH AND (SUSPECTED) EXPOSURE TO COVID-19: ICD-10-CM

## 2022-07-08 DIAGNOSIS — L89.152 PRESSURE ULCER OF SACRAL REGION, STAGE 2: ICD-10-CM

## 2022-07-08 DIAGNOSIS — I45.2 BIFASCICULAR BLOCK: ICD-10-CM

## 2022-07-08 DIAGNOSIS — D50.8 OTHER IRON DEFICIENCY ANEMIAS: ICD-10-CM

## 2022-07-08 DIAGNOSIS — I48.20 CHRONIC ATRIAL FIBRILLATION, UNSPECIFIED: ICD-10-CM

## 2022-07-08 DIAGNOSIS — A41.9 SEPSIS, UNSPECIFIED ORGANISM: ICD-10-CM

## 2022-07-08 DIAGNOSIS — K62.5 HEMORRHAGE OF ANUS AND RECTUM: ICD-10-CM

## 2022-07-08 DIAGNOSIS — I21.A1 MYOCARDIAL INFARCTION TYPE 2: ICD-10-CM

## 2022-07-08 DIAGNOSIS — K64.9 UNSPECIFIED HEMORRHOIDS: ICD-10-CM

## 2022-07-08 DIAGNOSIS — K21.9 GASTRO-ESOPHAGEAL REFLUX DISEASE WITHOUT ESOPHAGITIS: ICD-10-CM

## 2022-07-08 DIAGNOSIS — R77.8 OTHER SPECIFIED ABNORMALITIES OF PLASMA PROTEINS: ICD-10-CM

## 2022-07-08 DIAGNOSIS — J18.9 PNEUMONIA, UNSPECIFIED ORGANISM: ICD-10-CM

## 2022-07-08 DIAGNOSIS — R91.8 OTHER NONSPECIFIC ABNORMAL FINDING OF LUNG FIELD: ICD-10-CM

## 2022-07-08 DIAGNOSIS — J96.01 ACUTE RESPIRATORY FAILURE WITH HYPOXIA: ICD-10-CM

## 2022-07-08 DIAGNOSIS — D62 ACUTE POSTHEMORRHAGIC ANEMIA: ICD-10-CM

## 2022-07-08 DIAGNOSIS — I95.9 HYPOTENSION, UNSPECIFIED: ICD-10-CM

## 2022-07-08 DIAGNOSIS — Z85.05 PERSONAL HISTORY OF MALIGNANT NEOPLASM OF LIVER: ICD-10-CM

## 2022-07-08 DIAGNOSIS — I11.0 HYPERTENSIVE HEART DISEASE WITH HEART FAILURE: ICD-10-CM

## 2022-07-08 DIAGNOSIS — J90 PLEURAL EFFUSION, NOT ELSEWHERE CLASSIFIED: ICD-10-CM

## 2022-07-08 DIAGNOSIS — R06.02 SHORTNESS OF BREATH: ICD-10-CM

## 2022-07-08 LAB
ALBUMIN SERPL ELPH-MCNC: 4.1 G/DL — SIGNIFICANT CHANGE UP (ref 3.5–5.2)
ALP SERPL-CCNC: 118 U/L — HIGH (ref 30–115)
ALT FLD-CCNC: 16 U/L — SIGNIFICANT CHANGE UP (ref 0–41)
ANION GAP SERPL CALC-SCNC: 14 MMOL/L — SIGNIFICANT CHANGE UP (ref 7–14)
APTT BLD: 26.5 SEC — LOW (ref 27–39.2)
APTT BLD: SIGNIFICANT CHANGE UP SEC (ref 27–39.2)
AST SERPL-CCNC: 27 U/L — SIGNIFICANT CHANGE UP (ref 0–41)
BASE EXCESS BLDV CALC-SCNC: 0.5 MMOL/L — SIGNIFICANT CHANGE UP (ref -2–3)
BASOPHILS # BLD AUTO: 0.01 K/UL — SIGNIFICANT CHANGE UP (ref 0–0.2)
BASOPHILS NFR BLD AUTO: 0.1 % — SIGNIFICANT CHANGE UP (ref 0–1)
BILIRUB SERPL-MCNC: 0.4 MG/DL — SIGNIFICANT CHANGE UP (ref 0.2–1.2)
BLD GP AB SCN SERPL QL: SIGNIFICANT CHANGE UP
BUN SERPL-MCNC: 49 MG/DL — HIGH (ref 10–20)
CA-I SERPL-SCNC: 1.22 MMOL/L — SIGNIFICANT CHANGE UP (ref 1.15–1.33)
CALCIUM SERPL-MCNC: 9.5 MG/DL — SIGNIFICANT CHANGE UP (ref 8.5–10.1)
CHLORIDE SERPL-SCNC: 103 MMOL/L — SIGNIFICANT CHANGE UP (ref 98–110)
CK MB CFR SERPL CALC: 11.3 NG/ML — HIGH (ref 0.6–6.3)
CK SERPL-CCNC: 187 U/L — SIGNIFICANT CHANGE UP (ref 0–225)
CO2 SERPL-SCNC: 23 MMOL/L — SIGNIFICANT CHANGE UP (ref 17–32)
CREAT SERPL-MCNC: 1.3 MG/DL — SIGNIFICANT CHANGE UP (ref 0.7–1.5)
EGFR: 39 ML/MIN/1.73M2 — LOW
EOSINOPHIL # BLD AUTO: 0 K/UL — SIGNIFICANT CHANGE UP (ref 0–0.7)
EOSINOPHIL NFR BLD AUTO: 0 % — SIGNIFICANT CHANGE UP (ref 0–8)
FLUAV AG NPH QL: SIGNIFICANT CHANGE UP
FLUBV AG NPH QL: SIGNIFICANT CHANGE UP
GAS PNL BLDV: 140 MMOL/L — SIGNIFICANT CHANGE UP (ref 136–145)
GAS PNL BLDV: SIGNIFICANT CHANGE UP
GLUCOSE SERPL-MCNC: 142 MG/DL — HIGH (ref 70–99)
HCO3 BLDV-SCNC: 25 MMOL/L — SIGNIFICANT CHANGE UP (ref 22–29)
HCT VFR BLD CALC: 23 % — LOW (ref 37–47)
HCT VFR BLDA CALC: 18 % — CRITICAL LOW (ref 39–51)
HGB BLD CALC-MCNC: 6 G/DL — CRITICAL LOW (ref 12.6–17.4)
HGB BLD-MCNC: 7 G/DL — LOW (ref 12–16)
IMM GRANULOCYTES NFR BLD AUTO: 0.3 % — SIGNIFICANT CHANGE UP (ref 0.1–0.3)
INR BLD: 1.05 RATIO — SIGNIFICANT CHANGE UP (ref 0.65–1.3)
INR BLD: 1.1 RATIO — SIGNIFICANT CHANGE UP (ref 0.65–1.3)
LACTATE BLDV-MCNC: 2.3 MMOL/L — HIGH (ref 0.5–2)
LYMPHOCYTES # BLD AUTO: 0.45 K/UL — LOW (ref 1.2–3.4)
LYMPHOCYTES # BLD AUTO: 4.5 % — LOW (ref 20.5–51.1)
MAGNESIUM SERPL-MCNC: 2.4 MG/DL — SIGNIFICANT CHANGE UP (ref 1.8–2.4)
MANUAL SMEAR VERIFICATION: SIGNIFICANT CHANGE UP
MCHC RBC-ENTMCNC: 28 PG — SIGNIFICANT CHANGE UP (ref 27–31)
MCHC RBC-ENTMCNC: 30.4 G/DL — LOW (ref 32–37)
MCV RBC AUTO: 92 FL — SIGNIFICANT CHANGE UP (ref 81–99)
MONOCYTES # BLD AUTO: 0.4 K/UL — SIGNIFICANT CHANGE UP (ref 0.1–0.6)
MONOCYTES NFR BLD AUTO: 4 % — SIGNIFICANT CHANGE UP (ref 1.7–9.3)
NEUTROPHILS # BLD AUTO: 9.08 K/UL — HIGH (ref 1.4–6.5)
NEUTROPHILS NFR BLD AUTO: 91.1 % — HIGH (ref 42.2–75.2)
NRBC # BLD: 0 /100 WBCS — SIGNIFICANT CHANGE UP (ref 0–0)
NT-PROBNP SERPL-SCNC: HIGH PG/ML (ref 0–300)
OVALOCYTES BLD QL SMEAR: SLIGHT — SIGNIFICANT CHANGE UP
PCO2 BLDV: 41 MMHG — SIGNIFICANT CHANGE UP (ref 39–42)
PH BLDV: 7.4 — SIGNIFICANT CHANGE UP (ref 7.32–7.43)
PLAT MORPH BLD: NORMAL — SIGNIFICANT CHANGE UP
PLATELET # BLD AUTO: 203 K/UL — SIGNIFICANT CHANGE UP (ref 130–400)
PO2 BLDV: 35 MMHG — SIGNIFICANT CHANGE UP
POLYCHROMASIA BLD QL SMEAR: SLIGHT — SIGNIFICANT CHANGE UP
POTASSIUM BLDV-SCNC: 4.6 MMOL/L — SIGNIFICANT CHANGE UP (ref 3.5–5.1)
POTASSIUM SERPL-MCNC: 4.7 MMOL/L — SIGNIFICANT CHANGE UP (ref 3.5–5)
POTASSIUM SERPL-SCNC: 4.7 MMOL/L — SIGNIFICANT CHANGE UP (ref 3.5–5)
PROT SERPL-MCNC: 6.8 G/DL — SIGNIFICANT CHANGE UP (ref 6–8)
PROTHROM AB SERPL-ACNC: 12.1 SEC — SIGNIFICANT CHANGE UP (ref 9.95–12.87)
PROTHROM AB SERPL-ACNC: 12.6 SEC — SIGNIFICANT CHANGE UP (ref 9.95–12.87)
RBC # BLD: 2.5 M/UL — LOW (ref 4.2–5.4)
RBC # FLD: 14.3 % — SIGNIFICANT CHANGE UP (ref 11.5–14.5)
RBC BLD AUTO: ABNORMAL
RSV RNA NPH QL NAA+NON-PROBE: SIGNIFICANT CHANGE UP
SAO2 % BLDV: 56.9 % — SIGNIFICANT CHANGE UP
SARS-COV-2 RNA SPEC QL NAA+PROBE: SIGNIFICANT CHANGE UP
SCHISTOCYTES BLD QL AUTO: SLIGHT — SIGNIFICANT CHANGE UP
SODIUM SERPL-SCNC: 140 MMOL/L — SIGNIFICANT CHANGE UP (ref 135–146)
TROPONIN T SERPL-MCNC: 0.16 NG/ML — CRITICAL HIGH
TROPONIN T SERPL-MCNC: 0.19 NG/ML — CRITICAL HIGH
WBC # BLD: 9.97 K/UL — SIGNIFICANT CHANGE UP (ref 4.8–10.8)
WBC # FLD AUTO: 9.97 K/UL — SIGNIFICANT CHANGE UP (ref 4.8–10.8)

## 2022-07-08 PROCEDURE — 99285 EMERGENCY DEPT VISIT HI MDM: CPT | Mod: FS

## 2022-07-08 PROCEDURE — 71045 X-RAY EXAM CHEST 1 VIEW: CPT | Mod: 26

## 2022-07-08 PROCEDURE — 93010 ELECTROCARDIOGRAM REPORT: CPT

## 2022-07-08 RX ORDER — ASPIRIN/CALCIUM CARB/MAGNESIUM 324 MG
81 TABLET ORAL DAILY
Refills: 0 | Status: DISCONTINUED | OUTPATIENT
Start: 2022-07-08 | End: 2022-08-15

## 2022-07-08 RX ORDER — LEVOTHYROXINE SODIUM 125 MCG
100 TABLET ORAL DAILY
Refills: 0 | Status: DISCONTINUED | OUTPATIENT
Start: 2022-07-08 | End: 2022-08-15

## 2022-07-08 RX ORDER — FUROSEMIDE 40 MG
40 TABLET ORAL
Refills: 0 | Status: DISCONTINUED | OUTPATIENT
Start: 2022-07-08 | End: 2022-07-12

## 2022-07-08 RX ORDER — CHLORHEXIDINE GLUCONATE 213 G/1000ML
1 SOLUTION TOPICAL
Refills: 0 | Status: DISCONTINUED | OUTPATIENT
Start: 2022-07-08 | End: 2022-08-02

## 2022-07-08 RX ORDER — FUROSEMIDE 40 MG
20 TABLET ORAL ONCE
Refills: 0 | Status: DISCONTINUED | OUTPATIENT
Start: 2022-07-08 | End: 2022-07-12

## 2022-07-08 RX ORDER — FUROSEMIDE 40 MG
40 TABLET ORAL ONCE
Refills: 0 | Status: COMPLETED | OUTPATIENT
Start: 2022-07-08 | End: 2022-07-08

## 2022-07-08 RX ORDER — PANTOPRAZOLE SODIUM 20 MG/1
40 TABLET, DELAYED RELEASE ORAL
Refills: 0 | Status: DISCONTINUED | OUTPATIENT
Start: 2022-07-08 | End: 2022-08-15

## 2022-07-08 RX ADMIN — Medication 40 MILLIGRAM(S): at 14:41

## 2022-07-08 RX ADMIN — Medication 40 MILLIGRAM(S): at 21:16

## 2022-07-08 NOTE — H&P ADULT - HISTORY OF PRESENT ILLNESS
This is a 89-year-old female with a past medical history significant for CHF,severe AAS, MVP, hypertension, HCC, hypothyroidism, GERD who presents with shortness of breath.  Patient states that she has been having chronic shortness of breath w/ recent hospital admission 6/18-6/22, pt reports dyspnea has been getting worse the last couple of days and did not have any resolution to sxs upon d/c.  Of note patient also states that she has been having some blood in the stool during this time.  Patient states dyspnea is worsened with any exertion- and associated with escalating chest pain.    Pt denies any cardiology or pulmonary f/u.  Pt also admits to predominantly bedbound state due to dyspnea.

## 2022-07-08 NOTE — H&P ADULT - PROBLEM SELECTOR PLAN 1
- Admit to step down  - telemetry monitoring  - hx of severe AS s/p balloon valvuloplasty  - last echo 6/20/22  1. Normal global left ventricular systolic function.                                2. LV Ejection Fraction by Johnson's Method with a biplane EF of 60 %.  - BNP on admission 17,734  - continue Lasix 40 mg IV BID  - EKG repeat in am  - Cardiology consult  - daily weights  - Strict I/O  - HOB elevated  - supplemental oxygen 2 lpm  - repeat bnp 48hr

## 2022-07-08 NOTE — PATIENT PROFILE ADULT - FALL HARM RISK - HARM RISK INTERVENTIONS

## 2022-07-08 NOTE — ED PROVIDER NOTE - CARE PLAN
1 Principal Discharge DX:	Shortness of breath  Secondary Diagnosis:	Acute CHF  Secondary Diagnosis:	Anemia  Secondary Diagnosis:	Elevated troponin  Secondary Diagnosis:	Pleural effusion

## 2022-07-08 NOTE — H&P ADULT - NS ATTEND AMEND GEN_ALL_CORE FT
Patient was seen and examined by myself; case was discussed with PA, agree with his H&P and A/P. Changes made above if needed.

## 2022-07-08 NOTE — PATIENT PROFILE ADULT - HOW PATIENT ADDRESSED, PROFILE
Follow up patient note  Interventional spine and sports physiatry, Physical medicine rehabilitation      Chief complaint:   Chief Complaint   Patient presents with   • Follow-Up     Back pain          HISTORY    Please see new patient note by Dr Vazquez,  for more details.     HPI  Patient identification: Jovita Chaves ,  1947,   With Diagnoses of Rib pain on left side, Lumbar spondylosis, History of lumbosacral spine surgery done by Dr Yun, and Chronic bilateral low back pain without sciatica were pertinent to this visit.     Procedures:  2021 diagnostic medial branch blocks targeting the bilateral L4-5 and L5-S1 facet joints.  Initially post procedure the patient had 100% pain relief.  By the time the patient went home there was minimal pain relief.  This will be called a negative block    The patient has chronic axial left-sided low back pain which is worse on the right side.  This pain can be severe in intensity intermittent worse with standing bending and lumbar extension.  Nonradiating.  She has upcoming diagnostic medial branch blocks.    Over the past 10 days the patient has developed pain in the left mid ribs.  Nonradiating.  No pain in the midline in the thoracic spine.  This is been worsening.  This is up to 10 out of 10 in intensity.  Worse with breathing.  Denies rashes.  Denies trauma.  She denies chest pain cough fevers chills or other signs of URI. She denies leg pain and swelling.        ROS Red Flags :   Fever, Chills, Sweats: Denies  Involuntary Weight Loss: Denies  Bowel/Bladder Incontinence: Denies  Saddle Anesthesia: Denies        PMHx:   Past Medical History:   Diagnosis Date   • Arthritis     lower back   • Cervicalgia    • Hypothyroid    • Migraine without aura, with intractable migraine, so stated, without mention of status migrainosus    • Obesity    • Pain     lower back 8/10   • Thoracic or lumbosacral neuritis or radiculitis, unspecified     Bilateral L4/5   •  Unspecified disorder of thyroid        PSHx:   Past Surgical History:   Procedure Laterality Date   • LUMBAR MEDIAL BRANCH BLOCKS Bilateral 1/4/2021    Procedure: BLOCK, NERVE, SPINAL, LUMBAR, POSTERIOR RAMUS, MEDIAL BRANCH;  Surgeon: Will Vazquez M.D.;  [facility];  Service: Pain Management   • IRRIGATION & DEBRIDEMENT NEURO  11/8/2010    Performed by JANET FELIX at SURGERY [facility] ORS   • LUMBAR LAMINECTOMY DISKECTOMY  9/27/2010    Performed by JANET FELIX at SURGERY [facility] ORS   • LAMINOTOMY  9/27/2010    Performed by JANET FELIX at SURGERY [facility] ORS   • OTHER  2010    epidural injections   • OTHER  2009    left ankle debridement   • OTHER  2006    right foot gusman osteotomy   • OTHER  2005    tendon transfer right foot   • CHOLECYSTECTOMY  1983   • CHOLECYSTECTOMY  1983   • OTHER  1982    left knee repair   • OTHER  1951    hysterectomy   • OTHER ORTHOPEDIC SURGERY      Left knee, right foot tendon, Left ankle debridement   • ROTATOR CUFF REPAIR  [id number]    rotator cuff, brachioplexus nerve       Family history   Family History   Problem Relation Age of Onset   • Cancer Mother         Colon   • Thyroid Mother         Hypothyroid   • Other Father         Migraine         Medications:   Outpatient Medications Marked as Taking for the 2/8/21 encounter (Office Visit) with Will Vazquez M.D.   Medication Sig Dispense Refill   • divalproex (DEPAKOTE) 250 MG Tablet Delayed Response      • rosuvastatin (CRESTOR) 10 MG Tab      • aspirin  MG Tablet Delayed Response Take 1 Tab by mouth every day. (Patient taking differently: Take 81 mg by mouth every day.) 100 Tab 3   • Omega-3 Fatty Acids (RA FISH OIL) 1400 MG CAPSULE DELAYED RELEASE Take 2 Caps by mouth every day. 60 Cap 3   • levothyroxine (SYNTHROID) 125 MCG Tab Take 125 mcg by mouth Every morning on an empty stomach.     • Cholecalciferol (VITAMIN D3) 5000 units Cap Take 1 Cap by mouth every day.      • sumatriptan (IMITREX) 100 MG tablet Take 1 Tab by mouth. 1 tab at headache onset; repeat in 1 hour prn. 9 Tab 6        Current Outpatient Medications on File Prior to Visit   Medication Sig Dispense Refill   • divalproex (DEPAKOTE) 250 MG Tablet Delayed Response      • rosuvastatin (CRESTOR) 10 MG Tab      • aspirin  MG Tablet Delayed Response Take 1 Tab by mouth every day. (Patient taking differently: Take 81 mg by mouth every day.) 100 Tab 3   • Omega-3 Fatty Acids (RA FISH OIL) 1400 MG CAPSULE DELAYED RELEASE Take 2 Caps by mouth every day. 60 Cap 3   • levothyroxine (SYNTHROID) 125 MCG Tab Take 125 mcg by mouth Every morning on an empty stomach.     • Cholecalciferol (VITAMIN D3) 5000 units Cap Take 1 Cap by mouth every day.     • sumatriptan (IMITREX) 100 MG tablet Take 1 Tab by mouth. 1 tab at headache onset; repeat in 1 hour prn. 9 Tab 6     No current facility-administered medications on file prior to visit.          Allergies:   No Known Allergies    Social Hx:   Social History     Socioeconomic History   • Marital status:      Spouse name: Not on file   • Number of children: Not on file   • Years of education: Not on file   • Highest education level: Not on file   Occupational History   • Not on file   Social Needs   • Financial resource strain: Not on file   • Food insecurity     Worry: Not on file     Inability: Not on file   • Transportation needs     Medical: Not on file     Non-medical: Not on file   Tobacco Use   • Smoking status: Never Smoker   • Smokeless tobacco: Never Used   Substance and Sexual Activity   • Alcohol use: No   • Drug use: No   • Sexual activity: Not on file   Lifestyle   • Physical activity     Days per week: Not on file     Minutes per session: Not on file   • Stress: Not on file   Relationships   • Social connections     Talks on phone: Not on file     Gets together: Not on file     Attends Church service: Not on file     Active member of club or  "organization: Not on file     Attends meetings of clubs or organizations: Not on file     Relationship status: Not on file   • Intimate partner violence     Fear of current or ex partner: Not on file     Emotionally abused: Not on file     Physically abused: Not on file     Forced sexual activity: Not on file   Other Topics Concern   •  Service No   • Blood Transfusions Yes   • Caffeine Concern No   • Occupational Exposure No   • Hobby Hazards No   • Sleep Concern No   • Stress Concern No   • Weight Concern No   • Special Diet No   • Back Care No   • Exercise No   • Bike Helmet No   • Seat Belt Yes   • Self-Exams No   Social History Narrative   • Not on file         EXAMINATION     Physical Exam:   Vitals: /72 (BP Location: Left arm, Patient Position: Sitting, BP Cuff Size: Adult)   Pulse 88   Temp 36 °C (96.8 °F) (Temporal)   Ht 1.727 m (5' 8\")   Wt 107 kg (236 lb 12.4 oz)   SpO2 97%     Constitutional:   Body Habitus: Body mass index is 36 kg/m².  Cooperation: Fully cooperates with exam  Appearance: Well-groomed no disheveled    Respiratory-  breathing comfortable on room air, no audible wheezing  Cardiovascular- capillary refills less than 2 seconds. No lower extremity edema is noted.   Psychiatric- alert and oriented ×3. Normal affect.    MSK and Neuro: -  There is no tenderness in the midline throughout the thoracic spine.  There is tenderness to palpation in the mid left ribs.  Mostly in the posterior and posterior lateral aspect.  No pain on the right ribs.  No rashes are present.          MEDICAL DECISION MAKING    DATA    Labs:   Lab Results   Component Value Date/Time    SODIUM 137 12/01/2017 03:08 PM    POTASSIUM 3.5 (L) 12/01/2017 03:08 PM    CHLORIDE 103 12/01/2017 03:08 PM    CO2 24 12/01/2017 03:08 PM    GLUCOSE 104 (H) 12/01/2017 03:08 PM    BUN 19 12/01/2017 03:08 PM    CREATININE 0.85 12/01/2017 03:08 PM        Lab Results   Component Value Date/Time    PROTHROMBTM 12.5 " 12/01/2017 03:08 PM    INR 0.97 12/01/2017 03:08 PM        Lab Results   Component Value Date/Time    WBC 6.9 12/01/2017 03:08 PM    RBC 4.50 12/01/2017 03:08 PM    HEMOGLOBIN 14.3 12/01/2017 03:08 PM    HEMATOCRIT 42.3 12/01/2017 03:08 PM    MCV 94.0 12/01/2017 03:08 PM    MCH 31.8 12/01/2017 03:08 PM    MCHC 33.8 12/01/2017 03:08 PM    MPV 10.6 12/01/2017 03:08 PM    NEUTSPOLYS 54.90 12/01/2017 03:08 PM    LYMPHOCYTES 33.00 12/01/2017 03:08 PM    MONOCYTES 8.70 12/01/2017 03:08 PM    EOSINOPHILS 2.50 12/01/2017 03:08 PM    BASOPHILS 0.60 12/01/2017 03:08 PM        Lab Results   Component Value Date/Time    HBA1C 5.6 12/01/2017 03:28 PM          Imaging:   I personally reviewed following images    I reviewed the fluoroscopic images from 1/4/2021 showing successful diagnostic medial branch blocks targeting the bilateral L4-5 and L5-S1 facet joints.  I reviewed these with the patient at the visit on 1/27/2021.    I reviewed the following radiology reports           Results for orders placed during the hospital encounter of 12/01/17   MR-BRAIN-W/O    Impression MRI OF THE BRAIN WITHOUT CONTRAST WITHIN NORMAL LIMITS.                                 Results for orders placed in visit on 12/10/20   MR-LUMBAR SPINE-W/O        Results for orders placed during the hospital encounter of 11/07/10   MR-LUMBAR SPINE-WITH & W/O            Results for orders placed in visit on 12/10/20   MR-LUMBAR SPINE-W/O                                                                                                                                                                                                        Results for orders placed in visit on 12/10/20   DX-LUMBAR SPINE-4+ VIEWS                                         DIAGNOSIS   Visit Diagnoses     ICD-10-CM   1. Rib pain on left side  R07.81   2. Lumbar spondylosis  M47.816   3. History of lumbosacral spine surgery done by Dr Yun  Z98.890   4. Chronic bilateral low back pain  without sciatica  M54.5    G89.29         ASSESSMENT and PLAN:     Jovita Chaves  1947 female      Jovita was seen today for follow-up.    Diagnoses and all orders for this visit:    Rib pain on left side  -     [id number] (WITH 1-VIEW CXR) LEFT; Future    Lumbar spondylosis    History of lumbosacral spine surgery done by Dr Yun    Chronic bilateral low back pain without sciatica           The patient has had new onset pain which is unrelated to her lumbar spondylosis.  There are no signs of thoracic compression fractures or injury to the thoracic spine.  She does have pain along the left mid ribs with tenderness to palpation.  There was no trauma to suggest fracture.  No rash to suggest shingles.  However the patient does have pain with breathing.  Because of this I have ordered an x-ray of the chest as well as x-rays of the left ribs.  I advised the patient to follow-up in urgent care today.    As long as there are no issues on the x-ray ordered as well as with the urgent care visit, plan to proceed with left diagnostic medial branch blocks targeting L3-4, L4-5, L5-S1 facet joints #1 and #2..    The risks benefits and alternatives to this procedure were discussed and the patient wishes to proceed with the procedure. Risks include but are not limited to damage to surrounding structures, infection, bleeding, worsening of pain which can be permanent, weakness which can be permanent. Benefits include pain relief, improved function. Alternatives includes not doing the procedure.      If there this is a negative diagnostic blocks and I would consider the patient for spinal cord stimulation    Follow up: After the above diagnostic procedures    Thank you for allowing me to participate in the care of this patient. If you have any questions please not hesitate to contact me.             Please note that this dictation was created using voice recognition software. I have made every reasonable  attempt to correct obvious errors but there may be errors of grammar and content that I may have overlooked prior to finalization of this note.      Will Vazquez MD  Interventional Spine and Sports Physiatry  Physical Medicine and Rehabilitation  [facility]        [place]

## 2022-07-08 NOTE — ED PROVIDER NOTE - ATTENDING APP SHARED VISIT CONTRIBUTION OF CARE
89-year-old female with a past medical history significant for CHF, severe AAS, MVP, hypertension, HCC, hypothyroidism, GERD who presents with shortness of breath.  Patient states that she has been having chronic shortness of breath that has been getting worse the last couple of days.  Of note patient also states that she has been having some blood in the stool during this time.  Patient denies any chest pain or any other medical complaints.    VITAL SIGNS: I have reviewed nursing notes and confirm.  CONSTITUTIONAL: non-toxic, well appearing  SKIN: no rash, no petechiae.  EYES: EOMI, pink conjunctiva, anicteric  ENT: tongue midline, no exudates, MMM  NECK: Supple; no meningismus, no JVD  CARD: crackles noted on the lower lobes  RESP: CTAB, no respiratory distress  ABD: Soft, non-tender, non-distended, no peritoneal signs, no HSM, no CVA tenderness  EXT: Normal ROM x4. mild edema   NEURO: Alert, oriented x3.     a/p  89 yr old f that presents with sob  -labs  -ekg  -imaging  -lasix  -consider admission

## 2022-07-08 NOTE — H&P ADULT - NSHPPHYSICALEXAM_GEN_ALL_CORE
GENERAL:  88y/o Female NAD, resting comfortably on nasal cannula oxygen.  HEAD:  Atraumatic, Normocephalic  EYES: EOMI, PERRLA, conjunctiva and sclera clear  NECK: Supple, No JVD, no cervical lymphadenopathy, non-tender  CHEST/LUNG: Clear to auscultation RT side, LT upper ascultation clear with diminished LT base.  HEART: Regular rate and rhythm; S1&S2  ABDOMEN: Soft, Nontender, Nondistended x 4 quadrants; Bowel sounds present  EXTREMITIES:   Peripheral Pulses Present, No clubbing, no cyanosis, mild 1+ pedal edema, no calf tenderness  PSYCH: AAOx3, cooperative, appropriate  NEUROLOGY: WNL  SKIN: Stage 2 sacral decubitus assessed by RN

## 2022-07-08 NOTE — H&P ADULT - PROBLEM SELECTOR PLAN 3
- Serial H&H  - Transfuse 2 units over 3hr each   - lasix 20IVP between units  - Stool for occult blood  - GI consult recommended  - NPO at midnight  - Hold SQ Heparin - Serial H&H  - Transfuse 1 units over 3hr  - lasix 20IVP   - Stool for occult blood  - GI consult recommended  - Hold SQ Heparin

## 2022-07-08 NOTE — ED PROVIDER NOTE - OBJECTIVE STATEMENT
patient BIBA for worsening SOB, H/o CHF< severe AS, pleural effusion, States Dc'd from hospital 2 weeks ago for same, Worsen sx since, c/o bright red blood with BMs at times, no abdominal pain

## 2022-07-08 NOTE — ED ADULT NURSE NOTE - NSIMPLEMENTINTERV_GEN_ALL_ED
Implemented All Fall with Harm Risk Interventions:  [place] to call system. Call bell, personal items and telephone within reach. Instruct patient to call for assistance. Room bathroom lighting operational. Non-slip footwear when patient is off stretcher. Physically safe environment: no spills, clutter or unnecessary equipment. Stretcher in lowest position, wheels locked, appropriate side rails in place. Provide visual cue, wrist band, yellow gown, etc. Monitor gait and stability. Monitor for mental status changes and reorient to person, place, and time. Review medications for side effects contributing to fall risk. Reinforce activity limits and safety measures with patient and family. Provide visual clues: red socks.

## 2022-07-08 NOTE — H&P ADULT - NSHPLABSRESULTS_GEN_ALL_CORE
7.0    9.97  )-----------( 203      ( 08 Jul 2022 12:20 )             23.0       07-08    140  |  103  |  49<H>  ----------------------------<  142<H>  4.7   |  23  |  1.3    Ca    9.5      08 Jul 2022 12:20  Mg     2.4     07-08    TPro  6.8  /  Alb  4.1  /  TBili  0.4  /  DBili  x   /  AST  27  /  ALT  16  /  AlkPhos  118<H>  07-08                  PT/INR - ( 08 Jul 2022 14:55 )   PT: 12.60 sec;   INR: 1.10 ratio         PTT - ( 08 Jul 2022 14:55 )  PTT:26.5 sec    Lactate Trend      CARDIAC MARKERS ( 08 Jul 2022 12:20 )  x     / 0.19 ng/mL / x     / x     / x            CAPILLARY BLOOD GLUCOSE        Culture Results:   No Growth Final (06-19 @ 15:30)  Culture Results:   No Growth Final (06-19 @ 07:18)      < from: Xray Chest 1 View- PORTABLE-Urgent (07.08.22 @ 12:52) >    Impression:    New large rounded opacities within the right lung measuring up to 5.5 cm.   Prior chest CT of 6/19/2022 demonstrated loculated right pleural   effusions.    However, these are incompletely evaluated on this examination    Increasing left-sided lung opacity extending to the midlung.    Further evaluation of the above findings with chest CT is recommended.    --- End of Report ---      DEVYN MANNING MD; Attending Radiologist  This document has been electronically signed. Jul 8 2022  1:37PM    < end of copied text >

## 2022-07-08 NOTE — H&P ADULT - NSHPREVIEWOFSYSTEMS_GEN_ALL_CORE
REVIEW OF SYSTEMS:    CONSTITUTIONAL: No weakness, fevers or chills  EYES/ENT: No visual changes;  No vertigo or throat pain   NECK: No pain or stiffness  RESPIRATORY: see HPI  CARDIOVASCULAR: see HPI  GASTROINTESTINAL: No abdominal or epigastric pain. No nausea, vomiting, or hematemesis; No diarrhea or constipation.   GENITOURINARY: No dysuria, frequency or hematuria  NEUROLOGICAL: No Numbness and no significant weakness  SKIN: No itching, rashes

## 2022-07-08 NOTE — H&P ADULT - ASSESSMENT
This is a 89-year-old female with a past medical history significant for CHF, severe AAS, MVP, hypertension, HCC, hypothyroidism, GERD who presents with shortness of breath.  d/w Dr Espinoza

## 2022-07-08 NOTE — H&P ADULT - NSHPSOCIALHISTORY_GEN_ALL_CORE
Living Condition: lives at home with son ( caretaker)  Ambulation Status:  mostly bedbound with rolling assist device  Tobacco use:  Denies  EtOH use:  Denies  Illicit drug use: Denies  Marital Status: Unk

## 2022-07-09 LAB
ALBUMIN SERPL ELPH-MCNC: 3.8 G/DL — SIGNIFICANT CHANGE UP (ref 3.5–5.2)
ALP SERPL-CCNC: 109 U/L — SIGNIFICANT CHANGE UP (ref 30–115)
ALT FLD-CCNC: 13 U/L — SIGNIFICANT CHANGE UP (ref 0–41)
ANION GAP SERPL CALC-SCNC: 11 MMOL/L — SIGNIFICANT CHANGE UP (ref 7–14)
AST SERPL-CCNC: 23 U/L — SIGNIFICANT CHANGE UP (ref 0–41)
BILIRUB SERPL-MCNC: 0.7 MG/DL — SIGNIFICANT CHANGE UP (ref 0.2–1.2)
BUN SERPL-MCNC: 46 MG/DL — HIGH (ref 10–20)
CALCIUM SERPL-MCNC: 9.7 MG/DL — SIGNIFICANT CHANGE UP (ref 8.5–10.1)
CHLORIDE SERPL-SCNC: 101 MMOL/L — SIGNIFICANT CHANGE UP (ref 98–110)
CK MB CFR SERPL CALC: 8.3 NG/ML — HIGH (ref 0.6–6.3)
CK SERPL-CCNC: 165 U/L — SIGNIFICANT CHANGE UP (ref 0–225)
CO2 SERPL-SCNC: 31 MMOL/L — SIGNIFICANT CHANGE UP (ref 17–32)
CREAT SERPL-MCNC: 1.3 MG/DL — SIGNIFICANT CHANGE UP (ref 0.7–1.5)
EGFR: 39 ML/MIN/1.73M2 — LOW
GLUCOSE SERPL-MCNC: 94 MG/DL — SIGNIFICANT CHANGE UP (ref 70–99)
HCT VFR BLD CALC: 25.7 % — LOW (ref 37–47)
HCT VFR BLD CALC: 27.1 % — LOW (ref 37–47)
HGB BLD-MCNC: 7.7 G/DL — LOW (ref 12–16)
HGB BLD-MCNC: 8.4 G/DL — LOW (ref 12–16)
MAGNESIUM SERPL-MCNC: 2.2 MG/DL — SIGNIFICANT CHANGE UP (ref 1.8–2.4)
MCHC RBC-ENTMCNC: 27.2 PG — SIGNIFICANT CHANGE UP (ref 27–31)
MCHC RBC-ENTMCNC: 27.7 PG — SIGNIFICANT CHANGE UP (ref 27–31)
MCHC RBC-ENTMCNC: 30 G/DL — LOW (ref 32–37)
MCHC RBC-ENTMCNC: 31 G/DL — LOW (ref 32–37)
MCV RBC AUTO: 89.4 FL — SIGNIFICANT CHANGE UP (ref 81–99)
MCV RBC AUTO: 90.8 FL — SIGNIFICANT CHANGE UP (ref 81–99)
NRBC # BLD: 0 /100 WBCS — SIGNIFICANT CHANGE UP (ref 0–0)
NRBC # BLD: 0 /100 WBCS — SIGNIFICANT CHANGE UP (ref 0–0)
PLATELET # BLD AUTO: 164 K/UL — SIGNIFICANT CHANGE UP (ref 130–400)
PLATELET # BLD AUTO: 176 K/UL — SIGNIFICANT CHANGE UP (ref 130–400)
POTASSIUM SERPL-MCNC: 4.2 MMOL/L — SIGNIFICANT CHANGE UP (ref 3.5–5)
POTASSIUM SERPL-SCNC: 4.2 MMOL/L — SIGNIFICANT CHANGE UP (ref 3.5–5)
PROT SERPL-MCNC: 6.5 G/DL — SIGNIFICANT CHANGE UP (ref 6–8)
RBC # BLD: 2.83 M/UL — LOW (ref 4.2–5.4)
RBC # BLD: 3.03 M/UL — LOW (ref 4.2–5.4)
RBC # FLD: 14.1 % — SIGNIFICANT CHANGE UP (ref 11.5–14.5)
RBC # FLD: 14.3 % — SIGNIFICANT CHANGE UP (ref 11.5–14.5)
SODIUM SERPL-SCNC: 143 MMOL/L — SIGNIFICANT CHANGE UP (ref 135–146)
TROPONIN T SERPL-MCNC: 0.23 NG/ML — CRITICAL HIGH
WBC # BLD: 6.34 K/UL — SIGNIFICANT CHANGE UP (ref 4.8–10.8)
WBC # BLD: 7.26 K/UL — SIGNIFICANT CHANGE UP (ref 4.8–10.8)
WBC # FLD AUTO: 6.34 K/UL — SIGNIFICANT CHANGE UP (ref 4.8–10.8)
WBC # FLD AUTO: 7.26 K/UL — SIGNIFICANT CHANGE UP (ref 4.8–10.8)

## 2022-07-09 PROCEDURE — 99291 CRITICAL CARE FIRST HOUR: CPT

## 2022-07-09 PROCEDURE — 99222 1ST HOSP IP/OBS MODERATE 55: CPT

## 2022-07-09 PROCEDURE — 93010 ELECTROCARDIOGRAM REPORT: CPT

## 2022-07-09 PROCEDURE — 99231 SBSQ HOSP IP/OBS SF/LOW 25: CPT

## 2022-07-09 RX ADMIN — Medication 81 MILLIGRAM(S): at 11:05

## 2022-07-09 RX ADMIN — PANTOPRAZOLE SODIUM 40 MILLIGRAM(S): 20 TABLET, DELAYED RELEASE ORAL at 06:06

## 2022-07-09 RX ADMIN — Medication 100 MICROGRAM(S): at 06:06

## 2022-07-09 RX ADMIN — Medication 40 MILLIGRAM(S): at 06:06

## 2022-07-09 RX ADMIN — CHLORHEXIDINE GLUCONATE 1 APPLICATION(S): 213 SOLUTION TOPICAL at 06:06

## 2022-07-09 RX ADMIN — Medication 40 MILLIGRAM(S): at 14:48

## 2022-07-09 NOTE — CONSULT NOTE ADULT - ASSESSMENT
89yo female with hx htn, MVP, AS (s/p balloon aortic valvuloplasty 05/2021), cardiomegaly, liver cancer (s/p resection, chemo and radiation) presents for worsening sob. Found to have hgb 7, transfused with  units PRBC, troponemia, bnp >17K, increasing left ling opacity extending to midlung. Admitted to stepdown.    Impression  *sob 2/2 severe AS  *nstemi type 2  *anemia    trop 0.19 -> 0.16 (chronic, troponemia on previous admissions)  ckmb 11.3 (7.5 06/8/2022)  bnp [id number] (6509 6/8/2022)  lactate 2.3  Hgb 7 -> 7.7 -> 7.7  TTE 06/2022: E 60%, sever AS, small plueral effusions  cxr: new right lung large opacity; increasing left opacity  ecg: NSR, RBBB, LVH    Plan:  *sob  - c/w lasix, I/Os, maintain negative u/o  - cxr as above, pulmonary eval (bronch?)  - repeat cxr in am  - repeat TTE    *nstemi type 2, supply demand mismatch in setting of anemia and chf  - trend trop  - c/w telemetry  - ecg in am  - correct anemia    *anemia  - r/o GIB  - GI eval  - keep Hgb >8, transfuse prn  - c/w aspirin       89yo female with hx htn, MVP, AS (s/p balloon aortic valvuloplasty 05/2021), cardiomegaly, liver cancer (s/p resection, chemo and radiation) presents for worsening sob. Found to have hgb 7, transfused with  units PRBC, troponemia, bnp >17K, increasing left ling opacity extending to midlung. Admitted to stepdown.    Impression  *sob 2/2 severe AS  *nstemi type 2  *anemia    trop 0.19 -> 0.16 (chronic, troponemia on previous admissions)  ckmb 11.3 (7.5 06/8/2022)  bnp [id number] (6509 6/8/2022)  lactate 2.3  Hgb 7 -> 7.7 -> 7.7  TTE 06/2022: E 60%, sever AS, small plueral effusions  cxr: new right lung large opacity; increasing left opacity  ecg: NSR, RBBB, LVH    Plan:  *sob 2/2 severe AS  sob exacerbated by severe AS  - c/w lasix, I/Os, maintain negative u/o  - cxr as above, pulmonary eval (bronch?)  - repeat cxr in am  - repeat TTE  - will request Dr.Manniatis cruz for possible TAVR    *nstemi type 2, supply demand mismatch in setting of anemia and chf  - trend trop  - c/w telemetry  - ecg in am  - correct anemia    *anemia  - r/o GIB  - GI eval  - keep Hgb >8, transfuse prn  - c/w aspirin    Define goals of care     89yo female with hx htn, MVP, AS (s/p balloon aortic valvuloplasty 05/2021), cardiomegaly, liver cancer (s/p resection, chemo and radiation) presents for worsening sob. Found to have hgb 7, transfused with  units PRBC, troponemia, bnp >17K, increasing left ling opacity extending to midlung. Admitted to stepdown.    Impression  *sob 2/2 severe AS  *nstemi type 2  *anemia    trop 0.19 -> 0.16 (chronic, troponemia on previous admissions)  ckmb 11.3 (7.5 06/8/2022)  bnp [phone] (6509 6/8/2022)  lactate 2.3  Hgb 7 -> 7.7 -> 7.7  TTE 06/2022: E 60%, sever AS, small plueral effusions  cxr: new right lung large opacity; increasing left opacity  ecg: NSR, RBBB, LVH    Plan:  *sob 2/2 severe AS  sob exacerbated by severe AS  - c/w lasix, I/Os, maintain negative u/o  - cxr as above, pulmonary eval (bronch?)  - repeat cxr in am  - repeat TTE  - please contact  to evaluate for possible TAVR    *nstemi type 2, supply demand mismatch in setting of anemia and chf  - trend trop  - c/w telemetry  - ecg in am  - correct anemia    *anemia  - r/o GIB  - GI eval  - keep Hgb >8, transfuse prn  - c/w aspirin    Define goals of care    Discussed with  89yo female with hx htn, MVP, AS (s/p balloon aortic valvuloplasty 05/2021), cardiomegaly, liver cancer (s/p resection, chemo and radiation) presents for worsening sob. Found to have hgb 7, transfused with  units PRBC, troponemia, bnp >17K, increasing left ling opacity extending to midlung. Admitted to stepdown.    Impression  *Critical AS- shortness of breath  *nstemi type 2 - secondary to critical AS and anemia  *anemia    trop 0.19 -> 0.16 (chronic, troponemia on previous admissions)  ckmb 11.3 (7.5 06/8/2022)  bnp [phone] (6509 6/8/2022)  lactate 2.3  Hgb 7 -> 7.7 -> 7.7  TTE 06/2022: E 60%, sever AS, small plueral effusions  cxr: new right lung large opacity; increasing left opacity  ecg: NSR, RBBB, LVH    Plan:  *sob 2/2 severe AS  sob exacerbated by severe AS  - c/w lasix, I/Os, maintain negative u/o  - cxr as above, pulmonary eval (bronch?)  - repeat cxr in am  - repeat TTE  - please contact  to evaluate for possible TAVR    *nstemi type 2, supply demand mismatch in setting of anemia and chf  - trend trop  - c/w telemetry  - ecg in am  - correct anemia    *anemia  - r/o GIB  - GI eval  - keep Hgb >8, transfuse prn  - c/w aspirin    Define goals of care    Discussed with

## 2022-07-09 NOTE — PROGRESS NOTE ADULT - SUBJECTIVE AND OBJECTIVE BOX
Pt seen and examined. Pt breathing comfortably    T(F): , Max: 98.3 (07-08-22 @ 23:23)  HR: 84 (07-09-22 @ 15:10) (70 - 87)  BP: 85/50 (07-09-22 @ 15:10)  RR: 37 (07-09-22 @ 15:10)  SpO2: 99% (07-09-22 @ 07:08)  IN: 0 mL / OUT: 2700 mL / NET: -2700 mL      General: No apparent distress  Cardiovascular: S1, split S2, late peaking systolic murmur   Gastrointestinal: Soft, Non-tender, Non-distended  Respiratory: Good air entry bilaterally  Musculoskeletal: Moves all extremities  Lymphatic: No edema  Neurologic: No gross motor deficit  Dermatologic: Skin dry  PT/INR - ( 08 Jul 2022 14:55 )   PT: 12.60 sec;   INR: 1.10 ratio         PTT - ( 08 Jul 2022 14:55 )  PTT:26.5 sec                        8.4    6.34  )-----------( 164      ( 09 Jul 2022 15:21 )             27.1     07-09    143  |  101  |  46<H>  ----------------------------<  94  4.2   |  31  |  1.3    Ca    9.7      09 Jul 2022 05:36  Mg     2.2     07-09    TPro  6.5  /  Alb  3.8  /  TBili  0.7  /  DBili  x   /  AST  23  /  ALT  13  /  AlkPhos  109  07-09

## 2022-07-09 NOTE — CONSULT NOTE ADULT - NSCONSULTADDITIONALINFOA_GEN_ALL_CORE
No PA or fellow or resident was involved in this consultation, and the impressions and recommendations are my own.

## 2022-07-09 NOTE — CONSULT NOTE ADULT - SUBJECTIVE AND OBJECTIVE BOX
HPI:  This is a 89-year-old female with a past medical history significant for CHF,severe AAS, MVP, hypertension, HCC, hypothyroidism, GERD who presents with shortness of breath.  Patient states that she has been having chronic shortness of breath w/ recent hospital admission 6/18-6/22, pt reports dyspnea has been getting worse the last couple of days and did not have any resolution to sxs upon d/c.  Of note patient also states that she has been having some blood in the stool during this time.  Patient states dyspnea is worsened with any exertion- and associated with escalating chest pain. Pt denies any cardiology or pulmonary f/u.  Pt also admits to predominantly bedbound state due to dyspnea.  (08 Jul 2022 16:43)    HPI-Cardiology   Pt evaluated at bedside. Endorses worsening sob and increasing debilitation 2/2 to dyspnea. Report chest tightness on exertion, occasional dry cough and melena that started Thursday. Last month admitted for dyspnea and discharged about 2 weeks ago. Anemia, hgb 7.0, transfused with 1 unit PRBC. On NC now, anxious, speaks in full sentences. Admitted to stepdown. Radiology tests and hospital records, were reviewed, as well as previous notes on this patient.      PAST MEDICAL & SURGICAL HISTORY  HTN (hypertension)  Mitral valve prolapse  Enlarged heart  Murmur  Hyperthyroidism  Arthritis  HTN (hypertension)  Diverticulosis  Hiatal hernia  Acid reflux  Liver cancers/p resection and s/p chemo and radiation  Aortic stenosis s/p ballon aortic valvuloplasty 5/25/21  H/O resection of liver liver cancer  Status post cholecystectomy        FAMILY HISTORY:  No pertinent family history in first degree relatives      SOCIAL HISTORY:  []smoker  []Alcohol  []Drug    ALLERGIES:  Cipro (Other)  Levaquin (Rash)  tetracycline (Hives)      MEDICATIONS:  MEDICATIONS  (STANDING):  aspirin  chewable 81 milliGRAM(s) Oral daily  chlorhexidine 4% Liquid 1 Application(s) Topical <User Schedule>  furosemide   Injectable 40 milliGRAM(s) IV Push two times a day  furosemide   Injectable 20 milliGRAM(s) IV Push once  levothyroxine 100 MICROGram(s) Oral daily  pantoprazole    Tablet 40 milliGRAM(s) Oral before breakfast  MEDICATIONS  (PRN):      HOME MEDICATIONS:  aspirin 81 mg oral tablet, chewable: 1 tab(s) orally once a day (21 Jun 2022 14:31)  furosemide 20 mg oral tablet: 1 tab(s) orally once a day (18 Jun 2022 18:53)  levothyroxine 100 mcg (0.1 mg) oral tablet: 1 tab(s) orally once a day (18 Jun 2022 18:53)  pantoprazole 40 mg oral delayed release tablet: for 15 days (18 Jun 2022 18:53)  senna oral tablet: 2 tab(s) orally once a day (at bedtime) (18 Jun 2022 18:53)      VITALS:   T(F): 97.1 (07-09 @ 07:30), Max: 98.3 (07-08 @ 23:23)  HR: 71 (07-09 @ 07:08) (70 - 89)  BP: 87/52 (07-09 @ 07:08) (86/49 - 127/58)  BP(mean): 65 (07-09 @ 07:08) (63 - 78)  RR: 42 (07-09 @ 07:08) (18 - 49)  SpO2: 99% (07-09 @ 07:08) (92% - 100%)  I&O's Summary  08 Jul 2022 07:01  -  09 Jul 2022 07:00  --------------------------------------------------------  IN: 0 mL / OUT: 2700 mL / NET: -2700 mL        REVIEW OF SYSTEMS:  CONSTITUTIONAL: No weakness, fevers or chills  EYES: No visual changes  ENT: No vertigo or throat pain   NECK: No pain or stiffness  RESPIRATORY: see hpi  CARDIOVASCULAR: No chest pain or palpitations  GASTROINTESTINAL: No abdominal or epigastric pain. No nausea, vomiting, or hematemesis; No diarrhea or constipation. No melena or hematochezia.  GENITOURINARY: No dysuria, frequency or hematuria  NEUROLOGICAL: No numbness or weakness  SKIN: No itching, no rashes  MSK: no joint swelling or pain      PHYSICAL EXAM:  GEN: cachectic, Not in acute distress, resting comfortably  NECK: no thyroid enlargement, no cervical adenopathy  LUNGS: left lungs crackles, diminished at bases, on NC, no retractions, no nasal flaring  CARDIOVASCULAR: midsystolic murmur, S1/S2 present, RRR , no murmurs or rubs, no carotid bruits, no JVD,  + PP bilaterally  GI: Soft, non-tender, non-distended, +BS  NEURO: patient is awake , alert and oriented, no weakness, no facial drooping  Vascular: No LE edema, pedal pulses 2+  SKIN: Intact    LABS:                        7.7    7.26  )-----------( 176      ( 09 Jul 2022 05:36 )             25.7   07-09  143  |  101  |  46<H>  ----------------------------<  94  4.2   |  31  |  1.3  Ca    9.7      09 Jul 2022 05:36  Mg     2.2     07-09  TPro  6.5  /  Alb  3.8  /  TBili  0.7  /  DBili  x   /  AST  23  /  ALT  13  /  AlkPhos  109  07-09  PT/INR - ( 08 Jul 2022 14:55 )   PT: 12.60 sec;   INR: 1.10 ratio    PTT - ( 08 Jul 2022 14:55 )  PTT:26.5 sec    Creatine Kinase, Serum: 165 U/L (07-09-22 @ 05:36)  Troponin T, Serum: 0.23 ng/mL ** (07-09-22 @ 05:36)  Creatine Kinase, Serum: 187 U/L (07-08-22 @ 21:53)  Troponin T, Serum: 0.16 ng/mL ** (07-08-22 @ 21:53)  Troponin T, Serum: 0.19 ng/mL ** (07-08-22 @ 12:20)  CARDIAC MARKERS ( 09 Jul 2022 05:36 )  x     / 0.23 ng/mL / 165 U/L / x     / 8.3 ng/mL  CARDIAC MARKERS ( 08 Jul 2022 21:53 )  x     / 0.16 ng/mL / 187 U/L / x     / 11.3 ng/mL  CARDIAC MARKERS ( 08 Jul 2022 12:20 )  x     / 0.19 ng/mL / x     / x     / x        Serum Pro-Brain Natriuretic Peptide: 12143 pg/mL (07-08-22 @ 12:20)      RADIOLOGY:  ecg< from: 12 Lead ECG (07.08.22 @ 12:47) >  Diagnosis Line Normal sinus rhythm with sinus arrhythmia  Right bundle branch block  Left anterior fascicular block  Left ventricular hypertrophy  Nonspecific ST and T wave abnormality  Abnormal ECG  < end of copied text >    tte< from: TTE Echo Complete w/o Contrast w/ Doppler (06.20.22 @ 07:51) >  Summary:   1. Normal global left ventricular systolic function.   2. LV Ejection Fraction by Johnson's Method with a biplane EF of 60 %.   3. Spectral Doppler shows impaired relaxation pattern of left   ventricular myocardial filling (Grade I diastolic dysfunction).   4. Normal left atrial size.   5. Normal right atrial size.   6. Severe aortic valve stenosis. Aortic valve mean gradient is 96.6 mmHg   with CESIA 0.48 cm2.   7. Mild aortic regurgitation.   8. Mild thickening of the anterior and posterior mitral valve leaflets.   9. Normal pulmonary artery pressure.  10. There is no evidence of pericardial effusion.  11. Small pleural effusion in the left lateral region.  < end of copied text >    cxr< from: Xray Chest 1 View- PORTABLE-Urgent (07.08.22 @ 12:52) >  Impression:  New large rounded opacities within the right lung measuring up to 5.5 cm.   Prior chest CT of 6/19/2022 demonstrated loculated right pleural   effusions.  However, these are incompletely evaluated on this examination  Increasing left-sided lung opacity extending to the midlung.  Further evaluation of the above findings with chest CT is recommended.  --- End of Report ---  < end of copied text >    TELEMETRY EVENTS: no events overnight   HPI:  This is a 89-year-old female with a past medical history significant for CHF,severe AAS, MVP, hypertension, HCC, hypothyroidism, GERD who presents with shortness of breath.  Patient states that she has been having chronic shortness of breath w/ recent hospital admission 6/18-6/22, pt reports dyspnea has been getting worse the last couple of days and did not have any resolution to sxs upon d/c.  Of note patient also states that she has been having some blood in the stool during this time.  Patient states dyspnea is worsened with any exertion- and associated with escalating chest pain. Pt denies any cardiology or pulmonary f/u.  Pt also admits to predominantly bedbound state due to dyspnea.  (08 Jul 2022 16:43)    HPI-Cardiology   Pt evaluated at bedside. Endorses worsening sob and increasing debilitation 2/2 to dyspnea. Report chest tightness on exertion, occasional dry cough and melena that started Thursday. Last month admitted for dyspnea and discharged about 2 weeks ago. Anemia, hgb 7.0, transfused with 1 unit PRBC. On NC now, anxious, speaks in full sentences. Admitted to stepdown. Radiology tests and hospital records, were reviewed, as well as previous notes on this patient.    The patient is known to have severe aortic stenosis and is s/p balloon valvuloplasty.  The patient never f/u for TAVR.  Echocardiogram performed 6/20/2022 demonstrated severe AS with CESIA or 0.48 cm. Mean AV gradient of 96.6 mm.Hg.  LVEDF is reported as 60%.      PAST MEDICAL & SURGICAL HISTORY  HTN (hypertension)  Mitral valve prolapse  Enlarged heart  Murmur  Hyperthyroidism  Arthritis  HTN (hypertension)  Diverticulosis  Hiatal hernia  Acid reflux  Liver cancers/p resection and s/p chemo and radiation  Aortic stenosis s/p ballon aortic valvuloplasty 5/25/21  H/O resection of liver liver cancer  Status post cholecystectomy        FAMILY HISTORY:  No pertinent family history in first degree relatives      SOCIAL HISTORY:  []smoker  []Alcohol  []Drug    ALLERGIES:  Cipro (Other)  Levaquin (Rash)  tetracycline (Hives)      MEDICATIONS:  MEDICATIONS  (STANDING):  aspirin  chewable 81 milliGRAM(s) Oral daily  chlorhexidine 4% Liquid 1 Application(s) Topical <User Schedule>  furosemide   Injectable 40 milliGRAM(s) IV Push two times a day  furosemide   Injectable 20 milliGRAM(s) IV Push once  levothyroxine 100 MICROGram(s) Oral daily  pantoprazole    Tablet 40 milliGRAM(s) Oral before breakfast  MEDICATIONS  (PRN):      HOME MEDICATIONS:  aspirin 81 mg oral tablet, chewable: 1 tab(s) orally once a day (21 Jun 2022 14:31)  furosemide 20 mg oral tablet: 1 tab(s) orally once a day (18 Jun 2022 18:53)  levothyroxine 100 mcg (0.1 mg) oral tablet: 1 tab(s) orally once a day (18 Jun 2022 18:53)  pantoprazole 40 mg oral delayed release tablet: for 15 days (18 Jun 2022 18:53)  senna oral tablet: 2 tab(s) orally once a day (at bedtime) (18 Jun 2022 18:53)      VITALS:   T(F): 97.1 (07-09 @ 07:30), Max: 98.3 (07-08 @ 23:23)  HR: 71 (07-09 @ 07:08) (70 - 89)  BP: 87/52 (07-09 @ 07:08) (86/49 - 127/58)  BP(mean): 65 (07-09 @ 07:08) (63 - 78)  RR: 42 (07-09 @ 07:08) (18 - 49)  SpO2: 99% (07-09 @ 07:08) (92% - 100%)  I&O's Summary  08 Jul 2022 07:01  -  09 Jul 2022 07:00  --------------------------------------------------------  IN: 0 mL / OUT: 2700 mL / NET: -2700 mL        REVIEW OF SYSTEMS:  CONSTITUTIONAL: No weakness, fevers or chills  EYES: No visual changes  ENT: No vertigo or throat pain   NECK: No pain or stiffness  RESPIRATORY: see hpi  CARDIOVASCULAR: No chest pain or palpitations  GASTROINTESTINAL: No abdominal or epigastric pain. No nausea, vomiting, or hematemesis; No diarrhea or constipation. No melena or hematochezia.  GENITOURINARY: No dysuria, frequency or hematuria  NEUROLOGICAL: No numbness or weakness  SKIN: No itching, no rashes  MSK: no joint swelling or pain      PHYSICAL EXAM:  GEN: cachectic, Not in acute distress, resting comfortably  NECK: no thyroid enlargement, no cervical adenopathy  LUNGS: left lungs crackles, diminished at bases, on NC, no retractions, no nasal flaring  CARDIOVASCULAR: reg. rate S1/S2 present with severely decreased S2,  5/6 sysolic ejection murmur with thrill is noted, no rubs, no carotid bruits, carotid pulse is tardis and brevis,  no JVD,  + PP bilaterally  GI: Soft, non-tender, non-distended, +BS  NEURO: patient is awake , alert and oriented, no weakness, no facial drooping  Vascular: No LE edema, pedal pulses 2+  SKIN: Intact    LABS:                        7.7    7.26  )-----------( 176      ( 09 Jul 2022 05:36 )             25.7   07-09  143  |  101  |  46<H>  ----------------------------<  94  4.2   |  31  |  1.3  Ca    9.7      09 Jul 2022 05:36  Mg     2.2     07-09  TPro  6.5  /  Alb  3.8  /  TBili  0.7  /  DBili  x   /  AST  23  /  ALT  13  /  AlkPhos  109  07-09  PT/INR - ( 08 Jul 2022 14:55 )   PT: 12.60 sec;   INR: 1.10 ratio    PTT - ( 08 Jul 2022 14:55 )  PTT:26.5 sec    Creatine Kinase, Serum: 165 U/L (07-09-22 @ 05:36)  Troponin T, Serum: 0.23 ng/mL ** (07-09-22 @ 05:36)  Creatine Kinase, Serum: 187 U/L (07-08-22 @ 21:53)  Troponin T, Serum: 0.16 ng/mL *HH* (07-08-22 @ 21:53)  Troponin T, Serum: 0.19 ng/mL *HH* (07-08-22 @ 12:20)  CARDIAC MARKERS ( 09 Jul 2022 05:36 )  x     / 0.23 ng/mL / 165 U/L / x     / 8.3 ng/mL  CARDIAC MARKERS ( 08 Jul 2022 21:53 )  x     / 0.16 ng/mL / 187 U/L / x     / 11.3 ng/mL  CARDIAC MARKERS ( 08 Jul 2022 12:20 )  x     / 0.19 ng/mL / x     / x     / x        Serum Pro-Brain Natriuretic Peptide: 22353 pg/mL (07-08-22 @ 12:20)      RADIOLOGY:  ecg< from: 12 Lead ECG (07.08.22 @ 12:47) >  Diagnosis Line Normal sinus rhythm with sinus arrhythmia  Right bundle branch block  Left anterior fascicular block  Left ventricular hypertrophy  Nonspecific ST and T wave abnormality  Abnormal ECG  < end of copied text >    tte< from: TTE Echo Complete w/o Contrast w/ Doppler (06.20.22 @ 07:51) >  Summary:   1. Normal global left ventricular systolic function.   2. LV Ejection Fraction by Johnson's Method with a biplane EF of 60 %.   3. Spectral Doppler shows impaired relaxation pattern of left   ventricular myocardial filling (Grade I diastolic dysfunction).   4. Normal left atrial size.   5. Normal right atrial size.   6. Severe aortic valve stenosis. Aortic valve mean gradient is 96.6 mmHg   with CESIA 0.48 cm2.   7. Mild aortic regurgitation.   8. Mild thickening of the anterior and posterior mitral valve leaflets.   9. Normal pulmonary artery pressure.  10. There is no evidence of pericardial effusion.  11. Small pleural effusion in the left lateral region.  < end of copied text >    cxr< from: Xray Chest 1 View- PORTABLE-Urgent (07.08.22 @ 12:52) >  Impression:  New large rounded opacities within the right lung measuring up to 5.5 cm.   Prior chest CT of 6/19/2022 demonstrated loculated right pleural   effusions.  However, these are incompletely evaluated on this examination  Increasing left-sided lung opacity extending to the midlung.  Further evaluation of the above findings with chest CT is recommended.  --- End of Report ---  < end of copied text >    TELEMETRY EVENTS: no events overnight

## 2022-07-09 NOTE — CONSULT NOTE ADULT - SUBJECTIVE AND OBJECTIVE BOX
Gastroenterology Consultation:    Patient is a 89y old  Female who presents with a chief complaint of Dyspnea w/ Exertion (09 Jul 2022 16:47)  Gi is consulted for anemia    Admitted on: 07-08-22  HPI:  This is a 89-year-old female with a past medical history significant for CHF,severe AAS, MVP, hypertension, HCC, hypothyroidism, GERD who presents with shortness of breath.  Patient states that she has been having chronic shortness of breath w/ recent hospital admission 6/18-6/22, pt reports dyspnea has been getting worse the last couple of days and did not have any resolution to sxs upon d/c.  Of note patient also states that she has been having some blood in the stool during this time.  Patient states dyspnea is worsened with any exertion- and associated with escalating chest pain.    Pt denies any cardiology or pulmonary f/u.  Pt also admits to predominantly bedbound state due to dyspnea.   (08 Jul 2022 16:43)  She has had rectal bleeding frequently for one year, worse on constipation, but blood was present on most bowel movements small to moderate amounts, but she never sought medical care for it.  She has dysphagia for solids intermittently: she never sought care for it.  She  had liver cancer with 2 surgical resections at [facility] in 2002.  She states she has no wt loss    Prior EGD: many years ago, per patient; was told she had gerd: not available for review  Prior Colonoscopy: many years ago per patient, was told it was normal: not available for review      PAST MEDICAL & SURGICAL HISTORY:  HTN (hypertension)      Mitral valve prolapse      Enlarged heart      Murmur      Hyperthyroidism      Arthritis      HTN (hypertension)      Diverticulosis      Hiatal hernia      Acid reflux      Liver cancer  s/p resection and s/p chemo and radiation      Aortic stenosis  s/p ballon aortic valuloplasty 5/25/21      H/O resection of liver  liver cancer      Status post cholecystectomy          FAMILY HISTORY:  No pertinent family history in first degree relatives: no gi cancer in any family members.        Social History:  Living Condition: lives at home with son ( caretaker)  Ambulation Status:  mostly bedbound with rolling assist device  Tobacco use:  Denies  EtOH use:  Denies  Illicit drug use: Denies  Marital Status: Unk (08 Jul 2022 16:43)      Drugs:    Home Medications:  aspirin 81 mg oral tablet, chewable: 1 tab(s) orally once a day (21 Jun 2022 14:31)  furosemide 20 mg oral tablet: 1 tab(s) orally once a day (18 Jun 2022 18:53)  levothyroxine 100 mcg (0.1 mg) oral tablet: 1 tab(s) orally once a day (18 Jun 2022 18:53)  pantoprazole 40 mg oral delayed release tablet: for 15 days (18 Jun 2022 18:53)  senna oral tablet: 2 tab(s) orally once a day (at bedtime) (18 Jun 2022 18:53)    MEDICATIONS  (STANDING):  aspirin  chewable 81 milliGRAM(s) Oral daily  chlorhexidine 4% Liquid 1 Application(s) Topical <User Schedule>  furosemide   Injectable 40 milliGRAM(s) IV Push two times a day  furosemide   Injectable 20 milliGRAM(s) IV Push once  levothyroxine 100 MICROGram(s) Oral daily  pantoprazole    Tablet 40 milliGRAM(s) Oral before breakfast    MEDICATIONS  (PRN):      Allergies  Cipro (Other)  Levaquin (Rash)  tetracycline (Hives)    e:	    Allergic/Immunologic:	    Review of Systems:   Constitutional:  No Fever, No Chills  ENT/Mouth:  No Hearing Changes,  No Difficulty Swallowing  Eyes:  No Eye Pain, No Vision Changes  Cardiovascular:  frequent  chestpain. frequent pressure, dyspnea on exertion.  Respiratory:  frequent shortness of breath, hutchins  Gastrointestinal:  As described in HPI  Musculoskeletal:  No Joint Swelling, No Back Pain  Skin:  No Skin Lesions, No Jaundice  Neuro:  No Syncope, No Dizziness  Heme/Lymph:  No Bruising, No Bleeding.          Physical Examination:  T(C): 36.5 (07-09-22 @ 15:10), Max: 36.8 (07-08-22 @ 23:23)  HR: 84 (07-09-22 @ 15:10) (70 - 84)  BP: 85/50 (07-09-22 @ 15:10) (85/50 - 103/55)  RR: 37 (07-09-22 @ 15:10) (18 - 48)  SpO2: 98% (07-09-22 @ 15:10) (98% - 100%)      07-08-22 @ 07:01  -  07-09-22 @ 07:00  --------------------------------------------------------  IN: 0 mL / OUT: 2700 mL / NET: -2700 mL        Constitutional: No acute distress.  Eyes:. Conjunctivae are clear, Sclera is non-icteric.  Ears Nose and Throat: The external ears are normal appearing,  Oral mucosa is pink and moist.  Respiratory:  No signs of respiratory distress. Lung sounds are clear bilaterally.  Cardiovascular:  S1 S2, Regular rate and rhythm.  GI: Abdomen is soft, symmetric, and non-tender without distention. There are no visible lesions or scars. Bowel sounds are present and normoactive in all four quadrants. No masses, hepatomegaly, or splenomegaly are noted.   Neuro: No Tremor, No involuntary movements  Skin: No rashes, No Jaundice.          Data:                        8.4    6.34  )-----------( 164      ( 09 Jul 2022 15:21 )             27.1     Hgb Trend:  8.4  07-09-22 @ 15:21  7.7  07-09-22 @ 05:36  7.7  07-08-22 @ 22:34  7.0  07-08-22 @ 12:20        07-09    143  |  101  |  46<H>  ----------------------------<  94  4.2   |  31  |  1.3    Ca    9.7      09 Jul 2022 05:36  Mg     2.2     07-09    TPro  6.5  /  Alb  3.8  /  TBili  0.7  /  DBili  x   /  AST  23  /  ALT  13  /  AlkPhos  109  07-09    Liver panel trend:  TBili 0.7   /   AST 23   /   ALT 13   /   AlkP 109   /   Tptn 6.5   /   Alb 3.8    /   DBili --      07-09  TBili 0.4   /   AST 27   /   ALT 16   /   AlkP 118   /   Tptn 6.8   /   Alb 4.1    /   DBili --      07-08      PT/INR - ( 08 Jul 2022 14:55 )   PT: 12.60 sec;   INR: 1.10 ratio         PTT - ( 08 Jul 2022 14:55 )  PTT:26.5 sec        Impression: 89y o Female for which GI is consulted for anemia  The patient has a history of rectal bleeding at least over one year, possibly longer.  She also states that last week she had black stool.  She could benefit from egd and colonoscopy, but it would appear that the risk would be prohibitivedue to severe critical as, and multiple cardiac issues.  Plans:  Please have Cardiology risk stratify the patient for egd and colonoscopy  follow cbc  transfuse as needed        Additional Notes:    Discussion of case with other physicians:    Personal review of imaging tracing or specimen:    Main GI issue improving/stable/not improving/unstable:    If procedure anticipated, were risks and benefits reviewed with the patient:    Were lab tests ordered or reviewed:    Were radiology tests ordered or reviewed:    Were old records reviewed :    Was history obtained from someone other than the patient :

## 2022-07-09 NOTE — PROGRESS NOTE ADULT - SUBJECTIVE AND OBJECTIVE BOX
Patient is a 89y old  Female who presents with a chief complaint of Dyspnea w/ Exertion (09 Jul 2022 09:34)      HPI:  This is a 89-year-old female with a past medical history significant for CHF,severe AAS, MVP, hypertension, HCC, hypothyroidism, GERD who presents with shortness of breath.  Patient states that she has been having chronic shortness of breath w/ recent hospital admission 6/18-6/22, pt reports dyspnea has been getting worse the last couple of days and did not have any resolution to sxs upon d/c.  Of note patient also states that she has been having some blood in the stool during this time.  Patient states dyspnea is worsened with any exertion- and associated with escalating chest pain.    Pt denies any cardiology or pulmonary f/u.  Pt also admits to predominantly bedbound state due to dyspnea.   (08 Jul 2022 16:43)      In bed, comfortable, no distress    PAST MEDICAL & SURGICAL HISTORY:  HTN (hypertension)      Mitral valve prolapse      Enlarged heart      Murmur      Hyperthyroidism      Arthritis      HTN (hypertension)      Diverticulosis      Hiatal hernia      Acid reflux      Liver cancer  s/p resection and s/p chemo and radiation      Aortic stenosis  s/p ballon aortic valuloplasty 5/25/21      H/O resection of liver  liver cancer      Status post cholecystectomy        Allergies    Cipro (Other)  Levaquin (Rash)  tetracycline (Hives)    Intolerances      FAMILY HISTORY:  No pertinent family history in first degree relatives      Home Medications:  aspirin 81 mg oral tablet, chewable: 1 tab(s) orally once a day (21 Jun 2022 14:31)  furosemide 20 mg oral tablet: 1 tab(s) orally once a day (18 Jun 2022 18:53)  levothyroxine 100 mcg (0.1 mg) oral tablet: 1 tab(s) orally once a day (18 Jun 2022 18:53)  pantoprazole 40 mg oral delayed release tablet: for 15 days (18 Jun 2022 18:53)  senna oral tablet: 2 tab(s) orally once a day (at bedtime) (18 Jun 2022 18:53)    Occupation:  Alochol: Denied  Smoking: Denied  Drug Use: Denied  Marital Status:         ROS: as in HPI; All other systems reviewed are negative    ICU Vital Signs Last 24 Hrs  T(C): 36.2 (09 Jul 2022 07:30), Max: 36.8 (08 Jul 2022 23:23)  T(F): 97.1 (09 Jul 2022 07:30), Max: 98.3 (08 Jul 2022 23:23)  HR: 71 (09 Jul 2022 07:08) (70 - 89)  BP: 87/52 (09 Jul 2022 07:08) (86/49 - 127/58)  BP(mean): 65 (09 Jul 2022 07:08) (63 - 78)  ABP: --  ABP(mean): --  RR: 42 (09 Jul 2022 07:08) (18 - 49)  SpO2: 99% (09 Jul 2022 07:08) (92% - 100%)    O2 Parameters below as of 09 Jul 2022 07:08  Patient On (Oxygen Delivery Method): nasal cannula    O2 Concentration (%): 2          Physical Examination:    General: No acute distress.  Alert, oriented, interactive, nonfocal    HEENT: Pupils equal, reactive to light.  Symmetric.    PULM: Clear to auscultation bilaterally, no significant sputum production    CVS: Regular rate and rhythm, no murmurs, rubs, or gallops    ABD: Soft, nondistended, nontender, normoactive bowel sounds, no masses    EXT: No edema, nontender    SKIN: Warm and well perfused, no rashes noted.              I&O's Detail    08 Jul 2022 07:01  -  09 Jul 2022 07:00  --------------------------------------------------------  IN:  Total IN: 0 mL    OUT:    Voided (mL): 2700 mL  Total OUT: 2700 mL    Total NET: -2700 mL            LABS:                        7.7    7.26  )-----------( 176      ( 09 Jul 2022 05:36 )             25.7     09 Jul 2022 05:36    143    |  101    |  46     ----------------------------<  94     4.2     |  31     |  1.3      Ca    9.7        09 Jul 2022 05:36  Mg     2.2       09 Jul 2022 05:36    TPro  6.5    /  Alb  3.8    /  TBili  0.7    /  DBili  x      /  AST  23     /  ALT  13     /  AlkPhos  109    09 Jul 2022 05:36  Amylase x     lipase x          CARDIAC MARKERS ( 09 Jul 2022 05:36 )  x     / 0.23 ng/mL / 165 U/L / x     / 8.3 ng/mL  CARDIAC MARKERS ( 08 Jul 2022 21:53 )  x     / 0.16 ng/mL / 187 U/L / x     / 11.3 ng/mL  CARDIAC MARKERS ( 08 Jul 2022 12:20 )  x     / 0.19 ng/mL / x     / x     / x          CAPILLARY BLOOD GLUCOSE        PT/INR - ( 08 Jul 2022 14:55 )   PT: 12.60 sec;   INR: 1.10 ratio         PTT - ( 08 Jul 2022 14:55 )  PTT:26.5 sec    Culture        MEDICATIONS  (STANDING):  aspirin  chewable 81 milliGRAM(s) Oral daily  chlorhexidine 4% Liquid 1 Application(s) Topical <User Schedule>  furosemide   Injectable 40 milliGRAM(s) IV Push two times a day  furosemide   Injectable 20 milliGRAM(s) IV Push once  levothyroxine 100 MICROGram(s) Oral daily  pantoprazole    Tablet 40 milliGRAM(s) Oral before breakfast    MEDICATIONS  (PRN):

## 2022-07-09 NOTE — PROGRESS NOTE ADULT - ASSESSMENT
Clinical HF 2/2 severe AS  - Lasix  - may benefit from TAVR    Type 2 MI  - hgb >8    anemia   - Heyde syndrome??  - GI eval  - stool for blood

## 2022-07-09 NOTE — PROGRESS NOTE ADULT - ASSESSMENT
IMPRESSION/PLAN:  sob  anemia  NSTEMI      Monitor HGB, GI eval p.  Cardio eval noted, monitor CE, likely demand ischemia  Pt doing well, received PRBC's. Monitor HGB.     CNS: avoid sedation    HEENT: clear    PULMONARY: supplemental O2 prn    CARDIOVASCULAR: serial CE, cardio f/u    GI: GI prophylaxis.  Feeding     RENAL: monitor UO    INFECTIOUS DISEASE: monitor off abx    HEMATOLOGICAL:  DVT prophylaxis.    ENDOCRINE:  Follow up FS.  Insulin protocol if needed.    MUSCULOSKELETAL: oob to chair        CRITICAL CARE TIME SPENT: 35 minutes

## 2022-07-10 LAB
ALBUMIN SERPL ELPH-MCNC: 3.9 G/DL — SIGNIFICANT CHANGE UP (ref 3.5–5.2)
ALP SERPL-CCNC: 112 U/L — SIGNIFICANT CHANGE UP (ref 30–115)
ALT FLD-CCNC: 13 U/L — SIGNIFICANT CHANGE UP (ref 0–41)
ANION GAP SERPL CALC-SCNC: 13 MMOL/L — SIGNIFICANT CHANGE UP (ref 7–14)
AST SERPL-CCNC: 21 U/L — SIGNIFICANT CHANGE UP (ref 0–41)
BILIRUB SERPL-MCNC: 0.6 MG/DL — SIGNIFICANT CHANGE UP (ref 0.2–1.2)
BUN SERPL-MCNC: 44 MG/DL — HIGH (ref 10–20)
CALCIUM SERPL-MCNC: 9.5 MG/DL — SIGNIFICANT CHANGE UP (ref 8.5–10.1)
CHLORIDE SERPL-SCNC: 95 MMOL/L — LOW (ref 98–110)
CO2 SERPL-SCNC: 32 MMOL/L — SIGNIFICANT CHANGE UP (ref 17–32)
CREAT SERPL-MCNC: 1.4 MG/DL — SIGNIFICANT CHANGE UP (ref 0.7–1.5)
EGFR: 36 ML/MIN/1.73M2 — LOW
GLUCOSE SERPL-MCNC: 113 MG/DL — HIGH (ref 70–99)
HCT VFR BLD CALC: 28.8 % — LOW (ref 37–47)
HGB BLD-MCNC: 8.9 G/DL — LOW (ref 12–16)
MCHC RBC-ENTMCNC: 27.9 PG — SIGNIFICANT CHANGE UP (ref 27–31)
MCHC RBC-ENTMCNC: 30.9 G/DL — LOW (ref 32–37)
MCV RBC AUTO: 90.3 FL — SIGNIFICANT CHANGE UP (ref 81–99)
NRBC # BLD: 0 /100 WBCS — SIGNIFICANT CHANGE UP (ref 0–0)
PLATELET # BLD AUTO: 167 K/UL — SIGNIFICANT CHANGE UP (ref 130–400)
POTASSIUM SERPL-MCNC: 3.6 MMOL/L — SIGNIFICANT CHANGE UP (ref 3.5–5)
POTASSIUM SERPL-SCNC: 3.6 MMOL/L — SIGNIFICANT CHANGE UP (ref 3.5–5)
PROT SERPL-MCNC: 6.7 G/DL — SIGNIFICANT CHANGE UP (ref 6–8)
RBC # BLD: 3.19 M/UL — LOW (ref 4.2–5.4)
RBC # FLD: 14 % — SIGNIFICANT CHANGE UP (ref 11.5–14.5)
SODIUM SERPL-SCNC: 140 MMOL/L — SIGNIFICANT CHANGE UP (ref 135–146)
TROPONIN T SERPL-MCNC: 0.22 NG/ML — CRITICAL HIGH
WBC # BLD: 7.34 K/UL — SIGNIFICANT CHANGE UP (ref 4.8–10.8)
WBC # FLD AUTO: 7.34 K/UL — SIGNIFICANT CHANGE UP (ref 4.8–10.8)

## 2022-07-10 PROCEDURE — 99232 SBSQ HOSP IP/OBS MODERATE 35: CPT

## 2022-07-10 PROCEDURE — 99233 SBSQ HOSP IP/OBS HIGH 50: CPT

## 2022-07-10 RX ADMIN — CHLORHEXIDINE GLUCONATE 1 APPLICATION(S): 213 SOLUTION TOPICAL at 06:37

## 2022-07-10 RX ADMIN — Medication 81 MILLIGRAM(S): at 11:13

## 2022-07-10 RX ADMIN — Medication 100 MICROGRAM(S): at 06:28

## 2022-07-10 RX ADMIN — PANTOPRAZOLE SODIUM 40 MILLIGRAM(S): 20 TABLET, DELAYED RELEASE ORAL at 06:28

## 2022-07-10 RX ADMIN — Medication 40 MILLIGRAM(S): at 06:28

## 2022-07-10 RX ADMIN — Medication 40 MILLIGRAM(S): at 14:21

## 2022-07-10 NOTE — PHYSICAL THERAPY INITIAL EVALUATION ADULT - PATIENT/FAMILY/SIGNIFICANT OTHER GOALS STATEMENT, PT EVAL
Pt expressed fear of falling and did not want to get OOB, convinced pt of importance of OOB, son present to support

## 2022-07-10 NOTE — PROGRESS NOTE ADULT - SUBJECTIVE AND OBJECTIVE BOX
Patient is comfortable in bed, denies CP or SOB      T(F): 96.8 (07-10-22 @ 07:00), Max: 97.7 (07-09-22 @ 15:10)  HR: 81 (07-10-22 @ 07:13)  BP: 105/57 (07-10-22 @ 07:13)  RR: 20  SpO2: 97% (07-10-22 @ 07:13) (95% - 98%)    PHYSICAL EXAM:  GENERAL: NAD  HEAD:  Atraumatic, Normocephalic  EYES: EOMI, PERRLA, conjunctiva and sclera clear  NERVOUS SYSTEM:  Alert & Oriented X3, no focal deficits   CHEST/LUNG:  bilateral rhonchi  HEART: Regular 2/6 PRIYA  ABDOMEN: Soft, Nontender, Nondistended; Bowel sounds present  EXTREMITIES:  2+ Peripheral Pulses, No clubbing, cyanosis, or edema    LABS  07-10    140  |  95<L>  |  44<H>  ----------------------------<  113<H>  3.6   |  32  |  1.4    Ca    9.5      10 Jul 2022 05:26  Mg     2.2     07-09    TPro  6.7  /  Alb  3.9  /  TBili  0.6  /  DBili  x   /  AST  21  /  ALT  13  /  AlkPhos  112  07-10                          8.9    7.34  )-----------( 167      ( 10 Jul 2022 05:26 )             28.8     PT/INR - ( 08 Jul 2022 14:55 )   PT: 12.60 sec;   INR: 1.10 ratio         PTT - ( 08 Jul 2022 14:55 )  PTT:26.5 sec    CARDIAC ENZYMES  Creatine Kinase, Serum: 165 (07-09 @ 05:36)  Creatine Kinase, Serum: 187 (07-08 @ 21:53)    CKMB Units: 8.3 (07-09 @ 05:36)  CKMB Units: 11.3 (07-08 @ 21:53)    Troponin T, Serum: 0.22 ng/mL (07-10-22 @ 05:26)  Troponin T, Serum: 0.23 ng/mL (07-09-22 @ 05:36)  Troponin T, Serum: 0.16 ng/mL (07-08-22 @ 21:53)  Troponin T, Serum: 0.19 ng/mL (07-08-22 @ 12:20)    < from: 12 Lead ECG (07.09.22 @ 11:07) >    Diagnosis Line Normal sinus rhythm  Right bundle branch block  Left anterior fascicular block  *** Bifascicular block ***  Left ventricular hypertrophy with repolarization abnormality  Abnormal ECG      < end of copied text >    Culture Results:   No Growth Final (06-19-22)  Culture Results:   No Growth Final (06-19-22)    RADIOLOGY  < from: Xray Chest 1 View- PORTABLE-Urgent (07.08.22 @ 12:52) >  Impression:    New large rounded opacities within the right lung measuring up to 5.5 cm.   Prior chest CT of 6/19/2022 demonstrated loculated right pleural   effusions.    However, these are incompletely evaluated on this examination    Increasing left-sided lung opacity extending to the midlung.    Further evaluation of the above findings with chest CT is recommended.    < end of copied text >    MEDICATIONS  (STANDING):  aspirin  chewable 81 milliGRAM(s) Oral daily  chlorhexidine 4% Liquid 1 Application(s) Topical <User Schedule>  furosemide   Injectable 40 milliGRAM(s) IV Push two times a day  furosemide   Injectable 20 milliGRAM(s) IV Push once  levothyroxine 100 MICROGram(s) Oral daily  pantoprazole    Tablet 40 milliGRAM(s) Oral before breakfast    MEDICATIONS  (PRN):      Patient is comfortable in bed, denies CP or SOB      T(F): 96.8 (07-10-22 @ 07:00), Max: 97.7 (07-09-22 @ 15:10)  HR: 81 (07-10-22 @ 07:13)  BP: 105/57 (07-10-22 @ 07:13)  RR: 20  SpO2: 97% (07-10-22 @ 07:13) (95% - 98%)    PHYSICAL EXAM:  GENERAL: NAD  HEAD:  Atraumatic, Normocephalic  EYES: EOMI, PERRLA, conjunctiva and sclera clear  NERVOUS SYSTEM:  Alert & Oriented X3, no focal deficits   CHEST/LUNG:  bilateral rhonchi  HEART: Regular 2/6 PRIYA  ABDOMEN: Soft, Nontender, Nondistended; Bowel sounds present  EXTREMITIES:  2+ Peripheral Pulses, No clubbing, cyanosis, or edema    LABS  07-10    140  |  95<L>  |  44<H>  ----------------------------<  113<H>  3.6   |  32  |  1.4    Ca    9.5      10 Jul 2022 05:26  Mg     2.2     07-09    TPro  6.7  /  Alb  3.9  /  TBili  0.6  /  DBili  x   /  AST  21  /  ALT  13  /  AlkPhos  112  07-10                          8.9    7.34  )-----------( 167      ( 10 Jul 2022 05:26 )             28.8     PT/INR - ( 08 Jul 2022 14:55 )   PT: 12.60 sec;   INR: 1.10 ratio         PTT - ( 08 Jul 2022 14:55 )  PTT:26.5 sec    CARDIAC ENZYMES  Creatine Kinase, Serum: 165 (07-09 @ 05:36)  Creatine Kinase, Serum: 187 (07-08 @ 21:53)    CKMB Units: 8.3 (07-09 @ 05:36)  CKMB Units: 11.3 (07-08 @ 21:53)    Troponin T, Serum: 0.22 ng/mL (07-10-22 @ 05:26)  Troponin T, Serum: 0.23 ng/mL (07-09-22 @ 05:36)  Troponin T, Serum: 0.16 ng/mL (07-08-22 @ 21:53)  Troponin T, Serum: 0.19 ng/mL (07-08-22 @ 12:20)    < from: 12 Lead ECG (07.09.22 @ 11:07) >    Diagnosis Line Normal sinus rhythm  Right bundle branch block  Left anterior fascicular block  *** Bifascicular block ***  Left ventricular hypertrophy with repolarization abnormality  Abnormal ECG      < end of copied text >    Culture Results:   No Growth Final (06-19-22)  Culture Results:   No Growth Final (06-19-22)    RADIOLOGY  < from: Xray Chest 1 View- PORTABLE-Urgent (07.08.22 @ 12:52) >  Impression:    New large rounded opacities within the right lung measuring up to 5.5 cm.   Prior chest CT of 6/19/2022 demonstrated loculated right pleural   effusions.    However, these are incompletely evaluated on this examination    Increasing left-sided lung opacity extending to the midlung.    Further evaluation of the above findings with chest CT is recommended.    < end of copied text >    < from: CT Chest No Cont (06.19.22 @ 10:25) >  IMPRESSION:  Moderate left pleural effusion with adjacent atelectasis/consolidation   predominantly of the basilar left lower lobe. This is increased in size   since CT chest 7/8/2021.    No significant change in partially loculated small right pleural effusion.    There is an unchanged right upper lobe irregular nodule    < end of copied text >    MEDICATIONS  (STANDING):  aspirin  chewable 81 milliGRAM(s) Oral daily  chlorhexidine 4% Liquid 1 Application(s) Topical <User Schedule>  furosemide   Injectable 40 milliGRAM(s) IV Push two times a day  furosemide   Injectable 20 milliGRAM(s) IV Push once  levothyroxine 100 MICROGram(s) Oral daily  pantoprazole    Tablet 40 milliGRAM(s) Oral before breakfast    MEDICATIONS  (PRN):

## 2022-07-10 NOTE — PROGRESS NOTE ADULT - ASSESSMENT
This is a 89-year-old female with a past medical history significant for chronic HFpEF, ,severe AS s/p AV dilation ~1yr ago,  hypertension, HCC, hypothyroidism, GERD who presents with shortness of breath.  Patient states that she has been having chronic shortness of breath, found with anemia s/p transfusion of 1 unit prbc. now asking for TAVR    acute on chronic HFpEF, severe AS / NSTEMI / anemia     - cardiology following   - continue IV Lasix and negative balance   - possible TAVR - pt and son aleksey refused in past but want to now proceed - consult TAVR team   - check echo   - tele   - follow H/H and maintain hgb above 8 This is a 89-year-old female with a past medical history significant for chronic HFpEF, ,severe AS s/p AV dilation ~1yr ago,  hypertension, HCC, hypothyroidism, GERD who presents with shortness of breath.  Patient states that she has been having chronic shortness of breath, found with anemia s/p transfusion of 1 unit prbc. now asking for TAVR    acute on chronic HFpEF, severe AS / NSTEMI / anemia / pleural effusion     - cardiology following   - continue IV Lasix and negative balance   - possible TAVR - pt and son aleksey refused in past but want to now proceed - consult TAVR team   - check echo   - pulmonary consult for possible thoracentesis    - tele   - follow H/H and maintain hgb above 8

## 2022-07-10 NOTE — PROGRESS NOTE ADULT - SUBJECTIVE AND OBJECTIVE BOX
· Subjective and Objective:    Patient is comfortable in bed, denies CP or SOB      T(F): 96.8 (07-10-22 @ 07:00), Max: 97.7 (07-09-22 @ 15:10)  HR: 81 (07-10-22 @ 07:13)  BP: 105/57 (07-10-22 @ 07:13)  RR: 20  SpO2: 97% (07-10-22 @ 07:13) (95% - 98%)    PHYSICAL EXAM:  GENERAL: NAD  HEAD:  Atraumatic, Normocephalic  EYES: EOMI, PERRLA, conjunctiva and sclera clear  NERVOUS SYSTEM:  Alert & Oriented X3, no focal deficits   CHEST/LUNG:  bilateral rhonchi  HEART: Regular 2/6 PRIYA  ABDOMEN: Soft, Nontender, Nondistended; Bowel sounds present  EXTREMITIES:  2+ Peripheral Pulses, No clubbing, cyanosis, or edema    LABS  07-10    140  |  95<L>  |  44<H>  ----------------------------<  113<H>  3.6   |  32  |  1.4    Ca    9.5      10 Jul 2022 05:26  Mg     2.2     07-09    TPro  6.7  /  Alb  3.9  /  TBili  0.6  /  DBili  x   /  AST  21  /  ALT  13  /  AlkPhos  112  07-10                          8.9    7.34  )-----------( 167      ( 10 Jul 2022 05:26 )             28.8     PT/INR - ( 08 Jul 2022 14:55 )   PT: 12.60 sec;   INR: 1.10 ratio         PTT - ( 08 Jul 2022 14:55 )  PTT:26.5 sec    CARDIAC ENZYMES  Creatine Kinase, Serum: 165 (07-09 @ 05:36)  Creatine Kinase, Serum: 187 (07-08 @ 21:53)    CKMB Units: 8.3 (07-09 @ 05:36)  CKMB Units: 11.3 (07-08 @ 21:53)    Troponin T, Serum: 0.22 ng/mL (07-10-22 @ 05:26)  Troponin T, Serum: 0.23 ng/mL (07-09-22 @ 05:36)  Troponin T, Serum: 0.16 ng/mL (07-08-22 @ 21:53)  Troponin T, Serum: 0.19 ng/mL (07-08-22 @ 12:20)    < from: 12 Lead ECG (07.09.22 @ 11:07) >    Diagnosis Line Normal sinus rhythm  Right bundle branch block  Left anterior fascicular block  *** Bifascicular block ***  Left ventricular hypertrophy with repolarization abnormality  Abnormal ECG      < end of copied text >    Culture Results:   No Growth Final (06-19-22)  Culture Results:   No Growth Final (06-19-22)    RADIOLOGY  < from: Xray Chest 1 View- PORTABLE-Urgent (07.08.22 @ 12:52) >  Impression:    New large rounded opacities within the right lung measuring up to 5.5 cm.   Prior chest CT of 6/19/2022 demonstrated loculated right pleural   effusions.    However, these are incompletely evaluated on this examination    Increasing left-sided lung opacity extending to the midlung.    Further evaluation of the above findings with chest CT is recommended.    < end of copied text >    < from: CT Chest No Cont (06.19.22 @ 10:25) >  IMPRESSION:  Moderate left pleural effusion with adjacent atelectasis/consolidation   predominantly of the basilar left lower lobe. This is increased in size   since CT chest 7/8/2021.    No significant change in partially loculated small right pleural effusion.    There is an unchanged right upper lobe irregular nodule    < end of copied text >    MEDICATIONS  (STANDING):  aspirin  chewable 81 milliGRAM(s) Oral daily  chlorhexidine 4% Liquid 1 Application(s) Topical <User Schedule>  furosemide   Injectable 40 milliGRAM(s) IV Push two times a day  furosemide   Injectable 20 milliGRAM(s) IV Push once  levothyroxine 100 MICROGram(s) Oral daily  pantoprazole    Tablet 40 milliGRAM(s) Oral before breakfast    MEDICATIONS  (PRN):

## 2022-07-10 NOTE — PROGRESS NOTE ADULT - ASSESSMENT
88yo female with hx htn, MVP, AS (s/p balloon aortic valvuloplasty 05/2021), cardiomegaly, liver cancer (s/p resection, chemo and radiation) presents for worsening sob. Found to have hgb 7, transfused with  units PRBC, troponemia, bnp >17K, increasing left ling opacity extending to midlung. Admitted to stepdown. Pt evaluated at bedside. Radiology tests and hospital records, were reviewed, as well as previous notes on this patient.  Pt's respiration improved, on room air now.    Impression  *Critical AS- shortness of breath  *nstemi type 2 - secondary to critical AS and anemia  *anemia    trop 0.19 -> 0.16 -> 0.23 -> 0.22 (chronic, troponemia on previous admissions)  ckmb 11.3 (7.5 06/8/2022)  bnp 54107 (6509 6/8/2022)  lactate 2.3  Hgb 7 -> 7.7 -> 7.7 -> 8.9  TTE 06/2022: E 60%, sever AS, small pleural effusions  cxr: new right lung large opacity; increasing left opacity  ecg: NSR, RBBB, LVH, bifascicular block      Plan:  *sob 2/2 severe AS  sob exacerbated by severe AS  improving on nc now  - c/w lasix, I/Os, maintain negative u/o  - cxr as above, pulmonary eval (bronch?)  - repeat cxr in am  - repeat TTE  - please contact  structural heart to evaluate for possible TAVR    *nstemi type 2, supply demand mismatch in setting of anemia and chf  - c/w telemetry, if ok overnight can downgrade to telemetry  - ecg in am  - correct anemia    *anemia  improving hgb  - r/o GIB  - GI eval  - keep Hgb >8, transfuse prn  - c/w aspirin

## 2022-07-10 NOTE — PROGRESS NOTE ADULT - SUBJECTIVE AND OBJECTIVE BOX
89yo female with hx htn, MVP, AS (s/p balloon aortic valvuloplasty 05/2021), cardiomegaly, liver cancer (s/p resection, chemo and radiation) presents for worsening sob. Found to have hgb 7, transfused with  units PRBC, troponemia, bnp >17K, increasing left ling opacity extending to midlung. Admitted to stepdown. Pt evaluated at bedside. Radiology tests and hospital records, were reviewed, as well as previous notes on this patient.  Pt's respiration improved, on room air now.      PAST MEDICAL & SURGICAL HISTORY  HTN (hypertension)  Mitral valve prolapse  Enlarged heart  Murmur  Hyperthyroidism  Arthritis  HTN (hypertension)  Diverticulosis  Hiatal hernia  Acid reflux  Liver cancers/p resection and s/p chemo and radiation  Aortic stenosis s/p ballon aortic valvuloplasty 5/25/21  H/O resection of liver liver cancer  Status post cholecystectomy        FAMILY HISTORY:  No pertinent family history in first degree relatives      SOCIAL HISTORY:  []smoker  []Alcohol  []Drug    ALLERGIES:  Cipro (Other)  Levaquin (Rash)  tetracycline (Hives)      MEDICATIONS:  MEDICATIONS  (STANDING):  aspirin  chewable 81 milliGRAM(s) Oral daily  chlorhexidine 4% Liquid 1 Application(s) Topical <User Schedule>  furosemide   Injectable 40 milliGRAM(s) IV Push two times a day  furosemide   Injectable 20 milliGRAM(s) IV Push once  levothyroxine 100 MICROGram(s) Oral daily  pantoprazole    Tablet 40 milliGRAM(s) Oral before breakfast  MEDICATIONS  (PRN):      HOME MEDICATIONS:  aspirin 81 mg oral tablet, chewable: 1 tab(s) orally once a day (21 Jun 2022 14:31)  furosemide 20 mg oral tablet: 1 tab(s) orally once a day (18 Jun 2022 18:53)  levothyroxine 100 mcg (0.1 mg) oral tablet: 1 tab(s) orally once a day (18 Jun 2022 18:53)  pantoprazole 40 mg oral delayed release tablet: for 15 days (18 Jun 2022 18:53)  senna oral tablet: 2 tab(s) orally once a day (at bedtime) (18 Jun 2022 18:53)      VITALS:   T(F): 96.8 (07-10 @ 07:00), Max: 98.3 (07-08 @ 23:23)  HR: 81 (07-10 @ 07:13) (70 - 89)  BP: 105/57 (07-10 @ 07:13) (85/50 - 127/58)  BP(mean): 78 (07-10 @ 07:13) (63 - 78)  RR: 36 (07-10 @ 07:13) (18 - 49)  SpO2: 97% (07-10 @ 07:13) (92% - 100%)  I&O's Summary  09 Jul 2022 07:01  -  10 Jul 2022 07:00  --------------------------------------------------------  IN: 0 mL / OUT: 1600 mL / NET: -1600 mL    PHYSICAL EXAM:  GEN: cachectic, Not in acute distress, resting comfortably  NECK: no thyroid enlargement, no cervical adenopathy  LUNGS: left lungs crackles, diminished at bases, on NC, no retractions, no nasal flaring  CARDIOVASCULAR: reg. rate S1/S2 present with severely decreased S2,  5/6 sysolic ejection murmur with thrill is noted, no rubs, no carotid bruits, carotid pulse is tardis and brevis,  no JVD,  + PP bilaterally  GI: Soft, non-tender, non-distended, +BS  NEURO: patient is awake , alert and oriented, no weakness, no facial drooping  Vascular: No LE edema, pedal pulses 2+  SKIN: Intact      LABS:                        8.9    7.34  )-----------( 167      ( 10 Jul 2022 05:26 )             28.8   07-10  140  |  95<L>  |  44<H>  ----------------------------<  113<H>  3.6   |  32  |  1.4  Ca    9.5      10 Jul 2022 05:26  Mg     2.2     07-09  TPro  6.7  /  Alb  3.9  /  TBili  0.6  /  DBili  x   /  AST  21  /  ALT  13  /  AlkPhos  112  07-10  PT/INR - ( 08 Jul 2022 14:55 )   PT: 12.60 sec;   INR: 1.10 ratio    PTT - ( 08 Jul 2022 14:55 )  PTT:26.5 sec    Troponin T, Serum: 0.22 ng/mL *HH* (07-10-22 @ 05:26)  CARDIAC MARKERS ( 10 Jul 2022 05:26 )  x     / 0.22 ng/mL / x     / x     / x      CARDIAC MARKERS ( 09 Jul 2022 05:36 )  x     / 0.23 ng/mL / 165 U/L / x     / 8.3 ng/mL  CARDIAC MARKERS ( 08 Jul 2022 21:53 )  x     / 0.16 ng/mL / 187 U/L / x     / 11.3 ng/mL  CARDIAC MARKERS ( 08 Jul 2022 12:20 )  x     / 0.19 ng/mL / x     / x     / x        Serum Pro-Brain Natriuretic Peptide: 34536 pg/mL (07-08-22 @ 12:20)    RADIOLOGY:< from: 12 Lead ECG (07.09.22 @ 11:07) >  Diagnosis Line Normal sinus rhythm  Right bundle branch block  Left anterior fascicular block  *** Bifascicular block ***  Left ventricular hypertrophy with repolarization abnormality  Abnormal ECG  < end of copied text >    tte< from: TTE Echo Complete w/o Contrast w/ Doppler (06.20.22 @ 07:51) >  Summary:   1. Normal global left ventricular systolic function.   2. LV Ejection Fraction by Johnson's Method with a biplane EF of 60 %.   3. Spectral Doppler shows impaired relaxation pattern of left   ventricular myocardial filling (Grade I diastolic dysfunction).   4. Normal left atrial size.   5. Normal right atrial size.   6. Severe aortic valve stenosis. Aortic valve mean gradient is 96.6 mmHg   with CESIA 0.48 cm2.   7. Mild aortic regurgitation.   8. Mild thickening of the anterior and posterior mitral valve leaflets.   9. Normal pulmonary artery pressure.  10. There is no evidence of pericardial effusion.  11. Small pleural effusion in the left lateral region.  < end of copied text >    cxr< from: Xray Chest 1 View- PORTABLE-Urgent (07.08.22 @ 12:52) >  Impression:  New large rounded opacities within the right lung measuring up to 5.5 cm.   Prior chest CT of 6/19/2022 demonstrated loculated right pleural   effusions.  However, these are incompletely evaluated on this examination  Increasing left-sided lung opacity extending to the midlung.  Further evaluation of the above findings with chest CT is recommended.  --- End of Report ---  < end of copied text >    TELEMETRY EVENTS: episodes of PVC

## 2022-07-10 NOTE — PROGRESS NOTE ADULT - ASSESSMENT
IMPRESSION/PLAN:  sob  anemia  NSTEMI      Monitor HGB, GI eval p.  Cardio eval noted, monitor CE, likely demand ischemia  Pt doing well, received PRBC's. Monitor HGB.     CNS: avoid sedation    HEENT: clear    PULMONARY: supplemental O2 prn    CARDIOVASCULAR: serial CE, cardio f/u    GI: GI prophylaxis.  Feeding     RENAL: monitor UO    INFECTIOUS DISEASE: monitor off abx    HEMATOLOGICAL:  DVT prophylaxis.    ENDOCRINE:  Follow up FS.  Insulin protocol if needed.    MUSCULOSKELETAL: oob to chair

## 2022-07-11 LAB
ALBUMIN SERPL ELPH-MCNC: 3.9 G/DL — SIGNIFICANT CHANGE UP (ref 3.5–5.2)
ALP SERPL-CCNC: 114 U/L — SIGNIFICANT CHANGE UP (ref 30–115)
ALT FLD-CCNC: 12 U/L — SIGNIFICANT CHANGE UP (ref 0–41)
ANION GAP SERPL CALC-SCNC: 10 MMOL/L — SIGNIFICANT CHANGE UP (ref 7–14)
AST SERPL-CCNC: 19 U/L — SIGNIFICANT CHANGE UP (ref 0–41)
BASOPHILS # BLD AUTO: 0.01 K/UL — SIGNIFICANT CHANGE UP (ref 0–0.2)
BASOPHILS NFR BLD AUTO: 0.2 % — SIGNIFICANT CHANGE UP (ref 0–1)
BILIRUB SERPL-MCNC: 0.5 MG/DL — SIGNIFICANT CHANGE UP (ref 0.2–1.2)
BUN SERPL-MCNC: 43 MG/DL — HIGH (ref 10–20)
CALCIUM SERPL-MCNC: 9.8 MG/DL — SIGNIFICANT CHANGE UP (ref 8.5–10.1)
CHLORIDE SERPL-SCNC: 92 MMOL/L — LOW (ref 98–110)
CO2 SERPL-SCNC: 35 MMOL/L — HIGH (ref 17–32)
CREAT SERPL-MCNC: 1.3 MG/DL — SIGNIFICANT CHANGE UP (ref 0.7–1.5)
EGFR: 39 ML/MIN/1.73M2 — LOW
EOSINOPHIL # BLD AUTO: 0.1 K/UL — SIGNIFICANT CHANGE UP (ref 0–0.7)
EOSINOPHIL NFR BLD AUTO: 1.6 % — SIGNIFICANT CHANGE UP (ref 0–8)
GLUCOSE SERPL-MCNC: 97 MG/DL — SIGNIFICANT CHANGE UP (ref 70–99)
HCT VFR BLD CALC: 30.2 % — LOW (ref 37–47)
HGB BLD-MCNC: 9.6 G/DL — LOW (ref 12–16)
IMM GRANULOCYTES NFR BLD AUTO: 0.3 % — SIGNIFICANT CHANGE UP (ref 0.1–0.3)
LYMPHOCYTES # BLD AUTO: 1.05 K/UL — LOW (ref 1.2–3.4)
LYMPHOCYTES # BLD AUTO: 16.5 % — LOW (ref 20.5–51.1)
MAGNESIUM SERPL-MCNC: 2.1 MG/DL — SIGNIFICANT CHANGE UP (ref 1.8–2.4)
MCHC RBC-ENTMCNC: 28.1 PG — SIGNIFICANT CHANGE UP (ref 27–31)
MCHC RBC-ENTMCNC: 31.8 G/DL — LOW (ref 32–37)
MCV RBC AUTO: 88.3 FL — SIGNIFICANT CHANGE UP (ref 81–99)
MONOCYTES # BLD AUTO: 0.67 K/UL — HIGH (ref 0.1–0.6)
MONOCYTES NFR BLD AUTO: 10.5 % — HIGH (ref 1.7–9.3)
NEUTROPHILS # BLD AUTO: 4.52 K/UL — SIGNIFICANT CHANGE UP (ref 1.4–6.5)
NEUTROPHILS NFR BLD AUTO: 70.9 % — SIGNIFICANT CHANGE UP (ref 42.2–75.2)
NRBC # BLD: 0 /100 WBCS — SIGNIFICANT CHANGE UP (ref 0–0)
PLATELET # BLD AUTO: 159 K/UL — SIGNIFICANT CHANGE UP (ref 130–400)
POTASSIUM SERPL-MCNC: 3.8 MMOL/L — SIGNIFICANT CHANGE UP (ref 3.5–5)
POTASSIUM SERPL-SCNC: 3.8 MMOL/L — SIGNIFICANT CHANGE UP (ref 3.5–5)
PROT SERPL-MCNC: 7 G/DL — SIGNIFICANT CHANGE UP (ref 6–8)
RBC # BLD: 3.42 M/UL — LOW (ref 4.2–5.4)
RBC # FLD: 13.9 % — SIGNIFICANT CHANGE UP (ref 11.5–14.5)
SODIUM SERPL-SCNC: 137 MMOL/L — SIGNIFICANT CHANGE UP (ref 135–146)
TROPONIN T SERPL-MCNC: 0.26 NG/ML — CRITICAL HIGH
WBC # BLD: 6.37 K/UL — SIGNIFICANT CHANGE UP (ref 4.8–10.8)
WBC # FLD AUTO: 6.37 K/UL — SIGNIFICANT CHANGE UP (ref 4.8–10.8)

## 2022-07-11 PROCEDURE — 99233 SBSQ HOSP IP/OBS HIGH 50: CPT

## 2022-07-11 PROCEDURE — 93010 ELECTROCARDIOGRAM REPORT: CPT

## 2022-07-11 PROCEDURE — 99232 SBSQ HOSP IP/OBS MODERATE 35: CPT

## 2022-07-11 PROCEDURE — 71045 X-RAY EXAM CHEST 1 VIEW: CPT | Mod: 26

## 2022-07-11 PROCEDURE — 99223 1ST HOSP IP/OBS HIGH 75: CPT

## 2022-07-11 RX ORDER — SENNA PLUS 8.6 MG/1
1 TABLET ORAL DAILY
Refills: 0 | Status: DISCONTINUED | OUTPATIENT
Start: 2022-07-11 | End: 2022-07-11

## 2022-07-11 RX ORDER — LACTULOSE 10 G/15ML
10 SOLUTION ORAL EVERY 8 HOURS
Refills: 0 | Status: DISCONTINUED | OUTPATIENT
Start: 2022-07-11 | End: 2022-07-30

## 2022-07-11 RX ORDER — SENNA PLUS 8.6 MG/1
2 TABLET ORAL AT BEDTIME
Refills: 0 | Status: DISCONTINUED | OUTPATIENT
Start: 2022-07-11 | End: 2022-08-15

## 2022-07-11 RX ADMIN — LACTULOSE 10 GRAM(S): 10 SOLUTION ORAL at 11:22

## 2022-07-11 RX ADMIN — SENNA PLUS 1 TABLET(S): 8.6 TABLET ORAL at 09:39

## 2022-07-11 RX ADMIN — LACTULOSE 10 GRAM(S): 10 SOLUTION ORAL at 15:49

## 2022-07-11 RX ADMIN — PANTOPRAZOLE SODIUM 40 MILLIGRAM(S): 20 TABLET, DELAYED RELEASE ORAL at 06:24

## 2022-07-11 RX ADMIN — Medication 81 MILLIGRAM(S): at 11:22

## 2022-07-11 RX ADMIN — Medication 40 MILLIGRAM(S): at 15:49

## 2022-07-11 RX ADMIN — CHLORHEXIDINE GLUCONATE 1 APPLICATION(S): 213 SOLUTION TOPICAL at 06:25

## 2022-07-11 RX ADMIN — Medication 40 MILLIGRAM(S): at 06:24

## 2022-07-11 RX ADMIN — Medication 100 MICROGRAM(S): at 06:24

## 2022-07-11 NOTE — PROGRESS NOTE ADULT - ASSESSMENT
Impression: 89y o Female for which GI is consulted for anemia      Problem 1-Rectal bleeding  The patient has a history of rectal bleeding at least over one year, possibly longer.  She also states that last week she had black stool.  She could benefit from egd and colonoscopy with Dr. Ball, but it would appear that the risk would be prohibitive due to severe critical as, and multiple cardiac issues.  Plans:  Please have Cardiology risk stratify the patient for egd and colonoscopy  follow cbc  transfuse as needed  -Patient reports she wants to follow-up outpatient with Dr. Ball for further workup Impression: 89y o Female for which GI is consulted for anemia      Problem 1-Rectal bleeding  The patient has a history of rectal bleeding at least over one year, possibly longer.  She also states that last week she had black stool  She could benefit from egd and colonoscopy with Dr. Ball, but it would appear that the risk would be prohibitive due to severe critical as, and multiple cardiac issues.  Plans:  -follow cbc  -transfuse as needed  -Patient reports she wants to follow-up outpatient with Dr. Ball for further workup

## 2022-07-11 NOTE — CONSULT NOTE ADULT - ASSESSMENT
IMPRESSION:  pulmonary edema CHF / pleural effusion   secondary to severe AS      PLAN:    CNS: no sedation     HEENT: oral care     PULMONARY: denny pox > 92 %   cxr chelsea     CARDIOVASCULAR: follow cardiology   care as per cardiology   continue diuretics follow lytes     GI: GI prophylaxis.  Feeding     RENAL:follow lytes is and os     INFECTIOUS DISEASE: check procal      HEMATOLOGICAL:  DVT prophylaxis.    ENDOCRINE:  Follow up FS.  Insulin protocol if needed.  care as per cardiology recall PRN   follow if possible to downgrade to tele

## 2022-07-11 NOTE — PROGRESS NOTE ADULT - SUBJECTIVE AND OBJECTIVE BOX
Subjective/Objective:   89yo female with hx htn, MVP, AS (s/p balloon aortic valvuloplasty 05/2021), cardiomegaly, liver cancer (s/p resection, chemo and radiation) presents for worsening sob. Found to have hgb 7, transfused with  units PRBC, troponemia, bnp >17K, increasing left ling opacity extending to midlung. Admitted to stepdown.     Pt evaluated at bedside with Dr Jaquez this am. Still has sob on exertion but improving on room air now, denies cp, palpitation     MEDICATIONS  (STANDING):  aspirin  chewable 81 milliGRAM(s) Oral daily  chlorhexidine 4% Liquid 1 Application(s) Topical <User Schedule>  furosemide   Injectable 40 milliGRAM(s) IV Push two times a day  furosemide   Injectable 20 milliGRAM(s) IV Push once  lactulose Syrup 10 Gram(s) Oral every 8 hours  levothyroxine 100 MICROGram(s) Oral daily  pantoprazole    Tablet 40 milliGRAM(s) Oral before breakfast  senna 2 Tablet(s) Oral at bedtime    MEDICATIONS  (PRN):          Vital Signs Last 24 Hrs  T(C): 36.9 (11 Jul 2022 15:04), Max: 36.9 (11 Jul 2022 15:04)  T(F): 98.5 (11 Jul 2022 15:04), Max: 98.5 (11 Jul 2022 15:04)  HR: 84 (11 Jul 2022 15:04) (67 - 87)  BP: 99/55 (11 Jul 2022 15:04) (90/50 - 114/50)  BP(mean): 72 (11 Jul 2022 15:04) (66 - 72)  RR: 22 (11 Jul 2022 15:04) (18 - 37)  SpO2: 96% (11 Jul 2022 15:04) (96% - 98%)    Parameters below as of 11 Jul 2022 07:00  Patient On (Oxygen Delivery Method): room air      I&O's Detail    10 Jul 2022 07:01  -  11 Jul 2022 07:00  --------------------------------------------------------  IN:  Total IN: 0 mL    OUT:    Voided (mL): 425 mL  Total OUT: 425 mL    Total NET: -425 mL        PHYSICAL EXAM:  GENERAL: AAOx3, no acute distress.  HEAD:  Atraumatic, Normocephalic  EYES: conjunctiva and sclera clear  NECK: Supple, no JVD or thyromegaly  CHEST/LUNG: +left sided rhonchi  HEART: Regular rate and rhythm; normal S1, S2, No murmurs.  ABDOMEN: Soft, nontender, nondistended; Bowel sounds present  NEUROLOGY: No asterixis or tremor.   SKIN: Intact, no jaundice        EKG/ TELEM:    LABS:                          9.6    6.37  )-----------( 159      ( 11 Jul 2022 05:47 )             30.2       11 Jul 2022 05:47    137    |  92<L>  |  43<H>  ----------------------------<  97     3.8     |  35<H>  |  1.3    10 Jul 2022 05:26    140    |  95<L>  |  44<H>  ----------------------------<  113<H>  3.6     |  32     |  1.4      Ca    9.8        11 Jul 2022 05:47  Ca    9.5        10 Jul 2022 05:26  Mg     2.1       11 Jul 2022 05:47    TPro  7.0    /  Alb  3.9    /  TBili  0.5    /  DBili  x      /  AST  19     /  ALT  12     /  AlkPhos  114    11 Jul 2022 05:47  TPro  6.7    /  Alb  3.9    /  TBili  0.6    /  DBili  x      /  AST  21     /  ALT  13     /  AlkPhos  112    10 Jul 2022 05:26    CARDIAC MARKERS ( 11 Jul 2022 05:47 )  x     / 0.26 ng/mL / x     / x     / x      CARDIAC MARKERS ( 10 Jul 2022 05:26 )  x     / 0.22 ng/mL / x     / x     / x            Creatine Kinase, Serum: 165 U/L (07-09-22 @ 05:36)  Creatine Kinase, Serum: 187 U/L (07-08-22 @ 21:53)              Diagnostic testing:  < from: TTE Echo Complete w/o Contrast w/ Doppler (06.20.22 @ 07:51) >  Summary:   1. Normal global left ventricular systolic function.   2. LV Ejection Fraction by Johnson's Method with a biplane EF of 60 %.   3. Spectral Doppler shows impaired relaxation pattern of left   ventricular myocardial filling (Grade I diastolic dysfunction).   4. Normal left atrial size.   5. Normal right atrial size.   6. Severe aortic valve stenosis. Aortic valve mean gradient is 96.6 mmHg   with CESIA 0.48 cm2.   7. Mild aortic regurgitation.   8. Mild thickening of the anterior and posterior mitral valve leaflets.   9. Normal pulmonary artery pressure.  10. There is no evidence of pericardial effusion.  11. Small pleural effusion in the left lateral region.    < end of copied text >        Assessment and Plan:

## 2022-07-11 NOTE — PROGRESS NOTE ADULT - SUBJECTIVE AND OBJECTIVE BOX
89yFemale  Being followed for intermittent rectal bleeding  Interval history: Patient denies nausea, vomiting, hematemesis, melena, blood in stool, diarrhea, constipation, abdominal pain. Patient without further bleeding.      PAST MEDICAL & SURGICAL HISTORY:   HTN (hypertension)      Mitral valve prolapse      Enlarged heart      Murmur      Hyperthyroidism      Arthritis      HTN (hypertension)      Diverticulosis      Hiatal hernia      Acid reflux      Liver cancer  s/p resection and s/p chemo and radiation      Aortic stenosis  s/p ballon aortic valuloplasty 5/25/21      H/O resection of liver  liver cancer      Status post cholecystectomy            Family history:  FAMILY HISTORY:  No pertinent family history in first degree relatives      No GI cancers in first or second degree relatives      Social History: No smoking. No alcohol. No illegal drug use.            MEDICATIONS  (STANDING):  aspirin  chewable 81 milliGRAM(s) Oral daily  chlorhexidine 4% Liquid 1 Application(s) Topical <User Schedule>  furosemide   Injectable 40 milliGRAM(s) IV Push two times a day  furosemide   Injectable 20 milliGRAM(s) IV Push once  lactulose Syrup 10 Gram(s) Oral every 8 hours  levothyroxine 100 MICROGram(s) Oral daily  pantoprazole    Tablet 40 milliGRAM(s) Oral before breakfast  senna 2 Tablet(s) Oral at bedtime        Allergies:   Cipro (Other)  Levaquin (Rash)  tetracycline (Hives)              REVIEW OF SYSTEMS:  General:  No weight loss, fevers, or chills.  Eyes:  No reported pain or visual changes  ENT:  No sore throat or runny nose.  NECK: No stiffness   CV:  No chest pain or palpitations.  Resp:  No shortness of breath, cough  GI:  No abdominal pain, nausea, vomiting, dysphagia, diarrhea or constipation. +rectal bleeding, No melena, or hematemesis.  Muscle:  No aches or weakness  Neuro:  No tingling, numbness           VITAL SIGNS:   T(F): 98.5 (07-11-22 @ 15:04), Max: 98.5 (07-11-22 @ 15:04)  HR: 84 (07-11-22 @ 15:04) (67 - 87)  BP: 99/55 (07-11-22 @ 15:04) (90/50 - 114/50)  RR: 22 (07-11-22 @ 15:04) (18 - 37)  SpO2: 96% (07-11-22 @ 15:04) (96% - 98%)    PHYSICAL EXAM:  GENERAL: AAOx3, no acute distress.  HEAD:  Atraumatic, Normocephalic  EYES: conjunctiva and sclera clear  NECK: Supple, no JVD or thyromegaly  CHEST/LUNG: +left sided rhonchi  HEART: Regular rate and rhythm; normal S1, S2, No murmurs.  ABDOMEN: Soft, nontender, nondistended; Bowel sounds present  NEUROLOGY: No asterixis or tremor.   SKIN: Intact, no jaundice            LABS:                        9.6    6.37  )-----------( 159      ( 11 Jul 2022 05:47 )             30.2     07-11    137  |  92<L>  |  43<H>  ----------------------------<  97  3.8   |  35<H>  |  1.3    Ca    9.8      11 Jul 2022 05:47  Mg     2.1     07-11    TPro  7.0  /  Alb  3.9  /  TBili  0.5  /  DBili  x   /  AST  19  /  ALT  12  /  AlkPhos  114  07-11    LIVER FUNCTIONS - ( 11 Jul 2022 05:47 )  Alb: 3.9 g/dL / Pro: 7.0 g/dL / ALK PHOS: 114 U/L / ALT: 12 U/L / AST: 19 U/L / GGT: x               IMAGING:      < from: Xray Chest 1 View- PORTABLE-Routine (Xray Chest 1 View- PORTABLE-Routine in AM.) (07.11.22 @ 06:08) >    ACC: [id number] EXAM:  XR CHEST PORTABLE  ROUTINE 1V                          PROCEDURE DATE:  07/11/2022          INTERPRETATION:  Clinical History / Reason for exam: Dyspnea    Comparison : Chest radiograph 7/8/2022.    Technique/Positioning: Frontal.    Findings:    Support devices: None.    Cardiac/mediastinum/hilum: Unremarkable.    Lung parenchyma/Pleura: Left lower lobe opacity and pleural effusion   unchanged. No pneumothorax.    Skeleton/soft tissues: Unremarkable.    Impression:    Leftlower lobe opacity and pleural effusion unchanged. No pneumothorax.        --- End of Report ---            WALLACE HDZ MD; Attending Radiologist  This document has been electronically signed. Jul 11 2022  4:28PM    < end of copied text >

## 2022-07-11 NOTE — CONSULT NOTE ADULT - SUBJECTIVE AND OBJECTIVE BOX
Patient is a 89y old  Female who presents with a chief complaint of Dyspnea w/ Exertion (10 Jul 2022 20:31)      HPI:  This is a 89-year-old female with a past medical history significant for CHF,severe AAS, MVP, hypertension, HCC, hypothyroidism, GERD who presents with shortness of breath.  Patient states that she has been having chronic shortness of breath w/ recent hospital admission 6/18-6/22, pt reports dyspnea has been getting worse the last couple of days and did not have any resolution to sxs upon d/c.  Of note patient also states that she has been having some blood in the stool during this time.  Patient states dyspnea is worsened with any exertion- and associated with escalating chest pain.    Pt denies any cardiology or pulmonary f/u.  Pt also admits to predominantly bedbound state due to dyspnea.   (08 Jul 2022 16:43)  s/p diuresis feel better now on RA   echo severe AS    PAST MEDICAL & SURGICAL HISTORY:  HTN (hypertension)      Mitral valve prolapse      Enlarged heart      Murmur      Hyperthyroidism      Arthritis      HTN (hypertension)      Diverticulosis      Hiatal hernia      Acid reflux      Liver cancer  s/p resection and s/p chemo and radiation      Aortic stenosis  s/p ballon aortic valuloplasty 5/25/21      H/O resection of liver  liver cancer      Status post cholecystectomy        Allergies    Cipro (Other)  Levaquin (Rash)  tetracycline (Hives)    Intolerances      Family history : no cardiovscular family history   Home Medications:  aspirin 81 mg oral tablet, chewable: 1 tab(s) orally once a day (21 Jun 2022 14:31)  furosemide 20 mg oral tablet: 1 tab(s) orally once a day (18 Jun 2022 18:53)  levothyroxine 100 mcg (0.1 mg) oral tablet: 1 tab(s) orally once a day (18 Jun 2022 18:53)  pantoprazole 40 mg oral delayed release tablet: for 15 days (18 Jun 2022 18:53)  senna oral tablet: 2 tab(s) orally once a day (at bedtime) (18 Jun 2022 18:53)    Occupation:  Alochol: Denied  Smoking: Denied  Drug Use: Denied  Marital Status:         ROS: as in HPI; All other systems reviewed are negative    ICU Vital Signs Last 24 Hrs  T(C): 36.3 (11 Jul 2022 07:00), Max: 37.1 (10 Jul 2022 15:00)  T(F): 97.4 (11 Jul 2022 07:00), Max: 98.8 (10 Jul 2022 15:00)  HR: 68 (11 Jul 2022 07:00) (67 - 87)  BP: 107/46 (11 Jul 2022 07:00) (90/50 - 114/50)  BP(mean): 66 (11 Jul 2022 07:00) (66 - 76)  ABP: --  ABP(mean): --  RR: 18 (11 Jul 2022 07:00) (18 - 42)  SpO2: 98% (11 Jul 2022 07:00) (96% - 98%)    O2 Parameters below as of 11 Jul 2022 07:00  Patient On (Oxygen Delivery Method): room air              Physical Examination:    General: No acute distress.  Alert, oriented, interactive, nonfocal    HEENT: Pupils equal, reactive to light.  Symmetric.    PULM:  mild b/l crackles     CVS: Regular rate and rhythm, no murmurs, rubs, or gallops    ABD: Soft, nondistended, nontender, normoactive bowel sounds, no masses    EXT: 1 edema, nontender, no clubbing     SKIN: Warm and well perfused, no rashes noted.    Neurology : no motor or sensory deficit     Musculoskeletal : move all extremity     Lymphatic system: no Palpable node               I&O's Detail    10 Jul 2022 07:01  -  11 Jul 2022 07:00  --------------------------------------------------------  IN:  Total IN: 0 mL    OUT:    Voided (mL): 425 mL  Total OUT: 425 mL    Total NET: -425 mL            LABS:                        9.6    6.37  )-----------( 159      ( 11 Jul 2022 05:47 )             30.2     10 Jul 2022 05:26    140    |  95     |  44     ----------------------------<  113    3.6     |  32     |  1.4      Ca    9.5        10 Jul 2022 05:26        CARDIAC MARKERS ( 10 Jul 2022 05:26 )  x     / 0.22 ng/mL / x     / x     / x          CAPILLARY BLOOD GLUCOSE            Culture        MEDICATIONS  (STANDING):  aspirin  chewable 81 milliGRAM(s) Oral daily  chlorhexidine 4% Liquid 1 Application(s) Topical <User Schedule>  furosemide   Injectable 40 milliGRAM(s) IV Push two times a day  furosemide   Injectable 20 milliGRAM(s) IV Push once  levothyroxine 100 MICROGram(s) Oral daily  pantoprazole    Tablet 40 milliGRAM(s) Oral before breakfast    MEDICATIONS  (PRN):  senna 1 Tablet(s) Oral daily PRN Constipation        RADIOLOGY: ***     CXR: decrease b/l effusion alvin right   improve left   TLC:  OG:  ET tube:        [facility] ICU:  ECHO:

## 2022-07-11 NOTE — CHART NOTE - NSCHARTNOTEFT_GEN_A_CORE
ICU Transfer Note    Transfer from: ICU    Transfer to: (  ) Medicine    (X) Telemetry    (  ) RCU                               (  ) Palliative    (  ) Stroke Unit    (  ) MICU    (  ) __________________    HPI / ICU COURSE:  This is a 89-year-old female with a past medical history significant for CHF, HTN, hypothyroidism, GERD, severe AS  AS (s/p balloon aortic valvuloplasty 05/2021), MVP. cardiomegaly, liver cancer (s/p resection, chemo and radiation) presents for worsening sob.  Patient states that she has been having chronic shortness of breath w/ recent hospital admission 6/18-6/22, pt reports dyspnea has been getting worse the last couple of days and did not have any resolution to sxs upon d/c.  Of note patient also states that she has been having some blood in the stool during this time.  Patient states dyspnea is worsened with any exertion- and associated with escalating chest pain.    Found to have hgb 7, transfused with units PRBC, troponemia, bnp >17K, increasing left ling opacity extending to midlung.   trop 0.19 -> 0.16 -> 0.23 -> 0.22 (chronic, troponemia on previous admissions)  ckmb 11.3 (7.5 06/8/2022)  bnp 03142 (6509 6/8/2022)  lactate 2.3  Hgb 7 -> 7.7 -> 7.7 -> 8.9  TTE 06/2022: E 60%, sever AS, small pleural effusions  cxr: new right lung large opacity; increasing left opacity  ecg: NSR, RBBB, LVH, bifascicular block     Pt also admits to predominantly bedbound state due to dyspnea.  Patient evaluated by GI and recomemmend EGD/Colonoscopy + cardiac clearance  Cardiology evaluation appreciated: in communication with Dr. Mccollum for TAVR (family amenable)      Symptomatic severe AS (pulm edema, VALENTINE) s/p balloon aortic valvuloplasty 5/2021   NSTEMI most likely 2/2 to demand  acute blood loss anemia s/p 1 unit PRBC  Left lung opacity?       Problem/Plan - 1:  ·  Problem: Acute on chronic diastolic heart failure/Severe AS  - telemetry monitoring  - hx of severe AS s/p balloon valvuloplasty  - last echo 6/20/22  1. Normal global left ventricular systolic function.                                2. LV Ejection Fraction by Johnson's Method with a biplane EF of 60 %.  - BNP on admission 17,734  - continue Lasix 40 mg IV BID  - daily weights  - Strict I/O  - HOB elevated  - supplemental oxygen 2 lpm prn (currently on RA)  -f/u cardiology consult: discussion with Dr. Mccollum for TAVR ongoing      Problem/Plan - 2:  ·  Problem: Elevated troponin 2/2 to demand   ·  Plan: - trend troponin  trop 0.19 -> 0.16 -> 0.23 -> 0.22 (chronic, troponemia on previous admissions)  ckmb 11.3 (7.5 06/8/2022)  bnp 30347 (6509 6/8/2022)  lactate 2.3  Hgb 7 -> 7.7 -> 7.7 -> 8.9  TTE 06/2022: E 60%, sever AS, small pleural effusions  cxr: new right lung large opacity; increasing left opacity  ecg: NSR, RBBB, LVH, bifascicular block     Problem/Plan - 3:  ·  Problem: Anemia.   ·  Plan: - Serial H&H  - Transfuse 1 units over 3hr  - Stool for occult blood  - GI consult appreciated: pending cardiac risk stratification for possible EGD/colonoscopy  - Son reports last colonoscopy 4 years ago Dr. Vasquez   - Hold SQ Heparin.  - Trend CBC & transfuse if < 7  - Active type and screen      Problem/Plan - 4:  ·  Problem: Opacity of lung on imaging study.   - f/u pulm recommendations; outpatient f/u     Problem/Plan - 5:  ·  Problem: Sacral decubitus ulcer, stage II.   ·  Plan: - Wound Care daily  - Wound Consult.              Vital Signs Last 24 Hrs  T(C): 36.3 (11 Jul 2022 07:00), Max: 37.1 (10 Jul 2022 15:00)  T(F): 97.4 (11 Jul 2022 07:00), Max: 98.8 (10 Jul 2022 15:00)  HR: 68 (11 Jul 2022 07:00) (67 - 87)  BP: 107/46 (11 Jul 2022 07:00) (90/50 - 114/50)  BP(mean): 66 (11 Jul 2022 07:00) (66 - 76)  RR: 18 (11 Jul 2022 07:00) (18 - 42)  SpO2: 98% (11 Jul 2022 07:00) (96% - 98%)    Parameters below as of 11 Jul 2022 07:00  Patient On (Oxygen Delivery Method): room air        I&O's Summary    10 Jul 2022 07:01  -  11 Jul 2022 07:00  --------------------------------------------------------  IN: 0 mL / OUT: 425 mL / NET: -425 mL        Physical Exam:       LABS:   CARDIAC MARKERS ( 11 Jul 2022 05:47 )  x     / 0.26 ng/mL / x     / x     / x      CARDIAC MARKERS ( 10 Jul 2022 05:26 )  x     / 0.22 ng/mL / x     / x     / x                                  9.6    6.37  )-----------( 159      ( 11 Jul 2022 05:47 )             30.2       07-11    137  |  92<L>  |  43<H>  ----------------------------<  97  3.8   |  35<H>  |  1.3    Ca    9.8      11 Jul 2022 05:47  Mg     2.1     07-11    TPro  7.0  /  Alb  3.9  /  TBili  0.5  /  DBili  x   /  AST  19  /  ALT  12  /  AlkPhos  114  07-11                ECG:    Telemetry:    Imaging:      ASSESSMENT & PLAN:           FOR FOLLOW UP:  [ ]   [ ]   [ ]     Alana Doss MD PGY1 ICU Transfer Note    Transfer from: ICU    Transfer to: (  ) Medicine    (X) Telemetry    (  ) RCU                               (  ) Palliative    (  ) Stroke Unit    (  ) MICU    (  ) __________________    HPI / ICU COURSE:  This is a 89-year-old female with a past medical history significant for CHF, HTN, hypothyroidism, GERD, severe AS  AS (s/p balloon aortic valvuloplasty 05/2021), MVP. cardiomegaly, liver cancer (s/p resection, chemo and radiation) presents for worsening sob.  Patient states that she has been having chronic shortness of breath w/ recent hospital admission 6/18-6/22, pt reports dyspnea has been getting worse the last couple of days and did not have any resolution to sxs upon d/c.  Of note patient also states that she has been having some blood in the stool during this time.  Patient states dyspnea is worsened with any exertion- and associated with escalating chest pain.    Found to have hgb 7, transfused with units PRBC, troponemia, bnp >17K, increasing left ling opacity extending to midlung.   trop 0.19 -> 0.16 -> 0.23 -> 0.22 (chronic, troponemia on previous admissions)  ckmb 11.3 (7.5 06/8/2022)  bnp [id number] (6509 6/8/2022)  lactate 2.3  Hgb 7 -> 7.7 -> 7.7 -> 8.9  TTE 06/2022: E 60%, sever AS, small pleural effusions  cxr: new right lung large opacity; increasing left opacity  ecg: NSR, RBBB, LVH, bifascicular block     Pt also admits to predominantly bedbound state due to dyspnea.  Patient evaluated by GI and recommended EGD/Colonoscopy + cardiac clearance  Cardiology evaluation appreciated: in communication with Dr. Mccollum for TAVR (family amenable)      Symptomatic severe AS (pulm edema, VALENTINE) s/p balloon aortic valvuloplasty 5/2021   NSTEMI most likely 2/2 to demand  acute blood loss anemia s/p 1 unit PRBC  Left lung opacity?       Problem/Plan - 1:  ·  Problem: Acute on chronic diastolic heart failure/Severe AS  - telemetry monitoring  - hx of severe AS s/p balloon valvuloplasty  - last echo 6/20/22  1. Normal global left ventricular systolic function.                                2. LV Ejection Fraction by Johnson's Method with a biplane EF of 60 %.  - BNP on admission 17,734  - continue Lasix 40 mg IV BID and aspirin   - daily weights  - Strict I/O  - HOB elevated  - supplemental oxygen 2 lpm prn (currently on RA)  -f/u cardiology consult: discussion with Dr. Schaffer for TAVR ongoing (inpatient vs outpatient)     Problem/Plan - 2:  ·  Problem: Elevated troponin 2/2 to demand   ·  Plan: - trend troponin  trop 0.19 -> 0.16 -> 0.23 -> 0.22 (chronic, troponemia on previous admissions)  ckmb 11.3 (7.5 06/8/2022)  bnp 97053 (6509 6/8/2022)  lactate 2.3  Hgb 7 -> 7.7 -> 7.7 -> 8.9  TTE 06/2022: E 60%, sever AS, small pleural effusions  cxr: new right lung large opacity; increasing left opacity  ecg: NSR, RBBB, LVH, bifascicular block     Problem/Plan - 3:  ·  Problem: Anemia.   ·  Plan: - Serial H&H  - Transfuse 1 units over 3hr  - Stool for occult blood  - GI consult appreciated: pending cardiac risk stratification for possible EGD/colonoscopy  - Son reports last colonoscopy 4 years ago Dr. Vasquez   - Hold SQ Heparin.  - Trend CBC & transfuse if < 7  - Active type and screen      Problem/Plan - 4:  ·  Problem: Opacity of lung on imaging study.   - f/u pulm recommendations; outpatient f/u     Problem/Plan - 5:  ·  Problem: Sacral decubitus ulcer, stage II.   ·  Plan: - Wound Care daily  - Wound Consult.    FOR FOLLOW UP:  f/u: trend trops and EKG  f/u cardiology recommendations for TAVR   f/u repeat echo  Trend CBC and transfuse < 7  f/u GI recommendations for possible EGD/colonoscopy           Vital Signs Last 24 Hrs  T(C): 36.3 (11 Jul 2022 07:00), Max: 37.1 (10 Jul 2022 15:00)  T(F): 97.4 (11 Jul 2022 07:00), Max: 98.8 (10 Jul 2022 15:00)  HR: 68 (11 Jul 2022 07:00) (67 - 87)  BP: 107/46 (11 Jul 2022 07:00) (90/50 - 114/50)  BP(mean): 66 (11 Jul 2022 07:00) (66 - 76)  RR: 18 (11 Jul 2022 07:00) (18 - 42)  SpO2: 98% (11 Jul 2022 07:00) (96% - 98%)    Parameters below as of 11 Jul 2022 07:00  Patient On (Oxygen Delivery Method): room air        I&O's Summary    10 Jul 2022 07:01  -  11 Jul 2022 07:00  --------------------------------------------------------  IN: 0 mL / OUT: 425 mL / NET: -425 mL        PHYSICAL EXAM:  GENERAL: NAD, lying in bed comfortably  EYES: EOMI, conjunctiva and sclera clear  ENT: Moist mucous membranes  CHEST/LUNG: Clear to auscultation bilaterally; No rales, rhonchi, wheezing, or rubs.  HEART: Regular rate and rhythm; No murmurs, rubs, or gallops  ABDOMEN:  Soft, Nontender, Nondistended.   EXTREMITIES:  No clubbing, cyanosis, or edema  NERVOUS SYSTEM:  Alert & Oriented X3    CARDIAC MARKERS ( 11 Jul 2022 05:47 )    x     / 0.26 ng/mL / x     / x     / x      CARDIAC MARKERS ( 10 Jul 2022 05:26 )  x     / 0.22 ng/mL / x     / x     / x                                  9.6    6.37  )-----------( 159      ( 11 Jul 2022 05:47 )             30.2       07-11    137  |  92<L>  |  43<H>  ----------------------------<  97  3.8   |  35<H>  |  1.3    Ca    9.8      11 Jul 2022 05:47  Mg     2.1     07-11    TPro  7.0  /  Alb  3.9  /  TBili  0.5  /  DBili  x   /  AST  19  /  ALT  12  /  AlkPhos  114  07-11 ICU Transfer Note    Transfer from: ICU    Transfer to: (  ) Medicine    (X) Telemetry    (  ) RCU                               (  ) Palliative    (  ) Stroke Unit    (  ) MICU    (  ) __________________    HPI / ICU COURSE:  This is a 89-year-old female with a past medical history significant for CHF, HTN, hypothyroidism, GERD, severe AS  AS (s/p balloon aortic valvuloplasty 05/2021), MVP. cardiomegaly, liver cancer (s/p resection, chemo and radiation) presents for worsening sob.  Patient states that she has been having chronic shortness of breath w/ recent hospital admission 6/18-6/22, pt reports dyspnea has been getting worse the last couple of days and did not have any resolution to sxs upon d/c.  Of note patient also states that she has been having some blood in the stool during this time.  Patient states dyspnea is worsened with any exertion- and associated with escalating chest pain.    Found to have hgb 7, transfused with units PRBC, troponemia, bnp >17K, increasing left ling opacity extending to midlung.   trop 0.19 -> 0.16 -> 0.23 -> 0.22 (chronic, troponemia on previous admissions)  ckmb 11.3 (7.5 06/8/2022)  bnp 63437 (6509 6/8/2022)  lactate 2.3  Hgb 7 -> 7.7 -> 7.7 -> 8.9  TTE 06/2022: E 60%, sever AS, small pleural effusions  cxr: new right lung large opacity; increasing left opacity  ecg: NSR, RBBB, LVH, bifascicular block     Pt also admits to predominantly bedbound state due to dyspnea.  Patient evaluated by GI and recommended EGD/Colonoscopy + cardiac clearance  Cardiology evaluation appreciated: in communication with Dr. Mccollum for TAVR (family amenable)    IMPRESSION:  Symptomatic severe AS (pulm edema, VALENTINE) s/p balloon aortic valvuloplasty 5/2021   NSTEMI most likely 2/2 to demand  acute blood loss anemia s/p 1 unit PRBC  Left lung opacity?       Problem/Plan - 1:  ·  Problem: Acute on chronic diastolic heart failure/Severe AS  - telemetry monitoring  - hx of severe AS s/p balloon valvuloplasty  - last echo 6/20/22  1. Normal global left ventricular systolic function.                                2. LV Ejection Fraction by Johnson's Method with a biplane EF of 60 %.  - BNP on admission 17,734  - continue Lasix 40 mg IV BID and aspirin   - daily weights  - Strict I/O  - HOB elevated  - supplemental oxygen 2 lpm prn (currently on RA)  -f/u cardiology consult: discussion with Dr. Schaffer for TAVR ongoing (inpatient vs outpatient)     Problem/Plan - 2:  ·  Problem: Elevated troponin 2/2 to demand   ·  Plan: - trend troponin  trop 0.19 -> 0.16 -> 0.23 -> 0.22 (chronic, troponemia on previous admissions)  ckmb 11.3 (7.5 06/8/2022)  bnp 85309 (6509 6/8/2022)  lactate 2.3  Hgb 7 -> 7.7 -> 7.7 -> 8.9  TTE 06/2022: E 60%, sever AS, small pleural effusions  cxr: new right lung large opacity; increasing left opacity  ecg: NSR, RBBB, LVH, bifascicular block     Problem/Plan - 3:  ·  Problem: Anemia.   ·  Plan: - Serial H&H  - Transfuse 1 units over 3hr  - Stool for occult blood  - GI consult appreciated: pending cardiac risk stratification for possible EGD/colonoscopy  - Son reports last colonoscopy 4 years ago Dr. Vasquez   - Hold SQ Heparin.  - Trend CBC & transfuse if < 7  - Active type and screen      Problem/Plan - 4:  ·  Problem: Opacity of lung on imaging study.   - f/u pulm recommendations; outpatient f/u     Problem/Plan - 5:  ·  Problem: Sacral decubitus ulcer, stage II.   ·  Plan: - Wound Care daily  - Wound Consult.    FOR FOLLOW UP:  f/u: trend trops and EKG  f/u cardiology recommendations for TAVR   f/u repeat echo  Trend CBC and transfuse < 7  f/u GI recommendations for possible EGD/colonoscopy           Vital Signs Last 24 Hrs  T(C): 36.3 (11 Jul 2022 07:00), Max: 37.1 (10 Jul 2022 15:00)  T(F): 97.4 (11 Jul 2022 07:00), Max: 98.8 (10 Jul 2022 15:00)  HR: 68 (11 Jul 2022 07:00) (67 - 87)  BP: 107/46 (11 Jul 2022 07:00) (90/50 - 114/50)  BP(mean): 66 (11 Jul 2022 07:00) (66 - 76)  RR: 18 (11 Jul 2022 07:00) (18 - 42)  SpO2: 98% (11 Jul 2022 07:00) (96% - 98%)    Parameters below as of 11 Jul 2022 07:00  Patient On (Oxygen Delivery Method): room air        I&O's Summary    10 Jul 2022 07:01  -  11 Jul 2022 07:00  --------------------------------------------------------  IN: 0 mL / OUT: 425 mL / NET: -425 mL        PHYSICAL EXAM:  GENERAL: NAD, lying in bed comfortably  EYES: EOMI, conjunctiva and sclera clear  ENT: Moist mucous membranes  CHEST/LUNG: Clear to auscultation bilaterally; No rales, rhonchi, wheezing, or rubs.  HEART: Regular rate and rhythm; No murmurs, rubs, or gallops  ABDOMEN:  Soft, Nontender, Nondistended.   EXTREMITIES:  No clubbing, cyanosis, or edema  NERVOUS SYSTEM:  Alert & Oriented X3    CARDIAC MARKERS ( 11 Jul 2022 05:47 )    x     / 0.26 ng/mL / x     / x     / x      CARDIAC MARKERS ( 10 Jul 2022 05:26 )  x     / 0.22 ng/mL / x     / x     / x                                  9.6    6.37  )-----------( 159      ( 11 Jul 2022 05:47 )             30.2       07-11    137  |  92<L>  |  43<H>  ----------------------------<  97  3.8   |  35<H>  |  1.3    Ca    9.8      11 Jul 2022 05:47  Mg     2.1     07-11    TPro  7.0  /  Alb  3.9  /  TBili  0.5  /  DBili  x   /  AST  19  /  ALT  12  /  AlkPhos  114  07-11

## 2022-07-11 NOTE — PROGRESS NOTE ADULT - ASSESSMENT
This is a 89-year-old female with a past medical history significant for chronic HFpEF, ,severe AS s/p AV dilation ~1yr ago,  hypertension, HCC, hypothyroidism, GERD who presents with shortness of breath.  Patient states that she has been having chronic shortness of breath, found with anemia s/p transfusion of 1 unit prbc. now asking for TAVR    acute on chronic HFpEF, severe AS / NSTEMI / anemia / pleural effusion     - cardiology following   - continue IV Lasix and negative balance   - possible TAVR - pt and son aleksey refused in past but want to now proceed - consult TAVR team   - check echo   - pulmonary consult for possible thoracentesis    - tele   - follow H/H and maintain hgb above 8

## 2022-07-11 NOTE — PROGRESS NOTE ADULT - ASSESSMENT
89yo female with hx htn, MVP, AS (s/p balloon aortic valvuloplasty 05/2021), cardiomegaly, liver cancer (s/p resection, chemo and radiation) presents for worsening sob. Found to have hgb 7, transfused with  units PRBC, troponemia, bnp >17K, increasing left ling opacity extending to midlung. Admitted to stepdown. Pt evaluated at bedside. Radiology tests and hospital records, were reviewed, as well as previous notes on this patient.  Pt's respiration improved, on room air now.    Impression  *Critical AS- shortness of breath  *nstemi type 2 - secondary to critical AS and anemia  *anemia      Plan:  *sob 2/2 severe AS  *nstemi type 2, supply demand mismatch in setting of anemia and chf  sob exacerbated by severe AS  improving on room air now  - c/w gentle diuresis will be careful to maintain filling pressure  - case discussed w/ Dr Jaquez    - pt may benefit from repeat aortic balloon valvuloplasty in lieu of TAVR at this time given bleeding risk will f/u w/  Dr Hood,  - follow GI recommendation  - may benefit from capsule endoscopy given bleeding risk for EGD/colonoscopy  - ok to downgrade to telemetry  - ecg in am  - keep Hgb >8, transfuse prn

## 2022-07-11 NOTE — PROGRESS NOTE ADULT - SUBJECTIVE AND OBJECTIVE BOX
Patient is comfortable in bed, no chest pain or SOB at rest       T(F): 97.4 (07-11-22 @ 07:00), Max: 98.8 (07-10-22 @ 15:00)  HR: 68 (07-11-22 @ 07:00)  BP: 107/46 (07-11-22 @ 07:00)  RR: 18 (07-11-22 @ 07:00)  SpO2: 98% (07-11-22 @ 07:00) (96% - 98%)    PHYSICAL EXAM:  GENERAL: NAD  HEAD:  Atraumatic, Normocephalic  EYES: EOMI, PERRLA, conjunctiva and sclera clear  NERVOUS SYSTEM:  Alert & Oriented X3, no focal deficits   CHEST/LUNG:  bilateral rales L>R  HEART: 2/6 PRIYA  ABDOMEN: Soft, Nontender, Nondistended; Bowel sounds present  EXTREMITIES:  2+ Peripheral Pulses, No clubbing, cyanosis, or edema    LABS  07-11    137  |  92<L>  |  43<H>  ----------------------------<  97  3.8   |  35<H>  |  1.3    Ca    9.8      11 Jul 2022 05:47  Mg     2.1     07-11    TPro  7.0  /  Alb  3.9  /  TBili  0.5  /  DBili  x   /  AST  19  /  ALT  12  /  AlkPhos  114  07-11                          9.6    6.37  )-----------( 159      ( 11 Jul 2022 05:47 )             30.2         CARDIAC ENZYMES  Creatine Kinase, Serum: 165 (07-09 @ 05:36)  Creatine Kinase, Serum: 187 (07-08 @ 21:53)    CKMB Units: 8.3 (07-09 @ 05:36)  CKMB Units: 11.3 (07-08 @ 21:53)    Troponin T, Serum: 0.26 ng/mL (07-11-22 @ 05:47)  Troponin T, Serum: 0.22 ng/mL (07-10-22 @ 05:26)  Troponin T, Serum: 0.23 ng/mL (07-09-22 @ 05:36)  Troponin T, Serum: 0.16 ng/mL (07-08-22 @ 21:53)  Troponin T, Serum: 0.19 ng/mL (07-08-22 @ 12:20)      Culture Results:   No Growth Final (06-19-22)  Culture Results:   No Growth Final (06-19-22)    RADIOLOGY  < from: CT Chest No Cont (06.19.22 @ 10:25) >    IMPRESSION:  Moderate left pleural effusion with adjacent atelectasis/consolidation   predominantly of the basilar left lower lobe. This is increased in size   since CT chest 7/8/2021.    No significant change in partially loculated small right pleural effusion.    < end of copied text >    MEDICATIONS  (STANDING):  aspirin  chewable 81 milliGRAM(s) Oral daily  chlorhexidine 4% Liquid 1 Application(s) Topical <User Schedule>  furosemide   Injectable 40 milliGRAM(s) IV Push two times a day  lactulose Syrup 10 Gram(s) Oral every 8 hours  levothyroxine 100 MICROGram(s) Oral daily  pantoprazole    Tablet 40 milliGRAM(s) Oral before breakfast  senna 2 Tablet(s) Oral at bedtime    MEDICATIONS  (PRN):

## 2022-07-12 LAB
ALBUMIN SERPL ELPH-MCNC: 4 G/DL — SIGNIFICANT CHANGE UP (ref 3.5–5.2)
ALP SERPL-CCNC: 116 U/L — HIGH (ref 30–115)
ALT FLD-CCNC: 12 U/L — SIGNIFICANT CHANGE UP (ref 0–41)
ANION GAP SERPL CALC-SCNC: 14 MMOL/L — SIGNIFICANT CHANGE UP (ref 7–14)
AST SERPL-CCNC: 19 U/L — SIGNIFICANT CHANGE UP (ref 0–41)
BASOPHILS # BLD AUTO: 0.03 K/UL — SIGNIFICANT CHANGE UP (ref 0–0.2)
BASOPHILS NFR BLD AUTO: 0.5 % — SIGNIFICANT CHANGE UP (ref 0–1)
BILIRUB SERPL-MCNC: 0.5 MG/DL — SIGNIFICANT CHANGE UP (ref 0.2–1.2)
BUN SERPL-MCNC: 49 MG/DL — HIGH (ref 10–20)
CALCIUM SERPL-MCNC: 9.8 MG/DL — SIGNIFICANT CHANGE UP (ref 8.5–10.1)
CHLORIDE SERPL-SCNC: 93 MMOL/L — LOW (ref 98–110)
CO2 SERPL-SCNC: 32 MMOL/L — SIGNIFICANT CHANGE UP (ref 17–32)
CREAT SERPL-MCNC: 1.2 MG/DL — SIGNIFICANT CHANGE UP (ref 0.7–1.5)
EGFR: 43 ML/MIN/1.73M2 — LOW
EOSINOPHIL # BLD AUTO: 0.09 K/UL — SIGNIFICANT CHANGE UP (ref 0–0.7)
EOSINOPHIL NFR BLD AUTO: 1.4 % — SIGNIFICANT CHANGE UP (ref 0–8)
GLUCOSE SERPL-MCNC: 90 MG/DL — SIGNIFICANT CHANGE UP (ref 70–99)
HCT VFR BLD CALC: 29.7 % — LOW (ref 37–47)
HGB BLD-MCNC: 9.4 G/DL — LOW (ref 12–16)
IMM GRANULOCYTES NFR BLD AUTO: 0.3 % — SIGNIFICANT CHANGE UP (ref 0.1–0.3)
LYMPHOCYTES # BLD AUTO: 1.33 K/UL — SIGNIFICANT CHANGE UP (ref 1.2–3.4)
LYMPHOCYTES # BLD AUTO: 21 % — SIGNIFICANT CHANGE UP (ref 20.5–51.1)
MAGNESIUM SERPL-MCNC: 2.3 MG/DL — SIGNIFICANT CHANGE UP (ref 1.8–2.4)
MCHC RBC-ENTMCNC: 28.1 PG — SIGNIFICANT CHANGE UP (ref 27–31)
MCHC RBC-ENTMCNC: 31.6 G/DL — LOW (ref 32–37)
MCV RBC AUTO: 88.9 FL — SIGNIFICANT CHANGE UP (ref 81–99)
MONOCYTES # BLD AUTO: 0.71 K/UL — HIGH (ref 0.1–0.6)
MONOCYTES NFR BLD AUTO: 11.2 % — HIGH (ref 1.7–9.3)
NEUTROPHILS # BLD AUTO: 4.16 K/UL — SIGNIFICANT CHANGE UP (ref 1.4–6.5)
NEUTROPHILS NFR BLD AUTO: 65.6 % — SIGNIFICANT CHANGE UP (ref 42.2–75.2)
NRBC # BLD: 0 /100 WBCS — SIGNIFICANT CHANGE UP (ref 0–0)
PLATELET # BLD AUTO: 163 K/UL — SIGNIFICANT CHANGE UP (ref 130–400)
POTASSIUM SERPL-MCNC: 3.4 MMOL/L — LOW (ref 3.5–5)
POTASSIUM SERPL-SCNC: 3.4 MMOL/L — LOW (ref 3.5–5)
PROT SERPL-MCNC: 7.1 G/DL — SIGNIFICANT CHANGE UP (ref 6–8)
RBC # BLD: 3.34 M/UL — LOW (ref 4.2–5.4)
RBC # FLD: 13.4 % — SIGNIFICANT CHANGE UP (ref 11.5–14.5)
SODIUM SERPL-SCNC: 139 MMOL/L — SIGNIFICANT CHANGE UP (ref 135–146)
WBC # BLD: 6.34 K/UL — SIGNIFICANT CHANGE UP (ref 4.8–10.8)
WBC # FLD AUTO: 6.34 K/UL — SIGNIFICANT CHANGE UP (ref 4.8–10.8)

## 2022-07-12 PROCEDURE — 99221 1ST HOSP IP/OBS SF/LOW 40: CPT

## 2022-07-12 PROCEDURE — 99233 SBSQ HOSP IP/OBS HIGH 50: CPT

## 2022-07-12 RX ORDER — FUROSEMIDE 40 MG
40 TABLET ORAL DAILY
Refills: 0 | Status: DISCONTINUED | OUTPATIENT
Start: 2022-07-13 | End: 2022-07-13

## 2022-07-12 RX ADMIN — PANTOPRAZOLE SODIUM 40 MILLIGRAM(S): 20 TABLET, DELAYED RELEASE ORAL at 07:00

## 2022-07-12 RX ADMIN — Medication 81 MILLIGRAM(S): at 13:23

## 2022-07-12 RX ADMIN — LACTULOSE 10 GRAM(S): 10 SOLUTION ORAL at 21:34

## 2022-07-12 RX ADMIN — Medication 40 MILLIGRAM(S): at 07:01

## 2022-07-12 RX ADMIN — Medication 100 MICROGRAM(S): at 07:00

## 2022-07-12 RX ADMIN — SENNA PLUS 2 TABLET(S): 8.6 TABLET ORAL at 21:36

## 2022-07-12 RX ADMIN — LACTULOSE 10 GRAM(S): 10 SOLUTION ORAL at 13:25

## 2022-07-12 NOTE — PROGRESS NOTE ADULT - SUBJECTIVE AND OBJECTIVE BOX
Patient is a 89y old  Female who presents with a chief complaint of Dyspnea w/ Exertion (12 Jul 2022 13:26)      OVERNIGHT EVENTS: off O2 feels better but has SOB on minimal exertion    SUBJECTIVE / INTERVAL HPI: Patient seen and examined at bedside.     VITAL SIGNS:  Vital Signs Last 24 Hrs  T(C): 35.6 (12 Jul 2022 05:00), Max: 37.2 (11 Jul 2022 21:50)  T(F): 96.1 (12 Jul 2022 05:00), Max: 99 (11 Jul 2022 21:50)  HR: 77 (12 Jul 2022 13:33) (67 - 84)  BP: 87/54 (12 Jul 2022 13:33) (87/54 - 111/52)  BP(mean): 75 (11 Jul 2022 21:50) (72 - 75)  RR: 18 (12 Jul 2022 05:00) (18 - 22)  SpO2: 97% (12 Jul 2022 06:08) (96% - 98%)    Parameters below as of 12 Jul 2022 06:08  Patient On (Oxygen Delivery Method): room air        PHYSICAL EXAM:    General: WDWN  HEENT: NC/AT; PERRL, clear conjunctiva  Neck: supple  Cardiovascular: +S1/S2; RRR, holosystolic murmur grade 5 at RUSB  Respiratory: CTA b/l; no W/R/R  Gastrointestinal: soft, NT/ND; +BSx4  Extremities: WWP; 2+ peripheral pulses; no edema   Neurological: AAOx3; no focal deficits    MEDICATIONS:  MEDICATIONS  (STANDING):  aspirin  chewable 81 milliGRAM(s) Oral daily  chlorhexidine 4% Liquid 1 Application(s) Topical <User Schedule>  furosemide   Injectable 40 milliGRAM(s) IV Push two times a day  lactulose Syrup 10 Gram(s) Oral every 8 hours  levothyroxine 100 MICROGram(s) Oral daily  pantoprazole    Tablet 40 milliGRAM(s) Oral before breakfast  senna 2 Tablet(s) Oral at bedtime    MEDICATIONS  (PRN):      ALLERGIES:  Allergies    Cipro (Other)  Levaquin (Rash)  tetracycline (Hives)    Intolerances        LABS:                        9.4    6.34  )-----------( 163      ( 12 Jul 2022 07:08 )             29.7     07-12    139  |  93<L>  |  49<H>  ----------------------------<  90  3.4<L>   |  32  |  1.2    Ca    9.8      12 Jul 2022 07:08  Mg     2.3     07-12    TPro  7.1  /  Alb  4.0  /  TBili  0.5  /  DBili  x   /  AST  19  /  ALT  12  /  AlkPhos  116<H>  07-12    < from: TTE Echo Complete w/o Contrast w/ Doppler (06.20.22 @ 07:51) >    PHYSICIAN INTERPRETATION:  Left Ventricle: Normal left ventricular size and wall thicknesses, with   normal systolic and diastolic function. The left ventricular internal   cavity size is normal. Left ventricular wall thickness is normal. There   is no left ventricular hypertrophy. Global LV systolic function was   normal. Normal segmentalleft ventricular systolic function. Spectral   Doppler shows impaired relaxation pattern of left ventricular myocardial   filling (Grade I diastolic dysfunction).  Right Ventricle: Normal right ventricular size and function. The right   ventricular size is normal. RV wall thickness is normal. RV systolic   function is normal. TV S' 0.1 m/s.  Left Atrium: Normal left atrial size. Intact intra-atrial septum without   shunt. LA volume Index is 28.6 ml/m² ml/m2.  Right Atrium: Normal right atrial size.  Pericardium: There is no evidence of pericardial effusion. There is a   small pleural effusion in the left lateral region.  Mitral Valve: Mild thickening of the anterior and posterior mitral valve   leaflets. No evidence of mitral stenosis.  Tricuspid Valve: Structurally normal tricuspid valve, with normal leaflet   excursion. Trivial tricuspid regurgitation is visualized.  Aortic Valve: Severe aortic stenosis is present. Peak transaortic   gradient equals 138.3 mmHg, mean transaortic gradientequals 96.6 mmHg,   the calculated aortic valve area equals 0.48 cm² by the continuity   equation consistent with severe aortic stenosis. The aortic valve mean   gradient is 96.6 mmHg consistent with severe aortic stenosis. Mild aortic   valve regurgitation is seen.  Pulmonic Valve: The pulmonic valve was not well visualized. No indication   of pulmonic valve regurgitation.  Aorta: The aortic root is normal in size and structure. The ascending   aorta was not well visualized.  Pulmonary Artery: Normal pulmonary artery pressure.  Venous: The inferior vena cava is not well visualized.    < end of copied text >

## 2022-07-12 NOTE — PROGRESS NOTE ADULT - NUTRITIONAL ASSESSMENT
Diet:  Diet, DASH/TLC:   Sodium & Cholesterol Restricted  2000mL Fluid Restriction ([id number])  Supplement Feeding Modality:  Oral  Ensure Enlive Cans or Servings Per Day:  2       Frequency:  Daily  Ensure Pudding Cans or Servings Per Day:  2       Frequency:  Daily (07-12-22 @ 11:06)

## 2022-07-12 NOTE — PROGRESS NOTE ADULT - ASSESSMENT
Wound Assessment:  Patient received in recliner chair, A/O responds appropriately to verbal command .                                    Limited morbility , being seen by physical   therapy  ,  Luis score 18,  on po dash diet -  pt c/o of decrease po intake                                  Incontinence to stool and urine + pur wick                                  B/l  heel intact  blanchable  redness                                 coccyx healed ulcer with scar formation                              B/L buttock moisture associated dermatitis with erythema and denuded skin                             L shin partial thickness ulceration           Plan: wound care recs   Clean  b/l buttock with soap and water, pat dry apply triad hydrophilic dressing         Continue Allevyn to shin ulceration          Pressure   injury preventive  measure   Incontinence and skin care   Assess skin  and inform primary provider of any changes   Case discussed with primary Rn  Wound/ ostomy specialist  to f/u as needed   other recs   Offloading: [ x Frequent position changes [ ] Devices/Equipment  Cleansing: [ ] Saline [x ] Soap/Water [ ] Other: ______  Topicals: [ x] Barrier Cream [ ] Antimicrobial [ ] Enzymatic Wound Debridement  Dressings: [ ] Dry, sterile [ x] Foam [ ] Absorbant Pads [ ] Collagenase   # Decrease po intake        Rec per nutrition         Total time spent to complete patient s bedside assessment  and review of medical record and discussing of medical plan with primary team was more than 35 minutes

## 2022-07-12 NOTE — PROGRESS NOTE ADULT - ASSESSMENT
This is a 89-year-old female with a past medical history significant for chronic HFpEF, ,severe AS s/p AV dilation ~1yr ago,  hypertension, HCC, hypothyroidism, GERD who presents with shortness of breath.  Patient states that she has been having chronic shortness of breath, found with anemia s/p transfusion of 1 unit prbc. now asking for TAVR    #AHRF from severe AS AND ACUTE PUL EDMEA  - on 40mg bid IV lasix, doing on RA, make lasix QD  - check CXR  - pt SOB on minimal exertion and need structual heart dz cande for possible baloon valvoplasty  - will touch base with cardiac team    #acute anemia from GIB [facility] lower GIB  - pt is not candidate for intervention at this time  - can do capsule endoscopy with Dr. Farfan  - will call GI again if develop threatening bleeding  - keep Hbg above 7.5     #HFpEF: lasix    #HTN    #Hypothyordisim   #DVT ppx: SCD    This is a 89-year-old female with a past medical history significant for chronic HFpEF, ,severe AS s/p AV dilation ~1yr ago,  hypertension, HCC, hypothyroidism, GERD who presents with shortness of breath.  Patient states that she has been having chronic shortness of breath, found with anemia s/p transfusion of 1 unit prbc. now asking for TAVR    #AHRF from severe AS AND ACUTE PUL EDMEA  - on 40mg bid IV lasix, doing on RA, make lasix QD  - check CXR  - pt SOB on minimal exertion and need structual heart dz eval for possible baloon valvoplasty  - will touch base with cardiac team    #acute anemia from GIB [facility] lower GIB  - pt is not candidate for intervention at this time  - can do capsule endoscopy with Dr. Farfan  - will call GI again if develop threatening bleeding  - keep Hbg above 7.5     #HFpEF: lasix    #HTN    #Hypothyordisim   #DVT ppx: SCD

## 2022-07-12 NOTE — CONSULT NOTE ADULT - ASSESSMENT
IMP:  - adm c/o SOB:  - severe AS  - anemia - r/o GI bleed vs hemolysis      s/p transfusions  - stage 2 sacral breakdown  - poor po intake      r/t constipation (improving), disliking hospital food, and a Q of dysphagia to solids    SUGGEST:  - obtain previous weights to determine if she has in fact been losing  - add Ensure Enlive 240 ml mid- morning and mid-afternoon daily  - for home she may do better with Boost VHC - 6 oz twice a day - lower volume, lower carb: fat ratio r/t her hypercarbia, higher nutrient density  - dietitian to assist pt in obtaining foods that are more to her liking while here  - consider swallow eval  - ad MV+minerals once daily - cont for a month at home and then re-evaluate zinc and Vit D

## 2022-07-12 NOTE — PROGRESS NOTE ADULT - SUBJECTIVE AND OBJECTIVE BOX
Subjective/Objective:    Patient is  a 88 years old Female with PMHx of HFpEF, severe AS s/p recent balloon valvuloplasty, HTN, hyperthyroidism, HCC s/p partial liver resection, and MVP c/o worsening chest pressure discomfort this am & , worsening dyspnea (1-2 weeks) . pt found to be severely anemic w/ Hgb-5.4 ,  Last Hgb was 9.1.  This is 3.7 gm drop since last adm. 2 months ago.  Pt stated she has constipation and went she has bowel movement she strains and sees blood in toilet paper. Pt denies- hemeatemesis, gross hematochezia or black stools, fever , chills, abdominal pain.  Pt was brought to ICU for anemia, hypotension. Pt received multiple units of PRBC, ASA was held, and GI consulted, pt was deemed high risk for scope so scope was not done, Hb stable right now, no active bleeding. Thoracenetesis for left pleural effusion showed transudative effusion.      ROS:       CONSTITUTIONAL: No weakness, fevers or chills  EYES/ENT: No visual changes;  No vertigo or throat pain   NECK: No pain or stiffness  RESPIRATORY: see HPI  CARDIOVASCULAR: see HPI  GASTROINTESTINAL: No abdominal or epigastric pain. No nausea, vomiting, or hematemesis; No diarrhea or constipation.   GENITOURINARY: No dysuria, frequency or hematuria  NEUROLOGICAL: No Numbness and no significant weakness  SKIN:  ulceration pressur einjury     ALLERGIES & MEDICATIONS  Allergies  Cipro (Other)  Levaquin (Rash)  tetracycline (Hives)  Intolerances    STANDING INPATIENT MEDICATIONS    aspirin  chewable 81 milliGRAM(s) Oral daily  chlorhexidine 4% Liquid 1 Application(s) Topical <User Schedule>  furosemide   Injectable 40 milliGRAM(s) IV Push two times a day  lactulose Syrup 10 Gram(s) Oral every 8 hours  levothyroxine 100 MICROGram(s) Oral daily  pantoprazole    Tablet 40 milliGRAM(s) Oral before breakfast  senna 2 Tablet(s) Oral at bedtime      PRN INPATIENT MEDICATION        VITALS/PHYSICAL EXAM-   T(C): 35.6 (07-12-22 @ 05:00), Max: 37.2 (07-11-22 @ 21:50)  HR: 67 (07-12-22 @ 06:08) (67 - 84)  BP: 105/59 (07-12-22 @ 06:08) (89/84 - 111/52)  RR: 18 (07-12-22 @ 05:00) (18 - 22)  SpO2: 97% (07-12-22 @ 06:08) (96% - 98%)  Height (cm): 157.5 (08 Jul 2022 16:46)  Weight (kg): 59.1 (08 Jul 2022 16:46)  BMI (kg/m2): 23.8 (08 Jul 2022 16:46)  BSA (m2): 1.59 (08 Jul 2022 16:46)    Wt(kg): --    07-11-22 @ 07:01  -  07-12-22 @ 07:00  --------------------------------------------------------  IN: 0 mL / OUT: 700 mL / NET: -700 mL            LABS/ CULTURES/ RADIOLOGY:              9.4    6.34  >-----------<  163      [07-12-22 @ 07:08]              29.7     139  |  93  |  49  ----------------------------<  90      [07-12-22 @ 07:08]  3.4   |  32  |  1.2        Ca     9.8     [07-12-22 @ 07:08]      Mg     2.3     [07-12-22 @ 07:08]    TPro  7.1  /  Alb  4.0  /  TBili  0.5  /  DBili  x   /  AST  19  /  ALT  12  /  AlkPhos  116  [07-12-22 @ 07:08]        Troponin 0.26      [07-11-22 @ 05:47]        CAPILLARY BLOOD GLUCOSE

## 2022-07-12 NOTE — CONSULT NOTE ADULT - SUBJECTIVE AND OBJECTIVE BOX
88 years old Female with PMHx of HFpEF, severe AS s/p recent balloon valvuloplasty, HTN, hyperthyroidism, HCC s/p partial liver resection, and MVP c/o worsening chest pressure discomfort this am & , worsening dyspnea (1-2 weeks) . pt found to be severely anemic w/ Hgb-5.4 ,  Last Hgb was 9.1.  This is 3.7 gm drop since last adm. 2 months ago.  Pt stated she has constipation and went she has bowel movement she strains and sees blood in toilet paper. Pt denies- hemeatemesis, gross hematochezia or black stools, fever , chills, abdominal pain.  Pt was brought to ICU for anemia, hypotension. Pt received multiple units of PRBC, ASA was held, and GI consulted, pt was deemed high risk for scope so scope was not done, Hb stable right now, no active bleeding. Thoracenetesis for left pleural effusion showed transudative effusion.    PAST MEDICAL & SURGICAL HISTORY:  HTN (hypertension)  Mitral valve prolapse  Enlarged heart  Murmur  Hyperthyroidism  Arthritis  HTN (hypertension)  Diverticulosis  Hiatal hernia  Acid reflux  Liver cancer s/p resection and s/p chemo and radiation  Aortic stenosis s/p balloon aortic valvuloplasty 5/25/21  liver cancer s/p resection  Status post cholecystectomy    Vital Signs Last 24 Hrs  T(C): 35.6 (12 Jul 2022 05:00), Max: 37.2 (11 Jul 2022 21:50)  T(F): 96.1 (12 Jul 2022 05:00), Max: 99 (11 Jul 2022 21:50)  HR: 67 (12 Jul 2022 06:08) (67 - 84)  BP: 105/59 (12 Jul 2022 06:08) (89/84 - 111/52)  BP(mean): 75 (11 Jul 2022 21:50) (72 - 75)  RR: 18 (12 Jul 2022 05:00) (18 - 22)  SpO2: 97% (12 Jul 2022 06:08) (96% - 98%)  Drug Dosing Weight  Height (cm): 157.5 (08 Jul 2022 16:46)  Weight (kg): 59.1 (08 Jul 2022 16:46)  BMI (kg/m2): 23.8 (08 Jul 2022 16:46)  BSA (m2): 1.59 (08 Jul 2022 16:46)  pt alert, verbal  O2NC, not in distress, in bed  skin turgor good, anicteric, mucous memb moist  abd soft, ND, NT  RN reports sacral stage 2 breakdown  no LE edema  + chronic facial temporal, infraclavicular, perineal wasting  pt reports "some" weight loss over recent months, says she eats fairly well at home but dislikes hospital food  pt reports difficulty with dry and some solid foods recently - has to "work" to swallow them, but no trouble with liquids  she is requesting Ensure, and asks for different food choices  pt also states that po intake poor last few days because she hadn't had a BM and was uncomfortable; better today after small BM after lactulose    MEDICATIONS  (STANDING):  aspirin  chewable 81 milliGRAM(s) Oral daily  chlorhexidine 4% Liquid 1 Application(s) Topical <User Schedule>  furosemide   Injectable 40 milliGRAM(s) IV Push two times a day  lactulose Syrup 10 Gram(s) Oral every 8 hours  levothyroxine 100 MICROGram(s) Oral daily  pantoprazole    Tablet 40 milliGRAM(s) Oral before breakfast  senna 2 Tablet(s) Oral at bedtime                        9.4    6.34  )-----------( 163      ( 12 Jul 2022 07:08 )             29.7     07-12    139  |  93<L>  |  49<H>  ----------------------------<  90  3.4<L>   |  32  |  1.2    Ca    9.8      12 Jul 2022 07:08  Mg     2.3     07-12    TPro  7.1  /  Alb  4.0  /  TBili  0.5  /  DBili  x   /  AST  19  /  ALT  12  /  AlkPhos  116<H>  07-12    < from: Xray Chest 1 View- PORTABLE-Routine (Xray Chest 1 View- PORTABLE-Routine in AM.) (07.11.22 @ 06:08) >  Cardiac/mediastinum/hilum: Unremarkable.    Lung parenchyma/Pleura: Left lower lobe opacity and pleural effusion   unchanged. No pneumothorax.    < end of copied text >

## 2022-07-13 LAB
ALBUMIN SERPL ELPH-MCNC: 3.9 G/DL — SIGNIFICANT CHANGE UP (ref 3.5–5.2)
ALP SERPL-CCNC: 113 U/L — SIGNIFICANT CHANGE UP (ref 30–115)
ALT FLD-CCNC: 12 U/L — SIGNIFICANT CHANGE UP (ref 0–41)
ANION GAP SERPL CALC-SCNC: 13 MMOL/L — SIGNIFICANT CHANGE UP (ref 7–14)
AST SERPL-CCNC: 21 U/L — SIGNIFICANT CHANGE UP (ref 0–41)
BASOPHILS # BLD AUTO: 0.01 K/UL — SIGNIFICANT CHANGE UP (ref 0–0.2)
BASOPHILS NFR BLD AUTO: 0.2 % — SIGNIFICANT CHANGE UP (ref 0–1)
BILIRUB SERPL-MCNC: 0.4 MG/DL — SIGNIFICANT CHANGE UP (ref 0.2–1.2)
BUN SERPL-MCNC: 47 MG/DL — HIGH (ref 10–20)
CALCIUM SERPL-MCNC: 9.9 MG/DL — SIGNIFICANT CHANGE UP (ref 8.5–10.1)
CHLORIDE SERPL-SCNC: 92 MMOL/L — LOW (ref 98–110)
CO2 SERPL-SCNC: 33 MMOL/L — HIGH (ref 17–32)
CREAT SERPL-MCNC: 1.2 MG/DL — SIGNIFICANT CHANGE UP (ref 0.7–1.5)
EGFR: 43 ML/MIN/1.73M2 — LOW
EOSINOPHIL # BLD AUTO: 0.11 K/UL — SIGNIFICANT CHANGE UP (ref 0–0.7)
EOSINOPHIL NFR BLD AUTO: 2.1 % — SIGNIFICANT CHANGE UP (ref 0–8)
GLUCOSE SERPL-MCNC: 97 MG/DL — SIGNIFICANT CHANGE UP (ref 70–99)
HCT VFR BLD CALC: 28.7 % — LOW (ref 37–47)
HGB BLD-MCNC: 9 G/DL — LOW (ref 12–16)
IMM GRANULOCYTES NFR BLD AUTO: 0.2 % — SIGNIFICANT CHANGE UP (ref 0.1–0.3)
LYMPHOCYTES # BLD AUTO: 1.01 K/UL — LOW (ref 1.2–3.4)
LYMPHOCYTES # BLD AUTO: 19.3 % — LOW (ref 20.5–51.1)
MAGNESIUM SERPL-MCNC: 2.4 MG/DL — SIGNIFICANT CHANGE UP (ref 1.8–2.4)
MCHC RBC-ENTMCNC: 27.8 PG — SIGNIFICANT CHANGE UP (ref 27–31)
MCHC RBC-ENTMCNC: 31.4 G/DL — LOW (ref 32–37)
MCV RBC AUTO: 88.6 FL — SIGNIFICANT CHANGE UP (ref 81–99)
MONOCYTES # BLD AUTO: 0.54 K/UL — SIGNIFICANT CHANGE UP (ref 0.1–0.6)
MONOCYTES NFR BLD AUTO: 10.3 % — HIGH (ref 1.7–9.3)
NEUTROPHILS # BLD AUTO: 3.54 K/UL — SIGNIFICANT CHANGE UP (ref 1.4–6.5)
NEUTROPHILS NFR BLD AUTO: 67.9 % — SIGNIFICANT CHANGE UP (ref 42.2–75.2)
NRBC # BLD: 0 /100 WBCS — SIGNIFICANT CHANGE UP (ref 0–0)
PLATELET # BLD AUTO: 154 K/UL — SIGNIFICANT CHANGE UP (ref 130–400)
POTASSIUM SERPL-MCNC: 3.8 MMOL/L — SIGNIFICANT CHANGE UP (ref 3.5–5)
POTASSIUM SERPL-SCNC: 3.8 MMOL/L — SIGNIFICANT CHANGE UP (ref 3.5–5)
PROT SERPL-MCNC: 6.9 G/DL — SIGNIFICANT CHANGE UP (ref 6–8)
RBC # BLD: 3.24 M/UL — LOW (ref 4.2–5.4)
RBC # FLD: 13.5 % — SIGNIFICANT CHANGE UP (ref 11.5–14.5)
SODIUM SERPL-SCNC: 138 MMOL/L — SIGNIFICANT CHANGE UP (ref 135–146)
WBC # BLD: 5.22 K/UL — SIGNIFICANT CHANGE UP (ref 4.8–10.8)
WBC # FLD AUTO: 5.22 K/UL — SIGNIFICANT CHANGE UP (ref 4.8–10.8)

## 2022-07-13 PROCEDURE — 99232 SBSQ HOSP IP/OBS MODERATE 35: CPT

## 2022-07-13 PROCEDURE — 93010 ELECTROCARDIOGRAM REPORT: CPT

## 2022-07-13 PROCEDURE — 93306 TTE W/DOPPLER COMPLETE: CPT | Mod: 26

## 2022-07-13 PROCEDURE — 71045 X-RAY EXAM CHEST 1 VIEW: CPT | Mod: 26

## 2022-07-13 RX ORDER — FUROSEMIDE 40 MG
40 TABLET ORAL DAILY
Refills: 0 | Status: DISCONTINUED | OUTPATIENT
Start: 2022-07-13 | End: 2022-07-15

## 2022-07-13 RX ADMIN — LACTULOSE 10 GRAM(S): 10 SOLUTION ORAL at 22:01

## 2022-07-13 RX ADMIN — Medication 81 MILLIGRAM(S): at 12:25

## 2022-07-13 RX ADMIN — PANTOPRAZOLE SODIUM 40 MILLIGRAM(S): 20 TABLET, DELAYED RELEASE ORAL at 06:33

## 2022-07-13 RX ADMIN — Medication 100 MICROGRAM(S): at 06:33

## 2022-07-13 RX ADMIN — LACTULOSE 10 GRAM(S): 10 SOLUTION ORAL at 06:36

## 2022-07-13 RX ADMIN — SENNA PLUS 2 TABLET(S): 8.6 TABLET ORAL at 22:01

## 2022-07-13 NOTE — PROGRESS NOTE ADULT - SUBJECTIVE AND OBJECTIVE BOX
CC.  Dyspnea w/ Exertion  HPI.  Patient reports that she feels better   SOB resolved  Offers no other complaints               Constitutional: No fever, fatigue or weight loss.  Skin: No rash.  Eyes: No recent vision problems or eye pain.  ENT: No congestion, ear pain, or sore throat.  Endocrine: No thyroid problems.  Cardiovascular: No chest pain or palpation.  Respiratory: No cough, shortness of breath, congestion, or wheezing.  Gastrointestinal: No abdominal pain, nausea, vomiting, or diarrhea.  Genitourinary: No dysuria.  Musculoskeletal: No joint swelling.  Neurologic: No headache.      Vital Signs Last 24 Hrs  T(C): 36.3 (07-13-22 @ 13:03), Max: 36.4 (07-12-22 @ 21:15)  T(F): 97.3 (07-13-22 @ 13:03), Max: 97.5 (07-12-22 @ 21:15)  HR: 73 (07-13-22 @ 13:03) (66 - 76)  BP: 91/50 (07-13-22 @ 13:03) (91/44 - 106/52)  BP(mean): --  RR: 16 (07-13-22 @ 13:03) (16 - 18)  SpO2: 97% (07-13-22 @ 01:35) (97% - 97%)        PHYSICAL EXAM-  GENERAL: NAD,    HEAD:  Atraumatic, Normocephalic  EYES: EOMI, PERRLA, conjunctiva and sclera clear  NECK: Supple, No JVD, Normal thyroid  NERVOUS SYSTEM:  Alert & Oriented X3, Moving all extremities  CHEST/LUNG: Clear to percussion bilaterally; No rales, rhonchi, wheezing, or rubs  HEART: Regular rate and rhythm; No murmurs, rubs, or gallops  ABDOMEN: Soft, Nontender, Nondistended; Bowel sounds present  EXTREMITIES:  No clubbing, cyanosis, or edema  SKIN: No rashes or lesions                                  9.0    5.22  )-----------( 154      ( 13 Jul 2022 07:08 )             28.7     07-13    138  |  92<L>  |  47<H>  ----------------------------<  97  3.8   |  33<H>  |  1.2    Ca    9.9      13 Jul 2022 07:08  Mg     2.4     07-13    TPro  6.9  /  Alb  3.9  /  TBili  0.4  /  DBili  x   /  AST  21  /  ALT  12  /  AlkPhos  113  07-13                    MEDICATIONS  (STANDING):  aspirin  chewable 81 milliGRAM(s) Oral daily  chlorhexidine 4% Liquid 1 Application(s) Topical <User Schedule>  furosemide   Injectable 40 milliGRAM(s) IV Push daily  lactulose Syrup 10 Gram(s) Oral every 8 hours  levothyroxine 100 MICROGram(s) Oral daily  pantoprazole    Tablet 40 milliGRAM(s) Oral before breakfast  senna 2 Tablet(s) Oral at bedtime    MEDICATIONS  (PRN):        Imaging Personally Reviewed:     [x ] YES  [ ] NO    Consultant(s) Notes Reviewed:  [x ] YES  [ ] NO    Care Discussed with Consultants/Other Providers [x ] YES  [ ] No medical contraindication for discharge

## 2022-07-13 NOTE — PROGRESS NOTE ADULT - ASSESSMENT
This is a 89-year-old female with a past medical history significant for chronic HFpEF, ,severe AS s/p AV dilation ~1yr ago,  hypertension, HCC, hypothyroidism, GERD who presents with shortness of breath.  Patient states that she has been having chronic shortness of breath, found with anemia s/p transfusion of 1 unit prbc. now asking for TAVR    #AHRF from severe AS AND ACUTE PUL EDMEA  - switch to po lasix   - CXR noticed  - pt SOB on minimal exertion and need structual heart dz eval for possible baloon valvoplasty. await response for Dr. mims    #acute anemia from GIB [facility] lower GIB  - pt is not candidate for intervention at this time  - can do capsule endoscopy with Dr. Ball  - will call GI again if develop threatening bleeding  - keep Hbg above 7.5     #HFpEF: lasix    #HTN    #Hypothyordisim   #DVT ppx: SCD

## 2022-07-14 LAB
ALBUMIN SERPL ELPH-MCNC: 3.8 G/DL — SIGNIFICANT CHANGE UP (ref 3.5–5.2)
ALP SERPL-CCNC: 110 U/L — SIGNIFICANT CHANGE UP (ref 30–115)
ALT FLD-CCNC: 13 U/L — SIGNIFICANT CHANGE UP (ref 0–41)
ANION GAP SERPL CALC-SCNC: 15 MMOL/L — HIGH (ref 7–14)
AST SERPL-CCNC: 22 U/L — SIGNIFICANT CHANGE UP (ref 0–41)
BILIRUB SERPL-MCNC: 0.3 MG/DL — SIGNIFICANT CHANGE UP (ref 0.2–1.2)
BLD GP AB SCN SERPL QL: SIGNIFICANT CHANGE UP
BUN SERPL-MCNC: 52 MG/DL — HIGH (ref 10–20)
CALCIUM SERPL-MCNC: 9.7 MG/DL — SIGNIFICANT CHANGE UP (ref 8.5–10.1)
CHLORIDE SERPL-SCNC: 92 MMOL/L — LOW (ref 98–110)
CO2 SERPL-SCNC: 30 MMOL/L — SIGNIFICANT CHANGE UP (ref 17–32)
CREAT SERPL-MCNC: 1.2 MG/DL — SIGNIFICANT CHANGE UP (ref 0.7–1.5)
EGFR: 43 ML/MIN/1.73M2 — LOW
GLUCOSE SERPL-MCNC: 91 MG/DL — SIGNIFICANT CHANGE UP (ref 70–99)
HCT VFR BLD CALC: 26.8 % — LOW (ref 37–47)
HCT VFR BLD CALC: 27.7 % — LOW (ref 37–47)
HGB BLD-MCNC: 8.2 G/DL — LOW (ref 12–16)
HGB BLD-MCNC: 8.6 G/DL — LOW (ref 12–16)
MCHC RBC-ENTMCNC: 27.3 PG — SIGNIFICANT CHANGE UP (ref 27–31)
MCHC RBC-ENTMCNC: 27.5 PG — SIGNIFICANT CHANGE UP (ref 27–31)
MCHC RBC-ENTMCNC: 30.6 G/DL — LOW (ref 32–37)
MCHC RBC-ENTMCNC: 31 G/DL — LOW (ref 32–37)
MCV RBC AUTO: 88.5 FL — SIGNIFICANT CHANGE UP (ref 81–99)
MCV RBC AUTO: 89.3 FL — SIGNIFICANT CHANGE UP (ref 81–99)
NRBC # BLD: 0 /100 WBCS — SIGNIFICANT CHANGE UP (ref 0–0)
NRBC # BLD: 0 /100 WBCS — SIGNIFICANT CHANGE UP (ref 0–0)
PLATELET # BLD AUTO: 152 K/UL — SIGNIFICANT CHANGE UP (ref 130–400)
PLATELET # BLD AUTO: 155 K/UL — SIGNIFICANT CHANGE UP (ref 130–400)
POTASSIUM SERPL-MCNC: 4 MMOL/L — SIGNIFICANT CHANGE UP (ref 3.5–5)
POTASSIUM SERPL-SCNC: 4 MMOL/L — SIGNIFICANT CHANGE UP (ref 3.5–5)
PROT SERPL-MCNC: 6.8 G/DL — SIGNIFICANT CHANGE UP (ref 6–8)
RBC # BLD: 3 M/UL — LOW (ref 4.2–5.4)
RBC # BLD: 3.13 M/UL — LOW (ref 4.2–5.4)
RBC # FLD: 13.7 % — SIGNIFICANT CHANGE UP (ref 11.5–14.5)
RBC # FLD: 13.7 % — SIGNIFICANT CHANGE UP (ref 11.5–14.5)
SODIUM SERPL-SCNC: 137 MMOL/L — SIGNIFICANT CHANGE UP (ref 135–146)
WBC # BLD: 7.77 K/UL — SIGNIFICANT CHANGE UP (ref 4.8–10.8)
WBC # BLD: 8.99 K/UL — SIGNIFICANT CHANGE UP (ref 4.8–10.8)
WBC # FLD AUTO: 7.77 K/UL — SIGNIFICANT CHANGE UP (ref 4.8–10.8)
WBC # FLD AUTO: 8.99 K/UL — SIGNIFICANT CHANGE UP (ref 4.8–10.8)

## 2022-07-14 PROCEDURE — 99232 SBSQ HOSP IP/OBS MODERATE 35: CPT

## 2022-07-14 RX ORDER — FUROSEMIDE 40 MG
40 TABLET ORAL ONCE
Refills: 0 | Status: COMPLETED | OUTPATIENT
Start: 2022-07-14 | End: 2022-07-14

## 2022-07-14 RX ADMIN — PANTOPRAZOLE SODIUM 40 MILLIGRAM(S): 20 TABLET, DELAYED RELEASE ORAL at 06:48

## 2022-07-14 RX ADMIN — SENNA PLUS 2 TABLET(S): 8.6 TABLET ORAL at 22:29

## 2022-07-14 RX ADMIN — Medication 81 MILLIGRAM(S): at 11:52

## 2022-07-14 RX ADMIN — LACTULOSE 10 GRAM(S): 10 SOLUTION ORAL at 22:29

## 2022-07-14 RX ADMIN — Medication 100 MICROGRAM(S): at 06:48

## 2022-07-14 RX ADMIN — Medication 40 MILLIGRAM(S): at 11:52

## 2022-07-14 NOTE — CHART NOTE - NSCHARTNOTEFT_GEN_A_CORE
discussed case with Dr. mims who requested transfer to [place] for evaluation for aortic valve repair  discussed with family, and in agreement discussed case with Dr. mims who requested transfer to [place] for evaluation for aortic valve repair  discussed with family, and in agreement  accepted by Dr. Hollis under cardiology service  approved by Dr. joseph mims

## 2022-07-14 NOTE — PROGRESS NOTE ADULT - ASSESSMENT
This is a 89-year-old female with a past medical history significant for chronic HFpEF, ,severe AS s/p AV dilation ~1yr ago,  hypertension, HCC, hypothyroidism, GERD who presents with shortness of breath.  Patient states that she has been having chronic shortness of breath, found with anemia s/p transfusion of 1 unit prbc. now asking for TAVR    #AHRF from severe AS AND ACUTE PUL EDMEA  -continue with po lasix   - CXR noticed  - Discussed case with Dr. Mccollum , who recommended transfer to [place] for evaluation for TAVR    #acute anemia from GIB [facility] lower GIB  - pt is not candidate for intervention at this time  - can do capsule endoscopy with Dr. Ball  - will call GI again if develop threatening bleeding  - keep Hbg above 7.5     #HFpEF: lasix    #HTN    #Hypothyordisim   #DVT ppx: SCD

## 2022-07-14 NOTE — PROGRESS NOTE ADULT - SUBJECTIVE AND OBJECTIVE BOX
CC.  Dyspnea w/ Exertion  HPI.  Patient reports that she feels better   SOB resolved  Offers no other complaints               Constitutional: No fever, fatigue or weight loss.  Skin: No rash.  Eyes: No recent vision problems or eye pain.  ENT: No congestion, ear pain, or sore throat.  Endocrine: No thyroid problems.  Cardiovascular: No chest pain or palpation.  Respiratory: No cough, shortness of breath, congestion, or wheezing.  Gastrointestinal: No abdominal pain, nausea, vomiting, or diarrhea.  Genitourinary: No dysuria.  Musculoskeletal: No joint swelling.  Neurologic: No headache.      Vital Signs Last 24 Hrs  T(C): 36.2 (14 Jul 2022 04:41), Max: 36.3 (13 Jul 2022 13:03)  T(F): 97.2 (14 Jul 2022 04:41), Max: 97.3 (13 Jul 2022 13:03)  HR: 70 (14 Jul 2022 08:31) (48 - 80)  BP: 103/57 (14 Jul 2022 08:31) (91/50 - 104/48)  BP(mean): --  RR: 16 (13 Jul 2022 21:15) (16 - 16)  SpO2: 96% (14 Jul 2022 08:31) (96% - 96%)    Parameters below as of 14 Jul 2022 08:31  Patient On (Oxygen Delivery Method): room air           PHYSICAL EXAM-  GENERAL: NAD,    HEAD:  Atraumatic, Normocephalic  EYES: EOMI, PERRLA, conjunctiva and sclera clear  NECK: Supple, No JVD, Normal thyroid  NERVOUS SYSTEM:  Alert & Oriented X3, Moving all extremities   CHEST/LUNG: Clear to percussion bilaterally; No rales, rhonchi, wheezing, or rubs  HEART: Regular rate and rhythm; No murmurs, rubs, or gallops  ABDOMEN: Soft, Nontender, Nondistended; Bowel sounds present  EXTREMITIES:   No clubbing, cyanosis, or edema  SKIN: No rashes or lesions                                    9.0    5.22  )-----------( 154      ( 13 Jul 2022 07:08 )             28.7     07-13    138  |  92<L>  |  47<H>  ----------------------------<  97  3.8   |  33<H>  |  1.2    Ca    9.9      13 Jul 2022 07:08  Mg     2.4     07-13    TPro  6.9  /  Alb  3.9  /  TBili  0.4  /  DBili  x   /  AST  21  /  ALT  12  /  AlkPhos  113  07-13                    MEDICATIONS  (STANDING):  aspirin  chewable 81 milliGRAM(s) Oral daily  chlorhexidine 4% Liquid 1 Application(s) Topical <User Schedule>  furosemide   Injectable 40 milliGRAM(s) IV Push daily  lactulose Syrup 10 Gram(s) Oral every 8 hours  levothyroxine 100 MICROGram(s) Oral daily  pantoprazole    Tablet 40 milliGRAM(s) Oral before breakfast  senna 2 Tablet(s) Oral at bedtime    MEDICATIONS  (PRN):        Imaging Personally Reviewed:     [x ] YES  [ ] NO    Consultant(s) Notes Reviewed:  [x ] YES  [ ] NO    Care Discussed with Consultants/Other Providers [x ] YES  [ ] No

## 2022-07-15 LAB
ANION GAP SERPL CALC-SCNC: 13 MMOL/L — SIGNIFICANT CHANGE UP (ref 7–14)
BLD GP AB SCN SERPL QL: SIGNIFICANT CHANGE UP
BUN SERPL-MCNC: 53 MG/DL — HIGH (ref 10–20)
CALCIUM SERPL-MCNC: 9.2 MG/DL — SIGNIFICANT CHANGE UP (ref 8.5–10.1)
CHLORIDE SERPL-SCNC: 96 MMOL/L — LOW (ref 98–110)
CO2 SERPL-SCNC: 30 MMOL/L — SIGNIFICANT CHANGE UP (ref 17–32)
CREAT SERPL-MCNC: 1.2 MG/DL — SIGNIFICANT CHANGE UP (ref 0.7–1.5)
EGFR: 43 ML/MIN/1.73M2 — LOW
GLUCOSE SERPL-MCNC: 90 MG/DL — SIGNIFICANT CHANGE UP (ref 70–99)
HCT VFR BLD CALC: 22.8 % — LOW (ref 37–47)
HCT VFR BLD CALC: 24.6 % — LOW (ref 37–47)
HGB BLD-MCNC: 7.2 G/DL — LOW (ref 12–16)
HGB BLD-MCNC: 8 G/DL — LOW (ref 12–16)
IRON SATN MFR SERPL: 22 UG/DL — LOW (ref 35–150)
IRON SATN MFR SERPL: 7 % — LOW (ref 15–50)
LDH SERPL L TO P-CCNC: 279 — HIGH (ref 50–242)
MAGNESIUM SERPL-MCNC: 2.3 MG/DL — SIGNIFICANT CHANGE UP (ref 1.8–2.4)
MCHC RBC-ENTMCNC: 27.7 PG — SIGNIFICANT CHANGE UP (ref 27–31)
MCHC RBC-ENTMCNC: 28.5 PG — SIGNIFICANT CHANGE UP (ref 27–31)
MCHC RBC-ENTMCNC: 31.6 G/DL — LOW (ref 32–37)
MCHC RBC-ENTMCNC: 32.5 G/DL — SIGNIFICANT CHANGE UP (ref 32–37)
MCV RBC AUTO: 87.5 FL — SIGNIFICANT CHANGE UP (ref 81–99)
MCV RBC AUTO: 87.7 FL — SIGNIFICANT CHANGE UP (ref 81–99)
NRBC # BLD: 0 /100 WBCS — SIGNIFICANT CHANGE UP (ref 0–0)
NRBC # BLD: 0 /100 WBCS — SIGNIFICANT CHANGE UP (ref 0–0)
PLATELET # BLD AUTO: 121 K/UL — LOW (ref 130–400)
PLATELET # BLD AUTO: 121 K/UL — LOW (ref 130–400)
POTASSIUM SERPL-MCNC: 3.5 MMOL/L — SIGNIFICANT CHANGE UP (ref 3.5–5)
POTASSIUM SERPL-SCNC: 3.5 MMOL/L — SIGNIFICANT CHANGE UP (ref 3.5–5)
RBC # BLD: 2.6 M/UL — LOW (ref 4.2–5.4)
RBC # BLD: 2.81 M/UL — LOW (ref 4.2–5.4)
RBC # BLD: 2.81 M/UL — LOW (ref 4.2–5.4)
RBC # FLD: 14 % — SIGNIFICANT CHANGE UP (ref 11.5–14.5)
RBC # FLD: 14 % — SIGNIFICANT CHANGE UP (ref 11.5–14.5)
RETICS #: 70.8 K/UL — SIGNIFICANT CHANGE UP (ref 25–125)
RETICS/RBC NFR: 2.5 % — HIGH (ref 0.5–1.5)
SODIUM SERPL-SCNC: 139 MMOL/L — SIGNIFICANT CHANGE UP (ref 135–146)
TIBC SERPL-MCNC: 336 UG/DL — SIGNIFICANT CHANGE UP (ref 220–430)
UIBC SERPL-MCNC: 314 UG/DL — SIGNIFICANT CHANGE UP (ref 110–370)
WBC # BLD: 5.71 K/UL — SIGNIFICANT CHANGE UP (ref 4.8–10.8)
WBC # BLD: 7.25 K/UL — SIGNIFICANT CHANGE UP (ref 4.8–10.8)
WBC # FLD AUTO: 5.71 K/UL — SIGNIFICANT CHANGE UP (ref 4.8–10.8)
WBC # FLD AUTO: 7.25 K/UL — SIGNIFICANT CHANGE UP (ref 4.8–10.8)

## 2022-07-15 PROCEDURE — 71045 X-RAY EXAM CHEST 1 VIEW: CPT | Mod: 26

## 2022-07-15 PROCEDURE — 99233 SBSQ HOSP IP/OBS HIGH 50: CPT

## 2022-07-15 RX ORDER — FUROSEMIDE 40 MG
20 TABLET ORAL ONCE
Refills: 0 | Status: COMPLETED | OUTPATIENT
Start: 2022-07-15 | End: 2022-07-16

## 2022-07-15 RX ORDER — POTASSIUM CHLORIDE 20 MEQ
20 PACKET (EA) ORAL ONCE
Refills: 0 | Status: COMPLETED | OUTPATIENT
Start: 2022-07-15 | End: 2022-07-15

## 2022-07-15 RX ORDER — FUROSEMIDE 40 MG
40 TABLET ORAL DAILY
Refills: 0 | Status: DISCONTINUED | OUTPATIENT
Start: 2022-07-15 | End: 2022-07-15

## 2022-07-15 RX ORDER — FUROSEMIDE 40 MG
40 TABLET ORAL DAILY
Refills: 0 | Status: DISCONTINUED | OUTPATIENT
Start: 2022-07-16 | End: 2022-07-17

## 2022-07-15 RX ORDER — POTASSIUM CHLORIDE 20 MEQ
40 PACKET (EA) ORAL ONCE
Refills: 0 | Status: COMPLETED | OUTPATIENT
Start: 2022-07-15 | End: 2022-07-15

## 2022-07-15 RX ADMIN — LACTULOSE 10 GRAM(S): 10 SOLUTION ORAL at 23:14

## 2022-07-15 RX ADMIN — PANTOPRAZOLE SODIUM 40 MILLIGRAM(S): 20 TABLET, DELAYED RELEASE ORAL at 05:35

## 2022-07-15 RX ADMIN — Medication 40 MILLIGRAM(S): at 05:35

## 2022-07-15 RX ADMIN — Medication 100 MICROGRAM(S): at 05:35

## 2022-07-15 RX ADMIN — LACTULOSE 10 GRAM(S): 10 SOLUTION ORAL at 05:35

## 2022-07-15 RX ADMIN — Medication 40 MILLIEQUIVALENT(S): at 11:18

## 2022-07-15 RX ADMIN — Medication 20 MILLIEQUIVALENT(S): at 17:03

## 2022-07-15 RX ADMIN — SENNA PLUS 2 TABLET(S): 8.6 TABLET ORAL at 23:04

## 2022-07-15 RX ADMIN — LACTULOSE 10 GRAM(S): 10 SOLUTION ORAL at 17:04

## 2022-07-15 RX ADMIN — CHLORHEXIDINE GLUCONATE 1 APPLICATION(S): 213 SOLUTION TOPICAL at 05:27

## 2022-07-15 RX ADMIN — Medication 81 MILLIGRAM(S): at 11:18

## 2022-07-15 NOTE — PROGRESS NOTE ADULT - SUBJECTIVE AND OBJECTIVE BOX
Chief complaint:  Mrs. Silverio is a 89 y/o female who presents with a chief complaint of Dyspnea w/ Exertion (2022 12:46)    Interval history:  Mrs. Silverio is a 89 y/o female with HFpEF, severe AS s/p recent balloon valvuloplasty, HTN, hypothyroidism, GERD, HCC s/p partial liver resection, MVP, anemia s/p multiple transfusions, recent hospital admission from - that presented for SOB transferred from [facility] to [place] (transferred on ) for evaluation for TAVR by Dr. Mccollum.    She presented to [facility] for c/o worsening chest pressure and dyspnea x 2 weeks. She was found to be severely anemic with Hgb 5.4 from 9.1 from prior admission 2 months ago. She reports seeing blood when she strains to have BM but denied any hematemesis, hematochezia or black stools. Admitted to ICU for anemia and hypotension. She received multiple units of PRBCs. Hgb stable now, no active bleeding. Thoracentesis for left pleural effusion showed transudative effusion.     Seen and examined at bedside, resting comfortably in NAD.  Noted to be slightly dyspneic while talking.  Offers numerous concerns. CBC showing Hgb downtrending      Review of systems: A complete 10-point review of systems was obtained and is negative except as stated in the interval history.    Past medical history is notable for:    SHORTNESS OF BREATH, ACUTE CHF ,ANEMIA, ELEVATED TROPONIN ,PLEURAL EFFUSION    No pertinent family history in first degree relatives    HTN (hypertension)    Mitral valve prolapse    Enlarged heart    Murmur    Hyperthyroidism    Arthritis    HTN (hypertension)    Diverticulosis    Hiatal hernia    Acid reflux    Liver cancer    Aortic stenosis    Shortness of breath    Acute on chronic diastolic heart failure    Elevated troponin    Sacral decubitus ulcer, stage II    Anemia    Opacity of lung on imaging study    H/O resection of liver    Status post cholecystectomy    DIFF BREATHING    Acute CHF    Anemia    Elevated troponin    Pleural effusion      Vitals:  Vital Signs Last 24 Hrs  T(C): 35.9 (15 Jul 2022 12:55), Max: 36.2 (2022 22:24)  T(F): 96.7 (15 Jul 2022 12:55), Max: 97.2 (2022 22:24)  HR: 81 (15 Jul 2022 12:55) (74 - 81)  BP: 101/51 (15 Jul 2022 12:55) (98/55 - 101/51)  RR: 18 (15 Jul 2022 12:55) (18 - 18)  SpO2: 98% (2022 20:24) (98% - 98%)  Patient On (Oxygen Delivery Method): room air    I&O's Detail    2022 07:01  -  15 Jul 2022 07:00  --------------------------------------------------------  IN:    Oral Fluid: 200 mL  Total IN: 200 mL    OUT:    Voided (mL): 1300 mL  Total OUT: 1300 mL    Total NET: -1100 mL    Weight trend:  Daily Weight in k (15 Jul 2022 05:15)      Physical exam:  General: No apparent distress  HEENT: Anicteric sclera. Moist mucous membranes.   Cardiac: Regular rate and rhythm. + murmur, no rubs, or gallops.   Vascular: Symmetric radial pulses. Dorsalis pedis pulses palpable.   Respiratory: normal inspiratory effort. + wheeze B/L.   Abdomen: Soft, nontender. Audible bowel sounds.   Extremities: Warm without edema. No cyanosis or clubbing.   Skin: Warm and dry. No rash.   Neurologic: Grossly normal motor function.   Psychiatric: Oriented to person, place, and time.     Data reviewed:  - Telemetry: NSR w/ episode of AT x 1    - EC Lead ECG (22 @ 08:38)    Ventricular Rate 69 BPM    Atrial Rate 69 BPM    P-R Interval 176 ms    QRS Duration 136 ms    Q-T Interval 502 ms    QTC Calculation(Bazett) 537 ms    P Axis 87 degrees    R Axis -49 degrees    T Axis 48 degrees    Diagnosis Line Sinus rhythm with Premature atrial complexes  Left axis deviation  Left ventricular hypertrophy with QRS widening  Abnormal ECG    - Echo:   TTE Echo Complete w/o Contrast w/ Doppler (22 @ 13:09)  Summary:   1. Left ventricular ejection fraction, by visual estimation, is 40 to   45%.   2. Normal right atrial size.   3. Mild mitral valve regurgitation.   4. Structurally normal mitral valve, with normal leaflet excursion.   5. Mild aortic regurgitation.   6. Severe aortic valve stenosis.   7. The aortic valve mean gradient is 75.1 mmHg consistent with severe   aortic stenosis.    - Radiology:   (Xray Chest 1 View- PORTABLE-Routine in AM.) (07.15.22 @ 06:22)   INTERPRETATION:  Clinical History / Reason for exam: Dyspnea on exertion   and heart failure    Comparison : Chest radiograph 2022.    Technique/Positioning: Frontal chest radiograph.    Findings:    Support devices: Overlying telemetry leads.    Cardiac/mediastinum/hilum: Stable enlarged cardiac silhouette.    Lung parenchyma/Pleura: Stable left lower lobe opacity/effusion. No   pneumothorax  Stable calcified granuloma right apex.  Skeleton/soft tissues: Stable    Impression:    Stable left lower lobe opacity/effusion.    - Labs:          7.2    5.71  )-----------( 121      ( 15 Jul 2022 04:57 )             22.8     07-15    139  |  96<L>  |  53<H>  ----------------------------<  90  3.5   |  30  |  1.2    Ca    9.2      15 Jul 2022 04:57  Mg     2.3     -15    TPro  6.8  /  Alb  3.8  /  TBili  0.3  /  DBili  x   /  AST  22  /  ALT  13  /  AlkPhos  110  07-14    LIVER FUNCTIONS - ( 2022 07:05 )  Alb: 3.8 g/dL / Pro: 6.8 g/dL / ALK PHOS: 110 U/L / ALT: 13 U/L / AST: 22 U/L / GGT: x             Medications:  aspirin  chewable 81 milliGRAM(s) Oral daily  chlorhexidine 4% Liquid 1 Application(s) Topical <User Schedule>  furosemide    Tablet 40 milliGRAM(s) Oral daily  lactulose Syrup 10 Gram(s) Oral every 8 hours  levothyroxine 100 MICROGram(s) Oral daily  pantoprazole    Tablet 40 milliGRAM(s) Oral before breakfast  senna 2 Tablet(s) Oral at bedtime    Drips:    PRN:       Allergies    Cipro (Other)  Levaquin (Rash)  tetracycline (Hives)    Intolerances

## 2022-07-15 NOTE — PROGRESS NOTE ADULT - ASSESSMENT
Assessment:     Mrs. Silverio is a 89 y/o female with HFpEF, severe AS s/p recent balloon valvuloplasty, HTN, hyperthyroidism, HCC s/p partial liver resection, MVP, anemia s/p multiple transfusions 2 months ago transferred from [facility] to [place] for evaluation for TAVR by Dr. Mccollum    Problems discussed and associated plan:    #HFpEF from severe AS AND ACUTE PULM EDEMA  -Continue with po lasix   -CXR noticed  -Discussed case with Dr. Mccollum, who recommended transfer to [place] for evaluation for TAVR    #Acute anemia from GIB likely lower GIB  -Pt is not candidate for intervention at this time  -S/p multiple units of PRBC  -Can do capsule endoscopy with Dr. Ball  -GI consult if develop threatening bleeding  -Keep Hbg > 7.5     #HTN  -Normotensive  -Continue to monitor VS per routine    #Hypothyroidism  -Continue Levothyroxine 100 mcg PO daily    #Constipation  -Continue Senna 2 tabs at bedtime    #DVT ppx:  -Sequential Compression Device (SCD)   Assessment:     Mrs. Silverio is a 89 y/o female with HFpEF, severe AS s/p recent balloon valvuloplasty, HTN, hyperthyroidism, HCC s/p partial liver resection, MVP, anemia s/p multiple transfusions 2 months ago transferred from [facility] to [place] for evaluation for TAVR by Dr. Mccollum    Problems discussed and associated plan:    #HFpEF from severe AS AND ACUTE PULM EDEMA  -Continue with po lasix   -CXR noticed  -Discussed case with Dr. Mccollum, who recommended transfer to [place] for evaluation for TAVR    #Acute anemia from GIB likely lower GIB  -Pt is not candidate for intervention at this time  -S/p multiple units of PRBC  -Can do capsule endoscopy with Dr. Ball  -GI consult if develop threatening bleeding  -Keep Hbg > 7.5     #HTN  -Normotensive  -Continue to monitor VS per routine    #Hypothyroidism  -Continue Levothyroxine 100 mcg PO daily    #GERD  -Continue Pantoprazole 40 mg PO daily    #Constipation  -Continue Senna 2 tabs at bedtime    #DVT ppx:  -Sequential Compression Device (SCD)   Assessment:     Mrs. Silverio is a 89 y/o female with HFpEF, severe AS s/p recent balloon valvuloplasty, HTN, hyperthyroidism, HCC s/p partial liver resection, MVP, anemia s/p multiple transfusions 2 months ago transferred from [facility] to [place] for evaluation for TAVR by Dr. Mccollum    Problems discussed and associated plan:    #HFpEF from severe AS AND ACUTE PULM EDEMA  -Continue with po lasix   -CXR noticed  -Discussed case with Dr. Mccollum, who recommended transfer to [place] for evaluation for TAVR    #Acute anemia from GIB likely lower GIB  -Pt is not candidate for intervention at this time due to severe AS  -Monitor CBC  -S/p multiple units of PRBC; transfuse as needed  -Can do capsule endoscopy with Dr. Ball  -GI consult if develop threatening bleeding  -Keep Hbg > 7.5     #HTN  -Normotensive  -Continue to monitor VS per routine    #Hypothyroidism  -Continue Levothyroxine 100 mcg PO daily    #GERD  -Continue Pantoprazole 40 mg PO daily    #Constipation  -Continue Senna 2 tabs at bedtime    #DVT ppx:  -Sequential Compression Device (SCD)   Assessment:     Mrs. Silverio is a 88 y/o female with HFpEF, severe AS s/p recent balloon valvuloplasty, HTN, hyperthyroidism, HCC s/p partial liver resection, MVP, anemia s/p multiple transfusions 2 months ago transferred from [facility] to [place] for evaluation for TAVR by Dr. Mccollum    Problems discussed and associated plan:    #Acute anemia from GIB likely lower GIB  - Monitor CBC  - Check iron profile, ferritin, retics, B12, folate, LDH, and haptoglobin  - Once above lab work collected, will give 1u pRBC with Lasix 20 mg IV  - Keep Hbg > 8  - S/p multiple units of PRBC  - Followed by Dr. Ball  - Will also discuss case with Dr. Woo prior to TAVR    #HFpEF from severe AS AND ACUTE PULM EDEMA  - Continue with po lasix   - CXR noted  - Discussed case with Dr. Mccollum    #HTN  -Normotensive  -Continue to monitor VS per routine    #Hypothyroidism  -Continue Levothyroxine 100 mcg PO daily    #GERD  -Continue Pantoprazole 40 mg PO daily    #Constipation  -Continue Senna 2 tabs at bedtime    #DVT ppx:  -Sequential Compression Device (SCD)

## 2022-07-15 NOTE — PROGRESS NOTE ADULT - SUBJECTIVE AND OBJECTIVE BOX
Chief complaint: Mrs. Silverio is a 89 y/o female who presents with a chief complaint of Dyspnea w/ Exertion (2022 12:46)    Interval history: Mrs. Silverio is a 89 y/o female with HFpEF, severe AS s/p recent balloon valvuloplasty, HTN, hyperthyroidism, HCC s/p partial liver resection, MVP, anemia s/p multiple transfusions 2 months ago transferred from [facility] to [place] for evaluation for TAVR by Dr. Mccollum.    She presented to [facility] for c/o worsening chest pressure and dyspnea x 2 weeks. She was found to be severely anemic with Hgb 5.4 from 9.1 from prior admission 2 months ago. She reports seeing blood when she strains to have BM but denied any hematemesis, hematochezia or black stools. Admitted to ICU for anemia and hypotension. She received multiple units of PRBCs. Hgb stable now, no active bleeding. Thoracentesis for left pleural effusion showed transudative effusion.     No complaints overnight.           Review of systems: A complete 10-point review of systems was obtained and is negative except as stated in the interval history.    Past medical history is notable for:    SHORTNESS OF BREATH, ACUTE CHF ,ANEMIA, ELEVATED TROPONIN ,PLEURAL EFFUSION    No pertinent family history in first degree relatives    HTN (hypertension)    Mitral valve prolapse    Enlarged heart    Murmur    Hyperthyroidism    Arthritis    HTN (hypertension)    Diverticulosis    Hiatal hernia    Acid reflux    Liver cancer    Aortic stenosis    Shortness of breath    Acute on chronic diastolic heart failure    Elevated troponin    Sacral decubitus ulcer, stage II    Anemia    Opacity of lung on imaging study    H/O resection of liver    Status post cholecystectomy    DIFF BREATHING    Acute CHF    Anemia    Elevated troponin    Pleural effusion      Vitals:  T(F): 96.3, Max: 98 ( @ 14:03)  HR: 74 (70 - 80)  BP: 100/52 (95/46 - 103/57)  RR: 18 (16 - 18)  SpO2: 98% (96% - 98%)    Ins & outs:      @ 07:  -   @ 07:00  --------------------------------------------------------  IN: 0 mL / OUT: 700 mL / NET: -700 mL     @ 07:01  -  07-13 @ 07:00  --------------------------------------------------------  IN: 250 mL / OUT: 950 mL / NET: -700 mL     @ 07: @ 07:00  --------------------------------------------------------  IN: 0 mL / OUT: 1050 mL / NET: -1050 mL     @ 07:15 @ 05:58  --------------------------------------------------------  IN: 0 mL / OUT: 1000 mL / NET: -1000 mL      Weight trend:      Physical exam:  General: No apparent distress  HEENT: Anicteric sclera. Moist mucous membranes.   Cardiac: Regular rate and rhythm. No murmurs, rubs, or gallops.   Vascular: Symmetric radial pulses. Dorsalis pedis pulses palpable.   Respiratory: Normal effort. Clear to ascultation.   Abdomen: Soft, nontender. Audible bowel sounds.   Extremities: Warm without edema. No cyanosis or clubbing.   Skin: Warm and dry. No rash.   Neurologic: Grossly normal motor function.   Psychiatric: Oriented to person, place, and time.     Data reviewed:  - Telemetry:   - EC Lead ECG (22 @ 08:38)    Ventricular Rate 69 BPM    Atrial Rate 69 BPM    P-R Interval 176 ms    QRS Duration 136 ms    Q-T Interval 502 ms    QTC Calculation(Bazett) 537 ms    P Axis 87 degrees    R Axis -49 degrees    T Axis 48 degrees    Diagnosis Line Sinus rhythm with Premature atrial complexes  Left axis deviation  Left ventricular hypertrophy with QRS widening  Abnormal ECG    - Echo:   TTE Echo Complete w/o Contrast w/ Doppler (22 @ 13:09)  Summary:   1. Left ventricular ejection fraction, by visual estimation, is 40 to   45%.   2. Normal right atrial size.   3. Mild mitral valve regurgitation.   4. Structurally normal mitral valve, with normal leaflet excursion.   5. Mild aortic regurgitation.   6. Severe aortic valve stenosis.   7. The aortic valve mean gradient is 75.1 mmHg consistent with severe   aortic stenosis.    - Radiology:   Xray Chest 1 View- PORTABLE-Routine (Xray Chest 1 View- PORTABLE-Routine in AM.) (22 @ 06:03)   Findings:    Support devices: None.    Cardiac/mediastinum/hilum: Indeterminate    Lung parenchyma/Pleura: Left lower lobe opacity/pleural effusion   unchanged. No air leak.    Skeleton/soft tissues: Unremarkable.    Impression:    Left lower lobe opacity/pleural effusion unchanged. No air leak.      - Labs:                        8.6    8.99  )-----------( 155      ( 2022 15:10 )             27.7     07-14    137  |  92<L>  |  52<H>  ----------------------------<  91  4.0   |  30  |  1.2    Ca    9.7      2022 07:05  Mg     2.4     07-13    TPro  6.8  /  Alb  3.8  /  TBili  0.3  /  DBili  x   /  AST  22  /  ALT  13  /  AlkPhos  110  07-14    LIVER FUNCTIONS - ( 2022 07:05 )  Alb: 3.8 g/dL / Pro: 6.8 g/dL / ALK PHOS: 110 U/L / ALT: 13 U/L / AST: 22 U/L / GGT: x               - Cultures:    Medications:  aspirin  chewable 81 milliGRAM(s) Oral daily  chlorhexidine 4% Liquid 1 Application(s) Topical <User Schedule>  furosemide    Tablet 40 milliGRAM(s) Oral daily  lactulose Syrup 10 Gram(s) Oral every 8 hours  levothyroxine 100 MICROGram(s) Oral daily  pantoprazole    Tablet 40 milliGRAM(s) Oral before breakfast  senna 2 Tablet(s) Oral at bedtime    Drips:    PRN:       Allergies    Cipro (Other)  Levaquin (Rash)  tetracycline (Hives)    Intolerances         Chief complaint: Mrs. Silverio is a 89 y/o female who presents with a chief complaint of Dyspnea w/ Exertion (2022 12:46)    Interval history: Mrs. Silverio is a 89 y/o female with HFpEF, severe AS s/p recent balloon valvuloplasty, HTN, hyperthyroidism, GERD, HCC s/p partial liver resection, MVP, anemia s/p multiple transfusions 2 months ago transferred from [facility] to [place] for evaluation for TAVR by Dr. Mccollum.    She presented to [facility] for c/o worsening chest pressure and dyspnea x 2 weeks. She was found to be severely anemic with Hgb 5.4 from 9.1 from prior admission 2 months ago. She reports seeing blood when she strains to have BM but denied any hematemesis, hematochezia or black stools. Admitted to ICU for anemia and hypotension. She received multiple units of PRBCs. Hgb stable now, no active bleeding. Thoracentesis for left pleural effusion showed transudative effusion.     No complaints overnight.           Review of systems: A complete 10-point review of systems was obtained and is negative except as stated in the interval history.    Past medical history is notable for:    SHORTNESS OF BREATH, ACUTE CHF ,ANEMIA, ELEVATED TROPONIN ,PLEURAL EFFUSION    No pertinent family history in first degree relatives    HTN (hypertension)    Mitral valve prolapse    Enlarged heart    Murmur    Hyperthyroidism    Arthritis    HTN (hypertension)    Diverticulosis    Hiatal hernia    Acid reflux    Liver cancer    Aortic stenosis    Shortness of breath    Acute on chronic diastolic heart failure    Elevated troponin    Sacral decubitus ulcer, stage II    Anemia    Opacity of lung on imaging study    H/O resection of liver    Status post cholecystectomy    DIFF BREATHING    Acute CHF    Anemia    Elevated troponin    Pleural effusion      Vitals:  T(F): 96.3, Max: 98 ( @ 14:03)  HR: 74 (70 - 80)  BP: 100/52 (95/46 - 103/57)  RR: 18 (16 - 18)  SpO2: 98% (96% - 98%)    Ins & outs:      @ 07:  -   @ 07:00  --------------------------------------------------------  IN: 0 mL / OUT: 700 mL / NET: -700 mL     @ 07: @ 07:00  --------------------------------------------------------  IN: 250 mL / OUT: 950 mL / NET: -700 mL     @ 07: @ 07:00  --------------------------------------------------------  IN: 0 mL / OUT: 1050 mL / NET: -1050 mL     @ 07:15 @ 05:58  --------------------------------------------------------  IN: 0 mL / OUT: 1000 mL / NET: -1000 mL      Weight trend:      Physical exam:  General: No apparent distress  HEENT: Anicteric sclera. Moist mucous membranes.   Cardiac: Regular rate and rhythm. No murmurs, rubs, or gallops.   Vascular: Symmetric radial pulses. Dorsalis pedis pulses palpable.   Respiratory: Normal effort. Clear to ascultation.   Abdomen: Soft, nontender. Audible bowel sounds.   Extremities: Warm without edema. No cyanosis or clubbing.   Skin: Warm and dry. No rash.   Neurologic: Grossly normal motor function.   Psychiatric: Oriented to person, place, and time.     Data reviewed:  - Telemetry:   - EC Lead ECG (22 @ 08:38)    Ventricular Rate 69 BPM    Atrial Rate 69 BPM    P-R Interval 176 ms    QRS Duration 136 ms    Q-T Interval 502 ms    QTC Calculation(Bazett) 537 ms    P Axis 87 degrees    R Axis -49 degrees    T Axis 48 degrees    Diagnosis Line Sinus rhythm with Premature atrial complexes  Left axis deviation  Left ventricular hypertrophy with QRS widening  Abnormal ECG    - Echo:   TTE Echo Complete w/o Contrast w/ Doppler (22 @ 13:09)  Summary:   1. Left ventricular ejection fraction, by visual estimation, is 40 to   45%.   2. Normal right atrial size.   3. Mild mitral valve regurgitation.   4. Structurally normal mitral valve, with normal leaflet excursion.   5. Mild aortic regurgitation.   6. Severe aortic valve stenosis.   7. The aortic valve mean gradient is 75.1 mmHg consistent with severe   aortic stenosis.    - Radiology:   Xray Chest 1 View- PORTABLE-Routine (Xray Chest 1 View- PORTABLE-Routine in AM.) (22 @ 06:03)   Findings:    Support devices: None.    Cardiac/mediastinum/hilum: Indeterminate    Lung parenchyma/Pleura: Left lower lobe opacity/pleural effusion   unchanged. No air leak.    Skeleton/soft tissues: Unremarkable.    Impression:    Left lower lobe opacity/pleural effusion unchanged. No air leak.      - Labs:                        8.6    8.99  )-----------( 155      ( 2022 15:10 )             27.7     07-14    137  |  92<L>  |  52<H>  ----------------------------<  91  4.0   |  30  |  1.2    Ca    9.7      2022 07:05  Mg     2.4     07-13    TPro  6.8  /  Alb  3.8  /  TBili  0.3  /  DBili  x   /  AST  22  /  ALT  13  /  AlkPhos  110  07-14    LIVER FUNCTIONS - ( 2022 07:05 )  Alb: 3.8 g/dL / Pro: 6.8 g/dL / ALK PHOS: 110 U/L / ALT: 13 U/L / AST: 22 U/L / GGT: x               - Cultures:    Medications:  aspirin  chewable 81 milliGRAM(s) Oral daily  chlorhexidine 4% Liquid 1 Application(s) Topical <User Schedule>  furosemide    Tablet 40 milliGRAM(s) Oral daily  lactulose Syrup 10 Gram(s) Oral every 8 hours  levothyroxine 100 MICROGram(s) Oral daily  pantoprazole    Tablet 40 milliGRAM(s) Oral before breakfast  senna 2 Tablet(s) Oral at bedtime    Drips:    PRN:       Allergies    Cipro (Other)  Levaquin (Rash)  tetracycline (Hives)    Intolerances         Chief complaint:  Mrs. Silverio is a 88 y/o female who presents with a chief complaint of Dyspnea w/ Exertion (2022 12:46)    Interval history:  Mrs. Silverio is a 88 y/o female with HFpEF, severe AS s/p recent balloon valvuloplasty, HTN, hyperthyroidism, GERD, HCC s/p partial liver resection, MVP, anemia s/p multiple transfusions 2 months ago transferred from [facility] to [place] for evaluation for TAVR by Dr. Mccollum.    She presented to [facility] for c/o worsening chest pressure and dyspnea x 2 weeks. She was found to be severely anemic with Hgb 5.4 from 9.1 from prior admission 2 months ago. She reports seeing blood when she strains to have BM but denied any hematemesis, hematochezia or black stools. Admitted to ICU for anemia and hypotension. She received multiple units of PRBCs. Hgb stable now, no active bleeding. Thoracentesis for left pleural effusion showed transudative effusion.     No complaints overnight.           Review of systems: A complete 10-point review of systems was obtained and is negative except as stated in the interval history.    Past medical history is notable for:    SHORTNESS OF BREATH, ACUTE CHF ,ANEMIA, ELEVATED TROPONIN ,PLEURAL EFFUSION    No pertinent family history in first degree relatives    HTN (hypertension)    Mitral valve prolapse    Enlarged heart    Murmur    Hyperthyroidism    Arthritis    HTN (hypertension)    Diverticulosis    Hiatal hernia    Acid reflux    Liver cancer    Aortic stenosis    Shortness of breath    Acute on chronic diastolic heart failure    Elevated troponin    Sacral decubitus ulcer, stage II    Anemia    Opacity of lung on imaging study    H/O resection of liver    Status post cholecystectomy    DIFF BREATHING    Acute CHF    Anemia    Elevated troponin    Pleural effusion      Vitals:  T(F): 96.3, Max: 98 ( @ 14:03)  HR: 74 (70 - 80)  BP: 100/52 (95/46 - 103/57)  RR: 18 (16 - 18)  SpO2: 98% (96% - 98%)    Ins & outs:      @ 07:  -   @ 07:00  --------------------------------------------------------  IN: 0 mL / OUT: 700 mL / NET: -700 mL     @ 07: @ 07:00  --------------------------------------------------------  IN: 250 mL / OUT: 950 mL / NET: -700 mL     @ 07: @ 07:00  --------------------------------------------------------  IN: 0 mL / OUT: 1050 mL / NET: -1050 mL     @ 07:15 @ 05:58  --------------------------------------------------------  IN: 0 mL / OUT: 1000 mL / NET: -1000 mL      Weight trend:      Physical exam:  General: No apparent distress  HEENT: Anicteric sclera. Moist mucous membranes.   Cardiac: Regular rate and rhythm. No murmurs, rubs, or gallops.   Vascular: Symmetric radial pulses. Dorsalis pedis pulses palpable.   Respiratory: Normal effort. Clear to ascultation.   Abdomen: Soft, nontender. Audible bowel sounds.   Extremities: Warm without edema. No cyanosis or clubbing.   Skin: Warm and dry. No rash.   Neurologic: Grossly normal motor function.   Psychiatric: Oriented to person, place, and time.     Data reviewed:  - Telemetry:   - EC Lead ECG (22 @ 08:38)    Ventricular Rate 69 BPM    Atrial Rate 69 BPM    P-R Interval 176 ms    QRS Duration 136 ms    Q-T Interval 502 ms    QTC Calculation(Bazett) 537 ms    P Axis 87 degrees    R Axis -49 degrees    T Axis 48 degrees    Diagnosis Line Sinus rhythm with Premature atrial complexes  Left axis deviation  Left ventricular hypertrophy with QRS widening  Abnormal ECG    - Echo:   TTE Echo Complete w/o Contrast w/ Doppler (22 @ 13:09)  Summary:   1. Left ventricular ejection fraction, by visual estimation, is 40 to   45%.   2. Normal right atrial size.   3. Mild mitral valve regurgitation.   4. Structurally normal mitral valve, with normal leaflet excursion.   5. Mild aortic regurgitation.   6. Severe aortic valve stenosis.   7. The aortic valve mean gradient is 75.1 mmHg consistent with severe   aortic stenosis.    - Radiology:   Xray Chest 1 View- PORTABLE-Routine (Xray Chest 1 View- PORTABLE-Routine in AM.) (22 @ 06:03)   Findings:    Support devices: None.    Cardiac/mediastinum/hilum: Indeterminate    Lung parenchyma/Pleura: Left lower lobe opacity/pleural effusion   unchanged. No air leak.    Skeleton/soft tissues: Unremarkable.    Impression:    Left lower lobe opacity/pleural effusion unchanged. No air leak.      - Labs:                        8.6    8.99  )-----------( 155      ( 2022 15:10 )             27.7     07-14    137  |  92<L>  |  52<H>  ----------------------------<  91  4.0   |  30  |  1.2    Ca    9.7      2022 07:05  Mg     2.4     07-13    TPro  6.8  /  Alb  3.8  /  TBili  0.3  /  DBili  x   /  AST  22  /  ALT  13  /  AlkPhos  110  07-14    LIVER FUNCTIONS - ( 2022 07:05 )  Alb: 3.8 g/dL / Pro: 6.8 g/dL / ALK PHOS: 110 U/L / ALT: 13 U/L / AST: 22 U/L / GGT: x               - Cultures:    Medications:  aspirin  chewable 81 milliGRAM(s) Oral daily  chlorhexidine 4% Liquid 1 Application(s) Topical <User Schedule>  furosemide    Tablet 40 milliGRAM(s) Oral daily  lactulose Syrup 10 Gram(s) Oral every 8 hours  levothyroxine 100 MICROGram(s) Oral daily  pantoprazole    Tablet 40 milliGRAM(s) Oral before breakfast  senna 2 Tablet(s) Oral at bedtime    Drips:    PRN:       Allergies    Cipro (Other)  Levaquin (Rash)  tetracycline (Hives)    Intolerances         Chief complaint:  Mrs. Silverio is a 88 y/o female who presents with a chief complaint of Dyspnea w/ Exertion (2022 12:46)    Interval history:  Mrs. Silverio is a 88 y/o female with HFpEF, severe AS s/p recent balloon valvuloplasty, HTN, hypothyroidism, GERD, HCC s/p partial liver resection, MVP, anemia s/p multiple transfusions, recent hospital admission from - that presented for SOB transferred from [facility] to [place] for evaluation for TAVR by Dr. Mccollum.    She presented to [facility] for c/o worsening chest pressure and dyspnea x 2 weeks. She was found to be severely anemic with Hgb 5.4 from 9.1 from prior admission 2 months ago. She reports seeing blood when she strains to have BM but denied any hematemesis, hematochezia or black stools. Admitted to ICU for anemia and hypotension. She received multiple units of PRBCs. Hgb stable now, no active bleeding. Thoracentesis for left pleural effusion showed transudative effusion.     No complaints overnight.       Review of systems: A complete 10-point review of systems was obtained and is negative except as stated in the interval history.    Past medical history is notable for:    SHORTNESS OF BREATH, ACUTE CHF ,ANEMIA, ELEVATED TROPONIN ,PLEURAL EFFUSION    No pertinent family history in first degree relatives    HTN (hypertension)    Mitral valve prolapse    Enlarged heart    Murmur    Hyperthyroidism    Arthritis    HTN (hypertension)    Diverticulosis    Hiatal hernia    Acid reflux    Liver cancer    Aortic stenosis    Shortness of breath    Acute on chronic diastolic heart failure    Elevated troponin    Sacral decubitus ulcer, stage II    Anemia    Opacity of lung on imaging study    H/O resection of liver    Status post cholecystectomy    DIFF BREATHING    Acute CHF    Anemia    Elevated troponin    Pleural effusion      Vitals:  T(F): 96.3, Max: 98 (-14 @ 14:03)  HR: 74 (70 - 80)  BP: 100/52 (95/46 - 103/57)  RR: 18 (16 - 18)  SpO2: 98% (96% - 98%)    Ins & outs:      @ 07:  -  12 @ 07:00  --------------------------------------------------------  IN: 0 mL / OUT: 700 mL / NET: -700 mL     @ 07: @ 07:00  --------------------------------------------------------  IN: 250 mL / OUT: 950 mL / NET: -700 mL     @ 07: @ 07:00  --------------------------------------------------------  IN: 0 mL / OUT: 1050 mL / NET: -1050 mL     @ 07:15 @ 05:58  --------------------------------------------------------  IN: 0 mL / OUT: 1000 mL / NET: -1000 mL      Weight trend:      Physical exam:  General: No apparent distress  HEENT: Anicteric sclera. Moist mucous membranes.   Cardiac: Regular rate and rhythm. No murmurs, rubs, or gallops.   Vascular: Symmetric radial pulses. Dorsalis pedis pulses palpable.   Respiratory: Normal effort. Clear to ascultation.   Abdomen: Soft, nontender. Audible bowel sounds.   Extremities: Warm without edema. No cyanosis or clubbing.   Skin: Warm and dry. No rash.   Neurologic: Grossly normal motor function.   Psychiatric: Oriented to person, place, and time.     Data reviewed:  - Telemetry:   - EC Lead ECG (22 @ 08:38)    Ventricular Rate 69 BPM    Atrial Rate 69 BPM    P-R Interval 176 ms    QRS Duration 136 ms    Q-T Interval 502 ms    QTC Calculation(Bazett) 537 ms    P Axis 87 degrees    R Axis -49 degrees    T Axis 48 degrees    Diagnosis Line Sinus rhythm with Premature atrial complexes  Left axis deviation  Left ventricular hypertrophy with QRS widening  Abnormal ECG    - Echo:   TTE Echo Complete w/o Contrast w/ Doppler (22 @ 13:09)  Summary:   1. Left ventricular ejection fraction, by visual estimation, is 40 to   45%.   2. Normal right atrial size.   3. Mild mitral valve regurgitation.   4. Structurally normal mitral valve, with normal leaflet excursion.   5. Mild aortic regurgitation.   6. Severe aortic valve stenosis.   7. The aortic valve mean gradient is 75.1 mmHg consistent with severe   aortic stenosis.    - Radiology:   Xray Chest 1 View- PORTABLE-Routine (Xray Chest 1 View- PORTABLE-Routine in AM.) (22 @ 06:03)   Findings:    Support devices: None.    Cardiac/mediastinum/hilum: Indeterminate    Lung parenchyma/Pleura: Left lower lobe opacity/pleural effusion   unchanged. No air leak.    Skeleton/soft tissues: Unremarkable.    Impression:    Left lower lobe opacity/pleural effusion unchanged. No air leak.      - Labs:                        8.6    8.99  )-----------( 155      ( 2022 15:10 )             27.7     07-    137  |  92<L>  |  52<H>  ----------------------------<  91  4.0   |  30  |  1.2    Ca    9.7      2022 07:05  Mg     2.4     -    TPro  6.8  /  Alb  3.8  /  TBili  0.3  /  DBili  x   /  AST  22  /  ALT  13  /  AlkPhos  110  07-14    LIVER FUNCTIONS - ( 2022 07:05 )  Alb: 3.8 g/dL / Pro: 6.8 g/dL / ALK PHOS: 110 U/L / ALT: 13 U/L / AST: 22 U/L / GGT: x               - Cultures:    Medications:  aspirin  chewable 81 milliGRAM(s) Oral daily  chlorhexidine 4% Liquid 1 Application(s) Topical <User Schedule>  furosemide    Tablet 40 milliGRAM(s) Oral daily  lactulose Syrup 10 Gram(s) Oral every 8 hours  levothyroxine 100 MICROGram(s) Oral daily  pantoprazole    Tablet 40 milliGRAM(s) Oral before breakfast  senna 2 Tablet(s) Oral at bedtime    Drips:    PRN:       Allergies    Cipro (Other)  Levaquin (Rash)  tetracycline (Hives)    Intolerances

## 2022-07-15 NOTE — PROGRESS NOTE ADULT - ASSESSMENT
Assessment:      Mrs. Silverio is a 89 y/o female with HFpEF, severe AS s/p recent balloon valvuloplasty, HTN, hyperthyroidism, HCC s/p partial liver resection, MVP, anemia s/p multiple transfusions 2 months ago transferred from [facility] to [place] for evaluation for TAVR by Dr. Mccollum           Problems discussed and associated plan:           #HFpEF      #severe AS     - s/p BAV (5/25/22)     - c/w lasix 40 mg QD     - TAVR work-up/optimization     - strict I&Os     - daily weights     - K+ 3.5, will replete 60 meq po x 1           #Acute anemia from likely GIB     - c/w protonix 40 mg QD     - Monitor CBC, will transfuse prn (7.2 <- 8.6 <- 8.2 <- 9)     - check LDH, haptoglobin, B12, folate, iron, ferritin and reticulocyte count     - will consult GI for recs, (EGD/colonoscopy/capsule endoscopy, risk v benefit)           #HTN     - Normotensive     - Continue to monitor VS per routine           #Hypothyroidism     - c/w Levothyroxine 100 mcg            #GERD     - c/w Protonix 40 mg            #Constipation     - c/w senna, lactulose        #DVT ppx:     -Sequential Compression Device (SCD)           [id number]

## 2022-07-16 LAB
ANION GAP SERPL CALC-SCNC: 13 MMOL/L — SIGNIFICANT CHANGE UP (ref 7–14)
BUN SERPL-MCNC: 43 MG/DL — HIGH (ref 10–20)
CALCIUM SERPL-MCNC: 9.3 MG/DL — SIGNIFICANT CHANGE UP (ref 8.5–10.1)
CHLORIDE SERPL-SCNC: 97 MMOL/L — LOW (ref 98–110)
CO2 SERPL-SCNC: 28 MMOL/L — SIGNIFICANT CHANGE UP (ref 17–32)
CREAT SERPL-MCNC: 1.1 MG/DL — SIGNIFICANT CHANGE UP (ref 0.7–1.5)
EGFR: 48 ML/MIN/1.73M2 — LOW
FERRITIN SERPL-MCNC: 24 NG/ML — SIGNIFICANT CHANGE UP (ref 15–150)
FOLATE SERPL-MCNC: >20 NG/ML — SIGNIFICANT CHANGE UP
GLUCOSE SERPL-MCNC: 106 MG/DL — HIGH (ref 70–99)
HAPTOGLOB SERPL-MCNC: 175 MG/DL — SIGNIFICANT CHANGE UP (ref 34–200)
HCT VFR BLD CALC: 29.8 % — LOW (ref 37–47)
HCT VFR BLD CALC: 30.5 % — LOW (ref 37–47)
HGB BLD-MCNC: 10.2 G/DL — LOW (ref 12–16)
HGB BLD-MCNC: 9.9 G/DL — LOW (ref 12–16)
MAGNESIUM SERPL-MCNC: 2.2 MG/DL — SIGNIFICANT CHANGE UP (ref 1.8–2.4)
MCHC RBC-ENTMCNC: 27.9 PG — SIGNIFICANT CHANGE UP (ref 27–31)
MCHC RBC-ENTMCNC: 28.1 PG — SIGNIFICANT CHANGE UP (ref 27–31)
MCHC RBC-ENTMCNC: 33.2 G/DL — SIGNIFICANT CHANGE UP (ref 32–37)
MCHC RBC-ENTMCNC: 33.4 G/DL — SIGNIFICANT CHANGE UP (ref 32–37)
MCV RBC AUTO: 83.3 FL — SIGNIFICANT CHANGE UP (ref 81–99)
MCV RBC AUTO: 84.7 FL — SIGNIFICANT CHANGE UP (ref 81–99)
NRBC # BLD: 0 /100 WBCS — SIGNIFICANT CHANGE UP (ref 0–0)
NRBC # BLD: 0 /100 WBCS — SIGNIFICANT CHANGE UP (ref 0–0)
PLATELET # BLD AUTO: 124 K/UL — LOW (ref 130–400)
PLATELET # BLD AUTO: 125 K/UL — LOW (ref 130–400)
POTASSIUM SERPL-MCNC: 4.2 MMOL/L — SIGNIFICANT CHANGE UP (ref 3.5–5)
POTASSIUM SERPL-SCNC: 4.2 MMOL/L — SIGNIFICANT CHANGE UP (ref 3.5–5)
RBC # BLD: 3.52 M/UL — LOW (ref 4.2–5.4)
RBC # BLD: 3.66 M/UL — LOW (ref 4.2–5.4)
RBC # FLD: 15.9 % — HIGH (ref 11.5–14.5)
RBC # FLD: 16.1 % — HIGH (ref 11.5–14.5)
SODIUM SERPL-SCNC: 138 MMOL/L — SIGNIFICANT CHANGE UP (ref 135–146)
VIT B12 SERPL-MCNC: 1258 PG/ML — HIGH (ref 232–1245)
WBC # BLD: 7 K/UL — SIGNIFICANT CHANGE UP (ref 4.8–10.8)
WBC # BLD: 8.74 K/UL — SIGNIFICANT CHANGE UP (ref 4.8–10.8)
WBC # FLD AUTO: 7 K/UL — SIGNIFICANT CHANGE UP (ref 4.8–10.8)
WBC # FLD AUTO: 8.74 K/UL — SIGNIFICANT CHANGE UP (ref 4.8–10.8)

## 2022-07-16 PROCEDURE — 99232 SBSQ HOSP IP/OBS MODERATE 35: CPT

## 2022-07-16 RX ORDER — PHENYLEPHRINE-SHARK LIVER OIL-MINERAL OIL-PETROLATUM RECTAL OINTMENT
1 OINTMENT (GRAM) RECTAL
Refills: 0 | Status: DISCONTINUED | OUTPATIENT
Start: 2022-07-16 | End: 2022-07-16

## 2022-07-16 RX ORDER — IRON SUCROSE 20 MG/ML
200 INJECTION, SOLUTION INTRAVENOUS EVERY 24 HOURS
Refills: 0 | Status: COMPLETED | OUTPATIENT
Start: 2022-07-16 | End: 2022-07-20

## 2022-07-16 RX ORDER — HYDROCORTISONE 1 %
1 OINTMENT (GRAM) TOPICAL
Refills: 0 | Status: DISCONTINUED | OUTPATIENT
Start: 2022-07-16 | End: 2022-07-18

## 2022-07-16 RX ADMIN — Medication 20 MILLIGRAM(S): at 00:57

## 2022-07-16 RX ADMIN — PANTOPRAZOLE SODIUM 40 MILLIGRAM(S): 20 TABLET, DELAYED RELEASE ORAL at 05:08

## 2022-07-16 RX ADMIN — Medication 100 MICROGRAM(S): at 05:09

## 2022-07-16 RX ADMIN — IRON SUCROSE 110 MILLIGRAM(S): 20 INJECTION, SOLUTION INTRAVENOUS at 15:51

## 2022-07-16 RX ADMIN — Medication 40 MILLIGRAM(S): at 05:08

## 2022-07-16 RX ADMIN — CHLORHEXIDINE GLUCONATE 1 APPLICATION(S): 213 SOLUTION TOPICAL at 05:07

## 2022-07-16 NOTE — PROGRESS NOTE ADULT - SUBJECTIVE AND OBJECTIVE BOX
Chief complaint:  Mrs. Silverio is a 89 y/o female who presents with a chief complaint of Dyspnea w/ Exertion (2022 12:46)    Interval history:  Mrs. Silverio is a 89 y/o female with HFpEF, severe AS s/p recent balloon valvuloplasty, HTN, hypothyroidism, GERD, HCC s/p partial liver resection, MVP, anemia s/p multiple transfusions, recent hospital admission from - that presented for SOB transferred from [facility] to [place] (transferred on ) for evaluation for TAVR by Dr. Mccollum.    She presented to [facility] for c/o worsening chest pressure and dyspnea x 2 weeks. She was found to be severely anemic with Hgb 5.4 from 9.1 from prior admission 2 months ago. She reports seeing blood when she strains to have BM but denied any hematemesis, hematochezia or black stools. Admitted to ICU for anemia and hypotension. She received multiple units of PRBCs. Hgb stable now, no active bleeding. Thoracentesis for left pleural effusion showed transudative effusion.     Seen and examined at bedside, resting comfortably in NAD.  Noted to be slightly dyspneic while talking.  Offers numerous concerns. CBC showing Hgb downtrending      Review of systems: A complete 10-point review of systems was obtained and is negative except as stated in the interval history.    Past medical history is notable for:    SHORTNESS OF BREATH, ACUTE CHF ,ANEMIA, ELEVATED TROPONIN ,PLEURAL EFFUSION    No pertinent family history in first degree relatives    HTN (hypertension)    Mitral valve prolapse    Enlarged heart    Murmur    Hyperthyroidism    Arthritis    HTN (hypertension)    Diverticulosis    Hiatal hernia    Acid reflux    Liver cancer    Aortic stenosis    Shortness of breath    Acute on chronic diastolic heart failure    Elevated troponin    Sacral decubitus ulcer, stage II    Anemia    Opacity of lung on imaging study    H/O resection of liver    Status post cholecystectomy    DIFF BREATHING    Acute CHF    Anemia    Elevated troponin    Pleural effusion      Vitals:  Vital Signs Last 24 Hrs  T(C): 35.9 (15 Jul 2022 12:55), Max: 36.2 (2022 22:24)  T(F): 96.7 (15 Jul 2022 12:55), Max: 97.2 (2022 22:24)  HR: 81 (15 Jul 2022 12:55) (74 - 81)  BP: 101/51 (15 Jul 2022 12:55) (98/55 - 101/51)  RR: 18 (15 Jul 2022 12:55) (18 - 18)  SpO2: 98% (2022 20:24) (98% - 98%)  Patient On (Oxygen Delivery Method): room air    I&O's Detail    2022 07:01  -  15 Jul 2022 07:00  --------------------------------------------------------  IN:    Oral Fluid: 200 mL  Total IN: 200 mL    OUT:    Voided (mL): 1300 mL  Total OUT: 1300 mL    Total NET: -1100 mL    Weight trend:  Daily Weight in k (15 Jul 2022 05:15)      Physical exam:  General: No apparent distress  HEENT: Anicteric sclera. Moist mucous membranes.   Cardiac: Regular rate and rhythm. + murmur, no rubs, or gallops.   Vascular: Symmetric radial pulses. Dorsalis pedis pulses palpable.   Respiratory: normal inspiratory effort. + wheeze B/L.   Abdomen: Soft, nontender. Audible bowel sounds.   Extremities: Warm without edema. No cyanosis or clubbing.   Skin: Warm and dry. No rash.   Neurologic: Grossly normal motor function.   Psychiatric: Oriented to person, place, and time.     Data reviewed:  - Telemetry: NSR w/ episode of AT x 1    - EC Lead ECG (22 @ 08:38)    Ventricular Rate 69 BPM    Atrial Rate 69 BPM    P-R Interval 176 ms    QRS Duration 136 ms    Q-T Interval 502 ms    QTC Calculation(Bazett) 537 ms    P Axis 87 degrees    R Axis -49 degrees    T Axis 48 degrees    Diagnosis Line Sinus rhythm with Premature atrial complexes  Left axis deviation  Left ventricular hypertrophy with QRS widening  Abnormal ECG    - Echo:   TTE Echo Complete w/o Contrast w/ Doppler (22 @ 13:09)  Summary:   1. Left ventricular ejection fraction, by visual estimation, is 40 to   45%.   2. Normal right atrial size.   3. Mild mitral valve regurgitation.   4. Structurally normal mitral valve, with normal leaflet excursion.   5. Mild aortic regurgitation.   6. Severe aortic valve stenosis.   7. The aortic valve mean gradient is 75.1 mmHg consistent with severe   aortic stenosis.    - Radiology:   (Xray Chest 1 View- PORTABLE-Routine in AM.) (07.15.22 @ 06:22)   INTERPRETATION:  Clinical History / Reason for exam: Dyspnea on exertion   and heart failure    Comparison : Chest radiograph 2022.    Technique/Positioning: Frontal chest radiograph.    Findings:    Support devices: Overlying telemetry leads.    Cardiac/mediastinum/hilum: Stable enlarged cardiac silhouette.    Lung parenchyma/Pleura: Stable left lower lobe opacity/effusion. No   pneumothorax  Stable calcified granuloma right apex.  Skeleton/soft tissues: Stable    Impression:    Stable left lower lobe opacity/effusion.    - Labs:          7.2    5.71  )-----------( 121      ( 15 Jul 2022 04:57 )             22.8     07-15    139  |  96<L>  |  53<H>  ----------------------------<  90  3.5   |  30  |  1.2    Ca    9.2      15 Jul 2022 04:57  Mg     2.3     -15    TPro  6.8  /  Alb  3.8  /  TBili  0.3  /  DBili  x   /  AST  22  /  ALT  13  /  AlkPhos  110  07-14    LIVER FUNCTIONS - ( 2022 07:05 )  Alb: 3.8 g/dL / Pro: 6.8 g/dL / ALK PHOS: 110 U/L / ALT: 13 U/L / AST: 22 U/L / GGT: x             Medications:  aspirin  chewable 81 milliGRAM(s) Oral daily  chlorhexidine 4% Liquid 1 Application(s) Topical <User Schedule>  furosemide    Tablet 40 milliGRAM(s) Oral daily  lactulose Syrup 10 Gram(s) Oral every 8 hours  levothyroxine 100 MICROGram(s) Oral daily  pantoprazole    Tablet 40 milliGRAM(s) Oral before breakfast  senna 2 Tablet(s) Oral at bedtime    Drips:    PRN:       Allergies    Cipro (Other)  Levaquin (Rash)  tetracycline (Hives)    Intolerances Chief complaint:  Mrs. Silverio is a 89 y/o female who presents with a chief complaint of Dyspnea w/ Exertion    Interval history:  Mrs. Silverio is a 89 y/o female with HFpEF, severe AS s/p recent balloon valvuloplasty, HTN, hypothyroidism, GERD, HCC s/p partial liver resection, MVP, anemia s/p multiple transfusions, recent hospital admission from - that presented for SOB transferred from [facility] to [place] (transferred on ) for evaluation for TAVR by Dr. Mccollum.    She presented to [facility] for c/o worsening chest pressure and dyspnea x 2 weeks. She was found to be severely anemic with Hgb 5.4 from 9.1 from prior admission 2 months ago. She reports seeing blood when she strains to have BM but denied any hematemesis, hematochezia or black stools. Admitted to ICU for anemia and hypotension. She received multiple units of PRBCs. Hgb stable now, no active bleeding. Thoracentesis for left pleural effusion showed transudative effusion.     Seen and examined at bedside, resting comfortably in NAD in high fowlers position.  Noted to be slightly dyspneic while talking but with 98% O2 saturation.  c/o hemorrhoidal discomfort.      Review of systems: A complete 10-point review of systems was obtained and is negative except as stated in the interval history.    Past medical history is notable for:    SHORTNESS OF BREATH, ACUTE CHF ,ANEMIA, ELEVATED TROPONIN ,PLEURAL EFFUSION    No pertinent family history in first degree relatives    HTN (hypertension)    Mitral valve prolapse    Enlarged heart    Murmur    Hyperthyroidism    Arthritis    HTN (hypertension)    Diverticulosis    Hiatal hernia    Acid reflux    Liver cancer    Aortic stenosis    Shortness of breath    Acute on chronic diastolic heart failure    Elevated troponin    Sacral decubitus ulcer, stage II    Anemia    Opacity of lung on imaging study    H/O resection of liver    Status post cholecystectomy    DIFF BREATHING    Acute CHF    Anemia    Elevated troponin    Pleural effusion      Vitals:  ICU Vital Signs Last 24 Hrs  T(C): 36.8 (2022 12:35), Max: 37.4 (2022 05:00)  T(F): 98.3 (2022 12:35), Max: 99.3 (2022 05:00)  HR: 77 (2022 12:35) (77 - 97)  BP: 95/51 (2022 12:35) (95/51 - 106/54)  RR: 18 (2022 12:35) (18 - 18)  SpO2: 98% RA    I&O's Detail  I&O's Summary    15 Jul 2022 07:01  -  2022 07:00  --------------------------------------------------------  IN: 540 mL / OUT: 600 mL / NET: -60 mL    2022 07:01  -  2022 14:54  --------------------------------------------------------  IN: 502 mL / OUT: 0 mL / NET: 502 mL        Weight trend:  Daily     Daily Weight in k.6 (2022 05:00)  Daily Weight in k (15 Jul 2022 05:15)      Physical exam:  General: No apparent distress  HEENT: Anicteric sclera. Moist mucous membranes.   Cardiac: Regular rate and rhythm. + murmur, no rubs, or gallops.   Vascular: Symmetric radial pulses. Dorsalis pedis pulses palpable.   Respiratory: normal inspiratory effort. no wheeze B/L.   Abdomen: Soft, nontender. Audible bowel sounds.   Extremities: Warm without edema. No cyanosis or clubbing.   Skin: Warm and dry. No rash.   Neurologic: Grossly normal motor function.   Psychiatric: Oriented to person, place, and time.     Data reviewed:  - Telemetry: NSR w/ episode of AT 6-7 beats     - EC Lead ECG (22 @ 08:38)    Ventricular Rate 69 BPM    Atrial Rate 69 BPM    P-R Interval 176 ms    QRS Duration 136 ms    Q-T Interval 502 ms    QTC Calculation(Bazett) 537 ms    P Axis 87 degrees    R Axis -49 degrees    T Axis 48 degrees    Diagnosis Line Sinus rhythm with Premature atrial complexes  Left axis deviation  Left ventricular hypertrophy with QRS widening  Abnormal ECG    - Echo:   TTE Echo Complete w/o Contrast w/ Doppler (22 @ 13:09)  Summary:   1. Left ventricular ejection fraction, by visual estimation, is 40 to   45%.   2. Normal right atrial size.   3. Mild mitral valve regurgitation.   4. Structurally normal mitral valve, with normal leaflet excursion.   5. Mild aortic regurgitation.   6. Severe aortic valve stenosis.   7. The aortic valve mean gradient is 75.1 mmHg consistent with severe   aortic stenosis.    - Radiology:   (Xray Chest 1 View- PORTABLE-Routine in AM.) (07.15.22 @ 06:22)   INTERPRETATION:  Clinical History / Reason for exam: Dyspnea on exertion   and heart failure    Comparison : Chest radiograph 2022.    Technique/Positioning: Frontal chest radiograph.    Findings:    Support devices: Overlying telemetry leads.    Cardiac/mediastinum/hilum: Stable enlarged cardiac silhouette.    Lung parenchyma/Pleura: Stable left lower lobe opacity/effusion. No   pneumothorax  Stable calcified granuloma right apex.  Skeleton/soft tissues: Stable    Impression:    Stable left lower lobe opacity/effusion.    - Labs:                      9.9    7.00  )-----------( 125      ( 2022 05:45 )             29.8         138  |  97<L>  |  43<H>  ----------------------------<  106<H>  4.2   |  28  |  1.1    Ca    9.3      2022 05:45  Mg     2.2           Medications:  aspirin  chewable 81 milliGRAM(s) Oral daily  chlorhexidine 4% Liquid 1 Application(s) Topical <User Schedule>  furosemide    Tablet 40 milliGRAM(s) Oral daily  lactulose Syrup 10 Gram(s) Oral every 8 hours  levothyroxine 100 MICROGram(s) Oral daily  pantoprazole    Tablet 40 milliGRAM(s) Oral before breakfast  senna 2 Tablet(s) Oral at bedtime        Allergies    Cipro (Other)  Levaquin (Rash)  tetracycline (Hives)    Intolerances

## 2022-07-16 NOTE — PROGRESS NOTE ADULT - ASSESSMENT
Assessment:     Mrs. Silverio is a 89 y/o female with HFpEF, severe AS s/p recent balloon valvuloplasty, HTN, hyperthyroidism, HCC s/p partial liver resection, MVP, anemia s/p multiple transfusions 2 months ago transferred from [facility] to [place] for evaluation for TAVR by Dr. Mccollum      #Acute anemia from GIB likely lower GIB  -s/p 1 unit PRBC - 7/15th, Lasix 20mg iv given during transfusion  -  ferritin 24- START Iron sucrose IV x5days   - Monitor CBC; HGB 9.9/HCT 29.8  -monitor for melena   - Followed by Dr. Gabrielle RAMACHANDRAN , case to be discussed with Dr. Woo prior to TAVR    #HFpEF    severe AS sp recent balloon valvuloplasty  - Continue with po lasix 40mg   -  Dr. Mccollum onboard for discussion plan for TAVR  - daily weights  - monitor strict I&O'S    #Hemorrhoids  #Constipation  - Start on preparation H  -Continue Senna 2 tabs at bedtime    #HTN  -Normotensive  -Continue to monitor VS per routine    #Hypothyroidism  -Continue Levothyroxine 100 mcg PO daily    #GERD  -Continue Pantoprazole 40 mg PO daily      #DVT ppx:  -Sequential Compression Device (SCD)    Plan of care discussed with son at bedside, all questions answered.

## 2022-07-17 LAB
ANION GAP SERPL CALC-SCNC: 14 MMOL/L — SIGNIFICANT CHANGE UP (ref 7–14)
BUN SERPL-MCNC: 47 MG/DL — HIGH (ref 10–20)
CALCIUM SERPL-MCNC: 9.2 MG/DL — SIGNIFICANT CHANGE UP (ref 8.5–10.1)
CHLORIDE SERPL-SCNC: 99 MMOL/L — SIGNIFICANT CHANGE UP (ref 98–110)
CO2 SERPL-SCNC: 30 MMOL/L — SIGNIFICANT CHANGE UP (ref 17–32)
CREAT SERPL-MCNC: 1.1 MG/DL — SIGNIFICANT CHANGE UP (ref 0.7–1.5)
EGFR: 48 ML/MIN/1.73M2 — LOW
GLUCOSE SERPL-MCNC: 102 MG/DL — HIGH (ref 70–99)
HCT VFR BLD CALC: 28.1 % — LOW (ref 37–47)
HGB BLD-MCNC: 9.1 G/DL — LOW (ref 12–16)
MAGNESIUM SERPL-MCNC: 2.4 MG/DL — SIGNIFICANT CHANGE UP (ref 1.8–2.4)
MCHC RBC-ENTMCNC: 27.4 PG — SIGNIFICANT CHANGE UP (ref 27–31)
MCHC RBC-ENTMCNC: 32.4 G/DL — SIGNIFICANT CHANGE UP (ref 32–37)
MCV RBC AUTO: 84.6 FL — SIGNIFICANT CHANGE UP (ref 81–99)
NRBC # BLD: 0 /100 WBCS — SIGNIFICANT CHANGE UP (ref 0–0)
PLATELET # BLD AUTO: 112 K/UL — LOW (ref 130–400)
POTASSIUM SERPL-MCNC: 3.7 MMOL/L — SIGNIFICANT CHANGE UP (ref 3.5–5)
POTASSIUM SERPL-SCNC: 3.7 MMOL/L — SIGNIFICANT CHANGE UP (ref 3.5–5)
RBC # BLD: 3.32 M/UL — LOW (ref 4.2–5.4)
RBC # FLD: 15.9 % — HIGH (ref 11.5–14.5)
SODIUM SERPL-SCNC: 143 MMOL/L — SIGNIFICANT CHANGE UP (ref 135–146)
WBC # BLD: 6.5 K/UL — SIGNIFICANT CHANGE UP (ref 4.8–10.8)
WBC # FLD AUTO: 6.5 K/UL — SIGNIFICANT CHANGE UP (ref 4.8–10.8)

## 2022-07-17 PROCEDURE — 99232 SBSQ HOSP IP/OBS MODERATE 35: CPT

## 2022-07-17 RX ORDER — FUROSEMIDE 40 MG
20 TABLET ORAL DAILY
Refills: 0 | Status: DISCONTINUED | OUTPATIENT
Start: 2022-07-19 | End: 2022-07-28

## 2022-07-17 RX ADMIN — Medication 40 MILLIGRAM(S): at 05:26

## 2022-07-17 RX ADMIN — LACTULOSE 10 GRAM(S): 10 SOLUTION ORAL at 21:02

## 2022-07-17 RX ADMIN — PANTOPRAZOLE SODIUM 40 MILLIGRAM(S): 20 TABLET, DELAYED RELEASE ORAL at 05:26

## 2022-07-17 RX ADMIN — Medication 100 MICROGRAM(S): at 05:27

## 2022-07-17 RX ADMIN — CHLORHEXIDINE GLUCONATE 1 APPLICATION(S): 213 SOLUTION TOPICAL at 07:33

## 2022-07-17 RX ADMIN — LACTULOSE 10 GRAM(S): 10 SOLUTION ORAL at 13:59

## 2022-07-17 RX ADMIN — SENNA PLUS 2 TABLET(S): 8.6 TABLET ORAL at 21:02

## 2022-07-17 RX ADMIN — Medication 81 MILLIGRAM(S): at 11:09

## 2022-07-17 RX ADMIN — IRON SUCROSE 110 MILLIGRAM(S): 20 INJECTION, SOLUTION INTRAVENOUS at 14:01

## 2022-07-17 NOTE — PROGRESS NOTE ADULT - SUBJECTIVE AND OBJECTIVE BOX
Chief complaint:  Mrs. Silverio is a 89 y/o female who presents with a chief complaint of Dyspnea w/ Exertion    Interval history:  Mrs. Silverio is a 89 y/o female with HFpEF, severe AS s/p recent balloon valvuloplasty, HTN, hypothyroidism, GERD, HCC s/p partial liver resection, MVP, anemia s/p multiple transfusions, recent hospital admission from - that presented for SOB transferred from [facility] to [place] (transferred on ) for evaluation for TAVR by Dr. Mccollum.    She presented to [facility] for c/o worsening chest pressure and dyspnea x 2 weeks. She was found to be severely anemic with Hgb 5.4 from 9.1 from prior admission 2 months ago. She reports seeing blood when she strains to have BM but denied any hematemesis, hematochezia or black stools. Admitted to ICU for anemia and hypotension. She received multiple units of PRBCs. Hgb stable now, no active bleeding. Thoracentesis for left pleural effusion showed transudative effusion.     Seen and examined at bedside, resting comfortably in bed. With one episode of bloody stool overnight.     Review of systems: A complete 10-point review of systems was obtained and is negative except as stated in the interval history.    Past medical history is notable for:    SHORTNESS OF BREATH, ACUTE CHF ,ANEMIA, ELEVATED TROPONIN ,PLEURAL EFFUSION    No pertinent family history in first degree relatives    HTN (hypertension)    Mitral valve prolapse    Enlarged heart    Murmur    Hyperthyroidism    Arthritis    HTN (hypertension)    Diverticulosis    Hiatal hernia    Acid reflux    Liver cancer    Aortic stenosis    Shortness of breath    Acute on chronic diastolic heart failure    Elevated troponin    Sacral decubitus ulcer, stage II    Anemia    Opacity of lung on imaging study    H/O resection of liver    Status post cholecystectomy    DIFF BREATHING    Acute CHF    Anemia    Elevated troponin    Pleural effusion      Vitals:  ICU Vital Signs Last 24 Hrs  T(C): 36.8 (2022 12:35), Max: 37.4 (2022 05:00)  T(F): 98.3 (2022 12:35), Max: 99.3 (2022 05:00)  HR: 77 (2022 12:35) (77 - 97)  BP: 95/51 (2022 12:35) (95/51 - 106/54)  RR: 18 (2022 12:35) (18 - 18)  SpO2: 98% RA    I&O's Detail  I&O's Summary    15 Jul 2022 07:01  -  2022 07:00  --------------------------------------------------------  IN: 540 mL / OUT: 600 mL / NET: -60 mL    2022 07:01  -  2022 14:54  --------------------------------------------------------  IN: 502 mL / OUT: 0 mL / NET: 502 mL        Weight trend:  Daily     Daily Weight in k.6 (2022 05:00)  Daily Weight in k (15 Jul 2022 05:15)      Physical exam:  General: No apparent distress  HEENT: Anicteric sclera. Moist mucous membranes.   Cardiac: Regular rate and rhythm. + murmur, no rubs, or gallops.   Vascular: Symmetric radial pulses. Dorsalis pedis pulses palpable.   Respiratory: normal inspiratory effort. no wheeze B/L.   Abdomen: Soft, nontender. Audible bowel sounds.   Extremities: Warm without edema. No cyanosis or clubbing.   Skin: Warm and dry. No rash.   Neurologic: Grossly normal motor function.   Psychiatric: Oriented to person, place, and time.     Data reviewed:  - Telemetry: NSR w/ episode of AT 6-7 beats     - EC Lead ECG (22 @ 08:38)    Ventricular Rate 69 BPM    Atrial Rate 69 BPM    P-R Interval 176 ms    QRS Duration 136 ms    Q-T Interval 502 ms    QTC Calculation(Bazett) 537 ms    P Axis 87 degrees    R Axis -49 degrees    T Axis 48 degrees    Diagnosis Line Sinus rhythm with Premature atrial complexes  Left axis deviation  Left ventricular hypertrophy with QRS widening  Abnormal ECG    - Echo:   TTE Echo Complete w/o Contrast w/ Doppler (22 @ 13:09)  Summary:   1. Left ventricular ejection fraction, by visual estimation, is 40 to   45%.   2. Normal right atrial size.   3. Mild mitral valve regurgitation.   4. Structurally normal mitral valve, with normal leaflet excursion.   5. Mild aortic regurgitation.   6. Severe aortic valve stenosis.   7. The aortic valve mean gradient is 75.1 mmHg consistent with severe   aortic stenosis.    - Radiology:   (Xray Chest 1 View- PORTABLE-Routine in AM.) (07.15.22 @ 06:22)   INTERPRETATION:  Clinical History / Reason for exam: Dyspnea on exertion   and heart failure    Comparison : Chest radiograph 2022.    Technique/Positioning: Frontal chest radiograph.    Findings:    Support devices: Overlying telemetry leads.    Cardiac/mediastinum/hilum: Stable enlarged cardiac silhouette.    Lung parenchyma/Pleura: Stable left lower lobe opacity/effusion. No   pneumothorax  Stable calcified granuloma right apex.  Skeleton/soft tissues: Stable    Impression:    Stable left lower lobe opacity/effusion.    - Labs:                      9.9    7.00  )-----------( 125      ( 2022 05:45 )             29.8         138  |  97<L>  |  43<H>  ----------------------------<  106<H>  4.2   |  28  |  1.1    Ca    9.3      2022 05:45  Mg     2.2           Medications:  aspirin  chewable 81 milliGRAM(s) Oral daily  chlorhexidine 4% Liquid 1 Application(s) Topical <User Schedule>  furosemide    Tablet 40 milliGRAM(s) Oral daily  lactulose Syrup 10 Gram(s) Oral every 8 hours  levothyroxine 100 MICROGram(s) Oral daily  pantoprazole    Tablet 40 milliGRAM(s) Oral before breakfast  senna 2 Tablet(s) Oral at bedtime        Allergies    Cipro (Other)  Levaquin (Rash)  tetracycline (Hives)    Intolerances Chief complaint:  Mrs. Silverio is a 88 y/o female who presents with a chief complaint of Dyspnea w/ Exertion    Interval history:  Mrs. Silverio is a 88 y/o female with HFpEF, severe AS s/p recent balloon valvuloplasty, HTN, hypothyroidism, GERD, HCC s/p partial liver resection, MVP, anemia s/p multiple transfusions, recent hospital admission from - that presented for SOB transferred from [facility] to [place] (transferred on ) for evaluation for TAVR by Dr. Mccollum.    She presented to [facility] for c/o worsening chest pressure and dyspnea x 2 weeks. She was found to be severely anemic with Hgb 5.4 from 9.1 from prior admission 2 months ago. She reports seeing blood when she strains to have BM but denied any hematemesis, hematochezia or black stools. Admitted to ICU for anemia and hypotension. She received multiple units of PRBCs. Hgb stable now, no active bleeding. Thoracentesis for left pleural effusion showed transudative effusion.     Seen and examined at bedside, resting comfortably in bed. With one episode of bloody stool overnight.   hypotensive in the afternoon. C/O "feeling tired".    Review of systems: A complete 10-point review of systems was obtained and is negative except as stated in the interval history.    Past medical history is notable for:    SHORTNESS OF BREATH, ACUTE CHF ,ANEMIA, ELEVATED TROPONIN ,PLEURAL EFFUSION    No pertinent family history in first degree relatives    HTN (hypertension)    Mitral valve prolapse    Enlarged heart    Murmur    Hyperthyroidism    Arthritis    HTN (hypertension)    Diverticulosis    Hiatal hernia    Acid reflux    Liver cancer    Aortic stenosis    Shortness of breath    Acute on chronic diastolic heart failure    Elevated troponin    Sacral decubitus ulcer, stage II    Anemia    Opacity of lung on imaging study    H/O resection of liver    Status post cholecystectomy    DIFF BREATHING    Acute CHF    Anemia    Elevated troponin    Pleural effusion      Vitals:  ICU Vital Signs Last 24 Hrs  T(C): 36.3 (2022 12:17), Max: 36.7 (2022 20:45)  T(F): 97.3 (2022 12:17), Max: 98.1 (2022 20:45)  HR: 71 (2022 12:17) (71 - 87)  BP: 88/44 (2022 12:17) (88/44 - 108/57)  RR: 18 (2022 12:17) (17 - 18)  SpO2: 96 RA    I&O's Detail  I&O's Summary    2022 07:01  -  2022 07:00  --------------------------------------------------------  IN: 602 mL / OUT: 300 mL / NET: 302 mL    2022 07:01  -  2022 13:28  --------------------------------------------------------  IN: 620 mL / OUT: 0 mL / NET: 620 mL      Weight trend:  Daily     Daily Weight in k.6 (2022 05:00)  Daily Weight in k (15 Jul 2022 05:15)      Physical exam:  General: No apparent distress  HEENT: Anicteric sclera. Moist mucous membranes.   Cardiac: Regular rate and rhythm. + murmur, no rubs, or gallops.   Vascular: Symmetric radial pulses. Dorsalis pedis pulses palpable.   Respiratory: normal inspiratory effort. no wheeze B/L.   Abdomen: Soft, nontender. Audible bowel sounds.   Extremities: Warm without edema. No cyanosis or clubbing.   Skin: Warm and dry. No rash.   Neurologic: Grossly normal motor function.   Psychiatric: Oriented to person, place, and time.     Data reviewed:  - Telemetry: NSR w/ episode of AT 6-7 beats     - EC Lead ECG (22 @ 08:38)    Ventricular Rate 69 BPM    Atrial Rate 69 BPM    P-R Interval 176 ms    QRS Duration 136 ms    Q-T Interval 502 ms    QTC Calculation(Bazett) 537 ms    P Axis 87 degrees    R Axis -49 degrees    T Axis 48 degrees    Diagnosis Line Sinus rhythm with Premature atrial complexes  Left axis deviation  Left ventricular hypertrophy with QRS widening  Abnormal ECG    - Echo:   TTE Echo Complete w/o Contrast w/ Doppler (22 @ 13:09)  Summary:   1. Left ventricular ejection fraction, by visual estimation, is 40 to   45%.   2. Normal right atrial size.   3. Mild mitral valve regurgitation.   4. Structurally normal mitral valve, with normal leaflet excursion.   5. Mild aortic regurgitation.   6. Severe aortic valve stenosis.   7. The aortic valve mean gradient is 75.1 mmHg consistent with severe   aortic stenosis.    - Radiology:   (Xray Chest 1 View- PORTABLE-Routine in AM.) (07.15.22 @ 06:22)   INTERPRETATION:  Clinical History / Reason for exam: Dyspnea on exertion   and heart failure    Comparison : Chest radiograph 2022.    Technique/Positioning: Frontal chest radiograph.    Findings:    Support devices: Overlying telemetry leads.    Cardiac/mediastinum/hilum: Stable enlarged cardiac silhouette.    Lung parenchyma/Pleura: Stable left lower lobe opacity/effusion. No   pneumothorax  Stable calcified granuloma right apex.  Skeleton/soft tissues: Stable    Impression:    Stable left lower lobe opacity/effusion.      - Labs:                        9.1    6.50  )-----------( 112      ( 2022 04:30 )             28.1         143  |  99  |  47<H>  ----------------------------<  102<H>  3.7   |  30  |  1.1    Ca    9.2      2022 04:30  Mg     2.4           Medications:  aspirin  chewable 81 milliGRAM(s) Oral daily  chlorhexidine 4% Liquid 1 Application(s) Topical <User Schedule>  furosemide    Tablet 40 milliGRAM(s) Oral daily  lactulose Syrup 10 Gram(s) Oral every 8 hours  levothyroxine 100 MICROGram(s) Oral daily  pantoprazole    Tablet 40 milliGRAM(s) Oral before breakfast  senna 2 Tablet(s) Oral at bedtime        Allergies    Cipro (Other)  Levaquin (Rash)  tetracycline (Hives)    Intolerances

## 2022-07-17 NOTE — PROGRESS NOTE ADULT - ASSESSMENT
Assessment:     Mrs. Silverio is a 89 y/o female with HFpEF, severe AS s/p recent balloon valvuloplasty, HTN, hyperthyroidism, HCC s/p partial liver resection, MVP, anemia s/p multiple transfusions 2 months ago transferred from [facility] [place] [facility] [place] for evaluation for TAVR by Dr. Mccollum      #Acute anemia from GIB likely lower GIB  -s/p 1 unit PRBC - 7/15th, Lasix 20mg iv given during transfusion  -  ferritin 24- Iron sucrose IV x5days   - Monitor CBC; HGB 9.9/HCT 29.8  -monitor for melena   - Followed by Dr. Gabrielle RAMACHANDRAN , case to be discussed with Dr. Woo prior to TAVR    #HFpEF    severe AS sp recent balloon valvuloplasty  - Continue with po lasix 40mg   -  Dr. Mccollum onboard for discussion plan for TAVR  - daily weights  - monitor strict I&O'S    #Hemorrhoids  #Constipation  - Start on preparation H  - Continue Senna 2 tabs at bedtime    #HTN  -Normotensive  -Continue to monitor VS per routine    #Hypothyroidism  -Continue Levothyroxine 100 mcg PO daily    #GERD  -Continue Pantoprazole 40 mg PO daily      #DVT ppx:  -Sequential Compression Device (SCD)    Plan of care discussed with son at bedside, all questions answered.  Assessment:     Mrs. Silverio is a 88 y/o female with HFpEF, severe AS s/p recent balloon valvuloplasty, HTN, hyperthyroidism, HCC s/p partial liver resection, MVP, anemia s/p multiple transfusions 2 months ago transferred from [facility] to [place] for evaluation for TAVR by Dr. Mccollum      #Acute anemia from GIB likely lower GIB  -s/p 1 unit PRBC - 7/15th, Lasix 20mg iv given during transfusion  -  ferritin 24- cont. Iron sucrose IV x5days   - Monitor CBC trend   -monitor for melena   - Followed by Dr. Gabrielle RAMACHANDRAN , case to be discussed with Dr. Woo prior to TAVR     #Hypotensive  - likely due to reduced intravascular volume, will HOLD furosemide 40mg on 7/18th ( last dose 7/17 AM) , Decrease to furosemide 20mg daily starting 7/19th  - negative for orthostatic bp reading, HR stable  - unable to give IVF due to severe AS  - Monitor for signs of hypovolemic shock ; Monitor CBC trend      #HFpEF    severe AS sp recent balloon valvuloplasty  - HOLD  lasix 40mg in setting of hypotension  -  Dr. Mccollum onboard for discussion plan for TAVR  - daily weights  - monitor I&O'S    #Hemorrhoids  #Constipation  - cont hemorrhoidal suppository   - Continue Senna 2 tabs at bedtime    #Hypothyroidism  -Continue Levothyroxine 100 mcg PO daily    #GERD  -Continue Pantoprazole 40 mg PO daily      #DVT ppx:  -Sequential Compression Device (SCD)    Plan of care discussed with son at bedside, all questions answered.  Assessment:     Mrs. Silverio is a 88 y/o female with HFpEF, severe AS s/p recent balloon valvuloplasty, HTN, hyperthyroidism, HCC s/p partial liver resection, MVP, anemia s/p multiple transfusions 2 months ago transferred from [facility] to [place] for evaluation for TAVR by Dr. Mccollum      #Acute anemia from GIB likely lower GIB  -s/p 1 unit PRBC - 7/15th, Lasix 20mg iv given during transfusion  -  ferritin 24- cont. Iron sucrose IV x5days   - Monitor CBC trend   - monitor for melena   - Maintain active T&S  - Followed by Dr. Gabrielle RAMACHANDRAN , case to be discussed with Dr. Woo prior to TAVR     #Hypotensive  - likely due to reduced intravascular volume, will HOLD furosemide 40mg on 7/18th ( last dose 7/17 AM) , Decrease to furosemide 20mg daily starting 7/19th  - negative for orthostatic bp reading, HR stable  - avoid IVF due to severe AS  - Monitor for signs of hypovolemic shock ; Monitor CBC trend      #HFpEF    severe AS sp recent balloon valvuloplasty  - HOLD  lasix 40mg in setting of hypotension  -  Dr. Mccollum onboard for discussion plan for TAVR  - daily weights  - monitor I&O'S    #Hemorrhoids  #Constipation  - cont hemorrhoidal suppository   - Continue Senna 2 tabs at bedtime    #Hypothyroidism  -Continue Levothyroxine 100 mcg PO daily    #GERD  -Continue Pantoprazole 40 mg PO daily      #DVT ppx:  -Sequential Compression Device (SCD)    Plan of care discussed with son at bedside, all questions answered.

## 2022-07-18 LAB
ANION GAP SERPL CALC-SCNC: 12 MMOL/L — SIGNIFICANT CHANGE UP (ref 7–14)
BLD GP AB SCN SERPL QL: SIGNIFICANT CHANGE UP
BUN SERPL-MCNC: 45 MG/DL — HIGH (ref 10–20)
CALCIUM SERPL-MCNC: 9.2 MG/DL — SIGNIFICANT CHANGE UP (ref 8.5–10.1)
CHLORIDE SERPL-SCNC: 96 MMOL/L — LOW (ref 98–110)
CO2 SERPL-SCNC: 31 MMOL/L — SIGNIFICANT CHANGE UP (ref 17–32)
CREAT SERPL-MCNC: 0.8 MG/DL — SIGNIFICANT CHANGE UP (ref 0.7–1.5)
EGFR: 70 ML/MIN/1.73M2 — SIGNIFICANT CHANGE UP
GLUCOSE SERPL-MCNC: 96 MG/DL — SIGNIFICANT CHANGE UP (ref 70–99)
HCT VFR BLD CALC: 26 % — LOW (ref 37–47)
HGB BLD-MCNC: 8.5 G/DL — LOW (ref 12–16)
MAGNESIUM SERPL-MCNC: 2.4 MG/DL — SIGNIFICANT CHANGE UP (ref 1.8–2.4)
MCHC RBC-ENTMCNC: 28.1 PG — SIGNIFICANT CHANGE UP (ref 27–31)
MCHC RBC-ENTMCNC: 32.7 G/DL — SIGNIFICANT CHANGE UP (ref 32–37)
MCV RBC AUTO: 85.8 FL — SIGNIFICANT CHANGE UP (ref 81–99)
NRBC # BLD: 0 /100 WBCS — SIGNIFICANT CHANGE UP (ref 0–0)
PLATELET # BLD AUTO: 104 K/UL — LOW (ref 130–400)
POTASSIUM SERPL-MCNC: 3.2 MMOL/L — LOW (ref 3.5–5)
POTASSIUM SERPL-SCNC: 3.2 MMOL/L — LOW (ref 3.5–5)
RBC # BLD: 3.03 M/UL — LOW (ref 4.2–5.4)
RBC # FLD: 15.6 % — HIGH (ref 11.5–14.5)
SODIUM SERPL-SCNC: 139 MMOL/L — SIGNIFICANT CHANGE UP (ref 135–146)
WBC # BLD: 5.36 K/UL — SIGNIFICANT CHANGE UP (ref 4.8–10.8)
WBC # FLD AUTO: 5.36 K/UL — SIGNIFICANT CHANGE UP (ref 4.8–10.8)

## 2022-07-18 PROCEDURE — 99232 SBSQ HOSP IP/OBS MODERATE 35: CPT

## 2022-07-18 RX ORDER — POTASSIUM CHLORIDE 20 MEQ
40 PACKET (EA) ORAL EVERY 4 HOURS
Refills: 0 | Status: COMPLETED | OUTPATIENT
Start: 2022-07-18 | End: 2022-07-18

## 2022-07-18 RX ORDER — HYDROCORTISONE 1 %
1 OINTMENT (GRAM) TOPICAL
Refills: 0 | Status: DISCONTINUED | OUTPATIENT
Start: 2022-07-18 | End: 2022-08-15

## 2022-07-18 RX ORDER — POLYETHYLENE GLYCOL 3350 17 G/17G
17 POWDER, FOR SOLUTION ORAL ONCE
Refills: 0 | Status: COMPLETED | OUTPATIENT
Start: 2022-07-18 | End: 2022-07-18

## 2022-07-18 RX ORDER — POTASSIUM CHLORIDE 20 MEQ
40 PACKET (EA) ORAL EVERY 4 HOURS
Refills: 0 | Status: DISCONTINUED | OUTPATIENT
Start: 2022-07-18 | End: 2022-07-18

## 2022-07-18 RX ADMIN — Medication 40 MILLIEQUIVALENT(S): at 13:16

## 2022-07-18 RX ADMIN — LACTULOSE 10 GRAM(S): 10 SOLUTION ORAL at 05:57

## 2022-07-18 RX ADMIN — Medication 81 MILLIGRAM(S): at 12:29

## 2022-07-18 RX ADMIN — LACTULOSE 10 GRAM(S): 10 SOLUTION ORAL at 22:46

## 2022-07-18 RX ADMIN — POLYETHYLENE GLYCOL 3350 17 GRAM(S): 17 POWDER, FOR SOLUTION ORAL at 22:46

## 2022-07-18 RX ADMIN — Medication 100 MICROGRAM(S): at 05:57

## 2022-07-18 RX ADMIN — Medication 1 SUPPOSITORY(S): at 10:18

## 2022-07-18 RX ADMIN — SENNA PLUS 2 TABLET(S): 8.6 TABLET ORAL at 22:46

## 2022-07-18 RX ADMIN — IRON SUCROSE 110 MILLIGRAM(S): 20 INJECTION, SOLUTION INTRAVENOUS at 16:06

## 2022-07-18 RX ADMIN — PANTOPRAZOLE SODIUM 40 MILLIGRAM(S): 20 TABLET, DELAYED RELEASE ORAL at 05:57

## 2022-07-18 RX ADMIN — Medication 40 MILLIEQUIVALENT(S): at 16:32

## 2022-07-18 RX ADMIN — CHLORHEXIDINE GLUCONATE 1 APPLICATION(S): 213 SOLUTION TOPICAL at 05:58

## 2022-07-18 NOTE — PROGRESS NOTE ADULT - ASSESSMENT
Assessment:     Mrs. Silverio is a 89 y/o female with HFpEF, severe AS s/p recent balloon valvuloplasty, HTN, hyperthyroidism, HCC s/p partial liver resection, MVP, anemia s/p multiple transfusions 2 months ago transferred from [facility] to [place] for evaluation for TAVR by Dr. Mccollum      #Acute anemia from GIB likely lower GIB  -s/p 1 unit PRBC - 7/15th, Lasix 20mg iv given during transfusion  -  ferritin 24- cont. Iron sucrose IV x5days   - Monitor CBC trend   - monitor for melena   - Maintain active T&S  - Followed by Dr. Gabrielle RAMACHANDRAN , case to be discussed with Dr. Woo prior to TAVR     #Hypotensive  - likely due to reduced intravascular volume, will HOLD furosemide 40mg on 7/18th ( last dose 7/17 AM) , Decrease to furosemide 20mg daily starting 7/19th  - negative for orthostatic bp reading, HR stable  - avoid IVF due to severe AS  - Monitor for signs of hypovolemic shock ; Monitor CBC trend      #HFpEF    severe AS sp recent balloon valvuloplasty  - HOLD  lasix 40mg in setting of hypotension  -  Dr. Mccollum onboard for discussion plan for TAVR  - daily weights  - monitor I&O'S    #Hemorrhoids  #Constipation  - cont hemorrhoidal suppository   - Continue Senna 2 tabs at bedtime    #Hypothyroidism  -Continue Levothyroxine 100 mcg PO daily    #GERD  -Continue Pantoprazole 40 mg PO daily      #DVT ppx:  -Sequential Compression Device (SCD)    Plan of care discussed with son at bedside, all questions answered.  Assessment:     Mrs. Silverio is a 89 y/o female with HFpEF, severe AS s/p recent balloon valvuloplasty, HTN, hyperthyroidism, HCC s/p partial liver resection, MVP, anemia s/p multiple transfusions 2 months ago transferred from [facility] to [place] for evaluation for TAVR by Dr. Mccollum      #Acute anemia from GIB likely lower GIB  -s/p 1 unit PRBC - 7/15th, Lasix 20mg iv given during transfusion  -  ferritin 24- cont. Iron sucrose IV x5days   - Monitor CBC trend   - monitor for melena   - Maintain active T&S  - Followed by Dr. Gabrielle RAMACHANDRAN , case to be discussed with Dr. Woo prior to TAVR     #Hypotensive- improved- ( bp today 93/55)  - likely due to reduced intravascular volume, will HOLD furosemide 40mg on 7/18th ( last dose 7/17 AM) , Decrease to furosemide 20mg daily starting 7/19th  - negative for orthostatic bp reading, HR stable  - avoid IVF due to severe AS  - Monitor for signs of hypovolemic shock ; Monitor CBC trend      #HFpEF    severe AS sp recent balloon valvuloplasty  - HOLD  lasix 40mg in setting of hypotension  -  Dr. Mccollum onboard for discussion plan for TAVR  - daily weights  - monitor I&O'S  - replenish electrolytes to goal k>4 , mg >2.0    #Hemorrhoids  #Constipation  - cont hemorrhoidal suppository   - Continue Senna 2 tabs at bedtime    #Hypothyroidism  -Continue Levothyroxine 100 mcg PO daily    #GERD  -Continue Pantoprazole 40 mg PO daily    #DVT ppx:  -Sequential Compression Device (SCD)    Plan of care discussed with son at bedside, all questions answered.  Assessment:     Mrs. Silverio is a 88 y/o female with HFpEF, severe AS s/p balloon valvuloplasty 2021, HTN, hyperthyroidism, HCC s/p partial liver resection, MVP, anemia s/p multiple transfusions 2 months ago transferred from [facility] to [place] for evaluation for TAVR by Dr. Mccollum      #Acute anemia from GIB likely lower GIB  -s/p 1 unit PRBC - 7/15th, Lasix 20mg iv given during transfusion  -  ferritin 24- cont. Iron sucrose IV x5days   - Monitor CBC trend   - monitor for melena   - Maintain active T&S  - Followed by Dr. Gabrielle RAMACHANDRAN , case to be discussed with Dr. Woo prior to TAVR     #Hypotensive- improved- ( bp today 93/55)  - likely due to reduced intravascular volume, will HOLD furosemide 40mg on 7/18th ( last dose 7/17 AM) , Decrease to furosemide 20mg daily starting 7/19th  - negative for orthostatic bp reading, HR stable  - avoid IVF due to severe AS  - Monitor for signs of hypovolemic shock ; Monitor CBC trend      #HFpEF    severe AS sp recent balloon valvuloplasty  - HOLD  lasix 40mg in setting of hypotension  -  Dr. Mccollum onboard for discussion plan for TAVR  - daily weights  - monitor I&O'S  - replenish electrolytes to goal k>4 , mg >2.0    #Hemorrhoids  #Constipation  - cont hemorrhoidal suppository   - Continue Senna 2 tabs at bedtime    #Hypothyroidism  -Continue Levothyroxine 100 mcg PO daily    #GERD  -Continue Pantoprazole 40 mg PO daily    #DVT ppx:  -Sequential Compression Device (SCD)    Plan of care discussed with son at bedside, all questions answered.

## 2022-07-18 NOTE — CONSULT NOTE ADULT - SUBJECTIVE AND OBJECTIVE BOX
Seen for ongoing anemia  NPO after midnight   Capsule study 7/19  Miralax doses ordered this evening x 2  Full consult to follow  Seen with Advanced Attending- Dr. Woo Patient is a 89-year-old female with a past medical history significant for CHF ,severe AAS, MVP, hypertension, HCC, hypothyroidism, GERD who presents with shortness of breath.  Patient was seen at [facility] whom deferred her work up to her outpatient GI Physician. She is constipated primarily and awaiting possible TAVR.       PAST MEDICAL & SURGICAL HISTORY:  Mitral valve prolapse  Enlarged heart  Murmur  Hyperthyroidism  Arthritis  HTN (hypertension)  Diverticulosis  Hiatal hernia  Liver cancer s/p resection and s/p chemo and radiation  Aortic stenosis s/p ballon aortic valuloplasty 5/25/21        MEDICATIONS  (STANDING):  aspirin  chewable 81 milliGRAM(s) Oral daily  chlorhexidine 4% Liquid 1 Application(s) Topical <User Schedule>  furosemide    Tablet 20 milliGRAM(s) Oral daily  iron sucrose IVPB 200 milliGRAM(s) IV Intermittent every 24 hours  lactulose Syrup 10 Gram(s) Oral every 8 hours  levothyroxine 100 MICROGram(s) Oral daily  pantoprazole    Tablet 40 milliGRAM(s) Oral before breakfast  senna 2 Tablet(s) Oral at bedtime    MEDICATIONS  (PRN):  hydrocortisone hemorrhoidal Suppository 1 Suppository(s) Rectal two times a day PRN hemorrhoidal discomfort      Allergies  Cipro (Other)  Levaquin (Rash)  tetracycline (Hives)    Review of Systems  General:  Denies Fatigue, Denies Fever, Denies Weakness ,Denies Weight Loss   HEENT: Denies Trouble Swallowing ,Denies  Sore Throat , Denies Change in hearing/vision/speech ,Denies Dizziness    Cardio:See HPI  Respiratory: See HPI  Abdomen: See detailed HPI  Neuro: Denies Headache Denies Dizziness, Denies Paresthesias  MSK: Denies pain in Bones/Joints/Muscles   Psych: Patient denies depression, denies suicidal or homicidal ideations  Integ: Patient Denies rash, or new skin lesions       Vital Signs Last 24 Hrs  T(C): 36.5 (19 Jul 2022 04:30), Max: 36.6 (18 Jul 2022 12:00)  T(F): 97.7 (19 Jul 2022 04:30), Max: 97.9 (18 Jul 2022 12:00)  HR: 62 (19 Jul 2022 07:35) (62 - 87)  BP: 94/54 (19 Jul 2022 04:30) (93/55 - 102/61)  RR: 18 (19 Jul 2022 07:35) (18 - 18)  SpO2: 96% (19 Jul 2022 07:35) (96% - 96%)    Parameters below as of 19 Jul 2022 07:35  Patient On (Oxygen Delivery Method): room air    Physical Exam  Gen: NAD  HEENT: NC/AT, Mucosal Membranes  Cardio: S1/S2 No S3/S4, Regular  Resp: CTA B/L  Abdomen: Soft, ND/NT  Neuro: AAOx3, Cranial Nerve II-XII intact   Extremities: FROM x 4      Labs:                          8.1    5.26  )-----------( x        ( 19 Jul 2022 06:09 )             25.5         07-18    139  |  96<L>  |  45<H>  ----------------------------<  96  3.2<L>   |  31  |  0.8

## 2022-07-18 NOTE — PHYSICAL THERAPY INITIAL EVALUATION ADULT - SPECIFY REASON(S)
pt transferred from [facility], pending new activity orders as previous orders are discontinued, NP ext [phone] made aware. PT to follow up as appropriate.
Attempted to see patient for bedside PT, pt said she is going for colonscopy and declined PT at this time, will continue to follow and see when appropriate.

## 2022-07-18 NOTE — PROGRESS NOTE ADULT - SUBJECTIVE AND OBJECTIVE BOX
PT doing well with no complains of N,V or chest pain. Hourly round completed throughout the shift. No complain of pain and denies any needs at this time Bed in lower position and call light/ personal items within reach. Will continue to monitor and give bed side shift report to on coming day shift nurse. Chief complaint:  Mrs. Silverio is a 88 y/o female who presents with a chief complaint of Dyspnea w/ Exertion    Interval history:  Mrs. Silverio is a 88 y/o female with HFpEF, severe AS s/p balloon valvuloplasty, HTN, hypothyroidism, GERD, HCC s/p partial liver resection, MVP, anemia s/p multiple transfusions, recent hospital admission from - that presented for SOB transferred from [facility] to [place] (transferred on ) for evaluation for TAVR by Dr. Mccollum.    She presented to [facility] for c/o worsening chest pressure and dyspnea x 2 weeks. She was found to be severely anemic with Hgb 5.4 from 9.1 from prior admission 2 months ago. She reports seeing blood when she strains to have BM but denied any hematemesis, hematochezia or black stools. Admitted to ICU for anemia and hypotension. She received multiple units of PRBCs. Hgb stable now, no active bleeding. Thoracentesis for left pleural effusion showed transudative effusion.     Seen and examined at bedside, resting comfortably in bed. c/o hemorrhoidal discomfort. no BM overnight. Denies chest pain or palpitation.       Review of systems: A complete 10-point review of systems was obtained and is negative except as stated in the interval history.    Past medical history is notable for:    SHORTNESS OF BREATH, ACUTE CHF ,ANEMIA, ELEVATED TROPONIN ,PLEURAL EFFUSION    No pertinent family history in first degree relatives    HTN (hypertension)    Mitral valve prolapse    Enlarged heart    Murmur    Hyperthyroidism    Arthritis    HTN (hypertension)    Diverticulosis    Hiatal hernia    Acid reflux    Liver cancer    Aortic stenosis    Shortness of breath    Acute on chronic diastolic heart failure    Elevated troponin    Sacral decubitus ulcer, stage II    Anemia    Opacity of lung on imaging study    H/O resection of liver    Status post cholecystectomy    DIFF BREATHING    Acute CHF    Anemia    Elevated troponin    Pleural effusion    Vitals:  ICU Vital Signs Last 24 Hrs  T(C): 36.6 (2022 12:00), Max: 36.9 (2022 04:55)  T(F): 97.9 (2022 12:00), Max: 98.5 (2022 04:55)  HR: 87 (2022 12:00) (69 - 87)  BP: 93/55 (2022 12:00) (89/48 - 95/55)  RR: 18 (2022 12:00) (17 - 18)  SpO2: 98% (2022 07:47) (96% - 98%)    O2 Parameters below as of 2022 07:47  Patient On (Oxygen Delivery Method): room air          I&O's Detail  I&O's Summary    2022 07:01  -  2022 07:00  --------------------------------------------------------  IN: 1432 mL / OUT: 750 mL / NET: 682 mL    2022 07:01  -  2022 15:03  --------------------------------------------------------  IN: 1078 mL / OUT: 0 mL / NET: 1078 mL        2022 07:01  -  2022 07:00  --------------------------------------------------------  IN: 602 mL / OUT: 300 mL / NET: 302 mL    2022 07:01  -  2022 13:28  --------------------------------------------------------  IN: 620 mL / OUT: 0 mL / NET: 620 mL      Weight trend:    Daily Weight in k.6 (2022 05:00)  Daily Weight in k (15 Jul 2022 05:15)  Daily Weight in k.5 (2022 04:55)    Physical exam:  General: No apparent distress  HEENT: Anicteric sclera. Moist mucous membranes.   Cardiac: Regular rate and rhythm. + murmur, no rubs, or gallops.   Vascular: Symmetric radial pulses. Dorsalis pedis pulses palpable.   Respiratory: normal inspiratory effort. no wheeze B/L.   Abdomen: Soft, nontender. Audible bowel sounds.   Extremities: Warm without edema. No cyanosis or clubbing.   Skin: Warm and dry. No rash.   Neurologic: Grossly normal motor function.   Psychiatric: Oriented to person, place, and time.     Data reviewed:  - Telemetry: NSR w/ episode of AT 6-7 beats     - EC Lead ECG (22 @ 08:38)    Ventricular Rate 69 BPM    Atrial Rate 69 BPM    P-R Interval 176 ms    QRS Duration 136 ms    Q-T Interval 502 ms    QTC Calculation(Bazett) 537 ms    P Axis 87 degrees    R Axis -49 degrees    T Axis 48 degrees    Diagnosis Line Sinus rhythm with Premature atrial complexes  Left axis deviation  Left ventricular hypertrophy with QRS widening  Abnormal ECG    - Echo:   TTE Echo Complete w/o Contrast w/ Doppler (22 @ 13:09)  Summary:   1. Left ventricular ejection fraction, by visual estimation, is 40 to   45%.   2. Normal right atrial size.   3. Mild mitral valve regurgitation.   4. Structurally normal mitral valve, with normal leaflet excursion.   5. Mild aortic regurgitation.   6. Severe aortic valve stenosis.   7. The aortic valve mean gradient is 75.1 mmHg consistent with severe   aortic stenosis.    - Radiology:   (Xray Chest 1 View- PORTABLE-Routine in AM.) (07.15.22 @ 06:22)   INTERPRETATION:  Clinical History / Reason for exam: Dyspnea on exertion   and heart failure    Comparison : Chest radiograph 2022.    Technique/Positioning: Frontal chest radiograph.    Findings:    Support devices: Overlying telemetry leads.    Cardiac/mediastinum/hilum: Stable enlarged cardiac silhouette.    Lung parenchyma/Pleura: Stable left lower lobe opacity/effusion. No   pneumothorax  Stable calcified granuloma right apex.  Skeleton/soft tissues: Stable    Impression:    Stable left lower lobe opacity/effusion.      - Labs:                        8.5    5.36  )-----------( 104      ( 2022 07:09 )             26.0         139  |  96<L>  |  45<H>  ----------------------------<  96  3.2<L>   |  31  |  0.8    Ca    9.2      2022 07:09  Mg     2.4     07-18      Lactate Trend        Medications:  aspirin  chewable 81 milliGRAM(s) Oral daily  chlorhexidine 4% Liquid 1 Application(s) Topical <User Schedule>  furosemide    Tablet 40 milliGRAM(s) Oral daily  lactulose Syrup 10 Gram(s) Oral every 8 hours  levothyroxine 100 MICROGram(s) Oral daily  pantoprazole    Tablet 40 milliGRAM(s) Oral before breakfast  senna 2 Tablet(s) Oral at bedtime        Allergies    Cipro (Other)  Levaquin (Rash)  tetracycline (Hives)    Intolerances Chief complaint:  Mrs. Silverio is a 88 y/o female who presents with a chief complaint of Dyspnea w/ Exertion    Interval history:  Hypotensive (~ 89/48 - 95/55) for which Lasix 40 mg po QD was held yesterday ().  Plan to restart Lasix 20 mg QD today ().  Hgb 7.2 on 7/15 and transfused 1 PRBC, Hgb peaked at 10.2 on , currently 8.5.  Ferritin 24, Venofer IV x 5 doses in progress (3/5 doses received).  Consulted by GI, will keep NPO for capsule study today.    Seen at bedside. NAD, patient is sleeping at this time.  NSR on tele.    Review of systems: A complete 10-point review of systems was obtained and is negative except as stated in the interval history.    Past medical history is notable for:    SHORTNESS OF BREATH, ACUTE CHF ,ANEMIA, ELEVATED TROPONIN ,PLEURAL EFFUSION    No pertinent family history in first degree relatives    HTN (hypertension)    Mitral valve prolapse    Enlarged heart    Murmur    Hyperthyroidism    Arthritis    HTN (hypertension)    Diverticulosis    Hiatal hernia    Acid reflux    Liver cancer    Aortic stenosis    Shortness of breath    Acute on chronic diastolic heart failure    Elevated troponin    Sacral decubitus ulcer, stage II    Anemia    Opacity of lung on imaging study    H/O resection of liver    Status post cholecystectomy    DIFF BREATHING    Acute CHF    Anemia    Elevated troponin    Pleural effusion    Vitals:  ICU Vital Signs Last 24 Hrs  T(C): 36.6 (2022 12:00), Max: 36.9 (2022 04:55)  T(F): 97.9 (2022 12:00), Max: 98.5 (2022 04:55)  HR: 87 (2022 12:00) (69 - 87)  BP: 93/55 (2022 12:00) (89/48 - 95/55)  RR: 18 (2022 12:00) (17 - 18)  SpO2: 98% (2022 07:47) (96% - 98%)    O2 Parameters below as of 2022 07:47  Patient On (Oxygen Delivery Method): room air          I&O's Detail  I&O's Summary    2022 07:01  -  2022 07:00  --------------------------------------------------------  IN: 1432 mL / OUT: 750 mL / NET: 682 mL    2022 07:01  -  2022 15:03  --------------------------------------------------------  IN: 1078 mL / OUT: 0 mL / NET: 1078 mL        2022 07:01  -  2022 07:00  --------------------------------------------------------  IN: 602 mL / OUT: 300 mL / NET: 302 mL    2022 07:01  -  2022 13:28  --------------------------------------------------------  IN: 620 mL / OUT: 0 mL / NET: 620 mL      Weight trend:    Daily Weight in k.6 (2022 05:00)  Daily Weight in k (15 Jul 2022 05:15)  Daily Weight in k.5 (2022 04:55)    Physical exam:  General: No apparent distress  HEENT: Anicteric sclera. Moist mucous membranes.   Cardiac: Regular rate and rhythm. + murmur, no rubs, or gallops.   Vascular: Symmetric radial pulses. Dorsalis pedis pulses palpable.   Respiratory: normal inspiratory effort. no wheeze B/L.   Abdomen: Soft, nontender. Audible bowel sounds.   Extremities: Warm without edema. No cyanosis or clubbing.   Skin: Warm and dry. No rash.   Neurologic: Grossly normal motor function.   Psychiatric: Oriented to person, place, and time.     Data reviewed:  - Telemetry: NSR      - EC Lead ECG (22 @ 08:38)    Ventricular Rate 69 BPM    Atrial Rate 69 BPM    P-R Interval 176 ms    QRS Duration 136 ms    Q-T Interval 502 ms    QTC Calculation(Bazett) 537 ms    P Axis 87 degrees    R Axis -49 degrees    T Axis 48 degrees    Diagnosis Line Sinus rhythm with Premature atrial complexes  Left axis deviation  Left ventricular hypertrophy with QRS widening  Abnormal ECG    - Echo:   TTE Echo Complete w/o Contrast w/ Doppler (22 @ 13:09)  Summary:   1. Left ventricular ejection fraction, by visual estimation, is 40 to   45%.   2. Normal right atrial size.   3. Mild mitral valve regurgitation.   4. Structurally normal mitral valve, with normal leaflet excursion.   5. Mild aortic regurgitation.   6. Severe aortic valve stenosis.   7. The aortic valve mean gradient is 75.1 mmHg consistent with severe   aortic stenosis.    - Radiology:   (Xray Chest 1 View- PORTABLE-Routine in AM.) (07.15.22 @ 06:22)   INTERPRETATION:  Clinical History / Reason for exam: Dyspnea on exertion   and heart failure    Comparison : Chest radiograph 2022.    Technique/Positioning: Frontal chest radiograph.    Findings:    Support devices: Overlying telemetry leads.    Cardiac/mediastinum/hilum: Stable enlarged cardiac silhouette.    Lung parenchyma/Pleura: Stable left lower lobe opacity/effusion. No   pneumothorax  Stable calcified granuloma right apex.  Skeleton/soft tissues: Stable    Impression:    Stable left lower lobe opacity/effusion.      - Labs:                        8.5    5.36  )-----------( 104      ( 2022 07:09 )             26.0     07-18    139  |  96<L>  |  45<H>  ----------------------------<  96  3.2<L>   |  31  |  0.8    Ca    9.2      2022 07:09  Mg     2.4     07-18      Lactate Trend        Medications:  aspirin  chewable 81 milliGRAM(s) Oral daily  chlorhexidine 4% Liquid 1 Application(s) Topical <User Schedule>  furosemide    Tablet 40 milliGRAM(s) Oral daily  lactulose Syrup 10 Gram(s) Oral every 8 hours  levothyroxine 100 MICROGram(s) Oral daily  pantoprazole    Tablet 40 milliGRAM(s) Oral before breakfast  senna 2 Tablet(s) Oral at bedtime        Allergies    Cipro (Other)  Levaquin (Rash)  tetracycline (Hives)    Intolerances Chief complaint:  Mrs. Silverio is a 89 y/o female who presents with a chief complaint of Dyspnea w/ Exertion    Interval history:    - Hypotensive (~ 89/48 - 95/55) for which Lasix 40 mg po QD was held yesterday ()  - Lasix 20 mg QD restarted today ()  - Hgb 7.2 on 7/15 and transfused 1 PRBC, Hgb peaked at 10.2 on , Hgb 8.5 on .   - Consulted by GI, will keep NPO for capsule study today  - On telemetry, patient in sinus rhythm  - SUBJECTIVE LINE    Review of systems: A complete 10-point review of systems was obtained and is negative except as stated in the interval history.    Past medical history is notable for:    SHORTNESS OF BREATH, ACUTE CHF ,ANEMIA, ELEVATED TROPONIN ,PLEURAL EFFUSION    No pertinent family history in first degree relatives    HTN (hypertension)    Mitral valve prolapse    Enlarged heart    Murmur    Hyperthyroidism    Arthritis    HTN (hypertension)    Diverticulosis    Hiatal hernia    Acid reflux    Liver cancer    Aortic stenosis    Shortness of breath    Acute on chronic diastolic heart failure    Elevated troponin    Sacral decubitus ulcer, stage II    Anemia    Opacity of lung on imaging study    H/O resection of liver    Status post cholecystectomy    DIFF BREATHING    Acute CHF    Anemia    Elevated troponin    Pleural effusion    Vitals:  ICU Vital Signs Last 24 Hrs  T(C): 36.6 (2022 12:00), Max: 36.9 (2022 04:55)  T(F): 97.9 (2022 12:00), Max: 98.5 (2022 04:55)  HR: 87 (2022 12:00) (69 - 87)  BP: 93/55 (2022 12:00) (89/48 - 95/55)  RR: 18 (2022 12:00) (17 - 18)  SpO2: 98% (2022 07:47) (96% - 98%)    O2 Parameters below as of 2022 07:47  Patient On (Oxygen Delivery Method): room air    I&O's Detail  I&O's Summary    2022 07:01  -  2022 07:00  --------------------------------------------------------  IN: 1432 mL / OUT: 750 mL / NET: 682 mL    2022 07:01  -  2022 15:03  --------------------------------------------------------  IN: 1078 mL / OUT: 0 mL / NET: 1078 mL        2022 07:01  -  2022 07:00  --------------------------------------------------------  IN: 602 mL / OUT: 300 mL / NET: 302 mL    2022 07:01  -  2022 13:28  --------------------------------------------------------  IN: 620 mL / OUT: 0 mL / NET: 620 mL      Weight trend:    Daily Weight in k.6 (2022 05:00)  Daily Weight in k (15 Jul 2022 05:15)  Daily Weight in k.5 (2022 04:55)    Physical exam:  General: No apparent distress  HEENT: Anicteric sclera. Moist mucous membranes.   Cardiac: Regular rate and rhythm. + murmur, no rubs, or gallops.   Vascular: Symmetric radial pulses. Dorsalis pedis pulses palpable.   Respiratory: normal inspiratory effort. no wheeze B/L.   Abdomen: Soft, nontender. Audible bowel sounds.   Extremities: Warm without edema. No cyanosis or clubbing.   Skin: Warm and dry. No rash.   Neurologic: Grossly normal motor function.   Psychiatric: Oriented to person, place, and time.     Data reviewed:  - Telemetry: NSR      - EC Lead ECG (22 @ 08:38)    Ventricular Rate 69 BPM    Atrial Rate 69 BPM    P-R Interval 176 ms    QRS Duration 136 ms    Q-T Interval 502 ms    QTC Calculation(Bazett) 537 ms    P Axis 87 degrees    R Axis -49 degrees    T Axis 48 degrees    Diagnosis Line Sinus rhythm with Premature atrial complexes  Left axis deviation  Left ventricular hypertrophy with QRS widening  Abnormal ECG    - Echo (22):   Summary:   1. Left ventricular ejection fraction, by visual estimation, is 40 to 45%.   2. Normal right atrial size.   3. Mild mitral valve regurgitation.   4. Structurally normal mitral valve, with normal leaflet excursion.   5. Mild aortic regurgitation.   6. Severe aortic valve stenosis.   7. The aortic valve mean gradient is 75.1 mmHg consistent with severe aortic stenosis.    - CXR (7/15/22):   Support devices: Overlying telemetry leads.  Cardiac/mediastinum/hilum: Stable enlarged cardiac silhouette.  Lung parenchyma/Pleura: Stable left lower lobe opacity/effusion. No pneumothorax  Stable calcified granuloma right apex.  Skeleton/soft tissues: Stable    Impression: Stable left lower lobe opacity/effusion.      - Labs:                        8.5    5.36  )-----------( 104      ( 2022 07:09 )             26.0     07-18    139  |  96<L>  |  45<H>  ----------------------------<  96  3.2<L>   |  31  |  0.8    Ca    9.2      2022 07:09  Mg     2.4     07-18    Medications:  aspirin  chewable 81 milliGRAM(s) Oral daily  chlorhexidine 4% Liquid 1 Application(s) Topical <User Schedule>  furosemide    Tablet 40 milliGRAM(s) Oral daily  lactulose Syrup 10 Gram(s) Oral every 8 hours  levothyroxine 100 MICROGram(s) Oral daily  pantoprazole    Tablet 40 milliGRAM(s) Oral before breakfast  senna 2 Tablet(s) Oral at bedtime    Allergies    Cipro (Other)  Levaquin (Rash)  tetracycline (Hives)    Intolerances

## 2022-07-18 NOTE — PROGRESS NOTE ADULT - ASSESSMENT
Assessment:   Mrs. Silverio is a 88 y/o female with HFpEF, severe AS s/p balloon valvuloplasty 2021, anemia s/p multiple transfusions 2 months ago, MVP, HTN, hyperthyroidism, HCC s/p partial liver resection, transferred from [facility] to [place] for TAVR evaluation by Dr. Mccollum.  Patient found to be anemic most likely d/t GI bleed requiring 1PRBC transfusion for Hgb < 8/anemia d/t likely GI bleed.  GI, Dr. Woo consulted at Dr. Mccollum' request.  Patient will have capsule study today.      #Acute anemia from GIB likely lower GIB  - NPO for capsule study in AM  - give Miralax dose x 2   - check/continue to trend CBC: Hgb 8.5 (7/18), 9.1 (7/17), 10.2 (7/16)  - will transfuse as needed for Hgb < 8  - s/p 1 unit PRBC on 7/15th  - ferritin 24 --> Venofer IVBP x 5 doses (3/5 completed, end 6/19)  - monitor for melena, bloody stools  - Maintain active T&S  - Dr. Woo consult requested per Dr. Mccollum (follows outpatient with Dr. Ball)     #Hypotension (BP 93/55)  - furosemide 40 mg held on 7/18th ( last dose 7/17 AM), will decrease to furosemide 20mg daily starting today, 7/19  - orthostatic readings reviewed  - avoid IVF resuscitation d/t severe AS  - Monitor for signs of hypovolemic shock      #HFpEF   #severe AS s/p recent balloon valvuloplasty  - HOLD lasix 40 mg d/t hypotension  - possible TAVR with Dr. Mccollum  - daily weights  - monitor I&O'S  - replenish electrolytes to goal k >4 , mg >2.0    #Hemorrhoids  #Constipation  - cont hemorrhoidal suppository   - Continue Senna 2 tabs at bedtime    #Hypothyroidism  -Continue Levothyroxine 100 mcg PO daily    #GERD  -Continue Pantoprazole 40 mg PO daily    #DVT ppx:  -Sequential Compression Device (SCD)    Plan of care discussed with son at bedside, all questions answered.  Assessment:   Mrs. Silverio is a 88 y/o female with HFpEF, severe AS s/p balloon valvuloplasty 2021, anemia s/p multiple transfusions 2 months ago, MVP, HTN, hyperthyroidism, HCC s/p partial liver resection, transferred from [facility] to [place] for TAVR evaluation by Dr. Mccollum.  Patient found to be anemic most likely d/t GI bleed requiring 1PRBC transfusion for Hgb < 8/anemia.  GI, Dr. Woo consulted at Dr. Mccollum' request.  Patient will have capsule study today.    #Acute anemia with BRBPR, likely lower GIB  - NPO for capsule study in AM  - give Miralax dose x 2   - check/continue to trend CBC: Hgb 8.5 (7/18), 9.1 (7/17), 10.2 (7/16)  - will transfuse as needed for Hgb < 8  - s/p 1 unit PRBC on 7/15th  - ferritin 24 --> Venofer IVBP x 5 doses (4/5 completed, end 7/19)  - monitor for melena, bloody stools  - Maintain active T&S, last completed 7/18, conditional order for renew on 7/21 in [place]  - Dr. Woo consult requested per Dr. Mccollum (follows outpatient with Dr. Ball)     #Hypotension (BP 93/55)  - furosemide 40 mg held on 7/18th ( last dose 7/17 AM), will decrease to furosemide 20mg daily starting today, 7/19  - orthostatic readings reviewed  - avoid IVF resuscitation d/t severe AS  - Monitor for signs of hypovolemic shock      #HFpEF   #severe AS s/p recent balloon valvuloplasty  - held Lasix yesterday, see above  - possible TAVR with Dr. Mccollum  - c/w ASA  - daily weights  - monitor I&O'S  - replenish electrolytes to goal k >4 , mg >2.0    #Hemorrhoids  #Constipation  - c/w hemorrhoidal suppository   - c/w Senna 2 tabs HS  - c/w lactulose 10 TID  #Hypothyroidism  - Continue Levothyroxine 100 mcg PO daily  - TSH 4.36 (6/19/22)    #GERD  -Continue Pantoprazole 40 mg PO daily    #DVT ppx:  -Sequential Compression Device (SCD)    Plan of care discussed with son at bedside, all questions answered.

## 2022-07-18 NOTE — PROGRESS NOTE ADULT - SUBJECTIVE AND OBJECTIVE BOX
Chief complaint:  Mrs. Silverio is a 88 y/o female who presents with a chief complaint of Dyspnea w/ Exertion    Interval history:  Mrs. Silverio is a 88 y/o female with HFpEF, severe AS s/p recent balloon valvuloplasty, HTN, hypothyroidism, GERD, HCC s/p partial liver resection, MVP, anemia s/p multiple transfusions, recent hospital admission from - that presented for SOB transferred from [facility] to [facility] (transferred on ) for evaluation for TAVR by Dr. Mccollum.    She presented to [facility] for c/o worsening chest pressure and dyspnea x 2 weeks. She was found to be severely anemic with Hgb 5.4 from 9.1 from prior admission 2 months ago. She reports seeing blood when she strains to have BM but denied any hematemesis, hematochezia or black stools. Admitted to ICU for anemia and hypotension. She received multiple units of PRBCs. Hgb stable now, no active bleeding. Thoracentesis for left pleural effusion showed transudative effusion.     Seen and examined at bedside, resting comfortably in bed. With one episode of bloody stool overnight.   hypotensive in the afternoon. C/O "feeling tired".    Review of systems: A complete 10-point review of systems was obtained and is negative except as stated in the interval history.    Past medical history is notable for:    SHORTNESS OF BREATH, ACUTE CHF ,ANEMIA, ELEVATED TROPONIN ,PLEURAL EFFUSION    No pertinent family history in first degree relatives    HTN (hypertension)    Mitral valve prolapse    Enlarged heart    Murmur    Hyperthyroidism    Arthritis    HTN (hypertension)    Diverticulosis    Hiatal hernia    Acid reflux    Liver cancer    Aortic stenosis    Shortness of breath    Acute on chronic diastolic heart failure    Elevated troponin    Sacral decubitus ulcer, stage II    Anemia    Opacity of lung on imaging study    H/O resection of liver    Status post cholecystectomy    DIFF BREATHING    Acute CHF    Anemia    Elevated troponin    Pleural effusion      Vitals:  ICU Vital Signs Last 24 Hrs  T(C): 36.3 (2022 12:17), Max: 36.7 (2022 20:45)  T(F): 97.3 (2022 12:17), Max: 98.1 (2022 20:45)  HR: 71 (2022 12:17) (71 - 87)  BP: 88/44 (2022 12:17) (88/44 - 108/57)  RR: 18 (2022 12:17) (17 - 18)  SpO2: 96 RA    I&O's Detail  I&O's Summary    2022 07:01  -  2022 07:00  --------------------------------------------------------  IN: 602 mL / OUT: 300 mL / NET: 302 mL    2022 07:01  -  2022 13:28  --------------------------------------------------------  IN: 620 mL / OUT: 0 mL / NET: 620 mL      Weight trend:  Daily     Daily Weight in k.6 (2022 05:00)  Daily Weight in k (15 Jul 2022 05:15)      Physical exam:  General: No apparent distress  HEENT: Anicteric sclera. Moist mucous membranes.   Cardiac: Regular rate and rhythm. + murmur, no rubs, or gallops.   Vascular: Symmetric radial pulses. Dorsalis pedis pulses palpable.   Respiratory: normal inspiratory effort. no wheeze B/L.   Abdomen: Soft, nontender. Audible bowel sounds.   Extremities: Warm without edema. No cyanosis or clubbing.   Skin: Warm and dry. No rash.   Neurologic: Grossly normal motor function.   Psychiatric: Oriented to person, place, and time.     Data reviewed:  - Telemetry: NSR w/ episode of AT 6-7 beats     - EC Lead ECG (22 @ 08:38)    Ventricular Rate 69 BPM    Atrial Rate 69 BPM    P-R Interval 176 ms    QRS Duration 136 ms    Q-T Interval 502 ms    QTC Calculation(Bazett) 537 ms    P Axis 87 degrees    R Axis -49 degrees    T Axis 48 degrees    Diagnosis Line Sinus rhythm with Premature atrial complexes  Left axis deviation  Left ventricular hypertrophy with QRS widening  Abnormal ECG    - Echo:   TTE Echo Complete w/o Contrast w/ Doppler (22 @ 13:09)  Summary:   1. Left ventricular ejection fraction, by visual estimation, is 40 to   45%.   2. Normal right atrial size.   3. Mild mitral valve regurgitation.   4. Structurally normal mitral valve, with normal leaflet excursion.   5. Mild aortic regurgitation.   6. Severe aortic valve stenosis.   7. The aortic valve mean gradient is 75.1 mmHg consistent with severe   aortic stenosis.    - Radiology:   (Xray Chest 1 View- PORTABLE-Routine in AM.) (07.15.22 @ 06:22)   INTERPRETATION:  Clinical History / Reason for exam: Dyspnea on exertion   and heart failure    Comparison : Chest radiograph 2022.    Technique/Positioning: Frontal chest radiograph.    Findings:    Support devices: Overlying telemetry leads.    Cardiac/mediastinum/hilum: Stable enlarged cardiac silhouette.    Lung parenchyma/Pleura: Stable left lower lobe opacity/effusion. No   pneumothorax  Stable calcified granuloma right apex.  Skeleton/soft tissues: Stable    Impression:    Stable left lower lobe opacity/effusion.      - Labs:                        9.1    6.50  )-----------( 112      ( 2022 04:30 )             28.1         143  |  99  |  47<H>  ----------------------------<  102<H>  3.7   |  30  |  1.1    Ca    9.2      2022 04:30  Mg     2.4           Medications:  aspirin  chewable 81 milliGRAM(s) Oral daily  chlorhexidine 4% Liquid 1 Application(s) Topical <User Schedule>  furosemide    Tablet 40 milliGRAM(s) Oral daily  lactulose Syrup 10 Gram(s) Oral every 8 hours  levothyroxine 100 MICROGram(s) Oral daily  pantoprazole    Tablet 40 milliGRAM(s) Oral before breakfast  senna 2 Tablet(s) Oral at bedtime        Allergies    Cipro (Other)  Levaquin (Rash)  tetracycline (Hives)    Intolerances Chief complaint:  Mrs. Silverio is a 88 y/o female who presents with a chief complaint of Dyspnea w/ Exertion    Interval history:  Mrs. Silverio is a 88 y/o female with HFpEF, severe AS s/p recent balloon valvuloplasty, HTN, hypothyroidism, GERD, HCC s/p partial liver resection, MVP, anemia s/p multiple transfusions, recent hospital admission from - that presented for SOB transferred from [facility] to [place] (transferred on ) for evaluation for TAVR by Dr. Mccollum.    She presented to [facility] for c/o worsening chest pressure and dyspnea x 2 weeks. She was found to be severely anemic with Hgb 5.4 from 9.1 from prior admission 2 months ago. She reports seeing blood when she strains to have BM but denied any hematemesis, hematochezia or black stools. Admitted to ICU for anemia and hypotension. She received multiple units of PRBCs. Hgb stable now, no active bleeding. Thoracentesis for left pleural effusion showed transudative effusion.     Seen and examined at bedside, resting comfortably in bed. c/o hemorrhoidal discomfort. no BM overnight. Denies chest pain or palpitation.       Review of systems: A complete 10-point review of systems was obtained and is negative except as stated in the interval history.    Past medical history is notable for:    SHORTNESS OF BREATH, ACUTE CHF ,ANEMIA, ELEVATED TROPONIN ,PLEURAL EFFUSION    No pertinent family history in first degree relatives    HTN (hypertension)    Mitral valve prolapse    Enlarged heart    Murmur    Hyperthyroidism    Arthritis    HTN (hypertension)    Diverticulosis    Hiatal hernia    Acid reflux    Liver cancer    Aortic stenosis    Shortness of breath    Acute on chronic diastolic heart failure    Elevated troponin    Sacral decubitus ulcer, stage II    Anemia    Opacity of lung on imaging study    H/O resection of liver    Status post cholecystectomy    DIFF BREATHING    Acute CHF    Anemia    Elevated troponin    Pleural effusion    Vitals:  ICU Vital Signs Last 24 Hrs  T(C): 36.6 (2022 12:00), Max: 36.9 (2022 04:55)  T(F): 97.9 (2022 12:00), Max: 98.5 (2022 04:55)  HR: 87 (2022 12:00) (69 - 87)  BP: 93/55 (2022 12:00) (89/48 - 95/55)  RR: 18 (2022 12:00) (17 - 18)  SpO2: 98% (2022 07:47) (96% - 98%)    O2 Parameters below as of 2022 07:47  Patient On (Oxygen Delivery Method): room air          I&O's Detail  I&O's Summary    2022 07:01  -  2022 07:00  --------------------------------------------------------  IN: 1432 mL / OUT: 750 mL / NET: 682 mL    2022 07:01  -  2022 15:03  --------------------------------------------------------  IN: 1078 mL / OUT: 0 mL / NET: 1078 mL        2022 07:01  -  2022 07:00  --------------------------------------------------------  IN: 602 mL / OUT: 300 mL / NET: 302 mL    2022 07:01  -  2022 13:28  --------------------------------------------------------  IN: 620 mL / OUT: 0 mL / NET: 620 mL      Weight trend:    Daily Weight in k.6 (2022 05:00)  Daily Weight in k (15 Jul 2022 05:15)  Daily Weight in k.5 (2022 04:55)    Physical exam:  General: No apparent distress  HEENT: Anicteric sclera. Moist mucous membranes.   Cardiac: Regular rate and rhythm. + murmur, no rubs, or gallops.   Vascular: Symmetric radial pulses. Dorsalis pedis pulses palpable.   Respiratory: normal inspiratory effort. no wheeze B/L.   Abdomen: Soft, nontender. Audible bowel sounds.   Extremities: Warm without edema. No cyanosis or clubbing.   Skin: Warm and dry. No rash.   Neurologic: Grossly normal motor function.   Psychiatric: Oriented to person, place, and time.     Data reviewed:  - Telemetry: NSR w/ episode of AT 6-7 beats     - EC Lead ECG (22 @ 08:38)    Ventricular Rate 69 BPM    Atrial Rate 69 BPM    P-R Interval 176 ms    QRS Duration 136 ms    Q-T Interval 502 ms    QTC Calculation(Bazett) 537 ms    P Axis 87 degrees    R Axis -49 degrees    T Axis 48 degrees    Diagnosis Line Sinus rhythm with Premature atrial complexes  Left axis deviation  Left ventricular hypertrophy with QRS widening  Abnormal ECG    - Echo:   TTE Echo Complete w/o Contrast w/ Doppler (22 @ 13:09)  Summary:   1. Left ventricular ejection fraction, by visual estimation, is 40 to   45%.   2. Normal right atrial size.   3. Mild mitral valve regurgitation.   4. Structurally normal mitral valve, with normal leaflet excursion.   5. Mild aortic regurgitation.   6. Severe aortic valve stenosis.   7. The aortic valve mean gradient is 75.1 mmHg consistent with severe   aortic stenosis.    - Radiology:   (Xray Chest 1 View- PORTABLE-Routine in AM.) (07.15.22 @ 06:22)   INTERPRETATION:  Clinical History / Reason for exam: Dyspnea on exertion   and heart failure    Comparison : Chest radiograph 2022.    Technique/Positioning: Frontal chest radiograph.    Findings:    Support devices: Overlying telemetry leads.    Cardiac/mediastinum/hilum: Stable enlarged cardiac silhouette.    Lung parenchyma/Pleura: Stable left lower lobe opacity/effusion. No   pneumothorax  Stable calcified granuloma right apex.  Skeleton/soft tissues: Stable    Impression:    Stable left lower lobe opacity/effusion.      - Labs:                        8.5    5.36  )-----------( 104      ( 2022 07:09 )             26.0         139  |  96<L>  |  45<H>  ----------------------------<  96  3.2<L>   |  31  |  0.8    Ca    9.2      2022 07:09  Mg     2.4     07-18      Lactate Trend        Medications:  aspirin  chewable 81 milliGRAM(s) Oral daily  chlorhexidine 4% Liquid 1 Application(s) Topical <User Schedule>  furosemide    Tablet 40 milliGRAM(s) Oral daily  lactulose Syrup 10 Gram(s) Oral every 8 hours  levothyroxine 100 MICROGram(s) Oral daily  pantoprazole    Tablet 40 milliGRAM(s) Oral before breakfast  senna 2 Tablet(s) Oral at bedtime        Allergies    Cipro (Other)  Levaquin (Rash)  tetracycline (Hives)    Intolerances Chief complaint:  Mrs. Silverio is a 89 y/o female who presents with a chief complaint of Dyspnea w/ Exertion    Interval history:  Mrs. Silverio is a 89 y/o female with HFpEF, severe AS s/p balloon valvuloplasty, HTN, hypothyroidism, GERD, HCC s/p partial liver resection, MVP, anemia s/p multiple transfusions, recent hospital admission from - that presented for SOB transferred from [facility] to [place] (transferred on ) for evaluation for TAVR by Dr. Mccollum.    She presented to [facility] for c/o worsening chest pressure and dyspnea x 2 weeks. She was found to be severely anemic with Hgb 5.4 from 9.1 from prior admission 2 months ago. She reports seeing blood when she strains to have BM but denied any hematemesis, hematochezia or black stools. Admitted to ICU for anemia and hypotension. She received multiple units of PRBCs. Hgb stable now, no active bleeding. Thoracentesis for left pleural effusion showed transudative effusion.     Seen and examined at bedside, resting comfortably in bed. c/o hemorrhoidal discomfort. no BM overnight. Denies chest pain or palpitation.       Review of systems: A complete 10-point review of systems was obtained and is negative except as stated in the interval history.    Past medical history is notable for:    SHORTNESS OF BREATH, ACUTE CHF ,ANEMIA, ELEVATED TROPONIN ,PLEURAL EFFUSION    No pertinent family history in first degree relatives    HTN (hypertension)    Mitral valve prolapse    Enlarged heart    Murmur    Hyperthyroidism    Arthritis    HTN (hypertension)    Diverticulosis    Hiatal hernia    Acid reflux    Liver cancer    Aortic stenosis    Shortness of breath    Acute on chronic diastolic heart failure    Elevated troponin    Sacral decubitus ulcer, stage II    Anemia    Opacity of lung on imaging study    H/O resection of liver    Status post cholecystectomy    DIFF BREATHING    Acute CHF    Anemia    Elevated troponin    Pleural effusion    Vitals:  ICU Vital Signs Last 24 Hrs  T(C): 36.6 (2022 12:00), Max: 36.9 (2022 04:55)  T(F): 97.9 (2022 12:00), Max: 98.5 (2022 04:55)  HR: 87 (2022 12:00) (69 - 87)  BP: 93/55 (2022 12:00) (89/48 - 95/55)  RR: 18 (2022 12:00) (17 - 18)  SpO2: 98% (2022 07:47) (96% - 98%)    O2 Parameters below as of 2022 07:47  Patient On (Oxygen Delivery Method): room air          I&O's Detail  I&O's Summary    2022 07:01  -  2022 07:00  --------------------------------------------------------  IN: 1432 mL / OUT: 750 mL / NET: 682 mL    2022 07:01  -  2022 15:03  --------------------------------------------------------  IN: 1078 mL / OUT: 0 mL / NET: 1078 mL        2022 07:01  -  2022 07:00  --------------------------------------------------------  IN: 602 mL / OUT: 300 mL / NET: 302 mL    2022 07:01  -  2022 13:28  --------------------------------------------------------  IN: 620 mL / OUT: 0 mL / NET: 620 mL      Weight trend:    Daily Weight in k.6 (2022 05:00)  Daily Weight in k (15 Jul 2022 05:15)  Daily Weight in k.5 (2022 04:55)    Physical exam:  General: No apparent distress  HEENT: Anicteric sclera. Moist mucous membranes.   Cardiac: Regular rate and rhythm. + murmur, no rubs, or gallops.   Vascular: Symmetric radial pulses. Dorsalis pedis pulses palpable.   Respiratory: normal inspiratory effort. no wheeze B/L.   Abdomen: Soft, nontender. Audible bowel sounds.   Extremities: Warm without edema. No cyanosis or clubbing.   Skin: Warm and dry. No rash.   Neurologic: Grossly normal motor function.   Psychiatric: Oriented to person, place, and time.     Data reviewed:  - Telemetry: NSR w/ episode of AT 6-7 beats     - EC Lead ECG (22 @ 08:38)    Ventricular Rate 69 BPM    Atrial Rate 69 BPM    P-R Interval 176 ms    QRS Duration 136 ms    Q-T Interval 502 ms    QTC Calculation(Bazett) 537 ms    P Axis 87 degrees    R Axis -49 degrees    T Axis 48 degrees    Diagnosis Line Sinus rhythm with Premature atrial complexes  Left axis deviation  Left ventricular hypertrophy with QRS widening  Abnormal ECG    - Echo:   TTE Echo Complete w/o Contrast w/ Doppler (22 @ 13:09)  Summary:   1. Left ventricular ejection fraction, by visual estimation, is 40 to   45%.   2. Normal right atrial size.   3. Mild mitral valve regurgitation.   4. Structurally normal mitral valve, with normal leaflet excursion.   5. Mild aortic regurgitation.   6. Severe aortic valve stenosis.   7. The aortic valve mean gradient is 75.1 mmHg consistent with severe   aortic stenosis.    - Radiology:   (Xray Chest 1 View- PORTABLE-Routine in AM.) (07.15.22 @ 06:22)   INTERPRETATION:  Clinical History / Reason for exam: Dyspnea on exertion   and heart failure    Comparison : Chest radiograph 2022.    Technique/Positioning: Frontal chest radiograph.    Findings:    Support devices: Overlying telemetry leads.    Cardiac/mediastinum/hilum: Stable enlarged cardiac silhouette.    Lung parenchyma/Pleura: Stable left lower lobe opacity/effusion. No   pneumothorax  Stable calcified granuloma right apex.  Skeleton/soft tissues: Stable    Impression:    Stable left lower lobe opacity/effusion.      - Labs:                        8.5    5.36  )-----------( 104      ( 2022 07:09 )             26.0         139  |  96<L>  |  45<H>  ----------------------------<  96  3.2<L>   |  31  |  0.8    Ca    9.2      2022 07:09  Mg     2.4     07-18      Lactate Trend        Medications:  aspirin  chewable 81 milliGRAM(s) Oral daily  chlorhexidine 4% Liquid 1 Application(s) Topical <User Schedule>  furosemide    Tablet 40 milliGRAM(s) Oral daily  lactulose Syrup 10 Gram(s) Oral every 8 hours  levothyroxine 100 MICROGram(s) Oral daily  pantoprazole    Tablet 40 milliGRAM(s) Oral before breakfast  senna 2 Tablet(s) Oral at bedtime        Allergies    Cipro (Other)  Levaquin (Rash)  tetracycline (Hives)    Intolerances

## 2022-07-18 NOTE — CONSULT NOTE ADULT - ASSESSMENT
Patient is a 89-year-old female with a past medical history significant for CHF ,severe AAS, MVP, hypertension, HCC, hypothyroidism, GERD who presents with shortness of breath.  Patient was seen at [facility] whom deferred her work up to her outpatient GI Physician. She is constipated primarily and awaiting possible TAVR. Consent obtained and plan for Capsule study. Discussed after capsule placement we would like her in chair or ambulating with assistance to help capsule progress. She was made aware that in rare circumstance capsule can get stuck and surgery or Endoscopic intervention will be needed. Plan with capsule, consent in chart.     Anemia  - Possible TAVR  - Capsule study discussed with patient and consent in chart  - Capsule 7/19 Tuesday  - Will follow

## 2022-07-19 LAB
ANION GAP SERPL CALC-SCNC: 9 MMOL/L — SIGNIFICANT CHANGE UP (ref 7–14)
BLD GP AB SCN SERPL QL: SIGNIFICANT CHANGE UP
BUN SERPL-MCNC: 41 MG/DL — HIGH (ref 10–20)
CALCIUM SERPL-MCNC: 9 MG/DL — SIGNIFICANT CHANGE UP (ref 8.5–10.1)
CHLORIDE SERPL-SCNC: 104 MMOL/L — SIGNIFICANT CHANGE UP (ref 98–110)
CO2 SERPL-SCNC: 31 MMOL/L — SIGNIFICANT CHANGE UP (ref 17–32)
CREAT SERPL-MCNC: 0.9 MG/DL — SIGNIFICANT CHANGE UP (ref 0.7–1.5)
EGFR: 61 ML/MIN/1.73M2 — SIGNIFICANT CHANGE UP
GLUCOSE SERPL-MCNC: 82 MG/DL — SIGNIFICANT CHANGE UP (ref 70–99)
HCT VFR BLD CALC: 25.5 % — LOW (ref 37–47)
HGB BLD-MCNC: 8.1 G/DL — LOW (ref 12–16)
MAGNESIUM SERPL-MCNC: 2.4 MG/DL — SIGNIFICANT CHANGE UP (ref 1.8–2.4)
MCHC RBC-ENTMCNC: 27.6 PG — SIGNIFICANT CHANGE UP (ref 27–31)
MCHC RBC-ENTMCNC: 31.8 G/DL — LOW (ref 32–37)
MCV RBC AUTO: 86.7 FL — SIGNIFICANT CHANGE UP (ref 81–99)
NRBC # BLD: 0 /100 WBCS — SIGNIFICANT CHANGE UP (ref 0–0)
PLATELET # BLD AUTO: 108 K/UL — LOW (ref 130–400)
POTASSIUM SERPL-MCNC: 4.8 MMOL/L — SIGNIFICANT CHANGE UP (ref 3.5–5)
POTASSIUM SERPL-SCNC: 4.8 MMOL/L — SIGNIFICANT CHANGE UP (ref 3.5–5)
RBC # BLD: 2.94 M/UL — LOW (ref 4.2–5.4)
RBC # FLD: 15.3 % — HIGH (ref 11.5–14.5)
SODIUM SERPL-SCNC: 144 MMOL/L — SIGNIFICANT CHANGE UP (ref 135–146)
WBC # BLD: 5.26 K/UL — SIGNIFICANT CHANGE UP (ref 4.8–10.8)
WBC # FLD AUTO: 5.26 K/UL — SIGNIFICANT CHANGE UP (ref 4.8–10.8)

## 2022-07-19 PROCEDURE — 99232 SBSQ HOSP IP/OBS MODERATE 35: CPT

## 2022-07-19 RX ADMIN — LACTULOSE 10 GRAM(S): 10 SOLUTION ORAL at 05:37

## 2022-07-19 RX ADMIN — IRON SUCROSE 110 MILLIGRAM(S): 20 INJECTION, SOLUTION INTRAVENOUS at 15:13

## 2022-07-19 RX ADMIN — POLYETHYLENE GLYCOL 3350 17 GRAM(S): 17 POWDER, FOR SOLUTION ORAL at 07:21

## 2022-07-19 RX ADMIN — CHLORHEXIDINE GLUCONATE 1 APPLICATION(S): 213 SOLUTION TOPICAL at 10:54

## 2022-07-19 RX ADMIN — PANTOPRAZOLE SODIUM 40 MILLIGRAM(S): 20 TABLET, DELAYED RELEASE ORAL at 05:39

## 2022-07-19 RX ADMIN — Medication 81 MILLIGRAM(S): at 15:12

## 2022-07-19 RX ADMIN — SENNA PLUS 2 TABLET(S): 8.6 TABLET ORAL at 21:44

## 2022-07-19 RX ADMIN — Medication 100 MICROGRAM(S): at 05:39

## 2022-07-19 RX ADMIN — LACTULOSE 10 GRAM(S): 10 SOLUTION ORAL at 21:43

## 2022-07-19 NOTE — PROGRESS NOTE ADULT - ASSESSMENT
Patient is a 89-year-old female with a past medical history significant for CHF ,severe AAS, MVP, hypertension, HCC, hypothyroidism, GERD who presents with shortness of breath.  Patient was seen at [facility] whom deferred her work up to her outpatient GI Physician. She is constipated primarily and awaiting possible TAVR. Consent obtained and plan for Capsule study. Discussed after capsule placement we would like her in chair or ambulating with assistance to help capsule progress. She was made aware that in rare circumstance capsule can get stuck and surgery or Endoscopic intervention will be needed. Plan with capsule, consent in chart.     Anemia  - Possible TAVR  - Capsule study discussed with patient and consent in chart  - Capsule 7/19 Tuesday  - Will follow

## 2022-07-19 NOTE — PROGRESS NOTE ADULT - ASSESSMENT
Assessment:   Mrs. Silverio is a 89 y/o female with HFpEF, severe AS s/p balloon valvuloplasty 2021, anemia s/p multiple transfusions 2 months ago, MVP, HTN, hyperthyroidism, HCC s/p partial liver resection, transferred from [facility] to [place] for TAVR evaluation by Dr. Mccollum.  Patient found to be anemic most likely d/t GI bleed requiring 1PRBC transfusion for Hgb < 8/anemia.  GI, Dr. Woo consulted at Dr. Mccollum' request.  Patient will have capsule study today.    #Acute anemia with BRBPR, likely lower GIB  - NPO for capsule study in AM  - give Miralax dose x 2   - check/continue to trend CBC: Hgb 8.5 (7/18), 9.1 (7/17), 10.2 (7/16)  - will transfuse as needed for Hgb < 8  - s/p 1 unit PRBC on 7/15th  - ferritin 24 --> Venofer IVBP x 5 doses (4/5 completed, end 7/19)  - monitor for melena, bloody stools  - Maintain active T&S, last completed 7/18, conditional order for renew on 7/21 in [place]  - Dr. Woo consult requested per Dr. Mccollum (follows outpatient with Dr. Ball)     #Hypotension (BP 93/55)  - furosemide 40 mg held on 7/18th ( last dose 7/17 AM), will decrease to furosemide 20mg daily starting today, 7/19  - orthostatic readings reviewed  - avoid IVF resuscitation d/t severe AS  - Monitor for signs of hypovolemic shock      #HFpEF   #severe AS s/p recent balloon valvuloplasty  - held Lasix yesterday, see above  - possible TAVR with Dr. Mccollum  - c/w ASA  - daily weights  - monitor I&O'S  - replenish electrolytes to goal k >4 , mg >2.0    #Hemorrhoids  #Constipation  - c/w hemorrhoidal suppository   - c/w Senna 2 tabs HS  - c/w lactulose 10 TID  #Hypothyroidism  - Continue Levothyroxine 100 mcg PO daily  - TSH 4.36 (6/19/22)    #GERD  -Continue Pantoprazole 40 mg PO daily    #DVT ppx:  -Sequential Compression Device (SCD)    Plan of care discussed with son at bedside, all questions answered.      Assessment:   Mrs. Silverio is a 88 y/o female with HFpEF, severe AS s/p balloon valvuloplasty 2021, anemia s/p multiple transfusions 2 months ago, MVP, HTN, hyperthyroidism, HCC s/p partial liver resection, transferred from [facility] to [place] for TAVR evaluation by Dr. Mccollum.  Patient found to be anemic most likely d/t GI bleed requiring 1PRBC transfusion for Hgb < 8/anemia.  GI, Dr. Woo consulted at Dr. Mccollum' request.  Patient will have capsule study today.    #Acute anemia with BRBPR, likely lower GIB  - NPO for capsule study 7/19/22  - give Miralax dose x 2   - check/continue to trend CBC: Hgb 8.1 (7/19), Hgb 8.5 (7/18), 9.1 (7/17), 10.2 (7/16)  - will transfuse as needed for Hgb < 8; Lasix to be given with transfusion  - s/p 1 unit PRBC on 7/15th  - ferritin 24 --> Venofer IVBP x 5 doses (4/5 completed, end 7/19)  - monitor for melena, bloody stools  - Maintain active T&S, last completed 7/18, conditional order for renew on 7/21 in [place]  - Dr. Woo consult requested per Dr. Mccollum (follows outpatient with Dr. Ball)     #Hypotension (BP 93/55)  - furosemide 40 mg held on 7/18 ( last dose 7/17 AM),   - Furosemide 20mg daily ordered to be started on 7/19, but held for SBP 90-95  - orthostatic readings reviewed  - avoid IVF resuscitation d/t severe AS  - Monitor for signs of hypovolemic shock      #HFpEF   #severe AS s/p recent balloon valvuloplasty  - Lasix as above   - possible TAVR with Dr. Mccollum  - c/w ASA  - daily weights  - monitor I&O'S  - replenish electrolytes to goal k >4 , mg >2.0    #Hemorrhoids  #Constipation  - c/w hemorrhoidal suppository   - c/w Senna 2 tabs HS  - c/w lactulose 10 TID  #Hypothyroidism  - Continue Levothyroxine 100 mcg PO daily  - TSH 4.36 (6/19/22)    #GERD  -Continue Pantoprazole 40 mg PO daily    #DVT ppx:  -Sequential Compression Device (SCD)    Plan of care discussed with son at bedside, all questions answered.

## 2022-07-19 NOTE — PROGRESS NOTE ADULT - SUBJECTIVE AND OBJECTIVE BOX
Chief complaint:     Mrs. Silverio is a 88 y/o female who presents with a chief complaint of Dyspnea w/ Exertion           Interval history:       - Hypotensive (~ 89/48 - 95/55) for which Lasix 40 mg po QD was held yesterday ()   - Lasix 20 mg QD restarted today ()   - Hgb 7.2 on 7/15 and transfused 1 PRBC, Hgb peaked at 10.2 on , Hgb 8.5 on .    - Consulted by GI, will keep NPO for capsule study today   - On telemetry, patient in sinus rhythm     - SUBJECTIVE LINE           Review of systems: A complete 10-point review of systems was obtained and is negative except as stated in the interval history.           Past medical history is notable for:           SHORTNESS OF BREATH, ACUTE CHF ,ANEMIA, ELEVATED TROPONIN ,PLEURAL EFFUSION           No pertinent family history in first degree relatives           HTN (hypertension)           Mitral valve prolapse           Enlarged heart           Murmur           Hyperthyroidism           Arthritis           HTN (hypertension)           Diverticulosis           Hiatal hernia           Acid reflux           Liver cancer           Aortic stenosis           Shortness of breath           Acute on chronic diastolic heart failure           Elevated troponin           Sacral decubitus ulcer, stage II           Anemia           Opacity of lung on imaging study           H/O resection of liver           Status post cholecystectomy           DIFF BREATHING           Acute CHF           Anemia           Elevated troponin           Pleural effusion           Vitals:     ICU Vital Signs Last 24 Hrs     T(C): 36.6 (2022 12:00), Max: 36.9 (2022 04:55)     T(F): 97.9 (2022 12:00), Max: 98.5 (2022 04:55)     HR: 87 (2022 12:00) (69 - 87)     BP: 93/55 (2022 12:00) (89/48 - 95/55)     RR: 18 (2022 12:00) (17 - 18)     SpO2: 98% (2022 07:47) (96% - 98%)           O2 Parameters below as of 2022 07:47     Patient On (Oxygen Delivery Method): room air           I&O's Detail     I&O's Summary           2022 07:01  -  2022 07:00     --------------------------------------------------------     IN: 1432 mL / OUT: 750 mL / NET: 682 mL           2022 07:01  -  2022 15:03     --------------------------------------------------------     IN: 1078 mL / OUT: 0 mL / NET: 1078 mL                       2022 07:01  -  2022 07:00     --------------------------------------------------------     IN: 602 mL / OUT: 300 mL / NET: 302 mL           2022 07:01  -  2022 13:28     --------------------------------------------------------     IN: 620 mL / OUT: 0 mL / NET: 620 mL                 Weight trend:           Daily Weight in k.6 (2022 05:00)     Daily Weight in k (15 Jul 2022 05:15)     Daily Weight in k.5 (2022 04:55)           Physical exam:     General: No apparent distress     HEENT: Anicteric sclera. Moist mucous membranes.      Cardiac: Regular rate and rhythm. + murmur, no rubs, or gallops.      Vascular: Symmetric radial pulses. Dorsalis pedis pulses palpable.      Respiratory: normal inspiratory effort. no wheeze B/L.      Abdomen: Soft, nontender. Audible bowel sounds.      Extremities: Warm without edema. No cyanosis or clubbing.      Skin: Warm and dry. No rash.      Neurologic: Grossly normal motor function.      Psychiatric: Oriented to person, place, and time.            Data reviewed:     - Telemetry: NSR             - EC Lead ECG (22 @ 08:38)           Ventricular Rate 69 BPM           Atrial Rate 69 BPM           P-R Interval 176 ms           QRS Duration 136 ms           Q-T Interval 502 ms           QTC Calculation(Bazett) 537 ms           P Axis 87 degrees           R Axis -49 degrees           T Axis 48 degrees           Diagnosis Line Sinus rhythm with Premature atrial complexes     Left axis deviation     Left ventricular hypertrophy with QRS widening     Abnormal ECG           - Echo (22):      Summary:     1. Left ventricular ejection fraction, by visual estimation, is 40 to 45%.     2. Normal right atrial size.     3. Mild mitral valve regurgitation.     4. Structurally normal mitral valve, with normal leaflet excursion.     5. Mild aortic regurgitation.     6. Severe aortic valve stenosis.     7. The aortic valve mean gradient is 75.1 mmHg consistent with severe aortic stenosis.           - CXR (7/15/22):      Support devices: Overlying telemetry leads.     Cardiac/mediastinum/hilum: Stable enlarged cardiac silhouette.     Lung parenchyma/Pleura: Stable left lower lobe opacity/effusion. No pneumothorax     Stable calcified granuloma right apex.     Skeleton/soft tissues: Stable           Impression: Stable left lower lobe opacity/effusion.                 - Labs:                              8.5       5.36  )-----------( 104      ( 2022 07:09 )                26.0            07-18           139  |  96<L>  |  45<H>     ----------------------------<  96     3.2<L>   |  31  |  0.8           Ca    9.2      2022 07:09     Mg     2.4     07-18           Medications:     aspirin  chewable 81 milliGRAM(s) Oral daily     chlorhexidine 4% Liquid 1 Application(s) Topical <User Schedule>     furosemide    Tablet 40 milliGRAM(s) Oral daily     lactulose Syrup 10 Gram(s) Oral every 8 hours     levothyroxine 100 MICROGram(s) Oral daily     pantoprazole    Tablet 40 milliGRAM(s) Oral before breakfast     senna 2 Tablet(s) Oral at bedtime           Allergies           Cipro (Other)     Levaquin (Rash)     tetracycline (Hives)           Intolerances      Chief complaint:     Mrs. Silverio is a 89 y/o female who presents with a chief complaint of Dyspnea w/ Exertion           Interval history:       Denies shortness of breath, palpitations, chest pain. No recent BM.     - Hypotensive (~ 89/48 - 95/55) for which Lasix 40 mg po QD was held  and   - Hgb 7.2 on 7/15 and transfused 1 PRBC, Hgb peaked at 10.2 on , Hgb 8.5 on .  Hgb 8.1 on 22.   - Consulted by GI, will keep NPO for capsule study today   - On telemetry, patient in sinus rhythm     - SUBJECTIVE LINE           Review of systems: A complete 10-point review of systems was obtained and is negative except as stated in the interval history.           Past medical history is notable for:           SHORTNESS OF BREATH, ACUTE CHF ,ANEMIA, ELEVATED TROPONIN ,PLEURAL EFFUSION           No pertinent family history in first degree relatives           HTN (hypertension)           Mitral valve prolapse           Enlarged heart           Murmur           Hyperthyroidism           Arthritis           HTN (hypertension)           Diverticulosis           Hiatal hernia           Acid reflux           Liver cancer           Aortic stenosis           Shortness of breath           Acute on chronic diastolic heart failure           Elevated troponin           Sacral decubitus ulcer, stage II           Anemia           Opacity of lung on imaging study           H/O resection of liver           Status post cholecystectomy           DIFF BREATHING           Acute CHF           Anemia           Elevated troponin           Pleural effusion           Vitals:     ICU Vital Signs Last 24 Hrs     T(C): 36.6 (2022 12:00), Max: 36.9 (2022 04:55)     T(F): 97.9 (2022 12:00), Max: 98.5 (2022 04:55)     HR: 87 (2022 12:00) (69 - 87)     BP: 93/55 (2022 12:00) (89/48 - 95/55)     RR: 18 (2022 12:00) (17 - 18)     SpO2: 98% (2022 07:47) (96% - 98%)           O2 Parameters below as of 2022 07:47     Patient On (Oxygen Delivery Method): room air           I&O's Detail     I&O's Summary           2022 07:01  -  2022 07:00     --------------------------------------------------------     IN: 1432 mL / OUT: 750 mL / NET: 682 mL           2022 07:01  -  2022 15:03     --------------------------------------------------------     IN: 1078 mL / OUT: 0 mL / NET: 1078 mL                       2022 07:01  -  2022 07:00     --------------------------------------------------------     IN: 602 mL / OUT: 300 mL / NET: 302 mL           2022 07:01  -  2022 13:28     --------------------------------------------------------     IN: 620 mL / OUT: 0 mL / NET: 620 mL                 Weight trend:           Daily Weight in k.6 (2022 05:00)     Daily Weight in k (15 Jul 2022 05:15)     Daily Weight in k.5 (2022 04:55)           Physical exam:     General: No apparent distress     HEENT: Anicteric sclera. Moist mucous membranes.      Cardiac: Regular rate and rhythm. + murmur, no rubs, or gallops.      Vascular: Symmetric radial pulses. Dorsalis pedis pulses palpable.      Respiratory: normal inspiratory effort. no wheeze B/L.      Abdomen: Soft, nontender. Audible bowel sounds.      Extremities: Warm without edema. No cyanosis or clubbing.      Skin: Warm and dry. No rash.      Neurologic: Grossly normal motor function.      Psychiatric: Oriented to person, place, and time.            Data reviewed:     - Telemetry: NSR             - EC Lead ECG (07.13.22 @ 08:38)           Ventricular Rate 69 BPM           Atrial Rate 69 BPM           P-R Interval 176 ms           QRS Duration 136 ms           Q-T Interval 502 ms           QTC Calculation(Bazett) 537 ms           P Axis 87 degrees           R Axis -49 degrees           T Axis 48 degrees           Diagnosis Line Sinus rhythm with Premature atrial complexes     Left axis deviation     Left ventricular hypertrophy with QRS widening     Abnormal ECG           - Echo (22):      Summary:     1. Left ventricular ejection fraction, by visual estimation, is 40 to 45%.     2. Normal right atrial size.     3. Mild mitral valve regurgitation.     4. Structurally normal mitral valve, with normal leaflet excursion.     5. Mild aortic regurgitation.     6. Severe aortic valve stenosis.     7. The aortic valve mean gradient is 75.1 mmHg consistent with severe aortic stenosis.           - CXR (7/15/22):      Support devices: Overlying telemetry leads.     Cardiac/mediastinum/hilum: Stable enlarged cardiac silhouette.     Lung parenchyma/Pleura: Stable left lower lobe opacity/effusion. No pneumothorax     Stable calcified granuloma right apex.     Skeleton/soft tissues: Stable           Impression: Stable left lower lobe opacity/effusion.                 - Labs:                              8.5       5.36  )-----------( 104      ( 2022 07:09 )                26.0            07-18           139  |  96<L>  |  45<H>     ----------------------------<  96     3.2<L>   |  31  |  0.8           Ca    9.2      2022 07:09     Mg     2.4     07-18           Medications:     aspirin  chewable 81 milliGRAM(s) Oral daily     chlorhexidine 4% Liquid 1 Application(s) Topical <User Schedule>     furosemide    Tablet 40 milliGRAM(s) Oral daily     lactulose Syrup 10 Gram(s) Oral every 8 hours     levothyroxine 100 MICROGram(s) Oral daily     pantoprazole    Tablet 40 milliGRAM(s) Oral before breakfast     senna 2 Tablet(s) Oral at bedtime           Allergies           Cipro (Other)     Levaquin (Rash)     tetracycline (Hives)           Intolerances

## 2022-07-19 NOTE — PHYSICAL THERAPY INITIAL EVALUATION ADULT - ADDITIONAL COMMENTS
as per chart, pt lives with son, basement entrance, 4 BRE, amb with rollator
Pt reports she lives with son in basement entrance with 4 BRE. Pt says she uses rollator for amb PTA.

## 2022-07-19 NOTE — PROGRESS NOTE ADULT - SUBJECTIVE AND OBJECTIVE BOX
Patient is a 89-year-old female with a past medical history significant for CHF ,severe AAS, MVP, hypertension, HCC, hypothyroidism, GERD who presents with shortness of breath.  Patient was seen at [facility] whom deferred her work up to her outpatient GI Physician. She is constipated primarily and awaiting possible TAVR. Given Miralax overnight. Spoke with son whom is HCP      PAST MEDICAL & SURGICAL HISTORY:  Mitral valve prolapse  Enlarged heart  Murmur  Hyperthyroidism  Arthritis  HTN (hypertension)  Diverticulosis  Hiatal hernia  Liver cancer s/p resection and s/p chemo and radiation  Aortic stenosis s/p ballon aortic valuloplasty 5/25/21        MEDICATIONS  (STANDING):  aspirin  chewable 81 milliGRAM(s) Oral daily  chlorhexidine 4% Liquid 1 Application(s) Topical <User Schedule>  furosemide    Tablet 20 milliGRAM(s) Oral daily  iron sucrose IVPB 200 milliGRAM(s) IV Intermittent every 24 hours  lactulose Syrup 10 Gram(s) Oral every 8 hours  levothyroxine 100 MICROGram(s) Oral daily  pantoprazole    Tablet 40 milliGRAM(s) Oral before breakfast  senna 2 Tablet(s) Oral at bedtime    MEDICATIONS  (PRN):  hydrocortisone hemorrhoidal Suppository 1 Suppository(s) Rectal two times a day PRN hemorrhoidal discomfort      Allergies  Cipro (Other)  Levaquin (Rash)  tetracycline (Hives)    Review of Systems  General:  Denies Fatigue, Denies Fever, Denies Weakness ,Denies Weight Loss   HEENT: Denies Trouble Swallowing ,Denies  Sore Throat , Denies Change in hearing/vision/speech ,Denies Dizziness    Cardio:See HPI  Respiratory: See HPI  Abdomen: See detailed HPI  Neuro: Denies Headache Denies Dizziness, Denies Paresthesias  MSK: Denies pain in Bones/Joints/Muscles   Psych: Patient denies depression, denies suicidal or homicidal ideations  Integ: Patient Denies rash, or new skin lesions       Vital Signs Last 24 Hrs  T(C): 36.5 (19 Jul 2022 04:30), Max: 36.6 (18 Jul 2022 12:00)  T(F): 97.7 (19 Jul 2022 04:30), Max: 97.9 (18 Jul 2022 12:00)  HR: 62 (19 Jul 2022 07:35) (62 - 87)  BP: 94/54 (19 Jul 2022 04:30) (93/55 - 102/61)  RR: 18 (19 Jul 2022 07:35) (18 - 18)  SpO2: 96% (19 Jul 2022 07:35) (96% - 96%)    Parameters below as of 19 Jul 2022 07:35  Patient On (Oxygen Delivery Method): room air    Physical Exam  Gen: NAD  HEENT: NC/AT, Mucosal Membranes  Cardio: S1/S2 No S3/S4, Regular  Resp: CTA B/L  Abdomen: Soft, ND/NT  Neuro: AAOx3, Cranial Nerve II-XII intact   Extremities: FROM x 4      Labs:                          8.1    5.26  )-----------( x        ( 19 Jul 2022 06:09 )             25.5         07-18    139  |  96<L>  |  45<H>  ----------------------------<  96  3.2<L>   |  31  |  0.8

## 2022-07-20 LAB
ANION GAP SERPL CALC-SCNC: 14 MMOL/L — SIGNIFICANT CHANGE UP (ref 7–14)
BUN SERPL-MCNC: 32 MG/DL — HIGH (ref 10–20)
CALCIUM SERPL-MCNC: 9.3 MG/DL — SIGNIFICANT CHANGE UP (ref 8.5–10.1)
CHLORIDE SERPL-SCNC: 99 MMOL/L — SIGNIFICANT CHANGE UP (ref 98–110)
CO2 SERPL-SCNC: 25 MMOL/L — SIGNIFICANT CHANGE UP (ref 17–32)
CREAT SERPL-MCNC: 0.9 MG/DL — SIGNIFICANT CHANGE UP (ref 0.7–1.5)
EGFR: 61 ML/MIN/1.73M2 — SIGNIFICANT CHANGE UP
GLUCOSE SERPL-MCNC: 84 MG/DL — SIGNIFICANT CHANGE UP (ref 70–99)
HCT VFR BLD CALC: 27.1 % — LOW (ref 37–47)
HGB BLD-MCNC: 8.8 G/DL — LOW (ref 12–16)
MAGNESIUM SERPL-MCNC: 2.4 MG/DL — SIGNIFICANT CHANGE UP (ref 1.8–2.4)
MCHC RBC-ENTMCNC: 28.5 PG — SIGNIFICANT CHANGE UP (ref 27–31)
MCHC RBC-ENTMCNC: 32.5 G/DL — SIGNIFICANT CHANGE UP (ref 32–37)
MCV RBC AUTO: 87.7 FL — SIGNIFICANT CHANGE UP (ref 81–99)
NRBC # BLD: 0 /100 WBCS — SIGNIFICANT CHANGE UP (ref 0–0)
PLATELET # BLD AUTO: 116 K/UL — LOW (ref 130–400)
POTASSIUM SERPL-MCNC: 4.5 MMOL/L — SIGNIFICANT CHANGE UP (ref 3.5–5)
POTASSIUM SERPL-SCNC: 4.5 MMOL/L — SIGNIFICANT CHANGE UP (ref 3.5–5)
RBC # BLD: 3.09 M/UL — LOW (ref 4.2–5.4)
RBC # FLD: 15.2 % — HIGH (ref 11.5–14.5)
SODIUM SERPL-SCNC: 138 MMOL/L — SIGNIFICANT CHANGE UP (ref 135–146)
WBC # BLD: 5.08 K/UL — SIGNIFICANT CHANGE UP (ref 4.8–10.8)
WBC # FLD AUTO: 5.08 K/UL — SIGNIFICANT CHANGE UP (ref 4.8–10.8)

## 2022-07-20 PROCEDURE — 99232 SBSQ HOSP IP/OBS MODERATE 35: CPT

## 2022-07-20 RX ORDER — HYDROCORTISONE 1 %
1 OINTMENT (GRAM) TOPICAL DAILY
Refills: 0 | Status: DISCONTINUED | OUTPATIENT
Start: 2022-07-20 | End: 2022-08-15

## 2022-07-20 RX ADMIN — PANTOPRAZOLE SODIUM 40 MILLIGRAM(S): 20 TABLET, DELAYED RELEASE ORAL at 05:50

## 2022-07-20 RX ADMIN — Medication 81 MILLIGRAM(S): at 11:27

## 2022-07-20 RX ADMIN — CHLORHEXIDINE GLUCONATE 1 APPLICATION(S): 213 SOLUTION TOPICAL at 05:52

## 2022-07-20 RX ADMIN — Medication 1 SUPPOSITORY(S): at 21:45

## 2022-07-20 RX ADMIN — Medication 100 MICROGRAM(S): at 05:50

## 2022-07-20 RX ADMIN — IRON SUCROSE 110 MILLIGRAM(S): 20 INJECTION, SOLUTION INTRAVENOUS at 21:39

## 2022-07-20 NOTE — PROGRESS NOTE ADULT - ASSESSMENT
Assessment:   Mrs. Silverio is a 89 y/o female with HFpEF, severe AS s/p balloon valvuloplasty 2021, anemia s/p multiple transfusions 2 months ago, MVP, HTN, hyperthyroidism, HCC s/p partial liver resection, transferred from [facility] to [place] for TAVR evaluation by Dr. Mccollum.  Patient found to be anemic most likely d/t GI bleed now s/p 1PRBC transfusion for Hgb < 8/anemia. GI, Dr. Woo consulted at Dr. Mccollum' request.  Capsule study completed.  Dr. Woo will present to [facility] tomorrow to see patient and give recs.  Diuretics remain on hold (x 3 days total) for soft BP (SBP ~ high 80s)    #Acute anemia with BRBPR, likely lower GIB  - Capsule study completed (7/20)  - check/continue to trend CBC: Hgb 8.8 (7/20), Hgb 8.1 (7/19), Hgb 8.5 (7/18), 9.1 (7/17), 10.2 (7/16)  - will transfuse as needed for Hgb < 8; Lasix to be given with transfusion  - s/p 1 unit PRBC on 7/15th  - 5 day course iron sucrose completed on 7/19 (ferritin-->24)  - monitor for melena, bloody stools, last reported BM 7/18  - Maintain active T&S, last completed 7/19  - Dr. Woo following, awaiting official read of Capsule study, will see pt chelsea/make recs     #Hypotension (SBP ~ high 80s)  - Furosemide/diuretics remain on hold for SBP ~ 80s, last time received 7/17 in AM  - orthostatic readings reviewed  - avoid IVF resuscitation d/t severe AS  - Monitor for signs of hypovolemic shock      #HFpEF   #severe AS s/p recent balloon valvuloplasty  - diuretics on hold at this time  - possible TAVR with Dr. Mccollum  - c/w ASA  - daily standing weights  - monitor I&O'S  - replenish electrolytes to goal k >4 , mg >2.0    #Hemorrhoids  #Constipation  - c/w hemorrhoidal suppository   - c/w Senna 2 tabs HS  - c/w lactulose 10 TID    #Hypothyroidism  - Continue Levothyroxine 100 mcg PO daily  - TSH 4.36 (6/19/22)    #GERD  -Continue Pantoprazole 40 mg PO daily    #DVT ppx:  -Sequential Compression Device (SCD)         Assessment:   Mrs. Silverio is a 89 y/o female with HFpEF, severe AS s/p balloon valvuloplasty 2021, anemia s/p multiple transfusions 2 months ago, MVP, HTN, hyperthyroidism, HCC s/p partial liver resection, transferred from [facility] to [place] for TAVR evaluation by Dr. Mccollum.  Patient found to be anemic most likely d/t GI bleed now s/p 1PRBC transfusion for Hgb < 8/anemia. GI, Dr. Woo consulted at Dr. Mccollum' request.  Capsule study completed.  Dr. Woo will present to [facility] tomorrow to see patient and give recs.  Diuretics remain on hold (x 3 days total) for soft BP (SBP ~ high 80s)    #Acute anemia with BRBPR, likely lower GIB  - Capsule study completed (7/20)  - check/continue to trend CBC: Hgb 8.8 (7/20), Hgb 8.1 (7/19), Hgb 8.5 (7/18), 9.1 (7/17), 10.2 (7/16)  - will transfuse as needed for Hgb < 8; Lasix to be given with transfusion  - s/p 1 unit PRBC on 7/15th  - 5 day course iron sucrose completed on 7/19 (ferritin-->24)  - monitor for melena, bloody stools, last reported BM 7/18  - Maintain active T&S, last completed 7/19  - Dr. Woo following, awaiting official read of Capsule study, will see pt chelsea/make recs     #Hypotension (SBP ~ high 80s)  - Furosemide/diuretics remain on hold for SBP ~ 80s, last time received 7/17 in AM (parameters set to hold for SBP < 100)  - orthostatic readings reviewed  - avoid IVF resuscitation d/t severe AS  - Monitor for signs of hypovolemic shock      #HFpEF   #severe AS s/p recent balloon valvuloplasty  - diuretics on hold at this time  - possible TAVR with Dr. Mccollum  - c/w ASA  - daily standing weights  - monitor I&O'S  - replenish electrolytes to goal k >4 , mg >2.0    #Hemorrhoids  #Constipation  - c/w hemorrhoidal suppository   - c/w Senna 2 tabs HS  - c/w lactulose 10 TID    #Hypothyroidism  - Continue Levothyroxine 100 mcg PO daily  - TSH 4.36 (6/19/22)    #GERD  -Continue Pantoprazole 40 mg PO daily    #DVT ppx:  -Sequential Compression Device (SCD)

## 2022-07-20 NOTE — PROGRESS NOTE ADULT - SUBJECTIVE AND OBJECTIVE BOX
Chief complaint:     Mrs. Silverio is a 89 y/o female who presents with a chief complaint of Dyspnea w/ Exertion           Interval history:       Denies shortness of breath, palpitations, chest pain.     - Hypotensive (SBP ~ high 80s) for which Lasix 40 mg po QD was held (,  and today )  - Hgb 8.8 today, up from 8.1 day prior ()  - Capsule study completed, awaiting read    Seen and examined at bedside.  No complaints, offers concerns.  All questions answered.  NAD, VSS, resting comfortably on RA.  Tele:  SR w/ no events noted    Review of systems: A complete 10-point review of systems was obtained and is negative except as stated in the interval history.     Past medical history is notable for:      SHORTNESS OF BREATH, ACUTE CHF ,ANEMIA, ELEVATED TROPONIN ,PLEURAL EFFUSION       No pertinent family history in first degree relatives     HTN (hypertension)       Mitral valve prolapse      Enlarged heart       Murmur       Hyperthyroidism       Arthritis       HTN (hypertension)       Diverticulosis       Hiatal hernia       Acid reflux       Liver cancer       Aortic stenosis       Shortness of breath       Acute on chronic diastolic heart failure       Elevated troponin       Sacral decubitus ulcer, stage II       Anemia       Opacity of lung on imaging study       H/O resection of liver       Status post cholecystectomy       DIFF BREATHING       Acute CHF       Anemia      Elevated troponin       Pleural effusion       Vitals:   Vital Signs Last 24 Hrs  T(C): 36.7 (2022 12:30), Max: 36.7 (2022 12:30)  T(F): 98 (2022 12:30), Max: 98 (2022 12:30)  HR: 83 (2022 12:30) (74 - 87)  BP: 105/55 (2022 12:30) (92/53 - 105/55)  BP(mean): --  RR: 18 (2022 12:30) (18 - 18)  SpO2: 98% (2022 08:00) (98% - 98%)  Patient On (Oxygen Delivery Method): room air    I&O's Summary    2022 07:01  -  2022 07:00  --------------------------------------------------------  IN: 120 mL / OUT: 150 mL / NET: -30 mL    2022 07:01  -  2022 13:27  --------------------------------------------------------  IN: 682 mL / OUT: 200 mL / NET: 482 mL    Daily     Daily Weight in k (2022 04:45)     Physical exam:   General: No apparent distress   HEENT: Anicteric sclera. Moist mucous membranes.    Cardiac: Regular rate and rhythm. + murmur, no rubs, or gallops.    Vascular: Symmetric radial pulses. Dorsalis pedis pulses palpable.    Respiratory: normal inspiratory effort. no wheeze B/L.    Abdomen: Soft, nontender. Audible bowel sounds.    Extremities: Warm without edema. No cyanosis or clubbing.    Skin: Warm and dry. No rash.    Neurologic: Grossly normal motor function.    Psychiatric: Oriented to person, place, and time.        Data reviewed:     - Telemetry: NSR  - EC Lead ECG (22 @ 08:38)   Ventricular Rate 69 BPM   Atrial Rate 69 BPM   P-R Interval 176 ms   QRS Duration 136 ms   Q-T Interval 502 ms   QTC Calculation(Bazett) 537 ms    P Axis 87 degrees   R Axis -49 degrees   T Axis 48 degrees   Diagnosis Line Sinus rhythm with Premature atrial complexes   Left axis deviation   Left ventricular hypertrophy with QRS widening   Abnormal ECG     - Echo (22):    Summary:     1. Left ventricular ejection fraction, by visual estimation, is 40 to 45%.     2. Normal right atrial size.     3. Mild mitral valve regurgitation.     4. Structurally normal mitral valve, with normal leaflet excursion.     5. Mild aortic regurgitation.     6. Severe aortic valve stenosis.     7. The aortic valve mean gradient is 75.1 mmHg consistent with severe aortic stenosis.     - CXR (7/15/22):      Support devices: Overlying telemetry leads.     Cardiac/mediastinum/hilum: Stable enlarged cardiac silhouette.     Lung parenchyma/Pleura: Stable left lower lobe opacity/effusion. No pneumothorax     Stable calcified granuloma right apex.     Skeleton/soft tissues: Stable         Impression: Stable left lower lobe opacity/effusion.         - Labs:             - Telemetry:  - ECG (date***):  - Echo (date***):  - Radiology:    - Labs:                        8.8    5.08  )-----------( 116      ( 2022 06:16 )             27.1     07-    138  |  99  |  32<H>  ----------------------------<  84  4.5   |  25  |  0.9    Ca    9.3      2022 06:16  Mg     2.4     -    Medications:     aspirin  chewable 81 milliGRAM(s) Oral daily     chlorhexidine 4% Liquid 1 Application(s) Topical <User Schedule>     furosemide    Tablet 40 milliGRAM(s) Oral daily     lactulose Syrup 10 Gram(s) Oral every 8 hours     levothyroxine 100 MICROGram(s) Oral daily     pantoprazole    Tablet 40 milliGRAM(s) Oral before breakfast     senna 2 Tablet(s) Oral at bedtime           Allergies   Cipro (Other)     Levaquin (Rash)     tetracycline (Hives)           Intolerances

## 2022-07-21 LAB
ANION GAP SERPL CALC-SCNC: 7 MMOL/L — SIGNIFICANT CHANGE UP (ref 7–14)
BUN SERPL-MCNC: 28 MG/DL — HIGH (ref 10–20)
CALCIUM SERPL-MCNC: 8.7 MG/DL — SIGNIFICANT CHANGE UP (ref 8.5–10.1)
CHLORIDE SERPL-SCNC: 101 MMOL/L — SIGNIFICANT CHANGE UP (ref 98–110)
CO2 SERPL-SCNC: 29 MMOL/L — SIGNIFICANT CHANGE UP (ref 17–32)
CREAT SERPL-MCNC: 1 MG/DL — SIGNIFICANT CHANGE UP (ref 0.7–1.5)
EGFR: 54 ML/MIN/1.73M2 — LOW
GLUCOSE SERPL-MCNC: 92 MG/DL — SIGNIFICANT CHANGE UP (ref 70–99)
HCT VFR BLD CALC: 25.1 % — LOW (ref 37–47)
HGB BLD-MCNC: 7.9 G/DL — LOW (ref 12–16)
MAGNESIUM SERPL-MCNC: 2.2 MG/DL — SIGNIFICANT CHANGE UP (ref 1.8–2.4)
MCHC RBC-ENTMCNC: 28.2 PG — SIGNIFICANT CHANGE UP (ref 27–31)
MCHC RBC-ENTMCNC: 31.5 G/DL — LOW (ref 32–37)
MCV RBC AUTO: 89.6 FL — SIGNIFICANT CHANGE UP (ref 81–99)
NRBC # BLD: 0 /100 WBCS — SIGNIFICANT CHANGE UP (ref 0–0)
PLATELET # BLD AUTO: 110 K/UL — LOW (ref 130–400)
POTASSIUM SERPL-MCNC: 4.3 MMOL/L — SIGNIFICANT CHANGE UP (ref 3.5–5)
POTASSIUM SERPL-SCNC: 4.3 MMOL/L — SIGNIFICANT CHANGE UP (ref 3.5–5)
RBC # BLD: 2.8 M/UL — LOW (ref 4.2–5.4)
RBC # FLD: 15.9 % — HIGH (ref 11.5–14.5)
SODIUM SERPL-SCNC: 137 MMOL/L — SIGNIFICANT CHANGE UP (ref 135–146)
WBC # BLD: 4.91 K/UL — SIGNIFICANT CHANGE UP (ref 4.8–10.8)
WBC # FLD AUTO: 4.91 K/UL — SIGNIFICANT CHANGE UP (ref 4.8–10.8)

## 2022-07-21 PROCEDURE — 99232 SBSQ HOSP IP/OBS MODERATE 35: CPT

## 2022-07-21 RX ORDER — FUROSEMIDE 40 MG
40 TABLET ORAL ONCE
Refills: 0 | Status: DISCONTINUED | OUTPATIENT
Start: 2022-07-21 | End: 2022-07-21

## 2022-07-21 RX ORDER — FUROSEMIDE 40 MG
20 TABLET ORAL ONCE
Refills: 0 | Status: COMPLETED | OUTPATIENT
Start: 2022-07-21 | End: 2022-07-21

## 2022-07-21 RX ADMIN — SENNA PLUS 2 TABLET(S): 8.6 TABLET ORAL at 21:04

## 2022-07-21 RX ADMIN — Medication 1 SUPPOSITORY(S): at 20:15

## 2022-07-21 RX ADMIN — LACTULOSE 10 GRAM(S): 10 SOLUTION ORAL at 06:03

## 2022-07-21 RX ADMIN — Medication 81 MILLIGRAM(S): at 11:47

## 2022-07-21 RX ADMIN — Medication 20 MILLIGRAM(S): at 13:30

## 2022-07-21 RX ADMIN — PANTOPRAZOLE SODIUM 40 MILLIGRAM(S): 20 TABLET, DELAYED RELEASE ORAL at 06:03

## 2022-07-21 RX ADMIN — LACTULOSE 10 GRAM(S): 10 SOLUTION ORAL at 21:04

## 2022-07-21 RX ADMIN — Medication 100 MICROGRAM(S): at 06:02

## 2022-07-21 RX ADMIN — LACTULOSE 10 GRAM(S): 10 SOLUTION ORAL at 14:53

## 2022-07-21 NOTE — PHYSICAL THERAPY INITIAL EVALUATION ADULT - GAIT DEVIATIONS NOTED, PT EVAL
kyphotic posture, fearful of falling/decreased carin/decreased step length/decreased stride length/decreased weight-shifting ability
pt anxious/nervous/decreased carin/decreased step length/decreased stride length

## 2022-07-21 NOTE — PROGRESS NOTE ADULT - SUBJECTIVE AND OBJECTIVE BOX
Chief complaint:     Mrs. Silverio is a 88 y/o female who presents with a chief complaint of Dyspnea w/ Exertion           Interval history:       - Hypotensive for which Lasix 40 mg po QD was held (,  and  ). BP now Improved within last 24HRS, SBP ranging in .  - Hgb stable  - Capsule study completed, awaiting read    Seen and examined at bedside.  No complaints, offers concerns.  All questions answered.  NAD, VSS, resting comfortably on RA.  Tele:  SR w/ PAC    Review of systems: A complete 10-point review of systems was obtained and is negative except as stated in the interval history.     Past medical history is notable for:      SHORTNESS OF BREATH, ACUTE CHF ,ANEMIA, ELEVATED TROPONIN ,PLEURAL EFFUSION       No pertinent family history in first degree relatives     HTN (hypertension)       Mitral valve prolapse      Enlarged heart       Murmur       Hyperthyroidism       Arthritis       HTN (hypertension)       Diverticulosis       Hiatal hernia       Acid reflux       Liver cancer       Aortic stenosis       Shortness of breath       Acute on chronic diastolic heart failure       Elevated troponin       Sacral decubitus ulcer, stage II       Anemia       Opacity of lung on imaging study       H/O resection of liver       Status post cholecystectomy       DIFF BREATHING       Acute CHF       Anemia      Elevated troponin       Pleural effusion       Vitals:   ICU Vital Signs Last 24 Hrs  T(C): 36.3 (2022 04:34), Max: 36.8 (2022 20:15)  T(F): 97.4 (2022 04:34), Max: 98.2 (2022 20:15)  HR: 69 (2022 04:34) (69 - 88)  BP: 94/50 (2022 04:34) (94/50 - 105/55)  RR: 18 (2022 04:34) (18 - 18)  SpO2: 98% (2022 08:00) (98% - 98%)    O2 Parameters below as of 2022 04:34  Patient On (Oxygen Delivery Method): room air          I&O's Summary  I&O's Summary    2022 07:01  -  2022 07:00  --------------------------------------------------------  IN: 1642 mL / OUT: 1225 mL / NET: 417 mL      2022 07:01  -  2022 07:00  --------------------------------------------------------  IN: 120 mL / OUT: 150 mL / NET: -30 mL    2022 07:01  -  2022 13:27  --------------------------------------------------------  IN: 682 mL / OUT: 200 mL / NET: 482 mL    Daily     Daily Weight in k (2022 04:45)     Physical exam:   General: No apparent distress   HEENT: Anicteric sclera. Moist mucous membranes.    Cardiac: Regular rate and rhythm. + murmur, no rubs, or gallops.    Vascular: Symmetric radial pulses. Dorsalis pedis pulses palpable.    Respiratory: normal inspiratory effort. no wheeze B/L.    Abdomen: Soft, nontender. Audible bowel sounds.    Extremities: Warm without edema. No cyanosis or clubbing.    Skin: Warm and dry. No rash.    Neurologic: Grossly normal motor function.    Psychiatric: Oriented to person, place, and time.        Data reviewed:     - Telemetry: NSR with PAC  - EC Lead ECG (22 @ 08:38)   Ventricular Rate 69 BPM   Atrial Rate 69 BPM   P-R Interval 176 ms   QRS Duration 136 ms   Q-T Interval 502 ms   QTC Calculation(Bazett) 537 ms    P Axis 87 degrees   R Axis -49 degrees   T Axis 48 degrees   Diagnosis Line Sinus rhythm with Premature atrial complexes   Left axis deviation   Left ventricular hypertrophy with QRS widening   Abnormal ECG     - Echo (22):    Summary:     1. Left ventricular ejection fraction, by visual estimation, is 40 to 45%.     2. Normal right atrial size.     3. Mild mitral valve regurgitation.     4. Structurally normal mitral valve, with normal leaflet excursion.     5. Mild aortic regurgitation.     6. Severe aortic valve stenosis.     7. The aortic valve mean gradient is 75.1 mmHg consistent with severe aortic stenosis.     - CXR (7/15/22):      Support devices: Overlying telemetry leads.     Cardiac/mediastinum/hilum: Stable enlarged cardiac silhouette.     Lung parenchyma/Pleura: Stable left lower lobe opacity/effusion. No pneumothorax     Stable calcified granuloma right apex.     Skeleton/soft tissues: Stable         Impression: Stable left lower lobe opacity/effusion.           - Labs:     - labs pending                8.8    5.08  )-----------( 116      ( 2022 06:16 )             27.1     07    138  |  99  |  32<H>  ----------------------------<  84  4.5   |  25  |  0.9    Ca    9.3      2022 06:16  Mg     2.4         Medications:     aspirin  chewable 81 milliGRAM(s) Oral daily     chlorhexidine 4% Liquid 1 Application(s) Topical <User Schedule>     furosemide    Tablet 40 milliGRAM(s) Oral daily     lactulose Syrup 10 Gram(s) Oral every 8 hours     levothyroxine 100 MICROGram(s) Oral daily     pantoprazole    Tablet 40 milliGRAM(s) Oral before breakfast     senna 2 Tablet(s) Oral at bedtime           Allergies   Cipro (Other)     Levaquin (Rash)     tetracycline (Hives)           Intolerances      Chief complaint:     Mrs. Silverio is a 88 y/o female who presents with a chief complaint of Dyspnea w/ Exertion           Interval history:       - Hypotensive for which Lasix 40 mg po QD was held (, ,  and ). BP now Improved within last 24HRS, SBP ranging in .  - Capsule study completed, awaiting read by Dr. Woo    Seen and examined at bedside.  OOB to chair w/ PT.  No complaints, offers concerns.  All questions answered.  NAD, VSS, resting comfortably on RA.  Tele:  SR     Review of systems: A complete 10-point review of systems was obtained and is negative except as stated in the interval history.     Past medical history is notable for:      SHORTNESS OF BREATH, ACUTE CHF ,ANEMIA, ELEVATED TROPONIN ,PLEURAL EFFUSION       No pertinent family history in first degree relatives     HTN (hypertension)       Mitral valve prolapse      Enlarged heart       Murmur       Hyperthyroidism       Arthritis       HTN (hypertension)       Diverticulosis       Hiatal hernia       Acid reflux       Liver cancer       Aortic stenosis       Shortness of breath       Acute on chronic diastolic heart failure       Elevated troponin       Sacral decubitus ulcer, stage II       Anemia       Opacity of lung on imaging study       H/O resection of liver       Status post cholecystectomy       DIFF BREATHING       Acute CHF       Anemia      Elevated troponin       Pleural effusion       Vitals:   Vital Signs Last 24 Hrs  T(C): 36.6 (2022 15:20), Max: 36.8 (2022 20:15)  T(F): 97.9 (2022 15:20), Max: 98.2 (2022 20:15)  HR: 82 (2022 15:20) (69 - 88)  BP: 102/52 (2022 15:20) (87/42 - 102/52)  BP(mean): 44 (2022 14:13) (44 - 44)  RR: 18 (2022 15:20) (18 - 18)  SpO2: 99% (2022 07:52) (99% - 99%)    Parameters below as of 2022 11:55  Patient On (Oxygen Delivery Method): room air    I&O's Detail    2022 07:01  -  2022 07:00  --------------------------------------------------------  IN:    IV PiggyBack: 100 mL    Oral Fluid: 1542 mL  Total IN: 1642 mL    OUT:    Voided (mL): 1225 mL  Total OUT: 1225 mL    Total NET: 417 mL      2022 07:01  -  2022 17:03  --------------------------------------------------------  IN:    Oral Fluid: 620 mL    PRBCs (Packed Red Blood Cells): 327 mL  Total IN: 947 mL    OUT:  Total OUT: 0 mL    Total NET: 947    Daily Weight in k.2 (2022 04:34)    Daily Weight in k (2022 04:45)     Physical exam:   General: No apparent distress   HEENT: Anicteric sclera. Moist mucous membranes.    Cardiac: Regular rate and rhythm. + murmur, no rubs, or gallops.    Vascular: Symmetric radial pulses. Dorsalis pedis pulses palpable.    Respiratory: normal inspiratory effort. no wheeze B/L.    Abdomen: Soft, nontender. Audible bowel sounds.    Extremities: Warm without edema. No cyanosis or clubbing.    Skin: Warm and dry. No rash.    Neurologic: Grossly normal motor function.    Psychiatric: Oriented to person, place, and time.        Data reviewed:     - Telemetry: NSR   - EC Lead ECG (22 @ 08:38)   Ventricular Rate 69 BPM   Atrial Rate 69 BPM   P-R Interval 176 ms   QRS Duration 136 ms   Q-T Interval 502 ms   QTC Calculation(Bazett) 537 ms    P Axis 87 degrees   R Axis -49 degrees   T Axis 48 degrees   Diagnosis Line Sinus rhythm with Premature atrial complexes   Left axis deviation   Left ventricular hypertrophy with QRS widening   Abnormal ECG     - Echo (22):    Summary:     1. Left ventricular ejection fraction, by visual estimation, is 40 to 45%.     2. Normal right atrial size.     3. Mild mitral valve regurgitation.     4. Structurally normal mitral valve, with normal leaflet excursion.     5. Mild aortic regurgitation.     6. Severe aortic valve stenosis.     7. The aortic valve mean gradient is 75.1 mmHg consistent with severe aortic stenosis.     - CXR (7/15/22):      Support devices: Overlying telemetry leads.     Cardiac/mediastinum/hilum: Stable enlarged cardiac silhouette.     Lung parenchyma/Pleura: Stable left lower lobe opacity/effusion. No pneumothorax     Stable calcified granuloma right apex.     Skeleton/soft tissues: Stable         Impression: Stable left lower lobe opacity/effusion.           - Labs:             7.9    4.91  )-----------( 110      ( 2022 06:03 )             25.1         137  |  101  |  28<H>  ----------------------------<  92  4.3   |  29  |  1.0    Ca    8.7      2022 06:03  Mg     2.2         Medications:     aspirin  chewable 81 milliGRAM(s) Oral daily     chlorhexidine 4% Liquid 1 Application(s) Topical <User Schedule>     furosemide    Tablet 40 milliGRAM(s) Oral daily     lactulose Syrup 10 Gram(s) Oral every 8 hours     levothyroxine 100 MICROGram(s) Oral daily     pantoprazole    Tablet 40 milliGRAM(s) Oral before breakfast     senna 2 Tablet(s) Oral at bedtime           Allergies   Cipro (Other)     Levaquin (Rash)     tetracycline (Hives)           Intolerances

## 2022-07-21 NOTE — PROGRESS NOTE ADULT - ASSESSMENT
Assessment:   Mrs. Silverio is a 88 y/o female with HFpEF, severe AS s/p balloon valvuloplasty 2021, anemia s/p multiple transfusions 2 months ago, MVP, HTN, hyperthyroidism, HCC s/p partial liver resection, transferred from [facility] to [place] for TAVR evaluation by Dr. Mccollum.  Patient found to be anemic most likely d/t GI bleed now s/p 1PRBC transfusion for Hgb < 8/anemia. GI, Dr. Woo consulted at Dr. Mccollum' request.  Capsule study completed.  Dr. Woo will present to [facility] tomorrow to see patient and give recs.  Diuretics remain on hold (x 3 days total) for soft BP (SBP ~ high 80s)    #Acute anemia with BRBPR, likely lower GIB  - Capsule study completed (7/20)  - check/continue to trend CBC: Hgb 8.8 (7/20), Hgb 8.1 (7/19), Hgb 8.5 (7/18), 9.1 (7/17), 10.2 (7/16)  - will transfuse as needed for Hgb < 8; Lasix to be given with transfusion  - s/p 1 unit PRBC on 7/15th  - 5 day course iron sucrose completed on 7/19 (ferritin-->24)  - monitor for melena, bloody stools, last reported BM 7/18  - Maintain active T&S, last completed 7/19  - Dr. Woo following, awaiting official read of Capsule study     #Hypotension ( improved )- 94/50 today on 7/21 AM  - Furosemide/diuretics remain on hold for SBP ~ 80s, last time received 7/17 in AM (parameters set to hold for SBP < 100)  - orthostatic readings reviewed  - avoid IVF resuscitation d/t severe AS  - Monitor for signs of hypovolemic shock      #HFpEF   #severe AS s/p recent balloon valvuloplasty  - diuretics currently not given BP consistently  below parameters to be given   - possible TAVR with Dr. Mccollum  - c/w ASA  - daily standing weights  - monitor I&O'S  - replenish electrolytes to goal k >4 , mg >2.0    #Hemorrhoids  #Constipation  - c/w hemorrhoidal suppository   - c/w Senna 2 tabs HS  - c/w lactulose 10 TID    #Hypothyroidism  - Continue Levothyroxine 100 mcg PO daily  - TSH 4.36 (6/19/22)    #GERD  -Continue Pantoprazole 40 mg PO daily    #DVT ppx:  -Sequential Compression Device (SCD)         Assessment:   Mrs. Silverio is a 89 y/o female with HFpEF, severe AS s/p balloon valvuloplasty 2021, anemia s/p multiple transfusions 2 months ago, MVP, HTN, hyperthyroidism, HCC s/p partial liver resection, transferred from [facility] to [place] for TAVR evaluation by Dr. Mccollum.  Patient found to be anemic most likely d/t GI bleed now s/p 1PRBC transfusion for Hgb < 8/anemia. GI, Dr. Woo consulted at Dr. Mccollum' request.  Capsule study completed.  Dr. Woo will present to [facility] tomorrow to see patient and give recs.  Diuretics remain on hold (x 4 days total) for soft BP (SBP ~ high 80s).    #Acute anemia with BRBPR, likely lower GIB  - Capsule study completed (7/20)  - check/continue to trend CBC:  Hgb 7.9 (7/21), Hgb 8.8 (7/20), Hgb 8.1 (7/19), Hgb 8.5 (7/18), 9.1 (7/17), 10.2 (7/16)  - Transfuse PRBC x 1 today, Lasix 20 mg IVP x 1 to be given shelter through transfusion  - will transfuse as needed for Hgb < 8; Lasix to be given with transfusion  - s/p 1 unit PRBC on 7/15th  - 5 day course iron sucrose completed on 7/19 (ferritin-->24)  - monitor for melena, bloody stools, last recorded BM 7/18  - Maintain active T&S, last completed 7/19  - Dr. 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 Mccollum regarding TAVR     #Hypotension ( improved )- 94/50 today on 7/21 AM  - Furosemide/diuretics remain on hold for SBP ~ 80s, last time received 7/17 in AM (parameters set to hold for SBP < 100)  - orthostatic readings reviewed  - avoid IVF resuscitation d/t severe AS  - Monitor for signs of hypovolemic shock      #HFpEF   #severe AS s/p recent balloon valvuloplasty  - diuretics currently not given BP consistently below parameters to be given   - possible TAVR with Dr. Mccollum  - c/w ASA  - daily standing weights  - monitor I&O'S  - replenish electrolytes to goal k >4 , mg >2.0    #Hemorrhoids  #Constipation  - c/w hemorrhoidal suppository   - c/w Senna 2 tabs HS  - c/w lactulose 10 TID    #Hypothyroidism  - Continue Levothyroxine 100 mcg PO daily  - TSH 4.36 (6/19/22)    #GERD  -Continue Pantoprazole 40 mg PO daily    #DVT ppx:  -Sequential Compression Device (SCD)         Assessment:   Mrs. Silverio is a 89 y/o female with HFpEF, severe AS s/p balloon valvuloplasty 2021, anemia s/p multiple transfusions 2 months ago, MVP, HTN, hyperthyroidism, HCC s/p partial liver resection, transferred from [facility] to [place] for TAVR evaluation by Dr. Mccollum.  Patient found to be anemic most likely d/t GI bleed now s/p 1PRBC transfusion for Hgb < 8/anemia. GI, Dr. Woo consulted at Dr. Mccollum' request.  Capsule study completed.  Dr. Woo will present to [facility] tomorrow to see patient and give recs.  Diuretics remain on hold (x 4 days total) for soft BP (SBP ~ high 80s).    #Acute anemia with BRBPR, likely lower GIB  - Capsule study completed (7/20)  - check/continue to trend CBC:  Hgb 7.9 (7/21), Hgb 8.8 (7/20), Hgb 8.1 (7/19), Hgb 8.5 (7/18), 9.1 (7/17), 10.2 (7/16)  - Transfuse PRBC x 1 today, Lasix 20 mg IVP x 1 to be given USP through transfusion  - will transfuse as needed for Hgb < 8; Lasix to be given with transfusion  - s/p 1 unit PRBC on 7/15th  - 5 day course iron sucrose completed on 7/19 (ferritin-->24)  - monitor for melena, bloody stools, last recorded BM 7/18  - Maintain active T&S, last completed 7/19  - Dr. 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 Mccollum      #Hypotension ( improved )- 94/50 today on 7/21 AM  - Furosemide/diuretics remain on hold for SBP ~ 80s, last time received 7/17 in AM (parameters set to hold for SBP < 100)  - orthostatic readings reviewed  - avoid IVF resuscitation d/t severe AS  - Monitor for signs of hypovolemic shock      #HFpEF   #severe AS s/p recent balloon valvuloplasty  - diuretics currently not given BP consistently below parameters to be given   - possible TAVR with Dr. Mccollum  - c/w ASA  - daily standing weights  - monitor I&O'S  - replenish electrolytes to goal k >4 , mg >2.0    #Hemorrhoids  #Constipation  - c/w hemorrhoidal suppository   - c/w Senna 2 tabs HS  - c/w lactulose 10 TID    #Hypothyroidism  - Continue Levothyroxine 100 mcg PO daily  - TSH 4.36 (6/19/22)    #GERD  -Continue Pantoprazole 40 mg PO daily    #DVT ppx:  -Sequential Compression Device (SCD)

## 2022-07-21 NOTE — PHYSICAL THERAPY INITIAL EVALUATION ADULT - GENERAL OBSERVATIONS, REHAB EVAL
Chart reviewed, attempted to see pt for PT RE (pt transferred from [facility] site), spoke with cardiology CRISTIANO Monk for new PT order, will f/u for RE once order is written
Pt encountered in the bed, + [employer], tele, agreeable for b/s PT with encouragement as pt is afraid of falling. Caroline. fairly to tx. Pt left seated in b/s chair post tx all needs within reach BEBE Acosta made aware.
14:05-14:30 Chart reviewed. Patient available to be seen for physical therapy, denies pain, confirmed with RN.

## 2022-07-21 NOTE — PHYSICAL THERAPY INITIAL EVALUATION ADULT - IMPAIRMENTS FOUND, PT EVAL
aerobic capacity/endurance/arousal, attention, and cognition/gait, locomotion, and balance/muscle strength
aerobic capacity/endurance/gait, locomotion, and balance/muscle strength/poor safety awareness

## 2022-07-21 NOTE — PHYSICAL THERAPY INITIAL EVALUATION ADULT - PERTINENT HX OF CURRENT PROBLEM, REHAB EVAL
pt adm for acute CHF, SOB, anemia, elevated troponin, pleural effusion
pt adm for acute CHF, SOB, anemia, elevated troponin, pleural effusion
SOB, Acute CHF, Anemia, Elevated Troponin, Pleural Effusion

## 2022-07-21 NOTE — PHYSICAL THERAPY INITIAL EVALUATION ADULT - TRANSFER SAFETY CONCERNS NOTED: SIT/STAND, REHAB EVAL
kyphotic posture, fearful of falling/decreased balance during turns/losing balance/decreased sequencing ability/decreased step length/decreased weight-shifting ability
pt anxious/nervous/decreased step length

## 2022-07-21 NOTE — PHYSICAL THERAPY INITIAL EVALUATION ADULT - CRITERIA FOR SKILLED THERAPEUTIC INTERVENTIONS
impairments found/functional limitations in following categories/rehab potential/therapy frequency/predicted duration of therapy intervention/anticipated equipment needs at discharge/anticipated discharge recommendation
impairments found/functional limitations in following categories/risk reduction/prevention/rehab potential/therapy frequency/predicted duration of therapy intervention/anticipated discharge recommendation

## 2022-07-21 NOTE — PHYSICAL THERAPY INITIAL EVALUATION ADULT - BED MOBILITY LIMITATIONS, REHAB EVAL
decreased ability to use legs for bridging/pushing/impaired ability to control trunk for mobility
decreased ability to use arms for pushing/pulling

## 2022-07-22 LAB
ALBUMIN SERPL ELPH-MCNC: 3.1 G/DL — LOW (ref 3.5–5.2)
ALP SERPL-CCNC: 109 U/L — SIGNIFICANT CHANGE UP (ref 30–115)
ALT FLD-CCNC: 14 U/L — SIGNIFICANT CHANGE UP (ref 0–41)
ANION GAP SERPL CALC-SCNC: 9 MMOL/L — SIGNIFICANT CHANGE UP (ref 7–14)
AST SERPL-CCNC: 22 U/L — SIGNIFICANT CHANGE UP (ref 0–41)
BILIRUB SERPL-MCNC: 0.9 MG/DL — SIGNIFICANT CHANGE UP (ref 0.2–1.2)
BUN SERPL-MCNC: 29 MG/DL — HIGH (ref 10–20)
CALCIUM SERPL-MCNC: 8.7 MG/DL — SIGNIFICANT CHANGE UP (ref 8.5–10.1)
CHLORIDE SERPL-SCNC: 104 MMOL/L — SIGNIFICANT CHANGE UP (ref 98–110)
CO2 SERPL-SCNC: 27 MMOL/L — SIGNIFICANT CHANGE UP (ref 17–32)
CREAT SERPL-MCNC: 0.9 MG/DL — SIGNIFICANT CHANGE UP (ref 0.7–1.5)
EGFR: 61 ML/MIN/1.73M2 — SIGNIFICANT CHANGE UP
GLUCOSE SERPL-MCNC: 77 MG/DL — SIGNIFICANT CHANGE UP (ref 70–99)
HCT VFR BLD CALC: 29.5 % — LOW (ref 37–47)
HGB BLD-MCNC: 9.6 G/DL — LOW (ref 12–16)
MAGNESIUM SERPL-MCNC: 2.1 MG/DL — SIGNIFICANT CHANGE UP (ref 1.8–2.4)
MCHC RBC-ENTMCNC: 28.5 PG — SIGNIFICANT CHANGE UP (ref 27–31)
MCHC RBC-ENTMCNC: 32.5 G/DL — SIGNIFICANT CHANGE UP (ref 32–37)
MCV RBC AUTO: 87.5 FL — SIGNIFICANT CHANGE UP (ref 81–99)
NRBC # BLD: 0 /100 WBCS — SIGNIFICANT CHANGE UP (ref 0–0)
PLATELET # BLD AUTO: 110 K/UL — LOW (ref 130–400)
POTASSIUM SERPL-MCNC: 3.7 MMOL/L — SIGNIFICANT CHANGE UP (ref 3.5–5)
POTASSIUM SERPL-SCNC: 3.7 MMOL/L — SIGNIFICANT CHANGE UP (ref 3.5–5)
PROT SERPL-MCNC: 5.8 G/DL — LOW (ref 6–8)
RBC # BLD: 3.37 M/UL — LOW (ref 4.2–5.4)
RBC # FLD: 16.3 % — HIGH (ref 11.5–14.5)
SODIUM SERPL-SCNC: 140 MMOL/L — SIGNIFICANT CHANGE UP (ref 135–146)
WBC # BLD: 6.55 K/UL — SIGNIFICANT CHANGE UP (ref 4.8–10.8)
WBC # FLD AUTO: 6.55 K/UL — SIGNIFICANT CHANGE UP (ref 4.8–10.8)

## 2022-07-22 PROCEDURE — 99232 SBSQ HOSP IP/OBS MODERATE 35: CPT

## 2022-07-22 RX ORDER — POTASSIUM CHLORIDE 20 MEQ
40 PACKET (EA) ORAL ONCE
Refills: 0 | Status: COMPLETED | OUTPATIENT
Start: 2022-07-22 | End: 2022-07-22

## 2022-07-22 RX ORDER — LANOLIN ALCOHOL/MO/W.PET/CERES
3 CREAM (GRAM) TOPICAL AT BEDTIME
Refills: 0 | Status: DISCONTINUED | OUTPATIENT
Start: 2022-07-22 | End: 2022-07-31

## 2022-07-22 RX ADMIN — Medication 81 MILLIGRAM(S): at 11:46

## 2022-07-22 RX ADMIN — PANTOPRAZOLE SODIUM 40 MILLIGRAM(S): 20 TABLET, DELAYED RELEASE ORAL at 05:04

## 2022-07-22 RX ADMIN — Medication 100 MICROGRAM(S): at 05:03

## 2022-07-22 RX ADMIN — Medication 40 MILLIEQUIVALENT(S): at 15:06

## 2022-07-22 RX ADMIN — CHLORHEXIDINE GLUCONATE 1 APPLICATION(S): 213 SOLUTION TOPICAL at 05:04

## 2022-07-22 RX ADMIN — Medication 20 MILLIGRAM(S): at 05:03

## 2022-07-22 RX ADMIN — Medication 3 MILLIGRAM(S): at 21:11

## 2022-07-22 RX ADMIN — Medication 1 SUPPOSITORY(S): at 11:45

## 2022-07-22 RX ADMIN — LACTULOSE 10 GRAM(S): 10 SOLUTION ORAL at 05:03

## 2022-07-22 RX ADMIN — SENNA PLUS 2 TABLET(S): 8.6 TABLET ORAL at 21:10

## 2022-07-22 NOTE — PROGRESS NOTE ADULT - ASSESSMENT
89F with HFrEF with LVEF 40-45%, severe AS and melena requiring transfusions who presented with ADHF. Patient being evaluated for TAVR.     SBP 90s-100s. Breathing comfortably. No signs of respiratory distress.     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 Woo and cardiac anesthesia before TAVR  Continue Lasix 20 mg daily, hold for SBP<100  Transfuse PRN to maintain Hgb >8, Lasix to be given with transfusion  Discussed with Dr. Chun Zavala MD  Vascular Cardiology Attending  Please text or call via MS Teams with questions

## 2022-07-22 NOTE — CHART NOTE - NSCHARTNOTEFT_GEN_A_CORE
Patient s/p Capsule study. 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 Ideally colonoscopy should be done prior to TAVR as AVM would benefit from APC ablation. Will place full progress note today. Colonoscopy will be offered to patient and would need Cardiac anesthesia for procedure if patient amendable.

## 2022-07-22 NOTE — PROGRESS NOTE ADULT - SUBJECTIVE AND OBJECTIVE BOX
Cardiology  Progress note     EMAIL [email or web address]     CC:  SOB    INTERVAL HISTORY:     s/p 1 u PRBCs yesterday    Patient has SOB with exertion. No trouble breathing at rest.       Allergies    Cipro (Other)  Levaquin (Rash)  tetracycline (Hives)    Intolerances    	    MEDICATIONS:  aspirin  chewable 81 milliGRAM(s) Oral daily  furosemide    Tablet 20 milliGRAM(s) Oral daily        melatonin 3 milliGRAM(s) Oral at bedtime    lactulose Syrup 10 Gram(s) Oral every 8 hours  pantoprazole    Tablet 40 milliGRAM(s) Oral before breakfast  senna 2 Tablet(s) Oral at bedtime    levothyroxine 100 MICROGram(s) Oral daily    chlorhexidine 4% Liquid 1 Application(s) Topical <User Schedule>  hydrocortisone hemorrhoidal Suppository 1 Suppository(s) Rectal two times a day PRN  hydrocortisone hemorrhoidal Suppository 1 Suppository(s) Rectal daily      PAST MEDICAL & SURGICAL HISTORY:  HTN (hypertension)      Mitral valve prolapse      Enlarged heart      Murmur      Hyperthyroidism      Arthritis      HTN (hypertension)      Diverticulosis      Hiatal hernia      Acid reflux      Liver cancer  s/p resection and s/p chemo and radiation      Aortic stenosis  s/p ballon aortic valuloplasty 5/25/21      H/O resection of liver  liver cancer      Status post cholecystectomy          FAMILY HISTORY:  No pertinent family history in first degree relatives        SOCIAL HISTORY:  unchanged    REVIEW OF SYSTEMS:  CONSTITUTIONAL: No fever, weight loss, or fatigue  EYES: No eye pain, visual disturbances, or discharge  ENT:  No difficulty hearing, tinnitus, vertigo; No sinus or throat pain  NECK: No pain or stiffness  RESPIRATORY: VALENTINE  CARDIOVASCULAR: No chest pain, palpitations, passing out, dizziness, or leg swelling  GASTROINTESTINAL: No abdominal or epigastric pain. No nausea, vomiting, or hematemesis; No diarrhea or constipation. No melena or hematochezia.  GENITOURINARY: No dysuria, frequency, hematuria, or incontinence  NEUROLOGICAL: No headaches, memory loss, loss of strength, numbness, or tremors  SKIN: No itching, burning, rashes, or lesions   LYMPH Nodes: No enlarged glands  ENDOCRINE: No heat or cold intolerance; No hair loss  MUSCULOSKELETAL: No joint pain or swelling; No muscle, back, or extremity pain  PSYCHIATRIC: No depression, anxiety, mood swings, or difficulty sleeping  HEME/LYMPH: No easy bruising, or bleeding gums  ALLERGY AND IMMUNOLOGIC: No hives or eczema	    [ x] All others negative	  [ ] Unable to obtain    PHYSICAL EXAM:  T(C): 36.1 (07-22-22 @ 20:21), Max: 36.4 (07-22-22 @ 14:18)  HR: 80 (07-22-22 @ 20:21) (72 - 80)  BP: 113/56 (07-22-22 @ 20:21) (87/49 - 113/56)  RR: 18 (07-22-22 @ 20:21) (18 - 18)  SpO2: 97% (07-22-22 @ 07:50) (97% - 97%)  Wt(kg): --  I&O's Summary    21 Jul 2022 07:01  -  22 Jul 2022 07:00  --------------------------------------------------------  IN: 1154 mL / OUT: 700 mL / NET: 454 mL    22 Jul 2022 07:01  -  22 Jul 2022 20:37  --------------------------------------------------------  IN: 0 mL / OUT: 700 mL / NET: -700 mL        Appearance: Normal	  HEENT:   Normal oral mucosa, PERRL, EOMI	  Cardiovascular: Normal S1 S2, No JVD, No murmurs, No edema, 4/6 systolic murmur  Respiratory: Lungs clear to auscultation	  Psychiatry: A & O x 3, Mood & affect appropriate  Gastrointestinal:  Soft, Non-tender, + BS	  Skin: No rashes, No ecchymoses, No cyanosis	  Neurologic: Non-focal  Extremities: Normal range of motion, No clubbing, cyanosis.    LABS:	 	    CBC Full  -  ( 22 Jul 2022 04:30 )  WBC Count : 6.55 K/uL  Hemoglobin : 9.6 g/dL  Hematocrit : 29.5 %  Platelet Count - Automated : 110 K/uL  Mean Cell Volume : 87.5 fL  Mean Cell Hemoglobin : 28.5 pg  Mean Cell Hemoglobin Concentration : 32.5 g/dL  Auto Neutrophil # : x  Auto Lymphocyte # : x  Auto Monocyte # : x  Auto Eosinophil # : x  Auto Basophil # : x  Auto Neutrophil % : x  Auto Lymphocyte % : x  Auto Monocyte % : x  Auto Eosinophil % : x  Auto Basophil % : x    07-22    140  |  104  |  29<H>  ----------------------------<  77  3.7   |  27  |  0.9  07-21    137  |  101  |  28<H>  ----------------------------<  92  4.3   |  29  |  1.0    Ca    8.7      22 Jul 2022 04:30  Ca    8.7      21 Jul 2022 06:03  Mg     2.1     07-22  Mg     2.2     07-21    TPro  5.8<L>  /  Alb  3.1<L>  /  TBili  0.9  /  DBili  x   /  AST  22  /  ALT  14  /  AlkPhos  109  07-22

## 2022-07-23 LAB
ALBUMIN SERPL ELPH-MCNC: 3.2 G/DL — LOW (ref 3.5–5.2)
ALP SERPL-CCNC: 118 U/L — HIGH (ref 30–115)
ALT FLD-CCNC: 15 U/L — SIGNIFICANT CHANGE UP (ref 0–41)
ANION GAP SERPL CALC-SCNC: 8 MMOL/L — SIGNIFICANT CHANGE UP (ref 7–14)
AST SERPL-CCNC: 24 U/L — SIGNIFICANT CHANGE UP (ref 0–41)
BASOPHILS # BLD AUTO: 0.02 K/UL — SIGNIFICANT CHANGE UP (ref 0–0.2)
BASOPHILS NFR BLD AUTO: 0.3 % — SIGNIFICANT CHANGE UP (ref 0–1)
BILIRUB SERPL-MCNC: 0.7 MG/DL — SIGNIFICANT CHANGE UP (ref 0.2–1.2)
BUN SERPL-MCNC: 27 MG/DL — HIGH (ref 10–20)
CALCIUM SERPL-MCNC: 8.9 MG/DL — SIGNIFICANT CHANGE UP (ref 8.5–10.1)
CHLORIDE SERPL-SCNC: 105 MMOL/L — SIGNIFICANT CHANGE UP (ref 98–110)
CO2 SERPL-SCNC: 28 MMOL/L — SIGNIFICANT CHANGE UP (ref 17–32)
CREAT SERPL-MCNC: 0.8 MG/DL — SIGNIFICANT CHANGE UP (ref 0.7–1.5)
EGFR: 70 ML/MIN/1.73M2 — SIGNIFICANT CHANGE UP
EOSINOPHIL # BLD AUTO: 0.07 K/UL — SIGNIFICANT CHANGE UP (ref 0–0.7)
EOSINOPHIL NFR BLD AUTO: 1 % — SIGNIFICANT CHANGE UP (ref 0–8)
GLUCOSE SERPL-MCNC: 94 MG/DL — SIGNIFICANT CHANGE UP (ref 70–99)
HCT VFR BLD CALC: 30.5 % — LOW (ref 37–47)
HGB BLD-MCNC: 9.8 G/DL — LOW (ref 12–16)
IMM GRANULOCYTES NFR BLD AUTO: 0.7 % — HIGH (ref 0.1–0.3)
LYMPHOCYTES # BLD AUTO: 0.96 K/UL — LOW (ref 1.2–3.4)
LYMPHOCYTES # BLD AUTO: 14.1 % — LOW (ref 20.5–51.1)
MAGNESIUM SERPL-MCNC: 2.2 MG/DL — SIGNIFICANT CHANGE UP (ref 1.8–2.4)
MCHC RBC-ENTMCNC: 28.6 PG — SIGNIFICANT CHANGE UP (ref 27–31)
MCHC RBC-ENTMCNC: 32.1 G/DL — SIGNIFICANT CHANGE UP (ref 32–37)
MCV RBC AUTO: 88.9 FL — SIGNIFICANT CHANGE UP (ref 81–99)
MONOCYTES # BLD AUTO: 0.72 K/UL — HIGH (ref 0.1–0.6)
MONOCYTES NFR BLD AUTO: 10.6 % — HIGH (ref 1.7–9.3)
NEUTROPHILS # BLD AUTO: 4.97 K/UL — SIGNIFICANT CHANGE UP (ref 1.4–6.5)
NEUTROPHILS NFR BLD AUTO: 73.3 % — SIGNIFICANT CHANGE UP (ref 42.2–75.2)
NRBC # BLD: 0 /100 WBCS — SIGNIFICANT CHANGE UP (ref 0–0)
PLATELET # BLD AUTO: 120 K/UL — LOW (ref 130–400)
POTASSIUM SERPL-MCNC: 4.5 MMOL/L — SIGNIFICANT CHANGE UP (ref 3.5–5)
POTASSIUM SERPL-SCNC: 4.5 MMOL/L — SIGNIFICANT CHANGE UP (ref 3.5–5)
PROT SERPL-MCNC: 5.8 G/DL — LOW (ref 6–8)
RBC # BLD: 3.43 M/UL — LOW (ref 4.2–5.4)
RBC # FLD: 17.2 % — HIGH (ref 11.5–14.5)
SODIUM SERPL-SCNC: 141 MMOL/L — SIGNIFICANT CHANGE UP (ref 135–146)
WBC # BLD: 6.79 K/UL — SIGNIFICANT CHANGE UP (ref 4.8–10.8)
WBC # FLD AUTO: 6.79 K/UL — SIGNIFICANT CHANGE UP (ref 4.8–10.8)

## 2022-07-23 PROCEDURE — 99232 SBSQ HOSP IP/OBS MODERATE 35: CPT

## 2022-07-23 RX ORDER — ACETAMINOPHEN 500 MG
650 TABLET ORAL EVERY 6 HOURS
Refills: 0 | Status: DISCONTINUED | OUTPATIENT
Start: 2022-07-23 | End: 2022-07-30

## 2022-07-23 RX ADMIN — CHLORHEXIDINE GLUCONATE 1 APPLICATION(S): 213 SOLUTION TOPICAL at 05:13

## 2022-07-23 RX ADMIN — PANTOPRAZOLE SODIUM 40 MILLIGRAM(S): 20 TABLET, DELAYED RELEASE ORAL at 05:28

## 2022-07-23 RX ADMIN — LACTULOSE 10 GRAM(S): 10 SOLUTION ORAL at 21:34

## 2022-07-23 RX ADMIN — Medication 1 SUPPOSITORY(S): at 12:18

## 2022-07-23 RX ADMIN — Medication 650 MILLIGRAM(S): at 14:13

## 2022-07-23 RX ADMIN — Medication 81 MILLIGRAM(S): at 12:20

## 2022-07-23 RX ADMIN — SENNA PLUS 2 TABLET(S): 8.6 TABLET ORAL at 21:34

## 2022-07-23 RX ADMIN — Medication 650 MILLIGRAM(S): at 14:36

## 2022-07-23 RX ADMIN — Medication 100 MICROGRAM(S): at 05:14

## 2022-07-23 RX ADMIN — Medication 3 MILLIGRAM(S): at 21:34

## 2022-07-23 RX ADMIN — LACTULOSE 10 GRAM(S): 10 SOLUTION ORAL at 13:16

## 2022-07-23 NOTE — PROGRESS NOTE ADULT - SUBJECTIVE AND OBJECTIVE BOX
Chief complaint:     Mrs. Silverio is a 89 y/o female who presents with a chief complaint of Dyspnea w/ Exertion           Interval history:       - Hypotensive for which Lasix 40 mg po QD was held (, ,  and ). BP now Improved within last 24HRS, SBP ranging in .  - Capsule study completed, AVM. Needs Colonoscopy    Seen and examined at bedside.  OOB to chair w/ PT.  No complaints, offers concerns.  All questions answered.  NAD, VSS, resting comfortably on RA.  Tele:  SR     Review of systems: A complete 10-point review of systems was obtained and is negative except as stated in the interval history.     Past medical history is notable for:      SHORTNESS OF BREATH, ACUTE CHF ,ANEMIA, ELEVATED TROPONIN ,PLEURAL EFFUSION       No pertinent family history in first degree relatives     HTN (hypertension)       Mitral valve prolapse      Enlarged heart       Murmur       Hyperthyroidism       Arthritis       HTN (hypertension)       Diverticulosis       Hiatal hernia       Acid reflux       Liver cancer       Aortic stenosis       Shortness of breath       Acute on chronic diastolic heart failure       Elevated troponin       Sacral decubitus ulcer, stage II       Anemia       Opacity of lung on imaging study       H/O resection of liver       Status post cholecystectomy       DIFF BREATHING       Acute CHF       Anemia      Elevated troponin       Pleural effusion       Vitals:   Vital Signs Last 24 Hrs  T(C): 36.6 (2022 15:20), Max: 36.8 (2022 20:15)  T(F): 97.9 (2022 15:20), Max: 98.2 (2022 20:15)  HR: 82 (2022 15:20) (69 - 88)  BP: 102/52 (2022 15:20) (87/42 - 102/52)  BP(mean): 44 (2022 14:13) (44 - 44)  RR: 18 (2022 15:20) (18 - 18)  SpO2: 99% (2022 07:52) (99% - 99%)    Parameters below as of 2022 11:55  Patient On (Oxygen Delivery Method): room air    I&O's Detail    2022 07:01  -  2022 07:00  --------------------------------------------------------  IN:    IV PiggyBack: 100 mL    Oral Fluid: 1542 mL  Total IN: 1642 mL    OUT:    Voided (mL): 1225 mL  Total OUT: 1225 mL    Total NET: 417 mL      2022 07:01  -  2022 17:03  --------------------------------------------------------  IN:    Oral Fluid: 620 mL    PRBCs (Packed Red Blood Cells): 327 mL  Total IN: 947 mL    OUT:  Total OUT: 0 mL    Total NET: 947    Daily Weight in k.2 (2022 04:34)    Daily Weight in k (2022 04:45)     Physical exam:   General: No apparent distress   HEENT: Anicteric sclera. Moist mucous membranes.    Cardiac: Regular rate and rhythm. + murmur, no rubs, or gallops.    Vascular: Symmetric radial pulses. Dorsalis pedis pulses palpable.    Respiratory: normal inspiratory effort. no wheeze B/L.    Abdomen: Soft, nontender. Audible bowel sounds.    Extremities: Warm without edema. No cyanosis or clubbing.    Skin: Warm and dry. No rash.    Neurologic: Grossly normal motor function.    Psychiatric: Oriented to person, place, and time.        Data reviewed:     - Telemetry: NSR   - EC Lead ECG (22 @ 08:38)   Ventricular Rate 69 BPM   Atrial Rate 69 BPM   P-R Interval 176 ms   QRS Duration 136 ms   Q-T Interval 502 ms   QTC Calculation(Bazett) 537 ms    P Axis 87 degrees   R Axis -49 degrees   T Axis 48 degrees   Diagnosis Line Sinus rhythm with Premature atrial complexes   Left axis deviation   Left ventricular hypertrophy with QRS widening   Abnormal ECG     - Echo (22):    Summary:     1. Left ventricular ejection fraction, by visual estimation, is 40 to 45%.     2. Normal right atrial size.     3. Mild mitral valve regurgitation.     4. Structurally normal mitral valve, with normal leaflet excursion.     5. Mild aortic regurgitation.     6. Severe aortic valve stenosis.     7. The aortic valve mean gradient is 75.1 mmHg consistent with severe aortic stenosis.     - CXR (7/15/22):      Support devices: Overlying telemetry leads.     Cardiac/mediastinum/hilum: Stable enlarged cardiac silhouette.     Lung parenchyma/Pleura: Stable left lower lobe opacity/effusion. No pneumothorax     Stable calcified granuloma right apex.     Skeleton/soft tissues: Stable         Impression: Stable left lower lobe opacity/effusion.           - Labs:             7.9    4.91  )-----------( 110      ( 2022 06:03 )             25.1     07-    137  |  101  |  28<H>  ----------------------------<  92  4.3   |  29  |  1.0    Ca    8.7      2022 06:03  Mg     2.2         Medications:     aspirin  chewable 81 milliGRAM(s) Oral daily     chlorhexidine 4% Liquid 1 Application(s) Topical <User Schedule>     furosemide    Tablet 40 milliGRAM(s) Oral daily     lactulose Syrup 10 Gram(s) Oral every 8 hours     levothyroxine 100 MICROGram(s) Oral daily     pantoprazole    Tablet 40 milliGRAM(s) Oral before breakfast     senna 2 Tablet(s) Oral at bedtime           Allergies   Cipro (Other)     Levaquin (Rash)     tetracycline (Hives)           Intolerances      Chief complaint:     Mrs. Silverio is a 89 y/o female who presents with a chief complaint of Dyspnea w/ Exertion           Interval history:       -Hypotensive for which Lasix 40 mg po QD was held (, ,  and ). BP now Improved within last 24HRS, SBP ranging in .  -Capsule study completed, AVM. Dr. Reyes to perform colonoscopy next week?  -Cardiac anesthesia onboard for colonoscopy because patient is high risk for sedation due to severe AS  -S/P 1 unit of PRBC on 22 Hgb stable 9.8 today goal >8.0.     Seen and examined at bedside.  OOB to chair w/ PT.  No complaints, offers concerns.  All questions answered.  NAD, VSS, resting comfortably on RA.  Tele:  SR     Review of systems: A complete 10-point review of systems was obtained and is negative except as stated in the interval history.     Past medical history is notable for:      SHORTNESS OF BREATH, ACUTE CHF ,ANEMIA, ELEVATED TROPONIN ,PLEURAL EFFUSION       No pertinent family history in first degree relatives     HTN (hypertension)       Mitral valve prolapse      Enlarged heart       Murmur       Hyperthyroidism       Arthritis       HTN (hypertension)       Diverticulosis       Hiatal hernia       Acid reflux       Liver cancer       Aortic stenosis       Shortness of breath       Acute on chronic diastolic heart failure       Elevated troponin       Sacral decubitus ulcer, stage II       Anemia       Opacity of lung on imaging study       H/O resection of liver       Status post cholecystectomy       DIFF BREATHING       Acute CHF       Anemia      Elevated troponin       Pleural effusion       Vitals:   Vital Signs Last 24 Hrs  T(C): 36.1 (2022 13:37), Max: 36.4 (2022 04:03)  T(F): 97 (2022 13:37), Max: 97.5 (2022 04:03)  HR: 72 (2022 13:37) (67 - 80)  BP: 101/55 (2022 13:37) (92/50 - 113/56)  BP(mean): --  RR: 18 (2022 13:37) (18 - 18)  SpO2: --    I&O's Detail  I&O's Detail    2022 07:01  -  2022 07:00  --------------------------------------------------------  IN:    Oral Fluid: 395 mL  Total IN: 395 mL    OUT:    Voided (mL): 1350 mL  Total OUT: 1350 mL    Total NET: -955 mL      2022 07:01  -  2022 18:49  --------------------------------------------------------  IN:    Oral Fluid: 200 mL  Total IN: 200 mL    OUT:    Voided (mL): 400 mL  Total OUT: 400 mL    Total NET: -200 mL       Physical exam:   General: No apparent distress   HEENT: Anicteric sclera. Moist mucous membranes.    Cardiac: Regular rate and rhythm. + murmur, no rubs, or gallops.    Vascular: Symmetric radial pulses. Dorsalis pedis pulses palpable.    Respiratory: normal inspiratory effort. no wheeze B/L.    Abdomen: Soft, nontender. Audible bowel sounds.    Extremities: Warm without edema. No cyanosis or clubbing.    Skin: Warm and dry. No rash.    Neurologic: Grossly normal motor function.    Psychiatric: Oriented to person, place, and time.        Data reviewed:     - Telemetry: NSR   - EC Lead ECG (22 @ 08:38)   Ventricular Rate 69 BPM   Atrial Rate 69 BPM   P-R Interval 176 ms   QRS Duration 136 ms   Q-T Interval 502 ms   QTC Calculation(Bazett) 537 ms    P Axis 87 degrees   R Axis -49 degrees   T Axis 48 degrees   Diagnosis Line Sinus rhythm with Premature atrial complexes   Left axis deviation   Left ventricular hypertrophy with QRS widening   Abnormal ECG     - Echo (22):    Summary:     1. Left ventricular ejection fraction, by visual estimation, is 40 to 45%.     2. Normal right atrial size.     3. Mild mitral valve regurgitation.     4. Structurally normal mitral valve, with normal leaflet excursion.     5. Mild aortic regurgitation.     6. Severe aortic valve stenosis.     7. The aortic valve mean gradient is 75.1 mmHg consistent with severe aortic stenosis.     - CXR (7/15/22):      Support devices: Overlying telemetry leads.     Cardiac/mediastinum/hilum: Stable enlarged cardiac silhouette.     Lung parenchyma/Pleura: Stable left lower lobe opacity/effusion. No pneumothorax     Stable calcified granuloma right apex.     Skeleton/soft tissues: Stable         Impression: Stable left lower lobe opacity/effusion.           - Labs:                         9.8    6.79  )-----------( 120      ( 2022 05:14 )             30.5       141  |  105  |  27<H>  ----------------------------<  94  4.5   |  28  |  0.8    Ca    8.9      2022 05:14  Mg     2.2         TPro  5.8<L>  /  Alb  3.2<L>  /  TBili  0.7  /  DBili  x   /  AST  24  /  ALT  15  /  AlkPhos  118<H>      Medications:   MEDICATIONS  (STANDING):  aspirin  chewable 81 milliGRAM(s) Oral daily  chlorhexidine 4% Liquid 1 Application(s) Topical <User Schedule>  furosemide    Tablet 20 milliGRAM(s) Oral daily  hydrocortisone hemorrhoidal Suppository 1 Suppository(s) Rectal daily  lactulose Syrup 10 Gram(s) Oral every 8 hours  levothyroxine 100 MICROGram(s) Oral daily  melatonin 3 milliGRAM(s) Oral at bedtime  pantoprazole    Tablet 40 milliGRAM(s) Oral before breakfast  senna 2 Tablet(s) Oral at bedtime    MEDICATIONS  (PRN):  acetaminophen     Tablet .. 650 milliGRAM(s) Oral every 6 hours PRN Mild Pain (1 - 3)        Allergies   Cipro (Other)     Levaquin (Rash)     tetracycline (Hives)           Intolerances      Chief complaint:     Mrs. Silverio is a 89 y/o female who presents with a chief complaint of Dyspnea w/ Exertion           Interval history:       -Hypotensive for which Lasix 40 mg po QD was held (, ,  and ). BP now Improved within last 24HRS, SBP ranging in .  -Capsule study completed, AVM. Dr. Reyes to perform colonoscopy next week?  -Cardiac anesthesia onboard for colonoscopy because patient is high risk for sedation due to severe AS  -S/P 1 unit of PRBC on 22 Hgb stable 9.8 today goal >8.0.     Seen and examined at bedside. Son at bedside. OOB to chair w/ PT.  C/o rectal pain. Given Tylenol PRN. Steroid suppository continued. All questions answered. NAD, VSS, resting comfortably on RA.  Tele:  SR     Review of systems: A complete 10-point review of systems was obtained and is negative except as stated in the interval history.     Past medical history is notable for:      SHORTNESS OF BREATH, ACUTE CHF ,ANEMIA, ELEVATED TROPONIN ,PLEURAL EFFUSION       No pertinent family history in first degree relatives     HTN (hypertension)       Mitral valve prolapse      Enlarged heart       Murmur       Hyperthyroidism       Arthritis       HTN (hypertension)       Diverticulosis       Hiatal hernia       Acid reflux       Liver cancer       Aortic stenosis       Shortness of breath       Acute on chronic diastolic heart failure       Elevated troponin       Sacral decubitus ulcer, stage II       Anemia       Opacity of lung on imaging study       H/O resection of liver       Status post cholecystectomy       DIFF BREATHING       Acute CHF       Anemia      Elevated troponin       Pleural effusion       Vitals:   Vital Signs Last 24 Hrs  T(C): 36.1 (2022 13:37), Max: 36.4 (2022 04:03)  T(F): 97 (2022 13:37), Max: 97.5 (2022 04:03)  HR: 72 (2022 13:37) (67 - 80)  BP: 101/55 (2022 13:37) (92/50 - 113/56)  BP(mean): --  RR: 18 (2022 13:37) (18 - 18)  SpO2: --    I&O's Detail  I&O's Detail    2022 07:01  -  2022 07:00  --------------------------------------------------------  IN:    Oral Fluid: 395 mL  Total IN: 395 mL    OUT:    Voided (mL): 1350 mL  Total OUT: 1350 mL    Total NET: -955 mL      2022 07:01  -  2022 18:49  --------------------------------------------------------  IN:    Oral Fluid: 200 mL  Total IN: 200 mL    OUT:    Voided (mL): 400 mL  Total OUT: 400 mL    Total NET: -200 mL       Physical exam:   General: No apparent distress   HEENT: Anicteric sclera. Moist mucous membranes.    Cardiac: Regular rate and rhythm. + murmur, no rubs, or gallops.    Vascular: Symmetric radial pulses. Dorsalis pedis pulses palpable.    Respiratory: normal inspiratory effort. no wheeze B/L.    Abdomen: Soft, nontender. Audible bowel sounds.    Extremities: Warm without edema. No cyanosis or clubbing.    Skin: Warm and dry. No rash.    Neurologic: Grossly normal motor function.    Psychiatric: Oriented to person, place, and time.        Data reviewed:     - Telemetry: NSR   - EC Lead ECG (22 @ 08:38)   Ventricular Rate 69 BPM   Atrial Rate 69 BPM   P-R Interval 176 ms   QRS Duration 136 ms   Q-T Interval 502 ms   QTC Calculation(Bazett) 537 ms    P Axis 87 degrees   R Axis -49 degrees   T Axis 48 degrees   Diagnosis Line Sinus rhythm with Premature atrial complexes   Left axis deviation   Left ventricular hypertrophy with QRS widening   Abnormal ECG     - Echo (22):    Summary:     1. Left ventricular ejection fraction, by visual estimation, is 40 to 45%.     2. Normal right atrial size.     3. Mild mitral valve regurgitation.     4. Structurally normal mitral valve, with normal leaflet excursion.     5. Mild aortic regurgitation.     6. Severe aortic valve stenosis.     7. The aortic valve mean gradient is 75.1 mmHg consistent with severe aortic stenosis.     - CXR (7/15/22):      Support devices: Overlying telemetry leads.     Cardiac/mediastinum/hilum: Stable enlarged cardiac silhouette.     Lung parenchyma/Pleura: Stable left lower lobe opacity/effusion. No pneumothorax     Stable calcified granuloma right apex.     Skeleton/soft tissues: Stable         Impression: Stable left lower lobe opacity/effusion.           - Labs:                         9.8    6.79  )-----------( 120      ( 2022 05:14 )             30.5       141  |  105  |  27<H>  ----------------------------<  94  4.5   |  28  |  0.8    Ca    8.9      2022 05:14  Mg     2.2         TPro  5.8<L>  /  Alb  3.2<L>  /  TBili  0.7  /  DBili  x   /  AST  24  /  ALT  15  /  AlkPhos  118<H>      Medications:   MEDICATIONS  (STANDING):  aspirin  chewable 81 milliGRAM(s) Oral daily  chlorhexidine 4% Liquid 1 Application(s) Topical <User Schedule>  furosemide    Tablet 20 milliGRAM(s) Oral daily  hydrocortisone hemorrhoidal Suppository 1 Suppository(s) Rectal daily  lactulose Syrup 10 Gram(s) Oral every 8 hours  levothyroxine 100 MICROGram(s) Oral daily  melatonin 3 milliGRAM(s) Oral at bedtime  pantoprazole    Tablet 40 milliGRAM(s) Oral before breakfast  senna 2 Tablet(s) Oral at bedtime    MEDICATIONS  (PRN):  acetaminophen     Tablet .. 650 milliGRAM(s) Oral every 6 hours PRN Mild Pain (1 - 3)        Allergies   Cipro (Other)     Levaquin (Rash)     tetracycline (Hives)           Intolerances

## 2022-07-23 NOTE — PROGRESS NOTE ADULT - ASSESSMENT
Assessment:   Mrs. Silverio is a 88 y/o female with HFpEF, severe AS s/p balloon valvuloplasty 2021, anemia s/p multiple transfusions 2 months ago, MVP, HTN, hyperthyroidism, HCC s/p partial liver resection, transferred from [facility] to [place] for TAVR evaluation by Dr. Mccollum.  Patient found to be anemic most likely d/t GI bleed now s/p 1PRBC transfusion for Hgb < 8/anemia. GI, Dr. Woo consulted at Dr. Mccollum' request.  Capsule study completed.  Dr. Woo will present to [facility] tomorrow to see patient and give recs.  Diuretics remain on hold (x 4 days total) for soft BP (SBP ~ high 80s).    #Acute anemia with BRBPR, likely lower GIB  - Capsule study completed (7/20)  - check/continue to trend CBC:  Hgb 7.9 (7/21), Hgb 8.8 (7/20), Hgb 8.1 (7/19), Hgb 8.5 (7/18), 9.1 (7/17), 10.2 (7/16)  - Transfuse PRBC x 1 today, Lasix 20 mg IVP x 1 to be given [facility] through transfusion  - will transfuse as needed for Hgb < 8; Lasix to be given with transfusion  - s/p 1 unit PRBC on 7/15th  - 5 day course iron sucrose completed on 7/19 (ferritin-->24)  - monitor for melena, bloody stools, last recorded BM 7/18  - Maintain active T&S, last completed 7/19  - Dr. 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 Mccollum      #Hypotension ( improved )- 94/50 today on 7/21 AM  - Furosemide/diuretics remain on hold for SBP ~ 80s, last time received 7/17 in AM (parameters set to hold for SBP < 100)  - orthostatic readings reviewed  - avoid IVF resuscitation d/t severe AS  - Monitor for signs of hypovolemic shock      #HFpEF   #severe AS s/p recent balloon valvuloplasty  - diuretics currently not given BP consistently below parameters to be given   - possible TAVR with Dr. Mccollum  - c/w ASA  - daily standing weights  - monitor I&O'S  - replenish electrolytes to goal k >4 , mg >2.0    #Hemorrhoids  #Constipation  - c/w hemorrhoidal suppository   - c/w Senna 2 tabs HS  - c/w lactulose 10 TID    #Hypothyroidism  - Continue Levothyroxine 100 mcg PO daily  - TSH 4.36 (6/19/22)    #GERD  -Continue Pantoprazole 40 mg PO daily    #DVT ppx:  -Sequential Compression Device (SCD)         Assessment:   Mrs. Silverio is a 88 y/o female with HFpEF, severe AS s/p balloon valvuloplasty 2021, anemia s/p multiple transfusions 2 months ago, MVP, HTN, hyperthyroidism, HCC s/p partial liver resection, transferred from [facility] to [place] for TAVR evaluation by Dr. Mccollum.  Patient found to be anemic most likely d/t GI bleed now s/p 1PRBC transfusion for Hgb < 8/anemia. GI, Dr. Woo consulted at Dr. Mccollum' request.  Capsule study completed.  Dr. Woo will present to [facility] tomorrow to see patient and give recs.  Diuretics remain on hold (x 4 days total) for soft BP (SBP ~ high 80s).    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  -Colonoscopy next week; cardiac anesthesia onboard      #Hypotension ( improved )-  (92/50 - 113/56)  - Furosemide/diuretics remain on hold for SBP ~ 80s, last time received 7/17 in AM (parameters set to hold for SBP < 100)  - Orthostatic readings reviewed  - Avoid IVF resuscitation d/t severe AS  - Monitor for signs of hypovolemic shock      #HFpEF   #severe AS s/p recent balloon valvuloplasty  - Diuretics currently not given BP consistently below parameters to be given   - Possible TAVR with Dr. Mccollum after Colonoscopy to fix large AVM  - Continue ASA  - Daily standing weights  - Monitor I&O'S  - Replenish electrolytes to goal k >4 , mg >2.0    #Hemorrhoids  #Constipation  -Continue hemorrhoidal suppository   -Continue Senna 2 tabs HS  -Continue Lactulose 10 TID    #Hypothyroidism  - Continue Levothyroxine 100 mcg PO daily  - TSH 4.36 (6/19/22)    #GERD  -Continue Pantoprazole 40 mg PO daily    #DVT ppx:  -Sequential Compression Device (SCD)         Assessment:   Mrs. Silverio is a 89 y/o female with HFpEF, severe AS s/p balloon valvuloplasty 2021, anemia s/p multiple transfusions 2 months ago, MVP, HTN, hyperthyroidism, HCC s/p partial liver resection, transferred from [facility] to [place] for TAVR evaluation by Dr. Mccollum.  Patient found to be anemic most likely d/t GI bleed now s/p 1PRBC transfusion for Hgb < 8/anemia. GI, Dr. Woo consulted at Dr. Mccollum' request.  Capsule study completed.  Dr. Woo will present to [facility] tomorrow to see patient and give recs.  Diuretics remain on hold (x 4 days total) for soft BP (SBP ~ high 80s).    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  -Colonoscopy next week; cardiac anesthesia onboard      #Hypotension ( improved )-  (92/50 - 113/56)  - Hold diuretics for SBP <100 mmHg  - Avoid IVF resuscitation d/t severe AS  - Monitor for signs of hypovolemic shock      #HFpEF   #severe AS s/p recent balloon valvuloplasty  -Continue Lasix 20 mg daily; Hold for SBP <100 mm Hg Diuretics    - Possible TAVR with Dr. Mccollum after Colonoscopy to fix large AVM  - Continue ASA  - Daily standing weights  - Monitor I&O'S  - Replenish electrolytes to goal k >4 , mg >2.0    #Hemorrhoids  #Constipation  -Continue hemorrhoidal suppository   -Continue Senna 2 tabs HS  -Continue Lactulose 10 TID    #Hypothyroidism  - Continue Levothyroxine 100 mcg PO daily  - TSH 4.36 (6/19/22)    #GERD  -Continue Pantoprazole 40 mg PO daily    #DVT ppx:  -Sequential Compression Device (SCD)

## 2022-07-24 LAB
ALBUMIN SERPL ELPH-MCNC: 3.4 G/DL — LOW (ref 3.5–5.2)
ALP SERPL-CCNC: 126 U/L — HIGH (ref 30–115)
ALT FLD-CCNC: 16 U/L — SIGNIFICANT CHANGE UP (ref 0–41)
ANION GAP SERPL CALC-SCNC: 11 MMOL/L — SIGNIFICANT CHANGE UP (ref 7–14)
AST SERPL-CCNC: 24 U/L — SIGNIFICANT CHANGE UP (ref 0–41)
BASOPHILS # BLD AUTO: 0.02 K/UL — SIGNIFICANT CHANGE UP (ref 0–0.2)
BASOPHILS NFR BLD AUTO: 0.3 % — SIGNIFICANT CHANGE UP (ref 0–1)
BILIRUB SERPL-MCNC: 0.7 MG/DL — SIGNIFICANT CHANGE UP (ref 0.2–1.2)
BLD GP AB SCN SERPL QL: SIGNIFICANT CHANGE UP
BUN SERPL-MCNC: 24 MG/DL — HIGH (ref 10–20)
CALCIUM SERPL-MCNC: 8.9 MG/DL — SIGNIFICANT CHANGE UP (ref 8.5–10.1)
CHLORIDE SERPL-SCNC: 103 MMOL/L — SIGNIFICANT CHANGE UP (ref 98–110)
CO2 SERPL-SCNC: 23 MMOL/L — SIGNIFICANT CHANGE UP (ref 17–32)
CREAT SERPL-MCNC: 0.8 MG/DL — SIGNIFICANT CHANGE UP (ref 0.7–1.5)
EGFR: 70 ML/MIN/1.73M2 — SIGNIFICANT CHANGE UP
EOSINOPHIL # BLD AUTO: 0.07 K/UL — SIGNIFICANT CHANGE UP (ref 0–0.7)
EOSINOPHIL NFR BLD AUTO: 1.1 % — SIGNIFICANT CHANGE UP (ref 0–8)
GLUCOSE SERPL-MCNC: 77 MG/DL — SIGNIFICANT CHANGE UP (ref 70–99)
HCT VFR BLD CALC: 31.2 % — LOW (ref 37–47)
HGB BLD-MCNC: 10 G/DL — LOW (ref 12–16)
IMM GRANULOCYTES NFR BLD AUTO: 0.6 % — HIGH (ref 0.1–0.3)
LYMPHOCYTES # BLD AUTO: 1.24 K/UL — SIGNIFICANT CHANGE UP (ref 1.2–3.4)
LYMPHOCYTES # BLD AUTO: 19.3 % — LOW (ref 20.5–51.1)
MAGNESIUM SERPL-MCNC: 2.2 MG/DL — SIGNIFICANT CHANGE UP (ref 1.8–2.4)
MCHC RBC-ENTMCNC: 28.9 PG — SIGNIFICANT CHANGE UP (ref 27–31)
MCHC RBC-ENTMCNC: 32.1 G/DL — SIGNIFICANT CHANGE UP (ref 32–37)
MCV RBC AUTO: 90.2 FL — SIGNIFICANT CHANGE UP (ref 81–99)
MONOCYTES # BLD AUTO: 0.68 K/UL — HIGH (ref 0.1–0.6)
MONOCYTES NFR BLD AUTO: 10.6 % — HIGH (ref 1.7–9.3)
NEUTROPHILS # BLD AUTO: 4.37 K/UL — SIGNIFICANT CHANGE UP (ref 1.4–6.5)
NEUTROPHILS NFR BLD AUTO: 68.1 % — SIGNIFICANT CHANGE UP (ref 42.2–75.2)
NRBC # BLD: 0 /100 WBCS — SIGNIFICANT CHANGE UP (ref 0–0)
PLATELET # BLD AUTO: 120 K/UL — LOW (ref 130–400)
POTASSIUM SERPL-MCNC: 5 MMOL/L — SIGNIFICANT CHANGE UP (ref 3.5–5)
POTASSIUM SERPL-SCNC: 5 MMOL/L — SIGNIFICANT CHANGE UP (ref 3.5–5)
PROT SERPL-MCNC: 6.2 G/DL — SIGNIFICANT CHANGE UP (ref 6–8)
RBC # BLD: 3.46 M/UL — LOW (ref 4.2–5.4)
RBC # FLD: 17.7 % — HIGH (ref 11.5–14.5)
SODIUM SERPL-SCNC: 137 MMOL/L — SIGNIFICANT CHANGE UP (ref 135–146)
WBC # BLD: 6.42 K/UL — SIGNIFICANT CHANGE UP (ref 4.8–10.8)
WBC # FLD AUTO: 6.42 K/UL — SIGNIFICANT CHANGE UP (ref 4.8–10.8)

## 2022-07-24 PROCEDURE — 99232 SBSQ HOSP IP/OBS MODERATE 35: CPT

## 2022-07-24 RX ADMIN — LACTULOSE 10 GRAM(S): 10 SOLUTION ORAL at 13:10

## 2022-07-24 RX ADMIN — LACTULOSE 10 GRAM(S): 10 SOLUTION ORAL at 22:46

## 2022-07-24 RX ADMIN — Medication 100 MICROGRAM(S): at 06:28

## 2022-07-24 RX ADMIN — Medication 81 MILLIGRAM(S): at 11:30

## 2022-07-24 RX ADMIN — Medication 3 MILLIGRAM(S): at 22:46

## 2022-07-24 RX ADMIN — SENNA PLUS 2 TABLET(S): 8.6 TABLET ORAL at 22:46

## 2022-07-24 RX ADMIN — Medication 1 SUPPOSITORY(S): at 11:28

## 2022-07-24 RX ADMIN — PANTOPRAZOLE SODIUM 40 MILLIGRAM(S): 20 TABLET, DELAYED RELEASE ORAL at 06:28

## 2022-07-24 RX ADMIN — LACTULOSE 10 GRAM(S): 10 SOLUTION ORAL at 06:28

## 2022-07-24 NOTE — PROGRESS NOTE ADULT - SUBJECTIVE AND OBJECTIVE BOX
Chief complaint:     Mrs. Silverio is a 88 y/o female who presents with a chief complaint of Dyspnea w/ Exertion           Interval history:       -Hypotensive for which Lasix 40 mg po QD was held (, ,  and ). BP now Improved within last 24HRS, SBP ranging in .  -Capsule study completed, AVM. Dr. Reyes to perform colonoscopy next week?  -Cardiac anesthesia onboard for colonoscopy because patient is high risk for sedation due to severe AS  -S/P 1 unit of PRBC on 22 Hgb stable 9.8 today goal >8.0.     Seen and examined at bedside. Son at bedside. OOB to chair w/ PT.  C/o rectal pain. Given Tylenol PRN. Steroid suppository continued. All questions answered. NAD, VSS, resting comfortably on RA.  Tele:  SR     Review of systems: A complete 10-point review of systems was obtained and is negative except as stated in the interval history.     Past medical history is notable for:      SHORTNESS OF BREATH, ACUTE CHF ,ANEMIA, ELEVATED TROPONIN ,PLEURAL EFFUSION       No pertinent family history in first degree relatives     HTN (hypertension)       Mitral valve prolapse      Enlarged heart       Murmur       Hyperthyroidism       Arthritis       HTN (hypertension)       Diverticulosis       Hiatal hernia       Acid reflux       Liver cancer       Aortic stenosis       Shortness of breath       Acute on chronic diastolic heart failure       Elevated troponin       Sacral decubitus ulcer, stage II       Anemia       Opacity of lung on imaging study       H/O resection of liver       Status post cholecystectomy       DIFF BREATHING       Acute CHF       Anemia      Elevated troponin       Pleural effusion       Vitals:   Vital Signs Last 24 Hrs  T(C): 36.1 (2022 13:37), Max: 36.4 (2022 04:03)  T(F): 97 (2022 13:37), Max: 97.5 (2022 04:03)  HR: 72 (2022 13:37) (67 - 80)  BP: 101/55 (2022 13:37) (92/50 - 113/56)  BP(mean): --  RR: 18 (2022 13:37) (18 - 18)  SpO2: --    I&O's Detail  I&O's Detail    2022 07:01  -  2022 07:00  --------------------------------------------------------  IN:    Oral Fluid: 395 mL  Total IN: 395 mL    OUT:    Voided (mL): 1350 mL  Total OUT: 1350 mL    Total NET: -955 mL      2022 07:01  -  2022 18:49  --------------------------------------------------------  IN:    Oral Fluid: 200 mL  Total IN: 200 mL    OUT:    Voided (mL): 400 mL  Total OUT: 400 mL    Total NET: -200 mL       Physical exam:   General: No apparent distress   HEENT: Anicteric sclera. Moist mucous membranes.    Cardiac: Regular rate and rhythm. + murmur, no rubs, or gallops.    Vascular: Symmetric radial pulses. Dorsalis pedis pulses palpable.    Respiratory: normal inspiratory effort. no wheeze B/L.    Abdomen: Soft, nontender. Audible bowel sounds.    Extremities: Warm without edema. No cyanosis or clubbing.    Skin: Warm and dry. No rash.    Neurologic: Grossly normal motor function.    Psychiatric: Oriented to person, place, and time.        Data reviewed:     - Telemetry: NSR   - EC Lead ECG (22 @ 08:38)   Ventricular Rate 69 BPM   Atrial Rate 69 BPM   P-R Interval 176 ms   QRS Duration 136 ms   Q-T Interval 502 ms   QTC Calculation(Bazett) 537 ms    P Axis 87 degrees   R Axis -49 degrees   T Axis 48 degrees   Diagnosis Line Sinus rhythm with Premature atrial complexes   Left axis deviation   Left ventricular hypertrophy with QRS widening   Abnormal ECG     - Echo (22):    Summary:     1. Left ventricular ejection fraction, by visual estimation, is 40 to 45%.     2. Normal right atrial size.     3. Mild mitral valve regurgitation.     4. Structurally normal mitral valve, with normal leaflet excursion.     5. Mild aortic regurgitation.     6. Severe aortic valve stenosis.     7. The aortic valve mean gradient is 75.1 mmHg consistent with severe aortic stenosis.     - CXR (7/15/22):      Support devices: Overlying telemetry leads.     Cardiac/mediastinum/hilum: Stable enlarged cardiac silhouette.     Lung parenchyma/Pleura: Stable left lower lobe opacity/effusion. No pneumothorax     Stable calcified granuloma right apex.     Skeleton/soft tissues: Stable         Impression: Stable left lower lobe opacity/effusion.           - Labs:                         9.8    6.79  )-----------( 120      ( 2022 05:14 )             30.5       141  |  105  |  27<H>  ----------------------------<  94  4.5   |  28  |  0.8    Ca    8.9      2022 05:14  Mg     2.2         TPro  5.8<L>  /  Alb  3.2<L>  /  TBili  0.7  /  DBili  x   /  AST  24  /  ALT  15  /  AlkPhos  118<H>      Medications:   MEDICATIONS  (STANDING):  aspirin  chewable 81 milliGRAM(s) Oral daily  chlorhexidine 4% Liquid 1 Application(s) Topical <User Schedule>  furosemide    Tablet 20 milliGRAM(s) Oral daily  hydrocortisone hemorrhoidal Suppository 1 Suppository(s) Rectal daily  lactulose Syrup 10 Gram(s) Oral every 8 hours  levothyroxine 100 MICROGram(s) Oral daily  melatonin 3 milliGRAM(s) Oral at bedtime  pantoprazole    Tablet 40 milliGRAM(s) Oral before breakfast  senna 2 Tablet(s) Oral at bedtime    MEDICATIONS  (PRN):  acetaminophen     Tablet .. 650 milliGRAM(s) Oral every 6 hours PRN Mild Pain (1 - 3)        Allergies   Cipro (Other)     Levaquin (Rash)     tetracycline (Hives)           Intolerances      Chief complaint:     Mrs. Silverio is a 89 y/o female who presents with a chief complaint of Dyspnea w/ Exertion           Interval history:   No overnight events.     -Lasix 20 mg PO daily held this morning for SBP <100 mmHg. 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 Recs for colonoscopy as per GI. Colonoscopy by Dr. Woo date TBD  -Cardiac anesthesia onboard for colonoscopy because patient is high risk for sedation due to severe AS  -S/P PRBC last unit on 22 Hgb 10.0 today; continue to check Hgb and transfuse as needed for Hgb <8.0 with Lasix IV    Seen and examined at bedside. She was up in bed eating breakfast. No complaints verbalized. Reported she had a large BM with no blood in it. All questions answered.    Tele: NSR       Review of systems: A complete 10-point review of systems was obtained and is negative except as stated in the interval history.     Past medical history is notable for:      SHORTNESS OF BREATH, ACUTE CHF ,ANEMIA, ELEVATED TROPONIN ,PLEURAL EFFUSION       No pertinent family history in first degree relatives     HTN (hypertension)       Mitral valve prolapse      Enlarged heart       Murmur       Hyperthyroidism       Arthritis       HTN (hypertension)       Diverticulosis       Hiatal hernia       Acid reflux       Liver cancer       Aortic stenosis       Shortness of breath       Acute on chronic diastolic heart failure       Elevated troponin       Sacral decubitus ulcer, stage II       Anemia       Opacity of lung on imaging study       H/O resection of liver       Status post cholecystectomy       DIFF BREATHING       Acute CHF       Anemia      Elevated troponin       Pleural effusion       Vitals:   Vital Signs Last 24 Hrs  T(C): 35.6 (2022 05:25), Max: 36.1 (2022 13:37)  T(F): 96.1 (2022 05:25), Max: 97 (2022 13:37)  HR: 72 (2022 05:25) (71 - 72)  BP: 90/54 (2022 05:25) (90/54 - 101/55)  BP(mean): --  RR: 18 (2022 05:25) (18 - 18)  SpO2: 100% (2022 08:02) (97% - 100%)    Parameters below as of 2022 08:02  Patient On (Oxygen Delivery Method): room air      I&O's Detail    I&O's Summary    2022 07:01  -  2022 07:00  --------------------------------------------------------  IN: 677 mL / OUT: 575 mL / NET: 102 mL    2022 07:01  -  2022 07:00  --------------------------------------------------------    Physical Exam:   General: Comfortable and in no apparent distress   HEENT: Anicteric sclera. Moist mucous membranes.    Cardiac: Regular rate and rhythm. + murmur, no rubs, or gallops.    Vascular: Symmetric radial pulses. Dorsalis pedis pulses palpable.    Respiratory: normal inspiratory effort. no wheeze B/L.    Abdomen: Soft, nontender. Audible bowel sounds.    Extremities: Warm without edema. No cyanosis or clubbing.    Skin: Warm and dry. No rash.    Neurologic: Grossly normal motor function.    Psychiatric: Oriented to person, place, and time.        Data reviewed:     - Telemetry: NSR   - EC Lead ECG (22 @ 08:38)   Ventricular Rate 69 BPM   Atrial Rate 69 BPM   P-R Interval 176 ms   QRS Duration 136 ms   Q-T Interval 502 ms   QTC Calculation(Bazett) 537 ms    P Axis 87 degrees   R Axis -49 degrees   T Axis 48 degrees   Diagnosis Line Sinus rhythm with Premature atrial complexes   Left axis deviation   Left ventricular hypertrophy with QRS widening   Abnormal ECG     - Echo (22):    Summary:     1. Left ventricular ejection fraction, by visual estimation, is 40 to 45%.     2. Normal right atrial size.     3. Mild mitral valve regurgitation.     4. Structurally normal mitral valve, with normal leaflet excursion.     5. Mild aortic regurgitation.     6. Severe aortic valve stenosis.     7. The aortic valve mean gradient is 75.1 mmHg consistent with severe aortic stenosis.     - CXR (7/15/22):      Support devices: Overlying telemetry leads.     Cardiac/mediastinum/hilum: Stable enlarged cardiac silhouette.     Lung parenchyma/Pleura: Stable left lower lobe opacity/effusion. No pneumothorax     Stable calcified granuloma right apex.     Skeleton/soft tissues: Stable         Impression: Stable left lower lobe opacity/effusion.           - Labs:                        10.0   6.42  )-----------( 120      ( 2022 06:15 )             31.2         137  |  103  |  24<H>  ----------------------------<  77  5.0   |  23  |  0.8    Ca    8.9      2022 06:15  Mg     2.2         TPro  6.2  /  Alb  3.4<L>  /  TBili  0.7  /  DBili  x   /  AST  24  /  ALT  16  /  AlkPhos  126<H>      Medications:   MEDICATIONS  (STANDING):  aspirin  chewable 81 milliGRAM(s) Oral daily  chlorhexidine 4% Liquid 1 Application(s) Topical <User Schedule>  furosemide    Tablet 20 milliGRAM(s) Oral daily  hydrocortisone hemorrhoidal Suppository 1 Suppository(s) Rectal daily  lactulose Syrup 10 Gram(s) Oral every 8 hours  levothyroxine 100 MICROGram(s) Oral daily  melatonin 3 milliGRAM(s) Oral at bedtime  pantoprazole    Tablet 40 milliGRAM(s) Oral before breakfast  senna 2 Tablet(s) Oral at bedtime    MEDICATIONS  (PRN):  acetaminophen     Tablet .. 650 milliGRAM(s) Oral every 6 hours PRN Mild Pain (1 - 3)        Allergies   Cipro (Other)     Levaquin (Rash)     tetracycline (Hives)           Intolerances

## 2022-07-24 NOTE — PROGRESS NOTE ADULT - ASSESSMENT
Assessment:   Mrs. Silverio is a 88 y/o female with HFpEF, severe AS s/p balloon valvuloplasty 2021, anemia s/p multiple transfusions 2 months ago, MVP, HTN, hyperthyroidism, HCC s/p partial liver resection, transferred from [facility] to [place] for TAVR evaluation by Dr. Mccollum.  Patient found to be anemic most likely d/t GI bleed now s/p 1PRBC transfusion for Hgb < 8/anemia. GI, Dr. Woo consulted at Dr. Mccollum' request.  Capsule study completed.  Dr. Woo will present to [facility] tomorrow to see patient and give recs.  Diuretics remain on hold (x 4 days total) for soft BP (SBP ~ high 80s).    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  -Colonoscopy next week; cardiac anesthesia onboard      #Hypotension ( improved )-  (92/50 - 113/56)  - Furosemide/diuretics remain on hold for SBP ~ 80s, last time received 7/17 in AM (parameters set to hold for SBP < 100)  - Orthostatic readings reviewed  - Avoid IVF resuscitation d/t severe AS  - Monitor for signs of hypovolemic shock      #HFpEF   #severe AS s/p recent balloon valvuloplasty  - Diuretics currently not given BP consistently below parameters to be given   - Possible TAVR with Dr. Mccollum after Colonoscopy to fix large AVM  - Continue ASA  - Daily standing weights  - Monitor I&O'S  - Replenish electrolytes to goal k >4 , mg >2.0    #Hemorrhoids  #Constipation  -Continue hemorrhoidal suppository   -Continue Senna 2 tabs HS  -Continue Lactulose 10 TID    #Hypothyroidism  - Continue Levothyroxine 100 mcg PO daily  - TSH 4.36 (6/19/22)    #GERD  -Continue Pantoprazole 40 mg PO daily    #DVT ppx:  -Sequential Compression Device (SCD)         Assessment:   Mrs. Silverio is a 89 y/o female with HFpEF (LVEF 40-45%), severe AS s/p balloon valvuloplasty (2021), anemia s/p multiple transfusions 2 months ago, MVP, HTN, hyperthyroidism, HCC s/p partial liver resection, transferred from [facility] to [place] for TAVR evaluation by Dr. Mccollum. Patient found to be anemic most likely d/t GI bleed now s/p 2 units PRBC transfusion for anemia with Hgb < 8.       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  -Colonoscopy by Dr. Woo date TBD; cardiac anesthesia onboard due to severe AS thus, high risk for sedation  -Check/continue to trend CBC:  Hgb 10.0 (7/24), Hgb 9.8 (7/23), Hbg 9.6 (7/22), Hgb 7.9 (7/21), Hgb 8.8 (7/20), Hgb 8.1 (7/19), Hgb 8.5 (7/18), 9.1 (7/17), 10.2 (7/16)  -S/p PRBC on 7/8 x 1 unit and 7/21 x 1 unit  -Will transfuse as needed for Hgb < 8; Lasix IV to be given with transfusion  -Completed 5 day course of iron sucrose on 7/19 (ferritin-->24)  -Monitor for melena, bloody stools  -Maintain active T&S, last completed 7/24 (A POS)    #Hypotension ( improved )-  (92/50 - 113/56)  -Continue to hold diuretics for SBP <100 mmHg  -Monitor BP per routine  -Orthostatic readings reviewed  -Avoid IVF resuscitation d/t severe AS  -Monitor for signs of hypovolemic shock      #HFpEF   #Severe AS s/p recent balloon valvuloplasty  -Will continue to hold diuretics for SBP <100 mmHg   - Possible TAVR with Dr. Mccollum after Colonoscopy & AVM ablation   - Continue ASA  - Daily standing weights  - Monitor I&O'S  - Replenish electrolytes to goal k >4 , mg >2.0    #Hemorrhoids  #Constipation  -Continue hemorrhoidal suppository   -Continue Senna 2 tabs HS  -Continue Lactulose 10 TID  -Tylenol 650 mg PO PRN for rectal pain    #Hypothyroidism  - Continue Levothyroxine 100 mcg PO daily  - TSH 4.36 (6/19/22)    #GERD  -Continue Pantoprazole 40 mg PO daily    #DVT ppx:  -Sequential Compression Device (SCD)         Assessment:   Mrs. Silverio is a 89 y/o female with HFpEF (LVEF 40-45%), severe AS s/p balloon valvuloplasty (2021), anemia s/p multiple transfusions 2 months ago, MVP, HTN, hyperthyroidism, HCC s/p partial liver resection, transferred from [facility] to [place] for TAVR evaluation by Dr. Mccollum. Patient found to be anemic most likely d/t GI bleed now s/p 2 units PRBC transfusion for anemia with Hgb < 8.     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  -Colonoscopy by Dr. Woo date TBD; cardiac anesthesia onboard due to severe AS thus, high risk for sedation  -Check/continue to trend CBC:  Hgb 10.0 (7/24), Hgb 9.8 (7/23), Hbg 9.6 (7/22), Hgb 7.9 (7/21), Hgb 8.8 (7/20), Hgb 8.1 (7/19), Hgb 8.5 (7/18), 9.1 (7/17), 10.2 (7/16)  -S/p PRBC on 7/8 x 1 unit and 7/21 x 1 unit  -Will transfuse as needed for Hgb < 8; Lasix IV to be given with transfusion  -Completed 5 day course of iron sucrose on 7/19 (ferritin-->24)  -Monitor for melena, bloody stools  -Maintain active T&S, last completed 7/24 (A POS)    #Hypotension ( improved )-  (92/50 - 113/56)  -Continue to hold diuretics for SBP <100 mmHg  -Monitor BP per routine  -Orthostatic readings reviewed  -Avoid IVF resuscitation d/t severe AS  -Monitor for signs of hypovolemic shock      #HFpEF   #Severe AS s/p recent balloon valvuloplasty  -Will continue to hold diuretics for SBP <100 mmHg   - Possible TAVR with Dr. Mccollum after Colonoscopy & AVM ablation   - Continue ASA  - Daily standing weights  - Monitor I&O'S  - Replenish electrolytes to goal k >4 , mg >2.0    #Hemorrhoids  #Constipation  -Continue hemorrhoidal suppository   -Continue Senna 2 tabs HS  -Continue Lactulose 10 TID  -Tylenol 650 mg PO PRN for rectal pain    #Hypothyroidism  - Continue Levothyroxine 100 mcg PO daily  - TSH 4.36 (6/19/22)    #GERD  -Continue Pantoprazole 40 mg PO daily    #DVT ppx:  -Sequential Compression Device (SCD)

## 2022-07-25 LAB
ALBUMIN SERPL ELPH-MCNC: 3.3 G/DL — LOW (ref 3.5–5.2)
ALP SERPL-CCNC: 128 U/L — HIGH (ref 30–115)
ALT FLD-CCNC: 15 U/L — SIGNIFICANT CHANGE UP (ref 0–41)
ANION GAP SERPL CALC-SCNC: 8 MMOL/L — SIGNIFICANT CHANGE UP (ref 7–14)
AST SERPL-CCNC: 21 U/L — SIGNIFICANT CHANGE UP (ref 0–41)
BILIRUB SERPL-MCNC: 0.4 MG/DL — SIGNIFICANT CHANGE UP (ref 0.2–1.2)
BUN SERPL-MCNC: 24 MG/DL — HIGH (ref 10–20)
CALCIUM SERPL-MCNC: 8.8 MG/DL — SIGNIFICANT CHANGE UP (ref 8.5–10.1)
CHLORIDE SERPL-SCNC: 106 MMOL/L — SIGNIFICANT CHANGE UP (ref 98–110)
CO2 SERPL-SCNC: 25 MMOL/L — SIGNIFICANT CHANGE UP (ref 17–32)
CREAT SERPL-MCNC: 0.7 MG/DL — SIGNIFICANT CHANGE UP (ref 0.7–1.5)
EGFR: 83 ML/MIN/1.73M2 — SIGNIFICANT CHANGE UP
GLUCOSE SERPL-MCNC: 87 MG/DL — SIGNIFICANT CHANGE UP (ref 70–99)
HCT VFR BLD CALC: 30.8 % — LOW (ref 37–47)
HGB BLD-MCNC: 9.8 G/DL — LOW (ref 12–16)
MAGNESIUM SERPL-MCNC: 2.2 MG/DL — SIGNIFICANT CHANGE UP (ref 1.8–2.4)
MCHC RBC-ENTMCNC: 29.1 PG — SIGNIFICANT CHANGE UP (ref 27–31)
MCHC RBC-ENTMCNC: 31.8 G/DL — LOW (ref 32–37)
MCV RBC AUTO: 91.4 FL — SIGNIFICANT CHANGE UP (ref 81–99)
NRBC # BLD: 0 /100 WBCS — SIGNIFICANT CHANGE UP (ref 0–0)
PLATELET # BLD AUTO: 140 K/UL — SIGNIFICANT CHANGE UP (ref 130–400)
POTASSIUM SERPL-MCNC: 4.3 MMOL/L — SIGNIFICANT CHANGE UP (ref 3.5–5)
POTASSIUM SERPL-SCNC: 4.3 MMOL/L — SIGNIFICANT CHANGE UP (ref 3.5–5)
PROT SERPL-MCNC: 6.4 G/DL — SIGNIFICANT CHANGE UP (ref 6–8)
RBC # BLD: 3.37 M/UL — LOW (ref 4.2–5.4)
RBC # FLD: 17.9 % — HIGH (ref 11.5–14.5)
SODIUM SERPL-SCNC: 139 MMOL/L — SIGNIFICANT CHANGE UP (ref 135–146)
WBC # BLD: 5.51 K/UL — SIGNIFICANT CHANGE UP (ref 4.8–10.8)
WBC # FLD AUTO: 5.51 K/UL — SIGNIFICANT CHANGE UP (ref 4.8–10.8)

## 2022-07-25 PROCEDURE — 99232 SBSQ HOSP IP/OBS MODERATE 35: CPT

## 2022-07-25 RX ORDER — SOD SULF/SODIUM/NAHCO3/KCL/PEG
4000 SOLUTION, RECONSTITUTED, ORAL ORAL ONCE
Refills: 0 | Status: COMPLETED | OUTPATIENT
Start: 2022-07-25 | End: 2022-07-25

## 2022-07-25 RX ADMIN — LACTULOSE 10 GRAM(S): 10 SOLUTION ORAL at 06:38

## 2022-07-25 RX ADMIN — LACTULOSE 10 GRAM(S): 10 SOLUTION ORAL at 21:17

## 2022-07-25 RX ADMIN — Medication 81 MILLIGRAM(S): at 11:11

## 2022-07-25 RX ADMIN — Medication 3 MILLIGRAM(S): at 21:17

## 2022-07-25 RX ADMIN — SENNA PLUS 2 TABLET(S): 8.6 TABLET ORAL at 21:18

## 2022-07-25 RX ADMIN — Medication 20 MILLIGRAM(S): at 06:38

## 2022-07-25 RX ADMIN — PANTOPRAZOLE SODIUM 40 MILLIGRAM(S): 20 TABLET, DELAYED RELEASE ORAL at 06:38

## 2022-07-25 RX ADMIN — Medication 4000 MILLILITER(S): at 17:54

## 2022-07-25 RX ADMIN — Medication 1 SUPPOSITORY(S): at 11:13

## 2022-07-25 RX ADMIN — Medication 100 MICROGRAM(S): at 06:38

## 2022-07-25 NOTE — PROGRESS NOTE ADULT - SUBJECTIVE AND OBJECTIVE BOX
Patient is a 89-year-old female with a past medical history significant for CHF ,severe AAS, MVP, hypertension, HCC, hypothyroidism, GERD who presents with shortness of breath.  Patient was seen at [facility] whom deferred her work up to her outpatient GI Physician. She is constipated primarily and awaiting possible TAVR. 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 Patient currently stable.       PAST MEDICAL & SURGICAL HISTORY:  Mitral valve prolapse  Enlarged heart  Murmur  Hyperthyroidism  Arthritis  HTN (hypertension)  Diverticulosis  Hiatal hernia  Liver cancer s/p resection and s/p chemo and radiation  Aortic stenosis s/p ballon aortic valuloplasty 5/25/21        MEDICATIONS  (STANDING):  aspirin  chewable 81 milliGRAM(s) Oral daily  chlorhexidine 4% Liquid 1 Application(s) Topical <User Schedule>  furosemide    Tablet 20 milliGRAM(s) Oral daily  iron sucrose IVPB 200 milliGRAM(s) IV Intermittent every 24 hours  lactulose Syrup 10 Gram(s) Oral every 8 hours  levothyroxine 100 MICROGram(s) Oral daily  pantoprazole    Tablet 40 milliGRAM(s) Oral before breakfast  senna 2 Tablet(s) Oral at bedtime    MEDICATIONS  (PRN):  hydrocortisone hemorrhoidal Suppository 1 Suppository(s) Rectal two times a day PRN hemorrhoidal discomfort      Allergies  Cipro (Other)  Levaquin (Rash)  tetracycline (Hives)    Review of Systems  General:  Denies Fatigue, Denies Fever, Denies Weakness ,Denies Weight Loss   HEENT: Denies Trouble Swallowing ,Denies  Sore Throat , Denies Change in hearing/vision/speech ,Denies Dizziness    Cardio:See HPI  Respiratory: See HPI  Abdomen: See detailed HPI  Neuro: Denies Headache Denies Dizziness, Denies Paresthesias  MSK: Denies pain in Bones/Joints/Muscles   Psych: Patient denies depression, denies suicidal or homicidal ideations  Integ: Patient Denies rash, or new skin lesions       Vital Signs Last 24 Hrs  T(C): 35.9 (25 Jul 2022 13:29), Max: 36.8 (24 Jul 2022 19:56)  T(F): 96.6 (25 Jul 2022 13:29), Max: 98.3 (24 Jul 2022 19:56)  HR: 75 (25 Jul 2022 13:29) (74 - 89)  BP: 91/48 (25 Jul 2022 13:29) (89/60 - 112/55)  RR: 19 (25 Jul 2022 13:29) (18 - 19)  SpO2: 97% (24 Jul 2022 19:41) (97% - 97%)    Parameters below as of 24 Jul 2022 19:41  Patient On (Oxygen Delivery Method): room air      Physical Exam  Gen: NAD  HEENT: NC/AT, Mucosal Membranes  Cardio: S1/S2 No S3/S4, Regular  Resp: CTA B/L  Abdomen: Soft, ND/NT  Neuro: AAOx3, Cranial Nerve II-XII intact   Extremities: FROM x 4      Labs:                        9.8    5.51  )-----------( 140      ( 25 Jul 2022 06:44 )             30.8     07-25    139  |  106  |  24<H>  ----------------------------<  87  4.3   |  25  |  0.7    Ca    8.8      25 Jul 2022 06:44  Mg     2.2     07-25    TPro  6.4  /  Alb  3.3<L>  /  TBili  0.4  /  DBili  x   /  AST  21  /  ALT  15  /  AlkPhos  128<H>  07-25

## 2022-07-25 NOTE — PROGRESS NOTE ADULT - ASSESSMENT
Patient is a 89-year-old female with a past medical history significant for CHF ,severe AAS, MVP, hypertension, HCC, hypothyroidism, GERD who presents with shortness of breath.  Patient was seen at [facility] whom deferred her work up to her outpatient GI Physician. She is constipated primarily and awaiting possible TAVR. 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 Patient currently stable. Colonoscopy planned tomorrow. Discussed risk with patient and son João Silverio. Golytely to be started this evening, clear liquids NPO after midnight.     Anemia  - Possible TAVR  - Capsule study discussed with patient and son  - Clear liquid diet today  - NPO after midnight   - Golytely to be started this evening   - Patient has stable Hgb, Active tnS, Coags ordered with AM labs  - Discussed C-scope with patient and son who agree to procedure  Patient is a 89-year-old female with a past medical history significant for CHF ,severe AAS, MVP, hypertension, HCC, hypothyroidism, GERD who presents with shortness of breath.  Patient was seen at [facility] whom deferred her work up to her outpatient GI Physician. She is constipated primarily and awaiting possible TAVR. 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 Patient currently stable. Colonoscopy planned tomorrow. Discussed risk with patient and son João Silverio. Golytely to be started this evening, clear liquids NPO after midnight.     Anemia. 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

## 2022-07-25 NOTE — PROGRESS NOTE ADULT - SUBJECTIVE AND OBJECTIVE BOX
Chief complaint: Patient is a 89y old  Female who presents with a chief complaint of Dyspnea w/ Exertion (24 Jul 2022 05:59)    Interval history: no acute events overnight    Review of systems: A complete 10-point review of systems was obtained and is negative except as stated in the interval history.    Vitals:  T(F): 98.3, Max: 98.3 (07-24 @ 19:56)  HR: 89 (72 - 89)  BP: 89/60 (89/60 - 107/53)  RR: 18 (18 - 20)  SpO2: 97% (97% - 100%)    Ins & outs:     07-21 @ 07:01  -  07-22 @ 07:00  --------------------------------------------------------  IN: 1154 mL / OUT: 700 mL / NET: 454 mL    07-22 @ 07:01  -  07-23 @ 07:00  --------------------------------------------------------  IN: 395 mL / OUT: 1350 mL / NET: -955 mL    07-23 @ 07:01  -  07-24 @ 07:00  --------------------------------------------------------  IN: 677 mL / OUT: 575 mL / NET: 102 mL    07-24 @ 07:01  -  07-25 @ 00:59  --------------------------------------------------------  IN: 339 mL / OUT: 0 mL / NET: 339 mL      Weight trend:      Physical exam:  General: No apparent distress  HEENT: Anicteric sclera. Moist mucous membranes.   Cardiac: Regular rate and rhythm. +murmur, rubs, or gallops.   Vascular: Symmetric radial pulses. Dorsalis pedis pulses palpable.   Respiratory: Normal effort. Bibasilar crackles. Clear to ascultation.   Abdomen: Soft, nontender. Audible bowel sounds.   Extremities: Warm with *** edema. No cyanosis or clubbing.   Skin: Warm and dry. No rash.   Neurologic: Grossly normal motor function.   Psychiatric: Oriented to person, place, and time.     Data reviewed:  - Telemetry: SR  - ECG (date***):   - TTE (date***): Date of Exam:        7/13/2022 1:09:45 PM  Referring Physician: [id number] ED UNASSIGNED  Sonographer:         Ruth Ann Christensen  Reading Physician:  Juan Antonio Kwon M.D.    Procedure:   2D Echo/Doppler/Color Doppler Complete.  Indications: I42.9 - Cardiomyopathy, unspecified  Diagnosis:   I42.9 - Cardiomyopathy, unspecified        Summary:   1. Left ventricular ejection fraction, by visual estimation, is 40 to   45%.   2. Normal right atrial size.   3. Mild mitral valve regurgitation.   4. Structurally normal mitral valve, with normal leaflet excursion.   5. Mild aortic regurgitation.   6. Severe aortic valve stenosis.   7. The aortic valve mean gradient is 75.1 mmHg consistent with severe   aortic stenosis.    - Chest x-ray (date***):   - Stress test:   - CCTA:  - Cardiac catheterization:  - Cardiac MRI:    - Labs:                        10.0   6.42  )-----------( 120      ( 24 Jul 2022 06:15 )             31.2     07-24    137  |  103  |  24<H>  ----------------------------<  77  5.0   |  23  |  0.8    Ca    8.9      24 Jul 2022 06:15  Mg     2.2     07-24    TPro  6.2  /  Alb  3.4<L>  /  TBili  0.7  /  DBili  x   /  AST  24  /  ALT  16  /  AlkPhos  126<H>  07-24        Serum Pro-Brain Natriuretic Peptide: 92107 pg/mL (07-08)              Medications:  aspirin  chewable 81 milliGRAM(s) Oral daily  chlorhexidine 4% Liquid 1 Application(s) Topical <User Schedule>  furosemide    Tablet 20 milliGRAM(s) Oral daily  hydrocortisone hemorrhoidal Suppository 1 Suppository(s) Rectal daily  lactulose Syrup 10 Gram(s) Oral every 8 hours  levothyroxine 100 MICROGram(s) Oral daily  melatonin 3 milliGRAM(s) Oral at bedtime  pantoprazole    Tablet 40 milliGRAM(s) Oral before breakfast  senna 2 Tablet(s) Oral at bedtime    Drips:    PRN:     Allergies    Cipro (Other)  Levaquin (Rash)  tetracycline (Hives)    Intolerances      Assessment:  Mrs. Silverio is a 89 y/o female with HFpEF, severe AS s/p balloon valvuloplasty 2021, anemia s/p multiple transfusions 2 months ago, MVP, HTN, hyperthyroidism, HCC s/p partial liver resection, transferred from [facility] to [place] for TAVR evaluation by Dr. Mccollum.  Patient found to be anemic most likely d/t GI bleed now s/p 1PRBC transfusion for Hgb < 8/anemia. GI, Dr. Woo consulted at Dr. Mccollum' request.  Capsule study completed.  Dr. Woo will present to [facility] tomorrow to see patient and give recs.  Diuretics remain on hold (x 4 days total) for soft BP (SBP ~ high 80s).        Problems discussed and associated plan:      Please contact me with any questions or concerns at [phone]. Chief complaint: Patient is a 89y old  Female who presents with a chief complaint of Dyspnea w/ Exertion (24 Jul 2022 05:59)    Interval history: no acute events overnight    Review of systems: A complete 10-point review of systems was obtained and is negative except as stated in the interval history.    Vitals:  T(F): 98.3, Max: 98.3 (07-24 @ 19:56)  HR: 89 (72 - 89)  BP: 89/60 (89/60 - 107/53)  RR: 18 (18 - 20)  SpO2: 97% (97% - 100%)    Ins & outs:     07-21 @ 07:01  -  07-22 @ 07:00  --------------------------------------------------------  IN: 1154 mL / OUT: 700 mL / NET: 454 mL    07-22 @ 07:01  -  07-23 @ 07:00  --------------------------------------------------------  IN: 395 mL / OUT: 1350 mL / NET: -955 mL    07-23 @ 07:01  -  07-24 @ 07:00  --------------------------------------------------------  IN: 677 mL / OUT: 575 mL / NET: 102 mL    07-24 @ 07:01  -  07-25 @ 00:59  --------------------------------------------------------  IN: 339 mL / OUT: 0 mL / NET: 339 mL      Weight trend:      Physical exam:  General: No apparent distress  HEENT: Anicteric sclera. Moist mucous membranes.   Cardiac: Regular rate and rhythm. +murmur, rubs, or gallops.   Vascular: Symmetric radial pulses. Dorsalis pedis pulses palpable.   Respiratory: Normal effort. mild exp wheeze  Abdomen: Soft, nontender. Audible bowel sounds.   Extremities: Warm with no edema. No cyanosis or clubbing.   Skin: Warm and dry. No rash.   Neurologic: Grossly normal motor function.   Psychiatric: Oriented to person, place, and time.     Data reviewed:  - Telemetry: SR  - ECG (date***):   - TTE (date***): Date of Exam:        7/13/2022 1:09:45 PM  Referring Physician: [id number] ED UNASSIGNED  Sonographer:         Ruth Ann Christensen  Reading Physician:  Juan Antonio Kwon M.D.    Procedure:   2D Echo/Doppler/Color Doppler Complete.  Indications: I42.9 - Cardiomyopathy, unspecified  Diagnosis:   I42.9 - Cardiomyopathy, unspecified        Summary:   1. Left ventricular ejection fraction, by visual estimation, is 40 to   45%.   2. Normal right atrial size.   3. Mild mitral valve regurgitation.   4. Structurally normal mitral valve, with normal leaflet excursion.   5. Mild aortic regurgitation.   6. Severe aortic valve stenosis.   7. The aortic valve mean gradient is 75.1 mmHg consistent with severe   aortic stenosis.    - Chest x-ray (date***):   - Stress test:   - CCTA:  - Cardiac catheterization:  - Cardiac MRI:    - Labs:                        10.0   6.42  )-----------( 120      ( 24 Jul 2022 06:15 )             31.2     07-24    137  |  103  |  24<H>  ----------------------------<  77  5.0   |  23  |  0.8    Ca    8.9      24 Jul 2022 06:15  Mg     2.2     07-24    TPro  6.2  /  Alb  3.4<L>  /  TBili  0.7  /  DBili  x   /  AST  24  /  ALT  16  /  AlkPhos  126<H>  07-24        Serum Pro-Brain Natriuretic Peptide: 36197 pg/mL (07-08)              Medications:  aspirin  chewable 81 milliGRAM(s) Oral daily  chlorhexidine 4% Liquid 1 Application(s) Topical <User Schedule>  furosemide    Tablet 20 milliGRAM(s) Oral daily  hydrocortisone hemorrhoidal Suppository 1 Suppository(s) Rectal daily  lactulose Syrup 10 Gram(s) Oral every 8 hours  levothyroxine 100 MICROGram(s) Oral daily  melatonin 3 milliGRAM(s) Oral at bedtime  pantoprazole    Tablet 40 milliGRAM(s) Oral before breakfast  senna 2 Tablet(s) Oral at bedtime    Drips:    PRN:     Allergies    Cipro (Other)  Levaquin (Rash)  tetracycline (Hives)    Intolerances      Assessment:  Mrs. Silverio is a 89 y/o female with HFpEF, severe AS s/p balloon valvuloplasty 2021, anemia s/p multiple transfusions 2 months ago, MVP, HTN, hyperthyroidism, HCC s/p partial liver resection, transferred from [facility] to [place] for TAVR evaluation by Dr. Mccollum.  Patient found to be anemic most likely d/t GI bleed now s/p 1PRBC transfusion for Hgb < 8/anemia. GI, Dr. Woo consulted at Dr. Mccollum' request.  Capsule study completed.  Dr. Woo will present to [facility] tomorrow to see patient and give recs.  Diuretics remain on hold (x 4 days total) for soft BP (SBP ~ high 80s).        Problems discussed and associated plan:      Please contact me with any questions or concerns at [phone]. Chief complaint: Patient is a 89y old  Female who presents with a chief complaint of Dyspnea w/ Exertion with found to be volume overloaded with acute GI Bleedings.      Interval history: no acute events overnight  Anemia Stable at 9.8(Last unit of PRBC 7/21/22)    Review of systems: A complete 10-point review of systems was obtained and is negative except as stated in the interval history.    Vitals:  T(F): 98.3, Max: 98.3 (07-24 @ 19:56)  HR: 89 (72 - 89)  BP: 89/60 (89/60 - 107/53)  RR: 18 (18 - 20)  SpO2: 97% (97% - 100%)    Ins & outs:     07-21 @ 07:01  -  07-22 @ 07:00  --------------------------------------------------------  IN: 1154 mL / OUT: 700 mL / NET: 454 mL    07-22 @ 07:01  -  07-23 @ 07:00  --------------------------------------------------------  IN: 395 mL / OUT: 1350 mL / NET: -955 mL    07-23 @ 07:01 - 07-24 @ 07:00  --------------------------------------------------------  IN: 677 mL / OUT: 575 mL / NET: 102 mL    07-24 @ 07:01  -  07-25 @ 00:59  --------------------------------------------------------  IN: 339 mL / OUT: 0 mL / NET: 339 mL      Weight trend:      Physical exam:  General: No apparent distress  HEENT: Anicteric sclera. Moist mucous membranes.   Cardiac: Regular rate and rhythm. +murmur, rubs, or gallops.   Vascular: Symmetric radial pulses. Dorsalis pedis pulses palpable.   Respiratory: Normal effort. mild exp wheeze  Abdomen: Soft, nontender. Audible bowel sounds.   Extremities: Warm with no edema. No cyanosis or clubbing.   Skin: Warm and dry. No rash.   Neurologic: Grossly normal motor function.   Psychiatric: Oriented to person, place, and time.     Data reviewed:  - Telemetry: SR  - ECG (date***):   - TTE (date***): Date of Exam:        7/13/2022 1:09:45 PM  Referring Physician: [id number] ED UNASSIGNED  Sonographer:         Ruth Ann Christensen  Reading Physician:  Juan Antonio Kwon M.D.    Procedure:   2D Echo/Doppler/Color Doppler Complete.  Indications: I42.9 - Cardiomyopathy, unspecified  Diagnosis:   I42.9 - Cardiomyopathy, unspecified        Summary:   1. Left ventricular ejection fraction, by visual estimation, is 40 to   45%.   2. Normal right atrial size.   3. Mild mitral valve regurgitation.   4. Structurally normal mitral valve, with normal leaflet excursion.   5. Mild aortic regurgitation.   6. Severe aortic valve stenosis.   7. The aortic valve mean gradient is 75.1 mmHg consistent with severe   aortic stenosis.    - Chest x-ray (date***):   - Stress test:   - CCTA:  - Cardiac catheterization:  - Cardiac MRI:    - Labs:                        10.0   6.42  )-----------( 120      ( 24 Jul 2022 06:15 )             31.2     07-24    137  |  103  |  24<H>  ----------------------------<  77  5.0   |  23  |  0.8    Ca    8.9      24 Jul 2022 06:15  Mg     2.2     07-24    TPro  6.2  /  Alb  3.4<L>  /  TBili  0.7  /  DBili  x   /  AST  24  /  ALT  16  /  AlkPhos  126<H>  07-24        Serum Pro-Brain Natriuretic Peptide: 18782 pg/mL (07-08)              Medications:  aspirin  chewable 81 milliGRAM(s) Oral daily  chlorhexidine 4% Liquid 1 Application(s) Topical <User Schedule>  furosemide    Tablet 20 milliGRAM(s) Oral daily  hydrocortisone hemorrhoidal Suppository 1 Suppository(s) Rectal daily  lactulose Syrup 10 Gram(s) Oral every 8 hours  levothyroxine 100 MICROGram(s) Oral daily  melatonin 3 milliGRAM(s) Oral at bedtime  pantoprazole    Tablet 40 milliGRAM(s) Oral before breakfast  senna 2 Tablet(s) Oral at bedtime    Drips:    PRN:     Allergies    Cipro (Other)  Levaquin (Rash)  tetracycline (Hives)    Intolerances      Assessment:  Mrs. Silverio is a 89 y/o female with HFpEF, severe AS s/p balloon valvuloplasty 2021, anemia s/p multiple transfusions 2 months ago, MVP, HTN, hyperthyroidism, HCC s/p partial liver resection, transferred from [facility] to [place] for TAVR evaluation by Dr. Mccollum.  Patient found to be anemic most likely d/t GI bleed now s/p 1PRBC transfusion for Hgb < 8/anemia. GI, Dr. Woo consulted at Dr. Mccollum' request.  Capsule study completed.  Dr. Woo will present to [facility] tomorrow to see patient and give recs.  Diuretics remain on hold (x 4 days total) for soft BP (SBP ~ high 80s).        Problems discussed and associated plan:      Please contact me with any questions or concerns at [phone]. Chief complaint: Patient is a 89y old  Female who presents with a chief complaint of Dyspnea w/ Exertion with found to be volume overloaded with acute GI Bleedings.      Interval history: no acute events overnight  Anemia Stable at 9.8(Last unit of PRBC 7/21/22)    Review of systems:  No Chest pain  No SOB  + Anxiety   Vitals:  ICU Vital Signs Last 24 Hrs  T(C): 35.8 (25 Jul 2022 04:30), Max: 36.8 (24 Jul 2022 19:56)  T(F): 96.4 (25 Jul 2022 04:30), Max: 98.3 (24 Jul 2022 19:56)  HR: 74 (25 Jul 2022 06:30) (74 - 89)  BP: 112/55 (25 Jul 2022 06:30) (89/60 - 112/55)  RR: 18 (25 Jul 2022 04:30) (18 - 20)  SpO2: 97% (24 Jul 2022 19:41) (97% - 97%)      07-21 @ 07:01  -  07-22 @ 07:00  --------------------------------------------------------  IN: 1154 mL / OUT: 700 mL / NET: 454 mL    07-22 @ 07:01  -  07-23 @ 07:00  --------------------------------------------------------  IN: 395 mL / OUT: 1350 mL / NET: -955 mL    07-23 @ 07:01  -  07-24 @ 07:00  --------------------------------------------------------  IN: 677 mL / OUT: 575 mL / NET: 102 mL    07-24 @ 07:01  -  07-25 @ 00:59  --------------------------------------------------------  IN: 339 mL / OUT: 0 mL / NET: 339 mL      Weight trend:      Physical exam:  General: No apparent distress  HEENT: Anicteric sclera. Moist mucous membranes.   Cardiac:  Ireg Ireg Symmetric radial pulses. Dorsalis pedis pulses palpable.   Respiratory: Normal effort. Clear  Abdomen: Soft, nontender. Audible bowel sounds.   Extremities: Warm with no edema. No cyanosis or clubbing.   Skin: Warm and dry. No rash.   Neurologic: Grossly normal motor function.   Psychiatric: Oriented to person, place, and time.     Data reviewed:  - Telemetry: AF  - ECG (date***):   - TTE (date***): Date of Exam:        7/13/2022 1:09:45 PM  Referring Physician: [id number] ED UNASSIGNED  Sonographer:         Ruth Ann Christensen  Reading Physician:  Juan Antonio Kwon M.D.    Procedure:   2D Echo/Doppler/Color Doppler Complete.  Indications: I42.9 - Cardiomyopathy, unspecified  Diagnosis:   I42.9 - Cardiomyopathy, unspecified        Summary:   1. Left ventricular ejection fraction, by visual estimation, is 40 to   45%.   2. Normal right atrial size.   3. Mild mitral valve regurgitation.   4. Structurally normal mitral valve, with normal leaflet excursion.   5. Mild aortic regurgitation.   6. Severe aortic valve stenosis.   7. The aortic valve mean gradient is 75.1 mmHg consistent with severe   aortic stenosis.    - Labs:                          9.8    5.51  )-----------( 140      ( 25 Jul 2022 06:44 )             30.8   07-25    139  |  106  |  24<H>  ----------------------------<  87  4.3   |  25  |  0.7    Ca    8.8      25 Jul 2022 06:44  Mg     2.2     07-25    TPro  6.4  /  Alb  3.3<L>  /  TBili  0.4  /  DBili  x   /  AST  21  /  ALT  15  /  AlkPhos  128<H>  07-25        Ca    8.9      24 Jul 2022 06:15  Mg     2.2     07-24    TPro  6.2  /  Alb  3.4<L>  /  TBili  0.7  /  DBili  x   /  AST  24  /  ALT  16  /  AlkPhos  126<H>  07-24        Serum Pro-Brain Natriuretic Peptide: 17199 pg/mL (07-08)              Medications:    MEDICATIONS  (STANDING):  aspirin  chewable 81 milliGRAM(s) Oral daily  chlorhexidine 4% Liquid 1 Application(s) Topical <User Schedule>  furosemide    Tablet 20 milliGRAM(s) Oral daily  hydrocortisone hemorrhoidal Suppository 1 Suppository(s) Rectal daily  lactulose Syrup 10 Gram(s) Oral every 8 hours  levothyroxine 100 MICROGram(s) Oral daily  melatonin 3 milliGRAM(s) Oral at bedtime  pantoprazole    Tablet 40 milliGRAM(s) Oral before breakfast  senna 2 Tablet(s) Oral at bedtime    MEDICATIONS  (PRN):  acetaminophen     Tablet .. 650 milliGRAM(s) Oral every 6 hours PRN Mild Pain (1 - 3)  hydrocortisone hemorrhoidal Suppository 1 Suppository(s) Rectal two times a day PRN hemorrhoidal discomfort    Allergies    Cipro (Other)  Levaquin (Rash)  tetracycline (Hives)    Intolerances      Assessment:  Mrs. Silverio is a 88 y/o female with HFpEF, severe AS s/p balloon valvuloplasty 2021, anemia s/p multiple transfusions 2 months ago, MVP, HTN, hyperthyroidism, HCC s/p partial liver resection, transferred from [facility] to [place] for TAVR evaluation by Dr. Mccollum.  Patient found to be anemic most likely d/t GI bleed now s/p 1PRBC transfusion for Hgb < 8/anemia. GI, Dr. Woo consulted at Dr. Mccollum' request.  Capsule study completed.  Dr. Woo will present to [facility] tomorrow to see patient and give recs.  Diuretics remain on hold (x 4 days total) for soft BP (SBP ~ high 80s).        Problems discussed and associated plan:    #HFmrEF   LVEF 40-45%  Stable on PO Diuretics  Cont Lasix 20mg PO Daily  -Completed 5 day course of iron sucrose on 7/19 (ferritin-->24)  Strict I+O   Daily weights  Keep K>4 MG >2    #Severe AS s/p recent balloon valvuloplasty  - Possible TAVR with Dr. 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 Transfuse for Hgb <8.0  -S/p PRBC on 7/8 x 1 unit and 7/21 x 1 unit    #Hypotension ( improved )-  (92/50 - 113/56)  -Monitor BP per routine  -Orthostatic readings reviewed  -Avoid IVF resuscitation d/t severe AS  -Monitor for signs of hypovolemic shock      #Hemorrhoids with Constipation  -Continue hemorrhoidal suppository   -Continue Senna 2 tabs HS  -Continue Lactulose 10 TID  -Tylenol 650 mg PO PRN for rectal pain    #Hypothyroidism  - Continue Levothyroxine 100 mcg PO daily  - TSH 4.36 (6/19/22)    #GERD  -Continue Pantoprazole 40 mg PO daily    #DVT ppx:  -Sequential Compression Device (SCD)        Please contact me with any questions or concerns at [phone]. Chief complaint: Patient is a 89y old  Female who presents with a chief complaint of Dyspnea w/ Exertion with found to be volume overloaded with acute GI Bleedings.      Interval history: no acute events overnight  Anemia Stable at 9.8(Last unit of PRBC 7/21/22)  Bowel movement this morning seen, dark brown with some blood noted    Review of systems:  No Chest pain or SOB.  + Anxiety  + blood in stool  10-Point Review of systems is otherwise negative.    Vitals:  ICU Vital Signs Last 24 Hrs  T(C): 35.8 (25 Jul 2022 04:30), Max: 36.8 (24 Jul 2022 19:56)  T(F): 96.4 (25 Jul 2022 04:30), Max: 98.3 (24 Jul 2022 19:56)  HR: 74 (25 Jul 2022 06:30) (74 - 89)  BP: 112/55 (25 Jul 2022 06:30) (89/60 - 112/55)  RR: 18 (25 Jul 2022 04:30) (18 - 20)  SpO2: 97% (24 Jul 2022 19:41) (97% - 97%)      07-21 @ 07:01  -  07-22 @ 07:00  --------------------------------------------------------  IN: 1154 mL / OUT: 700 mL / NET: 454 mL    07-22 @ 07:01 - 07-23 @ 07:00  --------------------------------------------------------  IN: 395 mL / OUT: 1350 mL / NET: -955 mL    07-23 @ 07:01 - 07-24 @ 07:00  --------------------------------------------------------  IN: 677 mL / OUT: 575 mL / NET: 102 mL    07-24 @ 07:01  - 07-25 @ 00:59  --------------------------------------------------------  IN: 339 mL / OUT: 0 mL / NET: 339 mL      Weight trend:      Physical exam:  General: No apparent distress  HEENT: Anicteric sclera. Moist mucous membranes.   Cardiac:  Ireg Ireg Symmetric radial pulses. Dorsalis pedis pulses palpable.   Respiratory: Normal effort. Clear  Abdomen: Soft, nontender. Audible bowel sounds.   Extremities: Warm with no edema. No cyanosis or clubbing.   Skin: Warm and dry. No rash.   Neurologic: Grossly normal motor function.   Psychiatric: Oriented to person, place, and time.     Data reviewed:  - Telemetry: AF  - ECG (date***):   - TTE (date***): Date of Exam:        7/13/2022 1:09:45 PM  Referring Physician: [id number] ED UNASSIGNED  Sonographer:         Ruth Ann Christensen  Reading Physician:  Juan Antonio Kwon M.D.    Procedure:   2D Echo/Doppler/Color Doppler Complete.  Indications: I42.9 - Cardiomyopathy, unspecified  Diagnosis:   I42.9 - Cardiomyopathy, unspecified        Summary:   1. Left ventricular ejection fraction, by visual estimation, is 40 to   45%.   2. Normal right atrial size.   3. Mild mitral valve regurgitation.   4. Structurally normal mitral valve, with normal leaflet excursion.   5. Mild aortic regurgitation.   6. Severe aortic valve stenosis.   7. The aortic valve mean gradient is 75.1 mmHg consistent with severe   aortic stenosis.    - Labs:                          9.8    5.51  )-----------( 140      ( 25 Jul 2022 06:44 )             30.8   07-25    139  |  106  |  24<H>  ----------------------------<  87  4.3   |  25  |  0.7    Ca    8.8      25 Jul 2022 06:44  Mg     2.2     07-25    TPro  6.4  /  Alb  3.3<L>  /  TBili  0.4  /  DBili  x   /  AST  21  /  ALT  15  /  AlkPhos  128<H>  07-25        Ca    8.9      24 Jul 2022 06:15  Mg     2.2     07-24    TPro  6.2  /  Alb  3.4<L>  /  TBili  0.7  /  DBili  x   /  AST  24  /  ALT  16  /  AlkPhos  126<H>  07-24        Serum Pro-Brain Natriuretic Peptide: 71540 pg/mL (07-08)              Medications:    MEDICATIONS  (STANDING):  aspirin  chewable 81 milliGRAM(s) Oral daily  chlorhexidine 4% Liquid 1 Application(s) Topical <User Schedule>  furosemide    Tablet 20 milliGRAM(s) Oral daily  hydrocortisone hemorrhoidal Suppository 1 Suppository(s) Rectal daily  lactulose Syrup 10 Gram(s) Oral every 8 hours  levothyroxine 100 MICROGram(s) Oral daily  melatonin 3 milliGRAM(s) Oral at bedtime  pantoprazole    Tablet 40 milliGRAM(s) Oral before breakfast  senna 2 Tablet(s) Oral at bedtime    MEDICATIONS  (PRN):  acetaminophen     Tablet .. 650 milliGRAM(s) Oral every 6 hours PRN Mild Pain (1 - 3)  hydrocortisone hemorrhoidal Suppository 1 Suppository(s) Rectal two times a day PRN hemorrhoidal discomfort    Allergies    Cipro (Other)  Levaquin (Rash)  tetracycline (Hives)    Intolerances      Assessment:  Mrs. Silverio is a 89 y/o female with HFpEF, severe AS s/p balloon valvuloplasty 2021, anemia s/p multiple transfusions 2 months ago, MVP, HTN, hyperthyroidism, HCC s/p partial liver resection, transferred from [facility] to [place] for TAVR evaluation by Dr. Mccollum.  Patient found to be anemic most likely d/t GI bleed now s/p 1PRBC transfusion for Hgb < 8/anemia. GI, Dr. Woo consulted at Dr. Mccollum' request.  Capsule study completed.  Dr. Woo will present to [facility] tomorrow to see patient and give recs.  Diuretics remain on hold (x 4 days total) for soft BP (SBP ~ high 80s).        Problems discussed and associated plan:    #HFmrEF   LVEF 40-45%  Stable on PO Diuretics  Cont Lasix 20mg PO Daily  -Completed 5 day course of iron sucrose on 7/19 (ferritin-->24)  Strict I+O   Daily weights  Keep K>4 MG >2    #Severe AS s/p recent balloon valvuloplasty  - Possible TAVR with Dr. 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 Transfuse for Hgb <8.0  -S/p PRBC on 7/8 x 1 unit and 7/21 x 1 unit    #Hypotension ( improved )-  (92/50 - 113/56)  -Monitor BP per routine  -Orthostatic readings reviewed  -Avoid IVF resuscitation d/t severe AS  -Monitor for signs of hypovolemic shock      #Hemorrhoids with Constipation  -Continue hemorrhoidal suppository   -Continue Senna 2 tabs HS  -Continue Lactulose 10 TID  -Tylenol 650 mg PO PRN for rectal pain    #Hypothyroidism  - Continue Levothyroxine 100 mcg PO daily  - TSH 4.36 (6/19/22)    #GERD  -Continue Pantoprazole 40 mg PO daily    #DVT ppx:  -Sequential Compression Device (SCD)        Please contact me with any questions or concerns at [phone].

## 2022-07-26 LAB
ANION GAP SERPL CALC-SCNC: 14 MMOL/L — SIGNIFICANT CHANGE UP (ref 7–14)
APTT BLD: 31.4 SEC — SIGNIFICANT CHANGE UP (ref 27–39.2)
BLD GP AB SCN SERPL QL: SIGNIFICANT CHANGE UP
BUN SERPL-MCNC: 19 MG/DL — SIGNIFICANT CHANGE UP (ref 10–20)
CALCIUM SERPL-MCNC: 9 MG/DL — SIGNIFICANT CHANGE UP (ref 8.5–10.1)
CHLORIDE SERPL-SCNC: 104 MMOL/L — SIGNIFICANT CHANGE UP (ref 98–110)
CHOLEST SERPL-MCNC: 199 MG/DL — SIGNIFICANT CHANGE UP
CO2 SERPL-SCNC: 24 MMOL/L — SIGNIFICANT CHANGE UP (ref 17–32)
CREAT SERPL-MCNC: 0.8 MG/DL — SIGNIFICANT CHANGE UP (ref 0.7–1.5)
EGFR: 70 ML/MIN/1.73M2 — SIGNIFICANT CHANGE UP
GLUCOSE SERPL-MCNC: 80 MG/DL — SIGNIFICANT CHANGE UP (ref 70–99)
HCT VFR BLD CALC: 34.2 % — LOW (ref 37–47)
HDLC SERPL-MCNC: 65 MG/DL — SIGNIFICANT CHANGE UP
HGB BLD-MCNC: 10.7 G/DL — LOW (ref 12–16)
INR BLD: 1 RATIO — SIGNIFICANT CHANGE UP (ref 0.65–1.3)
LIPID PNL WITH DIRECT LDL SERPL: 109 MG/DL — HIGH
MCHC RBC-ENTMCNC: 28.9 PG — SIGNIFICANT CHANGE UP (ref 27–31)
MCHC RBC-ENTMCNC: 31.3 G/DL — LOW (ref 32–37)
MCV RBC AUTO: 92.4 FL — SIGNIFICANT CHANGE UP (ref 81–99)
NON HDL CHOLESTEROL: 134 MG/DL — HIGH
NRBC # BLD: 0 /100 WBCS — SIGNIFICANT CHANGE UP (ref 0–0)
PLATELET # BLD AUTO: 158 K/UL — SIGNIFICANT CHANGE UP (ref 130–400)
POTASSIUM SERPL-MCNC: 4 MMOL/L — SIGNIFICANT CHANGE UP (ref 3.5–5)
POTASSIUM SERPL-SCNC: 4 MMOL/L — SIGNIFICANT CHANGE UP (ref 3.5–5)
PROTHROM AB SERPL-ACNC: 11.5 SEC — SIGNIFICANT CHANGE UP (ref 9.95–12.87)
RBC # BLD: 3.7 M/UL — LOW (ref 4.2–5.4)
RBC # FLD: 18.6 % — HIGH (ref 11.5–14.5)
SODIUM SERPL-SCNC: 142 MMOL/L — SIGNIFICANT CHANGE UP (ref 135–146)
TRIGL SERPL-MCNC: 128 MG/DL — SIGNIFICANT CHANGE UP
WBC # BLD: 6.36 K/UL — SIGNIFICANT CHANGE UP (ref 4.8–10.8)
WBC # FLD AUTO: 6.36 K/UL — SIGNIFICANT CHANGE UP (ref 4.8–10.8)

## 2022-07-26 PROCEDURE — 99232 SBSQ HOSP IP/OBS MODERATE 35: CPT

## 2022-07-26 RX ORDER — MULTIVIT WITH MIN/MFOLATE/K2 340-15/3 G
1 POWDER (GRAM) ORAL ONCE
Refills: 0 | Status: COMPLETED | OUTPATIENT
Start: 2022-07-26 | End: 2022-07-26

## 2022-07-26 RX ADMIN — Medication 81 MILLIGRAM(S): at 13:46

## 2022-07-26 RX ADMIN — PANTOPRAZOLE SODIUM 40 MILLIGRAM(S): 20 TABLET, DELAYED RELEASE ORAL at 06:00

## 2022-07-26 RX ADMIN — SENNA PLUS 2 TABLET(S): 8.6 TABLET ORAL at 22:27

## 2022-07-26 RX ADMIN — CHLORHEXIDINE GLUCONATE 1 APPLICATION(S): 213 SOLUTION TOPICAL at 06:10

## 2022-07-26 RX ADMIN — LACTULOSE 10 GRAM(S): 10 SOLUTION ORAL at 05:59

## 2022-07-26 RX ADMIN — Medication 3 MILLIGRAM(S): at 22:27

## 2022-07-26 RX ADMIN — Medication 1 BOTTLE: at 23:06

## 2022-07-26 RX ADMIN — Medication 100 MICROGRAM(S): at 05:59

## 2022-07-26 NOTE — PROGRESS NOTE ADULT - SUBJECTIVE AND OBJECTIVE BOX
Chief complaint: Patient is a 89y old  Female who presents with a chief complaint of Dyspnea w/ Exertion with found to be volume overloaded with acute GI Bleedings.      Interval history:   - Hgb stable 10.7 today (), 9.8 () --> last transfused 1 PRBC on   - large BM this AM with scant bright red streaks noted  - NPO after midnight for colonoscopy today, possible AVM cautery    Seen an examined at bedside.  Resting comfortably in NAD.  No acute events overnight.  She offers multiple questions and concerns.  All questions answered.    SR w/ PACS ~ 70/bpm with no events noted on monitor.    Review of systems:  10-Point Review of systems is otherwise negative.    Vitals:  Vital Signs Last 24 Hrs  T(C): 36.4 (2022 13:20), Max: 36.4 (2022 13:20)  T(F): 97.6 (2022 13:20), Max: 97.6 (2022 13:20)  HR: 84 (2022 13:20) (72 - 84)  BP: 107/57 (2022 13:20) (95/53 - 133/57)  BP(mean): --  RR: 18 (2022 13:20) (18 - 19)  SpO2: --    I&O's Summary    2022 07:01  -  2022 07:00  --------------------------------------------------------  IN: 1591 mL / OUT: 1000 mL / NET: 591 mL    Daily     Daily Weight in k.6 (2022 05:42)      Physical exam:  General: No apparent distress  HEENT: Anicteric sclera. Moist mucous membranes.   Cardiac:  Ireg Ireg Symmetric radial pulses. Dorsalis pedis pulses palpable.   Respiratory: Normal effort. CTA BL.  Abdomen: Soft, nontender. Audible bowel sounds.   Extremities: Warm with no edema. No cyanosis or clubbing.   Skin: Warm and dry. No rash.   Neurologic: Grossly normal motor function.   Psychiatric: Oriented to person, place, and time.     Data reviewed:  - Telemetry: AF    - ECG (22 @ 08:38)   Diagnosis Line Sinus rhythm withPremature atrial complexes  Left axis deviation  Left ventricular hypertrophy with QRS widening  Abnormal ECG      - TTE (2022 1:09:45 PM)    Summary:   1. Left ventricular ejection fraction, by visual estimation, is 40 to   45%.   2. Normal right atrial size.   3. Mild mitral valve regurgitation.   4. Structurally normal mitral valve, with normal leaflet excursion.   5. Mild aortic regurgitation.   6. Severe aortic valve stenosis.   7. The aortic valve mean gradient is 75.1 mmHg consistent with severe   aortic stenosis.    - Radiology:      - Labs:                        10.7   6.36  )-----------( 158      ( 2022 06:36 )             34.2         142  |  104  |  19  ----------------------------<  80  4.0   |  24  |  0.8    Ca    9.0      2022 06:36  Mg     2.2         TPro  6.4  /  Alb  3.3<L>  /  TBili  0.4  /  DBili  x   /  AST  21  /  ALT  15  /  AlkPhos  128<H>  07-25    LIVER FUNCTIONS - ( 2022 06:44 )  Alb: 3.3 g/dL / Pro: 6.4 g/dL / ALK PHOS: 128 U/L / ALT: 15 U/L / AST: 21 U/L / GGT: x           PT/INR - ( 2022 06:36 )   PT: 11.50 sec;   INR: 1.00 ratio         PTT - ( 2022 06:36 )  PTT:31.4 sec    Lipid Profile in AM (22 @ 06:36)    Cholesterol, Serum: 199 mg/dL    Triglycerides, Serum: 128 mg/dL    HDL Cholesterol, Serum: 65 mg/dL    Non HDL Cholesterol: 134: Patients Atherosclerotic Cardiovascular Disease (ASCVD) Risk  Optimal L    LDL Cholesterol Calculated: 109 mg/dL        Serum Pro-Brain Natriuretic Peptide: 91120 pg/mL ()              Medications:    MEDICATIONS  (STANDING):  aspirin  chewable 81 milliGRAM(s) Oral daily  chlorhexidine 4% Liquid 1 Application(s) Topical <User Schedule>  furosemide    Tablet 20 milliGRAM(s) Oral daily  hydrocortisone hemorrhoidal Suppository 1 Suppository(s) Rectal daily  lactulose Syrup 10 Gram(s) Oral every 8 hours  levothyroxine 100 MICROGram(s) Oral daily  melatonin 3 milliGRAM(s) Oral at bedtime  pantoprazole    Tablet 40 milliGRAM(s) Oral before breakfast  senna 2 Tablet(s) Oral at bedtime    MEDICATIONS  (PRN):  acetaminophen     Tablet .. 650 milliGRAM(s) Oral every 6 hours PRN Mild Pain (1 - 3)  hydrocortisone hemorrhoidal Suppository 1 Suppository(s) Rectal two times a day PRN hemorrhoidal discomfort    Allergies    Cipro (Other)  Levaquin (Rash)  tetracycline (Hives)    Intolerances      Assessment:  Mrs. Silverio is a 88 y/o female with HFpEF, severe AS s/p balloon valvuloplasty , anemia s/p multiple transfusions 2 months ago, MVP, HTN, hyperthyroidism, HCC s/p partial liver resection, transferred from [facility] to [place] for TAVR evaluation by Dr. Mccollum.  Patient found to be anemic most likely d/t GI bleed now s/p 1PRBC transfusion for Hgb < 8/anemia. GI, Dr. Woo consulted at Dr. Mccollum' request.  Capsule study completed.  Dr. Woo will present to [facility] tomorrow to see patient and give recs.  Diuretics remain on hold (x 4 days total) for soft BP (SBP ~ high 80s).        Problems discussed and associated plan:    #HFmrEF   LVEF 40-45%  Stable on PO Diuretics  Cont Lasix 20mg PO Daily  -Completed 5 day course of iron sucrose on  (ferritin-->24)  Strict I+O   Daily weights  Keep K>4 MG >2    #Severe AS s/p recent balloon valvuloplasty  - Possible TAVR with Dr. 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 Transfuse for Hgb <8.0  -S/p PRBC on 7/8 x 1 unit and 7/21 x 1 unit    #Hypotension ( improved )-  (92/50 - 113/56)  -Monitor BP per routine  -Orthostatic readings reviewed  -Avoid IVF resuscitation d/t severe AS  -Monitor for signs of hypovolemic shock      #Hemorrhoids with Constipation  -Continue hemorrhoidal suppository   -Continue Senna 2 tabs HS  -Continue Lactulose 10 TID  -Tylenol 650 mg PO PRN for rectal pain    #Hypothyroidism  - Continue Levothyroxine 100 mcg PO daily  - TSH 4.36 (22)    #GERD  -Continue Pantoprazole 40 mg PO daily    #DVT ppx:  -Sequential Compression Device (SCD)        Please contact me with any questions or concerns at [phone]. Chief complaint: Patient is a 89y old  Female who presents with a chief complaint of Dyspnea w/ Exertion with found to be volume overloaded with acute GI Bleedings.      Interval history:   - Hgb stable 10.7 today (), 9.8 () --> last transfused 1 PRBC on   - large BM this AM with scant bright red streaks noted  - NPO after midnight for colonoscopy today, possible AVM cautery    Seen an examined at bedside.  Resting comfortably in NAD.  No acute events overnight.  She offers multiple questions and concerns.  All questions answered.    SR w/ PACS ~ 70/bpm with no events noted on monitor.    Review of systems:  10-Point Review of systems is otherwise negative.    Vitals:  Vital Signs Last 24 Hrs  T(C): 36.4 (2022 13:20), Max: 36.4 (2022 13:20)  T(F): 97.6 (2022 13:20), Max: 97.6 (2022 13:20)  HR: 84 (2022 13:20) (72 - 84)  BP: 107/57 (2022 13:20) (95/53 - 133/57)  BP(mean): --  RR: 18 (2022 13:20) (18 - 19)  SpO2: --    I&O's Summary    2022 07:01  -  2022 07:00  --------------------------------------------------------  IN: 1591 mL / OUT: 1000 mL / NET: 591 mL    Daily     Daily Weight in k.6 (2022 05:42)      Physical exam:  General: No apparent distress.  HEENT: Anicteric sclera. Moist mucous membranes.   Cardiac:  Ireg Ireg Symmetric radial pulses. Dorsalis pedis pulses palpable.   Respiratory: Normal effort. CTA BL.  Abdomen: Soft, nontender. Audible bowel sounds.   Extremities: Warm with no edema. No cyanosis or clubbing.   Skin: Warm and dry. No rash.   Neurologic: Grossly normal motor function.   Psychiatric: Oriented to person, place, and time.     Data reviewed:  - Telemetry: AF    - ECG (22 @ 08:38)   Diagnosis Line Sinus rhythm withPremature atrial complexes  Left axis deviation  Left ventricular hypertrophy with QRS widening  Abnormal ECG      - TTE (2022 1:09:45 PM)    Summary:   1. Left ventricular ejection fraction, by visual estimation, is 40 to   45%.   2. Normal right atrial size.   3. Mild mitral valve regurgitation.   4. Structurally normal mitral valve, with normal leaflet excursion.   5. Mild aortic regurgitation.   6. Severe aortic valve stenosis.   7. The aortic valve mean gradient is 75.1 mmHg consistent with severe   aortic stenosis.    - Radiology:      - Labs:                        10.7   6.36  )-----------( 158      ( 2022 06:36 )             34.2         142  |  104  |  19  ----------------------------<  80  4.0   |  24  |  0.8    Ca    9.0      2022 06:36  Mg     2.2         TPro  6.4  /  Alb  3.3<L>  /  TBili  0.4  /  DBili  x   /  AST  21  /  ALT  15  /  AlkPhos  128<H>  0725    LIVER FUNCTIONS - ( 2022 06:44 )  Alb: 3.3 g/dL / Pro: 6.4 g/dL / ALK PHOS: 128 U/L / ALT: 15 U/L / AST: 21 U/L / GGT: x           PT/INR - ( 2022 06:36 )   PT: 11.50 sec;   INR: 1.00 ratio         PTT - ( 2022 06:36 )  PTT:31.4 sec    Lipid Profile in AM (22 @ 06:36)    Cholesterol, Serum: 199 mg/dL    Triglycerides, Serum: 128 mg/dL    HDL Cholesterol, Serum: 65 mg/dL    Non HDL Cholesterol: 134:     LDL Cholesterol Calculated: 109 mg/dL      Serum Pro-Brain Natriuretic Peptide: 86035 pg/mL ()      Medications:  MEDICATIONS  (STANDING):  aspirin  chewable 81 milliGRAM(s) Oral daily  chlorhexidine 4% Liquid 1 Application(s) Topical <User Schedule>  furosemide    Tablet 20 milliGRAM(s) Oral daily  hydrocortisone hemorrhoidal Suppository 1 Suppository(s) Rectal daily  lactulose Syrup 10 Gram(s) Oral every 8 hours  levothyroxine 100 MICROGram(s) Oral daily  melatonin 3 milliGRAM(s) Oral at bedtime  pantoprazole    Tablet 40 milliGRAM(s) Oral before breakfast  senna 2 Tablet(s) Oral at bedtime    MEDICATIONS  (PRN):  acetaminophen     Tablet .. 650 milliGRAM(s) Oral every 6 hours PRN Mild Pain (1 - 3)  hydrocortisone hemorrhoidal Suppository 1 Suppository(s) Rectal two times a day PRN hemorrhoidal discomfort    Allergies    Cipro (Other)  Levaquin (Rash)  tetracycline (Hives)    Intolerances      Assessment:  Mrs. Silverio is a 88 y/o female with HFpEF, severe AS s/p balloon valvuloplasty , anemia s/p multiple transfusions 2 months ago, MVP, HTN, hyperthyroidism, HCC s/p partial liver resection, transferred from [facility] to [place] for TAVR evaluation by Dr. Mccollum.  Patient found to be anemic most likely d/t GI bleed now s/p 1PRBC transfusion for Hgb < 8/anemia. GI, Dr. Woo consulted at Dr. Mccollum' request.  Capsule study completed.  Colonoscopy recommended per Dr. Woo.  Diuretics largely remain on hold for soft BP (SBP < 100).  Ultimately, plan is for TAVR.    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 Transfuse for Hgb <8.0  - S/p PRBC on 7/8 x 1 unit and 7/21 x 1 unit    #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%  - Stable on PO Diuretics  - c/w Lasix 20mg PO Daily, daily doses mostly held for SBP < 100  - Strict I+O   - Daily standing weights  - maintain electrolytes, K>4 Mg >2    #Severe AS s/p recent balloon valvuloplasty  - c/w optimization for TAVR  - c/w ASA  - Avoid IVF resuscitation d/t severe AS    #Hypotension (improved)-  (SBP )  - Monitor BP per routine  - Avoid IVF resuscitation d/t severe AS  - Monitor for signs of hypovolemic shock      #Hemorrhoids with Constipation  -Continue hemorrhoidal suppository   -Continue Senna 2 tabs HS  -Continue Lactulose 10 TID  -Tylenol 650 mg PO PRN for rectal pain    #Hypothyroidism  - Continue Levothyroxine 100 mcg PO daily  - TSH 4.36 (22)    #GERD  -Continue Pantoprazole 40 mg PO daily    #DVT ppx:  -Sequential Compression Device (SCD)        Please contact me with any questions or concerns at [phone].

## 2022-07-27 ENCOUNTER — TRANSCRIPTION ENCOUNTER (OUTPATIENT)
Age: 87
End: 2022-07-27

## 2022-07-27 ENCOUNTER — RESULT REVIEW (OUTPATIENT)
Age: 87
End: 2022-07-27

## 2022-07-27 LAB
ANION GAP SERPL CALC-SCNC: 7 MMOL/L — SIGNIFICANT CHANGE UP (ref 7–14)
BUN SERPL-MCNC: 15 MG/DL — SIGNIFICANT CHANGE UP (ref 10–20)
CALCIUM SERPL-MCNC: 8.9 MG/DL — SIGNIFICANT CHANGE UP (ref 8.5–10.1)
CHLORIDE SERPL-SCNC: 108 MMOL/L — SIGNIFICANT CHANGE UP (ref 98–110)
CO2 SERPL-SCNC: 26 MMOL/L — SIGNIFICANT CHANGE UP (ref 17–32)
CREAT SERPL-MCNC: 0.7 MG/DL — SIGNIFICANT CHANGE UP (ref 0.7–1.5)
EGFR: 83 ML/MIN/1.73M2 — SIGNIFICANT CHANGE UP
GLUCOSE SERPL-MCNC: 69 MG/DL — LOW (ref 70–99)
HCT VFR BLD CALC: 29.7 % — LOW (ref 37–47)
HGB BLD-MCNC: 9.2 G/DL — LOW (ref 12–16)
LIDOCAIN SERPL-MCNC: 54.1 NMOL/L — SIGNIFICANT CHANGE UP
MAGNESIUM SERPL-MCNC: 2.5 MG/DL — HIGH (ref 1.8–2.4)
MCHC RBC-ENTMCNC: 28.6 PG — SIGNIFICANT CHANGE UP (ref 27–31)
MCHC RBC-ENTMCNC: 31 G/DL — LOW (ref 32–37)
MCV RBC AUTO: 92.2 FL — SIGNIFICANT CHANGE UP (ref 81–99)
NRBC # BLD: 0 /100 WBCS — SIGNIFICANT CHANGE UP (ref 0–0)
PLATELET # BLD AUTO: 139 K/UL — SIGNIFICANT CHANGE UP (ref 130–400)
POTASSIUM SERPL-MCNC: 4.7 MMOL/L — SIGNIFICANT CHANGE UP (ref 3.5–5)
POTASSIUM SERPL-SCNC: 4.7 MMOL/L — SIGNIFICANT CHANGE UP (ref 3.5–5)
RBC # BLD: 3.22 M/UL — LOW (ref 4.2–5.4)
RBC # FLD: 18.6 % — HIGH (ref 11.5–14.5)
SARS-COV-2 RNA SPEC QL NAA+PROBE: SIGNIFICANT CHANGE UP
SODIUM SERPL-SCNC: 141 MMOL/L — SIGNIFICANT CHANGE UP (ref 135–146)
WBC # BLD: 5.16 K/UL — SIGNIFICANT CHANGE UP (ref 4.8–10.8)
WBC # FLD AUTO: 5.16 K/UL — SIGNIFICANT CHANGE UP (ref 4.8–10.8)

## 2022-07-27 PROCEDURE — 99233 SBSQ HOSP IP/OBS HIGH 50: CPT

## 2022-07-27 PROCEDURE — 45382 COLONOSCOPY W/CONTROL BLEED: CPT | Mod: 59

## 2022-07-27 PROCEDURE — 88305 TISSUE EXAM BY PATHOLOGIST: CPT | Mod: 26

## 2022-07-27 PROCEDURE — 45385 COLONOSCOPY W/LESION REMOVAL: CPT

## 2022-07-27 RX ADMIN — LACTULOSE 10 GRAM(S): 10 SOLUTION ORAL at 05:31

## 2022-07-27 RX ADMIN — Medication 3 MILLIGRAM(S): at 21:27

## 2022-07-27 RX ADMIN — Medication 20 MILLIGRAM(S): at 05:30

## 2022-07-27 RX ADMIN — Medication 81 MILLIGRAM(S): at 14:32

## 2022-07-27 RX ADMIN — LACTULOSE 10 GRAM(S): 10 SOLUTION ORAL at 21:27

## 2022-07-27 RX ADMIN — SENNA PLUS 2 TABLET(S): 8.6 TABLET ORAL at 21:27

## 2022-07-27 RX ADMIN — CHLORHEXIDINE GLUCONATE 1 APPLICATION(S): 213 SOLUTION TOPICAL at 05:37

## 2022-07-27 RX ADMIN — Medication 100 MICROGRAM(S): at 05:31

## 2022-07-27 RX ADMIN — PANTOPRAZOLE SODIUM 40 MILLIGRAM(S): 20 TABLET, DELAYED RELEASE ORAL at 05:30

## 2022-07-27 NOTE — CHART NOTE - NSCHARTNOTEFT_GEN_A_CORE
s/p colonoscopy:    Impression:  	Polyp (5 mm) in the sigmoid colon. (Polypectomy).  	Severe diverticulosis of the the left side of the colon.  	Internal and external hemorrhoids.  	1 cm non-bleeding AVM noted in cecum. APCs applied for the purpose of hemostasis. .  	5 mm non-bleeding AVM noted in cecum. APCs applied for the purpose of hemostasis. .  	No AVM noted in terminal ileum.  	1 cm non-bleeding AVM noted in transverse colon. APCs applied for the purpose of hemostasis. .  	No other lesion or mass noted. Small AVMs or polyps can be missed due to fair prep in right colon.    Rec:  High fiber diet    Await pathology results   Repeat colonoscopy as needed    Follow up with GI office

## 2022-07-27 NOTE — PROGRESS NOTE ADULT - SUBJECTIVE AND OBJECTIVE BOX
Chief complaint: Patient is a 89y old  Female who presents with a chief complaint of Dyspnea w/ Exertion with found to be volume overloaded with acute GI Bleedings.      Interval history:   - Hgb stable 9.2, 10.7 (), 9.8 () --> last transfused 1 PRBC on   - NPO after midnight for colonoscopy today, possible AVM cautery    Seen an examined at bedside.  Resting comfortably in bed, NAD.  No acute events overnight.  Patient offers she is upset her colonoscopy was postponed yesterday.  Family and patient spoken to at length. No acute events overnight.    SR w/ PACS, no events noted on monitor    Review of systems:  10-Point Review of systems is otherwise negative.    Vitals:  Vital Signs Last 24 Hrs  T(C): 36.6 (2022 13:03), Max: 37 (2022 21:10)  T(F): 97.9 (2022 13:03), Max: 98.6 (2022 21:10)  HR: 76 (2022 13:18) (71 - 86)  BP: 115/56 (2022 13:18) (100/51 - 136/69)  BP(mean): --  RR: 18 (2022 13:18) (18 - 18)  SpO2: 98% (2022 13:18) (97% - 98%)    Parameters below as of 2022 13:18  Patient On (Oxygen Delivery Method): room air    I&O's Summary    I&O's Summary    2022 07:01  -  2022 16:57  --------------------------------------------------------  IN: 0 mL / OUT: 500 mL / NET: -500 mL        Daily     Daily Weight in k.6 (2022 05:42)    Daily Weight in k (2022 05:38)      Physical exam:  General: No apparent distress.  HEENT: Anicteric sclera. Moist mucous membranes.   Cardiac:  Ireg, Ireg Symmetric radial pulses. Dorsalis pedis pulses palpable.   Respiratory: Normal effort. CTA BL.  Abdomen: Soft, nontender. Audible bowel sounds.   Extremities: Warm with no edema. No cyanosis or clubbing.   Skin: Warm and dry. No rash.   Neurologic: Grossly normal motor function.   Psychiatric: Oriented to person, place, and time.     Data reviewed:  - Telemetry: AF    - ECG (22 @ 08:38)   Diagnosis Line Sinus rhythm withPremature atrial complexes  Left axis deviation  Left ventricular hypertrophy with QRS widening  Abnormal ECG      - TTE (2022 1:09:45 PM)    Summary:   1. Left ventricular ejection fraction, by visual estimation, is 40 to   45%.   2. Normal right atrial size.   3. Mild mitral valve regurgitation.   4. Structurally normal mitral valve, with normal leaflet excursion.   5. Mild aortic regurgitation.   6. Severe aortic valve stenosis.   7. The aortic valve mean gradient is 75.1 mmHg consistent with severe   aortic stenosis.    - Radiology:    - Labs:                        9.2    5.16  )-----------( 139      ( 2022 05:42 )             29.7         141  |  108  |  15  ----------------------------<  69<L>  4.7   |  26  |  0.7    Ca    8.9      2022 05:42  Mg     2.5             PT/INR - ( 2022 06:36 )   PT: 11.50 sec;   INR: 1.00 ratio         PTT - ( 2022 06:36 )  PTT:31.4 sec    Lipid Profile in AM (22 @ 06:36)    Cholesterol, Serum: 199 mg/dL    Triglycerides, Serum: 128 mg/dL    HDL Cholesterol, Serum: 65 mg/dL    Non HDL Cholesterol: 134:     LDL Cholesterol Calculated: 109 mg/dL      Serum Pro-Brain Natriuretic Peptide: 98544 pg/mL ()      Medications:  MEDICATIONS  (STANDING):  aspirin  chewable 81 milliGRAM(s) Oral daily  chlorhexidine 4% Liquid 1 Application(s) Topical <User Schedule>  furosemide    Tablet 20 milliGRAM(s) Oral daily  hydrocortisone hemorrhoidal Suppository 1 Suppository(s) Rectal daily  lactulose Syrup 10 Gram(s) Oral every 8 hours  levothyroxine 100 MICROGram(s) Oral daily  melatonin 3 milliGRAM(s) Oral at bedtime  pantoprazole    Tablet 40 milliGRAM(s) Oral before breakfast  senna 2 Tablet(s) Oral at bedtime    MEDICATIONS  (PRN):  acetaminophen     Tablet .. 650 milliGRAM(s) Oral every 6 hours PRN Mild Pain (1 - 3)  hydrocortisone hemorrhoidal Suppository 1 Suppository(s) Rectal two times a day PRN hemorrhoidal discomfort      Allergies    Cipro (Other)  Levaquin (Rash)  tetracycline (Hives)          Assessment:  Mrs. Silverio is a 89 y/o female with HFpEF, severe AS s/p balloon valvuloplasty , anemia s/p multiple transfusions 2 months ago, MVP, HTN, hyperthyroidism, HCC s/p partial liver resection, transferred from [facility] to [place] for TAVR evaluation by Dr. Mccollum.  Patient found to be anemic most likely d/t GI bleed now s/p 1PRBC transfusion for Hgb < 8/anemia. GI, Dr. Woo consulted at Dr. Mccollum' request.  Capsule study completed.  Colonoscopy planned for today.  Diuretics largely remain on hold for soft BP (SBP < 100).  Ultimately, plan is for TAVR.    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 Transfuse for Hgb <8.0  - S/p PRBC on 7/8 x 1 unit and 7/21 x 1 unit    #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%  - Stable on PO Diuretics  - c/w Lasix 20mg PO Daily, daily doses mostly held for SBP < 100  - Strict I+O   - Daily standing weights  - maintain electrolytes, K>4 Mg >2    #Severe AS s/p recent balloon valvuloplasty  - c/w optimization for TAVR  - c/w ASA  - Avoid IVF resuscitation d/t severe AS    #Hypotension (improved, -136 in last 24 hr)  - Monitor BP per routine  - Avoid IVF resuscitation d/t severe AS  - Monitor for signs of hypovolemic shock      #Hemorrhoids with Constipation  -Continue hemorrhoidal suppository   -Continue Senna 2 tabs HS  -Continue Lactulose 10 TID  -Tylenol 650 mg PO PRN for rectal pain    #Hypothyroidism  - Continue Levothyroxine 100 mcg PO daily  - TSH 4.36 (22)    #GERD  -Continue Pantoprazole 40 mg PO daily    #DVT ppx:  Sequential Compression Device (SCD)  #GI proph:  Protonix        Please contact me with any questions or concerns at [phone].

## 2022-07-28 LAB
ANION GAP SERPL CALC-SCNC: 12 MMOL/L — SIGNIFICANT CHANGE UP (ref 7–14)
BUN SERPL-MCNC: 14 MG/DL — SIGNIFICANT CHANGE UP (ref 10–20)
CALCIUM SERPL-MCNC: 9 MG/DL — SIGNIFICANT CHANGE UP (ref 8.5–10.1)
CHLORIDE SERPL-SCNC: 105 MMOL/L — SIGNIFICANT CHANGE UP (ref 98–110)
CO2 SERPL-SCNC: 25 MMOL/L — SIGNIFICANT CHANGE UP (ref 17–32)
CREAT SERPL-MCNC: 0.7 MG/DL — SIGNIFICANT CHANGE UP (ref 0.7–1.5)
EGFR: 83 ML/MIN/1.73M2 — SIGNIFICANT CHANGE UP
GLUCOSE SERPL-MCNC: 86 MG/DL — SIGNIFICANT CHANGE UP (ref 70–99)
HCT VFR BLD CALC: 33.1 % — LOW (ref 37–47)
HGB BLD-MCNC: 10.5 G/DL — LOW (ref 12–16)
MAGNESIUM SERPL-MCNC: 2.3 MG/DL — SIGNIFICANT CHANGE UP (ref 1.8–2.4)
MCHC RBC-ENTMCNC: 29.4 PG — SIGNIFICANT CHANGE UP (ref 27–31)
MCHC RBC-ENTMCNC: 31.7 G/DL — LOW (ref 32–37)
MCV RBC AUTO: 92.7 FL — SIGNIFICANT CHANGE UP (ref 81–99)
NRBC # BLD: 0 /100 WBCS — SIGNIFICANT CHANGE UP (ref 0–0)
PLATELET # BLD AUTO: 160 K/UL — SIGNIFICANT CHANGE UP (ref 130–400)
POTASSIUM SERPL-MCNC: 3.7 MMOL/L — SIGNIFICANT CHANGE UP (ref 3.5–5)
POTASSIUM SERPL-SCNC: 3.7 MMOL/L — SIGNIFICANT CHANGE UP (ref 3.5–5)
RBC # BLD: 3.57 M/UL — LOW (ref 4.2–5.4)
RBC # FLD: 18.7 % — HIGH (ref 11.5–14.5)
SODIUM SERPL-SCNC: 142 MMOL/L — SIGNIFICANT CHANGE UP (ref 135–146)
WBC # BLD: 7.56 K/UL — SIGNIFICANT CHANGE UP (ref 4.8–10.8)
WBC # FLD AUTO: 7.56 K/UL — SIGNIFICANT CHANGE UP (ref 4.8–10.8)

## 2022-07-28 PROCEDURE — 99233 SBSQ HOSP IP/OBS HIGH 50: CPT

## 2022-07-28 PROCEDURE — 43239 EGD BIOPSY SINGLE/MULTIPLE: CPT

## 2022-07-28 RX ORDER — FUROSEMIDE 40 MG
20 TABLET ORAL EVERY 24 HOURS
Refills: 0 | Status: DISCONTINUED | OUTPATIENT
Start: 2022-07-30 | End: 2022-07-30

## 2022-07-28 RX ADMIN — Medication 81 MILLIGRAM(S): at 11:23

## 2022-07-28 RX ADMIN — Medication 3 MILLIGRAM(S): at 21:54

## 2022-07-28 RX ADMIN — Medication 20 MILLIGRAM(S): at 05:52

## 2022-07-28 RX ADMIN — PANTOPRAZOLE SODIUM 40 MILLIGRAM(S): 20 TABLET, DELAYED RELEASE ORAL at 05:47

## 2022-07-28 RX ADMIN — Medication 100 MICROGRAM(S): at 05:47

## 2022-07-28 RX ADMIN — LACTULOSE 10 GRAM(S): 10 SOLUTION ORAL at 05:47

## 2022-07-28 NOTE — PROGRESS NOTE ADULT - SUBJECTIVE AND OBJECTIVE BOX
Chief complaint: Patient is a 89y old  Female who presents with a chief complaint of Dyspnea w/ Exertion with found to be volume overloaded with acute GI Bleedings.      Interval history:   - Hgb stable 9.2, 10.7 (), 9.8 () --> last transfused 1 PRBC on   - NPO after midnight for colonoscopy today, possible AVM cautery    Seen an examined at bedside.  Resting comfortably in bed, NAD.  No acute events overnight.  Patient offers she is upset her colonoscopy was postponed yesterday.  Family and patient spoken to at length. No acute events overnight.    SR w/ PACS, no events noted on monitor    Review of systems:  10-Point Review of systems is otherwise negative.    Vitals:  Vital Signs Last 24 Hrs  T(C): 36.6 (2022 13:03), Max: 37 (2022 21:10)  T(F): 97.9 (2022 13:03), Max: 98.6 (2022 21:10)  HR: 76 (2022 13:18) (71 - 86)  BP: 115/56 (2022 13:18) (100/51 - 136/69)  BP(mean): --  RR: 18 (2022 13:18) (18 - 18)  SpO2: 98% (2022 13:18) (97% - 98%)    Parameters below as of 2022 13:18  Patient On (Oxygen Delivery Method): room air    I&O's Summary    I&O's Summary    2022 07:01  -  2022 16:57  --------------------------------------------------------  IN: 0 mL / OUT: 500 mL / NET: -500 mL        Daily     Daily Weight in k.6 (2022 05:42)    Daily Weight in k (2022 05:38)      Physical exam:  General: No apparent distress.  HEENT: Anicteric sclera. Moist mucous membranes.   Cardiac:  Ireg, Ireg Symmetric radial pulses. Dorsalis pedis pulses palpable.   Respiratory: Normal effort. CTA BL.  Abdomen: Soft, nontender. Audible bowel sounds.   Extremities: Warm with no edema. No cyanosis or clubbing.   Skin: Warm and dry. No rash.   Neurologic: Grossly normal motor function.   Psychiatric: Oriented to person, place, and time.     Data reviewed:  - Telemetry: AF    - ECG (22 @ 08:38)   Diagnosis Line Sinus rhythm withPremature atrial complexes  Left axis deviation  Left ventricular hypertrophy with QRS widening  Abnormal ECG      - TTE (2022 1:09:45 PM)    Summary:   1. Left ventricular ejection fraction, by visual estimation, is 40 to   45%.   2. Normal right atrial size.   3. Mild mitral valve regurgitation.   4. Structurally normal mitral valve, with normal leaflet excursion.   5. Mild aortic regurgitation.   6. Severe aortic valve stenosis.   7. The aortic valve mean gradient is 75.1 mmHg consistent with severe   aortic stenosis.    - Radiology:    - Labs:                        9.2    5.16  )-----------( 139      ( 2022 05:42 )             29.7         141  |  108  |  15  ----------------------------<  69<L>  4.7   |  26  |  0.7    Ca    8.9      2022 05:42  Mg     2.5             PT/INR - ( 2022 06:36 )   PT: 11.50 sec;   INR: 1.00 ratio         PTT - ( 2022 06:36 )  PTT:31.4 sec    Lipid Profile in AM (22 @ 06:36)    Cholesterol, Serum: 199 mg/dL    Triglycerides, Serum: 128 mg/dL    HDL Cholesterol, Serum: 65 mg/dL    Non HDL Cholesterol: 134:     LDL Cholesterol Calculated: 109 mg/dL      Serum Pro-Brain Natriuretic Peptide: 43865 pg/mL ()      Medications:  MEDICATIONS  (STANDING):  aspirin  chewable 81 milliGRAM(s) Oral daily  chlorhexidine 4% Liquid 1 Application(s) Topical <User Schedule>  furosemide    Tablet 20 milliGRAM(s) Oral daily  hydrocortisone hemorrhoidal Suppository 1 Suppository(s) Rectal daily  lactulose Syrup 10 Gram(s) Oral every 8 hours  levothyroxine 100 MICROGram(s) Oral daily  melatonin 3 milliGRAM(s) Oral at bedtime  pantoprazole    Tablet 40 milliGRAM(s) Oral before breakfast  senna 2 Tablet(s) Oral at bedtime    MEDICATIONS  (PRN):  acetaminophen     Tablet .. 650 milliGRAM(s) Oral every 6 hours PRN Mild Pain (1 - 3)  hydrocortisone hemorrhoidal Suppository 1 Suppository(s) Rectal two times a day PRN hemorrhoidal discomfort      Allergies    Cipro (Other)  Levaquin (Rash)  tetracycline (Hives)          Assessment:  Mrs. Silverio is a 88 y/o female with HFpEF, severe AS s/p balloon valvuloplasty , anemia s/p multiple transfusions 2 months ago, MVP, HTN, hyperthyroidism, HCC s/p partial liver resection, transferred from [facility] to [place] for TAVR evaluation by Dr. Mccollum.  Patient found to be anemic most likely d/t GI bleed now s/p 1PRBC transfusion for Hgb < 8/anemia. GI, Dr. Woo consulted at Dr. Mccollum' request.  Capsule study completed.  Colonoscopy planned for today.  Diuretics largely remain on hold for soft BP (SBP < 100).  Ultimately, plan is for TAVR.    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 Transfuse for Hgb <8.0  - S/p PRBC on 7/8 x 1 unit and 7/21 x 1 unit    #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%  - Stable on PO Diuretics  - c/w Lasix 20mg PO Daily, daily doses mostly held for SBP < 100  - Strict I+O   - Daily standing weights  - maintain electrolytes, K>4 Mg >2    #Severe AS s/p recent balloon valvuloplasty  - c/w optimization for TAVR  - c/w ASA  - Avoid IVF resuscitation d/t severe AS    #Hypotension (improved, -136 in last 24 hr)  - Monitor BP per routine  - Avoid IVF resuscitation d/t severe AS  - Monitor for signs of hypovolemic shock      #Hemorrhoids with Constipation  -Continue hemorrhoidal suppository   -Continue Senna 2 tabs HS  -Continue Lactulose 10 TID  -Tylenol 650 mg PO PRN for rectal pain    #Hypothyroidism  - Continue Levothyroxine 100 mcg PO daily  - TSH 4.36 (22)    #GERD  -Continue Pantoprazole 40 mg PO daily    #DVT ppx:  Sequential Compression Device (SCD)  #GI proph:  Protonix        Please contact me with any questions or concerns at [phone]. Chief complaint: Patient is a 89y old  Female who presents with a chief complaint of dyspnea on exertion in the setting of acute GI bleeding and severe aortic stenosis.    Interval history:  - Underwent colonoscopy yesterday with successful AVM intervention. Hgb is stable this morning.  - Seen an examined at bedside.  Resting comfortably in bed, NAD.  No acute events overnight. Very anxious.  - Denies dyspnea, chest discomfort, or other complaints at this time.    Review of systems: A complete 10-point review of systems was obtained and is negative except as stated in the interval history.    Vitals:  T(F): 97.8, Max: 97.8 (07-28 @ 12:43)  HR: 88 (77 - 88)  BP: 132/61 (91/53 - 132/61)  RR: 18 (18 - 18)  SpO2: --    Ins & outs:     07-25 @ 07:01  -  07-26 @ 07:00  --------------------------------------------------------  IN: 1591 mL / OUT: 1000 mL / NET: 591 mL    07-27 @ 07:01 - 07-28 @ 07:00  --------------------------------------------------------  IN: 615 mL / OUT: 1050 mL / NET: -435 mL    07-28 @ 07:01 - 07-28 @ 14:50  --------------------------------------------------------  IN: 540 mL / OUT: 300 mL / NET: 240 mL      Weight trend:  Weight (kg): 46.3 (07-27)    Physical exam:  General: No apparent distress, frail, sitting at bedside  HEENT: Anicteric sclera. Moist mucous membranes. No JVD  Cardiac: Regular rate and rhythm. No murmurs, rubs, or gallops.   Vascular: Symmetric radial pulses. Dorsalis pedis pulses palpable.   Respiratory: Normal effort. Bibasilar crackles. Clear to ascultation.   Abdomen: Soft, nontender. Audible bowel sounds.   Extremities: Warm with no edema. No cyanosis or clubbing.   Skin: Warm and dry. No rash.   Neurologic: Grossly normal motor function, anxious   Psychiatric: Oriented to person, place, and time.     Data reviewed:  - Telemetry: AF  - ECG (07.13.22 @ 08:38)   Diagnosis Line Sinus rhythm withPremature atrial complexes  Left axis deviation  Left ventricular hypertrophy with QRS widening  Abnormal ECG  - TTE (7/13/2022 1:09:45 PM)  Summary:   1. Left ventricular ejection fraction, by visual estimation, is 40 to   45%.   2. Normal right atrial size.   3. Mild mitral valve regurgitation.   4. Structurally normal mitral valve, with normal leaflet excursion.   5. Mild aortic regurgitation.   6. Severe aortic valve stenosis.   7. The aortic valve mean gradient is 75.1 mmHg consistent with severe   aortic stenosis.    - Labs:                        10.5   7.56  )-----------( 160      ( 28 Jul 2022 08:07 )             33.1     07-28    142  |  105  |  14  ----------------------------<  86  3.7   |  25  |  0.7    Ca    9.0      28 Jul 2022 08:07  Mg     2.3     07-28    Serum Pro-Brain Natriuretic Peptide: 32744 pg/mL (07-08)    Triglycerides, Serum: 128 mg/dL (07-26-22 @ 06:36)  LDL Cholesterol Calculated: 109 mg/dL (07-26-22 @ 06:36)    Medications:  aspirin  chewable 81 milliGRAM(s) Oral daily  chlorhexidine 4% Liquid 1 Application(s) Topical <User Schedule>  hydrocortisone hemorrhoidal Suppository 1 Suppository(s) Rectal daily  lactulose Syrup 10 Gram(s) Oral every 8 hours  levothyroxine 100 MICROGram(s) Oral daily  melatonin 3 milliGRAM(s) Oral at bedtime  pantoprazole    Tablet 40 milliGRAM(s) Oral before breakfast  senna 2 Tablet(s) Oral at bedtime    Allergies  Cipro (Other)  Levaquin (Rash)  tetracycline (Hives)    Assessment:  Mrs. Silverio is a 89 y/o female with HFpEF, severe AS s/p balloon valvuloplasty 2021, anemia s/p multiple transfusions 2 months ago, MVP, HTN, hyperthyroidism, HCC s/p partial liver resection, transferred from [facility] to [place] for TAVR evaluation by Dr. Mccollum.  Patient found to be anemic most likely d/t GI bleed now s/p 1PRBC transfusion for Hgb < 8/anemia. GI, Dr. Woo consulted at Dr. Mccollum' request.  Capsule study completed.  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 Diuretics largely remain on hold for soft BP (SBP < 100).  Ultimately, plan is for TAVR pending conditioning.    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  - Trend CBC Daily. 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%  - Stable on PO Diuretics  - c/w Lasix 20mg PO Daily, daily doses mostly held for SBP < 100  - Strict I+O   - Daily standing weights  - maintain electrolytes, K>4 Mg >2    #Severe AS, mean gradient ~75mmHg s/p recent balloon valvuloplasty  - c/w optimization for TAVR  - c/w ASA  - Avoid IVF resuscitation d/t severe AS    #Hypotension (improved, -136 in last 24 hr)  - Monitor BP per routine  - Avoid IVF resuscitation d/t severe AS  - Monitor for signs of hypovolemic shock     #Hemorrhoids with Constipation  -Continue hemorrhoidal suppository   -Continue Senna 2 tabs HS  -Continue Lactulose 10 TID  -Tylenol 650 mg PO PRN for rectal pain    #Hypothyroidism  - Continue Levothyroxine 100 mcg PO daily  - TSH 4.36 (6/19/22)    #GERD  -Continue Pantoprazole 40 mg PO daily    #DVT ppx:  Sequential Compression Device (SCD)  #GI ppx:  Protonix  Code Status: Full Code    Dispo: likely to subacute rehab with plans for TAVR as outpatient when patient's conditioning improves.    Please contact me with any questions or concerns at [phone].

## 2022-07-28 NOTE — PROGRESS NOTE ADULT - SUBJECTIVE AND OBJECTIVE BOX
Patient is a 89-year-old female with a past medical history significant for CHF ,severe AAS, MVP, hypertension, HCC, hypothyroidism, GERD who presents with shortness of breath.  Patient was seen at [facility] whom deferred her work up to her outpatient GI Physician. 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 Patient s/p Colonoscopy with APC of AVM. Blood counts doing well, tolerated procedure.       PAST MEDICAL & SURGICAL HISTORY:  Mitral valve prolapse  Enlarged heart  Murmur  Hyperthyroidism  Arthritis  HTN (hypertension)  Diverticulosis  Hiatal hernia  Liver cancer s/p resection and s/p chemo and radiation  Aortic stenosis s/p ballon aortic valuloplasty 5/25/21        MEDICATIONS  (STANDING):  aspirin  chewable 81 milliGRAM(s) Oral daily  chlorhexidine 4% Liquid 1 Application(s) Topical <User Schedule>  furosemide    Tablet 20 milliGRAM(s) Oral daily  iron sucrose IVPB 200 milliGRAM(s) IV Intermittent every 24 hours  lactulose Syrup 10 Gram(s) Oral every 8 hours  levothyroxine 100 MICROGram(s) Oral daily  pantoprazole    Tablet 40 milliGRAM(s) Oral before breakfast  senna 2 Tablet(s) Oral at bedtime    MEDICATIONS  (PRN):  hydrocortisone hemorrhoidal Suppository 1 Suppository(s) Rectal two times a day PRN hemorrhoidal discomfort      Allergies  Cipro (Other)  Levaquin (Rash)  tetracycline (Hives)    Review of Systems  General:  Denies Fatigue, Denies Fever, Denies Weakness ,Denies Weight Loss   HEENT: Denies Trouble Swallowing ,Denies  Sore Throat , Denies Change in hearing/vision/speech ,Denies Dizziness    Cardio:See HPI  Respiratory: See HPI  Abdomen: See detailed HPI  Neuro: Denies Headache Denies Dizziness, Denies Paresthesias  MSK: Denies pain in Bones/Joints/Muscles   Psych: Patient denies depression, denies suicidal or homicidal ideations  Integ: Patient Denies rash, or new skin lesions       Vital Signs Last 24 Hrs  T(C): 36.3 (28 Jul 2022 05:02), Max: 36.6 (27 Jul 2022 13:03)  T(F): 97.4 (28 Jul 2022 05:02), Max: 97.9 (27 Jul 2022 13:03)  HR: 77 (28 Jul 2022 09:33) (71 - 86)  BP: 91/53 (28 Jul 2022 09:33) (91/53 - 136/69)  RR: 18 (28 Jul 2022 05:02) (18 - 18)  SpO2: 98% (27 Jul 2022 13:18) (97% - 98%)    Parameters below as of 27 Jul 2022 13:18  Patient On (Oxygen Delivery Method): room air        Physical Exam  Gen: NAD  HEENT: NC/AT, Mucosal Membranes  Cardio: S1/S2 No S3/S4, Regular  Resp: CTA B/L  Abdomen: Soft, ND/NT  Neuro: AAOx3, Cranial Nerve II-XII intact   Extremities: FROM x 4      Labs:                        10.5   7.56  )-----------( 160      ( 28 Jul 2022 08:07 )             33.1     07-28    142  |  105  |  14  ----------------------------<  86  3.7   |  25  |  0.7    Ca    9.0      28 Jul 2022 08:07  Mg     2.3     07-28

## 2022-07-28 NOTE — PROGRESS NOTE ADULT - ASSESSMENT
Patient is a 89-year-old female with a past medical history significant for CHF ,severe AAS, MVP, hypertension, HCC, hypothyroidism, GERD who presents with shortness of breath.  Patient was seen at [facility] whom deferred her work up to her outpatient GI Physician. 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 Patient s/p Colonoscopy with APC of AVM. Blood counts doing well, tolerated procedure. Discussed procedure with kathrin Silverio    Melva. 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

## 2022-07-28 NOTE — PROGRESS NOTE ADULT - SUBJECTIVE AND OBJECTIVE BOX
OPERATIVE PROCEDURE(s):              Being evaluated for TAVR                    89yFemale  SURGEON(s): NATHALY Rodriguez / Chun  SUBJECTIVE ASSESSMENT: rest in chair no dyspnea, states she is unable to walk    Vital Signs Last 24 Hrs  T(F): 97.8 (28 Jul 2022 12:43), Max: 97.8 (28 Jul 2022 12:43)  HR: 88 (28 Jul 2022 12:43) (77 - 88)  BP: 132/61 (28 Jul 2022 12:43) (91/53 - 132/61)  RR: 18 (28 Jul 2022 12:43) (18 - 18)  I&O's Detail    27 Jul 2022 07:01  -  28 Jul 2022 07:00  --------------------------------------------------------  IN:    Oral Fluid: 615 mL  Total IN: 615 mL    OUT:    Voided (mL): 1050 mL  Total OUT: 1050 mL    Net:   I&O's Detail    27 Jul 2022 07:01  -  28 Jul 2022 07:00  --------------------------------------------------------  Total NET: -435 mL    CAPILLARY BLOOD GLUCOSE    Physical Exam:  General: NAD; A&Ox3  Cardiac: S1/S2, RRR, + murmur, no rubs  Lungs: unlabored respirations, b/l bs essentially clear  Abdomen: Soft/NT/protuberant  Extremities: No edema b/l lower extremities        LABS:                        10.5<L>  7.56  )-----------( 160      ( 28 Jul 2022 08:07 )             33.1<L>                        9.2<L>  5.16  )-----------( 139      ( 27 Jul 2022 05:42 )             29.7<L>    07-28    142  |  105  |  14  ----------------------------<  86  3.7   |  25  |  0.7  07-27    141  |  108  |  15  ----------------------------<  69<L>  4.7   |  26  |  0.7    Ca    9.0      28 Jul 2022 08:07  Mg     2.3     07-28    < from: Xray Chest 1 View- PORTABLE-Routine (Xray Chest 1 View- PORTABLE-Routine in AM.) (07.15.22 @ 06:22) >  Stable left lower lobe opacity/effusion.    < end of copied text >  < from: TTE Echo Complete w/o Contrast w/ Doppler (07.13.22 @ 13:09) >   1. Left ventricular ejection fraction, by visual estimation, is 40 to   45%.   2. Normal right atrial size.   3. Mild mitral valve regurgitation.   4. Structurally normal mitral valve, with normal leaflet excursion.   5. Mild aortic regurgitation.   6. Severe aortic valve stenosis.   7. The aortic valve mean gradient is 75.1 mmHg consistent with severe   aortic stenosis.    MEDICATIONS  (STANDING):  aspirin  chewable 81 milliGRAM(s) Oral daily  chlorhexidine 4% Liquid 1 Application(s) Topical <User Schedule>  hydrocortisone hemorrhoidal Suppository 1 Suppository(s) Rectal daily  lactulose Syrup 10 Gram(s) Oral every 8 hours  levothyroxine 100 MICROGram(s) Oral daily  melatonin 3 milliGRAM(s) Oral at bedtime  pantoprazole    Tablet 40 milliGRAM(s) Oral before breakfast  senna 2 Tablet(s) Oral at bedtime    MEDICATIONS  (PRN):  acetaminophen     Tablet .. 650 milliGRAM(s) Oral every 6 hours PRN Mild Pain (1 - 3)  hydrocortisone hemorrhoidal Suppository 1 Suppository(s) Rectal two times a day PRN hemorrhoidal discomfort    Allergies    Cipro (Other)  Levaquin (Rash)  tetracycline (Hives)    Intolerances    Ambulation/Activity Status: ambulate with assistance    Assessment/Plan:  89y Female with severe AS being evaluated for TAVR. Currently unable to ambulate, HD #20 with significant deconditioning.  Case discussed with Dr. Rodriguez who was in contact with Dr. Mccollum  Last Structural heart CT evaluation > 1 year ago. Patient needs repeat structural heart CTA of chest abdomen and pelvis.   Discussed with Dr. Harrison radiology and will schedule for AM tomorrow, discussed with cardiology team  Patient needs intensive PT prior to TAVR due to deconditioning as well as recovery from recent GIB  Current plan is for TAVR after rehab / PT at a later date  Continue current care as per primary team  GI note appreciated  CTS will follow    Social Service Disposition:  to be determined

## 2022-07-29 LAB
ANION GAP SERPL CALC-SCNC: 8 MMOL/L — SIGNIFICANT CHANGE UP (ref 7–14)
BUN SERPL-MCNC: 13 MG/DL — SIGNIFICANT CHANGE UP (ref 10–20)
CALCIUM SERPL-MCNC: 8.7 MG/DL — SIGNIFICANT CHANGE UP (ref 8.5–10.1)
CHLORIDE SERPL-SCNC: 103 MMOL/L — SIGNIFICANT CHANGE UP (ref 98–110)
CO2 SERPL-SCNC: 25 MMOL/L — SIGNIFICANT CHANGE UP (ref 17–32)
CREAT SERPL-MCNC: 0.7 MG/DL — SIGNIFICANT CHANGE UP (ref 0.7–1.5)
EGFR: 83 ML/MIN/1.73M2 — SIGNIFICANT CHANGE UP
GLUCOSE SERPL-MCNC: 92 MG/DL — SIGNIFICANT CHANGE UP (ref 70–99)
HCT VFR BLD CALC: 31.5 % — LOW (ref 37–47)
HGB BLD-MCNC: 9.7 G/DL — LOW (ref 12–16)
MAGNESIUM SERPL-MCNC: 1.9 MG/DL — SIGNIFICANT CHANGE UP (ref 1.8–2.4)
MCHC RBC-ENTMCNC: 28.5 PG — SIGNIFICANT CHANGE UP (ref 27–31)
MCHC RBC-ENTMCNC: 30.8 G/DL — LOW (ref 32–37)
MCV RBC AUTO: 92.6 FL — SIGNIFICANT CHANGE UP (ref 81–99)
NRBC # BLD: 0 /100 WBCS — SIGNIFICANT CHANGE UP (ref 0–0)
PLATELET # BLD AUTO: 134 K/UL — SIGNIFICANT CHANGE UP (ref 130–400)
POTASSIUM SERPL-MCNC: 4.3 MMOL/L — SIGNIFICANT CHANGE UP (ref 3.5–5)
POTASSIUM SERPL-SCNC: 4.3 MMOL/L — SIGNIFICANT CHANGE UP (ref 3.5–5)
RBC # BLD: 3.4 M/UL — LOW (ref 4.2–5.4)
RBC # FLD: 18.9 % — HIGH (ref 11.5–14.5)
SODIUM SERPL-SCNC: 136 MMOL/L — SIGNIFICANT CHANGE UP (ref 135–146)
SURGICAL PATHOLOGY STUDY: SIGNIFICANT CHANGE UP
WBC # BLD: 5.26 K/UL — SIGNIFICANT CHANGE UP (ref 4.8–10.8)
WBC # FLD AUTO: 5.26 K/UL — SIGNIFICANT CHANGE UP (ref 4.8–10.8)

## 2022-07-29 PROCEDURE — 74174 CTA ABD&PLVS W/CONTRAST: CPT | Mod: 26

## 2022-07-29 PROCEDURE — 75574 CT ANGIO HRT W/3D IMAGE: CPT | Mod: 26

## 2022-07-29 PROCEDURE — 99233 SBSQ HOSP IP/OBS HIGH 50: CPT

## 2022-07-29 RX ORDER — MAGNESIUM SULFATE 500 MG/ML
2 VIAL (ML) INJECTION ONCE
Refills: 0 | Status: COMPLETED | OUTPATIENT
Start: 2022-07-29 | End: 2022-07-29

## 2022-07-29 RX ORDER — ENOXAPARIN SODIUM 100 MG/ML
40 INJECTION SUBCUTANEOUS EVERY 24 HOURS
Refills: 0 | Status: DISCONTINUED | OUTPATIENT
Start: 2022-07-29 | End: 2022-07-31

## 2022-07-29 RX ADMIN — Medication 1 SUPPOSITORY(S): at 21:49

## 2022-07-29 RX ADMIN — LACTULOSE 10 GRAM(S): 10 SOLUTION ORAL at 13:39

## 2022-07-29 RX ADMIN — Medication 3 MILLIGRAM(S): at 21:26

## 2022-07-29 RX ADMIN — PANTOPRAZOLE SODIUM 40 MILLIGRAM(S): 20 TABLET, DELAYED RELEASE ORAL at 05:41

## 2022-07-29 RX ADMIN — LACTULOSE 10 GRAM(S): 10 SOLUTION ORAL at 21:26

## 2022-07-29 RX ADMIN — SENNA PLUS 2 TABLET(S): 8.6 TABLET ORAL at 21:26

## 2022-07-29 RX ADMIN — Medication 100 MICROGRAM(S): at 05:41

## 2022-07-29 RX ADMIN — Medication 25 GRAM(S): at 12:38

## 2022-07-29 RX ADMIN — CHLORHEXIDINE GLUCONATE 1 APPLICATION(S): 213 SOLUTION TOPICAL at 05:42

## 2022-07-29 RX ADMIN — Medication 81 MILLIGRAM(S): at 12:38

## 2022-07-29 NOTE — PROGRESS NOTE ADULT - SUBJECTIVE AND OBJECTIVE BOX
Chief complaint: Patient is a 89y old  Female who presents with a chief complaint of dyspnea on exertion in the setting of acute GI bleeding and severe aortic stenosis.    Interval history:  - No acute events overnight.  - Patient denies any active complaints, though notes she is very anxious about her rehabilitation plan.  - She was seen by the CTS team yesterday, who agreed with intensive rehab prior to TAVR. Given the extended timing since her last CTA she was recommended a repeat CTA including abdomen/pelvis during this admission.    Review of systems: A complete 10-point review of systems was obtained and is negative except as stated in the interval history.    Vitals:  T(F): 97.6, Max: 97.8 (07-28 @ 12:43)  HR: 73 (73 - 88)  BP: 109/67 (103/57 - 132/61)  RR: 18 (18 - 18)  SpO2: 98% (98% - 98%)    Ins & outs:     07-27 @ 07:01  -  07-28 @ 07:00  --------------------------------------------------------  IN: 615 mL / OUT: 1050 mL / NET: -435 mL    07-28 @ 07:01 - 07-29 @ 07:00  --------------------------------------------------------  IN: 1030 mL / OUT: 300 mL / NET: 730 mL    07-29 @ 07:01 - 07-29 @ 11:08  --------------------------------------------------------  IN: 0 mL / OUT: 0 mL / NET: 0 mL      Weight trend:  Weight (kg): 46.3 (07-27)    Physical exam:  General: No apparent distress, elderly, frail, sitting in her bedside chair  HEENT: Anicteric sclera. Moist mucous membranes. No JVD  Cardiac: Regular rate and rhythm. II/VI systolic murmur without an audible S2. No rubs, or gallops.   Vascular: Symmetric radial pulses. Dorsalis pedis pulses palpable.   Respiratory: Normal effort. Bibasilar crackles. Clear to ascultation.   Abdomen: Soft, nontender. Audible bowel sounds.   Extremities: Warm with no edema. No cyanosis or clubbing.   Skin: Warm and dry. No rash.   Neurologic: Grossly normal motor function.   Psychiatric: Oriented to person, place, and time.     Data reviewed:  - Telemetry: AF  - ECG (07.13.22 @ 08:38)   Diagnosis Line Sinus rhythm withPremature atrial complexes  Left axis deviation  Left ventricular hypertrophy with QRS widening  Abnormal ECG  - TTE (7/13/2022 1:09:45 PM)  Summary:   1. Left ventricular ejection fraction, by visual estimation, is 40 to   45%.   2. Normal right atrial size.   3. Mild mitral valve regurgitation.   4. Structurally normal mitral valve, with normal leaflet excursion.   5. Mild aortic regurgitation.   6. Severe aortic valve stenosis.   7. The aortic valve mean gradient is 75.1 mmHg consistent with severe   aortic stenosis.    - Labs:                        9.7    5.26  )-----------( 134      ( 29 Jul 2022 06:29 )             31.5     07-29    136  |  103  |  13  ----------------------------<  92  4.3   |  25  |  0.7    Ca    8.7      29 Jul 2022 06:29  Mg     1.9     07-29    Serum Pro-Brain Natriuretic Peptide: 63246 pg/mL (07-08)  Triglycerides, Serum: 128 mg/dL (07-26-22 @ 06:36)  LDL Cholesterol Calculated: 109 mg/dL (07-26-22 @ 06:36)    Medications:  aspirin  chewable 81 milliGRAM(s) Oral daily  chlorhexidine 4% Liquid 1 Application(s) Topical <User Schedule>  hydrocortisone hemorrhoidal Suppository 1 Suppository(s) Rectal daily  lactulose Syrup 10 Gram(s) Oral every 8 hours  levothyroxine 100 MICROGram(s) Oral daily  magnesium sulfate  IVPB 2 Gram(s) IV Intermittent once  melatonin 3 milliGRAM(s) Oral at bedtime  pantoprazole    Tablet 40 milliGRAM(s) Oral before breakfast  senna 2 Tablet(s) Oral at bedtime    Allergies  Cipro (Other)  Levaquin (Rash)  tetracycline (Hives)

## 2022-07-29 NOTE — PROGRESS NOTE ADULT - ASSESSMENT
Mrs. Silverio is a 88 y/o female with HFpEF, severe AS s/p balloon valvuloplasty 2021, anemia s/p multiple transfusions, MVP, HTN, hyperthyroidism, HCC s/p partial liver resection, transferred from [facility] to [place] for TAVR evaluation by Dr. Mccollum.  Patient found to be anemic s/p 1PRBC transfusion for Hgb < 8. 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 Diuretics largely remain on hold for soft BP (SBP < 100).  Ultimately, plan is for TAVR pending conditioning.    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  - Trend CBC Daily. 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%  - Stable on PO Diuretics  - c/w Lasix 20mg PO Daily, hold for SBP < 100  - Strict I+O   - Daily standing weights  - maintain electrolytes, K>4 Mg >2    #Severe AS, mean gradient ~75mmHg s/p recent balloon valvuloplasty  - c/w optimization for TAVR  - c/w ASA  - Avoid IVF resuscitation d/t severe AS    #Hypotension (improved, -136 in last 24 hr)  - Monitor BP per routine  - Avoid IVF resuscitation d/t severe AS  - Monitor for signs of hypovolemic shock     #Hemorrhoids with Constipation  -Continue hemorrhoidal suppository   -Continue Senna 2 tabs HS  -Continue Lactulose 10 TID  -Tylenol 650 mg PO PRN for rectal pain    #Hypothyroidism  - Continue Levothyroxine 100 mcg PO daily  - TSH 4.36 (6/19/22)    #GERD  -Continue Pantoprazole 40 mg PO daily    #DVT ppx:  Sequential Compression Device (SCD)  #GI ppx:  Protonix  Code Status: Full Code    Dispo: likely to subacute rehab with plans for TAVR as outpatient when patient's conditioning improves.    Please contact me with any questions or concerns at [phone].

## 2022-07-30 PROCEDURE — 99291 CRITICAL CARE FIRST HOUR: CPT

## 2022-07-30 PROCEDURE — 71045 X-RAY EXAM CHEST 1 VIEW: CPT | Mod: 26

## 2022-07-30 RX ORDER — CEFEPIME 1 G/1
2000 INJECTION, POWDER, FOR SOLUTION INTRAMUSCULAR; INTRAVENOUS ONCE
Refills: 0 | Status: COMPLETED | OUTPATIENT
Start: 2022-07-30 | End: 2022-07-31

## 2022-07-30 RX ORDER — CEFEPIME 1 G/1
2000 INJECTION, POWDER, FOR SOLUTION INTRAMUSCULAR; INTRAVENOUS EVERY 8 HOURS
Refills: 0 | Status: DISCONTINUED | OUTPATIENT
Start: 2022-07-31 | End: 2022-08-01

## 2022-07-30 RX ORDER — CEFEPIME 1 G/1
INJECTION, POWDER, FOR SOLUTION INTRAMUSCULAR; INTRAVENOUS
Refills: 0 | Status: DISCONTINUED | OUTPATIENT
Start: 2022-07-31 | End: 2022-08-01

## 2022-07-30 RX ORDER — SUCRALFATE 1 G
1 TABLET ORAL ONCE
Refills: 0 | Status: COMPLETED | OUTPATIENT
Start: 2022-07-30 | End: 2022-07-30

## 2022-07-30 RX ORDER — ACETAMINOPHEN 500 MG
1000 TABLET ORAL ONCE
Refills: 0 | Status: COMPLETED | OUTPATIENT
Start: 2022-07-30 | End: 2022-07-30

## 2022-07-30 RX ORDER — VANCOMYCIN HCL 1 G
750 VIAL (EA) INTRAVENOUS ONCE
Refills: 0 | Status: COMPLETED | OUTPATIENT
Start: 2022-07-30 | End: 2022-07-30

## 2022-07-30 RX ADMIN — Medication 30 MILLILITER(S): at 02:45

## 2022-07-30 RX ADMIN — LACTULOSE 10 GRAM(S): 10 SOLUTION ORAL at 05:22

## 2022-07-30 RX ADMIN — Medication 81 MILLIGRAM(S): at 11:24

## 2022-07-30 RX ADMIN — CHLORHEXIDINE GLUCONATE 1 APPLICATION(S): 213 SOLUTION TOPICAL at 05:22

## 2022-07-30 RX ADMIN — Medication 1 GRAM(S): at 02:45

## 2022-07-30 RX ADMIN — PANTOPRAZOLE SODIUM 40 MILLIGRAM(S): 20 TABLET, DELAYED RELEASE ORAL at 05:22

## 2022-07-30 RX ADMIN — Medication 400 MILLIGRAM(S): at 23:44

## 2022-07-30 RX ADMIN — Medication 100 MICROGRAM(S): at 05:22

## 2022-07-30 RX ADMIN — ENOXAPARIN SODIUM 40 MILLIGRAM(S): 100 INJECTION SUBCUTANEOUS at 21:45

## 2022-07-30 RX ADMIN — Medication 20 MILLIGRAM(S): at 16:43

## 2022-07-30 NOTE — PROGRESS NOTE ADULT - SUBJECTIVE AND OBJECTIVE BOX
Chief complaint: Patient is a 89y old  Female who presents with a chief complaint of Dyspnea w/ Exertion (29 Jul 2022 11:08)    Interval history:  no acute events overnight.   pending rehab prior to AV intervention.     Review of systems: A complete 10-point review of systems was obtained and is negative except as stated in the interval history.    Vitals:  T(F): 97.9, Max: 97.9 (07-29 @ 20:08)  HR: 91 (73 - 101)  BP: 109/61 (93/65 - 109/67)  RR: 18 (18 - 18)  SpO2: 98% (98% - 98%)    Ins & outs:     07-27 @ 07:01  -  07-28 @ 07:00  --------------------------------------------------------  IN: 615 mL / OUT: 1050 mL / NET: -435 mL    07-28 @ 07:01 - 07-29 @ 07:00  --------------------------------------------------------  IN: 1030 mL / OUT: 300 mL / NET: 730 mL    07-29 @ 07:01 - 07-30 @ 00:54  --------------------------------------------------------  IN: 460 mL / OUT: 0 mL / NET: 460 mL      Weight trend:  Weight (kg): 46.3 (07-27)    Physical exam:  General: No apparent distress  HEENT: Anicteric sclera. Moist mucous membranes. JVP *** cm.   Cardiac: Regular rate and rhythm. No murmurs, rubs, or gallops.   Vascular: Symmetric radial pulses. Dorsalis pedis pulses palpable.   Respiratory: Normal effort. Bibasilar crackles. Clear to ascultation.   Abdomen: Soft, nontender. Audible bowel sounds.   Extremities: Warm with *** edema. No cyanosis or clubbing.   Skin: Warm and dry. No rash.   Neurologic: Grossly normal motor function.   Psychiatric: Oriented to person, place, and time.     Data reviewed:  - Telemetry:   - ECG (date***):   - TTE (date***):   - Chest x-ray (date***):   - Stress test:   - CCTA:  - Cardiac catheterization:  - Cardiac MRI:    - Labs:                        9.7    5.26  )-----------( 134      ( 29 Jul 2022 06:29 )             31.5     07-29    136  |  103  |  13  ----------------------------<  92  4.3   |  25  |  0.7    Ca    8.7      29 Jul 2022 06:29  Mg     1.9     07-29          Serum Pro-Brain Natriuretic Peptide: 38660 pg/mL (07-08)      Triglycerides, Serum: 128 mg/dL (07-26-22 @ 06:36)  LDL Cholesterol Calculated: 109 mg/dL (07-26-22 @ 06:36)          Medications:  aspirin  chewable 81 milliGRAM(s) Oral daily  chlorhexidine 4% Liquid 1 Application(s) Topical <User Schedule>  enoxaparin Injectable 40 milliGRAM(s) SubCutaneous every 24 hours  furosemide    Tablet 20 milliGRAM(s) Oral every 24 hours  hydrocortisone hemorrhoidal Suppository 1 Suppository(s) Rectal daily  lactulose Syrup 10 Gram(s) Oral every 8 hours  levothyroxine 100 MICROGram(s) Oral daily  melatonin 3 milliGRAM(s) Oral at bedtime  pantoprazole    Tablet 40 milliGRAM(s) Oral before breakfast  senna 2 Tablet(s) Oral at bedtime    Drips:    PRN:     Allergies    Cipro (Other)  Levaquin (Rash)  tetracycline (Hives)    Intolerances      Assessment:      Problems discussed and associated plan:      Please contact me with any questions or concerns at [phone]. Chief complaint: Patient is a 89y old  Female who presents with a chief complaint of Dyspnea w/ Exertion (29 Jul 2022 11:08)    Interval history:  no acute events overnight.   pending rehab prior to AV intervention.     Review of systems: A complete 10-point review of systems was obtained and is negative except as stated in the interval history.    Vitals:  T(F): 97.9, Max: 97.9 (07-29 @ 20:08)  HR: 91 (73 - 101)  BP: 109/61 (93/65 - 109/67)  RR: 18 (18 - 18)  SpO2: 98% (98% - 98%)    Ins & outs:     07-27 @ 07:01  -  07-28 @ 07:00  --------------------------------------------------------  IN: 615 mL / OUT: 1050 mL / NET: -435 mL    07-28 @ 07:01 - 07-29 @ 07:00  --------------------------------------------------------  IN: 1030 mL / OUT: 300 mL / NET: 730 mL    07-29 @ 07:01 - 07-30 @ 00:54  --------------------------------------------------------  IN: 460 mL / OUT: 0 mL / NET: 460 mL      Weight trend:  Weight (kg): 46.3 (07-27)    Physical exam:  General: No apparent distress  HEENT: Anicteric sclera. Moist mucous membranes. JVP *** cm.   Cardiac: Regular rate and rhythm. No murmurs, rubs, or gallops.   Vascular: Symmetric radial pulses. Dorsalis pedis pulses palpable.   Respiratory: Normal effort. Bibasilar crackles. Clear to ascultation.   Abdomen: Soft, nontender. Audible bowel sounds.   Extremities: Warm with *** edema. No cyanosis or clubbing.   Skin: Warm and dry. No rash.   Neurologic: Grossly normal motor function.   Psychiatric: Oriented to person, place, and time.     Data reviewed:  - Telemetry:   - ECG (date***):   - TTE (date***):   - Chest x-ray (date***):   - Stress test:   - CCTA:  - Cardiac catheterization:  - Cardiac MRI:    - Labs:                        9.7    5.26  )-----------( 134      ( 29 Jul 2022 06:29 )             31.5     07-29    136  |  103  |  13  ----------------------------<  92  4.3   |  25  |  0.7    Ca    8.7      29 Jul 2022 06:29  Mg     1.9     07-29          Serum Pro-Brain Natriuretic Peptide: 25772 pg/mL (07-08)      Triglycerides, Serum: 128 mg/dL (07-26-22 @ 06:36)  LDL Cholesterol Calculated: 109 mg/dL (07-26-22 @ 06:36)          Medications:  aspirin  chewable 81 milliGRAM(s) Oral daily  chlorhexidine 4% Liquid 1 Application(s) Topical <User Schedule>  enoxaparin Injectable 40 milliGRAM(s) SubCutaneous every 24 hours  furosemide    Tablet 20 milliGRAM(s) Oral every 24 hours  hydrocortisone hemorrhoidal Suppository 1 Suppository(s) Rectal daily  lactulose Syrup 10 Gram(s) Oral every 8 hours  levothyroxine 100 MICROGram(s) Oral daily  melatonin 3 milliGRAM(s) Oral at bedtime  pantoprazole    Tablet 40 milliGRAM(s) Oral before breakfast  senna 2 Tablet(s) Oral at bedtime    Drips:    PRN:     Allergies    Cipro (Other)  Levaquin (Rash)  tetracycline (Hives)    Intolerances

## 2022-07-30 NOTE — CHART NOTE - NSCHARTNOTEFT_GEN_A_CORE
Pt accepted to the CCU due to hypertensive urgency/pulmonary edema in the setting of severe AS s/p BAV 2021 by Dr. Mccollum  Echo: Echo: EF 40-45%.  Severe AS: Area 0.4cm2, Vpeak 5.74 m/s, M.1mmHg     Pt became acutely dyspneic with elevated BP to 160's and sinus tachycardia 120-130's. She then received Torsemide 20mg x1 today    Plan  - Place on Bipap. ABG now and 45 minutes after Bipap  - CXR now  - Monitor volume status carefully in the setting of severe AS. Maintain euvolemia  - Maintain SBP ~130-140's (Pt presented with hypotension in the setting of GI bleed. Afterload reduction is warranted in the setting of severe AS, but avoid hypotension).  - Trend CBC daily.

## 2022-07-30 NOTE — PROGRESS NOTE ADULT - ASSESSMENT
Mrs. Silverio is a 88 y/o female with HFpEF, severe AS s/p balloon valvuloplasty 2021, anemia s/p multiple transfusions, MVP, HTN, hyperthyroidism, HCC s/p partial liver resection, transferred from [facility] to [place] for TAVR evaluation by Dr. Mccollum.  Patient found to be anemic s/p 1PRBC transfusion for Hgb < 8. 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 Diuretics largely remain on hold for soft BP (SBP < 100).  Ultimately, plan is for TAVR pending conditioning.    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  - Trend CBC Daily. 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%  - Stable on PO Diuretics  - c/w Lasix 20mg PO Daily, hold for SBP < 100  - Strict I+O   - Daily standing weights  - maintain electrolytes, K>4 Mg >2    #Severe AS, mean gradient ~75mmHg s/p recent balloon valvuloplasty  - c/w optimization for TAVR  - c/w ASA  - Avoid IVF resuscitation d/t severe AS    #Hypotension (improved, -136 in last 24 hr)  - Monitor BP per routine  - Avoid IVF resuscitation d/t severe AS  - Monitor for signs of hypovolemic shock     #Hemorrhoids with Constipation  -Continue hemorrhoidal suppository   -Continue Senna 2 tabs HS  -Continue Lactulose 10 TID  -Tylenol 650 mg PO PRN for rectal pain    #Hypothyroidism  - Continue Levothyroxine 100 mcg PO daily  - TSH 4.36 (6/19/22)    #GERD  -Continue Pantoprazole 40 mg PO daily    #DVT ppx:  Sequential Compression Device (SCD)  #GI ppx:  Protonix  Code Status: Full Code    Dispo: likely to subacute rehab with plans for TAVR as outpatient when patient's conditioning improves.    Please contact me with any questions or concerns at [phone].

## 2022-07-30 NOTE — CHART NOTE - NSCHARTNOTEFT_GEN_A_CORE
Pt called asking stating she forgot to ask at her appt if PD could cause hoarseness. MA spoke with RASHARD Reeves, who stated that can be sx of PD. MA informed pt who verbalized understanding.   Electronically signed by John East on 5/2/19 at 12:56 PM Mrs. Silverio is a 89 y/o female with HFpEF, severe AS s/p balloon valvuloplasty 2021, anemia s/p multiple transfusions 2 months ago, MVP, HTN, hyperthyroidism, HCC s/p partial liver resection, transferred from [facility] to [place] for TAVR evaluation by Dr. Mccollum.  Patient found to be anemic most likely d/t GI bleed now s/p 1PRBC transfusion for Hgb < 8/anemia. GI, Dr. Woo consulted at Dr. Mccollum' request.  Capsule study completed.  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 Diuretics largely remain on hold for soft BP (SBP < 100).  Ultimately, plan is for TAVR pending conditioning. Patient was waiting for subacute rehab placement with plans for TAVR as outpatient when patient's conditioning improves.    Patient this afternoon 7/30, became acutely dyspneic, hypertensive 170/79 and tachycardic to 140s. Physical exam revealed crackles and wheezes to lungs throughout. Bipap started, with some improvement noted. Transfer to CCU initiated.

## 2022-07-30 NOTE — PROGRESS NOTE ADULT - CRITICAL CARE ATTENDING COMMENT
See my comments above. I spent the listed time directly managing the patient at bedside and coordinating care with the CCU. I spoke at length about patient's overall prognosis and care plan with the son and daughter, individually, over the phone.

## 2022-07-31 LAB
ALBUMIN SERPL ELPH-MCNC: 3.1 G/DL — LOW (ref 3.5–5.2)
ALBUMIN SERPL ELPH-MCNC: 3.5 G/DL — SIGNIFICANT CHANGE UP (ref 3.5–5.2)
ALP SERPL-CCNC: 136 U/L — HIGH (ref 30–115)
ALP SERPL-CCNC: 137 U/L — HIGH (ref 30–115)
ALT FLD-CCNC: 17 U/L — SIGNIFICANT CHANGE UP (ref 0–41)
ALT FLD-CCNC: 19 U/L — SIGNIFICANT CHANGE UP (ref 0–41)
ANION GAP SERPL CALC-SCNC: 10 MMOL/L — SIGNIFICANT CHANGE UP (ref 7–14)
ANION GAP SERPL CALC-SCNC: 10 MMOL/L — SIGNIFICANT CHANGE UP (ref 7–14)
APTT BLD: 29 SEC — SIGNIFICANT CHANGE UP (ref 27–39.2)
AST SERPL-CCNC: 28 U/L — SIGNIFICANT CHANGE UP (ref 0–41)
AST SERPL-CCNC: 34 U/L — SIGNIFICANT CHANGE UP (ref 0–41)
BASOPHILS # BLD AUTO: 0.03 K/UL — SIGNIFICANT CHANGE UP (ref 0–0.2)
BASOPHILS NFR BLD AUTO: 0.2 % — SIGNIFICANT CHANGE UP (ref 0–1)
BILIRUB SERPL-MCNC: 0.7 MG/DL — SIGNIFICANT CHANGE UP (ref 0.2–1.2)
BILIRUB SERPL-MCNC: 0.7 MG/DL — SIGNIFICANT CHANGE UP (ref 0.2–1.2)
BUN SERPL-MCNC: 17 MG/DL — SIGNIFICANT CHANGE UP (ref 10–20)
BUN SERPL-MCNC: 19 MG/DL — SIGNIFICANT CHANGE UP (ref 10–20)
CALCIUM SERPL-MCNC: 8.3 MG/DL — LOW (ref 8.5–10.1)
CALCIUM SERPL-MCNC: 8.5 MG/DL — SIGNIFICANT CHANGE UP (ref 8.5–10.1)
CHLORIDE SERPL-SCNC: 101 MMOL/L — SIGNIFICANT CHANGE UP (ref 98–110)
CHLORIDE SERPL-SCNC: 102 MMOL/L — SIGNIFICANT CHANGE UP (ref 98–110)
CO2 SERPL-SCNC: 25 MMOL/L — SIGNIFICANT CHANGE UP (ref 17–32)
CO2 SERPL-SCNC: 27 MMOL/L — SIGNIFICANT CHANGE UP (ref 17–32)
CREAT SERPL-MCNC: 0.8 MG/DL — SIGNIFICANT CHANGE UP (ref 0.7–1.5)
CREAT SERPL-MCNC: 0.8 MG/DL — SIGNIFICANT CHANGE UP (ref 0.7–1.5)
EGFR: 70 ML/MIN/1.73M2 — SIGNIFICANT CHANGE UP
EGFR: 70 ML/MIN/1.73M2 — SIGNIFICANT CHANGE UP
EOSINOPHIL # BLD AUTO: 0.01 K/UL — SIGNIFICANT CHANGE UP (ref 0–0.7)
EOSINOPHIL NFR BLD AUTO: 0.1 % — SIGNIFICANT CHANGE UP (ref 0–8)
GLUCOSE SERPL-MCNC: 119 MG/DL — HIGH (ref 70–99)
GLUCOSE SERPL-MCNC: 96 MG/DL — SIGNIFICANT CHANGE UP (ref 70–99)
HCT VFR BLD CALC: 30.2 % — LOW (ref 37–47)
HCT VFR BLD CALC: 32.8 % — LOW (ref 37–47)
HGB BLD-MCNC: 10.4 G/DL — LOW (ref 12–16)
HGB BLD-MCNC: 9.7 G/DL — LOW (ref 12–16)
IMM GRANULOCYTES NFR BLD AUTO: 0.5 % — HIGH (ref 0.1–0.3)
INR BLD: 1.22 RATIO — SIGNIFICANT CHANGE UP (ref 0.65–1.3)
LYMPHOCYTES # BLD AUTO: 0.61 K/UL — LOW (ref 1.2–3.4)
LYMPHOCYTES # BLD AUTO: 4.8 % — LOW (ref 20.5–51.1)
MAGNESIUM SERPL-MCNC: 2.1 MG/DL — SIGNIFICANT CHANGE UP (ref 1.8–2.4)
MAGNESIUM SERPL-MCNC: 2.2 MG/DL — SIGNIFICANT CHANGE UP (ref 1.8–2.4)
MCHC RBC-ENTMCNC: 28.9 PG — SIGNIFICANT CHANGE UP (ref 27–31)
MCHC RBC-ENTMCNC: 28.9 PG — SIGNIFICANT CHANGE UP (ref 27–31)
MCHC RBC-ENTMCNC: 31.7 G/DL — LOW (ref 32–37)
MCHC RBC-ENTMCNC: 32.1 G/DL — SIGNIFICANT CHANGE UP (ref 32–37)
MCV RBC AUTO: 89.9 FL — SIGNIFICANT CHANGE UP (ref 81–99)
MCV RBC AUTO: 91.1 FL — SIGNIFICANT CHANGE UP (ref 81–99)
MONOCYTES # BLD AUTO: 0.52 K/UL — SIGNIFICANT CHANGE UP (ref 0.1–0.6)
MONOCYTES NFR BLD AUTO: 4.1 % — SIGNIFICANT CHANGE UP (ref 1.7–9.3)
MRSA PCR RESULT.: NEGATIVE — SIGNIFICANT CHANGE UP
NEUTROPHILS # BLD AUTO: 11.37 K/UL — HIGH (ref 1.4–6.5)
NEUTROPHILS NFR BLD AUTO: 90.3 % — HIGH (ref 42.2–75.2)
NRBC # BLD: 0 /100 WBCS — SIGNIFICANT CHANGE UP (ref 0–0)
NRBC # BLD: 0 /100 WBCS — SIGNIFICANT CHANGE UP (ref 0–0)
NT-PROBNP SERPL-SCNC: HIGH PG/ML (ref 0–300)
PHOSPHATE SERPL-MCNC: 1.9 MG/DL — LOW (ref 2.1–4.9)
PLATELET # BLD AUTO: 166 K/UL — SIGNIFICANT CHANGE UP (ref 130–400)
PLATELET # BLD AUTO: 239 K/UL — SIGNIFICANT CHANGE UP (ref 130–400)
POTASSIUM SERPL-MCNC: 4 MMOL/L — SIGNIFICANT CHANGE UP (ref 3.5–5)
POTASSIUM SERPL-MCNC: 4.2 MMOL/L — SIGNIFICANT CHANGE UP (ref 3.5–5)
POTASSIUM SERPL-SCNC: 4 MMOL/L — SIGNIFICANT CHANGE UP (ref 3.5–5)
POTASSIUM SERPL-SCNC: 4.2 MMOL/L — SIGNIFICANT CHANGE UP (ref 3.5–5)
PROT SERPL-MCNC: 5.7 G/DL — LOW (ref 6–8)
PROT SERPL-MCNC: 6.3 G/DL — SIGNIFICANT CHANGE UP (ref 6–8)
PROTHROM AB SERPL-ACNC: 14 SEC — HIGH (ref 9.95–12.87)
RBC # BLD: 3.36 M/UL — LOW (ref 4.2–5.4)
RBC # BLD: 3.6 M/UL — LOW (ref 4.2–5.4)
RBC # FLD: 18.9 % — HIGH (ref 11.5–14.5)
RBC # FLD: 19.3 % — HIGH (ref 11.5–14.5)
SODIUM SERPL-SCNC: 136 MMOL/L — SIGNIFICANT CHANGE UP (ref 135–146)
SODIUM SERPL-SCNC: 139 MMOL/L — SIGNIFICANT CHANGE UP (ref 135–146)
WBC # BLD: 12.06 K/UL — HIGH (ref 4.8–10.8)
WBC # BLD: 12.6 K/UL — HIGH (ref 4.8–10.8)
WBC # FLD AUTO: 12.06 K/UL — HIGH (ref 4.8–10.8)
WBC # FLD AUTO: 12.6 K/UL — HIGH (ref 4.8–10.8)

## 2022-07-31 PROCEDURE — 93010 ELECTROCARDIOGRAM REPORT: CPT

## 2022-07-31 PROCEDURE — 99291 CRITICAL CARE FIRST HOUR: CPT | Mod: 25

## 2022-07-31 PROCEDURE — 71045 X-RAY EXAM CHEST 1 VIEW: CPT | Mod: 26

## 2022-07-31 RX ORDER — PHENYLEPHRINE HYDROCHLORIDE 10 MG/ML
0.1 INJECTION INTRAVENOUS
Qty: 160 | Refills: 0 | Status: DISCONTINUED | OUTPATIENT
Start: 2022-07-31 | End: 2022-08-06

## 2022-07-31 RX ORDER — MORPHINE SULFATE 50 MG/1
2 CAPSULE, EXTENDED RELEASE ORAL ONCE
Refills: 0 | Status: DISCONTINUED | OUTPATIENT
Start: 2022-07-31 | End: 2022-07-31

## 2022-07-31 RX ORDER — SODIUM CHLORIDE 9 MG/ML
500 INJECTION INTRAMUSCULAR; INTRAVENOUS; SUBCUTANEOUS ONCE
Refills: 0 | Status: COMPLETED | OUTPATIENT
Start: 2022-07-31 | End: 2022-07-31

## 2022-07-31 RX ADMIN — CEFEPIME 100 MILLIGRAM(S): 1 INJECTION, POWDER, FOR SOLUTION INTRAMUSCULAR; INTRAVENOUS at 13:39

## 2022-07-31 RX ADMIN — Medication 81 MILLIGRAM(S): at 13:48

## 2022-07-31 RX ADMIN — Medication 1000 MILLIGRAM(S): at 02:38

## 2022-07-31 RX ADMIN — CHLORHEXIDINE GLUCONATE 1 APPLICATION(S): 213 SOLUTION TOPICAL at 05:38

## 2022-07-31 RX ADMIN — Medication 1 SUPPOSITORY(S): at 11:48

## 2022-07-31 RX ADMIN — MORPHINE SULFATE 2 MILLIGRAM(S): 50 CAPSULE, EXTENDED RELEASE ORAL at 06:30

## 2022-07-31 RX ADMIN — CEFEPIME 100 MILLIGRAM(S): 1 INJECTION, POWDER, FOR SOLUTION INTRAMUSCULAR; INTRAVENOUS at 21:05

## 2022-07-31 RX ADMIN — SODIUM CHLORIDE 1000 MILLILITER(S): 9 INJECTION INTRAMUSCULAR; INTRAVENOUS; SUBCUTANEOUS at 00:33

## 2022-07-31 RX ADMIN — Medication 250 MILLIGRAM(S): at 00:16

## 2022-07-31 RX ADMIN — MORPHINE SULFATE 2 MILLIGRAM(S): 50 CAPSULE, EXTENDED RELEASE ORAL at 05:00

## 2022-07-31 RX ADMIN — SENNA PLUS 2 TABLET(S): 8.6 TABLET ORAL at 21:05

## 2022-07-31 RX ADMIN — CEFEPIME 100 MILLIGRAM(S): 1 INJECTION, POWDER, FOR SOLUTION INTRAMUSCULAR; INTRAVENOUS at 05:40

## 2022-07-31 RX ADMIN — CEFEPIME 100 MILLIGRAM(S): 1 INJECTION, POWDER, FOR SOLUTION INTRAMUSCULAR; INTRAVENOUS at 00:33

## 2022-07-31 NOTE — PROGRESS NOTE ADULT - ASSESSMENT
Mrs. Silverio is a 89 y/o female with HFpEF, severe AS s/p balloon valvuloplasty 2021, anemia s/p multiple transfusions, MVP, HTN, hyperthyroidism, HCC s/p partial liver resection, transferred from [facility] to [place] for TAVR evaluation by Dr. Mccollum.  Patient found to be anemic s/p 1PRBC transfusion for Hgb < 8. 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 Diuretics largely remain on hold for soft BP (SBP < 100).  Ultimately, plan is for TAVR pending conditioning.    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  - Trend CBC Daily. 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%  - Stable on PO Diuretics  - c/w Lasix 20mg PO Daily, hold for SBP < 100  - Strict I+O   - Daily standing weights  - maintain electrolytes, K>4 Mg >2    #Severe AS, mean gradient ~75mmHg s/p recent balloon valvuloplasty  - c/w optimization for TAVR  - c/w ASA  - Avoid IVF resuscitation d/t severe AS    #Hypotension (improved, -136 in last 24 hr)  - Monitor BP per routine  - Avoid IVF resuscitation d/t severe AS  - Monitor for signs of hypovolemic shock     #Hemorrhoids with Constipation  -Continue hemorrhoidal suppository   -Continue Senna 2 tabs HS  -Continue Lactulose 10 TID  -Tylenol 650 mg PO PRN for rectal pain    #Hypothyroidism  - Continue Levothyroxine 100 mcg PO daily  - TSH 4.36 (6/19/22)    #GERD  -Continue Pantoprazole 40 mg PO daily    #DVT ppx:  Sequential Compression Device (SCD)  #GI ppx:  Protonix  Code Status: Full Code    Dispo: likely to subacute rehab with plans for TAVR as outpatient when patient's conditioning improves.   Mrs. Silverio is a 89 y/o female with HFpEF, severe AS s/p balloon valvuloplasty 2021, anemia s/p multiple transfusions, MVP, HTN, hyperthyroidism, HCC s/p partial liver resection, transferred from [facility] to [place] for TAVR evaluation by Dr. Mccollum.  Patient found to be anemic s/p 1PRBC transfusion for Hgb < 8. 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 Diuretics largely remain on hold for soft BP (SBP < 100).  Ultimately, plan is for TAVR pending conditioning.    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  - Trend CBC Daily. 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%  - Stable on PO Diuretics  - c/w Lasix 20mg PO Daily, hold for SBP < 100  - Strict I+O   - Daily standing weights  - maintain electrolytes, K>4 Mg >2    #Severe AS, mean gradient ~75mmHg s/p recent balloon valvuloplasty  - c/w optimization for TAVR  - c/w ASA  - Avoid IVF resuscitation d/t severe AS    #Hypotension (improved, -136 in last 24 hr)  - Monitor BP per routine  - Avoid IVF resuscitation d/t severe AS  - Monitor for signs of hypovolemic shock     #Hemorrhoids with Constipation  -Continue hemorrhoidal suppository   -Continue Senna 2 tabs HS  -Continue Lactulose 10 TID  -Tylenol 650 mg PO PRN for rectal pain    #Hypothyroidism  - Continue Levothyroxine 100 mcg PO daily  - TSH 4.36 (6/19/22)    #GERD  -Continue Pantoprazole 40 mg PO daily    #DVT ppx:  Sequential Compression Device (SCD)  #GI ppx:  Protonix  Code Status: Full Code    Dispo: likely to subacute rehab with plans for TAVR as outpatient when patient's conditioning improves.   Mrs. Silverio is a 89 y/o female with HFpEF, severe AS s/p balloon valvuloplasty 2021, anemia s/p multiple transfusions, MVP, HTN, hyperthyroidism, HCC s/p partial liver resection, transferred from [facility] to [place] for TAVR evaluation by Dr. Mccollum.  Patient found to be anemic s/p 1PRBC transfusion for Hgb < 8. 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 Diuretics largely remain on hold for soft BP (SBP < 100).  Ultimately, plan is for TAVR pending conditioning.    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  - Trend CBC Daily. 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%    - Strict I+O   - Daily standing weights  - maintain electrolytes, K>4 Mg >2    #Severe AS, mean gradient ~75mmHg s/p recent balloon valvuloplasty  - c/w optimization for TAVR  - c/w ASA  - Avoid IVF resuscitation d/t severe AS    #Hypotension (improved, -136 in last 24 hr)  - Monitor BP per routine  - severe sepsis and severe AS       #Hemorrhoids with Constipation  -Continue hemorrhoidal suppository   -Continue Senna 2 tabs HS  -Continue Lactulose 10 TID  -Tylenol 650 mg PO PRN for rectal pain    #Hypothyroidism  - Continue Levothyroxine 100 mcg PO daily  - TSH 4.36 (6/19/22)    #GERD  -Continue Pantoprazole 40 mg PO daily    #DVT ppx:  Sequential Compression Device (SCD)  #GI ppx:  Protonix  Code Status: Full Code    Dispo: likely to subacute rehab with plan to consider  TAVR as outpatient when patient's conditioning improves.

## 2022-07-31 NOTE — CHART NOTE - NSCHARTNOTEFT_GEN_A_CORE
Upon transfer to CCU, pt was found to be febrile to 100.4. CXR showed RML opacity (new from before). STAT blood cultures were drawn, and empiric treatment with Cefepime 2g q8hr and Vancomycin 750mg x1 were give. Later in the night, pt became hypotensive to a MAP of 40's. Bedside echo showed IVC diameter 1.6cm . She received 500cc bolus of IVF before being started on phenylephrine given her critical AS. R-radial A-line was subsequently placed  Pt went into rapid Afib to 140-150's before spontaneously converting to NSR at 80bpm. Phenylephrine requirement continued to increase overnight (currently at 1.75mcg/kg/min). R IJ was placed. Pt's daughter was contacted and is aware. Pt remains full code.    #Assessment  - Septic shock secondary to RML PNA (possibly aspiration PNA)  - Critical AS (Area 0.4cm2, Vpeak 5.74 m/s, M.1mmHg     Plan:  - Continue with pressor support (target [id number])  - Avoid volume overload. Pt's respiratory status is currently stable on bipap but she would benefit from diuresis once BP improves  - COntinue with DCefepime 2g q8hr. Vanco x1  given. Restart vanco if MRSA nares positive  - ID eval  - Follow up blood cultures  - Continue to monitor CBC given recent GIB w/ acute anemia  - Repeat CXR daily

## 2022-07-31 NOTE — PROGRESS NOTE ADULT - SUBJECTIVE AND OBJECTIVE BOX
Patient is a 89y old  Female who presents with a chief complaint of Dyspnea w/ Exertion (30 Jul 2022 00:53)    HPI:  This is a 89-year-old female with a past medical history significant for CHF,severe AAS, MVP, hypertension, HCC, hypothyroidism, GERD who presents with shortness of breath.  Patient states that she has been having chronic shortness of breath w/ recent hospital admission 6/18-6/22, pt reports dyspnea has been getting worse the last couple of days and did not have any resolution to sxs upon d/c.  Of note patient also states that she has been having some blood in the stool during this time.  Patient states dyspnea is worsened with any exertion- and associated with escalating chest pain.    Pt denies any cardiology or pulmonary f/u.  Pt also admits to predominantly bedbound state due to dyspnea.   (08 Jul 2022 16:43)       INTERVAL HPI/OVERNIGHT EVENTS:   patient had a t max 100.2 overnight, given iv tylenol   cxr concerning for r sided opacity possible pna   patients bp was very low   -given 500cc bolus   -a line placed   -central line placed   -phenylephrine started titrated to a goal of map 65      Subjective:    ICU Vital Signs Last 24 Hrs  T(C): 37.7 (31 Jul 2022 00:00), Max: 38.2 (30 Jul 2022 18:45)  T(F): 99.9 (31 Jul 2022 00:00), Max: 100.7 (30 Jul 2022 18:45)  HR: 67 (31 Jul 2022 03:00) (67 - 130)  BP: 96/52 (31 Jul 2022 03:00) (52/29 - 178/77)  BP(mean): 70 (31 Jul 2022 03:00) (37 - 74)  ABP: --  ABP(mean): --  RR: 33 (31 Jul 2022 03:00) (18 - 54)  SpO2: 100% (31 Jul 2022 03:00) (95% - 100%)    O2 Parameters below as of 31 Jul 2022 03:00  Patient On (Oxygen Delivery Method): BiPAP/CPAP          I&O's Summary    29 Jul 2022 07:01  -  30 Jul 2022 07:00  --------------------------------------------------------  IN: 460 mL / OUT: 200 mL / NET: 260 mL    30 Jul 2022 07:01  -  31 Jul 2022 06:10  --------------------------------------------------------  IN: 0 mL / OUT: 200 mL / NET: -200 mL          Daily Height in cm: 149.86 (30 Jul 2022 18:45)    Daily     Adult Advanced Hemodynamics Last 24 Hrs  CVP(mm Hg): --  CVP(cm H2O): --  CO: --  CI: --  PA: --  PA(mean): --  PCWP: --  SVR: --  SVRI: --  PVR: --  PVRI: --    EKG/Telemetry Events:    MEDICATIONS  (STANDING):  aspirin  chewable 81 milliGRAM(s) Oral daily  cefepime   IVPB      cefepime   IVPB 2000 milliGRAM(s) IV Intermittent every 8 hours  chlorhexidine 4% Liquid 1 Application(s) Topical <User Schedule>  enoxaparin Injectable 40 milliGRAM(s) SubCutaneous every 24 hours  hydrocortisone hemorrhoidal Suppository 1 Suppository(s) Rectal daily  levothyroxine 100 MICROGram(s) Oral daily  melatonin 3 milliGRAM(s) Oral at bedtime  pantoprazole    Tablet 40 milliGRAM(s) Oral before breakfast  phenylephrine    Infusion 0.1 MICROgram(s)/kG/Min (1.1 mL/Hr) IV Continuous <Continuous>  senna 2 Tablet(s) Oral at bedtime    MEDICATIONS  (PRN):  aluminum hydroxide/magnesium hydroxide/simethicone Suspension 30 milliLiter(s) Oral every 4 hours PRN Dyspepsia  hydrocortisone hemorrhoidal Suppository 1 Suppository(s) Rectal two times a day PRN hemorrhoidal discomfort      PHYSICAL EXAM:  GENERAL: nad laying in bed   HEAD:  Atraumatic, Normocephalic  EYES: EOMI, PERRLA, conjunctiva and sclera clear  NECK: Supple, No JVD, Normal thyroid, no enlarged nodes r tripple lumen in place  NERVOUS SYSTEM:  Alert & Awake.   CHEST/LUNG: B/L good air entry; No rales, rhonchi, or wheezing  HEART: S1S2 normal, no S3, Regular rate and rhythm; No murmurs  ABDOMEN: Soft, Nontender, Nondistended; Bowel sounds present  EXTREMITIES:  2+ Peripheral Pulses, No clubbing, cyanosis, or edema      LABS:                        10.4   12.06 )-----------( x        ( 31 Jul 2022 05:09 )             32.8     07-30    139  |  102  |  17  ----------------------------<  119<H>  4.2   |  27  |  0.8    Ca    8.5      30 Jul 2022 23:30  Phos  1.9     07-30  Mg     2.2     07-30    TPro  6.3  /  Alb  3.5  /  TBili  0.7  /  DBili  x   /  AST  28  /  ALT  17  /  AlkPhos  136<H>  07-30    LIVER FUNCTIONS - ( 30 Jul 2022 23:30 )  Alb: 3.5 g/dL / Pro: 6.3 g/dL / ALK PHOS: 136 U/L / ALT: 17 U/L / AST: 28 U/L / GGT: x             CAPILLARY BLOOD GLUCOSE        ABG - ( 31 Jul 2022 03:02 )  pH, Arterial: 7.52  pH, Blood: x     /  pCO2: 32    /  pO2: 166   / HCO3: 26    / Base Excess: 3.5   /  SaO2: 98.7                          RADIOLOGY & ADDITIONAL TESTS:  CXR:        Care Discussed with Consultants/Other Providers [ x] YES  [ ] NO           Patient is a 89y old  Female who presents with a chief complaint of Dyspnea w/ Exertion (30 Jul 2022 00:53)    HPI:  88 y/o female with HFpEF, severe AS s/p balloon valvuloplasty 2021, anemia s/p multiple transfusions 2 months ago, MVP, HTN, hyperthyroidism, HCC s/p partial liver resection, transferred from [facility] to [place] for TAVR evaluation by Dr. Mccollum.  Patient found to be anemic most likely d/t GI bleed now s/p 1PRBC transfusion for Hgb < 8/anemia. GI, Dr. Woo consulted at Dr. Mccollum' request.  Capsule study completed.  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 Diuretics largely remain on hold for soft BP (SBP < 100).  Ultimately, plan is for TAVR pending conditioning. Patient was waiting for subacute rehab placement with plans for TAVR as outpatient when patient's conditioning improves.    Patient 7/30, became acutely dyspneic, hypertensive 170/79 and tachycardic to 140s w/ crackles and wheezes to lungs throughout. Bipap started, with some improvement noted. Transfer to CCU initiated.       INTERVAL HPI/OVERNIGHT EVENTS:   patient had a t max 100.2 overnight, given iv tylenol   cxr concerning for r sided opacity possible pna   patients bp was very low   -given 500cc bolus   -a line placed   -central line placed   -phenylephrine started titrated to a goal of map 65  Run of afib with RVR      Subjective:  Patient evaluated at bedside. She continues to endorse shortness of breath and fatigue. She denies chest pain or palpitations.     ICU Vital Signs Last 24 Hrs  T(C): 37.7 (31 Jul 2022 00:00), Max: 38.2 (30 Jul 2022 18:45)  T(F): 99.9 (31 Jul 2022 00:00), Max: 100.7 (30 Jul 2022 18:45)  HR: 67 (31 Jul 2022 03:00) (67 - 130)  BP: 96/52 (31 Jul 2022 03:00) (52/29 - 178/77)  BP(mean): 70 (31 Jul 2022 03:00) (37 - 74)  ABP: --  ABP(mean): --  RR: 33 (31 Jul 2022 03:00) (18 - 54)  SpO2: 100% (31 Jul 2022 03:00) (95% - 100%)    O2 Parameters below as of 31 Jul 2022 03:00  Patient On (Oxygen Delivery Method): BiPAP/CPAP          I&O's Summary    29 Jul 2022 07:01  -  30 Jul 2022 07:00  --------------------------------------------------------  IN: 460 mL / OUT: 200 mL / NET: 260 mL    30 Jul 2022 07:01  -  31 Jul 2022 06:10  --------------------------------------------------------  IN: 0 mL / OUT: 200 mL / NET: -200 mL          Daily Height in cm: 149.86 (30 Jul 2022 18:45)    Daily     Adult Advanced Hemodynamics Last 24 Hrs  CVP(mm Hg): --  CVP(cm H2O): --  CO: --  CI: --  PA: --  PA(mean): --  PCWP: --  SVR: --  SVRI: --  PVR: --  PVRI: --    EKG/Telemetry Events:    MEDICATIONS  (STANDING):  aspirin  chewable 81 milliGRAM(s) Oral daily  cefepime   IVPB      cefepime   IVPB 2000 milliGRAM(s) IV Intermittent every 8 hours  chlorhexidine 4% Liquid 1 Application(s) Topical <User Schedule>  enoxaparin Injectable 40 milliGRAM(s) SubCutaneous every 24 hours  hydrocortisone hemorrhoidal Suppository 1 Suppository(s) Rectal daily  levothyroxine 100 MICROGram(s) Oral daily  melatonin 3 milliGRAM(s) Oral at bedtime  pantoprazole    Tablet 40 milliGRAM(s) Oral before breakfast  phenylephrine    Infusion 0.1 MICROgram(s)/kG/Min (1.1 mL/Hr) IV Continuous <Continuous>  senna 2 Tablet(s) Oral at bedtime    MEDICATIONS  (PRN):  aluminum hydroxide/magnesium hydroxide/simethicone Suspension 30 milliLiter(s) Oral every 4 hours PRN Dyspepsia  hydrocortisone hemorrhoidal Suppository 1 Suppository(s) Rectal two times a day PRN hemorrhoidal discomfort      PHYSICAL EXAM:  GENERAL: nad laying in bed, bipap in place   HEAD:  Atraumatic, Normocephalic  NECK: Supple, No JVD, r triple lumen in place  NERVOUS SYSTEM:  Awakens to voice, oriented.   CHEST/LUNG: B/L breath sounds  HEART: S1S2 audible, bradycardic, regular rhythm rhythm; Systolic murmur  ABDOMEN: Soft, Nontender, Nondistended  EXTREMITIES:  No clubbing, cyanosis, or edema      LABS:                        10.4   12.06 )-----------( x        ( 31 Jul 2022 05:09 )             32.8     07-30    139  |  102  |  17  ----------------------------<  119<H>  4.2   |  27  |  0.8    Ca    8.5      30 Jul 2022 23:30  Phos  1.9     07-30  Mg     2.2     07-30    TPro  6.3  /  Alb  3.5  /  TBili  0.7  /  DBili  x   /  AST  28  /  ALT  17  /  AlkPhos  136<H>  07-30    LIVER FUNCTIONS - ( 30 Jul 2022 23:30 )  Alb: 3.5 g/dL / Pro: 6.3 g/dL / ALK PHOS: 136 U/L / ALT: 17 U/L / AST: 28 U/L / GGT: x             CAPILLARY BLOOD GLUCOSE        ABG - ( 31 Jul 2022 03:02 )  pH, Arterial: 7.52  pH, Blood: x     /  pCO2: 32    /  pO2: 166   / HCO3: 26    / Base Excess: 3.5   /  SaO2: 98.7                          RADIOLOGY & ADDITIONAL TESTS:  CXR:        Care Discussed with Consultants/Other Providers [ x] YES  [ ] NO           Patient is a 89y old  Female who presents with a chief complaint of Dyspnea w/ Exertion (30 Jul 2022 00:53)    HPI:  89 y/o female with HFpEF, severe AS s/p balloon valvuloplasty 2021, anemia s/p multiple transfusions 2 months ago, MVP, HTN, hyperthyroidism, HCC s/p partial liver resection, transferred from [facility] to [place] for TAVR evaluation by Dr. Mccollum.  Patient found to be anemic most likely d/t GI bleed now s/p 1PRBC transfusion for Hgb < 8/anemia. GI, Dr. Woo consulted at Dr. Mccollum' request.  Capsule study completed.  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 Diuretics largely remain on hold for soft BP (SBP < 100).  Ultimately, plan is for TAVR pending conditioning. Patient was waiting for subacute rehab placement with plans for TAVR as outpatient when patient's conditioning improves.    Patient 7/30, became acutely dyspneic, hypertensive 170/79 and tachycardic to 140s w/ crackles and wheezes to lungs throughout. Bipap started, with some improvement noted. Transfer to CCU initiated.       INTERVAL HPI/OVERNIGHT EVENTS:   patient had fever  overnight, given iv tylenol   cxr concerning for r sided opacity possible pna   patients bp was very low   -given 500cc bolus   -a line placed   -central line placed   -phenylephrine started titrated to a goal of map 65  Run of afib with RVR      Subjective:  Patient evaluated at bedside. She continues to endorse shortness of breath and fatigue. She denies chest pain or palpitations.     ICU Vital Signs Last 24 Hrs  T(C): 37.7 (31 Jul 2022 00:00), Max: 38.2 (30 Jul 2022 18:45)  T(F): 99.9 (31 Jul 2022 00:00), Max: 100.7 (30 Jul 2022 18:45)  HR: 67 (31 Jul 2022 03:00) (67 - 130)  BP: 96/52 (31 Jul 2022 03:00) (52/29 - 178/77)  BP(mean): 70 (31 Jul 2022 03:00) (37 - 74)  ABP: --  ABP(mean): --  RR: 33 (31 Jul 2022 03:00) (18 - 54)  SpO2: 100% (31 Jul 2022 03:00) (95% - 100%)    O2 Parameters below as of 31 Jul 2022 03:00  Patient On (Oxygen Delivery Method): BiPAP/CPAP          I&O's Summary    29 Jul 2022 07:01  -  30 Jul 2022 07:00  --------------------------------------------------------  IN: 460 mL / OUT: 200 mL / NET: 260 mL    30 Jul 2022 07:01  -  31 Jul 2022 06:10  --------------------------------------------------------  IN: 0 mL / OUT: 200 mL / NET: -200 mL          Daily Height in cm: 149.86 (30 Jul 2022 18:45)    Daily     Adult Advanced Hemodynamics Last 24 Hrs  CVP(mm Hg): --  CVP(cm H2O): --  CO: --  CI: --  PA: --  PA(mean): --  PCWP: --  SVR: --  SVRI: --  PVR: --  PVRI: --    EKG/Telemetry Events:    MEDICATIONS  (STANDING):  aspirin  chewable 81 milliGRAM(s) Oral daily  cefepime   IVPB      cefepime   IVPB 2000 milliGRAM(s) IV Intermittent every 8 hours  chlorhexidine 4% Liquid 1 Application(s) Topical <User Schedule>  enoxaparin Injectable 40 milliGRAM(s) SubCutaneous every 24 hours  hydrocortisone hemorrhoidal Suppository 1 Suppository(s) Rectal daily  levothyroxine 100 MICROGram(s) Oral daily  melatonin 3 milliGRAM(s) Oral at bedtime  pantoprazole    Tablet 40 milliGRAM(s) Oral before breakfast  phenylephrine    Infusion 0.1 MICROgram(s)/kG/Min (1.1 mL/Hr) IV Continuous <Continuous>  senna 2 Tablet(s) Oral at bedtime    MEDICATIONS  (PRN):  aluminum hydroxide/magnesium hydroxide/simethicone Suspension 30 milliLiter(s) Oral every 4 hours PRN Dyspepsia  hydrocortisone hemorrhoidal Suppository 1 Suppository(s) Rectal two times a day PRN hemorrhoidal discomfort      PHYSICAL EXAM:  GENERAL: nad laying in bed, bipap in place   HEAD:  Atraumatic, Normocephalic  NECK: Supple, No JVD, r triple lumen in place  NERVOUS SYSTEM:  Awakens to voice, oriented.   CHEST/LUNG: B/L breath sounds  HEART: S1S2 audible, bradycardic, regular rhythm rhythm; Systolic murmur  ABDOMEN: Soft, Nontender, Nondistended  EXTREMITIES:  No clubbing, cyanosis, or edema      LABS:                        10.4   12.06 )-----------( x        ( 31 Jul 2022 05:09 )             32.8     07-30    139  |  102  |  17  ----------------------------<  119<H>  4.2   |  27  |  0.8    Ca    8.5      30 Jul 2022 23:30  Phos  1.9     07-30  Mg     2.2     07-30    TPro  6.3  /  Alb  3.5  /  TBili  0.7  /  DBili  x   /  AST  28  /  ALT  17  /  AlkPhos  136<H>  07-30    LIVER FUNCTIONS - ( 30 Jul 2022 23:30 )  Alb: 3.5 g/dL / Pro: 6.3 g/dL / ALK PHOS: 136 U/L / ALT: 17 U/L / AST: 28 U/L / GGT: x             CAPILLARY BLOOD GLUCOSE        ABG - ( 31 Jul 2022 03:02 )  pH, Arterial: 7.52  pH, Blood: x     /  pCO2: 32    /  pO2: 166   / HCO3: 26    / Base Excess: 3.5   /  SaO2: 98.7

## 2022-07-31 NOTE — PROGRESS NOTE ADULT - ATTENDING COMMENTS
89 year old  female with multiple comorbidities , Medical hx of HFpEF, severe AS s/p balloon valvuloplasty 2021, afib , off AC currently due to recent GI bleed, anemia s/p multiple transfusions 2 months ago, MVP, HTN, hyperthyroidism, HCC s/p partial liver resection, initially  transferred from [facility] to [place] for TAVR evaluation.  Hospitalization complicated by GI bleed s/p capsule endoscopy , found AVM s/p intervention.   7/30 patient become tachypneic with increase oxygen requirement , place on BiPAP in addition to diuretics and upgraded to CCU. Overnight patient developed fever and CXR showed congestion and consolidation could be suggestive of pneumonia. Patient also become more hypotensive requiring vasopressors,    Plan:  CCU monitoring  vasopressors to maintain MAP 60   IV ABX , ID evaluation pan Cx  Pulm evaluation for possible drainage of left pleural effusion   Oxygen support  Try to keep patient euvolemic   monitor kidney function and electrolytes  Guarded prognosis given multiple underlying comorbidities especially combination of severe AS and severe sepsis/septic shock currently.   CCU team discussed goal of care with family and for she is full code.

## 2022-08-01 ENCOUNTER — RESULT REVIEW (OUTPATIENT)
Age: 87
End: 2022-08-01

## 2022-08-01 DIAGNOSIS — J90 PLEURAL EFFUSION, NOT ELSEWHERE CLASSIFIED: ICD-10-CM

## 2022-08-01 LAB
-  STAPHYLOCOCCUS EPIDERMIDIS, METHICILLIN RESISTANT: SIGNIFICANT CHANGE UP
ALBUMIN SERPL ELPH-MCNC: 2.8 G/DL — LOW (ref 3.5–5.2)
ALP SERPL-CCNC: 121 U/L — HIGH (ref 30–115)
ALT FLD-CCNC: 17 U/L — SIGNIFICANT CHANGE UP (ref 0–41)
ANION GAP SERPL CALC-SCNC: 9 MMOL/L — SIGNIFICANT CHANGE UP (ref 7–14)
APPEARANCE UR: CLEAR — SIGNIFICANT CHANGE UP
AST SERPL-CCNC: 25 U/L — SIGNIFICANT CHANGE UP (ref 0–41)
B PERT IGG+IGM PNL SER: CLEAR — SIGNIFICANT CHANGE UP
BASE EXCESS BLDA CALC-SCNC: 1.8 MMOL/L — SIGNIFICANT CHANGE UP (ref -2–3)
BASOPHILS # BLD AUTO: 0.03 K/UL — SIGNIFICANT CHANGE UP (ref 0–0.2)
BASOPHILS NFR BLD AUTO: 0.4 % — SIGNIFICANT CHANGE UP (ref 0–1)
BILIRUB SERPL-MCNC: 0.5 MG/DL — SIGNIFICANT CHANGE UP (ref 0.2–1.2)
BILIRUB UR-MCNC: NEGATIVE — SIGNIFICANT CHANGE UP
BUN SERPL-MCNC: 26 MG/DL — HIGH (ref 10–20)
CALCIUM SERPL-MCNC: 8.5 MG/DL — SIGNIFICANT CHANGE UP (ref 8.5–10.1)
CHLORIDE SERPL-SCNC: 104 MMOL/L — SIGNIFICANT CHANGE UP (ref 98–110)
CO2 SERPL-SCNC: 26 MMOL/L — SIGNIFICANT CHANGE UP (ref 17–32)
COLOR FLD: SIGNIFICANT CHANGE UP
COLOR SPEC: SIGNIFICANT CHANGE UP
CREAT SERPL-MCNC: 0.8 MG/DL — SIGNIFICANT CHANGE UP (ref 0.7–1.5)
DIFF PNL FLD: NEGATIVE — SIGNIFICANT CHANGE UP
EGFR: 70 ML/MIN/1.73M2 — SIGNIFICANT CHANGE UP
EOSINOPHIL # BLD AUTO: 0.17 K/UL — SIGNIFICANT CHANGE UP (ref 0–0.7)
EOSINOPHIL NFR BLD AUTO: 2.3 % — SIGNIFICANT CHANGE UP (ref 0–8)
FLUID INTAKE SUBSTANCE CLASS: SIGNIFICANT CHANGE UP
GAS PNL BLDA: SIGNIFICANT CHANGE UP
GLUCOSE SERPL-MCNC: 88 MG/DL — SIGNIFICANT CHANGE UP (ref 70–99)
GLUCOSE UR QL: NEGATIVE — SIGNIFICANT CHANGE UP
GRAM STN FLD: SIGNIFICANT CHANGE UP
GRAM STN FLD: SIGNIFICANT CHANGE UP
HCO3 BLDA-SCNC: 26 MMOL/L — SIGNIFICANT CHANGE UP (ref 21–28)
HCT VFR BLD CALC: 31.8 % — LOW (ref 37–47)
HGB BLD-MCNC: 9.7 G/DL — LOW (ref 12–16)
HOROWITZ INDEX BLDA+IHG-RTO: 32 — SIGNIFICANT CHANGE UP
IMM GRANULOCYTES NFR BLD AUTO: 0.3 % — SIGNIFICANT CHANGE UP (ref 0.1–0.3)
KETONES UR-MCNC: SIGNIFICANT CHANGE UP
LEUKOCYTE ESTERASE UR-ACNC: NEGATIVE — SIGNIFICANT CHANGE UP
LYMPHOCYTES # BLD AUTO: 1.06 K/UL — LOW (ref 1.2–3.4)
LYMPHOCYTES # BLD AUTO: 14.4 % — LOW (ref 20.5–51.1)
LYMPHOCYTES # FLD: 50 — SIGNIFICANT CHANGE UP
MAGNESIUM SERPL-MCNC: 2.2 MG/DL — SIGNIFICANT CHANGE UP (ref 1.8–2.4)
MCHC RBC-ENTMCNC: 28.4 PG — SIGNIFICANT CHANGE UP (ref 27–31)
MCHC RBC-ENTMCNC: 30.5 G/DL — LOW (ref 32–37)
MCV RBC AUTO: 93.3 FL — SIGNIFICANT CHANGE UP (ref 81–99)
MESOTHL CELL # FLD: 8 % — SIGNIFICANT CHANGE UP
METHOD TYPE: SIGNIFICANT CHANGE UP
MONOCYTES # BLD AUTO: 0.51 K/UL — SIGNIFICANT CHANGE UP (ref 0.1–0.6)
MONOCYTES NFR BLD AUTO: 6.9 % — SIGNIFICANT CHANGE UP (ref 1.7–9.3)
MONOS+MACROS # FLD: 12 % — SIGNIFICANT CHANGE UP
NEUTROPHILS # BLD AUTO: 5.57 K/UL — SIGNIFICANT CHANGE UP (ref 1.4–6.5)
NEUTROPHILS NFR BLD AUTO: 75.7 % — HIGH (ref 42.2–75.2)
NEUTROPHILS-BODY FLUID: 30 % — SIGNIFICANT CHANGE UP
NITRITE UR-MCNC: NEGATIVE — SIGNIFICANT CHANGE UP
NRBC # BLD: 0 /100 WBCS — SIGNIFICANT CHANGE UP (ref 0–0)
PCO2 BLDA: 37 MMHG — SIGNIFICANT CHANGE UP (ref 25–48)
PH BLDA: 7.45 — SIGNIFICANT CHANGE UP (ref 7.35–7.45)
PH UR: 6 — SIGNIFICANT CHANGE UP (ref 5–8)
PHOSPHATE SERPL-MCNC: 2.5 MG/DL — SIGNIFICANT CHANGE UP (ref 2.1–4.9)
PLATELET # BLD AUTO: 222 K/UL — SIGNIFICANT CHANGE UP (ref 130–400)
PO2 BLDA: 121 MMHG — HIGH (ref 83–108)
POTASSIUM SERPL-MCNC: 4.2 MMOL/L — SIGNIFICANT CHANGE UP (ref 3.5–5)
POTASSIUM SERPL-SCNC: 4.2 MMOL/L — SIGNIFICANT CHANGE UP (ref 3.5–5)
PROCALCITONIN SERPL-MCNC: 0.51 NG/ML — HIGH (ref 0.02–0.1)
PROT SERPL-MCNC: 5.9 G/DL — LOW (ref 6–8)
PROT UR-MCNC: SIGNIFICANT CHANGE UP
RBC # BLD: 3.41 M/UL — LOW (ref 4.2–5.4)
RBC # FLD: 19.6 % — HIGH (ref 11.5–14.5)
RCV VOL RI: 1000 /UL — HIGH (ref 0–0)
SAO2 % BLDA: 98.8 % — HIGH (ref 94–98)
SODIUM SERPL-SCNC: 139 MMOL/L — SIGNIFICANT CHANGE UP (ref 135–146)
SP GR SPEC: 1.02 — SIGNIFICANT CHANGE UP (ref 1.01–1.03)
SPECIMEN SOURCE: SIGNIFICANT CHANGE UP
TOTAL NUCLEATED CELL COUNT, BODY FLUID: 193 /UL — SIGNIFICANT CHANGE UP
TUBE TYPE: SIGNIFICANT CHANGE UP
UROBILINOGEN FLD QL: SIGNIFICANT CHANGE UP
WBC # BLD: 7.36 K/UL — SIGNIFICANT CHANGE UP (ref 4.8–10.8)
WBC # FLD AUTO: 7.36 K/UL — SIGNIFICANT CHANGE UP (ref 4.8–10.8)

## 2022-08-01 PROCEDURE — 32555 ASPIRATE PLEURA W/ IMAGING: CPT

## 2022-08-01 PROCEDURE — 71045 X-RAY EXAM CHEST 1 VIEW: CPT | Mod: 26

## 2022-08-01 PROCEDURE — 71045 X-RAY EXAM CHEST 1 VIEW: CPT | Mod: 26,77

## 2022-08-01 PROCEDURE — 99291 CRITICAL CARE FIRST HOUR: CPT | Mod: 25

## 2022-08-01 PROCEDURE — 88112 CYTOPATH CELL ENHANCE TECH: CPT | Mod: 26

## 2022-08-01 PROCEDURE — 93010 ELECTROCARDIOGRAM REPORT: CPT

## 2022-08-01 PROCEDURE — 99291 CRITICAL CARE FIRST HOUR: CPT

## 2022-08-01 PROCEDURE — 88305 TISSUE EXAM BY PATHOLOGIST: CPT | Mod: 26

## 2022-08-01 RX ORDER — ALBUMIN HUMAN 25 %
300 VIAL (ML) INTRAVENOUS ONCE
Refills: 0 | Status: DISCONTINUED | OUTPATIENT
Start: 2022-08-01 | End: 2022-08-01

## 2022-08-01 RX ORDER — VANCOMYCIN HCL 1 G
750 VIAL (EA) INTRAVENOUS EVERY 12 HOURS
Refills: 0 | Status: DISCONTINUED | OUTPATIENT
Start: 2022-08-01 | End: 2022-08-01

## 2022-08-01 RX ORDER — ALBUMIN HUMAN 25 %
300 VIAL (ML) INTRAVENOUS ONCE
Refills: 0 | Status: COMPLETED | OUTPATIENT
Start: 2022-08-01 | End: 2022-08-01

## 2022-08-01 RX ORDER — CEFEPIME 1 G/1
1000 INJECTION, POWDER, FOR SOLUTION INTRAMUSCULAR; INTRAVENOUS EVERY 8 HOURS
Refills: 0 | Status: DISCONTINUED | OUTPATIENT
Start: 2022-08-01 | End: 2022-08-08

## 2022-08-01 RX ORDER — ENOXAPARIN SODIUM 100 MG/ML
40 INJECTION SUBCUTANEOUS EVERY 24 HOURS
Refills: 0 | Status: DISCONTINUED | OUTPATIENT
Start: 2022-08-01 | End: 2022-08-15

## 2022-08-01 RX ORDER — CEFTRIAXONE 500 MG/1
1000 INJECTION, POWDER, FOR SOLUTION INTRAMUSCULAR; INTRAVENOUS EVERY 24 HOURS
Refills: 0 | Status: DISCONTINUED | OUTPATIENT
Start: 2022-08-01 | End: 2022-08-01

## 2022-08-01 RX ORDER — MIDODRINE HYDROCHLORIDE 2.5 MG/1
5 TABLET ORAL EVERY 8 HOURS
Refills: 0 | Status: DISCONTINUED | OUTPATIENT
Start: 2022-08-01 | End: 2022-08-02

## 2022-08-01 RX ORDER — AZITHROMYCIN 500 MG/1
500 TABLET, FILM COATED ORAL EVERY 24 HOURS
Refills: 0 | Status: DISCONTINUED | OUTPATIENT
Start: 2022-08-01 | End: 2022-08-01

## 2022-08-01 RX ADMIN — CEFEPIME 100 MILLIGRAM(S): 1 INJECTION, POWDER, FOR SOLUTION INTRAMUSCULAR; INTRAVENOUS at 13:09

## 2022-08-01 RX ADMIN — AZITHROMYCIN 255 MILLIGRAM(S): 500 TABLET, FILM COATED ORAL at 10:25

## 2022-08-01 RX ADMIN — CHLORHEXIDINE GLUCONATE 1 APPLICATION(S): 213 SOLUTION TOPICAL at 05:43

## 2022-08-01 RX ADMIN — MIDODRINE HYDROCHLORIDE 5 MILLIGRAM(S): 2.5 TABLET ORAL at 21:53

## 2022-08-01 RX ADMIN — CEFTRIAXONE 100 MILLIGRAM(S): 500 INJECTION, POWDER, FOR SOLUTION INTRAMUSCULAR; INTRAVENOUS at 10:35

## 2022-08-01 RX ADMIN — SENNA PLUS 2 TABLET(S): 8.6 TABLET ORAL at 21:53

## 2022-08-01 RX ADMIN — CEFEPIME 100 MILLIGRAM(S): 1 INJECTION, POWDER, FOR SOLUTION INTRAMUSCULAR; INTRAVENOUS at 05:45

## 2022-08-01 RX ADMIN — CEFEPIME 100 MILLIGRAM(S): 1 INJECTION, POWDER, FOR SOLUTION INTRAMUSCULAR; INTRAVENOUS at 21:53

## 2022-08-01 RX ADMIN — ENOXAPARIN SODIUM 40 MILLIGRAM(S): 100 INJECTION SUBCUTANEOUS at 12:50

## 2022-08-01 RX ADMIN — Medication 81 MILLIGRAM(S): at 12:50

## 2022-08-01 RX ADMIN — Medication 150 MILLILITER(S): at 12:51

## 2022-08-01 NOTE — PROGRESS NOTE ADULT - ATTENDING COMMENTS
88 y/o F with severe AS with anemia from GIB s/p blood transfusion, septic shock likely due to PNA on Abx, blood cultures growing Staph epidermidis. Mod to large L pleural effusion s/p thoracentesis. T wave inversion on ECG.     Wean pressor support   Continue broad spectrum Abx   Monitor procal  Follow blood cultures   S/p thoracentesis   Monitor H/H - GI follow up   Monitor trops and daily ECG  Structural cards follow up once sepsis/ anemia under control  Prognosis guarded 89 y/o F with severe AS with anemia from GIB s/p blood transfusion, septic shock likely due to PNA on Abx, blood cultures growing Staph epidermidis. Mod to large L pleural effusion s/p thoracentesis. T wave inversion on ECG.     CVP 6, POCUS shows IVC 1.8 and collapsing   Wean pressor support   Give Albumin 25% 300 ml once   Agree with midodrine - start 5 mg TID if difficult to wean pressor after albumin bolus   Continue broad spectrum Abx   Monitor procal  Follow blood cultures   S/p thoracentesis   Monitor H/H - GI follow up   Monitor trops and daily ECG  Structural cards follow up once sepsis/ anemia under control  Prognosis guarded 89 y/o F with severe AS with anemia from GIB s/p blood transfusion, septic shock likely due to PNA on Abx, blood cultures growing Staph epidermidis. Mod to large L pleural effusion s/p thoracentesis. T wave inversion on ECG.     CVP 6, POCUS shows IVC 1.8 and collapsing   Wean pressor support   Give Albumin 25% 300 ml once   Agree with midodrine - start 5 mg TID if difficult to wean pressor after albumin bolus   Continue broad spectrum Abx   Monitor procal  Follow blood cultures   S/p thoracentesis   Monitor H/H - GI follow up   Monitor trops and daily ECG  Structural cards follow up once sepsis/ anemia under control  Prognosis guarded.

## 2022-08-01 NOTE — PROCEDURE NOTE - NSPOSTPRCRAD_GEN_A_CORE
Positive lung sliding/no pneumothorax
central line located in the superior vena cava/post-procedure radiography performed

## 2022-08-01 NOTE — PROGRESS NOTE ADULT - ASSESSMENT
Mrs. Silverio is a 89 y/o female with HFpEF, severe AS s/p balloon valvuloplasty 2021, anemia s/p multiple transfusions, MVP, HTN, hyperthyroidism, HCC s/p partial liver resection, transferred from [facility] to [place] for TAVR evaluation by Dr. Mccollum.  Patient found to be anemic s/p 1PRBC transfusion for Hgb < 8. 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 Diuretics largely remain on hold for soft BP (SBP < 100).  Ultimately, plan is for TAVR pending conditioning.    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  - Trend CBC Daily. 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%    - Strict I+O   - Daily standing weights  - maintain electrolytes, K>4 Mg >2    #Severe AS, mean gradient ~75mmHg s/p recent balloon valvuloplasty  - c/w optimization for TAVR  - c/w ASA  - Avoid IVF resuscitation d/t severe AS    #Hypotension (improved, -136 in last 24 hr)  - Monitor BP per routine  - severe sepsis and severe AS       #Hemorrhoids with Constipation  -Continue hemorrhoidal suppository   -Continue Senna 2 tabs HS  -Continue Lactulose 10 TID  -Tylenol 650 mg PO PRN for rectal pain    #Hypothyroidism  - Continue Levothyroxine 100 mcg PO daily  - TSH 4.36 (6/19/22)    #GERD  -Continue Pantoprazole 40 mg PO daily    #DVT ppx:  Sequential Compression Device (SCD)  #GI ppx:  Protonix  Code Status: Full Code    Dispo: likely to subacute rehab with plan to consider  TAVR as outpatient when patient's conditioning improves. Mrs. Silverio is a 89 y/o female with HFpEF, severe AS s/p balloon valvuloplasty 2021, anemia s/p multiple transfusions, MVP, HTN, hyperthyroidism, HCC s/p partial liver resection, transferred from [facility] to [place] for TAVR evaluation by Dr. Mccollum.  Patient found to be anemic s/p 1PRBC transfusion for Hgb < 8. 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 Diuretics largely remain on hold for soft BP (SBP < 100).  Ultimately, plan is for TAVR pending conditioning.    Respiratory   - Moderate left pleural effusion seen on CT angio s/p thoracocentesis 8/1/22 - 1 L fluid  - Repeat ABG this afternoon    ID  - 7/30 procal .51  - ID consulted  - d/c vancomycin  - continue cefepime at 1000 mg q 8 hours   - f/u blood and urine cultures  - 7/30 grew staph epidermidis, 8/1 pending  - f/u LDH and proteins levels from thoracentesis fluid    Cardiovascular  - TAVR workup likely outpatient   - Currently on phenylephedrine drip  - Given albumin   - Consider midodrine 5mg once albumin completed for additional pressure support  - Lasix held for hypotension      Anemia with BRBPR, likely lower GIB  Capsule study completed (7/20) showed 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  - Trend CBC Daily. Transfuse for Hgb <8.0  - S/p PRBC on 7/8 x 1 unit and 7/21 x 1 unit  - If patient is transfused give lasix    #Possible PNA  - Fevers overnight 7/31/22        #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%    - Strict I+O   - Daily standing weights  - maintain electrolytes, K>4 Mg >2    #Severe AS, mean gradient ~75mmHg s/p recent balloon valvuloplasty  - c/w optimization for TAVR  - c/w ASA  - Avoid IVF resuscitation d/t severe AS    #Hypotension (improved, -136 in last 24 hr)  - Monitor BP per routine  - severe sepsis and severe AS       #Hemorrhoids with Constipation  -Continue hemorrhoidal suppository   -Continue Senna 2 tabs HS  -Continue Lactulose 10 TID  -Tylenol 650 mg PO PRN for rectal pain    #Hypothyroidism  - Continue Levothyroxine 100 mcg PO daily  - TSH 4.36 (6/19/22)    #GERD  -Continue Pantoprazole 40 mg PO daily    #DVT ppx:  Sequential Compression Device (SCD)  #GI ppx:  Protonix  Code Status: Full Code    Dispo: likely to subacute rehab with plan to consider  TAVR as outpatient when patient's conditioning improves. Mrs. Silverio is a 89 y/o female with HFpEF, severe AS s/p balloon valvuloplasty 2021, anemia s/p multiple transfusions, MVP, HTN, hyperthyroidism, HCC s/p partial liver resection, transferred from [facility] to [place] for TAVR evaluation by Dr. Mccollum.  Patient found to be anemic s/p 1PRBC transfusion for Hgb < 8. 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 Diuretics largely remain on hold for soft BP (SBP < 100).  Ultimately, plan is for TAVR pending conditioning.    #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%  #Hypotension (improved, -136 in last 24 hr)  #Hemorrhoids with Constipation  #Hypothyroidism    Respiratory   - Moderate left pleural effusion seen on CT angio s/p thoracocentesis 8/1/22 - 1 L fluid  - Repeat ABG this afternoon    ID  - CXR 7/30 suspicious for PNA, with fever, elevated white count    - 7/30 procal .51  - ID consulted  - d/c vancomycin  - continue cefepime at 1000 mg q 8 hours   - f/u blood and urine cultures  - 7/30 grew staph epidermidis, 8/1 pending  - f/u LDH and proteins levels from thoracentesis fluid    Cardiovascular  - 1.8 IVC  - TAVR workup likely outpatient   - Currently on phenylephedrine drip - wean as tolerated  - Given albumin   - Consider midodrine 5mg once albumin completed for additional pressure support  - Lasix held for hypotension      Hematologic   - Trend CBC Daily. 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  - Continue hemorrhoidal suppository   -Continue Senna 2 tabs HS  -Tylenol 650 mg PO PRN for rectal pain  - Protonix    Endocrine  - Continue Levothyroxine 100 mcg PO daily  - TSH 4.36 (6/19/22)  - Monitor FS    Renal  - Strict I+O   - Daily standing weights  - maintain electrolytes, K>4 Mg >2    Neurological  - Avoid sedating medications    PT/Rehab  - Ordered    Lines/Gruber  - Central line: 7/31  - A-line: 8/1  - Primafit    Code Status: Full Code    Dispo: likely to subacute rehab with plan to consider  TAVR as outpatient when patient's conditioning improves. Mrs. Silverio is a 89 y/o female with HFpEF, severe AS s/p balloon valvuloplasty 2021, anemia s/p multiple transfusions, MVP, HTN, hyperthyroidism, HCC s/p partial liver resection, transferred from [facility] to [place] for TAVR evaluation by Dr. Mccollum.  Patient found to be anemic s/p 1PRBC transfusion for Hgb < 8. 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 Diuretics largely remain on hold for soft BP (SBP < 100).  Ultimately, plan is for TAVR pending conditioning.    #Possible PNA  Anemia with BRBPR, likely lower GIB  #Severe AS, mean gradient ~75mmHg s/p recent balloon valvuloplasty  #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%  #Hemorrhoids with Constipation  #Hypothyroidism    Respiratory   - Moderate left pleural effusion seen on CT angio s/p thoracocentesis 8/1/22 - 1 L fluid  - Repeat ABG this afternoon    ID  - CXR 7/30 suspicious for PNA, with fever, elevated white count    - 7/30 procal .51, WBC downtrending  - ID consulted  - d/c vancomycin  - continue cefepime at 1000 mg q 8 hours   - f/u blood and urine cultures  - 7/30 grew staph epidermidis, 8/1 pending  - f/u LDH and proteins levels from thoracentesis fluid    Cardiovascular  - 1.8 IVC  - TAVR workup likely outpatient   - Currently on phenylephedrine drip - wean as tolerated  - Given albumin   - Consider midodrine 5mg once albumin completed for additional pressure support  - Lasix held for hypotension      Hematologic   - Trend CBC Daily. 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  - Continue hemorrhoidal suppository   -Continue Senna 2 tabs HS  -Tylenol 650 mg PO PRN for rectal pain  - Protonix    Endocrine  - Continue Levothyroxine 100 mcg PO daily  - TSH 4.36 (6/19/22)  - Monitor FS    Renal  - Strict I+O   - Daily standing weights  - maintain electrolytes, K>4 Mg >2    Neurological  - Avoid sedating medications    PT/Rehab  - Ordered    Lines/Gruber  - Central line: 7/31  - A-line: 8/1  - Primafit    Code Status: Full Code    Dispo: likely to subacute rehab with plan to consider  TAVR as outpatient when patient's conditioning improves.

## 2022-08-01 NOTE — CONSULT NOTE ADULT - ASSESSMENT
ASSESSMENT  Patient is a 88 y/o female with HFpEF, severe AS s/p balloon valvuloplasty 2021, anemia s/p multiple transfusions, MVP, HTN, hyperthyroidism, HCC s/p partial liver resection whom is consulted for suspected sepsis secondary to pneumonia.     IMPRESSION  # Suspected sepsis secondary to RML pneumonia   # Blood cultures positive for MSSA Staphylococcus epidermidis    RECOMMENDATIONS  - continue unasyn + azithromycin  - repeat CXR  - repeat blood cultures     This is a pended note. All final recommendations to follow pending discussion with ID Attending    ASSESSMENT  Patient is a 88 y/o female with HFpEF, severe AS s/p balloon valvuloplasty 2021, anemia s/p multiple transfusions, MVP, HTN, hyperthyroidism, HCC s/p partial liver resection whom is consulted for suspected sepsis secondary to pneumonia.     IMPRESSION  # Suspected sepsis secondary to RML pneumonia   # s/p thoracentesis with removal of 875 mL fluid  # Blood cultures positive for Methicillin resistant Staphylococcus epidermidis    RECOMMENDATIONS  - continue unasyn + azithromycin  - repeat CXR  - repeat blood cultures   - f/u thoracentesis cultures    This is a pended note. All final recommendations to follow pending discussion with ID Attending    ASSESSMENT  Patient is a 88 y/o female with HFpEF, severe AS s/p balloon valvuloplasty 2021, anemia s/p multiple transfusions, MVP, HTN, hyperthyroidism, HCC s/p partial liver resection whom is consulted for suspected sepsis secondary to pneumonia.     IMPRESSION  # Suspected sepsis secondary to RML pneumonia   # s/p thoracentesis with removal of 875 mL fluid  # Blood cultures positive for Methicillin resistant Staphylococcus epidermidis    RECOMMENDATIONS  - d/c vancomycin  - continue cefepime 1000 mg  - f/u blood cultures   - f/u LDH and proteins levels from thoracentesis fluid    This is a pended note. All final recommendations to follow pending discussion with ID Attending    ASSESSMENT  Patient is a 88 y/o female with HFpEF, severe AS s/p balloon valvuloplasty 2021, anemia s/p multiple transfusions, MVP, HTN, hyperthyroidism, HCC s/p partial liver resection whom is consulted for suspected sepsis secondary to pneumonia.     IMPRESSION  # Suspected sepsis secondary to RML pneumonia   # s/p thoracentesis with removal of 875 mL fluid  # Blood cultures positive for Methicillin resistant Staphylococcus epidermidis    RECOMMENDATIONS  - d/c vancomycin  - continue cefepime 1000 mg q 8 hours   - f/u blood cultures   - f/u LDH and proteins levels from thoracentesis fluid

## 2022-08-01 NOTE — PROGRESS NOTE ADULT - SUBJECTIVE AND OBJECTIVE BOX
Patient is a 89y old  Female who presents with a chief complaint of Dyspnea w/ Exertion (2022 06:09)    HPI:  This is a 89-year-old female with a past medical history significant for CHF,severe AAS, MVP, hypertension, HCC, hypothyroidism, GERD who presents with shortness of breath.  Patient states that she has been having chronic shortness of breath w/ recent hospital admission -, pt reports dyspnea has been getting worse the last couple of days and did not have any resolution to sxs upon d/c.  Of note patient also states that she has been having some blood in the stool during this time.  Patient states dyspnea is worsened with any exertion- and associated with escalating chest pain.    Pt denies any cardiology or pulmonary f/u.  Pt also admits to predominantly bedbound state due to dyspnea.   (2022 16:43)       INTERVAL HPI/OVERNIGHT EVENTS:   No overnight events   Afebrile, hemodynamically stable     Subjective:    ICU Vital Signs Last 24 Hrs  T(C): 37.3 (2022 20:00), Max: 37.3 (2022 20:00)  T(F): 99.1 (2022 20:00), Max: 99.1 (2022 20:00)  HR: 57 (01 Aug 2022 03:00) (56 - 80)  BP: 98/53 (2022 07:00) (98/53 - 101/54)  BP(mean): 73 (2022 07:00) (71 - 75)  ABP: 90/57 (01 Aug 2022 03:00) (83/63 - 118/55)  ABP(mean): 69 (01 Aug 2022 03:00) (58 - 77)  RR: 23 (01 Aug 2022 03:00) (18 - 47)  SpO2: 100% (01 Aug 2022 03:00) (97% - 100%)    O2 Parameters below as of 01 Aug 2022 03:00  Patient On (Oxygen Delivery Method): nasal cannula, high flow          I&O's Summary    2022 07:01  -  2022 07:00  --------------------------------------------------------  IN: 135 mL / OUT: 1000 mL / NET: -865 mL    2022 07:01  -  01 Aug 2022 04:49  --------------------------------------------------------  IN: 297.8 mL / OUT: 500 mL / NET: -202.2 mL          Daily     Daily Weight in k.6 (2022 06:00)    Adult Advanced Hemodynamics Last 24 Hrs  CVP(mm Hg): --  CVP(cm H2O): --  CO: --  CI: --  PA: --  PA(mean): --  PCWP: --  SVR: --  SVRI: --  PVR: --  PVRI: --    EKG/Telemetry Events:    MEDICATIONS  (STANDING):  aspirin  chewable 81 milliGRAM(s) Oral daily  cefepime   IVPB      cefepime   IVPB 2000 milliGRAM(s) IV Intermittent every 8 hours  chlorhexidine 4% Liquid 1 Application(s) Topical <User Schedule>  hydrocortisone hemorrhoidal Suppository 1 Suppository(s) Rectal daily  levothyroxine 100 MICROGram(s) Oral daily  pantoprazole    Tablet 40 milliGRAM(s) Oral before breakfast  phenylephrine    Infusion 0.1 MICROgram(s)/kG/Min (1.1 mL/Hr) IV Continuous <Continuous>  senna 2 Tablet(s) Oral at bedtime    MEDICATIONS  (PRN):  aluminum hydroxide/magnesium hydroxide/simethicone Suspension 30 milliLiter(s) Oral every 4 hours PRN Dyspepsia  hydrocortisone hemorrhoidal Suppository 1 Suppository(s) Rectal two times a day PRN hemorrhoidal discomfort      PHYSICAL EXAM:  GENERAL:   HEAD:  Atraumatic, Normocephalic  EYES: EOMI, PERRLA, conjunctiva and sclera clear  NECK: Supple, No JVD, Normal thyroid, no enlarged nodes  NERVOUS SYSTEM:  Alert & Awake.   CHEST/LUNG: B/L good air entry; No rales, rhonchi, or wheezing  HEART: S1S2 normal, no S3, Regular rate and rhythm; No murmurs  ABDOMEN: Soft, Nontender, Nondistended; Bowel sounds present  EXTREMITIES:  2+ Peripheral Pulses, No clubbing, cyanosis, or edema  LYMPH: No lymphadenopathy noted  SKIN: No rashes or lesions    LABS:                        10.4   12.06 )-----------( 239      ( 2022 05:09 )             32.8         136  |  101  |  19  ----------------------------<  96  4.0   |  25  |  0.8    Ca    8.3<L>      2022 05:09  Phos  1.9       Mg     2.1         TPro  5.7<L>  /  Alb  3.1<L>  /  TBili  0.7  /  DBili  x   /  AST  34  /  ALT  19  /  AlkPhos  137<H>      LIVER FUNCTIONS - ( 2022 05:09 )  Alb: 3.1 g/dL / Pro: 5.7 g/dL / ALK PHOS: 137 U/L / ALT: 19 U/L / AST: 34 U/L / GGT: x           PT/INR - ( 2022 20:21 )   PT: 14.00 sec;   INR: 1.22 ratio         PTT - ( 2022 20:21 )  PTT:29.0 sec  CAPILLARY BLOOD GLUCOSE        ABG - ( 2022 03:02 )  pH, Arterial: 7.52  pH, Blood: x     /  pCO2: 32    /  pO2: 166   / HCO3: 26    / Base Excess: 3.5   /  SaO2: 98.7                          RADIOLOGY & ADDITIONAL TESTS:  CXR:        Care Discussed with Consultants/Other Providers [ x] YES  [ ] NO           Patient is a 89y old  Female who presents with a chief complaint of Dyspnea w/ Exertion (2022 06:09)    HPI:  HFpEF, severe AS s/p balloon valvuloplasty , anemia s/p multiple transfusions 2 months ago, MVP, HTN, hyperthyroidism, HCC s/p partial liver resection, transferred from [facility] to [place] for TAVR evaluation by Dr. Mccollum.  Patient found to be anemic most likely d/t GI bleed now s/p 1PRBC transfusion for Hgb < 8/anemia. GI, Dr. Woo consulted at Dr. Mccollum' request.  Capsule study completed.  S/p colonoscopy  with successful AVM intervention. Diuretics largely remain on hold for soft BP (SBP < 100).  Ultimately, plan is for TAVR pending conditioning. Patient was waiting for subacute rehab placement with plans for TAVR as outpatient when patient's conditioning improves.    Patient , became acutely dyspneic, hypertensive 170/79 and tachycardic to 140s w/ crackles and wheezes to lungs throughout. Bipap started, with some improvement noted. Transfer to CCU initiated. In CCU: became febrile 100.4, CXR showed possible pneumonia, bcx drawn, given cefepime and vanc.   Then became hypotensive down to 50s systolic- given 1 bolus, requiring pressor support. (phenylepherine), then went into nonsustained afib RVR (140s-150s). 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 Left sided pleural effusion drained 1L of fluid - sent for testing. ID following recommended cefepime and discontinuing vanc.        INTERVAL HPI/OVERNIGHT EVENTS:   No overnight events   Tmax 99.1 overnight, on pressor support overnight, saturating well on HFNC     Subjective:  Patient examined at bedside. She continues to endorse some SOB, and pain with breathing. She denies active chest pain, dizziness, lightheadedness.     ICU Vital Signs Last 24 Hrs  T(C): 37.3 (2022 20:00), Max: 37.3 (2022 20:00)  T(F): 99.1 (2022 20:00), Max: 99.1 (2022 20:00)  HR: 57 (01 Aug 2022 03:00) (56 - 80)  BP: 98/53 (2022 07:00) (98/53 - 101/54)  BP(mean): 73 (2022 07:00) (71 - 75)  ABP: 90/57 (01 Aug 2022 03:00) (83/63 - 118/55)  ABP(mean): 69 (01 Aug 2022 03:00) (58 - 77)  RR: 23 (01 Aug 2022 03:00) (18 - 47)  SpO2: 100% (01 Aug 2022 03:00) (97% - 100%)    O2 Parameters below as of 01 Aug 2022 03:00  Patient On (Oxygen Delivery Method): nasal cannula, high flow          I&O's Summary    2022 07:01  -  2022 07:00  --------------------------------------------------------  IN: 135 mL / OUT: 1000 mL / NET: -865 mL    2022 07:01  -  01 Aug 2022 04:49  --------------------------------------------------------  IN: 297.8 mL / OUT: 500 mL / NET: -202.2 mL          Daily     Daily Weight in k.6 (2022 06:00)    Adult Advanced Hemodynamics Last 24 Hrs  CVP(mm Hg): --  CVP(cm H2O): --  CO: --  CI: --  PA: --  PA(mean): --  PCWP: --  SVR: --  SVRI: --  PVR: --  PVRI: --    EKG/Telemetry Events:    MEDICATIONS  (STANDING):  aspirin  chewable 81 milliGRAM(s) Oral daily  cefepime   IVPB      cefepime   IVPB 2000 milliGRAM(s) IV Intermittent every 8 hours  chlorhexidine 4% Liquid 1 Application(s) Topical <User Schedule>  hydrocortisone hemorrhoidal Suppository 1 Suppository(s) Rectal daily  levothyroxine 100 MICROGram(s) Oral daily  pantoprazole    Tablet 40 milliGRAM(s) Oral before breakfast  phenylephrine    Infusion 0.1 MICROgram(s)/kG/Min (1.1 mL/Hr) IV Continuous <Continuous>  senna 2 Tablet(s) Oral at bedtime    MEDICATIONS  (PRN):  aluminum hydroxide/magnesium hydroxide/simethicone Suspension 30 milliLiter(s) Oral every 4 hours PRN Dyspepsia  hydrocortisone hemorrhoidal Suppository 1 Suppository(s) Rectal two times a day PRN hemorrhoidal discomfort      PHYSICAL EXAM:  GENERAL: nad laying in bed, high flow in place   HEAD:  Atraumatic, Normocephalic  NECK: Supple, No JVD, r triple lumen in place  NERVOUS SYSTEM:  Awakens to voice, oriented.   CHEST/LUNG: B/L breath sounds  HEART: S1S2 audible, bradycardic, regular rhythm; Systolic murmur  ABDOMEN: Soft, Nontender, Nondistended  EXTREMITIES:  No clubbing, cyanosis, or edema, r a-line in place    LABS:                        10.4   12.06 )-----------( 239      ( 2022 05:09 )             32.8     07-    136  |  101  |  19  ----------------------------<  96  4.0   |  25  |  0.8    Ca    8.3<L>      2022 05:09  Phos  1.9     07-30  Mg     2.1     -    TPro  5.7<L>  /  Alb  3.1<L>  /  TBili  0.7  /  DBili  x   /  AST  34  /  ALT  19  /  AlkPhos  137<H>  07-    LIVER FUNCTIONS - ( 2022 05:09 )  Alb: 3.1 g/dL / Pro: 5.7 g/dL / ALK PHOS: 137 U/L / ALT: 19 U/L / AST: 34 U/L / GGT: x           PT/INR - ( 2022 20:21 )   PT: 14.00 sec;   INR: 1.22 ratio         PTT - ( 2022 20:21 )  PTT:29.0 sec  CAPILLARY BLOOD GLUCOSE        ABG - ( 2022 03:02 )  pH, Arterial: 7.52  pH, Blood: x     /  pCO2: 32    /  pO2: 166   / HCO3: 26    / Base Excess: 3.5   /  SaO2: 98.7                          RADIOLOGY & ADDITIONAL TESTS:  CXR:  `< from: 12 Lead ECG (22 @ 05:02) >  Diagnosis Line Sinus rhythm with Premature supraventricular complexes  Left axis deviation  Left anterior fascicular block  Right bundle branch block  Left ventricular hypertrophy with repolarization abnormality  Abnormal ECG  < end of copied text >    CXR  < from: Xray Chest 1 View-PORTABLE IMMEDIATE (Xray Chest 1 View-PORTABLE IMMEDIATE .) (22 @ 14:55) >  Findings:  Support devices: Telemetry leads are seen. External pacer overlies the   left thorax. Right neck central venous catheter.  Cardiac/mediastinum/hilum: Aorta is ectatic  Lung parenchyma/Pleura: Retrocardiac opacity. Right mid lobe opacity.  Skeleton/soft tissues: Unchanged  Impression:  Improved appearance of the left hemithorax. Support devices as described.  < end of copied text >        Care Discussed with Consultants/Other Providers [ x] YES  [ ] NO

## 2022-08-01 NOTE — PROGRESS NOTE ADULT - ASSESSMENT
IMP:  - adm c/o SOB:  - severe AS awaiting TAVR  - anemia - lower GI bleed, large AVM in ileum found on capsule study 7/30      s/p transfusions  - stage 2 sacral breakdown  - poor po intake      r/t constipation (improving), disliking hospital food, and a Q of dysphagia to solids - and intermittently NPO  - hypothyroid - Synthroid ot given yesterday or today as pt was NPO for procedure - d/w RN and residents  - acute episode over the weekend as described above      to resume po diet  consider having dietitian or diet aide assist with food choices  for home she may do better with Boost VHC, which is lower volume, higher nutrient density, and low carb  give the synthroid iv if necessary  treat phos gently, but keep level> 3.5

## 2022-08-01 NOTE — CONSULT NOTE ADULT - TIME BILLING
I have personally seen and examined this patient.    I have reviewed all pertinent clinical information and reviewed all relevant imaging and diagnostic studies personally.   I counseled the patient about diagnostic testing and treatment plan. All questions were answered.   I discussed recommendations with the primary team.
I was physically present for the key portions of the evaluation and management (E/M) service provided.  I agree with the above history, physical, and plan which I have reviewed and edited where appropriate.  This was reviewed with the NP (ANIBAL Moran).

## 2022-08-01 NOTE — CONSULT NOTE ADULT - ASSESSMENT
IMPRESSION:    Left moderate pleural effusion  Acute hypoxemic respiratory failure   Severe aortic stenosis  Shock on Pressor  Possible CAP       PLAN:    CNS: No depressants. MEntal status is ok.     HEENT: Oral care    PULMONARY:  HOB @ 45 degrees. DC HFNC and switch to regular NC. Keep O2 saturation more than 92%. BEdside US done with moderate left effusion. Will plan for thoracentesis today. 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     CARDIOVASCULAR: Severe AS on echo with plans for TAVR. Taper down Neosinephrine and try to Add midodrine. Cardiology is following.     GI: GI prophylaxis.  Feeding PO diet as tolertaed.     RENAL:  Follow up lytes.  Correct as needed. Kidney function is at baseline.     INFECTIOUS DISEASE: Repeat blood culture; likely staph epidermidis was a colonizer. Switch Abx to Rocephin and Azithromycin and finish 5 day course.     HEMATOLOGICAL:  DVT prophylaxis with Lovenox.     ENDOCRINE:  Follow up FS.  Insulin protocol if needed.    MUSCULOSKELETAL: out of bed to chair.     CODE STATUS: Full code.     Will follow.

## 2022-08-01 NOTE — PROGRESS NOTE ADULT - SUBJECTIVE AND OBJECTIVE BOX
Mrs. Silverio is a 88 y/o female with HFpEF, severe AS s/p balloon valvuloplasty , anemia s/p multiple transfusions 2 months ago, MVP, HTN, hyperthyroidism, HCC s/p partial liver resection, transferred from [facility] to [place] for TAVR evaluation by Dr. Mccollum.  Patient found to be anemic most likely d/t GI bleed now s/p 1PRBC transfusion for Hgb < 8/anemia. GI, Dr. Woo consulted at Dr. Mccollum' request.  Capsule study completed.  S/p colonoscopy  with successful AVM intervention. Diuretics largely remain on hold for soft BP (SBP < 100).  Ultimately, plan is for TAVR pending conditioning. Patient was waiting for subacute rehab placement with plans for TAVR as outpatient when patient's conditioning improves.  pt initially seen at [facility], and followed with transfer [place].  On  she became acutely dyspneic and hypertensive and tachycardic abd transferred to CCU  pt required BiPAP initially on NC now and fairly comfortable, reported some SOB after she was speaking for a time  pt diuresed, required pressor, now s/p 5% albumin, s/p thoracentesis this morning  + febrile last night  abd soft, ND, NT   moves all extrem, no LE edema    T(F): 99.1 (22 @ 04:00), Max: 99.1 (22 @ 20:00)  HR: 55 (22 @ 09:00) (54 - 73)  BP: --  RR: 27 (22 @ 09:00) (19 - 47)  SpO2: 100% (22 @ 09:00) (88% - 100%)    MEDICATIONS  (STANDING):  albumin human  5% IVPB 300 milliLiter(s) IV Intermittent once  aspirin  chewable 81 milliGRAM(s) Oral daily  cefepime   IVPB 1000 milliGRAM(s) IV Intermittent every 8 hours  chlorhexidine 4% Liquid 1 Application(s) Topical <User Schedule>  enoxaparin Injectable 40 milliGRAM(s) SubCutaneous every 24 hours  hydrocortisone hemorrhoidal Suppository 1 Suppository(s) Rectal daily  levothyroxine 100 MICROGram(s) Oral daily  pantoprazole    Tablet 40 milliGRAM(s) Oral before breakfast  phenylephrine    Infusion 0.1 MICROgram(s)/kG/Min (1.1 mL/Hr) IV Continuous <Continuous>  senna 2 Tablet(s) Oral at bedtime  vancomycin  IVPB 750 milliGRAM(s) IV Intermittent every 12 hours    I&O's Detail  2022 07:01  -  01 Aug 2022 07:00  IN:    Phenylephrine: 297.8 mL  Total IN: 297.8 mL  OUT:    Voided (mL): 500 mL  Total OUT: 500 mL  Total NET: -202.2 mL        139  |  104  |  26<H>  ----------------------------<  88  4.2   |  26  |  0.8    Ca    8.5      01 Aug 2022 04:45  Phos  2.5       Mg     2.2         TPro  5.9<L>  /  Alb  2.8<L>  /  TBili  0.5  /  DBili  x   /  AST  25  /  ALT  17  /  AlkPhos  121<H>                          9.7    7.36  )-----------( 222      ( 01 Aug 2022 04:45 )             31.8      Daily Weight in k.2 (01 Aug 2022 05:00)    Diet, NPO after Midnight:      NPO Start Date: 2022,   NPO Start Time: 23:59 (22 @ 15:53)  Diet, DASH/TLC:   Sodium & Cholesterol Restricted  High Fiber (22 @ 13:10)

## 2022-08-01 NOTE — CONSULT NOTE ADULT - SUBJECTIVE AND OBJECTIVE BOX
DILIP LUCAS  89y, Female  Allergy: Cipro (Other)  Levaquin (Rash)  tetracycline (Hives)      CHIEF COMPLAINT: Dyspnea w/ Exertion (01 Aug 2022 08:21)      HPI:    Patient is a 88 y/o female with HFpEF, severe AS s/p balloon valvuloplasty 2021, anemia s/p multiple transfusions, MVP, HTN, hyperthyroidism, HCC s/p partial liver resection whom is consulted for suspected sepsis secondary to pneumonia. Patient was transferred from [facility] to [place] for TAVR evaluation by Dr. Mccollum.  Patient found to be anemic s/p 1PRBC transfusion for Hgb < 8. 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 On 7/30, patient was admitted to CCU due to acute dyspnea with elevated BP to 160's and sinus tachycardia 120-130's. On admission to CCU, patient became febrile 100.4. CXR showed possible pneumonia.      Infectious Diseases History:  Old Micro Data/Cultures:     FAMILY HISTORY:  No pertinent family history in first degree relatives      PAST MEDICAL & SURGICAL HISTORY:  HTN (hypertension)      Mitral valve prolapse      Enlarged heart      Murmur      Hyperthyroidism      Arthritis      HTN (hypertension)      Diverticulosis      Hiatal hernia      Acid reflux      Liver cancer  s/p resection and s/p chemo and radiation      Aortic stenosis  s/p ballon aortic valuloplasty 5/25/21      H/O resection of liver  liver cancer      Status post cholecystectomy          SOCIAL HISTORY  Social History:  Living Condition: lives at home with son ( caretaker)  Ambulation Status:  mostly bedbound with rolling assist device  Tobacco use:  Denies  EtOH use:  Denies  Illicit drug use: Denies  Marital Status: Unk (08 Jul 2022 16:43)      Recent Travel:  Other Exposures:     ROS  General: Denies rigors, nightsweats  HEENT: Denies headache, rhinorrhea, sore throat, eye pain  CV: Denies CP, palpitations  PULM: Denies wheezing, hemoptysis  GI: Denies hematemesis, hematochezia, melena  : Denies discharge, hematuria  MSK: Denies arthralgias, myalgias  SKIN: Denies rash, lesions  NEURO: Denies paresthesias, weakness  PSYCH: Denies depression, anxiety    VITALS:  T(F): 99.1, Max: 99.1 (07-31-22 @ 20:00)  HR: 55  BP: --  RR: 27Vital Signs Last 24 Hrs  T(C): 37.3 (01 Aug 2022 04:00), Max: 37.3 (31 Jul 2022 20:00)  T(F): 99.1 (01 Aug 2022 04:00), Max: 99.1 (31 Jul 2022 20:00)  HR: 55 (01 Aug 2022 09:00) (54 - 73)  BP: --  BP(mean): --  RR: 27 (01 Aug 2022 09:00) (19 - 47)  SpO2: 100% (01 Aug 2022 09:00) (88% - 100%)    Parameters below as of 01 Aug 2022 09:00  Patient On (Oxygen Delivery Method): nasal cannula  O2 Flow (L/min): 3      PHYSICAL EXAM:  General: In NAD   HEENT:  RAFFI              Lymphatic system: No cervical LN   Lungs: Bilateral BS, decreased breath sounds on the left  Cardiovascular: Regular  Gastrointestinal: Soft, Positive BS  Musculoskeletal: No clubbing.  Moves all extremities.  Full range of motion   Skin: Warm.  Intact  Neurological: No motor or sensory deficit       TESTS & MEASUREMENTS:                        9.7    7.36  )-----------( 222      ( 01 Aug 2022 04:45 )             31.8     08-01    139  |  104  |  26<H>  ----------------------------<  88  4.2   |  26  |  0.8    Ca    8.5      01 Aug 2022 04:45  Phos  2.5     08-01  Mg     2.2     08-01    TPro  5.9<L>  /  Alb  2.8<L>  /  TBili  0.5  /  DBili  x   /  AST  25  /  ALT  17  /  AlkPhos  121<H>  08-01      LIVER FUNCTIONS - ( 01 Aug 2022 04:45 )  Alb: 2.8 g/dL / Pro: 5.9 g/dL / ALK PHOS: 121 U/L / ALT: 17 U/L / AST: 25 U/L / GGT: x               Culture - Blood (collected 07-30-22 @ 23:30)  Source: .Blood Blood  Gram Stain (08-01-22 @ 08:33):    Growth in anaerobic bottle: Gram Positive Cocci in Clusters    Growth in aerobic bottle: Gram Positive Cocci in Clusters  Preliminary Report (08-01-22 @ 08:34):    Growth in anaerobic bottle: Gram Positive Cocci in Clusters    Growth in aerobic bottle: Gram Positive Cocci in Clusters    ***Blood Panel PCR results on this specimen are available    approximately 3 hours after the Gram stain result.***    Gram stain, PCR, and/or culture results may not always    correspond due to difference in methodologies.    ************************************************************    This PCR assay was performed by multiplex PCR. This    Assay tests for 66 bacterial and resistance gene targets.    Please refer to the [facility] test directory    at [email or web address] for details.  Organism: Blood Culture PCR (08-01-22 @ 04:43)  Organism: Blood Culture PCR (08-01-22 @ 04:43)      -  Staphylococcus epidermidis, Methicillin resistant: Detec      Method Type: PCR            INFECTIOUS DISEASES TESTING  MRSA PCR Result.: Negative (07-31-22 @ 08:07)  Legionella Antigen, Urine: Negative (06-19-22 @ 09:47)      RADIOLOGY & ADDITIONAL TESTS:    < from: Xray Chest 1 View-PORTABLE IMMEDIATE (Xray Chest 1 View-PORTABLE IMMEDIATE .) (08.01.22 @ 08:42) >  ACC: [id number] EXAM:  XR CHEST PORTABLE IMMED 1V                          PROCEDURE DATE:  08/01/2022          INTERPRETATION:  Clinical History / Reason for exam: Right IJ insertion    Comparison : Chest radiograph earlier the same day.    Technique/Positioning: AP chest.    Findings:    Support devices: New right IJ catheter with tip in the region of the   caval atrial junction.    Cardiac/mediastinum/hilum: No change    Lung parenchyma/Pleura: Cardiac pad obscures evaluation of the left   hemithorax. Again demonstrated are bilateral lung opacities with moderate   to large left pleural effusion. Findings essentially unchanged.    Skeleton/soft tissues: Degenerative changes.    Impression:    New right-sided central line.    Bilateral lung opacities with moderate to large left pleural effusion,   overall unchanged.    --- End of Report ---      CARDIOLOGY TESTING  12 Lead ECG:   Ventricular Rate 62 BPM    Atrial Rate 62 BPM    P-R Interval 162 ms    QRS Duration 130 ms    Q-T Interval 492 ms    QTC Calculation(Bazett) 499 ms    P Axis 20 degrees    R Axis -41 degrees    T Axis -55 degrees    Diagnosis Line Sinus rhythm with Premature supraventricular complexes  Left axis deviation  Left anterior fascicular block  Right bundle branch block  Left ventricular hypertrophy with repolarization abnormality  Abnormal ECG    Confirmed by JULIEN MAKI MD ([id number]) on 8/1/2022 8:46:56 AM (08-01-22 @ 05:02)  12 Lead ECG:   Ventricular Rate 66 BPM    Atrial Rate 66 BPM    P-R Interval 164 ms    QRS Duration 126 ms    Q-T Interval 464 ms    QTC Calculation(Bazett) 486 ms    P Axis 49 degrees    R Axis -42 degrees    T Axis -62 degrees    Diagnosis Line Sinus rhythm with Premature atrial complexes  Left axis deviation  Right bundle branch block  Voltage criteria for left ventricular hypertrophy  T wave abnormality, consider inferolateral ischemia  Abnormal ECG    Confirmed by Lino Moser ([id number]) on 7/31/2022 9:40:12 PM (07-31-22 @ 05:42)      All available historical records have been reviewed    MEDICATIONS  albumin human 25% IVPB 300  aspirin  chewable 81  cefepime   IVPB 1000  chlorhexidine 4% Liquid 1  enoxaparin Injectable 40  hydrocortisone hemorrhoidal Suppository 1  levothyroxine 100  pantoprazole    Tablet 40  phenylephrine    Infusion 0.1  senna 2  vancomycin  IVPB 750      ANTIBIOTICS:  cefepime   IVPB 1000 milliGRAM(s) IV Intermittent every 8 hours  vancomycin  IVPB 750 milliGRAM(s) IV Intermittent every 12 hours      All available historical data has been reviewed

## 2022-08-01 NOTE — CONSULT NOTE ADULT - SUBJECTIVE AND OBJECTIVE BOX
Patient is a 89y old  Female who presents with a chief complaint of Dyspnea w/ Exertion (01 Aug 2022 04:48)      HPI:  This is a 89-year-old female with a past medical history significant for CHF,severe AS, MVP, hypertension, HCC, hypothyroidism, GERD who presents with shortness of breath.  Patient states that she has been having chronic shortness of breath w/ recent hospital admission 6/18-6/22, pt reports dyspnea has been getting worse the last couple of days and did not have any resolution to sxs upon d/c.  Of note patient also states that she has been having some blood in the stool during this time.  Patient states dyspnea is worsened with any exertion- and associated with escalating chest pain.    Pt denies any cardiology or pulmonary f/u.  Pt also admits to predominantly bedbound state due to dyspnea.   (08 Jul 2022 16:43)      PAST MEDICAL & SURGICAL HISTORY:  HTN (hypertension)      Mitral valve prolapse      Enlarged heart      Murmur      Hyperthyroidism      Arthritis      HTN (hypertension)      Diverticulosis      Hiatal hernia      Acid reflux      Liver cancer  s/p resection and s/p chemo and radiation      Aortic stenosis  s/p ballon aortic valuloplasty 5/25/21      H/O resection of liver  liver cancer      Status post cholecystectomy          SOCIAL HX:   Smoking history of 20 PY                        ETOH                            Other    FAMILY HISTORY:  No pertinent family history in first degree relatives    :  No known cardiovacular family hisotry     ROS:  See HPI     Allergies    Cipro (Other)  Levaquin (Rash)  tetracycline (Hives)    Intolerances          PHYSICAL EXAM    ICU Vital Signs Last 24 Hrs  T(C): 37.3 (01 Aug 2022 04:00), Max: 37.3 (31 Jul 2022 20:00)  T(F): 99.1 (01 Aug 2022 04:00), Max: 99.1 (31 Jul 2022 20:00)  HR: 58 (01 Aug 2022 07:00) (54 - 73)  BP: --  BP(mean): --  ABP: 89/58 (01 Aug 2022 07:00) (75/63 - 118/55)  ABP(mean): 73 (01 Aug 2022 07:00) (58 - 77)  RR: 24 (01 Aug 2022 07:00) (19 - 47)  SpO2: 100% (01 Aug 2022 07:00) (88% - 100%)    O2 Parameters below as of 01 Aug 2022 07:42  Patient On (Oxygen Delivery Method): nasal cannula, high flow  O2 Flow (L/min): 60  O2 Concentration (%): 40        General: In NAD   HEENT:  RAFFI              Lymphatic system: No cervical LN   Lungs: Bilateral BS, decreased breath sounds on the left  Cardiovascular: Regular  Gastrointestinal: Soft, Positive BS  Musculoskeletal: No clubbing.  Moves all extremities.  Full range of motion   Skin: Warm.  Intact  Neurological: No motor or sensory deficit       07-31-22 @ 07:01  -  08-01-22 @ 07:00  --------------------------------------------------------  IN:    Phenylephrine: 297.8 mL  Total IN: 297.8 mL    OUT:    Voided (mL): 500 mL  Total OUT: 500 mL    Total NET: -202.2 mL          LABS:                          9.7    7.36  )-----------( 222      ( 01 Aug 2022 04:45 )             31.8                                               08-01    139  |  104  |  26<H>  ----------------------------<  88  4.2   |  26  |  0.8    Ca    8.5      01 Aug 2022 04:45  Phos  2.5     08-01  Mg     2.2     08-01    TPro  5.9<L>  /  Alb  2.8<L>  /  TBili  0.5  /  DBili  x   /  AST  25  /  ALT  17  /  AlkPhos  121<H>  08-01      PT/INR - ( 31 Jul 2022 20:21 )   PT: 14.00 sec;   INR: 1.22 ratio         PTT - ( 31 Jul 2022 20:21 )  PTT:29.0 sec                                                                                     LIVER FUNCTIONS - ( 01 Aug 2022 04:45 )  Alb: 2.8 g/dL / Pro: 5.9 g/dL / ALK PHOS: 121 U/L / ALT: 17 U/L / AST: 25 U/L / GGT: x                                                  Culture - Blood (collected 30 Jul 2022 23:30)  Source: .Blood Blood  Gram Stain (01 Aug 2022 03:01):    Growth in anaerobic bottle: Gram Positive Cocci in Clusters  Preliminary Report (01 Aug 2022 03:01):    Growth in anaerobic bottle: Gram Positive Cocci in Clusters    ***Blood Panel PCR results on this specimen are available    approximately 3 hours after the Gram stain result.***    Gram stain, PCR, and/or culture results may not always    correspond due todifference in methodologies.    ************************************************************    This PCR assay was performed by multiplex PCR. This    Assay tests for 66 bacterial and resistance gene targets.    Please refer to the [facility] testdirectory    at [email or web address] for details.  Organism: Blood Culture PCR (01 Aug 2022 04:43)  Organism: Blood Culture PCR (01 Aug 2022 04:43)                                                                                       ABG - ( 31 Jul 2022 03:02 )  pH, Arterial: 7.52  pH, Blood: x     /  pCO2: 32    /  pO2: 166   / HCO3: 26    / Base Excess: 3.5   /  SaO2: 98.7                X-Rays: Rounded opacity on the right side: pseudotumor likely; left effusion opacity                                                                                 ECHO: severe AS; EF 40-45%    MEDICATIONS  (STANDING):  aspirin  chewable 81 milliGRAM(s) Oral daily  cefepime   IVPB      cefepime   IVPB 2000 milliGRAM(s) IV Intermittent every 8 hours  chlorhexidine 4% Liquid 1 Application(s) Topical <User Schedule>  hydrocortisone hemorrhoidal Suppository 1 Suppository(s) Rectal daily  levothyroxine 100 MICROGram(s) Oral daily  pantoprazole    Tablet 40 milliGRAM(s) Oral before breakfast  phenylephrine    Infusion 0.1 MICROgram(s)/kG/Min (1.1 mL/Hr) IV Continuous <Continuous>  senna 2 Tablet(s) Oral at bedtime    MEDICATIONS  (PRN):  aluminum hydroxide/magnesium hydroxide/simethicone Suspension 30 milliLiter(s) Oral every 4 hours PRN Dyspepsia  hydrocortisone hemorrhoidal Suppository 1 Suppository(s) Rectal two times a day PRN hemorrhoidal discomfort

## 2022-08-02 LAB
ALBUMIN FLD-MCNC: 1.2 G/DL — SIGNIFICANT CHANGE UP
ALBUMIN SERPL ELPH-MCNC: 3.9 G/DL — SIGNIFICANT CHANGE UP (ref 3.5–5.2)
ALP SERPL-CCNC: 98 U/L — SIGNIFICANT CHANGE UP (ref 30–115)
ALT FLD-CCNC: 12 U/L — SIGNIFICANT CHANGE UP (ref 0–41)
ANION GAP SERPL CALC-SCNC: 12 MMOL/L — SIGNIFICANT CHANGE UP (ref 7–14)
ANION GAP SERPL CALC-SCNC: 9 MMOL/L — SIGNIFICANT CHANGE UP (ref 7–14)
AST SERPL-CCNC: 20 U/L — SIGNIFICANT CHANGE UP (ref 0–41)
BASOPHILS # BLD AUTO: 0.03 K/UL — SIGNIFICANT CHANGE UP (ref 0–0.2)
BASOPHILS NFR BLD AUTO: 0.4 % — SIGNIFICANT CHANGE UP (ref 0–1)
BILIRUB SERPL-MCNC: 0.6 MG/DL — SIGNIFICANT CHANGE UP (ref 0.2–1.2)
BUN SERPL-MCNC: 20 MG/DL — SIGNIFICANT CHANGE UP (ref 10–20)
BUN SERPL-MCNC: 22 MG/DL — HIGH (ref 10–20)
CALCIUM SERPL-MCNC: 8.9 MG/DL — SIGNIFICANT CHANGE UP (ref 8.5–10.1)
CALCIUM SERPL-MCNC: 9 MG/DL — SIGNIFICANT CHANGE UP (ref 8.5–10.1)
CHLORIDE SERPL-SCNC: 100 MMOL/L — SIGNIFICANT CHANGE UP (ref 98–110)
CHLORIDE SERPL-SCNC: 106 MMOL/L — SIGNIFICANT CHANGE UP (ref 98–110)
CO2 SERPL-SCNC: 23 MMOL/L — SIGNIFICANT CHANGE UP (ref 17–32)
CO2 SERPL-SCNC: 23 MMOL/L — SIGNIFICANT CHANGE UP (ref 17–32)
CREAT SERPL-MCNC: 0.7 MG/DL — SIGNIFICANT CHANGE UP (ref 0.7–1.5)
CREAT SERPL-MCNC: 0.9 MG/DL — SIGNIFICANT CHANGE UP (ref 0.7–1.5)
CULTURE RESULTS: SIGNIFICANT CHANGE UP
EGFR: 61 ML/MIN/1.73M2 — SIGNIFICANT CHANGE UP
EGFR: 83 ML/MIN/1.73M2 — SIGNIFICANT CHANGE UP
EOSINOPHIL # BLD AUTO: 0.17 K/UL — SIGNIFICANT CHANGE UP (ref 0–0.7)
EOSINOPHIL NFR BLD AUTO: 2.4 % — SIGNIFICANT CHANGE UP (ref 0–8)
GLUCOSE FLD-MCNC: 109 MG/DL — SIGNIFICANT CHANGE UP
GLUCOSE SERPL-MCNC: 141 MG/DL — HIGH (ref 70–99)
GLUCOSE SERPL-MCNC: 94 MG/DL — SIGNIFICANT CHANGE UP (ref 70–99)
GRAM STN FLD: SIGNIFICANT CHANGE UP
HCT VFR BLD CALC: 27.6 % — LOW (ref 37–47)
HCT VFR BLD CALC: 29.5 % — LOW (ref 37–47)
HGB BLD-MCNC: 8.7 G/DL — LOW (ref 12–16)
HGB BLD-MCNC: 9.2 G/DL — LOW (ref 12–16)
IMM GRANULOCYTES NFR BLD AUTO: 0.3 % — SIGNIFICANT CHANGE UP (ref 0.1–0.3)
IRON SATN MFR SERPL: 35 % — SIGNIFICANT CHANGE UP (ref 15–50)
IRON SATN MFR SERPL: 57 UG/DL — SIGNIFICANT CHANGE UP (ref 35–150)
LDH SERPL L TO P-CCNC: 222 — SIGNIFICANT CHANGE UP (ref 50–242)
LDH SERPL L TO P-CCNC: 72 U/L — SIGNIFICANT CHANGE UP
LYMPHOCYTES # BLD AUTO: 0.7 K/UL — LOW (ref 1.2–3.4)
LYMPHOCYTES # BLD AUTO: 10 % — LOW (ref 20.5–51.1)
MAGNESIUM SERPL-MCNC: 2.2 MG/DL — SIGNIFICANT CHANGE UP (ref 1.8–2.4)
MCHC RBC-ENTMCNC: 28.9 PG — SIGNIFICANT CHANGE UP (ref 27–31)
MCHC RBC-ENTMCNC: 29.3 PG — SIGNIFICANT CHANGE UP (ref 27–31)
MCHC RBC-ENTMCNC: 31.2 G/DL — LOW (ref 32–37)
MCHC RBC-ENTMCNC: 31.5 G/DL — LOW (ref 32–37)
MCV RBC AUTO: 92.8 FL — SIGNIFICANT CHANGE UP (ref 81–99)
MCV RBC AUTO: 92.9 FL — SIGNIFICANT CHANGE UP (ref 81–99)
MONOCYTES # BLD AUTO: 0.48 K/UL — SIGNIFICANT CHANGE UP (ref 0.1–0.6)
MONOCYTES NFR BLD AUTO: 6.9 % — SIGNIFICANT CHANGE UP (ref 1.7–9.3)
NEUTROPHILS # BLD AUTO: 5.6 K/UL — SIGNIFICANT CHANGE UP (ref 1.4–6.5)
NEUTROPHILS NFR BLD AUTO: 80 % — HIGH (ref 42.2–75.2)
NRBC # BLD: 0 /100 WBCS — SIGNIFICANT CHANGE UP (ref 0–0)
NRBC # BLD: 0 /100 WBCS — SIGNIFICANT CHANGE UP (ref 0–0)
ORGANISM # SPEC MICROSCOPIC CNT: SIGNIFICANT CHANGE UP
ORGANISM # SPEC MICROSCOPIC CNT: SIGNIFICANT CHANGE UP
PH FLD: 7.8 — SIGNIFICANT CHANGE UP
PLATELET # BLD AUTO: 169 K/UL — SIGNIFICANT CHANGE UP (ref 130–400)
PLATELET # BLD AUTO: 172 K/UL — SIGNIFICANT CHANGE UP (ref 130–400)
POTASSIUM SERPL-MCNC: 4.2 MMOL/L — SIGNIFICANT CHANGE UP (ref 3.5–5)
POTASSIUM SERPL-MCNC: 4.2 MMOL/L — SIGNIFICANT CHANGE UP (ref 3.5–5)
POTASSIUM SERPL-SCNC: 4.2 MMOL/L — SIGNIFICANT CHANGE UP (ref 3.5–5)
POTASSIUM SERPL-SCNC: 4.2 MMOL/L — SIGNIFICANT CHANGE UP (ref 3.5–5)
PROT FLD-MCNC: 2.1 G/DL — SIGNIFICANT CHANGE UP
PROT SERPL-MCNC: 6.3 G/DL — SIGNIFICANT CHANGE UP (ref 6–8)
RBC # BLD: 2.97 M/UL — LOW (ref 4.2–5.4)
RBC # BLD: 3.18 M/UL — LOW (ref 4.2–5.4)
RBC # FLD: 19.4 % — HIGH (ref 11.5–14.5)
RBC # FLD: 19.7 % — HIGH (ref 11.5–14.5)
SODIUM SERPL-SCNC: 135 MMOL/L — SIGNIFICANT CHANGE UP (ref 135–146)
SODIUM SERPL-SCNC: 138 MMOL/L — SIGNIFICANT CHANGE UP (ref 135–146)
SPECIMEN SOURCE: SIGNIFICANT CHANGE UP
SPECIMEN SOURCE: SIGNIFICANT CHANGE UP
TIBC SERPL-MCNC: 164 UG/DL — LOW (ref 220–430)
TRANSFERRIN SERPL-MCNC: 141 MG/DL — LOW (ref 200–360)
UIBC SERPL-MCNC: 107 UG/DL — LOW (ref 110–370)
WBC # BLD: 6.85 K/UL — SIGNIFICANT CHANGE UP (ref 4.8–10.8)
WBC # BLD: 7 K/UL — SIGNIFICANT CHANGE UP (ref 4.8–10.8)
WBC # FLD AUTO: 6.85 K/UL — SIGNIFICANT CHANGE UP (ref 4.8–10.8)
WBC # FLD AUTO: 7 K/UL — SIGNIFICANT CHANGE UP (ref 4.8–10.8)

## 2022-08-02 PROCEDURE — 71045 X-RAY EXAM CHEST 1 VIEW: CPT | Mod: 26

## 2022-08-02 PROCEDURE — 99233 SBSQ HOSP IP/OBS HIGH 50: CPT

## 2022-08-02 PROCEDURE — 93010 ELECTROCARDIOGRAM REPORT: CPT

## 2022-08-02 RX ORDER — FUROSEMIDE 40 MG
40 TABLET ORAL ONCE
Refills: 0 | Status: COMPLETED | OUTPATIENT
Start: 2022-08-02 | End: 2022-08-02

## 2022-08-02 RX ORDER — MIDODRINE HYDROCHLORIDE 2.5 MG/1
10 TABLET ORAL EVERY 8 HOURS
Refills: 0 | Status: DISCONTINUED | OUTPATIENT
Start: 2022-08-02 | End: 2022-08-06

## 2022-08-02 RX ORDER — CHLORHEXIDINE GLUCONATE 213 G/1000ML
1 SOLUTION TOPICAL DAILY
Refills: 0 | Status: DISCONTINUED | OUTPATIENT
Start: 2022-08-02 | End: 2022-08-15

## 2022-08-02 RX ORDER — MIDODRINE HYDROCHLORIDE 2.5 MG/1
5 TABLET ORAL EVERY 8 HOURS
Refills: 0 | Status: DISCONTINUED | OUTPATIENT
Start: 2022-08-02 | End: 2022-08-02

## 2022-08-02 RX ADMIN — SENNA PLUS 2 TABLET(S): 8.6 TABLET ORAL at 21:06

## 2022-08-02 RX ADMIN — MIDODRINE HYDROCHLORIDE 5 MILLIGRAM(S): 2.5 TABLET ORAL at 05:16

## 2022-08-02 RX ADMIN — CHLORHEXIDINE GLUCONATE 1 APPLICATION(S): 213 SOLUTION TOPICAL at 06:42

## 2022-08-02 RX ADMIN — CEFEPIME 100 MILLIGRAM(S): 1 INJECTION, POWDER, FOR SOLUTION INTRAMUSCULAR; INTRAVENOUS at 05:17

## 2022-08-02 RX ADMIN — MIDODRINE HYDROCHLORIDE 10 MILLIGRAM(S): 2.5 TABLET ORAL at 12:18

## 2022-08-02 RX ADMIN — PANTOPRAZOLE SODIUM 40 MILLIGRAM(S): 20 TABLET, DELAYED RELEASE ORAL at 05:16

## 2022-08-02 RX ADMIN — Medication 40 MILLIGRAM(S): at 12:17

## 2022-08-02 RX ADMIN — CEFEPIME 100 MILLIGRAM(S): 1 INJECTION, POWDER, FOR SOLUTION INTRAMUSCULAR; INTRAVENOUS at 21:06

## 2022-08-02 RX ADMIN — MIDODRINE HYDROCHLORIDE 10 MILLIGRAM(S): 2.5 TABLET ORAL at 21:06

## 2022-08-02 RX ADMIN — Medication 81 MILLIGRAM(S): at 12:18

## 2022-08-02 RX ADMIN — CEFEPIME 100 MILLIGRAM(S): 1 INJECTION, POWDER, FOR SOLUTION INTRAMUSCULAR; INTRAVENOUS at 13:26

## 2022-08-02 RX ADMIN — ENOXAPARIN SODIUM 40 MILLIGRAM(S): 100 INJECTION SUBCUTANEOUS at 12:18

## 2022-08-02 RX ADMIN — PHENYLEPHRINE HYDROCHLORIDE 1.1 MICROGRAM(S)/KG/MIN: 10 INJECTION INTRAVENOUS at 05:16

## 2022-08-02 RX ADMIN — Medication 100 MICROGRAM(S): at 05:16

## 2022-08-02 NOTE — PROGRESS NOTE ADULT - ATTENDING COMMENTS
Patient stable but remains on pressor support with phenylephrine gtt, started on midodrine yesterday. CXR shows reaccumulation of left effusion. On Abx for episode of fever. Hb 8.9.     Continue to wean phenylephrine   Agree with increasing midodrine  CVP 10-11 mmHg   Give one dose of furosemide 40 mg iv push   Repeat CBC in PM    Get full iron profile   Abx per ID / blood cultures NGTD Patient stable but remains on pressor support with phenylephrine gtt, started on midodrine yesterday. CXR shows reaccumulation of left effusion. On Abx for episode of fever. Hb 8.9.     Continue to wean phenylephrine   Agree with increasing midodrine  CVP 10-11 mmHg   Give one dose of furosemide 40 mg iv push   Repeat CBC in PM    Get full iron profile   Abx per ID / blood cultures NGTD.

## 2022-08-02 NOTE — PROGRESS NOTE ADULT - ASSESSMENT
#Possible PNA  Anemia with BRBPR, likely lower GIB  #Severe AS, mean gradient ~75mmHg s/p recent balloon valvuloplasty  #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%  #Hemorrhoids with Constipation  #Hypothyroidism    Respiratory   #Possible PNA  - Moderate left pleural effusion seen on CT angio s/p thoracocentesis 8/1/22 - 1 L fluid  - Repeat ABG this afternoon    ID  - CXR 7/30 suspicious for PNA, with fever, elevated white count    - 7/30 procal .51, WBC downtrending  - ID consulted  - d/c vancomycin  - continue cefepime at 1000 mg q 8 hours   - f/u blood and urine cultures  - 7/30 grew staph epidermidis, 8/1 pending  - f/u LDH and proteins levels from thoracentesis fluid    Cardiovascular  #Severe AS, mean gradient ~75mmHg s/p recent balloon   #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%   - 1.8 IVC  - TAVR workup likely outpatient   - Currently on phenylephedrine drip - wean as tolerated  - Given albumin   - Consider midodrine 5mg once albumin completed for additional pressure support  - Lasix held for hypotension      Hematologic   - Trend CBC Daily. 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  - Continue hemorrhoidal suppository   -Continue Senna 2 tabs HS  -Tylenol 650 mg PO PRN for rectal pain  - Protonix    Endocrine  #Hypothyroidism  - Continue Levothyroxine 100 mcg PO daily  - TSH 4.36 (6/19/22)  - Monitor FS    Renal  - Strict I+O   - Daily standing weights  - maintain electrolytes, K>4 Mg >2    Neurological  - Avoid sedating medications    PT/Rehab  - Ordered    Lines/Gruber  - Central line: 7/31  - A-line: 8/1  - Primafit   #Possible PNA  Anemia with BRBPR, likely lower GIB  #Severe AS, mean gradient ~75mmHg s/p recent balloon valvuloplasty  #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%  #Hemorrhoids with Constipation  #Hypothyroidism    Respiratory   #Possible PNA  - Moderate left pleural effusion seen on CT angio s/p thoracocentesis 8/1/22 - 1 L fluid  - Pleural effusion reoccurence 8/2 - approximately 800ccs - no indication to tap at this time. Pt remains afebrile, improving clinically  - F/U pleural fluid LDH, Protein - pending  - Wean nasal cannula as tolerated      ID  - CXR 7/30 suspicious for PNA, with fever, elevated white count    - 7/30 procal .51, WBC WNL since 8/1  - ID consulted  - continue cefepime at 1000 mg q 8 hours   - f/u blood and urine cultures  - 7/30 grew staph epidermidis, 8/1 pending  - f/u LDH and proteins levels from thoracentesis fluid    Cardiovascular  #Severe AS, mean gradient ~75mmHg s/p recent balloon   #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%   - TAVR workup likely outpatient   - Currently on phenylephedrine drip - wean as tolerated  - Increase midodrine to 10mg daily  - 8/2/22: IVC looks possibly overloaded. 1x 40 lasix IV  - Keep MAP >65    Hematologic   - Trend CBC Daily. 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  - Continue hemorrhoidal suppository   -Continue Senna 2 tabs HS  -Tylenol 650 mg PO PRN for rectal pain  - Protonix    Endocrine  #Hypothyroidism  - Continue Levothyroxine 100 mcg PO daily  - TSH 4.36 (6/19/22)  - Monitor FS    Renal  - Strict I+O   - Daily standing weights  - maintain electrolytes, K>4 Mg >2    Neurological  - Avoid sedating medications    PT/Rehab  - Ordered    Lines/Gruber  - Central line: 7/31  - A-line: 8/1  - Primafit

## 2022-08-02 NOTE — PROGRESS NOTE ADULT - ASSESSMENT
IMPRESSION:    Left moderate pleural effusion  Acute hypoxemic respiratory failure   Severe aortic stenosis  Shock on Pressor  Possible CAP       PLAN:    CNS: No depressants. Mental status is ok.     HEENT: Oral care    PULMONARY:  HOB @ 45 degrees. DC nasal cannula. Keep O2 saturation more than 92%. Bedside US shows moderate recurrent left effusion. F/U on fluid analysis results.     CARDIOVASCULAR: Severe AS on echo with plans for TAVR. Taper down Neosinephrine and continue midodrine. Cardiology is following.     GI: GI prophylaxis.  Feeding PO diet as tolerated.      RENAL:  Follow up lytes.  Correct as needed. Kidney function is at baseline.     INFECTIOUS DISEASE: Repeat blood culture pending; likely staph epidermidis was a colonizer. Switch Abx to Rocephin and Azithromycin and finish 5 day course.     HEMATOLOGICAL:  DVT prophylaxis with Lovenox.     ENDOCRINE:  Follow up FS.  Insulin protocol if needed.    MUSCULOSKELETAL: out of bed to chair. PT rehab.     CODE STATUS: Full code.     Will follow.

## 2022-08-02 NOTE — PROGRESS NOTE ADULT - SUBJECTIVE AND OBJECTIVE BOX
Patient is a 89y old  Female who presents with a chief complaint of Dyspnea w/ Exertion (01 Aug 2022 11:01)    HPI:  This is a 89-year-old female with a past medical history significant for CHF,severe AAS, MVP, hypertension, HCC, hypothyroidism, GERD who presents with shortness of breath.  Patient states that she has been having chronic shortness of breath w/ recent hospital admission -, pt reports dyspnea has been getting worse the last couple of days and did not have any resolution to sxs upon d/c.  Of note patient also states that she has been having some blood in the stool during this time.  Patient states dyspnea is worsened with any exertion- and associated with escalating chest pain.    Pt denies any cardiology or pulmonary f/u.  Pt also admits to predominantly bedbound state due to dyspnea.   (2022 16:43)       INTERVAL HPI/OVERNIGHT EVENTS:   No overnight events   Afebrile, hemodynamically stable     Subjective:    ICU Vital Signs Last 24 Hrs  T(C): 36.4 (02 Aug 2022 04:00), Max: 36.5 (01 Aug 2022 20:00)  T(F): 97.5 (02 Aug 2022 04:00), Max: 97.7 (01 Aug 2022 20:00)  HR: 66 (02 Aug 2022 06:00) (55 - 83)  BP: 107/55 (01 Aug 2022 15:00) (107/55 - 107/55)  BP(mean): 79 (01 Aug 2022 15:00) (79 - 79)  ABP: 116/51 (02 Aug 2022 06:00) (89/58 - 127/53)  ABP(mean): 75 (02 Aug 2022 06:00) (60 - 84)  RR: 22 (02 Aug 2022 06:00) (18 - 44)  SpO2: 100% (02 Aug 2022 06:00) (96% - 100%)    O2 Parameters below as of 02 Aug 2022 07:00  Patient On (Oxygen Delivery Method): nasal cannula  O2 Flow (L/min): 2        I&O's Summary    2022 07:01  -  01 Aug 2022 07:00  --------------------------------------------------------  IN: 297.8 mL / OUT: 500 mL / NET: -202.2 mL    01 Aug 2022 07:01  -  02 Aug 2022 06:57  --------------------------------------------------------  IN: 1572 mL / OUT: 2100 mL / NET: -528 mL          Daily     Daily Weight in k.3 (02 Aug 2022 05:00)    Adult Advanced Hemodynamics Last 24 Hrs  CVP(mm Hg): 7 (02 Aug 2022 06:00) (0 - 17)  CVP(cm H2O): --  CO: --  CI: --  PA: --  PA(mean): --  PCWP: --  SVR: --  SVRI: --  PVR: --  PVRI: --    EKG/Telemetry Events:    MEDICATIONS  (STANDING):  aspirin  chewable 81 milliGRAM(s) Oral daily  cefepime   IVPB 1000 milliGRAM(s) IV Intermittent every 8 hours  chlorhexidine 2% Cloths 1 Application(s) Topical daily  enoxaparin Injectable 40 milliGRAM(s) SubCutaneous every 24 hours  hydrocortisone hemorrhoidal Suppository 1 Suppository(s) Rectal daily  levothyroxine 100 MICROGram(s) Oral daily  midodrine 5 milliGRAM(s) Oral every 8 hours  pantoprazole    Tablet 40 milliGRAM(s) Oral before breakfast  phenylephrine    Infusion 0.1 MICROgram(s)/kG/Min (1.1 mL/Hr) IV Continuous <Continuous>  senna 2 Tablet(s) Oral at bedtime    MEDICATIONS  (PRN):  aluminum hydroxide/magnesium hydroxide/simethicone Suspension 30 milliLiter(s) Oral every 4 hours PRN Dyspepsia  hydrocortisone hemorrhoidal Suppository 1 Suppository(s) Rectal two times a day PRN hemorrhoidal discomfort      PHYSICAL EXAM:  GENERAL:   HEAD:  Atraumatic, Normocephalic  EYES: EOMI, PERRLA, conjunctiva and sclera clear  NECK: Supple, No JVD, Normal thyroid, no enlarged nodes  NERVOUS SYSTEM:  Alert & Awake.   CHEST/LUNG: B/L good air entry; No rales, rhonchi, or wheezing  HEART: S1S2 normal, no S3, Regular rate and rhythm; No murmurs  ABDOMEN: Soft, Nontender, Nondistended; Bowel sounds present  EXTREMITIES:  2+ Peripheral Pulses, No clubbing, cyanosis, or edema  LYMPH: No lymphadenopathy noted  SKIN: No rashes or lesions    LABS:                        8.7    7.00  )-----------( 172      ( 02 Aug 2022 05:13 )             27.6     08-02    138  |  106  |  20  ----------------------------<  94  4.2   |  23  |  0.7    Ca    8.9      02 Aug 2022 05:13  Phos  2.5     08-  Mg     2.2     08-    TPro  6.3  /  Alb  3.9  /  TBili  0.6  /  DBili  x   /  AST  20  /  ALT  12  /  AlkPhos  98  08-02    LIVER FUNCTIONS - ( 02 Aug 2022 05:13 )  Alb: 3.9 g/dL / Pro: 6.3 g/dL / ALK PHOS: 98 U/L / ALT: 12 U/L / AST: 20 U/L / GGT: x           PT/INR - ( 2022 20:21 )   PT: 14.00 sec;   INR: 1.22 ratio         PTT - ( 2022 20:21 )  PTT:29.0 sec  CAPILLARY BLOOD GLUCOSE        ABG - ( 01 Aug 2022 17:51 )  pH, Arterial: 7.45  pH, Blood: x     /  pCO2: 37    /  pO2: 121   / HCO3: 26    / Base Excess: 1.8   /  SaO2: 98.8                    Urinalysis Basic - ( 01 Aug 2022 22:25 )    Color: Light Yellow / Appearance: Clear / S.017 / pH: x  Gluc: x / Ketone: Trace  / Bili: Negative / Urobili: <2 mg/dL   Blood: x / Protein: Trace / Nitrite: Negative   Leuk Esterase: Negative / RBC: x / WBC x   Sq Epi: x / Non Sq Epi: x / Bacteria: x          RADIOLOGY & ADDITIONAL TESTS:  CXR:        Care Discussed with Consultants/Other Providers [ x] YES  [ ] NO           Patient is a 89y old  Female who presents with a chief complaint of Dyspnea w/ Exertion (01 Aug 2022 11:01)    HPI:  HFpEF, severe AS s/p balloon valvuloplasty , anemia s/p multiple transfusions 2 months ago, MVP, HTN, hyperthyroidism, HCC s/p partial liver resection, transferred from [facility] to [place] for TAVR evaluation by Dr. Mccollum.  Patient found to be anemic most likely d/t GI bleed now s/p 1PRBC transfusion for Hgb < 8/anemia. GI, Dr. Woo consulted at Dr. Mccollum' request.  Capsule study completed.  S/p colonoscopy  with successful AVM intervention. Diuretics largely remain on hold for soft BP (SBP < 100).  Ultimately, plan is for TAVR pending conditioning. Patient was waiting for subacute rehab placement with plans for TAVR as outpatient when patient's conditioning improves.    Patient , became acutely dyspneic, hypertensive 170/79 and tachycardic to 140s w/ crackles and wheezes to lungs throughout. Bipap started, with some improvement noted and patient was on nitro drip. Transfer to CCU initiated. In CCU: became febrile 100.4, CXR showed possible pneumonia, given cefepime and vanc. Blood cx () grew staph epidermidis (MR)  Then became hypotensive down to 50s systolic- d/c nitro drip, given 1 bolus, requiring pressor support. (phenylepherine), then went into afib RVR (140s-150s). Patient on bipap during event; O2 requirements have decreased during stay. Continued on phenylepherine drip and midodrine for pressor support.        INTERVAL HPI/OVERNIGHT EVENTS:   No overnight events   Afebrile, phenylephrine still required - weaning    Subjective:  Patient examined at bedside. Reports feeling well this morning - improved from yesterday. She seems more alert. Denies chest pain, palpitations, and SOB.     ICU Vital Signs Last 24 Hrs  T(C): 36.4 (02 Aug 2022 04:00), Max: 36.5 (01 Aug 2022 20:00)  T(F): 97.5 (02 Aug 2022 04:00), Max: 97.7 (01 Aug 2022 20:00)  HR: 66 (02 Aug 2022 06:00) (55 - 83)  BP: 107/55 (01 Aug 2022 15:00) (107/55 - 107/55)  BP(mean): 79 (01 Aug 2022 15:00) (79 - 79)  ABP: 116/51 (02 Aug 2022 06:00) (89/58 - 127/53)  ABP(mean): 75 (02 Aug 2022 06:00) (60 - 84)  RR: 22 (02 Aug 2022 06:00) (18 - 44)  SpO2: 100% (02 Aug 2022 06:00) (96% - 100%)    O2 Parameters below as of 02 Aug 2022 07:00  Patient On (Oxygen Delivery Method): nasal cannula  O2 Flow (L/min): 2        I&O's Summary    2022 07:01  -  01 Aug 2022 07:00  --------------------------------------------------------  IN: 297.8 mL / OUT: 500 mL / NET: -202.2 mL    01 Aug 2022 07:01  -  02 Aug 2022 06:57  --------------------------------------------------------  IN: 1572 mL / OUT: 2100 mL / NET: -528 mL          Daily     Daily Weight in k.3 (02 Aug 2022 05:00)    Adult Advanced Hemodynamics Last 24 Hrs  CVP(mm Hg): 7 (02 Aug 2022 06:00) (0 - 17)  CVP(cm H2O): --  CO: --  CI: --  PA: --  PA(mean): --  PCWP: --  SVR: --  SVRI: --  PVR: --  PVRI: --    EKG/Telemetry Events:    MEDICATIONS  (STANDING):  aspirin  chewable 81 milliGRAM(s) Oral daily  cefepime   IVPB 1000 milliGRAM(s) IV Intermittent every 8 hours  chlorhexidine 2% Cloths 1 Application(s) Topical daily  enoxaparin Injectable 40 milliGRAM(s) SubCutaneous every 24 hours  hydrocortisone hemorrhoidal Suppository 1 Suppository(s) Rectal daily  levothyroxine 100 MICROGram(s) Oral daily  midodrine 5 milliGRAM(s) Oral every 8 hours  pantoprazole    Tablet 40 milliGRAM(s) Oral before breakfast  phenylephrine    Infusion 0.1 MICROgram(s)/kG/Min (1.1 mL/Hr) IV Continuous <Continuous>  senna 2 Tablet(s) Oral at bedtime    MEDICATIONS  (PRN):  aluminum hydroxide/magnesium hydroxide/simethicone Suspension 30 milliLiter(s) Oral every 4 hours PRN Dyspepsia  hydrocortisone hemorrhoidal Suppository 1 Suppository(s) Rectal two times a day PRN hemorrhoidal discomfort      PHYSICAL EXAM:  GENERAL: nad laying in bed, NC in place   HEAD:  Atraumatic, Normocephalic  NECK: Supple, No JVD, r triple lumen in place  NERVOUS SYSTEM:  Awakens to voice, oriented.   CHEST/LUNG: B/L breath sounds  HEART: S1S2 audible, bradycardic, regular rhythm; Systolic murmur  ABDOMEN: Soft, Nontender, Nondistended  EXTREMITIES:  No clubbing, cyanosis, or edema, r a-line in place    LABS:                        8.7    7.00  )-----------( 172      ( 02 Aug 2022 05:13 )             27.6     08-02    138  |  106  |  20  ----------------------------<  94  4.2   |  23  |  0.7    Ca    8.9      02 Aug 2022 05:13  Phos  2.5     08-  Mg     2.2     08-    TPro  6.3  /  Alb  3.9  /  TBili  0.6  /  DBili  x   /  AST  20  /  ALT  12  /  AlkPhos  98  08-02    LIVER FUNCTIONS - ( 02 Aug 2022 05:13 )  Alb: 3.9 g/dL / Pro: 6.3 g/dL / ALK PHOS: 98 U/L / ALT: 12 U/L / AST: 20 U/L / GGT: x           PT/INR - ( 2022 20:21 )   PT: 14.00 sec;   INR: 1.22 ratio         PTT - ( 2022 20:21 )  PTT:29.0 sec  CAPILLARY BLOOD GLUCOSE        ABG - ( 01 Aug 2022 17:51 )  pH, Arterial: 7.45  pH, Blood: x     /  pCO2: 37    /  pO2: 121   / HCO3: 26    / Base Excess: 1.8   /  SaO2: 98.8                    Urinalysis Basic - ( 01 Aug 2022 22:25 )    Color: Light Yellow / Appearance: Clear / S.017 / pH: x  Gluc: x / Ketone: Trace  / Bili: Negative / Urobili: <2 mg/dL   Blood: x / Protein: Trace / Nitrite: Negative   Leuk Esterase: Negative / RBC: x / WBC x   Sq Epi: x / Non Sq Epi: x / Bacteria: x          RADIOLOGY & ADDITIONAL TESTS:  CXR:  < from: Xray Chest 1 View-PORTABLE IMMEDIATE (Xray Chest 1 View-PORTABLE IMMEDIATE .) (22 @ 14:55) >  Findings:    Support devices: Telemetry leads are seen. External pacer overlies the   left thorax. Right neck central venous catheter.    Cardiac/mediastinum/hilum: Aorta is ectatic    Lung parenchyma/Pleura: Retrocardiac opacity. Right mid lobe opacity.    Skeleton/soft tissues: Unchanged    Impression:    Improved appearance of the left hemithorax. Support devices as described.    EKG:  < end of copied text >  < from: 12 Lead ECG (22 @ 05:28) >  Diagnosis Line Normal sinus rhythm  Right bundle branch block  Left anterior fascicular block  *** Bifascicular block ***  Left ventricular hypertrophy with repolarization abnormality  Abnormal ECG    < end of copied text >        Care Discussed with Consultants/Other Providers [ x] YES  [ ] NO           Patient is a 89y old  Female who presents with a chief complaint of Dyspnea w/ Exertion (01 Aug 2022 11:01)    HPI:  HFpEF, severe AS s/p balloon valvuloplasty , anemia s/p multiple transfusions 2 months ago, MVP, HTN, hyperthyroidism, HCC s/p partial liver resection, transferred from [facility] to [place] for TAVR evaluation by Dr. Mccollum.  Patient found to be anemic most likely d/t GI bleed now s/p 1PRBC transfusion for Hgb < 8/anemia. GI, Dr. Woo consulted at Dr. Mccollum' request.  Capsule study completed.  S/p colonoscopy  with successful AVM intervention. Diuretics largely remain on hold for soft BP (SBP < 100).  Ultimately, plan is for TAVR pending conditioning. Patient was waiting for subacute rehab placement with plans for TAVR as outpatient when patient's conditioning improves.    Patient , became acutely dyspneic, hypertensive 170/79 and tachycardic to 140s w/ crackles and wheezes to lungs throughout. Bipap started, with some improvement noted and patient was on nitro drip. Transfer to CCU initiated. In CCU: became febrile 100.4, CXR showed possible pneumonia, given cefepime and vanc. Blood cx () grew staph epidermidis (MR)  Then became hypotensive down to 50s systolic- d/c nitro drip, given 1 bolus, requiring pressor support. (phenylepherine), then went into afib RVR (140s-150s). Patient on bipap during event; O2 requirements have decreased during stay. Continued on phenylepherine drip and midodrine for pressor support.        INTERVAL HPI/OVERNIGHT EVENTS:   No overnight events   Afebrile, phenylephrine still required - weaning    Subjective:  Patient examined at bedside. Reports feeling well this morning - improved from yesterday. She seems more alert. Denies chest pain, palpitations, and SOB.     ICU Vital Signs Last 24 Hrs  T(C): 36.4 (02 Aug 2022 04:00), Max: 36.5 (01 Aug 2022 20:00)  T(F): 97.5 (02 Aug 2022 04:00), Max: 97.7 (01 Aug 2022 20:00)  HR: 66 (02 Aug 2022 06:00) (55 - 83)  BP: 107/55 (01 Aug 2022 15:00) (107/55 - 107/55)  BP(mean): 79 (01 Aug 2022 15:00) (79 - 79)  ABP: 116/51 (02 Aug 2022 06:00) (89/58 - 127/53)  ABP(mean): 75 (02 Aug 2022 06:00) (60 - 84)  RR: 22 (02 Aug 2022 06:00) (18 - 44)  SpO2: 100% (02 Aug 2022 06:00) (96% - 100%)    O2 Parameters below as of 02 Aug 2022 07:00  Patient On (Oxygen Delivery Method): nasal cannula  O2 Flow (L/min): 2        I&O's Summary    2022 07:01  -  01 Aug 2022 07:00  --------------------------------------------------------  IN: 297.8 mL / OUT: 500 mL / NET: -202.2 mL    01 Aug 2022 07:01  -  02 Aug 2022 06:57  --------------------------------------------------------  IN: 1572 mL / OUT: 2100 mL / NET: -528 mL          Daily     Daily Weight in k.3 (02 Aug 2022 05:00)    Adult Advanced Hemodynamics Last 24 Hrs  CVP(mm Hg): 7 (02 Aug 2022 06:00) (0 - 17)  CVP(cm H2O): --  CO: --  CI: --  PA: --  PA(mean): --  PCWP: --  SVR: --  SVRI: --  PVR: --  PVRI: --    EKG/Telemetry Events:    MEDICATIONS  (STANDING):  aspirin  chewable 81 milliGRAM(s) Oral daily  cefepime   IVPB 1000 milliGRAM(s) IV Intermittent every 8 hours  chlorhexidine 2% Cloths 1 Application(s) Topical daily  enoxaparin Injectable 40 milliGRAM(s) SubCutaneous every 24 hours  hydrocortisone hemorrhoidal Suppository 1 Suppository(s) Rectal daily  levothyroxine 100 MICROGram(s) Oral daily  midodrine 5 milliGRAM(s) Oral every 8 hours  pantoprazole    Tablet 40 milliGRAM(s) Oral before breakfast  phenylephrine    Infusion 0.1 MICROgram(s)/kG/Min (1.1 mL/Hr) IV Continuous <Continuous>  senna 2 Tablet(s) Oral at bedtime    MEDICATIONS  (PRN):  aluminum hydroxide/magnesium hydroxide/simethicone Suspension 30 milliLiter(s) Oral every 4 hours PRN Dyspepsia  hydrocortisone hemorrhoidal Suppository 1 Suppository(s) Rectal two times a day PRN hemorrhoidal discomfort      PHYSICAL EXAM:  GENERAL: nad laying in bed, NC in place, more alert today  HEAD:  Atraumatic, Normocephalic  NECK: Supple, No JVD, r triple lumen in place  NERVOUS SYSTEM:  Awake and alert   CHEST/LUNG: B/L breath sounds  HEART: S1S2 audible, bradycardic, regular rhythm; Systolic murmur  ABDOMEN: Soft, Nontender, Nondistended  EXTREMITIES:  No clubbing, cyanosis, or edema, r a-line in place    LABS:                        8.7    7.00  )-----------( 172      ( 02 Aug 2022 05:13 )             27.6     08-02    138  |  106  |  20  ----------------------------<  94  4.2   |  23  |  0.7    Ca    8.9      02 Aug 2022 05:13  Phos  2.5     08-  Mg     2.2     08-    TPro  6.3  /  Alb  3.9  /  TBili  0.6  /  DBili  x   /  AST  20  /  ALT  12  /  AlkPhos  98  08-02    LIVER FUNCTIONS - ( 02 Aug 2022 05:13 )  Alb: 3.9 g/dL / Pro: 6.3 g/dL / ALK PHOS: 98 U/L / ALT: 12 U/L / AST: 20 U/L / GGT: x           PT/INR - ( 2022 20:21 )   PT: 14.00 sec;   INR: 1.22 ratio         PTT - ( 2022 20:21 )  PTT:29.0 sec  CAPILLARY BLOOD GLUCOSE        ABG - ( 01 Aug 2022 17:51 )  pH, Arterial: 7.45  pH, Blood: x     /  pCO2: 37    /  pO2: 121   / HCO3: 26    / Base Excess: 1.8   /  SaO2: 98.8                    Urinalysis Basic - ( 01 Aug 2022 22:25 )    Color: Light Yellow / Appearance: Clear / S.017 / pH: x  Gluc: x / Ketone: Trace  / Bili: Negative / Urobili: <2 mg/dL   Blood: x / Protein: Trace / Nitrite: Negative   Leuk Esterase: Negative / RBC: x / WBC x   Sq Epi: x / Non Sq Epi: x / Bacteria: x          RADIOLOGY & ADDITIONAL TESTS:  CXR:  < from: Xray Chest 1 View- PORTABLE-Routine (22 @ 06:28) >  Findings:    Support devices: Right IJ central venous catheter. Telemetry leads   appreciated.    Cardiac/mediastinum/hilum: Unchanged.    Lung parenchyma/Pleura: Worsening left lung opacity/effusion. Stable   right-sided opacity. No pneumothorax.    Skeleton/soft tissues: Unchanged.    Impression:    Worsening left lung opacity/effusion. Stable right-sided opacity/fluid   within the horizontal fissure. No pneumothorax.    < end of copied text >      EKG:  < end of copied text >  < from: 12 Lead ECG (22 @ 05:28) >  Diagnosis Line Normal sinus rhythm  Right bundle branch block  Left anterior fascicular block  *** Bifascicular block ***  Left ventricular hypertrophy with repolarization abnormality  Abnormal ECG    < end of copied text >        Care Discussed with Consultants/Other Providers [ x] YES  [ ] NO

## 2022-08-02 NOTE — PROGRESS NOTE ADULT - SUBJECTIVE AND OBJECTIVE BOX
Patient is a 89y old  Female who presents with a chief complaint of Dyspnea w/ Exertion (02 Aug 2022 06:57)        Over Night Events:    No events   S/P thoracentesis with 1L of lfuid removed         ROS:  See HPI    PHYSICAL EXAM    ICU Vital Signs Last 24 Hrs  T(C): 36.4 (02 Aug 2022 04:00), Max: 36.5 (01 Aug 2022 20:00)  T(F): 97.5 (02 Aug 2022 04:00), Max: 97.7 (01 Aug 2022 20:00)  HR: 58 (02 Aug 2022 07:00) (55 - 83)  BP: 107/55 (01 Aug 2022 15:00) (107/55 - 107/55)  BP(mean): 79 (01 Aug 2022 15:00) (79 - 79)  ABP: 106/47 (02 Aug 2022 07:00) (92/40 - 127/53)  ABP(mean): 67 (02 Aug 2022 07:00) (60 - 84)  RR: 20 (02 Aug 2022 07:00) (18 - 44)  SpO2: 100% (02 Aug 2022 07:00) (96% - 100%)    O2 Parameters below as of 02 Aug 2022 07:00  Patient On (Oxygen Delivery Method): nasal cannula  O2 Flow (L/min): 2          General: NAD, on NC at 2LPM   HEENT: RAFFI             Lymphatic system: No cervical LN   Lungs: Bilateral BS  Cardiovascular: Regular   Gastrointestinal: Soft, Positive BS  Extremities: No clubbing.  Moves extremities.  Full Range of motion   Skin: Warm, intact  Neurological: No motor or sensory deficit       22 @ 07:01  -  22 @ 07:00  --------------------------------------------------------  IN:    IV PiggyBack: 750 mL    Oral Fluid: 490 mL    Phenylephrine: 332 mL  Total IN: 1572 mL    OUT:    Other (mL): 1000 mL    Voided (mL): 1100 mL  Total OUT: 2100 mL    Total NET: -528 mL          LABS:                            8.7    7.00  )-----------( 172      ( 02 Aug 2022 05:13 )             27.6                                               08-02    138  |  106  |  20  ----------------------------<  94  4.2   |  23  |  0.7    Ca    8.9      02 Aug 2022 05:13  Phos  2.5     08-01  Mg     2.2     08-02    TPro  6.3  /  Alb  3.9  /  TBili  0.6  /  DBili  x   /  AST  20  /  ALT  12  /  AlkPhos  98  08-02      PT/INR - ( 2022 20:21 )   PT: 14.00 sec;   INR: 1.22 ratio         PTT - ( 2022 20:21 )  PTT:29.0 sec                                       Urinalysis Basic - ( 01 Aug 2022 22:25 )    Color: Light Yellow / Appearance: Clear / S.017 / pH: x  Gluc: x / Ketone: Trace  / Bili: Negative / Urobili: <2 mg/dL   Blood: x / Protein: Trace / Nitrite: Negative   Leuk Esterase: Negative / RBC: x / WBC x   Sq Epi: x / Non Sq Epi: x / Bacteria: x                                                  LIVER FUNCTIONS - ( 02 Aug 2022 05:13 )  Alb: 3.9 g/dL / Pro: 6.3 g/dL / ALK PHOS: 98 U/L / ALT: 12 U/L / AST: 20 U/L / GGT: x                                                  Culture - Fungal, Body Fluid (collected 01 Aug 2022 12:30)  Source: Pleural Fl Pleural Fluid  Preliminary Report (02 Aug 2022 07:33):    Testing in progress    Culture - Body Fluid with Gram Stain (collected 01 Aug 2022 12:30)  Source: Pleural Fl Pleural Fluid  Gram Stain (02 Aug 2022 01:59):    polymorphonuclear leukocytes seen    No organisms seen    by cytocentrifuge    Culture - Blood (collected 2022 23:30)  Source: .Blood Blood  Gram Stain (01 Aug 2022 08:33):    Growth in anaerobic bottle: Gram Positive Cocci in Clusters    Growth in aerobic bottle: Gram Positive Cocci in Clusters  Preliminary Report (01 Aug 2022 22:33):    Growth in anaerobic bottle: Staphylococcus epidermidis    Coag Negative Staphylococcus    Single set isolate, possible contaminant. Contact    Microbiology if susceptibility testing clinically    indicated.    Growth in aerobic bottle: Gram Positive Cocci inClusters    ***Blood Panel PCR results on this specimen are available    approximately 3 hours after the Gram stain result.***    Gram stain, PCR, and/or culture results may not always    correspond due to difference in methodologies.    ************************************************************    This PCR assay was performed by multiplex PCR. This    Assay tests for 66 bacterial and resistance gene targets.    Please refer to the [facility] test directory    at [email or web address] for details.  Organism: Blood Culture PCR (01 Aug 2022 04:43)  Organism: Blood Culture PCR (01 Aug 2022 04:43)                                                                                       ABG - ( 01 Aug 2022 17:51 )  pH, Arterial: 7.45  pH, Blood: x     /  pCO2: 37    /  pO2: 121   / HCO3: 26    / Base Excess: 1.8   /  SaO2: 98.8                MEDICATIONS  (STANDING):  aspirin  chewable 81 milliGRAM(s) Oral daily  cefepime   IVPB 1000 milliGRAM(s) IV Intermittent every 8 hours  chlorhexidine 2% Cloths 1 Application(s) Topical daily  enoxaparin Injectable 40 milliGRAM(s) SubCutaneous every 24 hours  hydrocortisone hemorrhoidal Suppository 1 Suppository(s) Rectal daily  levothyroxine 100 MICROGram(s) Oral daily  midodrine 5 milliGRAM(s) Oral every 8 hours  pantoprazole    Tablet 40 milliGRAM(s) Oral before breakfast  phenylephrine    Infusion 0.1 MICROgram(s)/kG/Min (1.1 mL/Hr) IV Continuous <Continuous>  senna 2 Tablet(s) Oral at bedtime    MEDICATIONS  (PRN):  aluminum hydroxide/magnesium hydroxide/simethicone Suspension 30 milliLiter(s) Oral every 4 hours PRN Dyspepsia  hydrocortisone hemorrhoidal Suppository 1 Suppository(s) Rectal two times a day PRN hemorrhoidal discomfort      Xrays: Recurrent moderate effusion on the left side                                                                                    ECHO

## 2022-08-03 LAB
ALBUMIN SERPL ELPH-MCNC: 3.6 G/DL — SIGNIFICANT CHANGE UP (ref 3.5–5.2)
ALP SERPL-CCNC: 100 U/L — SIGNIFICANT CHANGE UP (ref 30–115)
ALT FLD-CCNC: 12 U/L — SIGNIFICANT CHANGE UP (ref 0–41)
ANION GAP SERPL CALC-SCNC: 9 MMOL/L — SIGNIFICANT CHANGE UP (ref 7–14)
AST SERPL-CCNC: 17 U/L — SIGNIFICANT CHANGE UP (ref 0–41)
BASOPHILS # BLD AUTO: 0.03 K/UL — SIGNIFICANT CHANGE UP (ref 0–0.2)
BASOPHILS NFR BLD AUTO: 0.5 % — SIGNIFICANT CHANGE UP (ref 0–1)
BILIRUB SERPL-MCNC: 0.5 MG/DL — SIGNIFICANT CHANGE UP (ref 0.2–1.2)
BUN SERPL-MCNC: 21 MG/DL — HIGH (ref 10–20)
CALCIUM SERPL-MCNC: 8.7 MG/DL — SIGNIFICANT CHANGE UP (ref 8.5–10.1)
CHLORIDE SERPL-SCNC: 103 MMOL/L — SIGNIFICANT CHANGE UP (ref 98–110)
CO2 SERPL-SCNC: 26 MMOL/L — SIGNIFICANT CHANGE UP (ref 17–32)
CREAT SERPL-MCNC: 0.7 MG/DL — SIGNIFICANT CHANGE UP (ref 0.7–1.5)
CULTURE RESULTS: NO GROWTH — SIGNIFICANT CHANGE UP
EGFR: 83 ML/MIN/1.73M2 — SIGNIFICANT CHANGE UP
EOSINOPHIL # BLD AUTO: 0.16 K/UL — SIGNIFICANT CHANGE UP (ref 0–0.7)
EOSINOPHIL NFR BLD AUTO: 2.9 % — SIGNIFICANT CHANGE UP (ref 0–8)
FERRITIN SERPL-MCNC: 384 NG/ML — HIGH (ref 15–150)
GLUCOSE SERPL-MCNC: 94 MG/DL — SIGNIFICANT CHANGE UP (ref 70–99)
HCT VFR BLD CALC: 27.2 % — LOW (ref 37–47)
HGB BLD-MCNC: 8.8 G/DL — LOW (ref 12–16)
IMM GRANULOCYTES NFR BLD AUTO: 0.2 % — SIGNIFICANT CHANGE UP (ref 0.1–0.3)
LYMPHOCYTES # BLD AUTO: 0.74 K/UL — LOW (ref 1.2–3.4)
LYMPHOCYTES # BLD AUTO: 13.5 % — LOW (ref 20.5–51.1)
MAGNESIUM SERPL-MCNC: 2 MG/DL — SIGNIFICANT CHANGE UP (ref 1.8–2.4)
MCHC RBC-ENTMCNC: 30 PG — SIGNIFICANT CHANGE UP (ref 27–31)
MCHC RBC-ENTMCNC: 32.4 G/DL — SIGNIFICANT CHANGE UP (ref 32–37)
MCV RBC AUTO: 92.8 FL — SIGNIFICANT CHANGE UP (ref 81–99)
MONOCYTES # BLD AUTO: 0.37 K/UL — SIGNIFICANT CHANGE UP (ref 0.1–0.6)
MONOCYTES NFR BLD AUTO: 6.8 % — SIGNIFICANT CHANGE UP (ref 1.7–9.3)
NEUTROPHILS # BLD AUTO: 4.16 K/UL — SIGNIFICANT CHANGE UP (ref 1.4–6.5)
NEUTROPHILS NFR BLD AUTO: 76.1 % — HIGH (ref 42.2–75.2)
NON-GYNECOLOGICAL CYTOLOGY STUDY: SIGNIFICANT CHANGE UP
NRBC # BLD: 0 /100 WBCS — SIGNIFICANT CHANGE UP (ref 0–0)
PLATELET # BLD AUTO: 165 K/UL — SIGNIFICANT CHANGE UP (ref 130–400)
POTASSIUM SERPL-MCNC: 3.9 MMOL/L — SIGNIFICANT CHANGE UP (ref 3.5–5)
POTASSIUM SERPL-SCNC: 3.9 MMOL/L — SIGNIFICANT CHANGE UP (ref 3.5–5)
PROT SERPL-MCNC: 5.8 G/DL — LOW (ref 6–8)
RBC # BLD: 2.93 M/UL — LOW (ref 4.2–5.4)
RBC # FLD: 19.7 % — HIGH (ref 11.5–14.5)
SODIUM SERPL-SCNC: 138 MMOL/L — SIGNIFICANT CHANGE UP (ref 135–146)
SPECIMEN SOURCE: SIGNIFICANT CHANGE UP
WBC # BLD: 5.47 K/UL — SIGNIFICANT CHANGE UP (ref 4.8–10.8)
WBC # FLD AUTO: 5.47 K/UL — SIGNIFICANT CHANGE UP (ref 4.8–10.8)

## 2022-08-03 PROCEDURE — 99233 SBSQ HOSP IP/OBS HIGH 50: CPT

## 2022-08-03 PROCEDURE — 71045 X-RAY EXAM CHEST 1 VIEW: CPT | Mod: 26

## 2022-08-03 PROCEDURE — 93010 ELECTROCARDIOGRAM REPORT: CPT

## 2022-08-03 RX ORDER — POTASSIUM CHLORIDE 20 MEQ
40 PACKET (EA) ORAL ONCE
Refills: 0 | Status: COMPLETED | OUTPATIENT
Start: 2022-08-03 | End: 2022-08-03

## 2022-08-03 RX ADMIN — SENNA PLUS 2 TABLET(S): 8.6 TABLET ORAL at 21:13

## 2022-08-03 RX ADMIN — Medication 10 MILLIGRAM(S): at 17:25

## 2022-08-03 RX ADMIN — PANTOPRAZOLE SODIUM 40 MILLIGRAM(S): 20 TABLET, DELAYED RELEASE ORAL at 06:44

## 2022-08-03 RX ADMIN — Medication 81 MILLIGRAM(S): at 11:35

## 2022-08-03 RX ADMIN — Medication 100 MICROGRAM(S): at 05:41

## 2022-08-03 RX ADMIN — MIDODRINE HYDROCHLORIDE 10 MILLIGRAM(S): 2.5 TABLET ORAL at 21:12

## 2022-08-03 RX ADMIN — CEFEPIME 100 MILLIGRAM(S): 1 INJECTION, POWDER, FOR SOLUTION INTRAMUSCULAR; INTRAVENOUS at 21:12

## 2022-08-03 RX ADMIN — ENOXAPARIN SODIUM 40 MILLIGRAM(S): 100 INJECTION SUBCUTANEOUS at 11:58

## 2022-08-03 RX ADMIN — Medication 1 SUPPOSITORY(S): at 12:49

## 2022-08-03 RX ADMIN — Medication 40 MILLIEQUIVALENT(S): at 08:10

## 2022-08-03 RX ADMIN — MIDODRINE HYDROCHLORIDE 10 MILLIGRAM(S): 2.5 TABLET ORAL at 05:41

## 2022-08-03 RX ADMIN — MIDODRINE HYDROCHLORIDE 10 MILLIGRAM(S): 2.5 TABLET ORAL at 13:58

## 2022-08-03 RX ADMIN — CEFEPIME 100 MILLIGRAM(S): 1 INJECTION, POWDER, FOR SOLUTION INTRAMUSCULAR; INTRAVENOUS at 05:41

## 2022-08-03 RX ADMIN — CHLORHEXIDINE GLUCONATE 1 APPLICATION(S): 213 SOLUTION TOPICAL at 11:37

## 2022-08-03 RX ADMIN — CEFEPIME 100 MILLIGRAM(S): 1 INJECTION, POWDER, FOR SOLUTION INTRAMUSCULAR; INTRAVENOUS at 13:58

## 2022-08-03 NOTE — PROGRESS NOTE ADULT - SUBJECTIVE AND OBJECTIVE BOX
Patient is a 89y old  Female who presents with a chief complaint of Dyspnea w/ Exertion (02 Aug 2022 08:06)    HPI:  HFpEF, severe AS s/p balloon valvuloplasty , anemia s/p multiple transfusions 2 months ago, MVP, HTN, hyperthyroidism, HCC s/p partial liver resection, transferred from [facility] to [place] for TAVR evaluation by Dr. Mccollum.  Patient found to be anemic most likely d/t GI bleed now s/p 1PRBC transfusion for Hgb < 8/anemia. GI, Dr. Woo consulted at Dr. Mccollum' request.  Capsule study completed.  S/p colonoscopy  with successful AVM intervention. Diuretics largely remain on hold for soft BP (SBP < 100).  Ultimately, plan is for TAVR pending conditioning. Patient was waiting for subacute rehab placement with plans for TAVR as outpatient when patient's conditioning improves.    Patient , became acutely dyspneic, hypertensive 170/79 and tachycardic to 140s w/ crackles and wheezes to lungs throughout. Bipap started, with some improvement noted and patient was on nitro drip. Transfer to CCU initiated. In CCU: became febrile 100.4, CXR showed possible pneumonia, given cefepime and vanc. Blood cx () grew staph epidermidis (MR) - possible contaminant.   Then became hypotensive down to 50s systolic- d/c nitro drip, given 1 bolus, requiring pressor support. (phenylepherine), then went into afib RVR (140s-150s). Patient on bipap during event; O2 requirements have decreased during stay. Continued on phenylepherine drip and midodrine for pressor support.          INTERVAL HPI/OVERNIGHT EVENTS:   No overnight events   Afebrile, hemodynamically stable     Subjective:    ICU Vital Signs Last 24 Hrs  T(C): 36 (03 Aug 2022 00:00), Max: 36.1 (02 Aug 2022 12:00)  T(F): 96.8 (03 Aug 2022 00:00), Max: 97 (02 Aug 2022 12:00)  HR: 45 (03 Aug 2022 06:00) (45 - 83)  BP: --  BP(mean): --  ABP: 101/44 (03 Aug 2022 06:00) (95/45 - 135/73)  ABP(mean): 63 (03 Aug 2022 06:00) (61 - 97)  RR: 19 (03 Aug 2022 06:00) (19 - 37)  SpO2: 100% (03 Aug 2022 06:00) (88% - 100%)    O2 Parameters below as of 03 Aug 2022 06:00  Patient On (Oxygen Delivery Method): nasal cannula  O2 Flow (L/min): 1        I&O's Summary    01 Aug 2022 07:01  -  02 Aug 2022 07:00  --------------------------------------------------------  IN: 1572 mL / OUT: 2100 mL / NET: -528 mL    02 Aug 2022 07:01  -  03 Aug 2022 06:37  --------------------------------------------------------  IN: 957 mL / OUT: 2760 mL / NET: -1803 mL          Daily     Daily     Adult Advanced Hemodynamics Last 24 Hrs  CVP(mm Hg): 280 (03 Aug 2022 06:00) (8 - 280)  CVP(cm H2O): --  CO: --  CI: --  PA: --  PA(mean): --  PCWP: --  SVR: --  SVRI: --  PVR: --  PVRI: --    EKG/Telemetry Events:    MEDICATIONS  (STANDING):  aspirin  chewable 81 milliGRAM(s) Oral daily  cefepime   IVPB 1000 milliGRAM(s) IV Intermittent every 8 hours  chlorhexidine 2% Cloths 1 Application(s) Topical daily  enoxaparin Injectable 40 milliGRAM(s) SubCutaneous every 24 hours  hydrocortisone hemorrhoidal Suppository 1 Suppository(s) Rectal daily  levothyroxine 100 MICROGram(s) Oral daily  midodrine 10 milliGRAM(s) Oral every 8 hours  pantoprazole    Tablet 40 milliGRAM(s) Oral before breakfast  phenylephrine    Infusion 0.1 MICROgram(s)/kG/Min (1.1 mL/Hr) IV Continuous <Continuous>  senna 2 Tablet(s) Oral at bedtime    MEDICATIONS  (PRN):  aluminum hydroxide/magnesium hydroxide/simethicone Suspension 30 milliLiter(s) Oral every 4 hours PRN Dyspepsia  hydrocortisone hemorrhoidal Suppository 1 Suppository(s) Rectal two times a day PRN hemorrhoidal discomfort      PHYSICAL EXAM:  GENERAL: nad laying in bed, NC in place, more alert today  HEAD:  Atraumatic, Normocephalic  NECK: Supple, No JVD, r triple lumen in place  NERVOUS SYSTEM:  Awake and alert   CHEST/LUNG: B/L breath sounds  HEART: S1S2 audible, bradycardic, regular rhythm; Systolic murmur  ABDOMEN: Soft, Nontender, Nondistended  EXTREMITIES:  No clubbing, cyanosis, or edema, r a-line in place    LABS:                        8.8    5.47  )-----------( 165      ( 03 Aug 2022 03:53 )             27.2     08-03    138  |  103  |  21<H>  ----------------------------<  94  3.9   |  26  |  0.7    Ca    8.7      03 Aug 2022 03:53  Mg     2.0     08-03    TPro  5.8<L>  /  Alb  3.6  /  TBili  0.5  /  DBili  x   /  AST  17  /  ALT  12  /  AlkPhos  100  08-03    LIVER FUNCTIONS - ( 03 Aug 2022 03:53 )  Alb: 3.6 g/dL / Pro: 5.8 g/dL / ALK PHOS: 100 U/L / ALT: 12 U/L / AST: 17 U/L / GGT: x             CAPILLARY BLOOD GLUCOSE        ABG - ( 01 Aug 2022 17:51 )  pH, Arterial: 7.45  pH, Blood: x     /  pCO2: 37    /  pO2: 121   / HCO3: 26    / Base Excess: 1.8   /  SaO2: 98.8                    Urinalysis Basic - ( 01 Aug 2022 22:25 )    Color: Light Yellow / Appearance: Clear / S.017 / pH: x  Gluc: x / Ketone: Trace  / Bili: Negative / Urobili: <2 mg/dL   Blood: x / Protein: Trace / Nitrite: Negative   Leuk Esterase: Negative / RBC: x / WBC x   Sq Epi: x / Non Sq Epi: x / Bacteria: x          RADIOLOGY & ADDITIONAL TESTS:  CXR:        Care Discussed with Consultants/Other Providers [ x] YES  [ ] NO           Patient is a 89y old  Female who presents with a chief complaint of Dyspnea w/ Exertion (02 Aug 2022 08:06)    HPI:  HFpEF, severe AS s/p balloon valvuloplasty , anemia s/p multiple transfusions 2 months ago, MVP, HTN, hyperthyroidism, HCC s/p partial liver resection, transferred from [facility] to [place] for TAVR evaluation by Dr. Mccollum.  Patient found to be anemic most likely d/t GI bleed now s/p 1PRBC transfusion for Hgb < 8/anemia. GI, Dr. Woo consulted at Dr. Mccollum' request.  Capsule study completed.  S/p colonoscopy  with successful AVM intervention. Diuretics largely remain on hold for soft BP (SBP < 100).  Ultimately, plan is for TAVR pending conditioning. Patient was waiting for subacute rehab placement with plans for TAVR as outpatient when patient's conditioning improves.    Patient , became acutely dyspneic, hypertensive 170/79 and tachycardic to 140s w/ crackles and wheezes to lungs throughout. Bipap started, with some improvement noted and patient was on nitro drip. Transfer to CCU initiated. In CCU: became febrile 100.4, CXR showed possible pneumonia, given cefepime and vanc. Blood cx () grew staph epidermidis (MR) - possible contaminant.   Then became hypotensive down to 50s systolic- d/c nitro drip, given 1 bolus, requiring pressor support. (phenylepherine), then went into afib RVR (140s-150s). Patient on bipap during event; O2 requirements have decreased during stay. Continued on phenylepherine drip and midodrine for pressor support.          INTERVAL HPI/OVERNIGHT EVENTS:   No overnight events   Afebrile, hemodynamically stable,     Subjective:  Patient examined at bedside. She reports feeling well this morning. Denies any active complaints.     ICU Vital Signs Last 24 Hrs  T(C): 36 (03 Aug 2022 00:00), Max: 36.1 (02 Aug 2022 12:00)  T(F): 96.8 (03 Aug 2022 00:00), Max: 97 (02 Aug 2022 12:00)  HR: 45 (03 Aug 2022 06:00) (45 - 83)  BP: --  BP(mean): --  ABP: 101/44 (03 Aug 2022 06:00) (95/45 - 135/73)  ABP(mean): 63 (03 Aug 2022 06:00) (61 - 97)  RR: 19 (03 Aug 2022 06:00) (19 - 37)  SpO2: 100% (03 Aug 2022 06:00) (88% - 100%)    O2 Parameters below as of 03 Aug 2022 06:00  Patient On (Oxygen Delivery Method): nasal cannula  O2 Flow (L/min): 1        I&O's Summary    01 Aug 2022 07:01  -  02 Aug 2022 07:00  --------------------------------------------------------  IN: 1572 mL / OUT: 2100 mL / NET: -528 mL    02 Aug 2022 07:01  -  03 Aug 2022 06:37  --------------------------------------------------------  IN: 957 mL / OUT: 2760 mL / NET: -1803 mL          Daily     Daily     Adult Advanced Hemodynamics Last 24 Hrs  CVP(mm Hg): 280 (03 Aug 2022 06:00) (8 - 280)  CVP(cm H2O): --  CO: --  CI: --  PA: --  PA(mean): --  PCWP: --  SVR: --  SVRI: --  PVR: --  PVRI: --    EKG/Telemetry Events:    MEDICATIONS  (STANDING):  aspirin  chewable 81 milliGRAM(s) Oral daily  cefepime   IVPB 1000 milliGRAM(s) IV Intermittent every 8 hours  chlorhexidine 2% Cloths 1 Application(s) Topical daily  enoxaparin Injectable 40 milliGRAM(s) SubCutaneous every 24 hours  hydrocortisone hemorrhoidal Suppository 1 Suppository(s) Rectal daily  levothyroxine 100 MICROGram(s) Oral daily  midodrine 10 milliGRAM(s) Oral every 8 hours  pantoprazole    Tablet 40 milliGRAM(s) Oral before breakfast  phenylephrine    Infusion 0.1 MICROgram(s)/kG/Min (1.1 mL/Hr) IV Continuous <Continuous>  senna 2 Tablet(s) Oral at bedtime    MEDICATIONS  (PRN):  aluminum hydroxide/magnesium hydroxide/simethicone Suspension 30 milliLiter(s) Oral every 4 hours PRN Dyspepsia  hydrocortisone hemorrhoidal Suppository 1 Suppository(s) Rectal two times a day PRN hemorrhoidal discomfort      PHYSICAL EXAM:  GENERAL: nad laying in bed, NC in place, more alert today  HEAD:  Atraumatic, Normocephalic  NECK: Supple, No JVD, r triple lumen in place  NERVOUS SYSTEM:  Awake and alert   CHEST/LUNG: B/L breath sounds  HEART: S1S2 audible, bradycardic, regular rhythm; Systolic murmur  ABDOMEN: Soft, Nontender, Nondistended  EXTREMITIES:  No clubbing, cyanosis, or edema, r a-line in place    LABS:                        8.8    5.47  )-----------( 165      ( 03 Aug 2022 03:53 )             27.2     08-03    138  |  103  |  21<H>  ----------------------------<  94  3.9   |  26  |  0.7    Ca    8.7      03 Aug 2022 03:53  Mg     2.0     08-03    TPro  5.8<L>  /  Alb  3.6  /  TBili  0.5  /  DBili  x   /  AST  17  /  ALT  12  /  AlkPhos  100  08-03    LIVER FUNCTIONS - ( 03 Aug 2022 03:53 )  Alb: 3.6 g/dL / Pro: 5.8 g/dL / ALK PHOS: 100 U/L / ALT: 12 U/L / AST: 17 U/L / GGT: x             CAPILLARY BLOOD GLUCOSE        ABG - ( 01 Aug 2022 17:51 )  pH, Arterial: 7.45  pH, Blood: x     /  pCO2: 37    /  pO2: 121   / HCO3: 26    / Base Excess: 1.8   /  SaO2: 98.8                    Urinalysis Basic - ( 01 Aug 2022 22:25 )    Color: Light Yellow / Appearance: Clear / S.017 / pH: x  Gluc: x / Ketone: Trace  / Bili: Negative / Urobili: <2 mg/dL   Blood: x / Protein: Trace / Nitrite: Negative   Leuk Esterase: Negative / RBC: x / WBC x   Sq Epi: x / Non Sq Epi: x / Bacteria: x          RADIOLOGY & ADDITIONAL TESTS:  CXR :  Findings:  Support devices: Right IJ central venous catheter. Telemetry leads appreciated.  Cardiac/mediastinum/hilum: Unchanged.  Lung parenchyma/Pleura: Worsening left lung opacity/effusion. Stable right-sided opacity. No pneumothorax.  Skeleton/soft tissues: Unchanged.  Impression:  Worsening left lung opacity/effusion. Stable right-sided opacity/fluid within the horizontal fissure. No pneumothorax.    < from: 12 Lead ECG (22 @ 05:25) >  Diagnosis Line Normal sinus rhythm  Left axis deviation  Right bundle branch block  Voltage criteria for left ventricular hypertrophy  T wave abnormality, consider lateral ischemia  Abnormal ECG  < end of copied text >      Care Discussed with Consultants/Other Providers [ x] YES  [ ] NO

## 2022-08-03 NOTE — PROGRESS NOTE ADULT - SUBJECTIVE AND OBJECTIVE BOX
DILIP LUCAS  89y, Female  Allergy: Cipro (Other)  Levaquin (Rash)  tetracycline (Hives)      LOS  26d    CHIEF COMPLAINT: Dyspnea w/ Exertion (03 Aug 2022 06:37)      INTERVAL EVENTS/HPI  - No acute events overnight  - T(F): , Max: 97.9 (22 @ 08:00)  - on room air   - WBC Count: 5.47 (22 @ 03:53)  WBC Count: 6.85 (22 @ 20:56)     - Creatinine, Serum: 0.7 (22 @ 03:53)  Creatinine, Serum: 0.9 (22 @ 20:56)       ROS  General: Denies rigors, nightsweats  HEENT: Denies headache, rhinorrhea, sore throat, eye pain  CV: Denies CP, palpitations  PULM: Denies wheezing, hemoptysis  GI: Denies hematemesis, hematochezia, melena  : Denies discharge, hematuria  MSK: Denies arthralgias, myalgias  SKIN: Denies rash, lesions  NEURO: Denies paresthesias, weakness  PSYCH: Denies depression, anxiety    VITALS:  T(F): 97.9, Max: 97.9 (22 @ 08:00)  HR: 53  BP: --  RR: 23Vital Signs Last 24 Hrs  T(C): 36.6 (03 Aug 2022 08:00), Max: 36.6 (03 Aug 2022 08:00)  T(F): 97.9 (03 Aug 2022 08:00), Max: 97.9 (03 Aug 2022 08:00)  HR: 53 (03 Aug 2022 08:00) (45 - 83)  BP: --  BP(mean): --  RR: 23 (03 Aug 2022 08:00) (19 - 37)  SpO2: 96% (03 Aug 2022 08:00) (88% - 100%)    Parameters below as of 03 Aug 2022 08:00  Patient On (Oxygen Delivery Method): room air        PHYSICAL EXAM:  Gen: NAD, resting in bed  HEENT: Normocephalic, atraumatic  Neck: supple, no lymphadenopathy  CV: Regular rate & regular rhythm  Lungs: decreased BS at bases, no fremitus  Abdomen: Soft, BS present  Ext: Warm, well perfused  Neuro: non focal, awake  Skin: no rash, no erythema  Lines: no phlebitis    FH: Non-contributory  Social Hx: Non-contributory    TESTS & MEASUREMENTS:                        8.8    5.47  )-----------( 165      ( 03 Aug 2022 03:53 )             27.2         138  |  103  |  21<H>  ----------------------------<  94  3.9   |  26  |  0.7    Ca    8.7      03 Aug 2022 03:53  Mg     2.0         TPro  5.8<L>  /  Alb  3.6  /  TBili  0.5  /  DBili  x   /  AST  17  /  ALT  12  /  AlkPhos  100        LIVER FUNCTIONS - ( 03 Aug 2022 03:53 )  Alb: 3.6 g/dL / Pro: 5.8 g/dL / ALK PHOS: 100 U/L / ALT: 12 U/L / AST: 17 U/L / GGT: x           Urinalysis Basic - ( 01 Aug 2022 22:25 )    Color: Light Yellow / Appearance: Clear / S.017 / pH: x  Gluc: x / Ketone: Trace  / Bili: Negative / Urobili: <2 mg/dL   Blood: x / Protein: Trace / Nitrite: Negative   Leuk Esterase: Negative / RBC: x / WBC x   Sq Epi: x / Non Sq Epi: x / Bacteria: x        Culture - Urine (collected 22 @ 22:25)  Source: Clean Catch Clean Catch (Midstream)  Final Report (22 @ 07:17):    No growth    Culture - Blood (collected 22 @ 12:41)  Source: .Blood None  Preliminary Report (22 @ 22:02):    No growth to date.    Culture - Fungal, Body Fluid (collected 22 @ 12:30)  Source: Pleural Fl Pleural Fluid  Preliminary Report (22 @ 07:33):    Testing in progress    Culture - Body Fluid with Gram Stain (collected 22 @ 12:30)  Source: Pleural Fl Pleural Fluid  Gram Stain (22 @ 01:59):    polymorphonuclear leukocytes seen    No organisms seen    by cytocentrifuge  Preliminary Report (22 @ 20:15):    No growth    Culture - Blood (collected 22 @ 23:30)  Source: .Blood Blood  Gram Stain (22 @ 08:33):    Growth in anaerobic bottle: Gram Positive Cocci in Clusters    Growth in aerobic bottle: Gram Positive Cocci in Clusters  Final Report (22 @ 20:36):    Growth in aerobic and anaerobic bottles: Staphylococcus epidermidis    Coag Negative Staphylococcus    Single set isolate, possible contaminant. Contact    Microbiology if susceptibility testing clinically    indicated.    ***Blood Panel PCR results on this specimen are available    approximately 3 hours after the Gram stain result.***    Gram stain, PCR, and/or culture results may not always    correspond due to difference in methodologies.    ************************************************************    This PCR assay was performed by multiplex PCR. This    Assay tests for 66 bacterial and resistance gene targets.    Please refer to the [facility] test directory    at [email or web address] for details.  Organism: Blood Culture PCR (22 @ 20:36)  Organism: Blood Culture PCR (22 @ 20:36)      -  Staphylococcus epidermidis, Methicillin resistant: Detec      Method Type: PCR            INFECTIOUS DISEASES TESTING  COVID-19 PCR: NotDetec (22 @ 05:16)  MRSA PCR Result.: Negative (22 @ 08:07)  Procalcitonin, Serum: 0.51 (22 @ 23:30)  COVID-19 PCR: NotDetec (22 @ 22:30)  COVID-19 PCR: NotDetec (22 @ 06:00)  COVID-19 PCR: NotDetec (07-15-22 @ 07:00)  Legionella Antigen, Urine: Negative (22 @ 09:47)  Procalcitonin, Serum: 0.07 (22 @ 21:32)      INFLAMMATORY MARKERS      RADIOLOGY & ADDITIONAL TESTS:  I have personally reviewed the last available Chest xray  CXR      CT      CARDIOLOGY TESTING  12 Lead ECG:   Ventricular Rate 60 BPM    Atrial Rate 60 BPM    P-R Interval 168 ms    QRS Duration 132 ms    Q-T Interval 492 ms    QTC Calculation(Bazett) 492 ms    P Axis 18 degrees    R Axis -39 degrees    T Axis -36 degrees    Diagnosis Line Normal sinus rhythm  Left axis deviation  Right bundle branch block  Voltage criteria for left ventricular hypertrophy  T wave abnormality, consider lateral ischemia  Abnormal ECG    Confirmed by MONO WALKER MD ([id number]) on 8/3/2022 7:19:55 AM (22 @ 05:25)  12 Lead ECG:   Ventricular Rate 71 BPM    Atrial Rate 71 BPM    P-R Interval 182 ms    QRS Duration 138 ms    Q-T Interval 426 ms    QTC Calculation(Bazett) 462 ms    P Axis 33 degrees    R Axis -47 degrees    T Axis -21 degrees    Diagnosis Line Normal sinus rhythm  Right bundle branch block  Left anterior fascicular block  *** Bifascicular block ***  Left ventricular hypertrophy with repolarization abnormality  Abnormal ECG    Confirmed by Jhonatan Chang ([id number]) on 2022 8:13:29 AM (22 @ 05:28)      MEDICATIONS  aspirin  chewable 81 Oral daily  cefepime   IVPB 1000 IV Intermittent every 8 hours  chlorhexidine 2% Cloths 1 Topical daily  enoxaparin Injectable 40 SubCutaneous every 24 hours  hydrocortisone hemorrhoidal Suppository 1 Rectal daily  levothyroxine 100 Oral daily  midodrine 10 Oral every 8 hours  pantoprazole    Tablet 40 Oral before breakfast  phenylephrine    Infusion 0.1 IV Continuous <Continuous>  senna 2 Oral at bedtime      WEIGHT  Weight (kg): 58.5 (22 @ 18:45)  Creatinine, Serum: 0.7 mg/dL (22 @ 03:53)  Creatinine, Serum: 0.9 mg/dL (22 @ 20:56)      ANTIBIOTICS:  cefepime   IVPB 1000 milliGRAM(s) IV Intermittent every 8 hours      All available historical records have been reviewed

## 2022-08-03 NOTE — PROGRESS NOTE ADULT - ASSESSMENT
IMPRESSION:    Left moderate pleural effusion s/p thoracentesis   Acute hypoxemic respiratory failure   Severe aortic stenosis  Shock on Pressor - improving   Possible CAP       PLAN:    CNS: No depressants. Mental status is ok.     HEENT: Oral care    PULMONARY:  HOB @ 45 degrees. Off oxygen now  Keep O2 saturation more than 92%. Bedside US shows moderate recurrent left effusion. Fluid is transudative by Light's criteria. If there is worsening in respiratory status then we will either repeat thoracentesis or place a small pigtail.     CARDIOVASCULAR: Severe AS on echo with plans for TAVR. Taper down Neosinephrine and continue midodrine. Cardiology is following.     GI: GI prophylaxis.  Feeding PO diet as tolerated.      RENAL:  Follow up lytes.  Correct as needed. Kidney function is at baseline.     INFECTIOUS DISEASE: Blood cultures negative. Pleural fluid culture negative. Finish 5 day course of abx for possible CAP.     HEMATOLOGICAL:  DVT prophylaxis with Lovenox.     ENDOCRINE:  Follow up FS.  Insulin protocol if needed.    MUSCULOSKELETAL: Out of bed to chair. PT rehab.     CODE STATUS: Full code.     We will continue to follow.

## 2022-08-03 NOTE — PROGRESS NOTE ADULT - ATTENDING COMMENTS
Patient with bradycardia overnight, hemodynamically stable but remains on pressor support. Good urine output with one dose of furosemide. Blood cultures NGTD. Physically deconditioned.     Avoid AV eb blocking agent   Wean phenylephrine gtt   Continue midodrine  Start torsemide 10 mg PO daily  Continue Abx / follow up Bcx / ID follow up   Structural cards f/u for severe AS   OOB to chair / PT follow up

## 2022-08-03 NOTE — PROGRESS NOTE ADULT - SUBJECTIVE AND OBJECTIVE BOX
Patient is a 89y old  Female who presents with a chief complaint of Dyspnea w/ Exertion (03 Aug 2022 08:29)        Over Night Events:    She is anxious  Bedside lung us shows recurrent moderate left effusion  No fevers         ROS:  See HPI    PHYSICAL EXAM    ICU Vital Signs Last 24 Hrs  T(C): 36.6 (03 Aug 2022 08:00), Max: 36.6 (03 Aug 2022 08:00)  T(F): 97.9 (03 Aug 2022 08:00), Max: 97.9 (03 Aug 2022 08:00)  HR: 57 (03 Aug 2022 09:00) (45 - 83)  BP: --  BP(mean): --  ABP: 118/51 (03 Aug 2022 09:00) (95/45 - 135/73)  ABP(mean): 74 (03 Aug 2022 09:00) (61 - 97)  RR: 21 (03 Aug 2022 09:00) (19 - 37)  SpO2: 97% (03 Aug 2022 09:00) (93% - 100%)    O2 Parameters below as of 03 Aug 2022 09:00  Patient On (Oxygen Delivery Method): room air            General: NAD on room air   HEENT: RAFFI             Lymphatic system: No cervical LN   Lungs: Decreased breath sounds on the left side   Cardiovascular: Regular   Gastrointestinal: Soft, Positive BS  Extremities: No clubbing.  Moves extremities.  Full Range of motion   Skin: Warm, intact  Neurological: No motor or sensory deficit       22 @ 07:01  -  22 @ 07:00  --------------------------------------------------------  IN:    IV PiggyBack: 150 mL    Oral Fluid: 460 mL    Phenylephrine: 151 mL  Total IN: 761 mL    OUT:    Voided (mL): 2550 mL  Total OUT: 2550 mL    Total NET: -1789 mL      22 @ 07:01  -  22 @ 09:52  --------------------------------------------------------  IN:    Oral Fluid: 120 mL    Phenylephrine: 3.3 mL  Total IN: 123.3 mL    OUT:  Total OUT: 0 mL    Total NET: 123.3 mL          LABS:                            8.8    5.47  )-----------( 165      ( 03 Aug 2022 03:53 )             27.2                                               08-03    138  |  103  |  21<H>  ----------------------------<  94  3.9   |  26  |  0.7    Ca    8.7      03 Aug 2022 03:53  Mg     2.0     08-    TPro  5.8<L>  /  Alb  3.6  /  TBili  0.5  /  DBili  x   /  AST  17  /  ALT  12  /  AlkPhos  100  08-03                                             Urinalysis Basic - ( 01 Aug 2022 22:25 )    Color: Light Yellow / Appearance: Clear / S.017 / pH: x  Gluc: x / Ketone: Trace  / Bili: Negative / Urobili: <2 mg/dL   Blood: x / Protein: Trace / Nitrite: Negative   Leuk Esterase: Negative / RBC: x / WBC x   Sq Epi: x / Non Sq Epi: x / Bacteria: x                                                  LIVER FUNCTIONS - ( 03 Aug 2022 03:53 )  Alb: 3.6 g/dL / Pro: 5.8 g/dL / ALK PHOS: 100 U/L / ALT: 12 U/L / AST: 17 U/L / GGT: x                                                  Culture - Urine (collected 01 Aug 2022 22:25)  Source: Clean Catch Clean Catch (Midstream)  Final Report (03 Aug 2022 07:17):    No growth    Culture - Blood (collected 01 Aug 2022 12:41)  Source: .Blood None  Preliminary Report (02 Aug 2022 22:02):    No growth to date.    Culture - Fungal, Body Fluid (collected 01 Aug 2022 12:30)  Source: Pleural Fl Pleural Fluid  Preliminary Report (02 Aug 2022 07:33):    Testing in progress    Culture - Body Fluid with Gram Stain (collected 01 Aug 2022 12:30)  Source: Pleural Fl Pleural Fluid  Gram Stain (02 Aug 2022 01:59):    polymorphonuclear leukocytes seen    No organisms seen    by cytocentrifuge  Preliminary Report (02 Aug 2022 20:15):    No growth                                                                                       ABG - ( 01 Aug 2022 17:51 )  pH, Arterial: 7.45  pH, Blood: x     /  pCO2: 37    /  pO2: 121   / HCO3: 26    / Base Excess: 1.8   /  SaO2: 98.8                MEDICATIONS  (STANDING):  aspirin  chewable 81 milliGRAM(s) Oral daily  cefepime   IVPB 1000 milliGRAM(s) IV Intermittent every 8 hours  chlorhexidine 2% Cloths 1 Application(s) Topical daily  enoxaparin Injectable 40 milliGRAM(s) SubCutaneous every 24 hours  hydrocortisone hemorrhoidal Suppository 1 Suppository(s) Rectal daily  levothyroxine 100 MICROGram(s) Oral daily  midodrine 10 milliGRAM(s) Oral every 8 hours  pantoprazole    Tablet 40 milliGRAM(s) Oral before breakfast  phenylephrine    Infusion 0.1 MICROgram(s)/kG/Min (1.1 mL/Hr) IV Continuous <Continuous>  senna 2 Tablet(s) Oral at bedtime    MEDICATIONS  (PRN):  aluminum hydroxide/magnesium hydroxide/simethicone Suspension 30 milliLiter(s) Oral every 4 hours PRN Dyspepsia  hydrocortisone hemorrhoidal Suppository 1 Suppository(s) Rectal two times a day PRN hemorrhoidal discomfort      Xrays:                                                                                     ECHO

## 2022-08-03 NOTE — PROGRESS NOTE ADULT - ASSESSMENT
ASSESSMENT  Patient is a 89 y/o female with HFpEF, severe AS s/p balloon valvuloplasty 2021, anemia s/p multiple transfusions, MVP, HTN, hyperthyroidism, HCC s/p partial liver resection whom is consulted for suspected sepsis secondary to pneumonia.     IMPRESSION  # Sepsis during admission - Pneumonia? Aspiration pneumonia?  - s/p thoracentesis 8/1 - CX no growth  # Blood cultures positive for Methicillin resistant Staphylococcus epidermidis - suspect contaminant     RECOMMENDATIONS  - continue cefepime 1g q 8 hours (7/31-8/6) for suspected pneumonia  - recall as needed  Please call or message on [employer] Teams if with any questions.  Spectra [phone].

## 2022-08-03 NOTE — PROGRESS NOTE ADULT - ASSESSMENT
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 1x 40 lasix IV  - Keep MAP >65    Hematologic   - Trend CBC Daily. 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 Wean was tolerated. Bring down to 0.2 and if tolerated keep at 0.2 until after BAV.   - Plan for BAV 8/4/2022 keep NPO after midnight   - C/w midodrine  10mg daily  - Continue Torsemide 10 mg at night   - Keep MAP >65    Hematologic   - Trend CBC Daily. 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 Wean was tolerated. Bring down to 0.2 and if tolerated keep at 0.2 until after BAV.   - Plan for BAV 8/4/2022 keep NPO after midnight   - C/w midodrine  10mg daily  - Begin Torsemide 10 mg at night   - Keep MAP >65    Hematologic   - Trend CBC Daily. 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 Pt remains afebrile, improving clinically. Fluid is transudative by Light's criteria. 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 Wean was tolerated. Bring down to 0.2 and if tolerated keep at 0.2 until after BAV.   - Plan for BAV 8/4/2022 keep NPO after midnight   - C/w midodrine  10mg daily  - Begin Torsemide 10 mg at night   - Keep MAP >65    Hematologic   - Trend CBC Daily. 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  - Continue hemorrhoidal suppository   -Continue Senna 2 tabs HS  -Tylenol 650 mg PO PRN for rectal pain  - Protonix    Endocrine  #Hypothyroidism  - Continue Levothyroxine 100 mcg PO daily  - TSH 4.36 (6/19/22)  - Monitor FS    Renal  - Strict I+O   - Daily standing weights  - maintain electrolytes, K>4 Mg >2    Neurological  - Avoid sedating medications    PT/Rehab  - Ordered    Lines/Gruber  - Central line: 7/31  - A-line: 8/1  - Primafit

## 2022-08-04 LAB
ALBUMIN SERPL ELPH-MCNC: 3.6 G/DL — SIGNIFICANT CHANGE UP (ref 3.5–5.2)
ALP SERPL-CCNC: 102 U/L — SIGNIFICANT CHANGE UP (ref 30–115)
ALT FLD-CCNC: 12 U/L — SIGNIFICANT CHANGE UP (ref 0–41)
ANION GAP SERPL CALC-SCNC: 8 MMOL/L — SIGNIFICANT CHANGE UP (ref 7–14)
AST SERPL-CCNC: 18 U/L — SIGNIFICANT CHANGE UP (ref 0–41)
BASOPHILS # BLD AUTO: 0.04 K/UL — SIGNIFICANT CHANGE UP (ref 0–0.2)
BASOPHILS NFR BLD AUTO: 0.7 % — SIGNIFICANT CHANGE UP (ref 0–1)
BILIRUB SERPL-MCNC: 0.4 MG/DL — SIGNIFICANT CHANGE UP (ref 0.2–1.2)
BLD GP AB SCN SERPL QL: SIGNIFICANT CHANGE UP
BUN SERPL-MCNC: 22 MG/DL — HIGH (ref 10–20)
CALCIUM SERPL-MCNC: 9.1 MG/DL — SIGNIFICANT CHANGE UP (ref 8.5–10.1)
CHLORIDE SERPL-SCNC: 102 MMOL/L — SIGNIFICANT CHANGE UP (ref 98–110)
CO2 SERPL-SCNC: 27 MMOL/L — SIGNIFICANT CHANGE UP (ref 17–32)
CREAT SERPL-MCNC: 0.8 MG/DL — SIGNIFICANT CHANGE UP (ref 0.7–1.5)
EGFR: 70 ML/MIN/1.73M2 — SIGNIFICANT CHANGE UP
EOSINOPHIL # BLD AUTO: 0.21 K/UL — SIGNIFICANT CHANGE UP (ref 0–0.7)
EOSINOPHIL NFR BLD AUTO: 3.7 % — SIGNIFICANT CHANGE UP (ref 0–8)
GLUCOSE BLDC GLUCOMTR-MCNC: 92 MG/DL — SIGNIFICANT CHANGE UP (ref 70–99)
GLUCOSE SERPL-MCNC: 77 MG/DL — SIGNIFICANT CHANGE UP (ref 70–99)
GRAM STN FLD: SIGNIFICANT CHANGE UP
HCT VFR BLD CALC: 28.4 % — LOW (ref 37–47)
HGB BLD-MCNC: 9.3 G/DL — LOW (ref 12–16)
IMM GRANULOCYTES NFR BLD AUTO: 0.3 % — SIGNIFICANT CHANGE UP (ref 0.1–0.3)
INR BLD: 1.09 RATIO — SIGNIFICANT CHANGE UP (ref 0.65–1.3)
LYMPHOCYTES # BLD AUTO: 1.08 K/UL — LOW (ref 1.2–3.4)
LYMPHOCYTES # BLD AUTO: 18.8 % — LOW (ref 20.5–51.1)
MAGNESIUM SERPL-MCNC: 2 MG/DL — SIGNIFICANT CHANGE UP (ref 1.8–2.4)
MCHC RBC-ENTMCNC: 30.6 PG — SIGNIFICANT CHANGE UP (ref 27–31)
MCHC RBC-ENTMCNC: 32.7 G/DL — SIGNIFICANT CHANGE UP (ref 32–37)
MCV RBC AUTO: 93.4 FL — SIGNIFICANT CHANGE UP (ref 81–99)
MONOCYTES # BLD AUTO: 0.44 K/UL — SIGNIFICANT CHANGE UP (ref 0.1–0.6)
MONOCYTES NFR BLD AUTO: 7.7 % — SIGNIFICANT CHANGE UP (ref 1.7–9.3)
NEUTROPHILS # BLD AUTO: 3.94 K/UL — SIGNIFICANT CHANGE UP (ref 1.4–6.5)
NEUTROPHILS NFR BLD AUTO: 68.8 % — SIGNIFICANT CHANGE UP (ref 42.2–75.2)
NRBC # BLD: 0 /100 WBCS — SIGNIFICANT CHANGE UP (ref 0–0)
PLATELET # BLD AUTO: 199 K/UL — SIGNIFICANT CHANGE UP (ref 130–400)
POTASSIUM SERPL-MCNC: 4.2 MMOL/L — SIGNIFICANT CHANGE UP (ref 3.5–5)
POTASSIUM SERPL-SCNC: 4.2 MMOL/L — SIGNIFICANT CHANGE UP (ref 3.5–5)
PROT SERPL-MCNC: 5.9 G/DL — LOW (ref 6–8)
PROTHROM AB SERPL-ACNC: 12.5 SEC — SIGNIFICANT CHANGE UP (ref 9.95–12.87)
RBC # BLD: 3.04 M/UL — LOW (ref 4.2–5.4)
RBC # FLD: 19.7 % — HIGH (ref 11.5–14.5)
SODIUM SERPL-SCNC: 137 MMOL/L — SIGNIFICANT CHANGE UP (ref 135–146)
WBC # BLD: 5.73 K/UL — SIGNIFICANT CHANGE UP (ref 4.8–10.8)
WBC # FLD AUTO: 5.73 K/UL — SIGNIFICANT CHANGE UP (ref 4.8–10.8)

## 2022-08-04 PROCEDURE — 71045 X-RAY EXAM CHEST 1 VIEW: CPT | Mod: 26,77

## 2022-08-04 PROCEDURE — 99233 SBSQ HOSP IP/OBS HIGH 50: CPT | Mod: 25

## 2022-08-04 PROCEDURE — 71045 X-RAY EXAM CHEST 1 VIEW: CPT | Mod: 26

## 2022-08-04 PROCEDURE — 99291 CRITICAL CARE FIRST HOUR: CPT

## 2022-08-04 PROCEDURE — 93010 ELECTROCARDIOGRAM REPORT: CPT

## 2022-08-04 PROCEDURE — 32557 INSERT CATH PLEURA W/ IMAGE: CPT

## 2022-08-04 RX ORDER — ACETAMINOPHEN 500 MG
650 TABLET ORAL EVERY 6 HOURS
Refills: 0 | Status: DISCONTINUED | OUTPATIENT
Start: 2022-08-04 | End: 2022-08-15

## 2022-08-04 RX ORDER — VANCOMYCIN HCL 1 G
1000 VIAL (EA) INTRAVENOUS ONCE
Refills: 0 | Status: COMPLETED | OUTPATIENT
Start: 2022-08-04 | End: 2022-08-04

## 2022-08-04 RX ORDER — VANCOMYCIN HCL 1 G
750 VIAL (EA) INTRAVENOUS EVERY 12 HOURS
Refills: 0 | Status: DISCONTINUED | OUTPATIENT
Start: 2022-08-05 | End: 2022-08-05

## 2022-08-04 RX ADMIN — ENOXAPARIN SODIUM 40 MILLIGRAM(S): 100 INJECTION SUBCUTANEOUS at 12:13

## 2022-08-04 RX ADMIN — Medication 10 MILLIGRAM(S): at 18:50

## 2022-08-04 RX ADMIN — Medication 100 MICROGRAM(S): at 05:16

## 2022-08-04 RX ADMIN — PHENYLEPHRINE HYDROCHLORIDE 1.1 MICROGRAM(S)/KG/MIN: 10 INJECTION INTRAVENOUS at 03:03

## 2022-08-04 RX ADMIN — Medication 1 SUPPOSITORY(S): at 12:14

## 2022-08-04 RX ADMIN — MIDODRINE HYDROCHLORIDE 10 MILLIGRAM(S): 2.5 TABLET ORAL at 14:27

## 2022-08-04 RX ADMIN — Medication 650 MILLIGRAM(S): at 20:00

## 2022-08-04 RX ADMIN — MIDODRINE HYDROCHLORIDE 10 MILLIGRAM(S): 2.5 TABLET ORAL at 05:15

## 2022-08-04 RX ADMIN — SENNA PLUS 2 TABLET(S): 8.6 TABLET ORAL at 21:28

## 2022-08-04 RX ADMIN — Medication 250 MILLIGRAM(S): at 12:14

## 2022-08-04 RX ADMIN — CHLORHEXIDINE GLUCONATE 1 APPLICATION(S): 213 SOLUTION TOPICAL at 12:44

## 2022-08-04 RX ADMIN — MIDODRINE HYDROCHLORIDE 10 MILLIGRAM(S): 2.5 TABLET ORAL at 21:28

## 2022-08-04 RX ADMIN — PANTOPRAZOLE SODIUM 40 MILLIGRAM(S): 20 TABLET, DELAYED RELEASE ORAL at 06:16

## 2022-08-04 RX ADMIN — Medication 250 MILLIGRAM(S): at 21:28

## 2022-08-04 RX ADMIN — Medication 650 MILLIGRAM(S): at 19:02

## 2022-08-04 RX ADMIN — CEFEPIME 100 MILLIGRAM(S): 1 INJECTION, POWDER, FOR SOLUTION INTRAMUSCULAR; INTRAVENOUS at 14:27

## 2022-08-04 RX ADMIN — CEFEPIME 100 MILLIGRAM(S): 1 INJECTION, POWDER, FOR SOLUTION INTRAMUSCULAR; INTRAVENOUS at 05:15

## 2022-08-04 RX ADMIN — CEFEPIME 100 MILLIGRAM(S): 1 INJECTION, POWDER, FOR SOLUTION INTRAMUSCULAR; INTRAVENOUS at 21:28

## 2022-08-04 RX ADMIN — Medication 81 MILLIGRAM(S): at 12:13

## 2022-08-04 NOTE — PROGRESS NOTE ADULT - ATTENDING COMMENTS
Patient SOB overnight, CXR showing worsening left sided pleural effusion. Bcx from 8/1 growing gram positive cocci in cluster. She remains on pressor support with phenylephrine.     Pulm follow up for thoracentesis and pig tail   Resume gram positive coverage with vanco given MRSE  Repeat blood cultures   ID follow up   Wean off phenylephrine   Remove central line    Structural cards follow up for BAv

## 2022-08-04 NOTE — PROGRESS NOTE ADULT - SUBJECTIVE AND OBJECTIVE BOX
Patient is a 89y old  Female who presents with a chief complaint of Dyspnea w/ Exertion (03 Aug 2022 09:51)        Over Night Events:    No events   On nasal cannula   US recurrent effusion        ROS:  See HPI    PHYSICAL EXAM    ICU Vital Signs Last 24 Hrs  T(C): 36.2 (04 Aug 2022 04:00), Max: 36.8 (03 Aug 2022 12:00)  T(F): 97.2 (04 Aug 2022 04:00), Max: 98.2 (03 Aug 2022 12:00)  HR: 67 (04 Aug 2022 06:00) (43 - 80)  BP: 107/57 (04 Aug 2022 02:00) (107/57 - 107/57)  BP(mean): 76 (04 Aug 2022 02:00) (76 - 76)  ABP: 124/59 (04 Aug 2022 06:00) (91/42 - 124/59)  ABP(mean): 82 (04 Aug 2022 06:00) (58 - 83)  RR: 20 (04 Aug 2022 06:00) (19 - 34)  SpO2: 97% (04 Aug 2022 06:00) (94% - 99%)    O2 Parameters below as of 04 Aug 2022 06:00  Patient On (Oxygen Delivery Method): room air            General: NAD   HEENT: RAFFI             Lymphatic system: No cervical LN   Lungs: Decreased breath sounds   Cardiovascular: Regular   Gastrointestinal: Soft, Positive BS  Extremities: No clubbing.  Moves extremities.  Full Range of motion   Skin: Warm, intact  Neurological: No motor or sensory deficit       08-03-22 @ 07:01  -  08-04-22 @ 07:00  --------------------------------------------------------  IN:    IV PiggyBack: 50 mL    Oral Fluid: 680 mL    Phenylephrine: 65.4 mL  Total IN: 795.4 mL    OUT:    Voided (mL): 1200 mL  Total OUT: 1200 mL    Total NET: -404.6 mL          LABS:                            9.3    5.73  )-----------( 199      ( 04 Aug 2022 04:00 )             28.4                                               08-04    137  |  102  |  22<H>  ----------------------------<  77  4.2   |  27  |  0.8    Ca    9.1      04 Aug 2022 04:00  Mg     2.0     08-04    TPro  5.9<L>  /  Alb  3.6  /  TBili  0.4  /  DBili  x   /  AST  18  /  ALT  12  /  AlkPhos  102  08-04      PT/INR - ( 04 Aug 2022 04:00 )   PT: 12.50 sec;   INR: 1.09 ratio                                                                                              LIVER FUNCTIONS - ( 04 Aug 2022 04:00 )  Alb: 3.6 g/dL / Pro: 5.9 g/dL / ALK PHOS: 102 U/L / ALT: 12 U/L / AST: 18 U/L / GGT: x                                                  Culture - Urine (collected 01 Aug 2022 22:25)  Source: Clean Catch Clean Catch (Midstream)  Final Report (03 Aug 2022 07:17):    No growth    Culture - Blood (collected 01 Aug 2022 12:41)  Source: .Blood None  Gram Stain (04 Aug 2022 06:03):    Growth in aerobic bottle: Gram Positive Cocci in Clusters  Preliminary Report (04 Aug 2022 06:03):    Growth in aerobic bottle: Gram Positive Cocci in Clusters    Culture - Fungal, Body Fluid (collected 01 Aug 2022 12:30)  Source: Pleural Fl Pleural Fluid  Preliminary Report (02 Aug 2022 07:33):    Testing in progress    Culture - Body Fluid with Gram Stain (collected 01 Aug 2022 12:30)  Source: Pleural Fl Pleural Fluid  Gram Stain (02 Aug 2022 01:59):    polymorphonuclear leukocytes seen    No organisms seen    by cytocentrifuge  Preliminary Report (02 Aug 2022 20:15):    No growth                                                                                           MEDICATIONS  (STANDING):  aspirin  chewable 81 milliGRAM(s) Oral daily  cefepime   IVPB 1000 milliGRAM(s) IV Intermittent every 8 hours  chlorhexidine 2% Cloths 1 Application(s) Topical daily  enoxaparin Injectable 40 milliGRAM(s) SubCutaneous every 24 hours  hydrocortisone hemorrhoidal Suppository 1 Suppository(s) Rectal daily  levothyroxine 100 MICROGram(s) Oral daily  midodrine 10 milliGRAM(s) Oral every 8 hours  pantoprazole    Tablet 40 milliGRAM(s) Oral before breakfast  phenylephrine    Infusion 0.1 MICROgram(s)/kG/Min (1.1 mL/Hr) IV Continuous <Continuous>  senna 2 Tablet(s) Oral at bedtime  torsemide 10 milliGRAM(s) Oral <User Schedule>    MEDICATIONS  (PRN):  aluminum hydroxide/magnesium hydroxide/simethicone Suspension 30 milliLiter(s) Oral every 4 hours PRN Dyspepsia  hydrocortisone hemorrhoidal Suppository 1 Suppository(s) Rectal two times a day PRN hemorrhoidal discomfort      Xrays: Wrosening left effusion; worsening fluid in the fissure, pulm edema                                                                                     ECHO

## 2022-08-04 NOTE — PROCEDURE NOTE - NSINFORMCONSENT_GEN_A_CORE
Benefits, risks, and possible complications of procedure explained to patient/caregiver who verbalized understanding and gave written consent.
Benefits, risks, and possible complications of procedure explained to patient/caregiver who verbalized understanding and gave verbal consent.
Benefits, risks, and possible complications of procedure explained to patient/caregiver who verbalized understanding and gave written consent.
Benefits, risks, and possible complications of procedure explained to patient/caregiver who verbalized understanding and gave written consent.

## 2022-08-04 NOTE — PROCEDURE NOTE - NSPROCDETAILS_GEN_ALL_CORE
location identified, draped/prepped, sterile technique used, needle inserted/introduced/Seldinger technique/catheter inserted over needle/connection to syringe/connection to evacuated glass bottle/ultrasound assessment of effusion (localization)
free flowing fluid/Seldinger technique/secured in place/sterile dressing applied/supine position/percutaneous/thoracostomy tube placed percutaneously/ultrasound assessment of fluid (location)
location identified, draped/prepped, sterile technique used, needle inserted/introduced/positive blood return obtained via catheter/connected to a pressurized flush line/hemostasis with direct pressure, dressing applied/all materials/supplies accounted for at end of procedure
guidewire recovered/lumen(s) aspirated and flushed/sterile dressing applied/sterile technique, catheter placed/ultrasound guidance with use of sterile gel and probe cove
location identified, draped/prepped, sterile technique used, needle inserted/introduced/positive blood return obtained via catheter/connected to a pressurized flush line/sutured in place/hemostasis with direct pressure, dressing applied/Seldinger technique/all materials/supplies accounted for at end of procedure

## 2022-08-04 NOTE — PROCEDURE NOTE - NSPROCNAME_GEN_A_CORE
Arterial Puncture/Cannulation
Thoracentesis
Chest Tube
Arterial Puncture/Cannulation
Central Line Insertion

## 2022-08-04 NOTE — PROGRESS NOTE ADULT - ASSESSMENT
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 Pt remains afebrile, improving clinically. Fluid is transudative by Light's criteria. 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 Wean was tolerated. Bring down to 0.2 and if tolerated keep at 0.2 until after BAV.   - Plan for BAV 8/4/2022 keep NPO after midnight   - C/w midodrine  10mg daily  - Begin Torsemide 10 mg at night   - Keep MAP >65    Hematologic   - Trend CBC Daily. 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  - Continue hemorrhoidal suppository   -Continue Senna 2 tabs HS  -Tylenol 650 mg PO PRN for rectal pain  - Protonix    Endocrine  #Hypothyroidism  - Continue Levothyroxine 100 mcg PO daily  - TSH 4.36 (6/19/22)  - Monitor FS    Renal  - Strict I+O   - Daily standing weights  - maintain electrolytes, K>4 Mg >2    Neurological  - Avoid sedating medications    PT/Rehab  - Ordered    Lines/Gruber  - Central line: 7/31  - A-line: 8/1  - Primafit   Impression  #Possible PNA?  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%  #Hemorrhoids with Constipation  #Hypothyroidism  #transudative pleural effusion    Respiratory   - Moderate left pleural effusion seen on CT angio s/p thoracocentesis 8/1/22 - 1 L fluid  - Pleural effusion reoccurrence 8/2 - approximately 800ccs  - Wean oxygen as tolerated   - S/P pig tail for recurrent effusion 8/4/22      ID  - CXR 7/30 suspicious for PNA, with fever, elevated white count    - 7/30 procal .51,  - continue cefepime at 1000 mg q 8 hours   - Added vancomycin for added broad spectrum coverage  - repeat cultures  - MRSE bacteremia     Cardiovascular  - Off pressors   - torsemide 10 mg PO QD  - Plan for BAV  - C/w midodrine  10mg daily  - goal MAP >65    Hematologic   - DVT PPX  - Trend cbc and white count    GI  - GI prophylaxis   - feeding    Endocrine  - synthroid 100 mcg PO QD      Renal  - Strict I+O   - Daily standing weights  - maintain electrolytes, K>4 Mg >2    Neurological  - Avoid sedating medications    PT/Rehab  - Ordered    Lines/Gruber  -DC A line and TLC  - Primafit   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%  #Hemorrhoids with Constipation  #Hypothyroidism    Respiratory   #Possible PNA  - Moderate left pleural effusion seen on CT angio s/p thoracocentesis 8/1/22 - 1 L fluid  - Pleural effusion reoccurrence 8/2 - approximately 800ccs - chest tube to be placed 8/4/22  - F/U pleural fluid LDH (72), Protein (2.1), Cx NGTD  - Wean nasal cannula as tolerated      ID  - CXR 7/30 suspicious for PNA, with initial fever, elevated white count, and hypotension    - 7/30 procal .51, WBC WNL since 8/1  - ID consulted  - continue cefepime at 1000 mg q 8 hours  - Give 1x dose vancomycin    - f/u blood and urine cultures  - 7/30 grew staph epidermidis, 8/1 gram (+) cocci in clusters, repeat cx pending  - D/C central line    Cardiovascular  #Severe AS, mean gradient ~75mmHg s/p recent balloon   #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 Once weaned d/c a-line   - Plan for BAV possibly 8/8/22 if scheduling allows  - C/w midodrine  10mg daily  - C/w Torsemide 10 mg at night   - Keep MAP >65    Hematologic   - Trend CBC Daily. 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  - Continue hemorrhoidal suppository   -Continue Senna 2 tabs HS  -Tylenol 650 mg PO PRN for rectal pain  - Protonix    Endocrine  #Hypothyroidism  - Continue Levothyroxine 100 mcg PO daily  - TSH 4.36 (6/19/22)  - Monitor FS    Renal  - Strict I+O   - Daily standing weights  - maintain electrolytes, K>4 Mg >2    Neurological  - Avoid sedating medications    PT/Rehab  - Ordered    Lines/Gruber  - Midline: 8/4  - A-line: 8/1  - Peripheral IV: 8/4  - Primafit

## 2022-08-04 NOTE — PROGRESS NOTE ADULT - ASSESSMENT
IMPRESSION:    Left moderate pleural effusion s/p thoracentesis   Acute hypoxemic respiratory failure   Severe aortic stenosis  Shock on Pressor - improving   Possible CAP       PLAN:    CNS: No depressants. Mental status is ok.     HEENT: Oral care    PULMONARY:  HOB @ 45 degrees. Off oxygen now  Keep O2 saturation more than 92%. Bedside US shows moderate recurrent left effusion. Fluid is transudative by Light's criteria. CXR appears worse. May need repeat thoracentesis vs pigtail catheter placement.     CARDIOVASCULAR: Severe AS on echo with plans for TAVR. Wean off Blair. Cardiology following for possible valvuloplasty today.     GI: GI prophylaxis.  Feeding PO diet as tolerated.      RENAL:  Follow up lytes.  Correct as needed. Kidney function is at baseline.     INFECTIOUS DISEASE: Repeat blood culture positive for GPC in clusters. No fevers . No leukocytosis. Pleural fluid culture negative. Finish 5 day course of abx for possible CAP. Consider discontinuing IV and arterial lines. ID evaluation.     HEMATOLOGICAL:  DVT prophylaxis with Lovenox.     ENDOCRINE:  Follow up FS.  Insulin protocol if needed.    MUSCULOSKELETAL: Out of bed to chair. PT rehab.     CODE STATUS: Full code.     We will continue to follow.          IMPRESSION:    Left moderate pleural effusion s/p thoracentesis   Acute hypoxemic respiratory failure   Severe aortic stenosis  Shock on Pressor - improving   Possible CAP       PLAN:    CNS: No depressants. Mental status is ok.     HEENT: Oral care    PULMONARY:  HOB @ 45 degrees. Off oxygen now  Keep O2 saturation more than 92%. Bedside US shows moderate recurrent left effusion. Fluid is transudative by Light's criteria. CXR appears worse. May need repeat thoracentesis vs pigtail catheter placement.     CARDIOVASCULAR: Severe AS on echo with plans for TAVR. Wean off Blair. Cardiology following for possible valvuloplasty today. Diuretics per cardiology's recommendations.    GI: GI prophylaxis.  Feeding PO diet as tolerated.      RENAL:  Follow up lytes.  Correct as needed. Kidney function is at baseline.     INFECTIOUS DISEASE: Repeat blood culture positive for GPC in clusters. No fevers . No leukocytosis. Pleural fluid culture negative. Finish 5 day course of abx for possible CAP. Consider discontinuing IV and arterial lines. ID evaluation.     HEMATOLOGICAL:  DVT prophylaxis with Lovenox.     ENDOCRINE:  Follow up FS.  Insulin protocol if needed.    MUSCULOSKELETAL: Out of bed to chair. PT rehab.     CODE STATUS: Full code.     We will continue to follow.

## 2022-08-04 NOTE — PROCEDURE NOTE - NSPOSTCAREGUIDE_GEN_A_CORE
Care for catheter as per unit/ICU protocols
Verbal/written post procedure instructions were given to patient/caregiver/Instructed patient/caregiver to follow-up with primary care physician/Instructed patient/caregiver regarding signs and symptoms of infection/Keep the cast/splint/dressing clean and dry
Care for catheter as per unit/ICU protocols
Care for catheter as per unit/ICU protocols
Verbal/written post procedure instructions were given to patient/caregiver

## 2022-08-04 NOTE — PROCEDURAL SAFETY CHECKLIST WITH OR WITHOUT SEDATION - NSINSTRUMENTCOUNTSD_GEN_ALL_CORE
Celestine Lama is a 47 y.o. female who was seen by synchronous (real-time) audio-video technology on 2/14/2022. Consent: Celestine Lama, who was seen by synchronous (real-time) audio-video technology, and/or her healthcare decision maker, is aware that this patient-initiated, Telehealth encounter on 2/14/2022 is a billable service, with coverage as determined by her insurance carrier. She is aware that she may receive a bill and has provided verbal consent to proceed: Yes. Assessment & Plan:   1. Asthma exacerbation, mild  As below  - albuterol (ProAir HFA) 90 mcg/actuation inhaler; INHALE 2 PUFFS BY MOUTH EVERY 6 HOURS AS NEEDED FOR WHEEZING  Indications: chronic obstructive pulmonary disease  Dispense: 3 Each; Refill: 3  - predniSONE (DELTASONE) 50 mg tablet; Take 1 Tablet by mouth daily for 5 days. Dispense: 5 Tablet; Refill: 0    2. Moderate persistent asthma with acute exacerbation  No resp distress on visit today but escalating asthma symptoms in setting of recent covid  Recommend we treat as exacerbation  And with loss of control add spiriva  C/w singulair, advair, flonase, prn albuterol  Recommend add steroid burst  - fluticasone propion-salmeteroL (Advair Diskus) 500-50 mcg/dose diskus inhaler; INHALE 1 PUFF BY MOUTH EVERY 12 HOURS  Indications: bronchospasm prevention with COPD  Dispense: 3 Each; Refill: 3  - fluticasone propionate (FLONASE) 50 mcg/actuation nasal spray; INSTILL 2 SPRAYS INTO EACH NOSTRIL EVERY DAY  Dispense: 3 Each; Refill: 14  - tiotropium (SPIRIVA) 18 mcg inhalation capsule; Take 1 Capsule by inhalation daily. Dispense: 30 Capsule; Refill: 1    3. Muscle spasm  C/w flexril, refilled today in refill encounte    4. Fibromyalgia  5.  Other chronic pain  Per patient chronic back pain and fibromyalgia limit her ability to comfortably perform job and she is interested in pursuing accomodations and so she may be sending me papwerork regarding this           Pt was counseled on risks,
benefits and alternatives of treatment options. All questions were asked and answered and the patient was agreeable with the treatment plan as outlined. Subjective:   Arsenio Longoria is a 47 y.o. 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 ROS  A 12 point review of systems was negative except as noted here or in the HPI.     Objective:   Vital Signs: (As obtained by patient/caregiver at home)  Patient-Reported Vitals 2/14/2022   Patient-Reported Weight 198lb   Patient-Reported Systolic  (No Data)        [INSTRUCTIONS:  \"[x]\" Indicates a positive item  \"[]\" Indicates a negative item  -- DELETE ALL ITEMS NOT EXAMINED]    Constitutional: [x] Appears well-developed and well-nourished [x] No apparent distress      [] Abnormal -     Mental status: [x] Alert and awake  [x] Oriented to person/place/time [x] Able to follow commands    [] Abnormal -     Eyes:   EOM    [x]  Normal    [] Abnormal -   Sclera  [x]  Normal    [] Abnormal -          Discharge [x]  None visible   [] Abnormal -     HENT: [x] Normocephalic, atraumatic  [] Abnormal -   [x] Mouth/Throat: Mucous membranes are moist    External Ears
[x] Normal  [] Abnormal -    Neck: [x] No visualized mass [] Abnormal -     Pulmonary/Chest: [x] Respiratory effort normal   [x] No visualized signs of difficulty breathing or respiratory distress        [] Abnormal -      Musculoskeletal:   [] Normal gait with no signs of ataxia         [x] Normal range of motion of neck        [] Abnormal -     Neurological:        [x] No Facial Asymmetry (Cranial nerve 7 motor function) (limited exam due to video visit)          [x] No gaze palsy        [] Abnormal -          Skin:        [x] No significant exanthematous lesions or discoloration noted on facial skin         [] Abnormal -            Psychiatric:       [x] Normal Affect [] Abnormal -        [x] No Hallucinations    Other pertinent observable physical exam findings: seated, wearing glasses    We discussed the expected course, resolution and complications of the diagnosis(es) in detail. Medication risks, benefits, costs, interactions, and alternatives were discussed as indicated. I advised her to contact the office if her condition worsens, changes or fails to improve as anticipated. She expressed understanding with the diagnosis(es) and plan. Omero Avila is a 47 y.o. female who was evaluated by a video visit encounter for concerns as above. Patient identification was verified prior to start of the visit. A caregiver was present when appropriate. Due to this being a TeleHealth encounter (During [facility]- public [facility] emergency), evaluation of the following organ systems was limited: Vitals/Constitutional/EENT/Resp/CV/GI//MS/Neuro/Skin/Heme-Lymph-Imm.   Pursuant to the emergency declaration under the [facility], 1135 waiver authority and the [employer] Act, this Virtual  Visit was conducted, with patient's (and/or legal guardian's) consent, to reduce the patient's risk of exposure to COVID-19 and provide necessary
medical care. Services were provided through a video synchronous discussion virtually to substitute for in-person clinic visit. Patient and provider were located at their individual homes. Ashley Silveira MD  [facility]  02/14/22 2:46 PM     Portions of this note may have been populated using smart dictation software and may have \"sounds-like\" errors present.
done
done

## 2022-08-04 NOTE — PROCEDURE NOTE - NSINDICATIONS_GEN_A_CORE
arterial puncture to obtain ABG's/blood sampling/critical patient/monitoring purposes
pleural effusion
arterial puncture to obtain ABG's/blood sampling/cannulation purposes/critical patient/monitoring purposes
pleural effusion
critical illness/venous access

## 2022-08-04 NOTE — PROGRESS NOTE ADULT - SUBJECTIVE AND OBJECTIVE BOX
Patient is a 89y old  Female who presents with a chief complaint of Dyspnea w/ Exertion (04 Aug 2022 08:21)    HPI:  This is a 89-year-old female with a past medical history significant for CHF,severe AAS, MVP, hypertension, HCC, hypothyroidism, GERD who presents with shortness of breath.  Patient states that she has been having chronic shortness of breath w/ recent hospital admission -, pt reports dyspnea has been getting worse the last couple of days and did not have any resolution to sxs upon d/c.  Of note patient also states that she has been having some blood in the stool during this time.  Patient states dyspnea is worsened with any exertion- and associated with escalating chest pain.    Pt denies any cardiology or pulmonary f/u.  Pt also admits to predominantly bedbound state due to dyspnea.   (2022 16:43)       INTERVAL HPI/OVERNIGHT EVENTS:   No overnight events   Afebrile, hemodynamically stable     Subjective:    ICU Vital Signs Last 24 Hrs  T(C): 36.2 (04 Aug 2022 04:00), Max: 36.8 (03 Aug 2022 12:00)  T(F): 97.2 (04 Aug 2022 04:00), Max: 98.2 (03 Aug 2022 12:00)  HR: 67 (04 Aug 2022 06:00) (43 - 80)  BP: 107/57 (04 Aug 2022 02:00) (107/57 - 107/57)  BP(mean): 76 (04 Aug 2022 02:00) (76 - 76)  ABP: 124/59 (04 Aug 2022 06:00) (91/42 - 124/59)  ABP(mean): 82 (04 Aug 2022 06:00) (58 - 83)  RR: 20 (04 Aug 2022 06:00) (19 - 34)  SpO2: 97% (04 Aug 2022 06:00) (94% - 99%)    O2 Parameters below as of 04 Aug 2022 06:00  Patient On (Oxygen Delivery Method): room air          I&O's Summary    03 Aug 2022 07:01  -  04 Aug 2022 07:00  --------------------------------------------------------  IN: 795.4 mL / OUT: 1200 mL / NET: -404.6 mL          Daily     Daily Weight in k.1 (04 Aug 2022 06:00)    Adult Advanced Hemodynamics Last 24 Hrs  CVP(mm Hg): 8 (04 Aug 2022 02:00) (3 - 11)  CVP(cm H2O): --  CO: --  CI: --  PA: --  PA(mean): --  PCWP: --  SVR: --  SVRI: --  PVR: --  PVRI: --    EKG/Telemetry Events:    MEDICATIONS  (STANDING):  aspirin  chewable 81 milliGRAM(s) Oral daily  cefepime   IVPB 1000 milliGRAM(s) IV Intermittent every 8 hours  chlorhexidine 2% Cloths 1 Application(s) Topical daily  enoxaparin Injectable 40 milliGRAM(s) SubCutaneous every 24 hours  hydrocortisone hemorrhoidal Suppository 1 Suppository(s) Rectal daily  levothyroxine 100 MICROGram(s) Oral daily  midodrine 10 milliGRAM(s) Oral every 8 hours  pantoprazole    Tablet 40 milliGRAM(s) Oral before breakfast  phenylephrine    Infusion 0.1 MICROgram(s)/kG/Min (1.1 mL/Hr) IV Continuous <Continuous>  senna 2 Tablet(s) Oral at bedtime  torsemide 10 milliGRAM(s) Oral <User Schedule>    MEDICATIONS  (PRN):  aluminum hydroxide/magnesium hydroxide/simethicone Suspension 30 milliLiter(s) Oral every 4 hours PRN Dyspepsia  hydrocortisone hemorrhoidal Suppository 1 Suppository(s) Rectal two times a day PRN hemorrhoidal discomfort      PHYSICAL EXAM:  GENERAL: nad laying in bed, NC in place, more alert today  HEAD:  Atraumatic, Normocephalic  NECK: Supple, No JVD, r triple lumen in place  NERVOUS SYSTEM:  Awake and alert   CHEST/LUNG: B/L breath sounds  HEART: S1S2 audible, bradycardic, regular rhythm; Systolic murmur  ABDOMEN: Soft, Nontender, Nondistended  EXTREMITIES:  No clubbing, cyanosis, or edema, r a-line in place    LABS:                        9.3    5.73  )-----------( 199      ( 04 Aug 2022 04:00 )             28.4     08-04    137  |  102  |  22<H>  ----------------------------<  77  4.2   |  27  |  0.8    Ca    9.1      04 Aug 2022 04:00  Mg     2.0         TPro  5.9<L>  /  Alb  3.6  /  TBili  0.4  /  DBili  x   /  AST  18  /  ALT  12  /  AlkPhos  102  08-04    LIVER FUNCTIONS - ( 04 Aug 2022 04:00 )  Alb: 3.6 g/dL / Pro: 5.9 g/dL / ALK PHOS: 102 U/L / ALT: 12 U/L / AST: 18 U/L / GGT: x           PT/INR - ( 04 Aug 2022 04:00 )   PT: 12.50 sec;   INR: 1.09 ratio           CAPILLARY BLOOD GLUCOSE      POCT Blood Glucose.: 92 mg/dL (04 Aug 2022 06:37)                  RADIOLOGY & ADDITIONAL TESTS:  CXR:        Care Discussed with Consultants/Other Providers [ x] YES  [ ] NO           Patient is a 89y old  Female who presents with a chief complaint of Dyspnea w/ Exertion (04 Aug 2022 08:21)    HPI:  HFpEF, severe AS s/p balloon valvuloplasty , anemia s/p multiple transfusions 2 months ago, MVP, HTN, hyperthyroidism, HCC s/p partial liver resection, transferred from [facility] to [place] for TAVR evaluation by Dr. Mccollum.  Patient found to be anemic most likely d/t GI bleed now s/p 1PRBC transfusion for Hgb < 8/anemia. GI, Dr. Woo consulted at Dr. Mccollum' request.  Capsule study completed.  S/p colonoscopy  with successful AVM intervention. Diuretics largely remain on hold for soft BP (SBP < 100).  Ultimately, plan is for TAVR pending conditioning. Patient was waiting for subacute rehab placement with plans for TAVR as outpatient when patient's conditioning improves.    Patient , became acutely dyspneic, hypertensive 170/79 and tachycardic to 140s w/ crackles and wheezes to lungs throughout. Bipap started, with some improvement noted and patient was on nitro drip. Transfer to CCU initiated. In CCU: became febrile 100.4, elevated WBC, CXR showed possible pneumonia, given cefepime and vanc. Blood cx () grew staph epidermidis (MR)  Then became hypotensive down to 50s systolic- d/c nitro drip, given 1 bolus, requiring pressor support. (phenylepherine), then went into afib RVR (140s-150s). Patient on bipap during event; O2 requirements have decreased during stay. Continued on phenylepherine drip and midodrine for pressor support. Patient has remained afebrile, WBC WNL, pressor requirements have decreased, she is setting well on room air, WBC count has remained WNL. Repeat cx from  grew Gram (+) cocci. Patient also has pleural effusion first thoracocentesis was on  1L fluid removed. Fluid was trandsudative in nature, cx were negative. Effusion reoccurred and chest tube was placed 22. Plan is to complete aortic balloon angioplasty with Dr. Hood once scheduling allows. Due to 2x positive cx central line was removed 22.     INTERVAL HPI/OVERNIGHT EVENTS:   No overnight events   Afebrile, hemodynamically stable on phenylephrine      Subjective:  Patient examined at bedside. Reports feeling well today. Endorses some SOB with activity, denies chest pain, palpitations, lightheadedness, or dizziness.     ICU Vital Signs Last 24 Hrs  T(C): 36.2 (04 Aug 2022 04:00), Max: 36.8 (03 Aug 2022 12:00)  T(F): 97.2 (04 Aug 2022 04:00), Max: 98.2 (03 Aug 2022 12:00)  HR: 67 (04 Aug 2022 06:00) (43 - 80)  BP: 107/57 (04 Aug 2022 02:00) (107/57 - 107/57)  BP(mean): 76 (04 Aug 2022 02:00) (76 - 76)  ABP: 124/59 (04 Aug 2022 06:00) (91/42 - 124/59)  ABP(mean): 82 (04 Aug 2022 06:00) (58 - 83)  RR: 20 (04 Aug 2022 06:00) (19 - 34)  SpO2: 97% (04 Aug 2022 06:00) (94% - 99%)    O2 Parameters below as of 04 Aug 2022 06:00  Patient On (Oxygen Delivery Method): room air          I&O's Summary    03 Aug 2022 07:01  -  04 Aug 2022 07:00  --------------------------------------------------------  IN: 795.4 mL / OUT: 1200 mL / NET: -404.6 mL          Daily     Daily Weight in k.1 (04 Aug 2022 06:00)    Adult Advanced Hemodynamics Last 24 Hrs  CVP(mm Hg): 8 (04 Aug 2022 02:00) (3 - 11)  CVP(cm H2O): --  CO: --  CI: --  PA: --  PA(mean): --  PCWP: --  SVR: --  SVRI: --  PVR: --  PVRI: --    EKG/Telemetry Events:    MEDICATIONS  (STANDING):  aspirin  chewable 81 milliGRAM(s) Oral daily  cefepime   IVPB 1000 milliGRAM(s) IV Intermittent every 8 hours  chlorhexidine 2% Cloths 1 Application(s) Topical daily  enoxaparin Injectable 40 milliGRAM(s) SubCutaneous every 24 hours  hydrocortisone hemorrhoidal Suppository 1 Suppository(s) Rectal daily  levothyroxine 100 MICROGram(s) Oral daily  midodrine 10 milliGRAM(s) Oral every 8 hours  pantoprazole    Tablet 40 milliGRAM(s) Oral before breakfast  phenylephrine    Infusion 0.1 MICROgram(s)/kG/Min (1.1 mL/Hr) IV Continuous <Continuous>  senna 2 Tablet(s) Oral at bedtime  torsemide 10 milliGRAM(s) Oral <User Schedule>    MEDICATIONS  (PRN):  aluminum hydroxide/magnesium hydroxide/simethicone Suspension 30 milliLiter(s) Oral every 4 hours PRN Dyspepsia  hydrocortisone hemorrhoidal Suppository 1 Suppository(s) Rectal two times a day PRN hemorrhoidal discomfort      PHYSICAL EXAM:  GENERAL: nad laying in bed, NC in place, more alert today  HEAD:  Atraumatic, Normocephalic  NECK: Supple, JVD noted, r triple lumen in place  NERVOUS SYSTEM:  Awake and alert   CHEST/LUNG: B/L breath sounds  HEART: S1S2 audible, bradycardic, regular rhythm; Systolic murmur  ABDOMEN: Soft, Nontender, Nondistended  EXTREMITIES:  No clubbing, cyanosis, or edema, r a-line in place    LABS:                        9.3    5.73  )-----------( 199      ( 04 Aug 2022 04:00 )             28.4     08-04    137  |  102  |  22<H>  ----------------------------<  77  4.2   |  27  |  0.8    Ca    9.1      04 Aug 2022 04:00  Mg     2.0     08-04    TPro  5.9<L>  /  Alb  3.6  /  TBili  0.4  /  DBili  x   /  AST  18  /  ALT  12  /  AlkPhos  102  08-04    LIVER FUNCTIONS - ( 04 Aug 2022 04:00 )  Alb: 3.6 g/dL / Pro: 5.9 g/dL / ALK PHOS: 102 U/L / ALT: 12 U/L / AST: 18 U/L / GGT: x           PT/INR - ( 04 Aug 2022 04:00 )   PT: 12.50 sec;   INR: 1.09 ratio           CAPILLARY BLOOD GLUCOSE      POCT Blood Glucose.: 92 mg/dL (04 Aug 2022 06:37)                  RADIOLOGY & ADDITIONAL TESTS:  CXR:  `< from: Xray Chest 1 View- PORTABLE-Urgent (Xray Chest 1 View- PORTABLE-Urgent .) (22 @ 10:52) >  Findings:  Support devices: Telemetry leads are seen. Left pleural catheter is   identified. Left neck central venous catheter  Cardiac/mediastinum/hilum: Heart is enlarged.  Lung parenchyma/Pleura: Bilateral opacities.  Skeleton/soft tissues: Unremarkable.  Impression:  Interval placement of a left pleural catheter with some improvement of   the appearance of the left lung. Otherwise, stable.  < end of copied text >    < from: 12 Lead ECG (22 @ 05:58) >  Diagnosis Line Normal sinus rhythm  Left axis deviation  Right bundle branch block  Voltage criteria for left ventricular hypertrophy  Abnormal ECG  < end of copied text >        Care Discussed with Consultants/Other Providers [ x] YES  [ ] NO

## 2022-08-05 LAB
ALBUMIN SERPL ELPH-MCNC: 3.4 G/DL — LOW (ref 3.5–5.2)
ALP SERPL-CCNC: 105 U/L — SIGNIFICANT CHANGE UP (ref 30–115)
ALT FLD-CCNC: 13 U/L — SIGNIFICANT CHANGE UP (ref 0–41)
ANION GAP SERPL CALC-SCNC: 9 MMOL/L — SIGNIFICANT CHANGE UP (ref 7–14)
AST SERPL-CCNC: 21 U/L — SIGNIFICANT CHANGE UP (ref 0–41)
BASOPHILS # BLD AUTO: 0.02 K/UL — SIGNIFICANT CHANGE UP (ref 0–0.2)
BASOPHILS NFR BLD AUTO: 0.4 % — SIGNIFICANT CHANGE UP (ref 0–1)
BILIRUB SERPL-MCNC: 0.4 MG/DL — SIGNIFICANT CHANGE UP (ref 0.2–1.2)
BUN SERPL-MCNC: 25 MG/DL — HIGH (ref 10–20)
CALCIUM SERPL-MCNC: 9.2 MG/DL — SIGNIFICANT CHANGE UP (ref 8.5–10.1)
CHLORIDE SERPL-SCNC: 104 MMOL/L — SIGNIFICANT CHANGE UP (ref 98–110)
CO2 SERPL-SCNC: 25 MMOL/L — SIGNIFICANT CHANGE UP (ref 17–32)
CREAT SERPL-MCNC: 0.8 MG/DL — SIGNIFICANT CHANGE UP (ref 0.7–1.5)
EGFR: 70 ML/MIN/1.73M2 — SIGNIFICANT CHANGE UP
EOSINOPHIL # BLD AUTO: 0.13 K/UL — SIGNIFICANT CHANGE UP (ref 0–0.7)
EOSINOPHIL NFR BLD AUTO: 2.8 % — SIGNIFICANT CHANGE UP (ref 0–8)
GLUCOSE BLDC GLUCOMTR-MCNC: 146 MG/DL — HIGH (ref 70–99)
GLUCOSE SERPL-MCNC: 85 MG/DL — SIGNIFICANT CHANGE UP (ref 70–99)
HCT VFR BLD CALC: 28.3 % — LOW (ref 37–47)
HGB BLD-MCNC: 9.1 G/DL — LOW (ref 12–16)
IMM GRANULOCYTES NFR BLD AUTO: 0.2 % — SIGNIFICANT CHANGE UP (ref 0.1–0.3)
LYMPHOCYTES # BLD AUTO: 0.79 K/UL — LOW (ref 1.2–3.4)
LYMPHOCYTES # BLD AUTO: 16.8 % — LOW (ref 20.5–51.1)
MAGNESIUM SERPL-MCNC: 1.9 MG/DL — SIGNIFICANT CHANGE UP (ref 1.8–2.4)
MCHC RBC-ENTMCNC: 30.1 PG — SIGNIFICANT CHANGE UP (ref 27–31)
MCHC RBC-ENTMCNC: 32.2 G/DL — SIGNIFICANT CHANGE UP (ref 32–37)
MCV RBC AUTO: 93.7 FL — SIGNIFICANT CHANGE UP (ref 81–99)
MONOCYTES # BLD AUTO: 0.3 K/UL — SIGNIFICANT CHANGE UP (ref 0.1–0.6)
MONOCYTES NFR BLD AUTO: 6.4 % — SIGNIFICANT CHANGE UP (ref 1.7–9.3)
NEUTROPHILS # BLD AUTO: 3.45 K/UL — SIGNIFICANT CHANGE UP (ref 1.4–6.5)
NEUTROPHILS NFR BLD AUTO: 73.4 % — SIGNIFICANT CHANGE UP (ref 42.2–75.2)
NRBC # BLD: 0 /100 WBCS — SIGNIFICANT CHANGE UP (ref 0–0)
PLATELET # BLD AUTO: 139 K/UL — SIGNIFICANT CHANGE UP (ref 130–400)
POTASSIUM SERPL-MCNC: 4.1 MMOL/L — SIGNIFICANT CHANGE UP (ref 3.5–5)
POTASSIUM SERPL-SCNC: 4.1 MMOL/L — SIGNIFICANT CHANGE UP (ref 3.5–5)
PROT SERPL-MCNC: 6 G/DL — SIGNIFICANT CHANGE UP (ref 6–8)
RBC # BLD: 3.02 M/UL — LOW (ref 4.2–5.4)
RBC # FLD: 19.8 % — HIGH (ref 11.5–14.5)
SODIUM SERPL-SCNC: 138 MMOL/L — SIGNIFICANT CHANGE UP (ref 135–146)
VANCOMYCIN TROUGH SERPL-MCNC: 28.5 UG/ML — HIGH (ref 5–10)
WBC # BLD: 4.7 K/UL — LOW (ref 4.8–10.8)
WBC # FLD AUTO: 4.7 K/UL — LOW (ref 4.8–10.8)

## 2022-08-05 PROCEDURE — 93010 ELECTROCARDIOGRAM REPORT: CPT

## 2022-08-05 PROCEDURE — 99233 SBSQ HOSP IP/OBS HIGH 50: CPT

## 2022-08-05 PROCEDURE — 71045 X-RAY EXAM CHEST 1 VIEW: CPT | Mod: 26

## 2022-08-05 RX ORDER — LIDOCAINE 4 G/100G
1 CREAM TOPICAL DAILY
Refills: 0 | Status: DISCONTINUED | OUTPATIENT
Start: 2022-08-05 | End: 2022-08-15

## 2022-08-05 RX ORDER — MAGNESIUM SULFATE 500 MG/ML
1 VIAL (ML) INJECTION ONCE
Refills: 0 | Status: COMPLETED | OUTPATIENT
Start: 2022-08-05 | End: 2022-08-05

## 2022-08-05 RX ADMIN — Medication 250 MILLIGRAM(S): at 05:54

## 2022-08-05 RX ADMIN — Medication 10 MILLIGRAM(S): at 17:19

## 2022-08-05 RX ADMIN — CEFEPIME 100 MILLIGRAM(S): 1 INJECTION, POWDER, FOR SOLUTION INTRAMUSCULAR; INTRAVENOUS at 05:54

## 2022-08-05 RX ADMIN — Medication 650 MILLIGRAM(S): at 06:03

## 2022-08-05 RX ADMIN — MIDODRINE HYDROCHLORIDE 10 MILLIGRAM(S): 2.5 TABLET ORAL at 13:38

## 2022-08-05 RX ADMIN — PANTOPRAZOLE SODIUM 40 MILLIGRAM(S): 20 TABLET, DELAYED RELEASE ORAL at 06:02

## 2022-08-05 RX ADMIN — LIDOCAINE 1 PATCH: 4 CREAM TOPICAL at 12:02

## 2022-08-05 RX ADMIN — Medication 100 GRAM(S): at 10:22

## 2022-08-05 RX ADMIN — Medication 650 MILLIGRAM(S): at 06:48

## 2022-08-05 RX ADMIN — MIDODRINE HYDROCHLORIDE 10 MILLIGRAM(S): 2.5 TABLET ORAL at 21:31

## 2022-08-05 RX ADMIN — Medication 100 MICROGRAM(S): at 05:54

## 2022-08-05 RX ADMIN — SENNA PLUS 2 TABLET(S): 8.6 TABLET ORAL at 21:31

## 2022-08-05 RX ADMIN — CHLORHEXIDINE GLUCONATE 1 APPLICATION(S): 213 SOLUTION TOPICAL at 12:00

## 2022-08-05 RX ADMIN — MIDODRINE HYDROCHLORIDE 10 MILLIGRAM(S): 2.5 TABLET ORAL at 05:53

## 2022-08-05 RX ADMIN — CEFEPIME 100 MILLIGRAM(S): 1 INJECTION, POWDER, FOR SOLUTION INTRAMUSCULAR; INTRAVENOUS at 21:33

## 2022-08-05 RX ADMIN — CEFEPIME 100 MILLIGRAM(S): 1 INJECTION, POWDER, FOR SOLUTION INTRAMUSCULAR; INTRAVENOUS at 13:23

## 2022-08-05 RX ADMIN — LIDOCAINE 1 PATCH: 4 CREAM TOPICAL at 19:20

## 2022-08-05 RX ADMIN — Medication 81 MILLIGRAM(S): at 12:02

## 2022-08-05 RX ADMIN — ENOXAPARIN SODIUM 40 MILLIGRAM(S): 100 INJECTION SUBCUTANEOUS at 12:02

## 2022-08-05 NOTE — PROGRESS NOTE ADULT - SUBJECTIVE AND OBJECTIVE BOX
Patient is a 89y old  Female who presents with a chief complaint of Dyspnea w/ Exertion (05 Aug 2022 08:24)        Over Night Events:    No events   Chest tube in place   Draining transudative pleural fluid   No pnuemothorax       ROS:  See HPI    PHYSICAL EXAM    ICU Vital Signs Last 24 Hrs  T(C): 36.2 (05 Aug 2022 08:00), Max: 36.2 (05 Aug 2022 06:00)  T(F): 97.1 (05 Aug 2022 08:00), Max: 97.2 (05 Aug 2022 06:00)  HR: 64 (05 Aug 2022 11:00) (45 - 72)  BP: 106/55 (05 Aug 2022 11:00) (88/46 - 135/57)  BP(mean): 71 (05 Aug 2022 11:00) (62 - 80)  ABP: 97/46 (04 Aug 2022 14:00) (97/46 - 102/46)  ABP(mean): 63 (04 Aug 2022 14:00) (63 - 65)  RR: 22 (05 Aug 2022 11:00) (19 - 41)  SpO2: 99% (05 Aug 2022 11:00) (94% - 100%)    O2 Parameters below as of 05 Aug 2022 11:00  Patient On (Oxygen Delivery Method): room air            General: NAD   HEENT: RAFFI             Lymphatic system: No cervical LN   Lungs: Bilateral BS, chest tube 14 Fr on the left  Cardiovascular: Regular   Gastrointestinal: Soft, Positive BS  Extremities: No clubbing.  Moves extremities.  Full Range of motion   Skin: Warm, intact  Neurological: No motor or sensory deficit       08-04-22 @ 07:01  -  08-05-22 @ 07:00  --------------------------------------------------------  IN:    IV PiggyBack: 300 mL    Oral Fluid: 1040 mL    Phenylephrine: 10 mL  Total IN: 1350 mL    OUT:    Chest Tube (mL): 1300 mL    Voided (mL): 300 mL  Total OUT: 1600 mL    Total NET: -250 mL      08-05-22 @ 07:01  -  08-05-22 @ 12:51  --------------------------------------------------------  IN:    Oral Fluid: 240 mL  Total IN: 240 mL    OUT:  Total OUT: 0 mL    Total NET: 240 mL          LABS:                            9.1    4.70  )-----------( 139      ( 05 Aug 2022 05:03 )             28.3                                               08-05    138  |  104  |  25<H>  ----------------------------<  85  4.1   |  25  |  0.8    Ca    9.2      05 Aug 2022 05:03  Mg     1.9     08-05    TPro  6.0  /  Alb  3.4<L>  /  TBili  0.4  /  DBili  x   /  AST  21  /  ALT  13  /  AlkPhos  105  08-05      PT/INR - ( 04 Aug 2022 04:00 )   PT: 12.50 sec;   INR: 1.09 ratio                                                                                              LIVER FUNCTIONS - ( 05 Aug 2022 05:03 )  Alb: 3.4 g/dL / Pro: 6.0 g/dL / ALK PHOS: 105 U/L / ALT: 13 U/L / AST: 21 U/L / GGT: x                                                                                                                                       MEDICATIONS  (STANDING):  aspirin  chewable 81 milliGRAM(s) Oral daily  cefepime   IVPB 1000 milliGRAM(s) IV Intermittent every 8 hours  chlorhexidine 2% Cloths 1 Application(s) Topical daily  enoxaparin Injectable 40 milliGRAM(s) SubCutaneous every 24 hours  hydrocortisone hemorrhoidal Suppository 1 Suppository(s) Rectal daily  levothyroxine 100 MICROGram(s) Oral daily  lidocaine   4% Patch 1 Patch Transdermal daily  midodrine 10 milliGRAM(s) Oral every 8 hours  pantoprazole    Tablet 40 milliGRAM(s) Oral before breakfast  phenylephrine    Infusion 0.1 MICROgram(s)/kG/Min (1.1 mL/Hr) IV Continuous <Continuous>  senna 2 Tablet(s) Oral at bedtime  torsemide 10 milliGRAM(s) Oral <User Schedule>  vancomycin  IVPB 750 milliGRAM(s) IV Intermittent every 12 hours    MEDICATIONS  (PRN):  acetaminophen     Tablet .. 650 milliGRAM(s) Oral every 6 hours PRN Moderate Pain (4 - 6)  aluminum hydroxide/magnesium hydroxide/simethicone Suspension 30 milliLiter(s) Oral every 4 hours PRN Dyspepsia  hydrocortisone hemorrhoidal Suppository 1 Suppository(s) Rectal two times a day PRN hemorrhoidal discomfort      Xrays: L pigtail catheter; improved effusion

## 2022-08-05 NOTE — PROGRESS NOTE ADULT - SUBJECTIVE AND OBJECTIVE BOX
DILIP LUCAS  89y, Female  Allergy: Cipro (Other)  Levaquin (Rash)  tetracycline (Hives)      LOS  28d    CHIEF COMPLAINT: Dyspnea w/ Exertion (05 Aug 2022 12:51)      INTERVAL EVENTS/HPI  - No acute events overnight  - T(F): , Max: 97.2 (08-05-22 @ 06:00)  - Denies any worsening symptoms  - WBC Count: 4.70 (08-05-22 @ 05:03)  WBC Count: 5.73 (08-04-22 @ 04:00)     - Creatinine, Serum: 0.8 (08-05-22 @ 05:03)  Creatinine, Serum: 0.8 (08-04-22 @ 04:00)       ROS  General: Denies rigors, nightsweats  HEENT: Denies headache, rhinorrhea, sore throat, eye pain  CV: Denies CP, palpitations  PULM: Denies wheezing, hemoptysis  GI: Denies hematemesis, hematochezia, melena  : Denies discharge, hematuria  MSK: Denies arthralgias, myalgias  SKIN: Denies rash, lesions  NEURO: Denies paresthesias, weakness  PSYCH: Denies depression, anxiety    VITALS:  T(F): 97, Max: 97.2 (08-05-22 @ 06:00)  HR: 65  BP: 105/55  RR: 29Vital Signs Last 24 Hrs  T(C): 36.1 (05 Aug 2022 12:00), Max: 36.2 (05 Aug 2022 06:00)  T(F): 97 (05 Aug 2022 12:00), Max: 97.2 (05 Aug 2022 06:00)  HR: 65 (05 Aug 2022 16:00) (45 - 72)  BP: 105/55 (05 Aug 2022 16:00) (88/46 - 135/57)  BP(mean): 75 (05 Aug 2022 16:00) (62 - 86)  RR: 29 (05 Aug 2022 16:00) (19 - 41)  SpO2: 98% (05 Aug 2022 16:00) (94% - 99%)    Parameters below as of 05 Aug 2022 16:00  Patient On (Oxygen Delivery Method): room air        PHYSICAL EXAM:  Gen: NAD, resting in bed  HEENT: Normocephalic, atraumatic  Neck: supple, no lymphadenopathy  CV: Regular rate & regular rhythm  Lungs: decreased BS at bases, no fremitus  Abdomen: Soft, BS present  Ext: Warm, well perfused  Neuro: non focal, awake  Skin: no rash, no erythema  Lines: no phlebitis    FH: Non-contributory  Social Hx: Non-contributory    TESTS & MEASUREMENTS:                        9.1    4.70  )-----------( 139      ( 05 Aug 2022 05:03 )             28.3     08-05    138  |  104  |  25<H>  ----------------------------<  85  4.1   |  25  |  0.8    Ca    9.2      05 Aug 2022 05:03  Mg     1.9     08-05    TPro  6.0  /  Alb  3.4<L>  /  TBili  0.4  /  DBili  x   /  AST  21  /  ALT  13  /  AlkPhos  105  08-05      LIVER FUNCTIONS - ( 05 Aug 2022 05:03 )  Alb: 3.4 g/dL / Pro: 6.0 g/dL / ALK PHOS: 105 U/L / ALT: 13 U/L / AST: 21 U/L / GGT: x               Culture - Urine (collected 08-01-22 @ 22:25)  Source: Clean Catch Clean Catch (Midstream)  Final Report (08-03-22 @ 07:17):    No growth    Culture - Blood (collected 08-01-22 @ 12:41)  Source: .Blood None  Gram Stain (08-04-22 @ 06:03):    Growth in aerobic bottle: Gram Positive Cocci in Clusters  Preliminary Report (08-05-22 @ 13:17):    Growth in aerobic bottle: Staphylococcus epidermidis    Susceptibility to follow.    Culture - Fungal, Body Fluid (collected 08-01-22 @ 12:30)  Source: Pleural Fl Pleural Fluid  Preliminary Report (08-02-22 @ 07:33):    Testing in progress    Culture - Body Fluid with Gram Stain (collected 08-01-22 @ 12:30)  Source: Pleural Fl Pleural Fluid  Gram Stain (08-02-22 @ 01:59):    polymorphonuclear leukocytes seen    No organisms seen    by cytocentrifuge  Preliminary Report (08-02-22 @ 20:15):    No growth    Culture - Blood (collected 07-30-22 @ 23:30)  Source: .Blood Blood  Gram Stain (08-01-22 @ 08:33):    Growth in anaerobic bottle: Gram Positive Cocci in Clusters    Growth in aerobic bottle: Gram Positive Cocci in Clusters  Final Report (08-02-22 @ 20:36):    Growth in aerobic and anaerobic bottles: Staphylococcus epidermidis    Coag Negative Staphylococcus    Single set isolate, possible contaminant. Contact    Microbiology if susceptibility testing clinically    indicated.    ***Blood Panel PCR results on this specimen are available    approximately 3 hours after the Gram stain result.***    Gram stain, PCR, and/or culture results may not always    correspond due to difference in methodologies.    ************************************************************    This PCR assay was performed by multiplex PCR. This    Assay tests for 66 bacterial and resistance gene targets.    Please refer to the [facility] test directory    at [email or web address] for details.  Organism: Blood Culture PCR (08-02-22 @ 20:36)  Organism: Blood Culture PCR (08-02-22 @ 20:36)      -  Staphylococcus epidermidis, Methicillin resistant: Detec      Method Type: PCR            INFECTIOUS DISEASES TESTING  COVID-19 PCR: NotDetec (08-02-22 @ 05:16)  MRSA PCR Result.: Negative (07-31-22 @ 08:07)  Procalcitonin, Serum: 0.51 (07-30-22 @ 23:30)  COVID-19 PCR: NotDetec (07-27-22 @ 22:30)  COVID-19 PCR: NotDetec (07-21-22 @ 06:00)  COVID-19 PCR: NotDetec (07-15-22 @ 07:00)  Legionella Antigen, Urine: Negative (06-19-22 @ 09:47)  Procalcitonin, Serum: 0.07 (06-18-22 @ 21:32)      INFLAMMATORY MARKERS      RADIOLOGY & ADDITIONAL TESTS:  I have personally reviewed the last available Chest xray  CXR  Xray Chest 1 View- PORTABLE-Urgent:   ACC: [id number] EXAM:  XR CHEST PORTABLE URGENT 1V                          PROCEDURE DATE:  08/04/2022          INTERPRETATION:  Clinical History / Reason for exam: Chest tube placement.    Comparison : Chest radiograph earlier in the same day.    Technique/Positioning: Frontal portable.    Findings:    Support devices: Telemetry leads are seen. Left pleural catheter is   identified. Left neck central venous catheter    Cardiac/mediastinum/hilum: Heart is enlarged.    Lung parenchyma/Pleura: Bilateral opacities.    Skeleton/soft tissues: Unremarkable.    Impression:    Interval placement of a left pleural catheter with some improvement of   the appearance of the left lung. Otherwise, stable.        --- End of Report ---            HANNAH CHAO MD; Attending Interventional Radiologist  This document has been electronically signed. Aug  4 2022 10:54AM (08-04-22 @ 10:52)      CT      CARDIOLOGY TESTING  12 Lead ECG:   Ventricular Rate 56 BPM    Atrial Rate 56 BPM    P-R Interval 168 ms    QRS Duration 134 ms    Q-T Interval 496 ms    QTC Calculation(Bazett) 478 ms    P Axis 35 degrees    R Axis -44 degrees    T Axis -27 degrees    Diagnosis Line Sinus bradycardia  Left axis deviation  Right bundle branch block  Voltage criteria for left ventricular hypertrophy  Cannot rule out Septal infarct , age undetermined  Abnormal ECG    Confirmed by MARILYN PLAZA MD ([id number]) on 8/5/2022 7:06:59 AM (08-05-22 @ 05:16)  12 Lead ECG:   Ventricular Rate 62 BPM    Atrial Rate 62 BPM    P-R Interval 164 ms    QRS Duration 128 ms    Q-T Interval 464 ms    QTC Calculation(Bazett) 470 ms    P Axis 21 degrees    R Axis -42 degrees    T Axis -18 degrees    Diagnosis Line Normal sinus rhythm  Left axis deviation  Right bundle branch block  Voltage criteria for left ventricular hypertrophy  Abnormal ECG    Confirmed by Lino Moser ([id number]) on 8/4/2022 7:11:31 AM (08-04-22 @ 05:58)      MEDICATIONS  aspirin  chewable 81 Oral daily  cefepime   IVPB 1000 IV Intermittent every 8 hours  chlorhexidine 2% Cloths 1 Topical daily  enoxaparin Injectable 40 SubCutaneous every 24 hours  hydrocortisone hemorrhoidal Suppository 1 Rectal daily  levothyroxine 100 Oral daily  lidocaine   4% Patch 1 Transdermal daily  midodrine 10 Oral every 8 hours  pantoprazole    Tablet 40 Oral before breakfast  phenylephrine    Infusion 0.1 IV Continuous <Continuous>  senna 2 Oral at bedtime  torsemide 10 Oral <User Schedule>  vancomycin  IVPB 750 IV Intermittent every 12 hours      WEIGHT  Weight (kg): 58.5 (07-30-22 @ 18:45)  Creatinine, Serum: 0.8 mg/dL (08-05-22 @ 05:03)      ANTIBIOTICS:  cefepime   IVPB 1000 milliGRAM(s) IV Intermittent every 8 hours  vancomycin  IVPB 750 milliGRAM(s) IV Intermittent every 12 hours      All available historical records have been reviewed

## 2022-08-05 NOTE — PROGRESS NOTE ADULT - ATTENDING COMMENTS
S/p pig tail yesterday, draining of 1.4 L. Central line and elie removed yesterday. Restarted on Abx due to positive blood cultures. Hemodynamically stable.     Continue pig tail per pulm   Continue Abx   Monitor blood cultures  Get procalcitonin   Continue torsemide 10 mg daily  Replete electrolytes   Get OOB to chair/PT   Structural cards follow up for BAV planning

## 2022-08-05 NOTE — PROGRESS NOTE ADULT - ASSESSMENT
ASSESSMENT  Patient is a 88 y/o female with HFpEF, severe AS s/p balloon valvuloplasty 2021, anemia s/p multiple transfusions, MVP, HTN, hyperthyroidism, HCC s/p partial liver resection whom is consulted for suspected sepsis secondary to pneumonia.     IMPRESSION  # Sepsis during admission - Pneumonia? Aspiration pneumonia?  - s/p thoracentesis 8/1 - CX no growth  # Blood cultures positive for Methicillin resistant Staphylococcus epidermidis - suspect contaminant   - Blood Cx 7/30 and 8/1 Staph epi     RECOMMENDATIONS  - follow-up blood cx from 8/4 and 8/5 -- if these are negative, stop vancomycin   - continue cefepime 1g q 8 hours (7/31-8/6) for suspected pneumonia    Please call or message on [employer] Teams if with any questions.  Spectra [phone].

## 2022-08-05 NOTE — PROGRESS NOTE ADULT - ASSESSMENT
IMPRESSION:    Left moderate pleural effusion s/p thoracentesis   Acute hypoxemic respiratory failure   Severe aortic stenosis  Shock on Pressor - improving   Possible CAP       PLAN:    CNS: No depressants. Mental status is ok.     HEENT: Oral care.     PULMONARY:  HOB @ 45 degrees. Off oxygen now  Keep O2 saturation more than 92%. Bedside US shows moderate recurrent left effusion. Fluid is transudative by Light's criteria. S/P pigtail catheter placement which is draining adequately. No pneumo and no air leak on today's eval. Remove chest tube after the procedure is done.     CARDIOVASCULAR: Severe AS on echo with plans for TAVR. Off [place]. Cardiology following for possible valvuloplasty today. Diuretics per cardiology's recommendations.    GI: GI prophylaxis.  Feeding PO diet as tolerated.      RENAL:  Follow up lytes.  Correct as needed. Kidney function is at baseline.     INFECTIOUS DISEASE: Blood culture 8/1 positive for GPC in clusters; repeat blood cultures. No fevers . No leukocytosis. Pleural fluid culture negative. DC lines. Abx per Id's recommendations.     HEMATOLOGICAL:  DVT prophylaxis with Lovenox.     ENDOCRINE:  Follow up FS.  Insulin protocol if needed.    MUSCULOSKELETAL: Out of bed to chair. PT rehab.     CODE STATUS: Full code.     We will continue to follow.

## 2022-08-05 NOTE — PROGRESS NOTE ADULT - SUBJECTIVE AND OBJECTIVE BOX
Patient is a 89y old  Female who presents with a chief complaint of Dyspnea w/ Exertion (04 Aug 2022 08:28)    HPI:  HFpEF, severe AS s/p balloon valvuloplasty 2021, anemia s/p multiple transfusions 2 months ago, MVP, HTN, hyperthyroidism, HCC s/p partial liver resection, transferred from [facility] to [place] for TAVR evaluation by Dr. Mccollum.  Patient found to be anemic most likely d/t GI bleed now s/p 1PRBC transfusion for Hgb < 8/anemia. GI, Dr. Woo consulted at Dr. Mccollum' request.  Capsule study completed.  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 Diuretics largely remain on hold for soft BP (SBP < 100).  Ultimately, plan is for TAVR pending conditioning. Patient was waiting for subacute rehab placement with plans for TAVR as outpatient when patient's conditioning improves.    Patient 7/30, became acutely dyspneic, hypertensive 170/79 and tachycardic to 140s w/ crackles and wheezes to lungs throughout. Bipap started, with some improvement noted and patient was on nitro drip. Transfer to CCU initiated. In CCU: became febrile 100.4, elevated WBC, CXR showed possible pneumonia, given cefepime and vanc. Blood cx (7/30) grew staph epidermidis (MR)  Then became hypotensive down to 50s systolic- d/c nitro drip, given 1 bolus, requiring pressor support. (phenylepherine), then went into afib RVR (140s-150s). Patient on bipap during event; O2 requirements have decreased during stay. Continued on phenylepherine drip and midodrine for pressor support. Patient has remained afebrile, WBC WNL, pressor requirements have decreased, she is setting well on room air, WBC count has remained WNL. Repeat cx from 8/2 grew Gram (+) cocci. Patient also has pleural effusion first thoracocentesis was on 8/1 1L fluid removed. Fluid was trandsudative in nature, cx were negative. Effusion reoccurred and chest tube was placed 8/4/22. Plan is to complete aortic balloon angioplasty with Dr. Hood once scheduling allows. Due to 2x positive cx central line was removed 8/4/22.          INTERVAL HPI/OVERNIGHT EVENTS:   No overnight events   Afebrile, hemodynamically stable     Subjective:    ICU Vital Signs Last 24 Hrs  T(C): 36.2 (05 Aug 2022 06:00), Max: 36.2 (05 Aug 2022 06:00)  T(F): 97.2 (05 Aug 2022 06:00), Max: 97.2 (05 Aug 2022 06:00)  HR: 53 (05 Aug 2022 06:00) (45 - 72)  BP: 101/49 (05 Aug 2022 06:00) (88/46 - 112/48)  BP(mean): 70 (05 Aug 2022 06:00) (62 - 78)  ABP: 97/46 (04 Aug 2022 14:00) (88/33 - 128/55)  ABP(mean): 63 (04 Aug 2022 14:00) (51 - 82)  RR: 22 (05 Aug 2022 06:00) (21 - 35)  SpO2: 96% (05 Aug 2022 06:00) (94% - 100%)    O2 Parameters below as of 05 Aug 2022 04:00  Patient On (Oxygen Delivery Method): room air          I&O's Summary    04 Aug 2022 07:01  -  05 Aug 2022 07:00  --------------------------------------------------------  IN: 1350 mL / OUT: 1600 mL / NET: -250 mL          Daily     Daily     Adult Advanced Hemodynamics Last 24 Hrs  CVP(mm Hg): --  CVP(cm H2O): --  CO: --  CI: --  PA: --  PA(mean): --  PCWP: --  SVR: --  SVRI: --  PVR: --  PVRI: --    EKG/Telemetry Events:    MEDICATIONS  (STANDING):  aspirin  chewable 81 milliGRAM(s) Oral daily  cefepime   IVPB 1000 milliGRAM(s) IV Intermittent every 8 hours  chlorhexidine 2% Cloths 1 Application(s) Topical daily  enoxaparin Injectable 40 milliGRAM(s) SubCutaneous every 24 hours  hydrocortisone hemorrhoidal Suppository 1 Suppository(s) Rectal daily  levothyroxine 100 MICROGram(s) Oral daily  magnesium sulfate  IVPB 1 Gram(s) IV Intermittent once  midodrine 10 milliGRAM(s) Oral every 8 hours  pantoprazole    Tablet 40 milliGRAM(s) Oral before breakfast  phenylephrine    Infusion 0.1 MICROgram(s)/kG/Min (1.1 mL/Hr) IV Continuous <Continuous>  senna 2 Tablet(s) Oral at bedtime  torsemide 10 milliGRAM(s) Oral <User Schedule>  vancomycin  IVPB 750 milliGRAM(s) IV Intermittent every 12 hours    MEDICATIONS  (PRN):  acetaminophen     Tablet .. 650 milliGRAM(s) Oral every 6 hours PRN Moderate Pain (4 - 6)  aluminum hydroxide/magnesium hydroxide/simethicone Suspension 30 milliLiter(s) Oral every 4 hours PRN Dyspepsia  hydrocortisone hemorrhoidal Suppository 1 Suppository(s) Rectal two times a day PRN hemorrhoidal discomfort      PHYSICAL EXAM:  GENERAL: nad laying in bed, NC in place, more alert today  HEAD:  Atraumatic, Normocephalic  NECK: Supple, JVD noted, r triple lumen in place  NERVOUS SYSTEM:  Awake and alert   CHEST/LUNG: B/L breath sounds  HEART: S1S2 audible, bradycardic, regular rhythm; Systolic murmur  ABDOMEN: Soft, Nontender, Nondistended  EXTREMITIES:  No clubbing, cyanosis, or edema, r a-line in place    LABS:                        9.1    4.70  )-----------( 139      ( 05 Aug 2022 05:03 )             28.3     08-05    138  |  104  |  25<H>  ----------------------------<  85  4.1   |  25  |  0.8    Ca    9.2      05 Aug 2022 05:03  Mg     1.9     08-05    TPro  6.0  /  Alb  3.4<L>  /  TBili  0.4  /  DBili  x   /  AST  21  /  ALT  13  /  AlkPhos  105  08-05    LIVER FUNCTIONS - ( 05 Aug 2022 05:03 )  Alb: 3.4 g/dL / Pro: 6.0 g/dL / ALK PHOS: 105 U/L / ALT: 13 U/L / AST: 21 U/L / GGT: x           PT/INR - ( 04 Aug 2022 04:00 )   PT: 12.50 sec;   INR: 1.09 ratio           CAPILLARY BLOOD GLUCOSE                      RADIOLOGY & ADDITIONAL TESTS:  CXR:        Care Discussed with Consultants/Other Providers [ x] YES  [ ] NO           Patient is a 89y old  Female who presents with a chief complaint of Dyspnea w/ Exertion (04 Aug 2022 08:28)    HPI:  HFpEF, severe AS s/p balloon valvuloplasty 2021, anemia s/p multiple transfusions 2 months ago, MVP, HTN, hyperthyroidism, HCC s/p partial liver resection, transferred from [facility] to [place] for TAVR evaluation by Dr. Mccollum.  Patient found to be anemic most likely d/t GI bleed now s/p 1PRBC transfusion for Hgb < 8/anemia. GI, Dr. Woo consulted at Dr. Mccollum' request.  Capsule study completed.  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 Diuretics largely remain on hold for soft BP (SBP < 100).  Ultimately, plan is for TAVR pending conditioning. Patient was waiting for subacute rehab placement with plans for TAVR as outpatient when patient's conditioning improves.    Patient 7/30, became acutely dyspneic, hypertensive 170/79 and tachycardic to 140s w/ crackles and wheezes to lungs throughout. Bipap started, with some improvement noted and patient was on nitro drip. Transfer to CCU initiated. In CCU: became febrile 100.4, elevated WBC, CXR showed possible pneumonia, given cefepime and vanc. Blood cx (7/30) grew staph epidermidis (MR)  Then became hypotensive down to 50s systolic- d/c nitro drip, given 1 bolus, requiring pressor support. (phenylepherine), then went into afib RVR (140s-150s). Patient on bipap during event; O2 requirements have decreased during stay. Continued on phenylepherine drip and midodrine for pressor support. Patient has remained afebrile, WBC WNL, pressor requirements have decreased, she is setting well on room air, WBC count has remained WNL. Repeat cx from 8/2 grew Gram (+) cocci. Patient also has pleural effusion first thoracocentesis was on 8/1 1L fluid removed. Fluid was trandsudative in nature, cx were negative. Effusion reoccurred and chest tube was placed 8/4/22. Plan is to complete aortic balloon angioplasty with Dr. Hood once scheduling allows. Due to 2x positive cx central line was removed 8/4/22.          INTERVAL HPI/OVERNIGHT EVENTS:   No overnight events   Afebrile, hemodynamically stable     Subjective:    ICU Vital Signs Last 24 Hrs  T(C): 36.2 (05 Aug 2022 06:00), Max: 36.2 (05 Aug 2022 06:00)  T(F): 97.2 (05 Aug 2022 06:00), Max: 97.2 (05 Aug 2022 06:00)  HR: 53 (05 Aug 2022 06:00) (45 - 72)  BP: 101/49 (05 Aug 2022 06:00) (88/46 - 112/48)  BP(mean): 70 (05 Aug 2022 06:00) (62 - 78)  ABP: 97/46 (04 Aug 2022 14:00) (88/33 - 128/55)  ABP(mean): 63 (04 Aug 2022 14:00) (51 - 82)  RR: 22 (05 Aug 2022 06:00) (21 - 35)  SpO2: 96% (05 Aug 2022 06:00) (94% - 100%)    O2 Parameters below as of 05 Aug 2022 04:00  Patient On (Oxygen Delivery Method): room air          I&O's Summary    04 Aug 2022 07:01  -  05 Aug 2022 07:00  --------------------------------------------------------  IN: 1350 mL / OUT: 1600 mL / NET: -250 mL          Daily     Daily     Adult Advanced Hemodynamics Last 24 Hrs  CVP(mm Hg): --  CVP(cm H2O): --  CO: --  CI: --  PA: --  PA(mean): --  PCWP: --  SVR: --  SVRI: --  PVR: --  PVRI: --    EKG/Telemetry Events:    MEDICATIONS  (STANDING):  aspirin  chewable 81 milliGRAM(s) Oral daily  cefepime   IVPB 1000 milliGRAM(s) IV Intermittent every 8 hours  chlorhexidine 2% Cloths 1 Application(s) Topical daily  enoxaparin Injectable 40 milliGRAM(s) SubCutaneous every 24 hours  hydrocortisone hemorrhoidal Suppository 1 Suppository(s) Rectal daily  levothyroxine 100 MICROGram(s) Oral daily  magnesium sulfate  IVPB 1 Gram(s) IV Intermittent once  midodrine 10 milliGRAM(s) Oral every 8 hours  pantoprazole    Tablet 40 milliGRAM(s) Oral before breakfast  phenylephrine    Infusion 0.1 MICROgram(s)/kG/Min (1.1 mL/Hr) IV Continuous <Continuous>  senna 2 Tablet(s) Oral at bedtime  torsemide 10 milliGRAM(s) Oral <User Schedule>  vancomycin  IVPB 750 milliGRAM(s) IV Intermittent every 12 hours    MEDICATIONS  (PRN):  acetaminophen     Tablet .. 650 milliGRAM(s) Oral every 6 hours PRN Moderate Pain (4 - 6)  aluminum hydroxide/magnesium hydroxide/simethicone Suspension 30 milliLiter(s) Oral every 4 hours PRN Dyspepsia  hydrocortisone hemorrhoidal Suppository 1 Suppository(s) Rectal two times a day PRN hemorrhoidal discomfort      PHYSICAL EXAM:  GENERAL: nad laying in bed, NC in place, more alert today  HEAD:  Atraumatic, Normocephalic  NECK: Supple  NERVOUS SYSTEM:  Awake and alert   CHEST/LUNG: B/L breath sounds  HEART: S1S2 audible, bradycardic, regular rhythm; Systolic murmur  ABDOMEN: Soft, Nontender, Nondistended  EXTREMITIES:  No clubbing, cyanosis, or edema, r a-line in place    LABS:                        9.1    4.70  )-----------( 139      ( 05 Aug 2022 05:03 )             28.3     08-05    138  |  104  |  25<H>  ----------------------------<  85  4.1   |  25  |  0.8    Ca    9.2      05 Aug 2022 05:03  Mg     1.9     08-05    TPro  6.0  /  Alb  3.4<L>  /  TBili  0.4  /  DBili  x   /  AST  21  /  ALT  13  /  AlkPhos  105  08-05    LIVER FUNCTIONS - ( 05 Aug 2022 05:03 )  Alb: 3.4 g/dL / Pro: 6.0 g/dL / ALK PHOS: 105 U/L / ALT: 13 U/L / AST: 21 U/L / GGT: x           PT/INR - ( 04 Aug 2022 04:00 )   PT: 12.50 sec;   INR: 1.09 ratio           CAPILLARY BLOOD GLUCOSE                      RADIOLOGY & ADDITIONAL TESTS:  CXR:        Care Discussed with Consultants/Other Providers [ x] YES  [ ] NO           Patient is a 89y old  Female who presents with a chief complaint of Dyspnea w/ Exertion (04 Aug 2022 08:28)    HPI:  HFpEF, severe AS s/p balloon valvuloplasty 2021, anemia s/p multiple transfusions 2 months ago, MVP, HTN, hyperthyroidism, HCC s/p partial liver resection, transferred from [facility] to [place] for TAVR evaluation by Dr. Mccollum.  Patient found to be anemic most likely d/t GI bleed now s/p 1PRBC transfusion for Hgb < 8/anemia. GI, Dr. Woo consulted at Dr. Mccollum' request.  Capsule study completed.  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 Diuretics largely remain on hold for soft BP (SBP < 100).  Ultimately, plan is for TAVR pending conditioning. Patient was waiting for subacute rehab placement with plans for TAVR as outpatient when patient's conditioning improves.    Patient 7/30, became acutely dyspneic, hypertensive 170/79 and tachycardic to 140s w/ crackles and wheezes to lungs throughout. Bipap started, with some improvement noted and patient was on nitro drip. Transfer to CCU initiated. In CCU: became febrile 100.4, elevated WBC, CXR showed possible pneumonia, given cefepime and vanc. Blood cx (7/30) grew staph epidermidis (MR)  Then became hypotensive down to 50s systolic- d/c nitro drip, given 1 bolus, requiring pressor support. (phenylepherine), then went into afib RVR (140s-150s). Patient on bipap during event; O2 requirements have decreased during stay. Continued on phenylepherine drip and midodrine for pressor support. Patient has remained afebrile, WBC WNL, pressor requirements have decreased, she is setting well on room air, WBC count has remained WNL. Repeat cx from 8/2 grew Gram (+) cocci. Patient also has pleural effusion first thoracocentesis was on 8/1 1L fluid removed. Fluid was trandsudative in nature, cx were negative. Effusion reoccurred and chest tube was placed 8/4/22. Plan is to complete aortic balloon angioplasty with Dr. Hood once scheduling allows. Due to 2x positive cx central line was removed 8/4/22.        INTERVAL HPI/OVERNIGHT EVENTS:   No overnight events   Afebrile, hemodynamically stable     Subjective:  Patient examined at bedside. She endorses discomfort with the chest tube, but does not want any opioid medication. Denies chest pain or SOB.      ICU Vital Signs Last 24 Hrs  T(C): 36.2 (05 Aug 2022 06:00), Max: 36.2 (05 Aug 2022 06:00)  T(F): 97.2 (05 Aug 2022 06:00), Max: 97.2 (05 Aug 2022 06:00)  HR: 53 (05 Aug 2022 06:00) (45 - 72)  BP: 101/49 (05 Aug 2022 06:00) (88/46 - 112/48)  BP(mean): 70 (05 Aug 2022 06:00) (62 - 78)  ABP: 97/46 (04 Aug 2022 14:00) (88/33 - 128/55)  ABP(mean): 63 (04 Aug 2022 14:00) (51 - 82)  RR: 22 (05 Aug 2022 06:00) (21 - 35)  SpO2: 96% (05 Aug 2022 06:00) (94% - 100%)    O2 Parameters below as of 05 Aug 2022 04:00  Patient On (Oxygen Delivery Method): room air          I&O's Summary    04 Aug 2022 07:01  -  05 Aug 2022 07:00  --------------------------------------------------------  IN: 1350 mL / OUT: 1600 mL / NET: -250 mL          Daily     Daily     Adult Advanced Hemodynamics Last 24 Hrs  CVP(mm Hg): --  CVP(cm H2O): --  CO: --  CI: --  PA: --  PA(mean): --  PCWP: --  SVR: --  SVRI: --  PVR: --  PVRI: --    EKG/Telemetry Events:    MEDICATIONS  (STANDING):  aspirin  chewable 81 milliGRAM(s) Oral daily  cefepime   IVPB 1000 milliGRAM(s) IV Intermittent every 8 hours  chlorhexidine 2% Cloths 1 Application(s) Topical daily  enoxaparin Injectable 40 milliGRAM(s) SubCutaneous every 24 hours  hydrocortisone hemorrhoidal Suppository 1 Suppository(s) Rectal daily  levothyroxine 100 MICROGram(s) Oral daily  magnesium sulfate  IVPB 1 Gram(s) IV Intermittent once  midodrine 10 milliGRAM(s) Oral every 8 hours  pantoprazole    Tablet 40 milliGRAM(s) Oral before breakfast  phenylephrine    Infusion 0.1 MICROgram(s)/kG/Min (1.1 mL/Hr) IV Continuous <Continuous>  senna 2 Tablet(s) Oral at bedtime  torsemide 10 milliGRAM(s) Oral <User Schedule>  vancomycin  IVPB 750 milliGRAM(s) IV Intermittent every 12 hours    MEDICATIONS  (PRN):  acetaminophen     Tablet .. 650 milliGRAM(s) Oral every 6 hours PRN Moderate Pain (4 - 6)  aluminum hydroxide/magnesium hydroxide/simethicone Suspension 30 milliLiter(s) Oral every 4 hours PRN Dyspepsia  hydrocortisone hemorrhoidal Suppository 1 Suppository(s) Rectal two times a day PRN hemorrhoidal discomfort      PHYSICAL EXAM:  GENERAL: nad laying in bed, NC in place, more alert today  HEAD:  Atraumatic, Normocephalic  NECK: Supple  NERVOUS SYSTEM:  Awake and alert   CHEST/LUNG: B/L breath sounds, L chest tube in place  HEART: S1S2 audible, bradycardic, regular rhythm; Systolic murmur  ABDOMEN: Soft, Nontender, Nondistended  EXTREMITIES:  No clubbing, cyanosis, or edema    LABS:                        9.1    4.70  )-----------( 139      ( 05 Aug 2022 05:03 )             28.3     08-05    138  |  104  |  25<H>  ----------------------------<  85  4.1   |  25  |  0.8    Ca    9.2      05 Aug 2022 05:03  Mg     1.9     08-05    TPro  6.0  /  Alb  3.4<L>  /  TBili  0.4  /  DBili  x   /  AST  21  /  ALT  13  /  AlkPhos  105  08-05    LIVER FUNCTIONS - ( 05 Aug 2022 05:03 )  Alb: 3.4 g/dL / Pro: 6.0 g/dL / ALK PHOS: 105 U/L / ALT: 13 U/L / AST: 21 U/L / GGT: x           PT/INR - ( 04 Aug 2022 04:00 )   PT: 12.50 sec;   INR: 1.09 ratio           CAPILLARY BLOOD GLUCOSE                      RADIOLOGY & ADDITIONAL TESTS:  CXR:  < from: Xray Chest 1 View-PORTABLE IMMEDIATE (Xray Chest 1 View-PORTABLE IMMEDIATE .) (08.04.22 @ 17:23) >  Findings:  Support devices: Interval left pleural catheter placement.  Cardiac/mediastinum/hilum: Unchanged.  Lung parenchyma/Pleura: Unchanged left pleural effusion.. Loculated right   pleural effusion. Stable small left pneumothorax, post chest tube   placement.  Skeleton/soft tissues: Unchanged.  Impression:  Stable small left pneumothorax, post chest tube placement.  Unchanged bilateral pleural effusions.  --- End of Report ---  < end of copied text >    < from: 12 Lead ECG (08.05.22 @ 05:16) >  Diagnosis Line Sinus bradycardia  Left axis deviation  Right bundle branch block  Voltage criteria for left ventricular hypertrophy  Cannot rule out Septal infarct , age undetermined  Abnormal ECG  < end of copied text >          Care Discussed with Consultants/Other Providers [ x] YES  [ ] NO           Patient is a 89y old  Female who presents with a chief complaint of Dyspnea w/ Exertion (04 Aug 2022 08:28)    HPI:  HFpEF, severe AS s/p balloon valvuloplasty 2021, anemia s/p multiple transfusions 2 months ago, MVP, HTN, hyperthyroidism, HCC s/p partial liver resection, transferred from [facility] to [place] for TAVR evaluation by Dr. Mccollum.  Patient found to be anemic most likely d/t GI bleed now s/p 1PRBC transfusion for Hgb < 8/anemia. GI, Dr. Woo consulted at Dr. Mccollum' request.  Capsule study completed.  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 Diuretics largely remain on hold for soft BP (SBP < 100).  Ultimately, plan is for TAVR pending conditioning. Patient was waiting for subacute rehab placement with plans for TAVR as outpatient when patient's conditioning improves.    Patient 7/30, became acutely dyspneic, hypertensive 170/79 and tachycardic to 140s w/ crackles and wheezes to lungs throughout. Bipap started, with some improvement noted and patient was on nitro drip. Transfer to CCU initiated. In CCU: became febrile 100.4, elevated WBC, CXR showed possible pneumonia, given cefepime and vanc. Blood cx (7/30) grew staph epidermidis (MR)  Then became hypotensive down to 50s systolic- d/c nitro drip, given 1 bolus, requiring pressor support. (phenylepherine), then went into afib RVR (140s-150s). Patient on bipap during event; O2 requirements have decreased during stay. Continued on phenylepherine drip and midodrine for pressor support. Patient has remained afebrile, WBC WNL, pressor requirements have decreased, she is setting well on room air, WBC count has remained WNL. Repeat cx from 8/2 grew Gram (+) cocci. Patient also has pleural effusion first thoracocentesis was on 8/1 1L fluid removed. Fluid was trandsudative in nature, cx were negative. Effusion reoccurred and chest tube was placed 8/4/22. Plan is to complete aortic balloon angioplasty with Dr. Hood once scheduling allows. Due to 2x positive cx central line was removed 8/4/22.        INTERVAL HPI/OVERNIGHT EVENTS:   No overnight events   Afebrile, hemodynamically stable     Subjective:  Patient examined at bedside. She endorses discomfort with the chest tube, but does not want any opioid medication. Denies chest pain or SOB.      ICU Vital Signs Last 24 Hrs  T(C): 36.2 (05 Aug 2022 06:00), Max: 36.2 (05 Aug 2022 06:00)  T(F): 97.2 (05 Aug 2022 06:00), Max: 97.2 (05 Aug 2022 06:00)  HR: 53 (05 Aug 2022 06:00) (45 - 72)  BP: 101/49 (05 Aug 2022 06:00) (88/46 - 112/48)  BP(mean): 70 (05 Aug 2022 06:00) (62 - 78)  ABP: 97/46 (04 Aug 2022 14:00) (88/33 - 128/55)  ABP(mean): 63 (04 Aug 2022 14:00) (51 - 82)  RR: 22 (05 Aug 2022 06:00) (21 - 35)  SpO2: 96% (05 Aug 2022 06:00) (94% - 100%)    O2 Parameters below as of 05 Aug 2022 04:00  Patient On (Oxygen Delivery Method): room air          I&O's Summary    04 Aug 2022 07:01  -  05 Aug 2022 07:00  --------------------------------------------------------  IN: 1350 mL / OUT: 1600 mL / NET: -250 mL          Daily     Daily     Adult Advanced Hemodynamics Last 24 Hrs  CVP(mm Hg): --  CVP(cm H2O): --  CO: --  CI: --  PA: --  PA(mean): --  PCWP: --  SVR: --  SVRI: --  PVR: --  PVRI: --    EKG/Telemetry Events:    MEDICATIONS  (STANDING):  aspirin  chewable 81 milliGRAM(s) Oral daily  cefepime   IVPB 1000 milliGRAM(s) IV Intermittent every 8 hours  chlorhexidine 2% Cloths 1 Application(s) Topical daily  enoxaparin Injectable 40 milliGRAM(s) SubCutaneous every 24 hours  hydrocortisone hemorrhoidal Suppository 1 Suppository(s) Rectal daily  levothyroxine 100 MICROGram(s) Oral daily  magnesium sulfate  IVPB 1 Gram(s) IV Intermittent once  midodrine 10 milliGRAM(s) Oral every 8 hours  pantoprazole    Tablet 40 milliGRAM(s) Oral before breakfast  phenylephrine    Infusion 0.1 MICROgram(s)/kG/Min (1.1 mL/Hr) IV Continuous <Continuous>  senna 2 Tablet(s) Oral at bedtime  torsemide 10 milliGRAM(s) Oral <User Schedule>  vancomycin  IVPB 750 milliGRAM(s) IV Intermittent every 12 hours    MEDICATIONS  (PRN):  acetaminophen     Tablet .. 650 milliGRAM(s) Oral every 6 hours PRN Moderate Pain (4 - 6)  aluminum hydroxide/magnesium hydroxide/simethicone Suspension 30 milliLiter(s) Oral every 4 hours PRN Dyspepsia  hydrocortisone hemorrhoidal Suppository 1 Suppository(s) Rectal two times a day PRN hemorrhoidal discomfort      PHYSICAL EXAM:  GENERAL: nad laying in bed, NC in place, more alert today  HEAD:  Atraumatic, Normocephalic  NECK: Supple  NERVOUS SYSTEM:  Awake and alert   CHEST/LUNG: B/L breath sounds, L chest tube in place  HEART: S1S2 audible, bradycardic, regular rhythm; Systolic murmur  ABDOMEN: Soft, Nontender, Nondistended  EXTREMITIES:  No clubbing, cyanosis, or edema    LABS:                        9.1    4.70  )-----------( 139      ( 05 Aug 2022 05:03 )             28.3     08-05    138  |  104  |  25<H>  ----------------------------<  85  4.1   |  25  |  0.8    Ca    9.2      05 Aug 2022 05:03  Mg     1.9     08-05    TPro  6.0  /  Alb  3.4<L>  /  TBili  0.4  /  DBili  x   /  AST  21  /  ALT  13  /  AlkPhos  105  08-05    LIVER FUNCTIONS - ( 05 Aug 2022 05:03 )  Alb: 3.4 g/dL / Pro: 6.0 g/dL / ALK PHOS: 105 U/L / ALT: 13 U/L / AST: 21 U/L / GGT: x           PT/INR - ( 04 Aug 2022 04:00 )   PT: 12.50 sec;   INR: 1.09 ratio           CAPILLARY BLOOD GLUCOSE      RADIOLOGY & ADDITIONAL TESTS:  CXR:  < from: Xray Chest 1 View-PORTABLE IMMEDIATE (Xray Chest 1 View-PORTABLE IMMEDIATE .) (08.04.22 @ 17:23) >  Findings:  Support devices: Interval left pleural catheter placement.  Cardiac/mediastinum/hilum: Unchanged.  Lung parenchyma/Pleura: Unchanged left pleural effusion.. Loculated right   pleural effusion. Stable small left pneumothorax, post chest tube   placement.  Skeleton/soft tissues: Unchanged.  Impression:  Stable small left pneumothorax, post chest tube placement.  Unchanged bilateral pleural effusions.  --- End of Report ---  < end of copied text >    < from: 12 Lead ECG (08.05.22 @ 05:16) >  Diagnosis Line Sinus bradycardia  Left axis deviation  Right bundle branch block  Voltage criteria for left ventricular hypertrophy  Cannot rule out Septal infarct , age undetermined  Abnormal ECG  < end of copied text >      Care Discussed with Consultants/Other Providers [ x] YES  [ ] NO           Patient is a 89y old  Female who presents with a chief complaint of Dyspnea w/ Exertion (04 Aug 2022 08:28)    HPI:  HFpEF, severe AS s/p balloon valvuloplasty 2021, anemia s/p multiple transfusions 2 months ago, MVP, HTN, hyperthyroidism, HCC s/p partial liver resection, transferred from [facility] to [place] for TAVR evaluation by Dr. Mccollum.  Patient found to be anemic most likely d/t GI bleed now s/p 1PRBC transfusion for Hgb < 8/anemia. GI, Dr. Woo consulted at Dr. Mccollum' request.  Capsule study completed.  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 Diuretics largely remain on hold for soft BP (SBP < 100).  Ultimately, plan is for TAVR pending conditioning. Patient was waiting for subacute rehab placement with plans for TAVR as outpatient when patient's conditioning improves.    Patient 7/30, became acutely dyspneic, hypertensive 170/79 and tachycardic to 140s w/ crackles and wheezes to lungs throughout. Bipap started, with some improvement noted and patient was on nitro drip. Transfer to CCU initiated. In CCU: became febrile 100.4, elevated WBC, CXR showed possible pneumonia, given cefepime and vanc. Blood cx (7/30) grew staph epidermidis (MR)  Then became hypotensive down to 50s systolic- d/c nitro drip, given 1 bolus, requiring pressor support. (phenylepherine), then went into afib RVR (140s-150s). Patient on bipap during event; O2 requirements have decreased during stay. Continued on phenylepherine drip and midodrine for pressor support. Patient has remained afebrile, WBC WNL, pressor requirements have decreased, she is setting well on room air, WBC count has remained WNL. Repeat cx from 8/2 grew Gram (+) cocci. Patient also has pleural effusion first thoracocentesis was on 8/1 1L fluid removed. Fluid was trandsudative in nature, cx were negative. Effusion reoccurred and chest tube was placed 8/4/22. Plan is to complete aortic balloon angioplasty with Dr. Hood once scheduling allows. Due to 2x positive cx central line was removed 8/4/22.        INTERVAL HPI/OVERNIGHT EVENTS:   No overnight events   Afebrile, hemodynamically stable     Subjective:  Patient examined at bedside. She endorses discomfort with the chest tube, but does not want any opioid medication. Denies chest pain or SOB.      ICU Vital Signs Last 24 Hrs  T(C): 36.2 (05 Aug 2022 06:00), Max: 36.2 (05 Aug 2022 06:00)  T(F): 97.2 (05 Aug 2022 06:00), Max: 97.2 (05 Aug 2022 06:00)  HR: 53 (05 Aug 2022 06:00) (45 - 72)  BP: 101/49 (05 Aug 2022 06:00) (88/46 - 112/48)  BP(mean): 70 (05 Aug 2022 06:00) (62 - 78)  ABP: 97/46 (04 Aug 2022 14:00) (88/33 - 128/55)  ABP(mean): 63 (04 Aug 2022 14:00) (51 - 82)  RR: 22 (05 Aug 2022 06:00) (21 - 35)  SpO2: 96% (05 Aug 2022 06:00) (94% - 100%)    O2 Parameters below as of 05 Aug 2022 04:00  Patient On (Oxygen Delivery Method): room air          I&O's Summary    04 Aug 2022 07:01  -  05 Aug 2022 07:00  --------------------------------------------------------  IN: 1350 mL / OUT: 1600 mL / NET: -250 mL          Daily     Daily     Adult Advanced Hemodynamics Last 24 Hrs  CVP(mm Hg): --  CVP(cm H2O): --  CO: --  CI: --  PA: --  PA(mean): --  PCWP: --  SVR: --  SVRI: --  PVR: --  PVRI: --    EKG/Telemetry Events:    MEDICATIONS  (STANDING):  aspirin  chewable 81 milliGRAM(s) Oral daily  cefepime   IVPB 1000 milliGRAM(s) IV Intermittent every 8 hours  chlorhexidine 2% Cloths 1 Application(s) Topical daily  enoxaparin Injectable 40 milliGRAM(s) SubCutaneous every 24 hours  hydrocortisone hemorrhoidal Suppository 1 Suppository(s) Rectal daily  levothyroxine 100 MICROGram(s) Oral daily  magnesium sulfate  IVPB 1 Gram(s) IV Intermittent once  midodrine 10 milliGRAM(s) Oral every 8 hours  pantoprazole    Tablet 40 milliGRAM(s) Oral before breakfast  phenylephrine    Infusion 0.1 MICROgram(s)/kG/Min (1.1 mL/Hr) IV Continuous <Continuous>  senna 2 Tablet(s) Oral at bedtime  torsemide 10 milliGRAM(s) Oral <User Schedule>  vancomycin  IVPB 750 milliGRAM(s) IV Intermittent every 12 hours    MEDICATIONS  (PRN):  acetaminophen     Tablet .. 650 milliGRAM(s) Oral every 6 hours PRN Moderate Pain (4 - 6)  aluminum hydroxide/magnesium hydroxide/simethicone Suspension 30 milliLiter(s) Oral every 4 hours PRN Dyspepsia  hydrocortisone hemorrhoidal Suppository 1 Suppository(s) Rectal two times a day PRN hemorrhoidal discomfort      PHYSICAL EXAM:  GENERAL: nad laying in bed, NC in place, more alert today  HEAD:  Atraumatic, Normocephalic  NECK: Supple  NERVOUS SYSTEM:  Awake and alert   CHEST/LUNG: B/L breath sounds, L chest tube in place  HEART: S1S2 audible, bradycardic, regular rhythm; Systolic murmur  ABDOMEN: Soft, Nontender, Nondistended  EXTREMITIES:  No clubbing, cyanosis, or edema    LABS:                        9.1    4.70  )-----------( 139      ( 05 Aug 2022 05:03 )             28.3     08-05    138  |  104  |  25<H>  ----------------------------<  85  4.1   |  25  |  0.8    Ca    9.2      05 Aug 2022 05:03  Mg     1.9     08-05    TPro  6.0  /  Alb  3.4<L>  /  TBili  0.4  /  DBili  x   /  AST  21  /  ALT  13  /  AlkPhos  105  08-05    LIVER FUNCTIONS - ( 05 Aug 2022 05:03 )  Alb: 3.4 g/dL / Pro: 6.0 g/dL / ALK PHOS: 105 U/L / ALT: 13 U/L / AST: 21 U/L / GGT: x           PT/INR - ( 04 Aug 2022 04:00 )   PT: 12.50 sec;   INR: 1.09 ratio           CAPILLARY BLOOD GLUCOSE      RADIOLOGY & ADDITIONAL TESTS:    CXR:  < from: Xray Chest 1 View- PORTABLE-Routine (08.05.22 @ 05:50) >  Findings:  Support devices: Chest tube projecting over the left hemithorax.   Telemetry leads.  Cardiac/mediastinum/hilum: Unchanged.  Lung parenchyma/Pleura: Previously seen left-sided pneumothorax is not   definitely identified. Left pleural effusion mildly increased. Right   loculated pleural effusion stable.  Skeleton/soft tissues: Unchanged.  Impression:  Previously seen left-sided pneumothorax is not definitely identified.   Left pleural effusion mildly increased.  < end of copied text >      < from: 12 Lead ECG (08.05.22 @ 05:16) >  Diagnosis Line Sinus bradycardia  Left axis deviation  Right bundle branch block  Voltage criteria for left ventricular hypertrophy  Cannot rule out Septal infarct , age undetermined  Abnormal ECG  < end of copied text >      Care Discussed with Consultants/Other Providers [ x] YES  [ ] NO

## 2022-08-05 NOTE — PROGRESS NOTE ADULT - ASSESSMENT
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%  #Hemorrhoids with Constipation  #Hypothyroidism    Respiratory   #Possible PNA  - Moderate left pleural effusion seen on CT angio s/p thoracocentesis 8/1/22 - 1 L fluid  - Pleural effusion reoccurrence 8/2 - approximately 800ccs - chest tube to be placed 8/4/22  - F/U pleural fluid LDH (72), Protein (2.1), Cx NGTD  - Wean nasal cannula as tolerated      ID  - CXR 7/30 suspicious for PNA, with initial fever, elevated white count, and hypotension    - 7/30 procal .51, WBC WNL since 8/1  - ID consulted  - continue cefepime at 1000 mg q 8 hours  - Give 1x dose vancomycin    - f/u blood and urine cultures  - 7/30 grew staph epidermidis, 8/1 gram (+) cocci in clusters, repeat cx pending  - D/C central line    Cardiovascular  #Severe AS, mean gradient ~75mmHg s/p recent balloon   #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 Once weaned d/c a-line   - Plan for BAV possibly 8/8/22 if scheduling allows  - C/w midodrine  10mg daily  - C/w Torsemide 10 mg at night   - Keep MAP >65    Hematologic   - Trend CBC Daily. 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  - Continue hemorrhoidal suppository   -Continue Senna 2 tabs HS  -Tylenol 650 mg PO PRN for rectal pain  - Protonix    Endocrine  #Hypothyroidism  - Continue Levothyroxine 100 mcg PO daily  - TSH 4.36 (6/19/22)  - Monitor FS    Renal  - Strict I+O   - Daily standing weights  - maintain electrolytes, K>4 Mg >2    Neurological  - Avoid sedating medications    PT/Rehab  - Ordered    Lines/Gruber  - Midline: 8/4  - A-line: 8/1  - Peripheral IV: 8/4  - Primafit   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%  #Hemorrhoids with Constipation  #Hypothyroidism    Respiratory   #Possible PNA  - Moderate left pleural effusion seen on CT angio s/p thoracocentesis 8/1/22 - 1 L fluid  - Pleural effusion reoccurrence 8/2 - approximately 800ccs - chest tube to be placed 8/4/22  - F/U pleural fluid LDH (72), Protein (2.1), Cx NGTD    ID  - CXR 7/30 suspicious for PNA, with initial fever, elevated white count, and hypotension    - 7/30 procal .51, WBC WNL since 8/1  - ID consulted  - continue cefepime at 1000 mg q 8 hours  - Give 1x dose vancomycin - awaiting ID recommendations    - f/u blood and urine cultures  - 7/30 grew staph epidermidis, 8/1 gram (+) cocci in clusters, repeat cx pending    Cardiovascular  #Severe AS, mean gradient ~75mmHg s/p recent balloon   #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  - Continue hemorrhoidal suppository   -Continue Senna 2 tabs HS  -Tylenol 650 mg PO PRN for rectal pain  - Protonix    Endocrine  #Hypothyroidism  - Continue Levothyroxine 100 mcg PO daily  - TSH 4.36 (6/19/22)  - Monitor FS    Renal  - Strict I+O   - Daily standing weights  - maintain electrolytes, K>4 Mg >2    Neurological  - Avoid sedating medications    PT/Rehab  - Ordered    Lines/Gruber  - Midline: 8/4  - Peripheral IV: 8/4  - Primafit   #Bacteremia - staph epidermidis  #Possible PNA with recurrent pleural effusion  #Anemia with BRBPR 2/ lower GIB s/p AVM intervention   #Severe AS, mean gradient ~75mmHg s/p balloon valvuloplasty last year  #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%  #Hemorrhoids with Constipation  #Hypothyroidism    Respiratory   - Moderate left pleural effusion seen on CT angio s/p thoracocentesis 8/1/22 - 1 L fluid  - Pleural effusion reoccurrence 8/2 - approximately 800ccs - chest tube placed 8/4/22  - F/U pleural fluid LDH (72), Protein (2.1), Cx NGTD  - Tylenol and lidocaine patch for pain  - Chest tube to be in place until draining <100ccs/24hrs    ID  - CXR 7/30 suspicious for PNA, with initial fever, elevated white count, and hypotension    - 7/30 procal .51, WBC WNL since 8/1, Urine cx (-)  - 7/30 grew staph epidermidis, 8/1 gram (+) cocci in clusters, repeat cx and procal pending  - ID consulted  - continue cefepime at 1000 mg q 8 hours  - Vancomycin 750mg Q12  - Vanc trough 4pm        Cardiovascular  - TAVR workup likely outpatient   - Plan for BAV possibly 8/8/22 if scheduling allows  - C/w midodrine  10mg daily  - C/w Torsemide 10 mg at night   - Keep MAP >65    Hematologic   - Trend CBC Daily. 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  - Continue hemorrhoidal suppository   -Continue Senna 2 tabs HS  -Tylenol 650 mg PO PRN for rectal pain  - Protonix    Endocrine  - Continue Levothyroxine 100 mcg PO daily  - TSH 4.36 (6/19/22)  - Monitor FS    Renal  - Strict I+O, Daily standing weights  - maintain electrolytes, K>4 Mg >2    Neurological  - Avoid sedating medications    PT/Rehab  - On board    Lines/Gruber  - Midline: 8/4  - Peripheral IV: 8/4  - Primafit

## 2022-08-05 NOTE — CHART NOTE - NSCHARTNOTEFT_GEN_A_CORE
Vanc trough high 28.5 while on 750mg  D/c Vanc 750mg   Hold 6pm 8/5/22 dose tonight   Vanc level random ordered for 4:30am   Decide on dose in am after random Vanc level

## 2022-08-06 LAB
-  AMPICILLIN/SULBACTAM: SIGNIFICANT CHANGE UP
-  CEFAZOLIN: SIGNIFICANT CHANGE UP
-  CLINDAMYCIN: SIGNIFICANT CHANGE UP
-  ERYTHROMYCIN: SIGNIFICANT CHANGE UP
-  GENTAMICIN: SIGNIFICANT CHANGE UP
-  OXACILLIN: SIGNIFICANT CHANGE UP
-  PENICILLIN: SIGNIFICANT CHANGE UP
-  RIFAMPIN: SIGNIFICANT CHANGE UP
-  TETRACYCLINE: SIGNIFICANT CHANGE UP
-  TRIMETHOPRIM/SULFAMETHOXAZOLE: SIGNIFICANT CHANGE UP
-  VANCOMYCIN: SIGNIFICANT CHANGE UP
ALBUMIN SERPL ELPH-MCNC: 3.3 G/DL — LOW (ref 3.5–5.2)
ALP SERPL-CCNC: 109 U/L — SIGNIFICANT CHANGE UP (ref 30–115)
ALT FLD-CCNC: 13 U/L — SIGNIFICANT CHANGE UP (ref 0–41)
ANION GAP SERPL CALC-SCNC: 14 MMOL/L — SIGNIFICANT CHANGE UP (ref 7–14)
AST SERPL-CCNC: 26 U/L — SIGNIFICANT CHANGE UP (ref 0–41)
BASOPHILS # BLD AUTO: 0.02 K/UL — SIGNIFICANT CHANGE UP (ref 0–0.2)
BASOPHILS NFR BLD AUTO: 0.4 % — SIGNIFICANT CHANGE UP (ref 0–1)
BILIRUB SERPL-MCNC: 0.4 MG/DL — SIGNIFICANT CHANGE UP (ref 0.2–1.2)
BUN SERPL-MCNC: 30 MG/DL — HIGH (ref 10–20)
CALCIUM SERPL-MCNC: 9.1 MG/DL — SIGNIFICANT CHANGE UP (ref 8.5–10.1)
CHLORIDE SERPL-SCNC: 102 MMOL/L — SIGNIFICANT CHANGE UP (ref 98–110)
CO2 SERPL-SCNC: 23 MMOL/L — SIGNIFICANT CHANGE UP (ref 17–32)
CREAT SERPL-MCNC: 1 MG/DL — SIGNIFICANT CHANGE UP (ref 0.7–1.5)
CULTURE RESULTS: SIGNIFICANT CHANGE UP
CULTURE RESULTS: SIGNIFICANT CHANGE UP
EGFR: 54 ML/MIN/1.73M2 — LOW
EOSINOPHIL # BLD AUTO: 0.14 K/UL — SIGNIFICANT CHANGE UP (ref 0–0.7)
EOSINOPHIL NFR BLD AUTO: 2.5 % — SIGNIFICANT CHANGE UP (ref 0–8)
GLUCOSE SERPL-MCNC: 70 MG/DL — SIGNIFICANT CHANGE UP (ref 70–99)
HCT VFR BLD CALC: 29.9 % — LOW (ref 37–47)
HGB BLD-MCNC: 9.4 G/DL — LOW (ref 12–16)
IMM GRANULOCYTES NFR BLD AUTO: 0.2 % — SIGNIFICANT CHANGE UP (ref 0.1–0.3)
LYMPHOCYTES # BLD AUTO: 0.8 K/UL — LOW (ref 1.2–3.4)
LYMPHOCYTES # BLD AUTO: 14.4 % — LOW (ref 20.5–51.1)
MAGNESIUM SERPL-MCNC: 2.3 MG/DL — SIGNIFICANT CHANGE UP (ref 1.8–2.4)
MCHC RBC-ENTMCNC: 29.2 PG — SIGNIFICANT CHANGE UP (ref 27–31)
MCHC RBC-ENTMCNC: 31.4 G/DL — LOW (ref 32–37)
MCV RBC AUTO: 92.9 FL — SIGNIFICANT CHANGE UP (ref 81–99)
METHOD TYPE: SIGNIFICANT CHANGE UP
MONOCYTES # BLD AUTO: 0.46 K/UL — SIGNIFICANT CHANGE UP (ref 0.1–0.6)
MONOCYTES NFR BLD AUTO: 8.3 % — SIGNIFICANT CHANGE UP (ref 1.7–9.3)
NEUTROPHILS # BLD AUTO: 4.11 K/UL — SIGNIFICANT CHANGE UP (ref 1.4–6.5)
NEUTROPHILS NFR BLD AUTO: 74.2 % — SIGNIFICANT CHANGE UP (ref 42.2–75.2)
NRBC # BLD: 0 /100 WBCS — SIGNIFICANT CHANGE UP (ref 0–0)
ORGANISM # SPEC MICROSCOPIC CNT: SIGNIFICANT CHANGE UP
ORGANISM # SPEC MICROSCOPIC CNT: SIGNIFICANT CHANGE UP
PLATELET # BLD AUTO: 133 K/UL — SIGNIFICANT CHANGE UP (ref 130–400)
POTASSIUM SERPL-MCNC: 4.6 MMOL/L — SIGNIFICANT CHANGE UP (ref 3.5–5)
POTASSIUM SERPL-SCNC: 4.6 MMOL/L — SIGNIFICANT CHANGE UP (ref 3.5–5)
PROCALCITONIN SERPL-MCNC: 0.18 NG/ML — HIGH (ref 0.02–0.1)
PROT SERPL-MCNC: 5.8 G/DL — LOW (ref 6–8)
RBC # BLD: 3.22 M/UL — LOW (ref 4.2–5.4)
RBC # FLD: 19.9 % — HIGH (ref 11.5–14.5)
SODIUM SERPL-SCNC: 139 MMOL/L — SIGNIFICANT CHANGE UP (ref 135–146)
SPECIMEN SOURCE: SIGNIFICANT CHANGE UP
SPECIMEN SOURCE: SIGNIFICANT CHANGE UP
WBC # BLD: 5.54 K/UL — SIGNIFICANT CHANGE UP (ref 4.8–10.8)
WBC # FLD AUTO: 5.54 K/UL — SIGNIFICANT CHANGE UP (ref 4.8–10.8)

## 2022-08-06 PROCEDURE — 71045 X-RAY EXAM CHEST 1 VIEW: CPT | Mod: 26

## 2022-08-06 PROCEDURE — 93010 ELECTROCARDIOGRAM REPORT: CPT

## 2022-08-06 RX ORDER — MIDODRINE HYDROCHLORIDE 2.5 MG/1
5 TABLET ORAL EVERY 8 HOURS
Refills: 0 | Status: DISCONTINUED | OUTPATIENT
Start: 2022-08-06 | End: 2022-08-10

## 2022-08-06 RX ORDER — VANCOMYCIN HCL 1 G
500 VIAL (EA) INTRAVENOUS EVERY 12 HOURS
Refills: 0 | Status: DISCONTINUED | OUTPATIENT
Start: 2022-08-06 | End: 2022-08-08

## 2022-08-06 RX ADMIN — LIDOCAINE 1 PATCH: 4 CREAM TOPICAL at 21:32

## 2022-08-06 RX ADMIN — LIDOCAINE 1 PATCH: 4 CREAM TOPICAL at 13:05

## 2022-08-06 RX ADMIN — PANTOPRAZOLE SODIUM 40 MILLIGRAM(S): 20 TABLET, DELAYED RELEASE ORAL at 05:24

## 2022-08-06 RX ADMIN — ENOXAPARIN SODIUM 40 MILLIGRAM(S): 100 INJECTION SUBCUTANEOUS at 13:05

## 2022-08-06 RX ADMIN — MIDODRINE HYDROCHLORIDE 5 MILLIGRAM(S): 2.5 TABLET ORAL at 13:08

## 2022-08-06 RX ADMIN — Medication 1 SUPPOSITORY(S): at 13:04

## 2022-08-06 RX ADMIN — Medication 100 MICROGRAM(S): at 05:24

## 2022-08-06 RX ADMIN — LIDOCAINE 1 PATCH: 4 CREAM TOPICAL at 00:03

## 2022-08-06 RX ADMIN — Medication 81 MILLIGRAM(S): at 13:04

## 2022-08-06 RX ADMIN — MIDODRINE HYDROCHLORIDE 5 MILLIGRAM(S): 2.5 TABLET ORAL at 22:16

## 2022-08-06 RX ADMIN — MIDODRINE HYDROCHLORIDE 10 MILLIGRAM(S): 2.5 TABLET ORAL at 05:24

## 2022-08-06 RX ADMIN — CEFEPIME 100 MILLIGRAM(S): 1 INJECTION, POWDER, FOR SOLUTION INTRAMUSCULAR; INTRAVENOUS at 13:09

## 2022-08-06 RX ADMIN — SENNA PLUS 2 TABLET(S): 8.6 TABLET ORAL at 22:15

## 2022-08-06 RX ADMIN — CEFEPIME 100 MILLIGRAM(S): 1 INJECTION, POWDER, FOR SOLUTION INTRAMUSCULAR; INTRAVENOUS at 22:16

## 2022-08-06 RX ADMIN — CEFEPIME 100 MILLIGRAM(S): 1 INJECTION, POWDER, FOR SOLUTION INTRAMUSCULAR; INTRAVENOUS at 05:25

## 2022-08-06 RX ADMIN — CHLORHEXIDINE GLUCONATE 1 APPLICATION(S): 213 SOLUTION TOPICAL at 13:04

## 2022-08-06 RX ADMIN — Medication 100 MILLIGRAM(S): at 18:25

## 2022-08-06 NOTE — PROGRESS NOTE ADULT - ASSESSMENT
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%  #Hemorrhoids with Constipation  #Hypothyroidism    Respiratory   - Moderate left pleural effusion seen on CT angio s/p thoracocentesis 8/1/22 - 1 L fluid  - Pleural effusion reoccurrence 8/2 - approximately 800ccs - chest tube placed 8/4/22  - F/U pleural fluid LDH (72), Protein (2.1), Cx NGTD  - Tylenol and lidocaine patch for pain  - Chest tube to be in place until draining <100ccs/24hrs    ID  - CXR 7/30 suspicious for PNA, with initial fever, elevated white count, and hypotension    - 7/30 procal .51, 8/4 0.18, WBC WNL since 8/1, Urine cx (-)  - 7/30 grew staph epidermidis, 8/1 gram (+) cocci in clusters, repeat cx  - ID consulted  - continue cefepime at 1000 mg q 8 hours  - Vancomycin 750mg Q12  - Vanc trough 4pm elevated - vanc dose held. Restarted at 500BID. Trough ordered before 4th dose.        Cardiovascular  - TAVR workup likely outpatient   - Plan for Balloon aortic valvuloplasty possibly 8/8/22 if scheduling allows  - Decrease midodrine to 5mg daily  - Discontinue Torsemide  - Keep MAP >65    Hematologic   - Trend CBC Daily. 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  - Continue hemorrhoidal suppository   -Continue Senna 2 tabs HS  -Tylenol 650 mg PO PRN for rectal pain  - Protonix  - Add ensure to diet  - NPO starting Sunday night for Monday procedure    Endocrine  - Continue Levothyroxine 100 mcg PO daily  - TSH 4.36 (6/19/22)  - Monitor FS    Renal  - Strict I+O, Daily standing weights  - maintain electrolytes, K>4 Mg >2    Neurological  - Avoid sedating medications    PT/Rehab  - On board    Lines/Gruber  - Midline: 8/4  - Peripheral IV: 8/4  - Primafit 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%  #Hemorrhoids with Constipation  #Hypothyroidism    Respiratory   - Moderate left pleural effusion seen on CT angio s/p thoracocentesis 8/1/22 - 1 L fluid  - Pleural effusion reoccurrence 8/2 - approximately 800ccs - chest tube placed 8/4/22  - F/U pleural fluid LDH (72), Protein (2.1), Cx NGTD  - Tylenol and lidocaine patch for pain  - Chest tube to be in place until draining <100ccs/24hrs    ID  - CXR 7/30 suspicious for PNA, with initial fever, elevated white count, and hypotension    - 7/30 procal .51, 8/4 0.18, WBC WNL since 8/1, Urine cx (-)  - 7/30 grew staph epidermidis, 8/1 gram (+) cocci in clusters, 8/5 cx - pending  - ID consulted  - continue cefepime at 1000 mg q 8 hours  - Vancomycin 750mg Q12  - Vanc trough 4pm elevated - vanc dose held. Restarted at 500BID. Trough ordered before 4th dose.        Cardiovascular  - TAVR workup likely outpatient   - Plan for Balloon aortic valvuloplasty possibly 8/8/22 if scheduling allows  - Decrease midodrine to 5mg daily  - Discontinue Torsemide  - Keep MAP >65    Hematologic   - Trend CBC Daily. 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  - Continue hemorrhoidal suppository   -Continue Senna 2 tabs HS  -Tylenol 650 mg PO PRN for rectal pain  - Protonix  - Add ensure to diet  - NPO starting Sunday night for Monday procedure    Endocrine  - Continue Levothyroxine 100 mcg PO daily  - TSH 4.36 (6/19/22)  - Monitor FS    Renal  - Strict I+O, Daily standing weights  - maintain electrolytes, K>4 Mg >2    Neurological  - Avoid sedating medications    PT/Rehab  - On board    Lines/Gruber  - Midline: 8/4  - Peripheral IV: 8/4  - Primafit

## 2022-08-06 NOTE — PROGRESS NOTE ADULT - SUBJECTIVE AND OBJECTIVE BOX
Patient is a 89y old  Female who presents with a chief complaint of Dyspnea w/ Exertion (05 Aug 2022 16:50)    HPI:  HFpEF, severe AS s/p balloon valvuloplasty , anemia s/p multiple transfusions 2 months ago, MVP, HTN, hyperthyroidism, HCC s/p partial liver resection, transferred from [facility] to [place] for TAVR evaluation by Dr. Mccollum.  Patient found to be anemic most likely d/t GI bleed now s/p 1PRBC transfusion for Hgb < 8/anemia. GI, Dr. Woo consulted at Dr. Mccollum' request.  Capsule study completed.  S/p colonoscopy  with successful AVM intervention. Diuretics largely remain on hold for soft BP (SBP < 100).  Ultimately, plan is for TAVR pending conditioning. Patient was waiting for subacute rehab placement with plans for TAVR as outpatient when patient's conditioning improves.    Patient , became acutely dyspneic, hypertensive 170/79 and tachycardic to 140s w/ crackles and wheezes to lungs throughout. Bipap started, with some improvement noted and patient was on nitro drip. Transfer to CCU initiated. In CCU: became febrile 100.4, elevated WBC, CXR showed possible pneumonia, given cefepime and vanc. Blood cx () grew staph epidermidis (MR)  Then became hypotensive down to 50s systolic- d/c nitro drip, given 1 bolus, requiring pressor support. (phenylepherine), then went into afib RVR (140s-150s). Patient on bipap during event; O2 requirements have decreased during stay. Continued on phenylepherine drip and midodrine for pressor support. Patient has remained afebrile, WBC WNL, pressor requirements have decreased, she is setting well on room air, WBC count has remained WNL. Repeat cx from  grew Gram (+) cocci. Patient also has pleural effusion first thoracocentesis was on  1L fluid removed. Fluid was transudative in nature, cx were negative. Effusion reoccurred and chest tube was placed 22 - to stay in place until drainage is less than 100ccs/24 hours. Due to 2x positive cx central line was removed 22.  Plan is to complete aortic balloon angioplasty with Dr. Hood once scheduling allows.       INTERVAL HPI/OVERNIGHT EVENTS:   No overnight events   Afebrile, hemodynamically stable     Subjective:  Patient examined at bedside. Denies chest pain and SOB.     ICU Vital Signs Last 24 Hrs  T(C): 35.9 (06 Aug 2022 12:00), Max: 36.3 (05 Aug 2022 20:00)  T(F): 96.7 (06 Aug 2022 12:00), Max: 97.4 (05 Aug 2022 20:00)  HR: 63 (06 Aug 2022 14:00) (47 - 80)  BP: 114/58 (06 Aug 2022 14:00) (94/52 - 131/57)  BP(mean): 84 (06 Aug 2022 14:00) (68 - 94)  ABP: --  ABP(mean): --  RR: 26 (06 Aug 2022 14:00) (15 - 29)  SpO2: 97% (06 Aug 2022 14:00) (96% - 100%)    O2 Parameters below as of 06 Aug 2022 14:00  Patient On (Oxygen Delivery Method): room air          I&O's Summary    05 Aug 2022 07:01  -  06 Aug 2022 07:00  --------------------------------------------------------  IN: 980 mL / OUT: 1900 mL / NET: -920 mL    06 Aug 2022 07:01  -  06 Aug 2022 14:25  --------------------------------------------------------  IN: 300 mL / OUT: 350 mL / NET: -50 mL          Daily     Daily Weight in k.9 (06 Aug 2022 06:00)    Adult Advanced Hemodynamics Last 24 Hrs  CVP(mm Hg): --  CVP(cm H2O): --  CO: --  CI: --  PA: --  PA(mean): --  PCWP: --  SVR: --  SVRI: --  PVR: --  PVRI: --    EKG/Telemetry Events:    MEDICATIONS  (STANDING):  aspirin  chewable 81 milliGRAM(s) Oral daily  cefepime   IVPB 1000 milliGRAM(s) IV Intermittent every 8 hours  chlorhexidine 2% Cloths 1 Application(s) Topical daily  enoxaparin Injectable 40 milliGRAM(s) SubCutaneous every 24 hours  hydrocortisone hemorrhoidal Suppository 1 Suppository(s) Rectal daily  levothyroxine 100 MICROGram(s) Oral daily  lidocaine   4% Patch 1 Patch Transdermal daily  midodrine 5 milliGRAM(s) Oral every 8 hours  pantoprazole    Tablet 40 milliGRAM(s) Oral before breakfast  senna 2 Tablet(s) Oral at bedtime    MEDICATIONS  (PRN):  acetaminophen     Tablet .. 650 milliGRAM(s) Oral every 6 hours PRN Moderate Pain (4 - 6)  aluminum hydroxide/magnesium hydroxide/simethicone Suspension 30 milliLiter(s) Oral every 4 hours PRN Dyspepsia  hydrocortisone hemorrhoidal Suppository 1 Suppository(s) Rectal two times a day PRN hemorrhoidal discomfort      PHYSICAL EXAM:  GENERAL: nad laying in bed  HEAD:  Atraumatic, Normocephalic  NECK: Supple  NERVOUS SYSTEM:  Awake and alert   CHEST/LUNG: B/L breath sounds, L chest tube in place  HEART: S1S2 audible, bradycardic, regular rhythm; Systolic murmur  ABDOMEN: Soft, Nontender, Nondistended  EXTREMITIES:  No clubbing, cyanosis, or edema    LABS:                        9.4    5.54  )-----------( 133      ( 06 Aug 2022 04:31 )             29.9     08-06    139  |  102  |  30<H>  ----------------------------<  70  4.6   |  23  |  1.0    Ca    9.1      06 Aug 2022 04:31  Mg     2.3     08-06    TPro  5.8<L>  /  Alb  3.3<L>  /  TBili  0.4  /  DBili  x   /  AST  26  /  ALT  13  /  AlkPhos  109  08-06    LIVER FUNCTIONS - ( 06 Aug 2022 04:31 )  Alb: 3.3 g/dL / Pro: 5.8 g/dL / ALK PHOS: 109 U/L / ALT: 13 U/L / AST: 26 U/L / GGT: x             CAPILLARY BLOOD GLUCOSE                      RADIOLOGY & ADDITIONAL TESTS:  CXR:  < from: Xray Chest 1 View- PORTABLE-Routine (22 @ 05:50) >  Findings:  Support devices: Chest tube projecting over the left hemithorax.   Telemetry leads.  Cardiac/mediastinum/hilum: Unchanged.  Lung parenchyma/Pleura: Previously seen left-sided pneumothorax is not   definitely identified. Left pleural effusion mildly increased. Right   loculated pleural effusion stable.  Skeleton/soft tissues: Unchanged.  Impression:  Previously seen left-sided pneumothorax is not definitely identified.   Left pleural effusion mildly increased.    EKG:  < end of copied text >  < from: 12 Lead ECG (22 @ 05:18) >  Diagnosis Line Normal sinus rhythm  Left axis deviation  Right bundle branch block  Voltage criteria for left ventricular hypertrophy  Abnormal ECG  < end of copied text >        Care Discussed with Consultants/Other Providers [ x] YES  [ ] NO           Patient is a 89y old  Female who presents with a chief complaint of Dyspnea w/ Exertion (05 Aug 2022 16:50)    HPI:  HFpEF, severe AS s/p balloon valvuloplasty , anemia s/p multiple transfusions 2 months ago, MVP, HTN, hyperthyroidism, HCC s/p partial liver resection, transferred from [facility] to [place] for TAVR evaluation by Dr. Mccollum.  Patient found to be anemic most likely d/t GI bleed now s/p 1PRBC transfusion for Hgb < 8/anemia. GI, Dr. Woo consulted at Dr. Mccollum' request.  Capsule study completed.  S/p colonoscopy  with successful AVM intervention. Diuretics largely remain on hold for soft BP (SBP < 100).  Ultimately, plan is for TAVR pending conditioning. Patient was waiting for subacute rehab placement with plans for TAVR as outpatient when patient's conditioning improves.    Patient , became acutely dyspneic, hypertensive 170/79 and tachycardic to 140s w/ crackles and wheezes to lungs throughout. Bipap started, with some improvement noted and patient was on nitro drip. Transfer to CCU initiated. In CCU: became febrile 100.4, elevated WBC, CXR showed possible pneumonia, given cefepime and vanc. Blood cx () grew staph epidermidis (MR)  Then became hypotensive down to 50s systolic- d/c nitro drip, given 1 bolus, requiring pressor support. (phenylepherine), then went into afib RVR (140s-150s). Patient on bipap during event; O2 requirements have decreased during stay. Continued on phenylepherine drip and midodrine for pressor support. Patient has remained afebrile, WBC WNL, pressor requirements have decreased, she is setting well on room air, WBC count has remained WNL. Repeat cx from  grew Gram (+) cocci. Patient also has pleural effusion first thoracocentesis was on  1L fluid removed. Fluid was transudative in nature, cx were negative. Effusion reoccurred and chest tube was placed 22 - to stay in place until drainage is less than 100ccs/24 hours. Due to 2x positive cx central line was removed 22.  Plan is to complete aortic balloon valvuloplasty with Dr. Hood once scheduling allows.       INTERVAL HPI/OVERNIGHT EVENTS:   No overnight events   Afebrile, hemodynamically stable     Subjective:  Patient examined at bedside. Denies chest pain and SOB.     ICU Vital Signs Last 24 Hrs  T(C): 35.9 (06 Aug 2022 12:00), Max: 36.3 (05 Aug 2022 20:00)  T(F): 96.7 (06 Aug 2022 12:00), Max: 97.4 (05 Aug 2022 20:00)  HR: 63 (06 Aug 2022 14:00) (47 - 80)  BP: 114/58 (06 Aug 2022 14:00) (94/52 - 131/57)  BP(mean): 84 (06 Aug 2022 14:00) (68 - 94)  ABP: --  ABP(mean): --  RR: 26 (06 Aug 2022 14:00) (15 - 29)  SpO2: 97% (06 Aug 2022 14:00) (96% - 100%)    O2 Parameters below as of 06 Aug 2022 14:00  Patient On (Oxygen Delivery Method): room air          I&O's Summary    05 Aug 2022 07:01  -  06 Aug 2022 07:00  --------------------------------------------------------  IN: 980 mL / OUT: 1900 mL / NET: -920 mL    06 Aug 2022 07:01  -  06 Aug 2022 14:25  --------------------------------------------------------  IN: 300 mL / OUT: 350 mL / NET: -50 mL          Daily     Daily Weight in k.9 (06 Aug 2022 06:00)    Adult Advanced Hemodynamics Last 24 Hrs  CVP(mm Hg): --  CVP(cm H2O): --  CO: --  CI: --  PA: --  PA(mean): --  PCWP: --  SVR: --  SVRI: --  PVR: --  PVRI: --    EKG/Telemetry Events:    MEDICATIONS  (STANDING):  aspirin  chewable 81 milliGRAM(s) Oral daily  cefepime   IVPB 1000 milliGRAM(s) IV Intermittent every 8 hours  chlorhexidine 2% Cloths 1 Application(s) Topical daily  enoxaparin Injectable 40 milliGRAM(s) SubCutaneous every 24 hours  hydrocortisone hemorrhoidal Suppository 1 Suppository(s) Rectal daily  levothyroxine 100 MICROGram(s) Oral daily  lidocaine   4% Patch 1 Patch Transdermal daily  midodrine 5 milliGRAM(s) Oral every 8 hours  pantoprazole    Tablet 40 milliGRAM(s) Oral before breakfast  senna 2 Tablet(s) Oral at bedtime    MEDICATIONS  (PRN):  acetaminophen     Tablet .. 650 milliGRAM(s) Oral every 6 hours PRN Moderate Pain (4 - 6)  aluminum hydroxide/magnesium hydroxide/simethicone Suspension 30 milliLiter(s) Oral every 4 hours PRN Dyspepsia  hydrocortisone hemorrhoidal Suppository 1 Suppository(s) Rectal two times a day PRN hemorrhoidal discomfort      PHYSICAL EXAM:  GENERAL: nad laying in bed  HEAD:  Atraumatic, Normocephalic  NECK: Supple  NERVOUS SYSTEM:  Awake and alert   CHEST/LUNG: B/L breath sounds, L chest tube in place  HEART: S1S2 audible, bradycardic, regular rhythm; Systolic murmur  ABDOMEN: Soft, Nontender, Nondistended  EXTREMITIES:  No clubbing, cyanosis, or edema    LABS:                        9.4    5.54  )-----------( 133      ( 06 Aug 2022 04:31 )             29.9     08-06    139  |  102  |  30<H>  ----------------------------<  70  4.6   |  23  |  1.0    Ca    9.1      06 Aug 2022 04:31  Mg     2.3     08-06    TPro  5.8<L>  /  Alb  3.3<L>  /  TBili  0.4  /  DBili  x   /  AST  26  /  ALT  13  /  AlkPhos  109  08-06    LIVER FUNCTIONS - ( 06 Aug 2022 04:31 )  Alb: 3.3 g/dL / Pro: 5.8 g/dL / ALK PHOS: 109 U/L / ALT: 13 U/L / AST: 26 U/L / GGT: x             CAPILLARY BLOOD GLUCOSE                      RADIOLOGY & ADDITIONAL TESTS:  CXR:  < from: Xray Chest 1 View- PORTABLE-Routine (22 @ 05:50) >  Findings:  Support devices: Chest tube projecting over the left hemithorax.   Telemetry leads.  Cardiac/mediastinum/hilum: Unchanged.  Lung parenchyma/Pleura: Previously seen left-sided pneumothorax is not   definitely identified. Left pleural effusion mildly increased. Right   loculated pleural effusion stable.  Skeleton/soft tissues: Unchanged.  Impression:  Previously seen left-sided pneumothorax is not definitely identified.   Left pleural effusion mildly increased.    EKG:  < end of copied text >  < from: 12 Lead ECG (22 @ 05:18) >  Diagnosis Line Normal sinus rhythm  Left axis deviation  Right bundle branch block  Voltage criteria for left ventricular hypertrophy  Abnormal ECG  < end of copied text >        Care Discussed with Consultants/Other Providers [ x] YES  [ ] NO

## 2022-08-07 LAB
ALBUMIN SERPL ELPH-MCNC: 3.4 G/DL — LOW (ref 3.5–5.2)
ALP SERPL-CCNC: 102 U/L — SIGNIFICANT CHANGE UP (ref 30–115)
ALT FLD-CCNC: 14 U/L — SIGNIFICANT CHANGE UP (ref 0–41)
ANION GAP SERPL CALC-SCNC: 11 MMOL/L — SIGNIFICANT CHANGE UP (ref 7–14)
APTT BLD: 31.9 SEC — SIGNIFICANT CHANGE UP (ref 27–39.2)
AST SERPL-CCNC: 23 U/L — SIGNIFICANT CHANGE UP (ref 0–41)
BASOPHILS # BLD AUTO: 0.03 K/UL — SIGNIFICANT CHANGE UP (ref 0–0.2)
BASOPHILS NFR BLD AUTO: 0.6 % — SIGNIFICANT CHANGE UP (ref 0–1)
BILIRUB SERPL-MCNC: 0.3 MG/DL — SIGNIFICANT CHANGE UP (ref 0.2–1.2)
BLD GP AB SCN SERPL QL: SIGNIFICANT CHANGE UP
BUN SERPL-MCNC: 38 MG/DL — HIGH (ref 10–20)
CALCIUM SERPL-MCNC: 9.3 MG/DL — SIGNIFICANT CHANGE UP (ref 8.5–10.1)
CHLORIDE SERPL-SCNC: 102 MMOL/L — SIGNIFICANT CHANGE UP (ref 98–110)
CO2 SERPL-SCNC: 25 MMOL/L — SIGNIFICANT CHANGE UP (ref 17–32)
CREAT SERPL-MCNC: 1 MG/DL — SIGNIFICANT CHANGE UP (ref 0.7–1.5)
EGFR: 54 ML/MIN/1.73M2 — LOW
EOSINOPHIL # BLD AUTO: 0.12 K/UL — SIGNIFICANT CHANGE UP (ref 0–0.7)
EOSINOPHIL NFR BLD AUTO: 2.4 % — SIGNIFICANT CHANGE UP (ref 0–8)
GLUCOSE SERPL-MCNC: 83 MG/DL — SIGNIFICANT CHANGE UP (ref 70–99)
HCT VFR BLD CALC: 26.3 % — LOW (ref 37–47)
HCT VFR BLD CALC: 26.8 % — LOW (ref 37–47)
HGB BLD-MCNC: 8.3 G/DL — LOW (ref 12–16)
HGB BLD-MCNC: 8.4 G/DL — LOW (ref 12–16)
IMM GRANULOCYTES NFR BLD AUTO: 0.6 % — HIGH (ref 0.1–0.3)
INR BLD: 1.02 RATIO — SIGNIFICANT CHANGE UP (ref 0.65–1.3)
LYMPHOCYTES # BLD AUTO: 0.87 K/UL — LOW (ref 1.2–3.4)
LYMPHOCYTES # BLD AUTO: 17.7 % — LOW (ref 20.5–51.1)
MAGNESIUM SERPL-MCNC: 2.1 MG/DL — SIGNIFICANT CHANGE UP (ref 1.8–2.4)
MCHC RBC-ENTMCNC: 28.9 PG — SIGNIFICANT CHANGE UP (ref 27–31)
MCHC RBC-ENTMCNC: 29.6 PG — SIGNIFICANT CHANGE UP (ref 27–31)
MCHC RBC-ENTMCNC: 31.3 G/DL — LOW (ref 32–37)
MCHC RBC-ENTMCNC: 31.6 G/DL — LOW (ref 32–37)
MCV RBC AUTO: 91.6 FL — SIGNIFICANT CHANGE UP (ref 81–99)
MCV RBC AUTO: 94.4 FL — SIGNIFICANT CHANGE UP (ref 81–99)
MONOCYTES # BLD AUTO: 0.45 K/UL — SIGNIFICANT CHANGE UP (ref 0.1–0.6)
MONOCYTES NFR BLD AUTO: 9.1 % — SIGNIFICANT CHANGE UP (ref 1.7–9.3)
NEUTROPHILS # BLD AUTO: 3.42 K/UL — SIGNIFICANT CHANGE UP (ref 1.4–6.5)
NEUTROPHILS NFR BLD AUTO: 69.6 % — SIGNIFICANT CHANGE UP (ref 42.2–75.2)
NRBC # BLD: 0 /100 WBCS — SIGNIFICANT CHANGE UP (ref 0–0)
NRBC # BLD: 0 /100 WBCS — SIGNIFICANT CHANGE UP (ref 0–0)
PLATELET # BLD AUTO: 105 K/UL — LOW (ref 130–400)
PLATELET # BLD AUTO: 123 K/UL — LOW (ref 130–400)
POTASSIUM SERPL-MCNC: 4.4 MMOL/L — SIGNIFICANT CHANGE UP (ref 3.5–5)
POTASSIUM SERPL-SCNC: 4.4 MMOL/L — SIGNIFICANT CHANGE UP (ref 3.5–5)
PROT SERPL-MCNC: 5.7 G/DL — LOW (ref 6–8)
PROTHROM AB SERPL-ACNC: 11.7 SEC — SIGNIFICANT CHANGE UP (ref 9.95–12.87)
RBC # BLD: 2.84 M/UL — LOW (ref 4.2–5.4)
RBC # BLD: 2.87 M/UL — LOW (ref 4.2–5.4)
RBC # FLD: 19.9 % — HIGH (ref 11.5–14.5)
RBC # FLD: 20.1 % — HIGH (ref 11.5–14.5)
SARS-COV-2 RNA SPEC QL NAA+PROBE: SIGNIFICANT CHANGE UP
SODIUM SERPL-SCNC: 138 MMOL/L — SIGNIFICANT CHANGE UP (ref 135–146)
VANCOMYCIN FLD-MCNC: 22.5 UG/ML — HIGH (ref 5–10)
WBC # BLD: 4.92 K/UL — SIGNIFICANT CHANGE UP (ref 4.8–10.8)
WBC # BLD: 5.44 K/UL — SIGNIFICANT CHANGE UP (ref 4.8–10.8)
WBC # FLD AUTO: 4.92 K/UL — SIGNIFICANT CHANGE UP (ref 4.8–10.8)
WBC # FLD AUTO: 5.44 K/UL — SIGNIFICANT CHANGE UP (ref 4.8–10.8)

## 2022-08-07 PROCEDURE — 93010 ELECTROCARDIOGRAM REPORT: CPT

## 2022-08-07 PROCEDURE — 71045 X-RAY EXAM CHEST 1 VIEW: CPT | Mod: 26

## 2022-08-07 RX ORDER — POLYETHYLENE GLYCOL 3350 17 G/17G
17 POWDER, FOR SOLUTION ORAL DAILY
Refills: 0 | Status: DISCONTINUED | OUTPATIENT
Start: 2022-08-07 | End: 2022-08-15

## 2022-08-07 RX ORDER — SODIUM CHLORIDE 9 MG/ML
500 INJECTION INTRAMUSCULAR; INTRAVENOUS; SUBCUTANEOUS
Refills: 0 | Status: DISCONTINUED | OUTPATIENT
Start: 2022-08-07 | End: 2022-08-09

## 2022-08-07 RX ORDER — SODIUM CHLORIDE 9 MG/ML
500 INJECTION, SOLUTION INTRAVENOUS
Refills: 0 | Status: DISCONTINUED | OUTPATIENT
Start: 2022-08-07 | End: 2022-08-07

## 2022-08-07 RX ADMIN — PANTOPRAZOLE SODIUM 40 MILLIGRAM(S): 20 TABLET, DELAYED RELEASE ORAL at 06:01

## 2022-08-07 RX ADMIN — SENNA PLUS 2 TABLET(S): 8.6 TABLET ORAL at 21:16

## 2022-08-07 RX ADMIN — Medication 1 SUPPOSITORY(S): at 12:38

## 2022-08-07 RX ADMIN — Medication 100 MILLIGRAM(S): at 06:25

## 2022-08-07 RX ADMIN — Medication 81 MILLIGRAM(S): at 12:38

## 2022-08-07 RX ADMIN — CEFEPIME 100 MILLIGRAM(S): 1 INJECTION, POWDER, FOR SOLUTION INTRAMUSCULAR; INTRAVENOUS at 05:50

## 2022-08-07 RX ADMIN — LIDOCAINE 1 PATCH: 4 CREAM TOPICAL at 04:00

## 2022-08-07 RX ADMIN — SODIUM CHLORIDE 170 MILLILITER(S): 9 INJECTION INTRAMUSCULAR; INTRAVENOUS; SUBCUTANEOUS at 13:04

## 2022-08-07 RX ADMIN — LIDOCAINE 1 PATCH: 4 CREAM TOPICAL at 20:23

## 2022-08-07 RX ADMIN — MIDODRINE HYDROCHLORIDE 5 MILLIGRAM(S): 2.5 TABLET ORAL at 21:16

## 2022-08-07 RX ADMIN — Medication 100 MICROGRAM(S): at 05:50

## 2022-08-07 RX ADMIN — CEFEPIME 100 MILLIGRAM(S): 1 INJECTION, POWDER, FOR SOLUTION INTRAMUSCULAR; INTRAVENOUS at 21:16

## 2022-08-07 RX ADMIN — CHLORHEXIDINE GLUCONATE 1 APPLICATION(S): 213 SOLUTION TOPICAL at 12:38

## 2022-08-07 RX ADMIN — MIDODRINE HYDROCHLORIDE 5 MILLIGRAM(S): 2.5 TABLET ORAL at 05:50

## 2022-08-07 RX ADMIN — LIDOCAINE 1 PATCH: 4 CREAM TOPICAL at 12:38

## 2022-08-07 RX ADMIN — MIDODRINE HYDROCHLORIDE 5 MILLIGRAM(S): 2.5 TABLET ORAL at 13:05

## 2022-08-07 RX ADMIN — POLYETHYLENE GLYCOL 3350 17 GRAM(S): 17 POWDER, FOR SOLUTION ORAL at 21:43

## 2022-08-07 RX ADMIN — CEFEPIME 100 MILLIGRAM(S): 1 INJECTION, POWDER, FOR SOLUTION INTRAMUSCULAR; INTRAVENOUS at 13:05

## 2022-08-07 NOTE — PROGRESS NOTE ADULT - ATTENDING COMMENTS
Patient stable hemodynamically, brief episodes of NSVT overnight. Chest tube still draining >100 ml/hr, CXR stable. Bcx NGTD on Abx.  BP borderline low on lower dose of midodrine. Blood work shows Hb 8.3 from 9.4, BUN 38 from 30. Patient is getting OOB to chair, eating.     Continue chest tube   Abx - follow up final Bcx/ ID follow up   Get FOBT   Repeat CBC in PM   PT follow up /OOB to chair /IS   Structural cardiology follow up for BAV planning Patient stable hemodynamically, brief episodes of NSVT overnight. Chest tube still draining >100 ml/hr, CXR stable. Bcx NGTD on Abx.  BP borderline low on lower dose of midodrine. Blood work shows Hb 8.3 from 9.4, BUN 38 from 30. Patient is getting OOB to chair, eating. IVC completed collapsed.     Continue chest tube   Abx - follow up final Bcx/ ID follow up   Get FOBT   Repeat CBC in PM   Give  ml over 3 hrs  PT follow up /OOB to chair /IS   Structural cardiology follow up for BAV planning

## 2022-08-07 NOTE — PROGRESS NOTE ADULT - ASSESSMENT
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%  #Hemorrhoids with Constipation  #Hypothyroidism    Respiratory   - Moderate left pleural effusion seen on CT angio s/p thoracocentesis 8/1/22 - 1 L fluid  - Pleural effusion reoccurrence 8/2 - approximately 800ccs - chest tube placed 8/4/22  - F/U pleural fluid LDH (72), Protein (2.1), Cx NGTD  - Tylenol and lidocaine patch for pain  - Chest tube to be in place until draining <100ccs/24hrs    ID  - CXR 7/30 suspicious for PNA, with initial fever, elevated white count, and hypotension    - 7/30 procal .51, 8/4 0.18, WBC WNL since 8/1, Urine cx (-)  - 7/30 grew staph epidermidis, 8/1 gram (+) cocci in clusters, 8/5 cx - pending  - ID consulted  - continue cefepime at 1000 mg q 8 hours  - Vancomycin 750mg Q12  - Vanc trough 4pm elevated - vanc dose held. Restarted at 500BID. Trough ordered before 4th dose.        Cardiovascular  - TAVR workup likely outpatient   - Plan for Balloon aortic valvuloplasty possibly 8/8/22 if scheduling allows  - Discontinued Torsemide  - Cont midodrine to 5mg daily  - Keep MAP >65    Hematologic   - Trend CBC Daily. 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  - Continue hemorrhoidal suppository   -Continue Senna 2 tabs HS  -Tylenol 650 mg PO PRN for rectal pain  - Protonix  - Add ensure to diet  - NPO starting Sunday night for Monday procedure    Endocrine  - Continue Levothyroxine 100 mcg PO daily  - TSH 4.36 (6/19/22)  - Monitor FS    Renal  - Strict I+O, Daily standing weights  - maintain electrolytes, K>4 Mg >2    Neurological  - Avoid sedating medications    PT/Rehab  - On board    Lines/Gruber  - Midline: 8/4  - Peripheral IV: 8/4  - Primafit 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%  #Hemorrhoids with Constipation  #Hypothyroidism    Respiratory   - Moderate left pleural effusion seen on CT angio s/p thoracocentesis 8/1/22 - 1 L fluid  - Pleural effusion reoccurrence 8/2 - approximately 800ccs - chest tube placed 8/4/22  - F/U pleural fluid LDH (72), Protein (2.1), Cx NGTD  - Tylenol and lidocaine patch for pain  - Chest tube to be in place until draining <100ccs/24hrs    ID  - CXR 7/30 suspicious for PNA, with initial fever, elevated white count, and hypotension    - 7/30 procal .51, 8/4 0.18, WBC WNL since 8/1, Urine cx (-)  - 7/30 grew staph epidermidis, 8/1 gram (+) cocci in clusters, 8/5 cx - pending  - ID consulted  - continue cefepime at 1000 mg q 8 hours  - Vancomycin 750mg Q12  - Vanc trough 4pm elevated - vanc dose held. Restarted at 500BID. Trough ordered before 4th dose.        Cardiovascular  - TAVR workup likely outpatient   - Plan for Balloon aortic valvuloplasty possibly 8/8/22  - Discontinued Torsemide  - Cont midodrine to 5mg daily  - Keep MAP >65    Hematologic   - Trend CBC Daily. 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  - Continue hemorrhoidal suppository   -Continue Senna 2 tabs HS  -Tylenol 650 mg PO PRN for rectal pain  - Protonix  - Add ensure to diet  - NPO starting Sunday night for Monday procedure    Endocrine  - Continue Levothyroxine 100 mcg PO daily  - TSH 4.36 (6/19/22)  - Monitor FS    Renal  - Strict I+O, Daily standing weights  - maintain electrolytes, K>4 Mg >2    Neurological  - Avoid sedating medications    PT/Rehab  - On board    Lines/Gruber  - Midline: 8/4  - Peripheral IV: 8/4  - Primafit 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%  #Hemorrhoids with Constipation  #Hypothyroidism    Respiratory   - Moderate left pleural effusion seen on CT angio s/p thoracocentesis 8/1/22 - 1 L fluid  - Pleural effusion reoccurrence 8/2 - approximately 800ccs - chest tube placed 8/4/22  - F/U pleural fluid LDH (72), Protein (2.1), Cx NGTD  - Tylenol and lidocaine patch for pain  - Chest tube to be in place until draining <100ccs/24hrs  - Incentive spirometer    ID  - CXR 7/30 suspicious for PNA, with initial fever, elevated white count, and hypotension    - 7/30 procal .51, 8/4 0.18, WBC WNL since 8/1, Urine cx (-)  - 7/30 grew staph epidermidis, 8/1 gram (+) cocci in clusters, 8/5 cx - pending  - ID consulted  - continue cefepime at 1000 mg q 8 hours  - Vancomycin 750mg Q12  - Vanc trough 4pm elevated - vanc dose held. Restarted at 500BID. Trough ordered before 4th dose.        Cardiovascular  - TAVR workup likely outpatient   - Plan for Balloon aortic valvuloplasty possibly 8/8/22; NPO after midnight   - Discontinued Torsemide  - Cont midodrine to 5mg daily  - Keep MAP >65    Hematologic   - Trend CBC Daily. Transfuse for Hgb <8.0 - Hb today w 1 point drop to 8.3, will trend and reevaluate potential need for transfusion. FOBT.   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  - Continue hemorrhoidal suppository   -Continue Senna 2 tabs HS  -Tylenol 650 mg PO PRN for rectal pain  - Protonix  - Add ensure to diet  - NPO after midnight    Endocrine  - Continue Levothyroxine 100 mcg PO daily  - TSH 4.36 (6/19/22)  - Monitor FS    Renal  - Strict I+O, Daily standing weights  - maintain electrolytes, K>4 Mg >2    Neurological  - Avoid sedating medications    PT/Rehab  - On board    Lines/Gruber  - Midline: 8/4  - Peripheral IV: 8/4  - Primafit

## 2022-08-07 NOTE — PROGRESS NOTE ADULT - SUBJECTIVE AND OBJECTIVE BOX
Patient is a 89y old  Female who presents with a chief complaint of Dyspnea w/ Exertion (05 Aug 2022 16:50)    HPI:  88 y/o woman w PMHx of HFpEF, severe AS s/p balloon valvuloplasty , anemia s/p multiple transfusions 2 months ago, MVP, HTN, hyperthyroidism, HCC s/p partial liver resection, transferred from [facility] to [place] for TAVR evaluation by Dr. Mccollum.  Patient found to be anemic most likely d/t GI bleed now s/p 1PRBC transfusion for Hgb < 8/anemia. GI, Dr. Woo consulted at Dr. Mccollum' request.  Capsule study completed.  S/p colonoscopy  with successful AVM intervention. Diuretics largely remain on hold for soft BP (SBP < 100).  Ultimately, plan is for TAVR pending conditioning. Patient was waiting for subacute rehab placement with plans for TAVR as outpatient when patient's conditioning improves.    Patient , became acutely dyspneic, hypertensive 170/79 and tachycardic to 140s w/ crackles and wheezes to lungs throughout. Bipap started, with some improvement noted and patient was on nitro drip. Transfer to CCU initiated. In CCU: became febrile 100.4, elevated WBC, CXR showed possible pneumonia, given cefepime and vanc. Blood cx () grew staph epidermidis (MR)  Then became hypotensive down to 50s systolic- d/c nitro drip, given 1 bolus, requiring pressor support. (phenylepherine), then went into afib RVR (140s-150s). Patient on bipap during event; O2 requirements have decreased during stay. Continued on phenylepherine drip and midodrine for pressor support. Patient has remained afebrile, WBC WNL, pressor requirements have decreased, she is setting well on room air, WBC count has remained WNL. Repeat cx from  grew Gram (+) cocci. Patient also has pleural effusion first thoracocentesis was on  1L fluid removed. Fluid was transudative in nature, cx were negative. Effusion reoccurred and chest tube was placed 22 - to stay in place until drainage is less than 100ccs/24 hours. Due to 2x positive cx central line was removed 22.  Plan is to complete aortic balloon valvuloplasty with Dr. Hood once scheduling allows.       INTERVAL HPI/OVERNIGHT EVENTS:       Subjective:       PHYSICAL EXAM:  GENERAL: nad laying in bed  HEAD:  Atraumatic, Normocephalic  NECK: Supple  NERVOUS SYSTEM:  Awake and alert   CHEST/LUNG: B/L breath sounds, L chest tube in place  HEART: S1S2 audible, bradycardic, regular rhythm; Systolic murmur  ABDOMEN: Soft, Nontender, Nondistended  EXTREMITIES:  No clubbing, cyanosis, or edema    ICU Vital Signs Last 24 Hrs  T(C): 35.9 (06 Aug 2022 12:00), Max: 36.3 (05 Aug 2022 20:00)  T(F): 96.7 (06 Aug 2022 12:00), Max: 97.4 (05 Aug 2022 20:00)  HR: 63 (06 Aug 2022 14:00) (47 - 80)  BP: 114/58 (06 Aug 2022 14:00) (94/52 - 131/57)  BP(mean): 84 (06 Aug 2022 14:00) (68 - 94)  ABP: --  ABP(mean): --  RR: 26 (06 Aug 2022 14:00) (15 - 29)  SpO2: 97% (06 Aug 2022 14:00) (96% - 100%)    O2 Parameters below as of 06 Aug 2022 14:00  Patient On (Oxygen Delivery Method): room air          I&O's Summary    05 Aug 2022 07:01  -  06 Aug 2022 07:00  --------------------------------------------------------  IN: 980 mL / OUT: 1900 mL / NET: -920 mL    06 Aug 2022 07:01  -  06 Aug 2022 14:25  --------------------------------------------------------  IN: 300 mL / OUT: 350 mL / NET: -50 mL          Daily     Daily Weight in k.9 (06 Aug 2022 06:00)          MEDICATIONS  (STANDING):  aspirin  chewable 81 milliGRAM(s) Oral daily  cefepime   IVPB 1000 milliGRAM(s) IV Intermittent every 8 hours  chlorhexidine 2% Cloths 1 Application(s) Topical daily  enoxaparin Injectable 40 milliGRAM(s) SubCutaneous every 24 hours  hydrocortisone hemorrhoidal Suppository 1 Suppository(s) Rectal daily  levothyroxine 100 MICROGram(s) Oral daily  lidocaine   4% Patch 1 Patch Transdermal daily  midodrine 5 milliGRAM(s) Oral every 8 hours  pantoprazole    Tablet 40 milliGRAM(s) Oral before breakfast  senna 2 Tablet(s) Oral at bedtime    MEDICATIONS  (PRN):  acetaminophen     Tablet .. 650 milliGRAM(s) Oral every 6 hours PRN Moderate Pain (4 - 6)  aluminum hydroxide/magnesium hydroxide/simethicone Suspension 30 milliLiter(s) Oral every 4 hours PRN Dyspepsia  hydrocortisone hemorrhoidal Suppository 1 Suppository(s) Rectal two times a day PRN hemorrhoidal discomfort      LABS:                        9.4    5.54  )-----------( 133      ( 06 Aug 2022 04:31 )             29.9     08-06    139  |  102  |  30<H>  ----------------------------<  70  4.6   |  23  |  1.0    Ca    9.1      06 Aug 2022 04:31  Mg     2.3     08-06    TPro  5.8<L>  /  Alb  3.3<L>  /  TBili  0.4  /  DBili  x   /  AST  26  /  ALT  13  /  AlkPhos  109  08-06    LIVER FUNCTIONS - ( 06 Aug 2022 04:31 )  Alb: 3.3 g/dL / Pro: 5.8 g/dL / ALK PHOS: 109 U/L / ALT: 13 U/L / AST: 26 U/L / GGT: x             CAPILLARY BLOOD GLUCOSE                      RADIOLOGY & ADDITIONAL TESTS:  CXR:  < from: Xray Chest 1 View- PORTABLE-Routine (22 @ 05:50) >  Findings:  Support devices: Chest tube projecting over the left hemithorax.   Telemetry leads.  Cardiac/mediastinum/hilum: Unchanged.  Lung parenchyma/Pleura: Previously seen left-sided pneumothorax is not   definitely identified. Left pleural effusion mildly increased. Right   loculated pleural effusion stable.  Skeleton/soft tissues: Unchanged.  Impression:  Previously seen left-sided pneumothorax is not definitely identified.   Left pleural effusion mildly increased.    EKG:  < end of copied text >  < from: 12 Lead ECG (22 @ 05:18) >  Diagnosis Line Normal sinus rhythm  Left axis deviation  Right bundle branch block  Voltage criteria for left ventricular hypertrophy  Abnormal ECG  < end of copied text >        Care Discussed with Consultants/Other Providers [ x] YES  [ ] NO           Patient is a 89y old  Female who presents with a chief complaint of Dyspnea w/ Exertion (05 Aug 2022 16:50)    HPI:  88 y/o woman w PMHx of HFpEF, severe AS s/p balloon valvuloplasty 2021, anemia s/p multiple transfusions 2 months ago, MVP, HTN, hyperthyroidism, HCC s/p partial liver resection, transferred from [facility] to [place] for TAVR evaluation by Dr. Mccollum.  Patient found to be anemic most likely d/t GI bleed now s/p 1PRBC transfusion for Hgb < 8/anemia. GI, Dr. Woo consulted at Dr. Mccollum' request.  Capsule study completed.  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 Diuretics largely remain on hold for soft BP (SBP < 100).  Ultimately, plan is for TAVR pending conditioning. Patient was waiting for subacute rehab placement with plans for TAVR as outpatient when patient's conditioning improves.    Patient 7/30, became acutely dyspneic, hypertensive 170/79 and tachycardic to 140s w/ crackles and wheezes to lungs throughout. Bipap started, with some improvement noted and patient was on nitro drip. Transfer to CCU initiated. In CCU: became febrile 100.4, elevated WBC, CXR showed possible pneumonia, given cefepime and vanc. Blood cx (7/30) grew staph epidermidis (MR)  Then became hypotensive down to 50s systolic- d/c nitro drip, given 1 bolus, requiring pressor support. (phenylepherine), then went into afib RVR (140s-150s). Patient on bipap during event; O2 requirements have decreased during stay. Continued on phenylepherine drip and midodrine for pressor support. Patient has remained afebrile, WBC WNL, pressor requirements have decreased, she is setting well on room air, WBC count has remained WNL. Repeat cx from 8/2 grew Gram (+) cocci. Patient also has pleural effusion first thoracocentesis was on 8/1 1L fluid removed. Fluid was transudative in nature, cx were negative. Effusion reoccurred and chest tube was placed 8/4/22 - to stay in place until drainage is less than 100ccs/24 hours. Due to 2x positive cx central line was removed 8/4/22.  Plan is to complete aortic balloon valvuloplasty with Dr. Hood once scheduling allows.       INTERVAL HPI/OVERNIGHT EVENTS:       Subjective:       PHYSICAL EXAM:  GENERAL: nad laying in bed  HEAD:  Atraumatic, Normocephalic  NECK: Supple  NERVOUS SYSTEM:  Awake and alert   CHEST/LUNG: B/L breath sounds, L chest tube in place  HEART: S1S2 audible, bradycardic, regular rhythm; Systolic murmur  ABDOMEN: Soft, Nontender, Nondistended  EXTREMITIES:  No clubbing, cyanosis, or edema      ICU Vital Signs Last 24 Hrs  T(C): 36.2 (07 Aug 2022 04:00), Max: 36.2 (06 Aug 2022 19:49)  T(F): 97.1 (07 Aug 2022 04:00), Max: 97.1 (06 Aug 2022 19:49)  HR: 52 (07 Aug 2022 06:00) (52 - 80)  BP: 94/69 (07 Aug 2022 06:00) (89/42 - 131/57)  BP(mean): 77 (07 Aug 2022 06:00) (61 - 86)  ABP: --  ABP(mean): --  RR: 22 (07 Aug 2022 06:00) (18 - 26)  SpO2: 98% (07 Aug 2022 06:00) (95% - 100%)    O2 Parameters below as of 07 Aug 2022 06:00  Patient On (Oxygen Delivery Method): room air          I&O's Summary    05 Aug 2022 07:01  -  06 Aug 2022 07:00  --------------------------------------------------------  IN: 980 mL / OUT: 1900 mL / NET: -920 mL    06 Aug 2022 07:01  -  07 Aug 2022 06:30  --------------------------------------------------------  IN: 1350 mL / OUT: 1000 mL / NET: 350 mL          Daily     Daily     Adult Advanced Hemodynamics Last 24 Hrs  CVP(mm Hg): --  CVP(cm H2O): --  CO: --  CI: --  PA: --  PA(mean): --  PCWP: --  SVR: --  SVRI: --  PVR: --  PVRI: --    EKG/Telemetry Events:    MEDICATIONS  (STANDING):  aspirin  chewable 81 milliGRAM(s) Oral daily  cefepime   IVPB 1000 milliGRAM(s) IV Intermittent every 8 hours  chlorhexidine 2% Cloths 1 Application(s) Topical daily  enoxaparin Injectable 40 milliGRAM(s) SubCutaneous every 24 hours  hydrocortisone hemorrhoidal Suppository 1 Suppository(s) Rectal daily  levothyroxine 100 MICROGram(s) Oral daily  lidocaine   4% Patch 1 Patch Transdermal daily  midodrine 5 milliGRAM(s) Oral every 8 hours  pantoprazole    Tablet 40 milliGRAM(s) Oral before breakfast  senna 2 Tablet(s) Oral at bedtime  vancomycin  IVPB 500 milliGRAM(s) IV Intermittent every 12 hours    MEDICATIONS  (PRN):  acetaminophen     Tablet .. 650 milliGRAM(s) Oral every 6 hours PRN Moderate Pain (4 - 6)  aluminum hydroxide/magnesium hydroxide/simethicone Suspension 30 milliLiter(s) Oral every 4 hours PRN Dyspepsia  hydrocortisone hemorrhoidal Suppository 1 Suppository(s) Rectal two times a day PRN hemorrhoidal discomfort        LABS:                        9.4    5.54  )-----------( 133      ( 06 Aug 2022 04:31 )             29.9     08-06    139  |  102  |  30<H>  ----------------------------<  70  4.6   |  23  |  1.0    Ca    9.1      06 Aug 2022 04:31  Mg     2.3     08-06    TPro  5.8<L>  /  Alb  3.3<L>  /  TBili  0.4  /  DBili  x   /  AST  26  /  ALT  13  /  AlkPhos  109  08-06    LIVER FUNCTIONS - ( 06 Aug 2022 04:31 )  Alb: 3.3 g/dL / Pro: 5.8 g/dL / ALK PHOS: 109 U/L / ALT: 13 U/L / AST: 26 U/L / GGT: x             CAPILLARY BLOOD GLUCOSE                                RADIOLOGY & ADDITIONAL TESTS:  CXR:  < from: Xray Chest 1 View- PORTABLE-Routine (08.05.22 @ 05:50) >  Findings:  Support devices: Chest tube projecting over the left hemithorax.   Telemetry leads.  Cardiac/mediastinum/hilum: Unchanged.  Lung parenchyma/Pleura: Previously seen left-sided pneumothorax is not   definitely identified. Left pleural effusion mildly increased. Right   loculated pleural effusion stable.  Skeleton/soft tissues: Unchanged.  Impression:  Previously seen left-sided pneumothorax is not definitely identified.   Left pleural effusion mildly increased.    EKG:  < end of copied text >  < from: 12 Lead ECG (08.06.22 @ 05:18) >  Diagnosis Line Normal sinus rhythm  Left axis deviation  Right bundle branch block  Voltage criteria for left ventricular hypertrophy  Abnormal ECG  < end of copied text >        Care Discussed with Consultants/Other Providers [ x] YES  [ ] NO           Patient is a 89y old  Female who presents with a chief complaint of Dyspnea w/ Exertion (05 Aug 2022 16:50)    HPI:  89 y/o woman w PMHx of HFpEF, severe AS s/p balloon valvuloplasty 2021, anemia s/p multiple transfusions 2 months ago, MVP, HTN, hyperthyroidism, HCC s/p partial liver resection, transferred from [facility] to [place] for TAVR evaluation by Dr. Mccollum.  Patient found to be anemic most likely d/t GI bleed now s/p 1PRBC transfusion for Hgb < 8/anemia. GI, Dr. Woo consulted at Dr. Mccollum' request.  Capsule study completed.  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 Diuretics largely remain on hold for soft BP (SBP < 100).  Ultimately, plan is for TAVR pending conditioning. Patient was waiting for subacute rehab placement with plans for TAVR as outpatient when patient's conditioning improves.    Patient 7/30, became acutely dyspneic, hypertensive 170/79 and tachycardic to 140s w/ crackles and wheezes to lungs throughout. Bipap started, with some improvement noted and patient was on nitro drip. Transfer to CCU initiated. In CCU: became febrile 100.4, elevated WBC, CXR showed possible pneumonia, given cefepime and vanc. Blood cx (7/30) grew staph epidermidis (MR)  Then became hypotensive down to 50s systolic- d/c nitro drip, given 1 bolus, requiring pressor support. (phenylepherine), then went into afib RVR (140s-150s). Patient on bipap during event; O2 requirements have decreased during stay. Continued on phenylepherine drip and midodrine for pressor support. Patient has remained afebrile, WBC WNL, pressor requirements have decreased, she is setting well on room air, WBC count has remained WNL. Repeat cx from 8/2 grew Gram (+) cocci. Patient also has pleural effusion first thoracocentesis was on 8/1 1L fluid removed. Fluid was transudative in nature, cx were negative. Effusion reoccurred and chest tube was placed 8/4/22 - to stay in place until drainage is less than 100ccs/24 hours. Due to 2x positive cx central line was removed 8/4/22.  Plan is to complete aortic balloon valvuloplasty with Dr. Hood once scheduling allows.       INTERVAL HPI/OVERNIGHT EVENTS:   None significant     Subjective:  Denies any acute complaints. Has not had recent BM.     PHYSICAL EXAM:  GENERAL: nad laying in bed  HEAD:  Atraumatic, Normocephalic  NECK: Supple  NERVOUS SYSTEM:  Awake and alert   CHEST/LUNG: B/L breath sounds, L chest tube in place  HEART: S1S2 audible, bradycardic, regular rhythm; Systolic murmur  ABDOMEN: Soft, Nontender, Nondistended  EXTREMITIES:  No clubbing, cyanosis, or edema      ICU Vital Signs Last 24 Hrs  T(C): 36.2 (07 Aug 2022 04:00), Max: 36.2 (06 Aug 2022 19:49)  T(F): 97.1 (07 Aug 2022 04:00), Max: 97.1 (06 Aug 2022 19:49)  HR: 52 (07 Aug 2022 06:00) (52 - 80)  BP: 94/69 (07 Aug 2022 06:00) (89/42 - 131/57)  BP(mean): 77 (07 Aug 2022 06:00) (61 - 86)  ABP: --  ABP(mean): --  RR: 22 (07 Aug 2022 06:00) (18 - 26)  SpO2: 98% (07 Aug 2022 06:00) (95% - 100%)    O2 Parameters below as of 07 Aug 2022 06:00  Patient On (Oxygen Delivery Method): room air          I&O's Summary    05 Aug 2022 07:01  -  06 Aug 2022 07:00  --------------------------------------------------------  IN: 980 mL / OUT: 1900 mL / NET: -920 mL    06 Aug 2022 07:01  -  07 Aug 2022 06:30  --------------------------------------------------------  IN: 1350 mL / OUT: 1000 mL / NET: 350 mL        MEDICATIONS  (STANDING):  aspirin  chewable 81 milliGRAM(s) Oral daily  cefepime   IVPB 1000 milliGRAM(s) IV Intermittent every 8 hours  chlorhexidine 2% Cloths 1 Application(s) Topical daily  enoxaparin Injectable 40 milliGRAM(s) SubCutaneous every 24 hours  hydrocortisone hemorrhoidal Suppository 1 Suppository(s) Rectal daily  levothyroxine 100 MICROGram(s) Oral daily  lidocaine   4% Patch 1 Patch Transdermal daily  midodrine 5 milliGRAM(s) Oral every 8 hours  pantoprazole    Tablet 40 milliGRAM(s) Oral before breakfast  senna 2 Tablet(s) Oral at bedtime  vancomycin  IVPB 500 milliGRAM(s) IV Intermittent every 12 hours    MEDICATIONS  (PRN):  acetaminophen     Tablet .. 650 milliGRAM(s) Oral every 6 hours PRN Moderate Pain (4 - 6)  aluminum hydroxide/magnesium hydroxide/simethicone Suspension 30 milliLiter(s) Oral every 4 hours PRN Dyspepsia  hydrocortisone hemorrhoidal Suppository 1 Suppository(s) Rectal two times a day PRN hemorrhoidal discomfort        LABS:                        9.4    5.54  )-----------( 133      ( 06 Aug 2022 04:31 )             29.9     08-06    139  |  102  |  30<H>  ----------------------------<  70  4.6   |  23  |  1.0    Ca    9.1      06 Aug 2022 04:31  Mg     2.3     08-06    TPro  5.8<L>  /  Alb  3.3<L>  /  TBili  0.4  /  DBili  x   /  AST  26  /  ALT  13  /  AlkPhos  109  08-06    LIVER FUNCTIONS - ( 06 Aug 2022 04:31 )  Alb: 3.3 g/dL / Pro: 5.8 g/dL / ALK PHOS: 109 U/L / ALT: 13 U/L / AST: 26 U/L / GGT: x             CAPILLARY BLOOD GLUCOSE                                RADIOLOGY & ADDITIONAL TESTS:  CXR:  < from: Xray Chest 1 View- PORTABLE-Routine (08.05.22 @ 05:50) >  Findings:  Support devices: Chest tube projecting over the left hemithorax.   Telemetry leads.  Cardiac/mediastinum/hilum: Unchanged.  Lung parenchyma/Pleura: Previously seen left-sided pneumothorax is not   definitely identified. Left pleural effusion mildly increased. Right   loculated pleural effusion stable.  Skeleton/soft tissues: Unchanged.  Impression:  Previously seen left-sided pneumothorax is not definitely identified.   Left pleural effusion mildly increased.    EKG:  < end of copied text >  < from: 12 Lead ECG (08.06.22 @ 05:18) >  Diagnosis Line Normal sinus rhythm  Left axis deviation  Right bundle branch block  Voltage criteria for left ventricular hypertrophy  Abnormal ECG  < end of copied text >        Care Discussed with Consultants/Other Providers [ x] YES  [ ] NO

## 2022-08-08 LAB
ALBUMIN SERPL ELPH-MCNC: 3.3 G/DL — LOW (ref 3.5–5.2)
ALP SERPL-CCNC: 111 U/L — SIGNIFICANT CHANGE UP (ref 30–115)
ALT FLD-CCNC: 19 U/L — SIGNIFICANT CHANGE UP (ref 0–41)
ANION GAP SERPL CALC-SCNC: 9 MMOL/L — SIGNIFICANT CHANGE UP (ref 7–14)
APTT BLD: 28.2 SEC — SIGNIFICANT CHANGE UP (ref 27–39.2)
AST SERPL-CCNC: 33 U/L — SIGNIFICANT CHANGE UP (ref 0–41)
BASOPHILS # BLD AUTO: 0.03 K/UL — SIGNIFICANT CHANGE UP (ref 0–0.2)
BASOPHILS NFR BLD AUTO: 0.7 % — SIGNIFICANT CHANGE UP (ref 0–1)
BILIRUB SERPL-MCNC: 0.3 MG/DL — SIGNIFICANT CHANGE UP (ref 0.2–1.2)
BUN SERPL-MCNC: 34 MG/DL — HIGH (ref 10–20)
CALCIUM SERPL-MCNC: 9 MG/DL — SIGNIFICANT CHANGE UP (ref 8.5–10.1)
CHLORIDE SERPL-SCNC: 106 MMOL/L — SIGNIFICANT CHANGE UP (ref 98–110)
CO2 SERPL-SCNC: 23 MMOL/L — SIGNIFICANT CHANGE UP (ref 17–32)
CREAT SERPL-MCNC: 0.9 MG/DL — SIGNIFICANT CHANGE UP (ref 0.7–1.5)
EGFR: 61 ML/MIN/1.73M2 — SIGNIFICANT CHANGE UP
EOSINOPHIL # BLD AUTO: 0.16 K/UL — SIGNIFICANT CHANGE UP (ref 0–0.7)
EOSINOPHIL NFR BLD AUTO: 3.5 % — SIGNIFICANT CHANGE UP (ref 0–8)
GLUCOSE SERPL-MCNC: 93 MG/DL — SIGNIFICANT CHANGE UP (ref 70–99)
HCT VFR BLD CALC: 26 % — LOW (ref 37–47)
HGB BLD-MCNC: 8.4 G/DL — LOW (ref 12–16)
IMM GRANULOCYTES NFR BLD AUTO: 0.7 % — HIGH (ref 0.1–0.3)
INR BLD: 0.98 RATIO — SIGNIFICANT CHANGE UP (ref 0.65–1.3)
LYMPHOCYTES # BLD AUTO: 0.86 K/UL — LOW (ref 1.2–3.4)
LYMPHOCYTES # BLD AUTO: 18.7 % — LOW (ref 20.5–51.1)
MAGNESIUM SERPL-MCNC: 2.1 MG/DL — SIGNIFICANT CHANGE UP (ref 1.8–2.4)
MCHC RBC-ENTMCNC: 30 PG — SIGNIFICANT CHANGE UP (ref 27–31)
MCHC RBC-ENTMCNC: 32.3 G/DL — SIGNIFICANT CHANGE UP (ref 32–37)
MCV RBC AUTO: 92.9 FL — SIGNIFICANT CHANGE UP (ref 81–99)
MONOCYTES # BLD AUTO: 0.39 K/UL — SIGNIFICANT CHANGE UP (ref 0.1–0.6)
MONOCYTES NFR BLD AUTO: 8.5 % — SIGNIFICANT CHANGE UP (ref 1.7–9.3)
NEUTROPHILS # BLD AUTO: 3.12 K/UL — SIGNIFICANT CHANGE UP (ref 1.4–6.5)
NEUTROPHILS NFR BLD AUTO: 67.9 % — SIGNIFICANT CHANGE UP (ref 42.2–75.2)
NRBC # BLD: 0 /100 WBCS — SIGNIFICANT CHANGE UP (ref 0–0)
PLATELET # BLD AUTO: 112 K/UL — LOW (ref 130–400)
POTASSIUM SERPL-MCNC: 4.5 MMOL/L — SIGNIFICANT CHANGE UP (ref 3.5–5)
POTASSIUM SERPL-SCNC: 4.5 MMOL/L — SIGNIFICANT CHANGE UP (ref 3.5–5)
PROT SERPL-MCNC: 5.8 G/DL — LOW (ref 6–8)
PROTHROM AB SERPL-ACNC: 11.3 SEC — SIGNIFICANT CHANGE UP (ref 9.95–12.87)
RBC # BLD: 2.8 M/UL — LOW (ref 4.2–5.4)
RBC # FLD: 20.1 % — HIGH (ref 11.5–14.5)
SODIUM SERPL-SCNC: 138 MMOL/L — SIGNIFICANT CHANGE UP (ref 135–146)
VANCOMYCIN TROUGH SERPL-MCNC: 19.8 UG/ML — HIGH (ref 5–10)
WBC # BLD: 4.59 K/UL — LOW (ref 4.8–10.8)
WBC # FLD AUTO: 4.59 K/UL — LOW (ref 4.8–10.8)

## 2022-08-08 PROCEDURE — 99233 SBSQ HOSP IP/OBS HIGH 50: CPT | Mod: GC

## 2022-08-08 PROCEDURE — 99291 CRITICAL CARE FIRST HOUR: CPT

## 2022-08-08 PROCEDURE — 93010 ELECTROCARDIOGRAM REPORT: CPT

## 2022-08-08 PROCEDURE — 71045 X-RAY EXAM CHEST 1 VIEW: CPT | Mod: 26

## 2022-08-08 RX ADMIN — LIDOCAINE 1 PATCH: 4 CREAM TOPICAL at 12:08

## 2022-08-08 RX ADMIN — CEFEPIME 100 MILLIGRAM(S): 1 INJECTION, POWDER, FOR SOLUTION INTRAMUSCULAR; INTRAVENOUS at 05:28

## 2022-08-08 RX ADMIN — LIDOCAINE 1 PATCH: 4 CREAM TOPICAL at 19:30

## 2022-08-08 RX ADMIN — Medication 100 MICROGRAM(S): at 05:29

## 2022-08-08 RX ADMIN — SENNA PLUS 2 TABLET(S): 8.6 TABLET ORAL at 21:28

## 2022-08-08 RX ADMIN — Medication 1 SUPPOSITORY(S): at 12:09

## 2022-08-08 RX ADMIN — MIDODRINE HYDROCHLORIDE 5 MILLIGRAM(S): 2.5 TABLET ORAL at 21:28

## 2022-08-08 RX ADMIN — MIDODRINE HYDROCHLORIDE 5 MILLIGRAM(S): 2.5 TABLET ORAL at 05:28

## 2022-08-08 RX ADMIN — MIDODRINE HYDROCHLORIDE 5 MILLIGRAM(S): 2.5 TABLET ORAL at 15:08

## 2022-08-08 RX ADMIN — PANTOPRAZOLE SODIUM 40 MILLIGRAM(S): 20 TABLET, DELAYED RELEASE ORAL at 05:28

## 2022-08-08 RX ADMIN — Medication 81 MILLIGRAM(S): at 12:09

## 2022-08-08 RX ADMIN — POLYETHYLENE GLYCOL 3350 17 GRAM(S): 17 POWDER, FOR SOLUTION ORAL at 12:09

## 2022-08-08 RX ADMIN — LIDOCAINE 1 PATCH: 4 CREAM TOPICAL at 01:44

## 2022-08-08 NOTE — PROGRESS NOTE ADULT - ASSESSMENT
ASSESSMENT  Patient is a 88 y/o female with HFpEF, severe AS s/p balloon valvuloplasty 2021, anemia s/p multiple transfusions, MVP, HTN, hyperthyroidism, HCC s/p partial liver resection whom is consulted for suspected sepsis secondary to pneumonia.     IMPRESSION  # Sepsis during admission - Pneumonia? Aspiration pneumonia?  - s/p thoracentesis 8/1 - CX no growth  # Blood cultures positive for Methicillin resistant Staphylococcus epidermidis - suspect contaminant   - Blood Cx 7/30 and 8/1 Staph epi   - Blood Cx 8/4-8/5 NG (vancomycin started after collection of blood cx 8/4)     RECOMMENDATIONS  - as blood cx 8/4 and 8/5 are NG, stop vancomycin   - stop cefepime as she completed course   - monitor for fevers off antibiotics     Please call or message on [employer] Teams if with any questions  Spectra [phone].

## 2022-08-08 NOTE — PROGRESS NOTE ADULT - ATTENDING COMMENTS
Ms Silverio is a very pleasant woman seen at bedside today, chest tube continues to drain though now <100cc/day.  If drainage remains low s/p cardiac procedure planned for later today, will remove chest tube later today or tomorrow.    Remainder of impression and plan per fellow note.

## 2022-08-08 NOTE — PROGRESS NOTE ADULT - SUBJECTIVE AND OBJECTIVE BOX
Patient is a 89y old  Female who presents with a chief complaint of Dyspnea w/ Exertion (07 Aug 2022 06:17)      HPI      INTERVAL HPI/OVERNIGHT EVENTS:   No overnight events   Afebrile, hemodynamically stable     Subjective:    ICU Vital Signs Last 24 Hrs  T(C): 35.8 (08 Aug 2022 04:00), Max: 36.1 (07 Aug 2022 12:00)  T(F): 96.5 (08 Aug 2022 04:00), Max: 97 (07 Aug 2022 12:00)  HR: 87 (08 Aug 2022 06:00) (53 - 87)  BP: 139/64 (08 Aug 2022 06:00) (90/54 - 140/48)  BP(mean): 78 (08 Aug 2022 06:00) (69 - 93)  ABP: --  ABP(mean): --  RR: 39 (08 Aug 2022 06:00) (17 - 39)  SpO2: 97% (08 Aug 2022 06:00) (96% - 98%)    O2 Parameters below as of 08 Aug 2022 06:00  Patient On (Oxygen Delivery Method): room air          I&O's Summary    07 Aug 2022 07:01  -  08 Aug 2022 07:00  --------------------------------------------------------  IN: 1150 mL / OUT: 1600 mL / NET: -450 mL          Daily     Daily     Adult Advanced Hemodynamics Last 24 Hrs  CVP(mm Hg): --  CVP(cm H2O): --  CO: --  CI: --  PA: --  PA(mean): --  PCWP: --  SVR: --  SVRI: --  PVR: --  PVRI: --    EKG/Telemetry Events:    MEDICATIONS  (STANDING):  aspirin  chewable 81 milliGRAM(s) Oral daily  cefepime   IVPB 1000 milliGRAM(s) IV Intermittent every 8 hours  chlorhexidine 2% Cloths 1 Application(s) Topical daily  enoxaparin Injectable 40 milliGRAM(s) SubCutaneous every 24 hours  hydrocortisone hemorrhoidal Suppository 1 Suppository(s) Rectal daily  levothyroxine 100 MICROGram(s) Oral daily  lidocaine   4% Patch 1 Patch Transdermal daily  midodrine 5 milliGRAM(s) Oral every 8 hours  pantoprazole    Tablet 40 milliGRAM(s) Oral before breakfast  polyethylene glycol 3350 17 Gram(s) Oral daily  senna 2 Tablet(s) Oral at bedtime  sodium chloride 0.9%. 500 milliLiter(s) (170 mL/Hr) IV Continuous <Continuous>  vancomycin  IVPB 500 milliGRAM(s) IV Intermittent every 12 hours    MEDICATIONS  (PRN):  acetaminophen     Tablet .. 650 milliGRAM(s) Oral every 6 hours PRN Moderate Pain (4 - 6)  aluminum hydroxide/magnesium hydroxide/simethicone Suspension 30 milliLiter(s) Oral every 4 hours PRN Dyspepsia  hydrocortisone hemorrhoidal Suppository 1 Suppository(s) Rectal two times a day PRN hemorrhoidal discomfort        LABS:                        8.4    4.59  )-----------( 112      ( 08 Aug 2022 04:47 )             26.0     08-08    138  |  106  |  34<H>  ----------------------------<  93  4.5   |  23  |  0.9    Ca    9.0      08 Aug 2022 04:47  Mg     2.1     08-08    TPro  5.8<L>  /  Alb  3.3<L>  /  TBili  0.3  /  DBili  x   /  AST  33  /  ALT  19  /  AlkPhos  111  08-08    LIVER FUNCTIONS - ( 08 Aug 2022 04:47 )  Alb: 3.3 g/dL / Pro: 5.8 g/dL / ALK PHOS: 111 U/L / ALT: 19 U/L / AST: 33 U/L / GGT: x           PT/INR - ( 08 Aug 2022 04:47 )   PT: 11.30 sec;   INR: 0.98 ratio         PTT - ( 08 Aug 2022 04:47 )  PTT:28.2 sec  CAPILLARY BLOOD GLUCOSE                      RADIOLOGY & ADDITIONAL TESTS:         Care Discussed with Consultants/Other Providers [ x] YES  [ ] NO           Patient is a 89y old  Female who presents with a chief complaint of Dyspnea w/ Exertion (07 Aug 2022 06:17)      HPI  88 y/o woman w PMHx of HFpEF, severe AS s/p balloon valvuloplasty 2021, anemia s/p multiple transfusions 2 months ago, MVP, HTN, hyperthyroidism, HCC s/p partial liver resection, transferred from [facility] to [place] for TAVR evaluation by Dr. Mccollum.  Patient found to be anemic most likely d/t GI bleed now s/p 1PRBC transfusion for Hgb < 8/anemia. GI, Dr. Woo consulted at Dr. Mccollum' request.  Capsule study completed.  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 Diuretics largely remain on hold for soft BP (SBP < 100).  Ultimately, plan is for TAVR pending conditioning. Patient was waiting for subacute rehab placement with plans for TAVR as outpatient when patient's conditioning improves.    Patient 7/30, became acutely dyspneic, hypertensive 170/79 and tachycardic to 140s w/ crackles and wheezes to lungs throughout. Bipap started, with some improvement noted and patient was on nitro drip. Transfer to CCU initiated. In CCU: became febrile 100.4, elevated WBC, CXR showed possible pneumonia, given cefepime and vanc. Blood cx (7/30) grew staph epidermidis (MR)  Then became hypotensive down to 50s systolic- d/c nitro drip, given 1 bolus, requiring pressor support. (phenylepherine), then went into afib RVR (140s-150s). Patient on bipap during event; O2 requirements have decreased during stay. Continued on phenylepherine drip and midodrine for pressor support. Patient has remained afebrile, WBC WNL, pressor requirements have decreased, she is setting well on room air, WBC count has remained WNL. Repeat cx from 8/2 grew Gram (+) cocci. Patient also has pleural effusion first thoracocentesis was on 8/1 1L fluid removed. Fluid was transudative in nature, cx were negative. Effusion reoccurred and chest tube was placed 8/4/22 - to stay in place until drainage is less than 100ccs/24 hours. Due to 2x positive cx central line was removed 8/4/22. Pt is stable off pressors. Plan is to complete aortic balloon valvuloplasty with Dr. Hood today.       INTERVAL HPI/OVERNIGHT EVENTS:   No overnight events   Afebrile, hemodynamically stable   Overnight, chest tube drained 80 ccs. Will remove post valvuloplasty     Subjective:    ICU Vital Signs Last 24 Hrs  T(C): 35.8 (08 Aug 2022 04:00), Max: 36.1 (07 Aug 2022 12:00)  T(F): 96.5 (08 Aug 2022 04:00), Max: 97 (07 Aug 2022 12:00)  HR: 87 (08 Aug 2022 06:00) (53 - 87)  BP: 139/64 (08 Aug 2022 06:00) (90/54 - 140/48)  BP(mean): 78 (08 Aug 2022 06:00) (69 - 93)  ABP: --  ABP(mean): --  RR: 39 (08 Aug 2022 06:00) (17 - 39)  SpO2: 97% (08 Aug 2022 06:00) (96% - 98%)    O2 Parameters below as of 08 Aug 2022 06:00  Patient On (Oxygen Delivery Method): room air          I&O's Summary    07 Aug 2022 07:01  -  08 Aug 2022 07:00  --------------------------------------------------------  IN: 1150 mL / OUT: 1600 mL / NET: -450 mL          Daily     Daily     Adult Advanced Hemodynamics Last 24 Hrs  CVP(mm Hg): --  CVP(cm H2O): --  CO: --  CI: --  PA: --  PA(mean): --  PCWP: --  SVR: --  SVRI: --  PVR: --  PVRI: --    EKG/Telemetry Events:    MEDICATIONS  (STANDING):  aspirin  chewable 81 milliGRAM(s) Oral daily  cefepime   IVPB 1000 milliGRAM(s) IV Intermittent every 8 hours  chlorhexidine 2% Cloths 1 Application(s) Topical daily  enoxaparin Injectable 40 milliGRAM(s) SubCutaneous every 24 hours  hydrocortisone hemorrhoidal Suppository 1 Suppository(s) Rectal daily  levothyroxine 100 MICROGram(s) Oral daily  lidocaine   4% Patch 1 Patch Transdermal daily  midodrine 5 milliGRAM(s) Oral every 8 hours  pantoprazole    Tablet 40 milliGRAM(s) Oral before breakfast  polyethylene glycol 3350 17 Gram(s) Oral daily  senna 2 Tablet(s) Oral at bedtime  sodium chloride 0.9%. 500 milliLiter(s) (170 mL/Hr) IV Continuous <Continuous>  vancomycin  IVPB 500 milliGRAM(s) IV Intermittent every 12 hours    MEDICATIONS  (PRN):  acetaminophen     Tablet .. 650 milliGRAM(s) Oral every 6 hours PRN Moderate Pain (4 - 6)  aluminum hydroxide/magnesium hydroxide/simethicone Suspension 30 milliLiter(s) Oral every 4 hours PRN Dyspepsia  hydrocortisone hemorrhoidal Suppository 1 Suppository(s) Rectal two times a day PRN hemorrhoidal discomfort        LABS:                        8.4    4.59  )-----------( 112      ( 08 Aug 2022 04:47 )             26.0     08-08    138  |  106  |  34<H>  ----------------------------<  93  4.5   |  23  |  0.9    Ca    9.0      08 Aug 2022 04:47  Mg     2.1     08-08    TPro  5.8<L>  /  Alb  3.3<L>  /  TBili  0.3  /  DBili  x   /  AST  33  /  ALT  19  /  AlkPhos  111  08-08    LIVER FUNCTIONS - ( 08 Aug 2022 04:47 )  Alb: 3.3 g/dL / Pro: 5.8 g/dL / ALK PHOS: 111 U/L / ALT: 19 U/L / AST: 33 U/L / GGT: x           PT/INR - ( 08 Aug 2022 04:47 )   PT: 11.30 sec;   INR: 0.98 ratio         PTT - ( 08 Aug 2022 04:47 )  PTT:28.2 sec  CAPILLARY BLOOD GLUCOSE                      RADIOLOGY & ADDITIONAL TESTS:         Care Discussed with Consultants/Other Providers [ x] YES  [ ] NO    8/7 CXR: Right midlung opacity in left lower lobe opacity/pleural effusion   unchanged. No pneumothorax.         Patient is a 89y old  Female who presents with a chief complaint of Dyspnea w/ Exertion (07 Aug 2022 06:17)      HPI  88 y/o woman w PMHx of HFpEF, severe AS s/p balloon valvuloplasty 2021, anemia s/p multiple transfusions 2 months ago, MVP, HTN, hyperthyroidism, HCC s/p partial liver resection, transferred from [facility] to [place] for TAVR evaluation by Dr. Mccollum.  Patient found to be anemic most likely d/t GI bleed now s/p 1PRBC transfusion for Hgb < 8/anemia. GI, Dr. Woo consulted at Dr. Mccollum' request.  Capsule study completed.  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 Diuretics largely remain on hold for soft BP (SBP < 100).  Ultimately, plan is for TAVR pending conditioning. Patient was waiting for subacute rehab placement with plans for TAVR as outpatient when patient's conditioning improves.    Patient 7/30, became acutely dyspneic, hypertensive 170/79 and tachycardic to 140s w/ crackles and wheezes to lungs throughout. Bipap started, with some improvement noted and patient was on nitro drip. Transfer to CCU initiated. In CCU: became febrile 100.4, elevated WBC, CXR showed possible pneumonia, given cefepime and vanc. Blood cx (7/30) grew staph epidermidis (MR)  Then became hypotensive down to 50s systolic- d/c nitro drip, given 1 bolus, requiring pressor support. (phenylepherine), then went into afib RVR (140s-150s). Patient on bipap during event; O2 requirements have decreased during stay. Continued on phenylepherine drip and midodrine for pressor support. Patient has remained afebrile, WBC WNL, pressor requirements have decreased, she is setting well on room air, WBC count has remained WNL. Repeat cx from 8/2 grew Gram (+) cocci. Patient also has pleural effusion first thoracocentesis was on 8/1 1L fluid removed. Fluid was transudative in nature, cx were negative. Effusion reoccurred and chest tube was placed 8/4/22 - to stay in place until drainage is less than 100ccs/24 hours. Due to 2x positive cx central line was removed 8/4/22. Pt is stable off pressors. Plan is to complete aortic balloon valvuloplasty with Dr. Hood today.       INTERVAL HPI/OVERNIGHT EVENTS:   No overnight events   Afebrile, hemodynamically stable   Overnight, chest tube drained 80 ccs. Will remove post valvuloplasty     Subjective:    ICU Vital Signs Last 24 Hrs  T(C): 35.8 (08 Aug 2022 04:00), Max: 36.1 (07 Aug 2022 12:00)  T(F): 96.5 (08 Aug 2022 04:00), Max: 97 (07 Aug 2022 12:00)  HR: 87 (08 Aug 2022 06:00) (53 - 87)  BP: 139/64 (08 Aug 2022 06:00) (90/54 - 140/48)  BP(mean): 78 (08 Aug 2022 06:00) (69 - 93)  ABP: --  ABP(mean): --  RR: 39 (08 Aug 2022 06:00) (17 - 39)  SpO2: 97% (08 Aug 2022 06:00) (96% - 98%)    O2 Parameters below as of 08 Aug 2022 06:00  Patient On (Oxygen Delivery Method): room air          I&O's Summary    07 Aug 2022 07:01  -  08 Aug 2022 07:00  --------------------------------------------------------  IN: 1150 mL / OUT: 1600 mL / NET: -450 mL          Daily     Daily     Adult Advanced Hemodynamics Last 24 Hrs  CVP(mm Hg): --  CVP(cm H2O): --  CO: --  CI: --  PA: --  PA(mean): --  PCWP: --  SVR: --  SVRI: --  PVR: --  PVRI: --    EKG/Telemetry Events:    MEDICATIONS  (STANDING):  aspirin  chewable 81 milliGRAM(s) Oral daily  cefepime   IVPB 1000 milliGRAM(s) IV Intermittent every 8 hours  chlorhexidine 2% Cloths 1 Application(s) Topical daily  enoxaparin Injectable 40 milliGRAM(s) SubCutaneous every 24 hours  hydrocortisone hemorrhoidal Suppository 1 Suppository(s) Rectal daily  levothyroxine 100 MICROGram(s) Oral daily  lidocaine   4% Patch 1 Patch Transdermal daily  midodrine 5 milliGRAM(s) Oral every 8 hours  pantoprazole    Tablet 40 milliGRAM(s) Oral before breakfast  polyethylene glycol 3350 17 Gram(s) Oral daily  senna 2 Tablet(s) Oral at bedtime  sodium chloride 0.9%. 500 milliLiter(s) (170 mL/Hr) IV Continuous <Continuous>  vancomycin  IVPB 500 milliGRAM(s) IV Intermittent every 12 hours    MEDICATIONS  (PRN):  acetaminophen     Tablet .. 650 milliGRAM(s) Oral every 6 hours PRN Moderate Pain (4 - 6)  aluminum hydroxide/magnesium hydroxide/simethicone Suspension 30 milliLiter(s) Oral every 4 hours PRN Dyspepsia  hydrocortisone hemorrhoidal Suppository 1 Suppository(s) Rectal two times a day PRN hemorrhoidal discomfort        LABS:                        8.4    4.59  )-----------( 112      ( 08 Aug 2022 04:47 )             26.0     08-08    138  |  106  |  34<H>  ----------------------------<  93  4.5   |  23  |  0.9    Ca    9.0      08 Aug 2022 04:47  Mg     2.1     08-08    TPro  5.8<L>  /  Alb  3.3<L>  /  TBili  0.3  /  DBili  x   /  AST  33  /  ALT  19  /  AlkPhos  111  08-08    LIVER FUNCTIONS - ( 08 Aug 2022 04:47 )  Alb: 3.3 g/dL / Pro: 5.8 g/dL / ALK PHOS: 111 U/L / ALT: 19 U/L / AST: 33 U/L / GGT: x           PT/INR - ( 08 Aug 2022 04:47 )   PT: 11.30 sec;   INR: 0.98 ratio         PTT - ( 08 Aug 2022 04:47 )  PTT:28.2 sec  CAPILLARY BLOOD GLUCOSE                      RADIOLOGY & ADDITIONAL TESTS:         Care Discussed with Consultants/Other Providers [ x] YES  [ ] NO    8/7 CXR: Right midlung opacity in left lower lobe opacity/pleural effusion   unchanged. No pneumothorax.         Patient is a 89y old  Female who presents with a chief complaint of Dyspnea w/ Exertion (07 Aug 2022 06:17)      HPI  88 y/o woman w PMHx of HFpEF, severe AS s/p balloon valvuloplasty 2021, anemia s/p multiple transfusions 2 months ago, MVP, HTN, hyperthyroidism, HCC s/p partial liver resection, transferred from [facility] to [place] for TAVR evaluation by Dr. Mccollum.  Patient found to be anemic most likely d/t GI bleed now s/p 1PRBC transfusion for Hgb < 8/anemia. GI, Dr. Woo consulted at Dr. Mccollum' request.  Capsule study completed.  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 Diuretics largely remain on hold for soft BP (SBP < 100).  Ultimately, plan is for TAVR pending conditioning. Patient was waiting for subacute rehab placement with plans for TAVR as outpatient when patient's conditioning improves.    Patient 7/30, became acutely dyspneic, hypertensive 170/79 and tachycardic to 140s w/ crackles and wheezes to lungs throughout. Bipap started, with some improvement noted and patient was on nitro drip. Transfer to CCU initiated. In CCU: became febrile 100.4, elevated WBC, CXR showed possible pneumonia, given cefepime and vanc. Blood cx (7/30) grew staph epidermidis (MR)  Then became hypotensive down to 50s systolic- d/c nitro drip, given 1 bolus, requiring pressor support. (phenylepherine), then went into afib RVR (140s-150s). Patient on bipap during event; O2 requirements have decreased during stay. Continued on phenylepherine drip and midodrine for pressor support. Patient has remained afebrile, WBC WNL, pressor requirements have decreased, she is setting well on room air, WBC count has remained WNL. Repeat cx from 8/2 grew Gram (+) cocci. Patient also has pleural effusion first thoracocentesis was on 8/1 1L fluid removed. Fluid was transudative in nature, cx were negative. Effusion reoccurred and chest tube was placed 8/4/22 - to stay in place until drainage is less than 100ccs/24 hours. Due to 2x positive cx central line was removed 8/4/22. Pt is stable off pressors. Plan is to complete aortic balloon valvuloplasty with Dr. Hood today.       INTERVAL HPI/OVERNIGHT EVENTS:   No overnight events   Afebrile, hemodynamically stable   Overnight, chest tube drained 80 ccs. Will remove post valvuloplasty     Subjective:    PHYSICAL EXAM:  GENERAL: nad laying in bed  HEAD:  Atraumatic, Normocephalic  NECK: Supple  NERVOUS SYSTEM:  Awake and alert   CHEST/LUNG: B/L breath sounds, L chest tube in place  HEART: S1S2 audible, normal rate and rhythm , regular rhythm; Systolic murmur  ABDOMEN: Soft, Nontender, Nondistended  EXTREMITIES:  No clubbing, cyanosis, or edema        ICU Vital Signs Last 24 Hrs  T(C): 35.8 (08 Aug 2022 04:00), Max: 36.1 (07 Aug 2022 12:00)  T(F): 96.5 (08 Aug 2022 04:00), Max: 97 (07 Aug 2022 12:00)  HR: 87 (08 Aug 2022 06:00) (53 - 87)  BP: 139/64 (08 Aug 2022 06:00) (90/54 - 140/48)  BP(mean): 78 (08 Aug 2022 06:00) (69 - 93)  ABP: --  ABP(mean): --  RR: 39 (08 Aug 2022 06:00) (17 - 39)  SpO2: 97% (08 Aug 2022 06:00) (96% - 98%)    O2 Parameters below as of 08 Aug 2022 06:00  Patient On (Oxygen Delivery Method): room air          I&O's Summary    07 Aug 2022 07:01  -  08 Aug 2022 07:00  --------------------------------------------------------  IN: 1150 mL / OUT: 1600 mL / NET: -450 mL          Daily     Daily     Adult Advanced Hemodynamics Last 24 Hrs  CVP(mm Hg): --  CVP(cm H2O): --  CO: --  CI: --  PA: --  PA(mean): --  PCWP: --  SVR: --  SVRI: --  PVR: --  PVRI: --    EKG/Telemetry Events:    MEDICATIONS  (STANDING):  aspirin  chewable 81 milliGRAM(s) Oral daily  cefepime   IVPB 1000 milliGRAM(s) IV Intermittent every 8 hours  chlorhexidine 2% Cloths 1 Application(s) Topical daily  enoxaparin Injectable 40 milliGRAM(s) SubCutaneous every 24 hours  hydrocortisone hemorrhoidal Suppository 1 Suppository(s) Rectal daily  levothyroxine 100 MICROGram(s) Oral daily  lidocaine   4% Patch 1 Patch Transdermal daily  midodrine 5 milliGRAM(s) Oral every 8 hours  pantoprazole    Tablet 40 milliGRAM(s) Oral before breakfast  polyethylene glycol 3350 17 Gram(s) Oral daily  senna 2 Tablet(s) Oral at bedtime  sodium chloride 0.9%. 500 milliLiter(s) (170 mL/Hr) IV Continuous <Continuous>  vancomycin  IVPB 500 milliGRAM(s) IV Intermittent every 12 hours    MEDICATIONS  (PRN):  acetaminophen     Tablet .. 650 milliGRAM(s) Oral every 6 hours PRN Moderate Pain (4 - 6)  aluminum hydroxide/magnesium hydroxide/simethicone Suspension 30 milliLiter(s) Oral every 4 hours PRN Dyspepsia  hydrocortisone hemorrhoidal Suppository 1 Suppository(s) Rectal two times a day PRN hemorrhoidal discomfort        LABS:                        8.4    4.59  )-----------( 112      ( 08 Aug 2022 04:47 )             26.0     08-08    138  |  106  |  34<H>  ----------------------------<  93  4.5   |  23  |  0.9    Ca    9.0      08 Aug 2022 04:47  Mg     2.1     08-08    TPro  5.8<L>  /  Alb  3.3<L>  /  TBili  0.3  /  DBili  x   /  AST  33  /  ALT  19  /  AlkPhos  111  08-08    LIVER FUNCTIONS - ( 08 Aug 2022 04:47 )  Alb: 3.3 g/dL / Pro: 5.8 g/dL / ALK PHOS: 111 U/L / ALT: 19 U/L / AST: 33 U/L / GGT: x           PT/INR - ( 08 Aug 2022 04:47 )   PT: 11.30 sec;   INR: 0.98 ratio         PTT - ( 08 Aug 2022 04:47 )  PTT:28.2 sec  CAPILLARY BLOOD GLUCOSE                      RADIOLOGY & ADDITIONAL TESTS:         Care Discussed with Consultants/Other Providers [ x] YES  [ ] NO    8/7 CXR: Right midlung opacity in left lower lobe opacity/pleural effusion   unchanged. No pneumothorax.         Patient is a 89y old  Female who presents with a chief complaint of Dyspnea w/ Exertion (07 Aug 2022 06:17)      HPI:    88 y/o woman w PMHx of HFpEF, severe AS s/p balloon valvuloplasty 2021, anemia s/p multiple transfusions 2 months ago, MVP, HTN, hyperthyroidism, HCC s/p partial liver resection, transferred from [facility] to [place] for TAVR evaluation by Dr. Mccollum.  Patient found to be anemic most likely d/t GI bleed now s/p 1PRBC transfusion for Hgb < 8/anemia. GI, Dr. Woo consulted at Dr. Mccollum' request.  Capsule study completed.  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 Diuretics largely remain on hold for soft BP (SBP < 100).  Ultimately, plan is for TAVR pending conditioning. Patient was waiting for subacute rehab placement with plans for TAVR as outpatient when patient's conditioning improves.    Patient 7/30, became acutely dyspneic, hypertensive 170/79 and tachycardic to 140s w/ crackles and wheezes to lungs throughout. Bipap started, with some improvement noted and patient was on nitro drip. Transfer to CCU initiated. In CCU: became febrile 100.4, elevated WBC, CXR showed possible pneumonia, given cefepime and vanc. Blood cx (7/30) grew staph epidermidis (MR)  Then became hypotensive down to 50s systolic- d/c nitro drip, given 1 bolus, requiring pressor support. (phenylepherine), then went into afib RVR (140s-150s). Patient on bipap during event; O2 requirements have decreased during stay. Continued on phenylepherine drip and midodrine for pressor support. Patient has remained afebrile, WBC WNL, pressor requirements have decreased, she is setting well on room air, WBC count has remained WNL. Repeat cx from 8/2 grew Gram (+) cocci. Patient also has pleural effusion first thoracocentesis was on 8/1 1L fluid removed. Fluid was transudative in nature, cx were negative. Effusion reoccurred and chest tube was placed 8/4/22 - to stay in place until drainage is less than 100ccs/24 hours. Due to 2x positive cx central line was removed 8/4/22. Pt is stable off pressors. Plan is to complete aortic balloon valvuloplasty with Dr. Hood today.       INTERVAL HPI/OVERNIGHT EVENTS:     No overnight events   Afebrile, hemodynamically stable   Overnight, chest tube drained 80 ccs. Will remove post valvuloplasty     Subjective:    PHYSICAL EXAM:  GENERAL: nad laying in bed  HEAD:  Atraumatic, Normocephalic  NECK: Supple  NERVOUS SYSTEM:  Awake and alert   CHEST/LUNG: B/L breath sounds, L chest tube in place  HEART: S1S2 audible, normal rate and rhythm , regular rhythm; Systolic murmur  ABDOMEN: Soft, Nontender, Nondistended  EXTREMITIES:  No clubbing, cyanosis, or edema        ICU Vital Signs Last 24 Hrs  T(C): 35.8 (08 Aug 2022 04:00), Max: 36.1 (07 Aug 2022 12:00)  T(F): 96.5 (08 Aug 2022 04:00), Max: 97 (07 Aug 2022 12:00)  HR: 87 (08 Aug 2022 06:00) (53 - 87)  BP: 139/64 (08 Aug 2022 06:00) (90/54 - 140/48)  BP(mean): 78 (08 Aug 2022 06:00) (69 - 93)  ABP: --  ABP(mean): --  RR: 39 (08 Aug 2022 06:00) (17 - 39)  SpO2: 97% (08 Aug 2022 06:00) (96% - 98%)    O2 Parameters below as of 08 Aug 2022 06:00  Patient On (Oxygen Delivery Method): room air          I&O's Summary    07 Aug 2022 07:01  -  08 Aug 2022 07:00  --------------------------------------------------------  IN: 1150 mL / OUT: 1600 mL / NET: -450 mL          Daily     Daily     Adult Advanced Hemodynamics Last 24 Hrs  CVP(mm Hg): --  CVP(cm H2O): --  CO: --  CI: --  PA: --  PA(mean): --  PCWP: --  SVR: --  SVRI: --  PVR: --  PVRI: --    EKG/Telemetry Events:    MEDICATIONS  (STANDING):  aspirin  chewable 81 milliGRAM(s) Oral daily  cefepime   IVPB 1000 milliGRAM(s) IV Intermittent every 8 hours  chlorhexidine 2% Cloths 1 Application(s) Topical daily  enoxaparin Injectable 40 milliGRAM(s) SubCutaneous every 24 hours  hydrocortisone hemorrhoidal Suppository 1 Suppository(s) Rectal daily  levothyroxine 100 MICROGram(s) Oral daily  lidocaine   4% Patch 1 Patch Transdermal daily  midodrine 5 milliGRAM(s) Oral every 8 hours  pantoprazole    Tablet 40 milliGRAM(s) Oral before breakfast  polyethylene glycol 3350 17 Gram(s) Oral daily  senna 2 Tablet(s) Oral at bedtime  sodium chloride 0.9%. 500 milliLiter(s) (170 mL/Hr) IV Continuous <Continuous>  vancomycin  IVPB 500 milliGRAM(s) IV Intermittent every 12 hours    MEDICATIONS  (PRN):  acetaminophen     Tablet .. 650 milliGRAM(s) Oral every 6 hours PRN Moderate Pain (4 - 6)  aluminum hydroxide/magnesium hydroxide/simethicone Suspension 30 milliLiter(s) Oral every 4 hours PRN Dyspepsia  hydrocortisone hemorrhoidal Suppository 1 Suppository(s) Rectal two times a day PRN hemorrhoidal discomfort        LABS:                        8.4    4.59  )-----------( 112      ( 08 Aug 2022 04:47 )             26.0     08-08    138  |  106  |  34<H>  ----------------------------<  93  4.5   |  23  |  0.9    Ca    9.0      08 Aug 2022 04:47  Mg     2.1     08-08    TPro  5.8<L>  /  Alb  3.3<L>  /  TBili  0.3  /  DBili  x   /  AST  33  /  ALT  19  /  AlkPhos  111  08-08    LIVER FUNCTIONS - ( 08 Aug 2022 04:47 )  Alb: 3.3 g/dL / Pro: 5.8 g/dL / ALK PHOS: 111 U/L / ALT: 19 U/L / AST: 33 U/L / GGT: x           PT/INR - ( 08 Aug 2022 04:47 )   PT: 11.30 sec;   INR: 0.98 ratio         PTT - ( 08 Aug 2022 04:47 )  PTT:28.2 sec  CAPILLARY BLOOD GLUCOSE                      RADIOLOGY & ADDITIONAL TESTS:         Care Discussed with Consultants/Other Providers [ x] YES  [ ] NO    8/7 CXR: Right midlung opacity in left lower lobe opacity/pleural effusion   unchanged. No pneumothorax.

## 2022-08-08 NOTE — CHART NOTE - NSCHARTNOTEFT_GEN_A_CORE
[] Vanc random level came back 22.5  [] Vanc PM dose holded  []  F/u with Vanc trough 4:30AM labs 08/08  [] Change/hold the dose as needed [] Vanc random level came back 22.5  [] Vanc PM dose holded  [] F/u with Vanc trough 4:30AM labs 08/08  [] Change/hold the dose as needed

## 2022-08-08 NOTE — PROGRESS NOTE ADULT - SUBJECTIVE AND OBJECTIVE BOX
Patient is a 89y old  Female who presents with a chief complaint of Dyspnea w/ Exertion (08 Aug 2022 08:04)    Over Night Events:  None    ROS:   All ROS are negative except HPI     PHYSICAL EXAM  ICU Vital Signs Last 24 Hrs  T(C): 35.8 (08 Aug 2022 08:00), Max: 36.1 (07 Aug 2022 12:00)  T(F): 96.5 (08 Aug 2022 08:00), Max: 97 (07 Aug 2022 12:00)  HR: 60 (08 Aug 2022 08:00) (59 - 87)  BP: 120/58 (08 Aug 2022 08:00) (90/54 - 140/48)  BP(mean): 83 (08 Aug 2022 08:00) (69 - 93)  RR: 16 (08 Aug 2022 08:00) (16 - 39)  SpO2: 99% (08 Aug 2022 08:00) (96% - 99%)    O2 Parameters below as of 08 Aug 2022 06:00  Patient On (Oxygen Delivery Method): room air      General: NAD   HEENT: RAFFI             Lymphatic system: No cervical LN   Lungs: Bilateral BS, chest tube 14 Fr on the left  Cardiovascular: Regular   Gastrointestinal: Soft, Positive BS  Extremities: No clubbing.  Moves extremities.  Full Range of motion   Skin: Warm, intact  Neurological: No motor or sensory deficit       08-07-22 @ 07:01  -  08-08-22 @ 07:00  --------------------------------------------------------  IN:    IV PiggyBack: 50 mL    Oral Fluid: 600 mL    sodium chloride 0.9%: 500 mL  Total IN: 1150 mL    OUT:    Chest Tube (mL): 150 mL    Voided (mL): 1450 mL  Total OUT: 1600 mL    Total NET: -450 mL      LABS:               8.4    4.59  )-----------( 112      ( 08 Aug 2022 04:47 )             26.0     138  |  106  |  34<H>  ----------------------------<  93  4.5   |  23  |  0.9    Ca    9.0      08 Aug 2022 04:47  Mg     2.1     08-08    TPro  5.8<L>  /  Alb  3.3<L>  /  TBili  0.3  /  DBili  x   /  AST  33  /  ALT  19  /  AlkPhos  111  08-08    PT/INR - ( 08 Aug 2022 04:47 )   PT: 11.30 sec;   INR: 0.98 ratio    PTT - ( 08 Aug 2022 04:47 )  PTT:28.2 sec                                            LIVER FUNCTIONS - ( 08 Aug 2022 04:47 )  Alb: 3.3 g/dL / Pro: 5.8 g/dL / ALK PHOS: 111 U/L / ALT: 19 U/L / AST: 33 U/L / GGT: x                                              Culture - Blood (collected 05 Aug 2022 17:08)  Source: .Blood None  Preliminary Report (07 Aug 2022 05:21):    No growth to date.    MEDICATIONS  (STANDING):  aspirin  chewable 81 milliGRAM(s) Oral daily  cefepime   IVPB 1000 milliGRAM(s) IV Intermittent every 8 hours  chlorhexidine 2% Cloths 1 Application(s) Topical daily  enoxaparin Injectable 40 milliGRAM(s) SubCutaneous every 24 hours  hydrocortisone hemorrhoidal Suppository 1 Suppository(s) Rectal daily  levothyroxine 100 MICROGram(s) Oral daily  lidocaine   4% Patch 1 Patch Transdermal daily  midodrine 5 milliGRAM(s) Oral every 8 hours  pantoprazole    Tablet 40 milliGRAM(s) Oral before breakfast  polyethylene glycol 3350 17 Gram(s) Oral daily  senna 2 Tablet(s) Oral at bedtime  sodium chloride 0.9%. 500 milliLiter(s) (170 mL/Hr) IV Continuous <Continuous>  vancomycin  IVPB 500 milliGRAM(s) IV Intermittent every 12 hours    MEDICATIONS  (PRN):  acetaminophen     Tablet .. 650 milliGRAM(s) Oral every 6 hours PRN Moderate Pain (4 - 6)  aluminum hydroxide/magnesium hydroxide/simethicone Suspension 30 milliLiter(s) Oral every 4 hours PRN Dyspepsia  hydrocortisone hemorrhoidal Suppository 1 Suppository(s) Rectal two times a day PRN hemorrhoidal discomfort

## 2022-08-08 NOTE — PROGRESS NOTE ADULT - SUBJECTIVE AND OBJECTIVE BOX
LUCASDILIP MARIE  89y, Female  Allergy: Cipro (Other)  Levaquin (Rash)  tetracycline (Hives)      LOS  31d    CHIEF COMPLAINT: Dyspnea w/ Exertion (08 Aug 2022 07:08)      INTERVAL EVENTS/HPI  - No acute events overnight  - T(F): , Max: 97 (08-07-22 @ 12:00)  - Denies any worsening symptoms  - Tolerating medication  - WBC Count: 4.59 (08-08-22 @ 04:47)  WBC Count: 5.44 (08-07-22 @ 16:33)     - Creatinine, Serum: 0.9 (08-08-22 @ 04:47)  Creatinine, Serum: 1.0 (08-07-22 @ 06:11)       ROS  General: Denies rigors, nightsweats  HEENT: Denies headache, rhinorrhea, sore throat, eye pain  CV: Denies CP, palpitations  PULM: Denies wheezing, hemoptysis  GI: Denies hematemesis, hematochezia, melena  : Denies discharge, hematuria  MSK: Denies arthralgias, myalgias  SKIN: Denies rash, lesions  NEURO: Denies paresthesias, weakness  PSYCH: Denies depression, anxiety    VITALS:  T(F): 96.5, Max: 97 (08-07-22 @ 12:00)  HR: 60  BP: 120/58  RR: 16Vital Signs Last 24 Hrs  T(C): 35.8 (08 Aug 2022 08:00), Max: 36.1 (07 Aug 2022 12:00)  T(F): 96.5 (08 Aug 2022 08:00), Max: 97 (07 Aug 2022 12:00)  HR: 60 (08 Aug 2022 08:00) (59 - 87)  BP: 120/58 (08 Aug 2022 08:00) (90/54 - 140/48)  BP(mean): 83 (08 Aug 2022 08:00) (69 - 93)  RR: 16 (08 Aug 2022 08:00) (16 - 39)  SpO2: 99% (08 Aug 2022 08:00) (96% - 99%)    Parameters below as of 08 Aug 2022 06:00  Patient On (Oxygen Delivery Method): room air        PHYSICAL EXAM:  Gen: NAD, resting in bed  HEENT: Normocephalic, atraumatic  Neck: supple, no lymphadenopathy  CV: Regular rate & regular rhythm  Lungs: decreased BS at bases, no fremitus  Abdomen: Soft, BS present  Ext: Warm, well perfused  Neuro: non focal, awake  Skin: no rash, no erythema  Lines: no phlebitis    FH: Non-contributory  Social Hx: Non-contributory    TESTS & MEASUREMENTS:                        8.4    4.59  )-----------( 112      ( 08 Aug 2022 04:47 )             26.0     08-08    138  |  106  |  34<H>  ----------------------------<  93  4.5   |  23  |  0.9    Ca    9.0      08 Aug 2022 04:47  Mg     2.1     08-08    TPro  5.8<L>  /  Alb  3.3<L>  /  TBili  0.3  /  DBili  x   /  AST  33  /  ALT  19  /  AlkPhos  111  08-08      LIVER FUNCTIONS - ( 08 Aug 2022 04:47 )  Alb: 3.3 g/dL / Pro: 5.8 g/dL / ALK PHOS: 111 U/L / ALT: 19 U/L / AST: 33 U/L / GGT: x               Culture - Blood (collected 08-05-22 @ 17:08)  Source: .Blood None  Preliminary Report (08-07-22 @ 05:21):    No growth to date.    Culture - Blood (collected 08-05-22 @ 05:03)  Source: .Blood None  Preliminary Report (08-06-22 @ 15:06):    No growth to date.    Culture - Blood (collected 08-05-22 @ 05:03)  Source: .Blood None  Preliminary Report (08-06-22 @ 15:06):    No growth to date.    Culture - Blood (collected 08-04-22 @ 12:24)  Source: .Blood None  Preliminary Report (08-05-22 @ 22:02):    No growth to date.    Culture - Urine (collected 08-01-22 @ 22:25)  Source: Clean Catch Clean Catch (Midstream)  Final Report (08-03-22 @ 07:17):    No growth    Culture - Blood (collected 08-01-22 @ 12:41)  Source: .Blood None  Gram Stain (08-04-22 @ 06:03):    Growth in aerobic bottle: Gram Positive Cocci in Clusters  Final Report (08-06-22 @ 10:59):    Growth in aerobic bottle: Staphylococcus epidermidis  Organism: Staphylococcus epidermidis (08-06-22 @ 10:59)  Organism: Staphylococcus epidermidis (08-06-22 @ 10:59)      -  Ampicillin/Sulbactam: R <=8/4      -  Cefazolin: R <=4      -  Clindamycin: S <=0.25      -  Erythromycin: R >4      -  Gentamicin: S <=1 Should not be used as monotherapy      -  Oxacillin: R >2      -  Penicillin: R 8      -  Rifampin: S <=1 Should not be used as monotherapy      -  Tetra/Doxy: S <=1      -  Trimethoprim/Sulfamethoxazole: S <=0.5/9.5      -  Vancomycin: S 2      Method Type: MEDHAT    Culture - Fungal, Body Fluid (collected 08-01-22 @ 12:30)  Source: Pleural Fl Pleural Fluid  Preliminary Report (08-02-22 @ 07:33):    Testing in progress    Culture - Body Fluid with Gram Stain (collected 08-01-22 @ 12:30)  Source: Pleural Fl Pleural Fluid  Gram Stain (08-02-22 @ 01:59):    polymorphonuclear leukocytes seen    No organisms seen    by cytocentrifuge  Final Report (08-06-22 @ 18:04):    No growth at 5 days    Culture - Blood (collected 07-30-22 @ 23:30)  Source: .Blood Blood  Gram Stain (08-01-22 @ 08:33):    Growth in anaerobic bottle: Gram Positive Cocci in Clusters    Growth in aerobic bottle: Gram Positive Cocci in Clusters  Final Report (08-02-22 @ 20:36):    Growth in aerobic and anaerobic bottles: Staphylococcus epidermidis    Coag Negative Staphylococcus    Single set isolate, possible contaminant. Contact    Microbiology if susceptibility testing clinically    indicated.    ***Blood Panel PCR results on this specimen are available    approximately 3 hours after the Gram stain result.***    Gram stain, PCR, and/or culture results may not always    correspond due to difference in methodologies.    ************************************************************    This PCR assay was performed by multiplex PCR. This    Assay tests for 66 bacterial and resistance gene targets.    Please refer to the [facility] test directory    at [email or web address] for details.  Organism: Blood Culture PCR (08-02-22 @ 20:36)  Organism: Blood Culture PCR (08-02-22 @ 20:36)      -  Staphylococcus epidermidis, Methicillin resistant: Detec      Method Type: PCR            INFECTIOUS DISEASES TESTING  COVID-19 PCR: NotDetec (08-07-22 @ 19:15)  Procalcitonin, Serum: 0.18 (08-05-22 @ 17:08)  COVID-19 PCR: NotDetec (08-02-22 @ 05:16)  MRSA PCR Result.: Negative (07-31-22 @ 08:07)  Procalcitonin, Serum: 0.51 (07-30-22 @ 23:30)  COVID-19 PCR: NotDetec (07-27-22 @ 22:30)  COVID-19 PCR: NotDetec (07-21-22 @ 06:00)  COVID-19 PCR: NotDetec (07-15-22 @ 07:00)  Legionella Antigen, Urine: Negative (06-19-22 @ 09:47)  Procalcitonin, Serum: 0.07 (06-18-22 @ 21:32)      INFLAMMATORY MARKERS      RADIOLOGY & ADDITIONAL TESTS:  I have personally reviewed the last available Chest xray  CXR      CT      CARDIOLOGY TESTING  12 Lead ECG:   Ventricular Rate 62 BPM    Atrial Rate 62 BPM    P-R Interval 178 ms    QRS Duration 128 ms    Q-T Interval 466 ms    QTC Calculation(Bazett) 472 ms    P Axis 26 degrees    R Axis -39 degrees    T Axis -18 degrees    Diagnosis Line Normal sinus rhythm  Left axis deviation  Right bundle branch block  Voltage criteria for left ventricular hypertrophy  T wave abnormality, consider lateral ischemia  Abnormal ECG    Confirmed by Diamante Garcia MD ([id number]) on 8/8/2022 7:46:02 AM (08-08-22 @ 05:11)  12 Lead ECG:   Ventricular Rate 64 BPM    Atrial Rate 64 BPM    P-R Interval 170 ms    QRS Duration 132 ms    Q-T Interval 470 ms    QTC Calculation(Bazett) 484 ms    P Axis 26 degrees    R Axis -34 degrees    T Axis -36 degrees    Diagnosis Line Normal sinus rhythm  Left axis deviation  Right bundle branch block  Voltage criteria for left ventricular hypertrophy    T wave abnormality, consider lateral ischemia  Abnormal ECG    Confirmed by MARILYN PLAZA MD ([id number]) on 8/7/2022 7:25:16 AM (08-07-22 @ 05:18)      MEDICATIONS  aspirin  chewable 81 Oral daily  cefepime   IVPB 1000 IV Intermittent every 8 hours  chlorhexidine 2% Cloths 1 Topical daily  enoxaparin Injectable 40 SubCutaneous every 24 hours  hydrocortisone hemorrhoidal Suppository 1 Rectal daily  levothyroxine 100 Oral daily  lidocaine   4% Patch 1 Transdermal daily  midodrine 5 Oral every 8 hours  pantoprazole    Tablet 40 Oral before breakfast  polyethylene glycol 3350 17 Oral daily  senna 2 Oral at bedtime  sodium chloride 0.9%. 500 IV Continuous <Continuous>  vancomycin  IVPB 500 IV Intermittent every 12 hours      WEIGHT  Weight (kg): 58.5 (07-30-22 @ 18:45)  Creatinine, Serum: 0.9 mg/dL (08-08-22 @ 04:47)      ANTIBIOTICS:  cefepime   IVPB 1000 milliGRAM(s) IV Intermittent every 8 hours  vancomycin  IVPB 500 milliGRAM(s) IV Intermittent every 12 hours      All available historical records have been reviewed

## 2022-08-08 NOTE — PROGRESS NOTE ADULT - ASSESSMENT
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%  #Hemorrhoids with Constipation  #Hypothyroidism    Respiratory   - Moderate left pleural effusion seen on CT angio s/p thoracocentesis 8/1/22 - 1 L fluid  - Pleural effusion reoccurrence 8/2 - approximately 800ccs - chest tube placed 8/4/22  - F/U pleural fluid LDH (72), Protein (2.1), Cx NGTD  - Tylenol and lidocaine patch for pain  - Chest tube to be in place until draining <100ccs/24hrs  - Incentive spirometer    ID  - CXR 7/30 suspicious for PNA, with initial fever, elevated white count, and hypotension    - 7/30 procal .51, 8/4 0.18, WBC WNL since 8/1, Urine cx (-)  - 7/30 grew staph epidermidis, 8/1 gram (+) cocci in clusters, 8/5 cx - pending  - ID consulted  - continue cefepime at 1000 mg q 8 hours  - Vancomycin 750mg Q12  - Vanc trough 4pm elevated - vanc dose held. Restarted at 500BID. Trough ordered before 4th dose.        Cardiovascular  - TAVR workup likely outpatient   - Plan for Balloon aortic valvuloplasty possibly 8/8/22; NPO after midnight   - Discontinued Torsemide  - Cont midodrine to 5mg daily  - Keep MAP >65    Hematologic   - Trend CBC Daily. Transfuse for Hgb <8.0 - Hb today w 1 point drop to 8.3, will trend and reevaluate potential need for transfusion. FOBT.   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%  #Hemorrhoids with Constipation  #Hypothyroidism    Respiratory   - Moderate left pleural effusion seen on CT angio s/p thoracocentesis 8/1/22 - 1 L fluid  - Pleural effusion reoccurrence 8/2 - approximately 800ccs - chest tube placed 8/4/22  - F/U pleural fluid LDH (72), Protein (2.1), Cx NGTD  - Tylenol and lidocaine patch for pain  - Chest tube to be in place until draining <100ccs/24hrs; 80ccs overnight, dc after valvuloplasty  - Incentive spirometer    ID  - CXR 7/30 suspicious for PNA, with initial fever, elevated white count, and hypotension    - 7/30 procal .51, 8/4 0.18, WBC WNL since 8/1, Urine cx (-)  - 7/30 grew staph epidermidis, 8/1 gram (+) cocci in clusters, 8/5 cx - pending  - ID consulted  - continue cefepime at 1000 mg q 8 hours  - Vancomycin 750mg Q12  - Vanc trough 4pm elevated - vanc dose held.   -dc abx per ID 2/2 negative Bcx 8/4 and 8/5      Cardiovascular  - TAVR workup likely outpatient   - Plan for Balloon aortic valvuloplasty 8/8/22  - Discontinued Torsemide  - Cont midodrine to 5mg daily  - Keep MAP >65    Hematologic   - Trend CBC Daily. 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%  #Hemorrhoids with Constipation  #Hypothyroidism    Respiratory   - Moderate left pleural effusion seen on CT angio s/p thoracocentesis 8/1/22 - 1 L fluid  - Pleural effusion reoccurrence 8/2 - approximately 800ccs - chest tube placed 8/4/22  - F/U pleural fluid LDH (72), Protein (2.1), Cx NGTD  - Tylenol and lidocaine patch for pain  - Chest tube to be in place until draining <100ccs/24hrs; 80ccs overnight, dc after valvuloplasty  - Incentive spirometer    ID  - CXR 7/30 suspicious for PNA, with initial fever, elevated white count, and hypotension    - 7/30 procal .51, 8/4 0.18, WBC WNL since 8/1, Urine cx (-)  - 7/30 grew staph epidermidis, 8/1 gram (+) cocci in clusters, 8/5 cx - pending; pt given vanc and cefepime per ID   -per ID 8/8/22 dc abx  2/2 negative Bcx 8/4 and 8/5      Cardiovascular  - TAVR workup likely outpatient   - Plan for Balloon aortic valvuloplasty 8/8/22  - Discontinued Torsemide  - Cont midodrine to 5mg daily  - Keep MAP >65    Hematologic   - Trend CBC Daily. 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%  #Hemorrhoids with Constipation  #Hypothyroidism    Respiratory   - Moderate left pleural effusion seen on CT angio s/p thoracocentesis 8/1/22 - 1 L fluid  - Pleural effusion reoccurrence 8/2 - approximately 800ccs - chest tube placed 8/4/22  - F/U pleural fluid LDH (72), Protein (2.1), Cx NGTD  - Tylenol and lidocaine patch for pain  - Chest tube to be in place until draining <100ccs/24hrs; 80ccs overnight, dc after valvuloplasty  - Incentive spirometer    ID  - CXR 7/30 suspicious for PNA, with initial fever, elevated white count, and hypotension    - 7/30 procal .51, 8/4 0.18, WBC WNL since 8/1, Urine cx (-)  - 7/30 grew staph epidermidis, 8/1 gram (+) cocci in clusters, 8/5 cx - pending; pt given vanc and cefepime per ID   -per ID 8/8/22 dc abx  2/2 negative Bcx 8/4 and 8/5      Cardiovascular  - TAVR workup likely outpatient   - Plan for Balloon aortic valvuloplasty 8/9/22  - Discontinued Torsemide  - Cont midodrine to 5mg daily  - Keep MAP >65    Hematologic   - Trend CBC Daily. 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%  #Hemorrhoids with Constipation  #Hypothyroidism    Respiratory   - Moderate left pleural effusion seen on CT angio s/p thoracocentesis 8/1/22 - 1 L fluid  - Pleural effusion reoccurrence 8/2 - approximately 800ccs - chest tube placed 8/4/22  - F/U pleural fluid LDH (72), Protein (2.1), Cx NGTD  - Tylenol and lidocaine patch for pain  - Chest tube to be in place until draining <100ccs/24hrs; 80ccs overnight, dc after valvuloplasty  - Pulmonary f/u  - Incentive spirometer    ID  - CXR 7/30 suspicious for PNA, with initial fever, elevated white count, and hypotension    - 7/30 procal .51, 8/4 0.18, WBC WNL since 8/1, Urine cx (-)  - 7/30 grew staph epidermidis, 8/1 gram (+) cocci in clusters, 8/5 cx - pending; pt given vanc and cefepime per ID   -per ID 8/8/22 dc abx  2/2 negative Bcx 8/4 and 8/5      Cardiovascular  - TAVR workup with Dr. Mccollum  - Plan for Balloon aortic valvuloplasty 8/9/22  - Discontinued Torsemide  - Cont midodrine to 5mg daily for now, should be able to wean off after valvuloplasty  - Keep MAP >65    Hematologic   - Trend CBC Daily. 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  - Continue hemorrhoidal suppository   -Continue Senna 2 tabs HS  -Tylenol 650 mg PO PRN for rectal pain  - Protonix  - Add ensure to diet  - NPO for procedure tomorrow    Endocrine  - Continue Levothyroxine 100 mcg PO daily  - TSH 4.36 (6/19/22)  - Monitor FS    Renal  - Strict I+O, Daily standing weights  - maintain electrolytes, K>4 Mg >2    Neurological  - Avoid sedating medications    PT/Rehab  - On board    Lines/Gruber  - Midline: 8/4  - Peripheral IV: 8/4  - Primafit

## 2022-08-08 NOTE — PROGRESS NOTE ADULT - ATTENDING SUPERVISION STATEMENT
Resident/Fellow
Fellow
Fellow
Resident/Fellow
Fellow
Resident/Fellow

## 2022-08-08 NOTE — PROGRESS NOTE ADULT - ASSESSMENT
IMPRESSION:    Left moderate pleural effusion SP thoracentesis, transudative  Recurrent effusion SP pigtail catheter  Acute hypoxemic respiratory failure   Severe aortic stenosis  Shock, resolved  Possible CAP       PLAN:    CNS: No depressants.    HEENT: Oral care.     PULMONARY:  HOB @ 45 degrees.  Aspiration precautions.      Off oxygen now  Keep O2 saturation more than 92%.  No air leak.  Remove chest tube after the procedure is done today.    CARDIOVASCULAR:  Cardiology following for possible valvuloplasty today. Diuretics per cardiology's recommendations.    GI: GI prophylaxis.  Feeding PO diet as tolerated.      RENAL:  Follow up lytes.  Correct as needed.    INFECTIOUS DISEASE:  SP ABX.  ABX per ID.    HEMATOLOGICAL:  DVT prophylaxis    ENDOCRINE:  Follow up FS.  Insulin protocol if needed.    MUSCULOSKELETAL: Out of bed to chair. PT rehab.     CODE STATUS: Full code.     Recall PRN

## 2022-08-09 LAB
ALBUMIN SERPL ELPH-MCNC: 3.2 G/DL — LOW (ref 3.5–5.2)
ALP SERPL-CCNC: 124 U/L — HIGH (ref 30–115)
ALT FLD-CCNC: 20 U/L — SIGNIFICANT CHANGE UP (ref 0–41)
ANION GAP SERPL CALC-SCNC: 9 MMOL/L — SIGNIFICANT CHANGE UP (ref 7–14)
AST SERPL-CCNC: 30 U/L — SIGNIFICANT CHANGE UP (ref 0–41)
BASOPHILS # BLD AUTO: 0.03 K/UL — SIGNIFICANT CHANGE UP (ref 0–0.2)
BASOPHILS NFR BLD AUTO: 0.6 % — SIGNIFICANT CHANGE UP (ref 0–1)
BILIRUB SERPL-MCNC: 0.3 MG/DL — SIGNIFICANT CHANGE UP (ref 0.2–1.2)
BLD GP AB SCN SERPL QL: SIGNIFICANT CHANGE UP
BUN SERPL-MCNC: 30 MG/DL — HIGH (ref 10–20)
CALCIUM SERPL-MCNC: 9.4 MG/DL — SIGNIFICANT CHANGE UP (ref 8.5–10.1)
CHLORIDE SERPL-SCNC: 109 MMOL/L — SIGNIFICANT CHANGE UP (ref 98–110)
CO2 SERPL-SCNC: 23 MMOL/L — SIGNIFICANT CHANGE UP (ref 17–32)
CREAT SERPL-MCNC: 0.8 MG/DL — SIGNIFICANT CHANGE UP (ref 0.7–1.5)
CULTURE RESULTS: SIGNIFICANT CHANGE UP
EGFR: 70 ML/MIN/1.73M2 — SIGNIFICANT CHANGE UP
EOSINOPHIL # BLD AUTO: 0.16 K/UL — SIGNIFICANT CHANGE UP (ref 0–0.7)
EOSINOPHIL NFR BLD AUTO: 2.9 % — SIGNIFICANT CHANGE UP (ref 0–8)
GLUCOSE SERPL-MCNC: 90 MG/DL — SIGNIFICANT CHANGE UP (ref 70–99)
HCT VFR BLD CALC: 28.4 % — LOW (ref 37–47)
HGB BLD-MCNC: 9.1 G/DL — LOW (ref 12–16)
IMM GRANULOCYTES NFR BLD AUTO: 0.6 % — HIGH (ref 0.1–0.3)
LYMPHOCYTES # BLD AUTO: 0.82 K/UL — LOW (ref 1.2–3.4)
LYMPHOCYTES # BLD AUTO: 15 % — LOW (ref 20.5–51.1)
MAGNESIUM SERPL-MCNC: 2.1 MG/DL — SIGNIFICANT CHANGE UP (ref 1.8–2.4)
MCHC RBC-ENTMCNC: 30.4 PG — SIGNIFICANT CHANGE UP (ref 27–31)
MCHC RBC-ENTMCNC: 32 G/DL — SIGNIFICANT CHANGE UP (ref 32–37)
MCV RBC AUTO: 95 FL — SIGNIFICANT CHANGE UP (ref 81–99)
MONOCYTES # BLD AUTO: 0.38 K/UL — SIGNIFICANT CHANGE UP (ref 0.1–0.6)
MONOCYTES NFR BLD AUTO: 7 % — SIGNIFICANT CHANGE UP (ref 1.7–9.3)
NEUTROPHILS # BLD AUTO: 4.03 K/UL — SIGNIFICANT CHANGE UP (ref 1.4–6.5)
NEUTROPHILS NFR BLD AUTO: 73.9 % — SIGNIFICANT CHANGE UP (ref 42.2–75.2)
NRBC # BLD: 0 /100 WBCS — SIGNIFICANT CHANGE UP (ref 0–0)
PLATELET # BLD AUTO: 118 K/UL — LOW (ref 130–400)
POTASSIUM SERPL-MCNC: 4.3 MMOL/L — SIGNIFICANT CHANGE UP (ref 3.5–5)
POTASSIUM SERPL-SCNC: 4.3 MMOL/L — SIGNIFICANT CHANGE UP (ref 3.5–5)
PROT SERPL-MCNC: 6.1 G/DL — SIGNIFICANT CHANGE UP (ref 6–8)
RBC # BLD: 2.99 M/UL — LOW (ref 4.2–5.4)
RBC # FLD: 20.4 % — HIGH (ref 11.5–14.5)
SODIUM SERPL-SCNC: 141 MMOL/L — SIGNIFICANT CHANGE UP (ref 135–146)
SPECIMEN SOURCE: SIGNIFICANT CHANGE UP
WBC # BLD: 5.45 K/UL — SIGNIFICANT CHANGE UP (ref 4.8–10.8)
WBC # FLD AUTO: 5.45 K/UL — SIGNIFICANT CHANGE UP (ref 4.8–10.8)

## 2022-08-09 PROCEDURE — 93010 ELECTROCARDIOGRAM REPORT: CPT

## 2022-08-09 PROCEDURE — 71045 X-RAY EXAM CHEST 1 VIEW: CPT | Mod: 26

## 2022-08-09 PROCEDURE — 99291 CRITICAL CARE FIRST HOUR: CPT

## 2022-08-09 PROCEDURE — 92986 REVISION OF AORTIC VALVE: CPT

## 2022-08-09 PROCEDURE — 71045 X-RAY EXAM CHEST 1 VIEW: CPT | Mod: 26,77

## 2022-08-09 RX ADMIN — LIDOCAINE 1 PATCH: 4 CREAM TOPICAL at 00:15

## 2022-08-09 RX ADMIN — MIDODRINE HYDROCHLORIDE 5 MILLIGRAM(S): 2.5 TABLET ORAL at 17:26

## 2022-08-09 RX ADMIN — SENNA PLUS 2 TABLET(S): 8.6 TABLET ORAL at 22:57

## 2022-08-09 RX ADMIN — CHLORHEXIDINE GLUCONATE 1 APPLICATION(S): 213 SOLUTION TOPICAL at 05:38

## 2022-08-09 RX ADMIN — Medication 81 MILLIGRAM(S): at 17:23

## 2022-08-09 RX ADMIN — Medication 1 SUPPOSITORY(S): at 17:25

## 2022-08-09 RX ADMIN — PANTOPRAZOLE SODIUM 40 MILLIGRAM(S): 20 TABLET, DELAYED RELEASE ORAL at 05:38

## 2022-08-09 RX ADMIN — LIDOCAINE 1 PATCH: 4 CREAM TOPICAL at 17:25

## 2022-08-09 RX ADMIN — MIDODRINE HYDROCHLORIDE 5 MILLIGRAM(S): 2.5 TABLET ORAL at 22:56

## 2022-08-09 RX ADMIN — MIDODRINE HYDROCHLORIDE 5 MILLIGRAM(S): 2.5 TABLET ORAL at 05:38

## 2022-08-09 RX ADMIN — Medication 100 MICROGRAM(S): at 05:39

## 2022-08-09 RX ADMIN — LIDOCAINE 1 PATCH: 4 CREAM TOPICAL at 19:42

## 2022-08-09 NOTE — PROGRESS NOTE ADULT - SUBJECTIVE AND OBJECTIVE BOX
Patient is a 89y old  Female who presents with a chief complaint of Dyspnea w/ Exertion (08 Aug 2022 09:21)      HPI      INTERVAL HPI/OVERNIGHT EVENTS:   No overnight events   Afebrile, hemodynamically stable     Subjective:    ICU Vital Signs Last 24 Hrs  T(C): 36 (09 Aug 2022 04:00), Max: 36.2 (08 Aug 2022 16:00)  T(F): 96.8 (09 Aug 2022 04:00), Max: 97.2 (09 Aug 2022 00:00)  HR: 65 (09 Aug 2022 06:00) (55 - 83)  BP: 98/49 (09 Aug 2022 06:00) (98/49 - 142/61)  BP(mean): 69 (09 Aug 2022 06:00) (69 - 94)  ABP: --  ABP(mean): --  RR: 17 (09 Aug 2022 06:00) (16 - 50)  SpO2: 97% (09 Aug 2022 06:00) (96% - 99%)    O2 Parameters below as of 09 Aug 2022 06:00  Patient On (Oxygen Delivery Method): room air          I&O's Summary    08 Aug 2022 07:01  -  09 Aug 2022 07:00  --------------------------------------------------------  IN: 340 mL / OUT: 1200 mL / NET: -860 mL          Daily     Daily Weight in k.5 (09 Aug 2022 05:00)    Adult Advanced Hemodynamics Last 24 Hrs  CVP(mm Hg): --  CVP(cm H2O): --  CO: --  CI: --  PA: --  PA(mean): --  PCWP: --  SVR: --  SVRI: --  PVR: --  PVRI: --    EKG/Telemetry Events:    MEDICATIONS  (STANDING):  aspirin  chewable 81 milliGRAM(s) Oral daily  chlorhexidine 2% Cloths 1 Application(s) Topical daily  enoxaparin Injectable 40 milliGRAM(s) SubCutaneous every 24 hours  hydrocortisone hemorrhoidal Suppository 1 Suppository(s) Rectal daily  levothyroxine 100 MICROGram(s) Oral daily  lidocaine   4% Patch 1 Patch Transdermal daily  midodrine 5 milliGRAM(s) Oral every 8 hours  pantoprazole    Tablet 40 milliGRAM(s) Oral before breakfast  polyethylene glycol 3350 17 Gram(s) Oral daily  senna 2 Tablet(s) Oral at bedtime  sodium chloride 0.9%. 500 milliLiter(s) (170 mL/Hr) IV Continuous <Continuous>    MEDICATIONS  (PRN):  acetaminophen     Tablet .. 650 milliGRAM(s) Oral every 6 hours PRN Moderate Pain (4 - 6)  aluminum hydroxide/magnesium hydroxide/simethicone Suspension 30 milliLiter(s) Oral every 4 hours PRN Dyspepsia  hydrocortisone hemorrhoidal Suppository 1 Suppository(s) Rectal two times a day PRN hemorrhoidal discomfort        LABS:                        9.1    5.45  )-----------( 118      ( 09 Aug 2022 05:13 )             28.4         141  |  109  |  30<H>  ----------------------------<  90  4.3   |  23  |  0.8    Ca    9.4      09 Aug 2022 05:13  Mg     2.1         TPro  6.1  /  Alb  3.2<L>  /  TBili  0.3  /  DBili  x   /  AST  30  /  ALT  20  /  AlkPhos  124<H>      LIVER FUNCTIONS - ( 09 Aug 2022 05:13 )  Alb: 3.2 g/dL / Pro: 6.1 g/dL / ALK PHOS: 124 U/L / ALT: 20 U/L / AST: 30 U/L / GGT: x           PT/INR - ( 08 Aug 2022 04:47 )   PT: 11.30 sec;   INR: 0.98 ratio         PTT - ( 08 Aug 2022 04:47 )  PTT:28.2 sec  CAPILLARY BLOOD GLUCOSE                      RADIOLOGY & ADDITIONAL TESTS:         Care Discussed with Consultants/Other Providers [ x] YES  [ ] NO           Patient is a 89y old  Female who presents with a chief complaint of Dyspnea w/ Exertion (08 Aug 2022 09:21)      HPI  88 y/o woman w PMHx of HFpEF, severe AS s/p balloon valvuloplasty , anemia s/p multiple transfusions 2 months ago, MVP, HTN, hyperthyroidism, HCC s/p partial liver resection, transferred from [facility] to [place] for TAVR evaluation by Dr. Mccollum.  Patient found to be anemic most likely d/t GI bleed now s/p 1PRBC transfusion for Hgb < 8/anemia. GI, Dr. Woo consulted at Dr. Mccollum' request.  Capsule study completed.  S/p colonoscopy  with successful AVM intervention. Diuretics largely remain on hold for soft BP (SBP < 100).  Ultimately, plan is for TAVR pending conditioning. Patient was waiting for subacute rehab placement with plans for TAVR as outpatient when patient's conditioning improves.    Patient , became acutely dyspneic, hypertensive 170/79 and tachycardic to 140s w/ crackles and wheezes to lungs throughout. Bipap started, with some improvement noted and patient was on nitro drip. Transfer to CCU initiated. In CCU: became febrile 100.4, elevated WBC, CXR showed possible pneumonia, given cefepime and vanc. Blood cx () grew staph epidermidis (MR)  Then became hypotensive down to 50s systolic- d/c nitro drip, given 1 bolus, requiring pressor support. (phenylepherine), then went into afib RVR (140s-150s). Patient on bipap during event; O2 requirements have decreased during stay. Continued on phenylepherine drip and midodrine for pressor support. Patient has remained afebrile, WBC WNL, pressor requirements have decreased, she is setting well on room air, WBC count has remained WNL. Repeat cx from  grew Gram (+) cocci. Patient also has pleural effusion first thoracocentesis was on  1L fluid removed. Fluid was transudative in nature, cx were negative. Effusion reoccurred and chest tube was placed 22 - to stay in place until drainage is less than 100ccs/24 hours. Due to 2x positive cx central line was removed 22. Pt is stable off pressors. Plan is to complete aortic balloon valvuloplasty with Dr. Hood today.       INTERVAL HPI/OVERNIGHT EVENTS:   No overnight events   Afebrile, hemodynamically stable     Subjective:    ICU Vital Signs Last 24 Hrs  T(C): 36 (09 Aug 2022 04:00), Max: 36.2 (08 Aug 2022 16:00)  T(F): 96.8 (09 Aug 2022 04:00), Max: 97.2 (09 Aug 2022 00:00)  HR: 65 (09 Aug 2022 06:00) (55 - 83)  BP: 98/49 (09 Aug 2022 06:00) (98/49 - 142/61)  BP(mean): 69 (09 Aug 2022 06:00) (69 - 94)  ABP: --  ABP(mean): --  RR: 17 (09 Aug 2022 06:00) (16 - 50)  SpO2: 97% (09 Aug 2022 06:00) (96% - 99%)    O2 Parameters below as of 09 Aug 2022 06:00  Patient On (Oxygen Delivery Method): room air          I&O's Summary    08 Aug 2022 07:01  -  09 Aug 2022 07:00  --------------------------------------------------------  IN: 340 mL / OUT: 1200 mL / NET: -860 mL          Daily     Daily Weight in k.5 (09 Aug 2022 05:00)    Adult Advanced Hemodynamics Last 24 Hrs  CVP(mm Hg): --  CVP(cm H2O): --  CO: --  CI: --  PA: --  PA(mean): --  PCWP: --  SVR: --  SVRI: --  PVR: --  PVRI: --    EKG/Telemetry Events:    MEDICATIONS  (STANDING):  aspirin  chewable 81 milliGRAM(s) Oral daily  chlorhexidine 2% Cloths 1 Application(s) Topical daily  enoxaparin Injectable 40 milliGRAM(s) SubCutaneous every 24 hours  hydrocortisone hemorrhoidal Suppository 1 Suppository(s) Rectal daily  levothyroxine 100 MICROGram(s) Oral daily  lidocaine   4% Patch 1 Patch Transdermal daily  midodrine 5 milliGRAM(s) Oral every 8 hours  pantoprazole    Tablet 40 milliGRAM(s) Oral before breakfast  polyethylene glycol 3350 17 Gram(s) Oral daily  senna 2 Tablet(s) Oral at bedtime  sodium chloride 0.9%. 500 milliLiter(s) (170 mL/Hr) IV Continuous <Continuous>    MEDICATIONS  (PRN):  acetaminophen     Tablet .. 650 milliGRAM(s) Oral every 6 hours PRN Moderate Pain (4 - 6)  aluminum hydroxide/magnesium hydroxide/simethicone Suspension 30 milliLiter(s) Oral every 4 hours PRN Dyspepsia  hydrocortisone hemorrhoidal Suppository 1 Suppository(s) Rectal two times a day PRN hemorrhoidal discomfort        LABS:                        9.1    5.45  )-----------( 118      ( 09 Aug 2022 05:13 )             28.4         141  |  109  |  30<H>  ----------------------------<  90  4.3   |  23  |  0.8    Ca    9.4      09 Aug 2022 05:13  Mg     2.1         TPro  6.1  /  Alb  3.2<L>  /  TBili  0.3  /  DBili  x   /  AST  30  /  ALT  20  /  AlkPhos  124<H>      LIVER FUNCTIONS - ( 09 Aug 2022 05:13 )  Alb: 3.2 g/dL / Pro: 6.1 g/dL / ALK PHOS: 124 U/L / ALT: 20 U/L / AST: 30 U/L / GGT: x           PT/INR - ( 08 Aug 2022 04:47 )   PT: 11.30 sec;   INR: 0.98 ratio         PTT - ( 08 Aug 2022 04:47 )  PTT:28.2 sec  CAPILLARY BLOOD GLUCOSE                      RADIOLOGY & ADDITIONAL TESTS:         Care Discussed with Consultants/Other Providers [ x] YES  [ ] NO           Patient is a 89y old  Female who presents with a chief complaint of Dyspnea w/ Exertion (08 Aug 2022 09:21)      HPI  89 y/o woman w PMHx of HFpEF, severe AS s/p balloon valvuloplasty , anemia s/p multiple transfusions 2 months ago, MVP, HTN, hyperthyroidism, HCC s/p partial liver resection, transferred from [facility] to [place] for TAVR evaluation by Dr. Mccollum.  Patient found to be anemic most likely d/t GI bleed now s/p 1PRBC transfusion for Hgb < 8/anemia. GI, Dr. Woo consulted at Dr. Mccollum' request.  Capsule study completed.  S/p colonoscopy  with successful AVM intervention. Diuretics largely remain on hold for soft BP (SBP < 100).  Ultimately, plan is for TAVR pending conditioning. Patient was waiting for subacute rehab placement with plans for TAVR as outpatient when patient's conditioning improves.    Patient , became acutely dyspneic, hypertensive 170/79 and tachycardic to 140s w/ crackles and wheezes to lungs throughout. Bipap started, with some improvement noted and patient was on nitro drip. Transfer to CCU initiated. In CCU: became febrile 100.4, elevated WBC, CXR showed possible pneumonia, given cefepime and vanc. Blood cx () grew staph epidermidis (MR)  Then became hypotensive down to 50s systolic- d/c nitro drip, given 1 bolus, requiring pressor support. (phenylepherine), then went into afib RVR (140s-150s). Patient on bipap during event; O2 requirements have decreased during stay. Continued on phenylepherine drip and midodrine for pressor support. Patient has remained afebrile, WBC WNL, pressor requirements have decreased, she is setting well on room air, WBC count has remained WNL. Repeat cx from  grew Gram (+) cocci. Patient also has pleural effusion first thoracocentesis was on  1L fluid removed. Fluid was transudative in nature, cx were negative. Effusion reoccurred and chest tube was placed 22 - to stay in place until drainage is less than 100ccs/24 hours. Due to 2x positive cx central line was removed 22. Pt is stable off pressors. Plan is to complete aortic balloon valvuloplasty with Dr. Hood today.       INTERVAL HPI/OVERNIGHT EVENTS:   No overnight events   Afebrile, hemodynamically stable     Subjective:  Physical exam:   General: Pt in NAD, resting comfortably in bed  Cardiac: normal rate and rhythm, 3/5 systolic murmur heard loudest in aortic area  Respiratory: vesicular breath sounds bilaterally, no accessory muscle use. no wheezes, crackles or rhonchi   abdominal: soft, nontender, nondistended  Extremities: no edema, no cyanosis, no clubbing  Neuro: A&O x3   Lines: peripheral IV line in [facility]     ICU Vital Signs Last 24 Hrs  T(C): 36 (09 Aug 2022 04:00), Max: 36.2 (08 Aug 2022 16:00)  T(F): 96.8 (09 Aug 2022 04:00), Max: 97.2 (09 Aug 2022 00:00)  HR: 65 (09 Aug 2022 06:00) (55 - 83)  BP: 98/49 (09 Aug 2022 06:00) (98/49 - 142/61)  BP(mean): 69 (09 Aug 2022 06:00) (69 - 94)  ABP: --  ABP(mean): --  RR: 17 (09 Aug 2022 06:00) (16 - 50)  SpO2: 97% (09 Aug 2022 06:00) (96% - 99%)    O2 Parameters below as of 09 Aug 2022 06:00  Patient On (Oxygen Delivery Method): room air          I&O's Summary    08 Aug 2022 07:01  -  09 Aug 2022 07:00  --------------------------------------------------------  IN: 340 mL / OUT: 1200 mL / NET: -860 mL          Daily     Daily Weight in k.5 (09 Aug 2022 05:00)    Adult Advanced Hemodynamics Last 24 Hrs  CVP(mm Hg): --  CVP(cm H2O): --  CO: --  CI: --  PA: --  PA(mean): --  PCWP: --  SVR: --  SVRI: --  PVR: --  PVRI: --    EKG/Telemetry Events:    MEDICATIONS  (STANDING):  aspirin  chewable 81 milliGRAM(s) Oral daily  chlorhexidine 2% Cloths 1 Application(s) Topical daily  enoxaparin Injectable 40 milliGRAM(s) SubCutaneous every 24 hours  hydrocortisone hemorrhoidal Suppository 1 Suppository(s) Rectal daily  levothyroxine 100 MICROGram(s) Oral daily  lidocaine   4% Patch 1 Patch Transdermal daily  midodrine 5 milliGRAM(s) Oral every 8 hours  pantoprazole    Tablet 40 milliGRAM(s) Oral before breakfast  polyethylene glycol 3350 17 Gram(s) Oral daily  senna 2 Tablet(s) Oral at bedtime  sodium chloride 0.9%. 500 milliLiter(s) (170 mL/Hr) IV Continuous <Continuous>    MEDICATIONS  (PRN):  acetaminophen     Tablet .. 650 milliGRAM(s) Oral every 6 hours PRN Moderate Pain (4 - 6)  aluminum hydroxide/magnesium hydroxide/simethicone Suspension 30 milliLiter(s) Oral every 4 hours PRN Dyspepsia  hydrocortisone hemorrhoidal Suppository 1 Suppository(s) Rectal two times a day PRN hemorrhoidal discomfort        LABS:                        9.1    5.45  )-----------( 118      ( 09 Aug 2022 05:13 )             28.4         141  |  109  |  30<H>  ----------------------------<  90  4.3   |  23  |  0.8    Ca    9.4      09 Aug 2022 05:13  Mg     2.1         TPro  6.1  /  Alb  3.2<L>  /  TBili  0.3  /  DBili  x   /  AST  30  /  ALT  20  /  AlkPhos  124<H>      LIVER FUNCTIONS - ( 09 Aug 2022 05:13 )  Alb: 3.2 g/dL / Pro: 6.1 g/dL / ALK PHOS: 124 U/L / ALT: 20 U/L / AST: 30 U/L / GGT: x           PT/INR - ( 08 Aug 2022 04:47 )   PT: 11.30 sec;   INR: 0.98 ratio         PTT - ( 08 Aug 2022 04:47 )  PTT:28.2 sec  CAPILLARY BLOOD GLUCOSE                      RADIOLOGY & ADDITIONAL TESTS:         Care Discussed with Consultants/Other Providers [ x] YES  [ ] NO           Patient is a 89y old  Female who presents with a chief complaint of Dyspnea w/ Exertion (08 Aug 2022 09:21)      HPI  89 y/o woman w PMHx of HFpEF, severe AS s/p balloon valvuloplasty , anemia s/p multiple transfusions 2 months ago, MVP, HTN, hyperthyroidism, HCC s/p partial liver resection, transferred from [facility] to [place] for TAVR evaluation by Dr. Mccollum.  Patient found to be anemic most likely d/t GI bleed now s/p 1PRBC transfusion for Hgb < 8/anemia. GI, Dr. Woo consulted at Dr. Mccollum' request.  Capsule study completed.  S/p colonoscopy  with successful AVM intervention. Diuretics largely remain on hold for soft BP (SBP < 100).  Ultimately, plan is for TAVR pending conditioning. Patient was waiting for subacute rehab placement with plans for TAVR as outpatient when patient's conditioning improves.    Patient , became acutely dyspneic, hypertensive 170/79 and tachycardic to 140s w/ crackles and wheezes to lungs throughout. Bipap started, with some improvement noted and patient was on nitro drip. Transfer to CCU initiated. In CCU: became febrile 100.4, elevated WBC, CXR showed possible pneumonia, given cefepime and vanc. Blood cx () grew staph epidermidis (MR)  Then became hypotensive down to 50s systolic- d/c nitro drip, given 1 bolus, requiring pressor support. (phenylepherine), then went into afib RVR (140s-150s). Patient on bipap during event; O2 requirements have decreased during stay. Continued on phenylepherine drip and midodrine for pressor support. Patient has remained afebrile, WBC WNL, pressor requirements have decreased, she is setting well on room air, WBC count has remained WNL. Repeat cx from  grew Gram (+) cocci. Patient also has pleural effusion first thoracocentesis was on  1L fluid removed. Fluid was transudative in nature, cx were negative. Effusion reoccurred and chest tube was placed 22 - to stay in place until drainage is less than 100ccs/24 hours. Due to 2x positive cx central line was removed 22. Pt is stable off pressors. Plan is to complete aortic balloon valvuloplasty with Dr. Hood today.       INTERVAL HPI/OVERNIGHT EVENTS:   No overnight events   Afebrile, hemodynamically stable     Subjective:  Physical exam:   General: Pt in NAD, resting comfortably in bed  Cardiac: normal rate and rhythm, 3/5 systolic murmur heard loudest in aortic area  Respiratory: vesicular breath sounds bilaterally, no accessory muscle use. no wheezes, crackles or rhonchi  abdominal: soft, nontender, nondistended  Extremities: no edema, no cyanosis, no clubbing  Neuro: A&O x3   Lines: peripheral IV line in RUE; Left chest tube in place and draining pink fluid     ICU Vital Signs Last 24 Hrs  T(C): 36 (09 Aug 2022 04:00), Max: 36.2 (08 Aug 2022 16:00)  T(F): 96.8 (09 Aug 2022 04:00), Max: 97.2 (09 Aug 2022 00:00)  HR: 65 (09 Aug 2022 06:00) (55 - 83)  BP: 98/49 (09 Aug 2022 06:00) (98/49 - 142/61)  BP(mean): 69 (09 Aug 2022 06:00) (69 - 94)  ABP: --  ABP(mean): --  RR: 17 (09 Aug 2022 06:00) (16 - 50)  SpO2: 97% (09 Aug 2022 06:00) (96% - 99%)    O2 Parameters below as of 09 Aug 2022 06:00  Patient On (Oxygen Delivery Method): room air          I&O's Summary    08 Aug 2022 07:01  -  09 Aug 2022 07:00  --------------------------------------------------------  IN: 340 mL / OUT: 1200 mL / NET: -860 mL          Daily     Daily Weight in k.5 (09 Aug 2022 05:00)    Adult Advanced Hemodynamics Last 24 Hrs  CVP(mm Hg): --  CVP(cm H2O): --  CO: --  CI: --  PA: --  PA(mean): --  PCWP: --  SVR: --  SVRI: --  PVR: --  PVRI: --    EKG/Telemetry Events:    MEDICATIONS  (STANDING):  aspirin  chewable 81 milliGRAM(s) Oral daily  chlorhexidine 2% Cloths 1 Application(s) Topical daily  enoxaparin Injectable 40 milliGRAM(s) SubCutaneous every 24 hours  hydrocortisone hemorrhoidal Suppository 1 Suppository(s) Rectal daily  levothyroxine 100 MICROGram(s) Oral daily  lidocaine   4% Patch 1 Patch Transdermal daily  midodrine 5 milliGRAM(s) Oral every 8 hours  pantoprazole    Tablet 40 milliGRAM(s) Oral before breakfast  polyethylene glycol 3350 17 Gram(s) Oral daily  senna 2 Tablet(s) Oral at bedtime  sodium chloride 0.9%. 500 milliLiter(s) (170 mL/Hr) IV Continuous <Continuous>    MEDICATIONS  (PRN):  acetaminophen     Tablet .. 650 milliGRAM(s) Oral every 6 hours PRN Moderate Pain (4 - 6)  aluminum hydroxide/magnesium hydroxide/simethicone Suspension 30 milliLiter(s) Oral every 4 hours PRN Dyspepsia  hydrocortisone hemorrhoidal Suppository 1 Suppository(s) Rectal two times a day PRN hemorrhoidal discomfort        LABS:                        9.1    5.45  )-----------( 118      ( 09 Aug 2022 05:13 )             28.4         141  |  109  |  30<H>  ----------------------------<  90  4.3   |  23  |  0.8    Ca    9.4      09 Aug 2022 05:13  Mg     2.1         TPro  6.1  /  Alb  3.2<L>  /  TBili  0.3  /  DBili  x   /  AST  30  /  ALT  20  /  AlkPhos  124<H>      LIVER FUNCTIONS - ( 09 Aug 2022 05:13 )  Alb: 3.2 g/dL / Pro: 6.1 g/dL / ALK PHOS: 124 U/L / ALT: 20 U/L / AST: 30 U/L / GGT: x           PT/INR - ( 08 Aug 2022 04:47 )   PT: 11.30 sec;   INR: 0.98 ratio         PTT - ( 08 Aug 2022 04:47 )  PTT:28.2 sec  CAPILLARY BLOOD GLUCOSE                      RADIOLOGY & ADDITIONAL TESTS:         Care Discussed with Consultants/Other Providers [ x] YES  [ ] NO           Patient is a 89y old  Female who presents with a chief complaint of Dyspnea w/ Exertion (08 Aug 2022 09:21)      HPI:    88 y/o woman w PMHx of HFpEF, severe AS s/p balloon valvuloplasty , anemia s/p multiple transfusions 2 months ago, MVP, HTN, hyperthyroidism, HCC s/p partial liver resection, transferred from [facility] to [place] for TAVR evaluation by Dr. Mccollum.  Patient found to be anemic most likely d/t GI bleed now s/p 1PRBC transfusion for Hgb < 8/anemia. GI, Dr. Woo consulted at Dr. Mccollum' request.  Capsule study completed.  S/p colonoscopy  with successful AVM intervention. Diuretics largely remain on hold for soft BP (SBP < 100).  Ultimately, plan is for TAVR pending conditioning. Patient was waiting for subacute rehab placement with plans for TAVR as outpatient when patient's conditioning improves.    Patient , became acutely dyspneic, hypertensive 170/79 and tachycardic to 140s w/ crackles and wheezes to lungs throughout. Bipap started, with some improvement noted and patient was on nitro drip. Transfer to CCU initiated. In CCU: became febrile 100.4, elevated WBC, CXR showed possible pneumonia, given cefepime and vanc. Blood cx () grew staph epidermidis (MR)  Then became hypotensive down to 50s systolic- d/c nitro drip, given 1 bolus, requiring pressor support. (phenylepherine), then went into afib RVR (140s-150s). Patient on bipap during event; O2 requirements have decreased during stay. Continued on phenylepherine drip and midodrine for pressor support. Patient has remained afebrile, WBC WNL, pressor requirements have decreased, she is setting well on room air, WBC count has remained WNL. Repeat cx from  grew Gram (+) cocci. Patient also has pleural effusion first thoracocentesis was on  1L fluid removed. Fluid was transudative in nature, cx were negative. Effusion reoccurred and chest tube was placed 22 - to stay in place until drainage is less than 100ccs/24 hours. Due to 2x positive cx central line was removed 22. Pt is stable off pressors. Plan is to complete aortic balloon valvuloplasty with Dr. Hood today.       INTERVAL HPI/OVERNIGHT EVENTS:   No overnight events   Afebrile, hemodynamically stable   BAV today    Subjective:  Physical exam:   General: Pt in NAD, resting comfortably in bed  Cardiac: normal rate and rhythm, 3/5 systolic murmur heard loudest in aortic area  Respiratory: vesicular breath sounds bilaterally, no accessory muscle use. no wheezes, crackles or rhonchi  abdominal: soft, nontender, nondistended  Extremities: no edema, no cyanosis, no clubbing  Neuro: A&O x3   Lines: peripheral IV line in RUE; Left chest tube in place and draining pink fluid     ICU Vital Signs Last 24 Hrs  T(C): 36 (09 Aug 2022 04:00), Max: 36.2 (08 Aug 2022 16:00)  T(F): 96.8 (09 Aug 2022 04:00), Max: 97.2 (09 Aug 2022 00:00)  HR: 65 (09 Aug 2022 06:00) (55 - 83)  BP: 98/49 (09 Aug 2022 06:00) (98/49 - 142/61)  BP(mean): 69 (09 Aug 2022 06:00) (69 - 94)  ABP: --  ABP(mean): --  RR: 17 (09 Aug 2022 06:00) (16 - 50)  SpO2: 97% (09 Aug 2022 06:00) (96% - 99%)    O2 Parameters below as of 09 Aug 2022 06:00  Patient On (Oxygen Delivery Method): room air          I&O's Summary    08 Aug 2022 07:01  -  09 Aug 2022 07:00  --------------------------------------------------------  IN: 340 mL / OUT: 1200 mL / NET: -860 mL          Daily     Daily Weight in k.5 (09 Aug 2022 05:00)    Adult Advanced Hemodynamics Last 24 Hrs  CVP(mm Hg): --  CVP(cm H2O): --  CO: --  CI: --  PA: --  PA(mean): --  PCWP: --  SVR: --  SVRI: --  PVR: --  PVRI: --    EKG/Telemetry Events:    MEDICATIONS  (STANDING):  aspirin  chewable 81 milliGRAM(s) Oral daily  chlorhexidine 2% Cloths 1 Application(s) Topical daily  enoxaparin Injectable 40 milliGRAM(s) SubCutaneous every 24 hours  hydrocortisone hemorrhoidal Suppository 1 Suppository(s) Rectal daily  levothyroxine 100 MICROGram(s) Oral daily  lidocaine   4% Patch 1 Patch Transdermal daily  midodrine 5 milliGRAM(s) Oral every 8 hours  pantoprazole    Tablet 40 milliGRAM(s) Oral before breakfast  polyethylene glycol 3350 17 Gram(s) Oral daily  senna 2 Tablet(s) Oral at bedtime  sodium chloride 0.9%. 500 milliLiter(s) (170 mL/Hr) IV Continuous <Continuous>    MEDICATIONS  (PRN):  acetaminophen     Tablet .. 650 milliGRAM(s) Oral every 6 hours PRN Moderate Pain (4 - 6)  aluminum hydroxide/magnesium hydroxide/simethicone Suspension 30 milliLiter(s) Oral every 4 hours PRN Dyspepsia  hydrocortisone hemorrhoidal Suppository 1 Suppository(s) Rectal two times a day PRN hemorrhoidal discomfort        LABS:                        9.1    5.45  )-----------( 118      ( 09 Aug 2022 05:13 )             28.4         141  |  109  |  30<H>  ----------------------------<  90  4.3   |  23  |  0.8    Ca    9.4      09 Aug 2022 05:13  Mg     2.1         TPro  6.1  /  Alb  3.2<L>  /  TBili  0.3  /  DBili  x   /  AST  30  /  ALT  20  /  AlkPhos  124<H>      LIVER FUNCTIONS - ( 09 Aug 2022 05:13 )  Alb: 3.2 g/dL / Pro: 6.1 g/dL / ALK PHOS: 124 U/L / ALT: 20 U/L / AST: 30 U/L / GGT: x           PT/INR - ( 08 Aug 2022 04:47 )   PT: 11.30 sec;   INR: 0.98 ratio         PTT - ( 08 Aug 2022 04:47 )  PTT:28.2 sec  CAPILLARY BLOOD GLUCOSE                      RADIOLOGY & ADDITIONAL TESTS:         Care Discussed with Consultants/Other Providers [ x] YES  [ ] NO

## 2022-08-09 NOTE — PROGRESS NOTE ADULT - ASSESSMENT
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%  #Hemorrhoids with Constipation  #Hypothyroidism    Respiratory   - Moderate left pleural effusion seen on CT angio s/p thoracocentesis 8/1/22 - 1 L fluid  - Pleural effusion reoccurrence 8/2 - approximately 800ccs - chest tube placed 8/4/22  - F/U pleural fluid LDH (72), Protein (2.1), Cx NGTD  - Tylenol and lidocaine patch for pain  - Chest tube to be in place until draining <100ccs/24hrs; 80ccs overnight, dc after valvuloplasty  - Pulmonary f/u  - Incentive spirometer    ID  - CXR 7/30 suspicious for PNA, with initial fever, elevated white count, and hypotension    - 7/30 procal .51, 8/4 0.18, WBC WNL since 8/1, Urine cx (-)  - 7/30 grew staph epidermidis, 8/1 gram (+) cocci in clusters, 8/5 cx - pending; pt given vanc and cefepime per ID   -per ID 8/8/22 dc abx  2/2 negative Bcx 8/4 and 8/5      Cardiovascular  - TAVR workup with Dr. Mccollum  - Plan for Balloon aortic valvuloplasty 8/9/22  - Discontinued Torsemide  - Cont midodrine to 5mg daily for now, should be able to wean off after valvuloplasty  - Keep MAP >65    Hematologic   - Trend CBC Daily. 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   #Severe AS, mean gradient ~75mmHg s/p balloon valvuloplasty last year  #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%  #Hemorrhoids with Constipation  #Hypothyroidism    Respiratory   - Moderate left pleural effusion seen on CT angio s/p thoracocentesis 8/1/22 - 1 L fluid  - Pleural effusion reoccurrence 8/2 - approximately 800ccs - chest tube placed 8/4/22  - F/U pleural fluid LDH (72), Protein (2.1), Cx NGTD  - Tylenol and lidocaine patch for pain  - Chest tube to be in place until draining <100ccs/24hrs; 240ccs overnight, f/u pulmonary after valvuloplasty  - Incentive spirometer    ID  - CXR 7/30 suspicious for PNA, with initial fever, elevated white count, and hypotension    - 7/30 procal .51, 8/4 0.18, WBC WNL since 8/1, Urine cx (-)  - 7/30 grew staph epidermidis, 8/1 gram (+) cocci in clusters, 8/5 cx - pending; pt given vanc and cefepime per ID   -per ID 8/8/22 dc abx  due to negative Bcx 8/4 and 8/5      Cardiovascular  - TAVR workup with Dr. Mccollum  - Plan for Balloon aortic valvuloplasty 8/9/22  - Discontinued Torsemide  - Cont midodrine to 5mg daily for now, should be able to wean off after valvuloplasty  - Keep MAP >65    Hematologic   - Trend CBC Daily. 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 Euvolemic  # AHRF s/p recurrent L sided effusion s/p pigtail  #Hemorrhoids with Constipation  #Hypothyroidism    Respiratory   - Moderate left pleural effusion seen on CT angio s/p thoracocentesis 8/1/22 - 1 L fluid  - Pleural effusion reoccurrence 8/2 - approximately 800ccs - Pigtail placed 8/4/22  - Follow up pulm when to D/C pigtail  - F/U pleural fluid LDH (72), Protein (2.1), Cx NGTD  - Tylenol and lidocaine patch for pain  - Chest tube to be in place until draining <100ccs/24hrs; 240ccs overnight, f/u pulmonary after valvuloplasty  - Incentive spirometer    ID  - CXR 7/30 suspicious for PNA, with initial fever, elevated white count, and hypotension    - 7/30 procal .51, 8/4 0.18, WBC WNL since 8/1, Urine cx (-)  - 7/30 grew staph epidermidis, 8/1 gram (+) cocci in clusters, 8/5 cx - pending; pt given vanc and cefepime per ID   -per ID 8/8/22 dc abx  due to negative Bcx 8/4 and 8/5  - Hold Abx per ID      Cardiovascular  - TAVR workup with Dr. Mccollum  - Plan for Balloon aortic valvuloplasty 8/9/22  - Discontinued Torsemide  - Cont midodrine to 5mg daily for now, should be able to wean off after valvuloplasty  - Keep MAP >65    Hematologic   - Trend CBC Daily. 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 Euvolemic  # AHRF s/p recurrent L sided effusion s/p pigtail  #Hemorrhoids with Constipation  #Hypothyroidism    Respiratory   - Moderate left pleural effusion seen on CT angio s/p thoracocentesis 8/1/22 - 1 L fluid  - Pleural effusion reoccurrence 8/2 - approximately 800ccs - Pigtail placed 8/4/22  - Follow up pulm for timing of D/C pigtail  - F/U pleural fluid LDH (72), Protein (2.1), Cx NGTD  - Tylenol and lidocaine patch for pain  - Chest tube to be in place until draining <100ccs/24hrs; 240ccs overnight, f/u pulmonary after valvuloplasty  - Incentive spirometer    ID  - CXR 7/30 suspicious for PNA, with initial fever, elevated white count, and hypotension    - 7/30 procal .51, 8/4 0.18, WBC WNL since 8/1, Urine cx (-)  - 7/30 grew staph epidermidis, 8/1 gram (+) cocci in clusters, 8/5 cx - pending; pt given vanc and cefepime per ID   -per ID 8/8/22 dc abx  due to negative Bcx 8/4 and 8/5  - Hold Abx per ID      Cardiovascular  - TAVR workup with Dr. Mccollum  - Balloon aortic valvuloplasty 8/9/22  - Discontinued Torsemide  - Cont midodrine to 5mg daily for now, should be able to wean off after valvuloplasty  - Keep MAP >65    Hematologic   - Trend CBC Daily. 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  - Continue hemorrhoidal suppository   -Continue Senna 2 tabs HS  -Tylenol 650 mg PO PRN for rectal pain  - Protonix  - Add ensure to diet  - NPO for procedure today    Endocrine  - Continue Levothyroxine 100 mcg PO daily  - TSH 4.36 (6/19/22)  - Monitor FS    Renal  - Strict I+O, Daily standing weights  - maintain electrolytes, K>4 Mg >2    Neurological  - Avoid sedating medications    PT/Rehab  - On board    Lines/Gruber  - Midline: 8/4  - Peripheral IV: 8/4  - Primafit

## 2022-08-10 LAB
ALBUMIN SERPL ELPH-MCNC: 3 G/DL — LOW (ref 3.5–5.2)
ALP SERPL-CCNC: 121 U/L — HIGH (ref 30–115)
ALT FLD-CCNC: 17 U/L — SIGNIFICANT CHANGE UP (ref 0–41)
ANION GAP SERPL CALC-SCNC: 9 MMOL/L — SIGNIFICANT CHANGE UP (ref 7–14)
AST SERPL-CCNC: 28 U/L — SIGNIFICANT CHANGE UP (ref 0–41)
BASOPHILS # BLD AUTO: 0.04 K/UL — SIGNIFICANT CHANGE UP (ref 0–0.2)
BASOPHILS NFR BLD AUTO: 1 % — SIGNIFICANT CHANGE UP (ref 0–1)
BILIRUB SERPL-MCNC: 0.4 MG/DL — SIGNIFICANT CHANGE UP (ref 0.2–1.2)
BUN SERPL-MCNC: 24 MG/DL — HIGH (ref 10–20)
CALCIUM SERPL-MCNC: 8.7 MG/DL — SIGNIFICANT CHANGE UP (ref 8.5–10.1)
CHLORIDE SERPL-SCNC: 107 MMOL/L — SIGNIFICANT CHANGE UP (ref 98–110)
CO2 SERPL-SCNC: 25 MMOL/L — SIGNIFICANT CHANGE UP (ref 17–32)
CREAT SERPL-MCNC: 0.7 MG/DL — SIGNIFICANT CHANGE UP (ref 0.7–1.5)
CULTURE RESULTS: SIGNIFICANT CHANGE UP
CULTURE RESULTS: SIGNIFICANT CHANGE UP
EGFR: 83 ML/MIN/1.73M2 — SIGNIFICANT CHANGE UP
EOSINOPHIL # BLD AUTO: 0.11 K/UL — SIGNIFICANT CHANGE UP (ref 0–0.7)
EOSINOPHIL NFR BLD AUTO: 2.7 % — SIGNIFICANT CHANGE UP (ref 0–8)
GLUCOSE SERPL-MCNC: 78 MG/DL — SIGNIFICANT CHANGE UP (ref 70–99)
HCT VFR BLD CALC: 25.9 % — LOW (ref 37–47)
HGB BLD-MCNC: 8 G/DL — LOW (ref 12–16)
IMM GRANULOCYTES NFR BLD AUTO: 0.5 % — HIGH (ref 0.1–0.3)
LYMPHOCYTES # BLD AUTO: 0.51 K/UL — LOW (ref 1.2–3.4)
LYMPHOCYTES # BLD AUTO: 12.7 % — LOW (ref 20.5–51.1)
MAGNESIUM SERPL-MCNC: 2.1 MG/DL — SIGNIFICANT CHANGE UP (ref 1.8–2.4)
MCHC RBC-ENTMCNC: 29.2 PG — SIGNIFICANT CHANGE UP (ref 27–31)
MCHC RBC-ENTMCNC: 30.9 G/DL — LOW (ref 32–37)
MCV RBC AUTO: 94.5 FL — SIGNIFICANT CHANGE UP (ref 81–99)
MONOCYTES # BLD AUTO: 0.31 K/UL — SIGNIFICANT CHANGE UP (ref 0.1–0.6)
MONOCYTES NFR BLD AUTO: 7.7 % — SIGNIFICANT CHANGE UP (ref 1.7–9.3)
NEUTROPHILS # BLD AUTO: 3.04 K/UL — SIGNIFICANT CHANGE UP (ref 1.4–6.5)
NEUTROPHILS NFR BLD AUTO: 75.4 % — HIGH (ref 42.2–75.2)
NRBC # BLD: 0 /100 WBCS — SIGNIFICANT CHANGE UP (ref 0–0)
PHOSPHATE SERPL-MCNC: 3 MG/DL — SIGNIFICANT CHANGE UP (ref 2.1–4.9)
PLATELET # BLD AUTO: 101 K/UL — LOW (ref 130–400)
POTASSIUM SERPL-MCNC: 4.3 MMOL/L — SIGNIFICANT CHANGE UP (ref 3.5–5)
POTASSIUM SERPL-SCNC: 4.3 MMOL/L — SIGNIFICANT CHANGE UP (ref 3.5–5)
PROT SERPL-MCNC: 5.3 G/DL — LOW (ref 6–8)
RBC # BLD: 2.74 M/UL — LOW (ref 4.2–5.4)
RBC # FLD: 20.6 % — HIGH (ref 11.5–14.5)
SODIUM SERPL-SCNC: 141 MMOL/L — SIGNIFICANT CHANGE UP (ref 135–146)
SPECIMEN SOURCE: SIGNIFICANT CHANGE UP
SPECIMEN SOURCE: SIGNIFICANT CHANGE UP
WBC # BLD: 4.03 K/UL — LOW (ref 4.8–10.8)
WBC # FLD AUTO: 4.03 K/UL — LOW (ref 4.8–10.8)

## 2022-08-10 PROCEDURE — 93306 TTE W/DOPPLER COMPLETE: CPT | Mod: 26

## 2022-08-10 PROCEDURE — 99291 CRITICAL CARE FIRST HOUR: CPT

## 2022-08-10 PROCEDURE — 71045 X-RAY EXAM CHEST 1 VIEW: CPT | Mod: 26,77

## 2022-08-10 PROCEDURE — 71045 X-RAY EXAM CHEST 1 VIEW: CPT | Mod: 26

## 2022-08-10 PROCEDURE — 93010 ELECTROCARDIOGRAM REPORT: CPT

## 2022-08-10 RX ORDER — FUROSEMIDE 40 MG
40 TABLET ORAL DAILY
Refills: 0 | Status: DISCONTINUED | OUTPATIENT
Start: 2022-08-10 | End: 2022-08-15

## 2022-08-10 RX ORDER — LOSARTAN POTASSIUM 100 MG/1
25 TABLET, FILM COATED ORAL DAILY
Refills: 0 | Status: DISCONTINUED | OUTPATIENT
Start: 2022-08-10 | End: 2022-08-15

## 2022-08-10 RX ADMIN — POLYETHYLENE GLYCOL 3350 17 GRAM(S): 17 POWDER, FOR SOLUTION ORAL at 12:38

## 2022-08-10 RX ADMIN — MIDODRINE HYDROCHLORIDE 5 MILLIGRAM(S): 2.5 TABLET ORAL at 05:40

## 2022-08-10 RX ADMIN — LIDOCAINE 1 PATCH: 4 CREAM TOPICAL at 05:42

## 2022-08-10 RX ADMIN — Medication 100 MICROGRAM(S): at 05:40

## 2022-08-10 RX ADMIN — Medication 40 MILLIGRAM(S): at 09:13

## 2022-08-10 RX ADMIN — LOSARTAN POTASSIUM 25 MILLIGRAM(S): 100 TABLET, FILM COATED ORAL at 09:13

## 2022-08-10 RX ADMIN — PANTOPRAZOLE SODIUM 40 MILLIGRAM(S): 20 TABLET, DELAYED RELEASE ORAL at 05:45

## 2022-08-10 RX ADMIN — CHLORHEXIDINE GLUCONATE 1 APPLICATION(S): 213 SOLUTION TOPICAL at 05:41

## 2022-08-10 RX ADMIN — Medication 81 MILLIGRAM(S): at 12:17

## 2022-08-10 RX ADMIN — ENOXAPARIN SODIUM 40 MILLIGRAM(S): 100 INJECTION SUBCUTANEOUS at 12:18

## 2022-08-10 NOTE — CONSULT NOTE ADULT - SUBJECTIVE AND OBJECTIVE BOX
HPI:  This is a 89-year-old female with a past medical history significant for CHF,severe AAS, MVP, hypertension, HCC, hypothyroidism, GERD who presents with shortness of breath.  Patient states that she has been having chronic shortness of breath w/ recent hospital admission 6/18-6/22, pt reports dyspnea has been getting worse the last couple of days and did not have any resolution to sxs upon d/c.  Of note patient also states that she has been having some blood in the stool during this time.  Patient states dyspnea is worsened with any exertion- and associated with escalating chest pain.    Pt denies any cardiology or pulmonary f/u.  Pt also admits to predominantly bedbound state due to dyspnea.  Patient found to be anemic most likely d/t GI bleed now s/p 1PRBC transfusion for Hgb < 8/anemia. GI, Dr. Woo consulted at Dr. Mccollum' request.  Capsule study completed.  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 Diuretics largely remain on hold for soft BP (SBP < 100).  Ultimately, plan is for TAVR pending conditioning. Patient was waiting for subacute rehab placement with plans for TAVR as outpatient when patient's conditioning improves.    Patient 7/30, became acutely dyspneic, hypertensive 170/79 and tachycardic to 140s w/ crackles and wheezes to lungs throughout. Bipap started, with some improvement noted and patient was on nitro drip. Transfer to CCU initiated. In CCU: became febrile 100.4, elevated WBC, CXR showed possible pneumonia, given cefepime and vanc. Blood cx (7/30) grew staph epidermidis (MR)  Then became hypotensive down to 50s systolic- d/c nitro drip, given 1 bolus, requiring pressor support. (phenylepherine), then went into afib RVR (140s-150s). Patient on bipap during event; O2 requirements have decreased during stay. Continued on phenylepherine drip and midodrine for pressor support. Patient has remained afebrile, WBC WNL, pressor requirements have decreased, she is setting well on room air, WBC count has remained WNL. Repeat cx from 8/2 grew Gram (+) cocci. Patient also has pleural effusion first thoracocentesis was on 8/1 1L fluid removed. Fluid was transudative in nature, cx were negative. Effusion reoccurred and chest tube was placed 8/4/22 - to stay in place until drainage is less than 100ccs/24 hours. Due to 2x positive cx central line was removed 8/4/22. Pt is stable off pressors. Pt had vulvuloplasty 8/9, chest tube removed 8/10.         PAST MEDICAL & SURGICAL HISTORY:  HTN (hypertension)      Mitral valve prolapse      Enlarged heart      Murmur      Hyperthyroidism      Arthritis      HTN (hypertension)      Diverticulosis      Hiatal hernia      Acid reflux      Liver cancer  s/p resection and s/p chemo and radiation      Aortic stenosis  s/p ballon aortic valuloplasty 5/25/21      H/O resection of liver  liver cancer      Status post cholecystectomy          Hospital Course:    TODAY'S SUBJECTIVE & REVIEW OF SYMPTOMS:     Constitutional WNL   Cardio WNL   Resp sob   GI WNL  Heme WNL  Endo WNL  Skin WNL  MSK Weakness  Neuro WNL  Cognitive WNL  Psych WNL      MEDICATIONS  (STANDING):  aspirin  chewable 81 milliGRAM(s) Oral daily  chlorhexidine 2% Cloths 1 Application(s) Topical daily  enoxaparin Injectable 40 milliGRAM(s) SubCutaneous every 24 hours  furosemide    Tablet 40 milliGRAM(s) Oral daily  hydrocortisone hemorrhoidal Suppository 1 Suppository(s) Rectal daily  levothyroxine 100 MICROGram(s) Oral daily  lidocaine   4% Patch 1 Patch Transdermal daily  losartan 25 milliGRAM(s) Oral daily  pantoprazole    Tablet 40 milliGRAM(s) Oral before breakfast  polyethylene glycol 3350 17 Gram(s) Oral daily  senna 2 Tablet(s) Oral at bedtime    MEDICATIONS  (PRN):  acetaminophen     Tablet .. 650 milliGRAM(s) Oral every 6 hours PRN Moderate Pain (4 - 6)  aluminum hydroxide/magnesium hydroxide/simethicone Suspension 30 milliLiter(s) Oral every 4 hours PRN Dyspepsia  hydrocortisone hemorrhoidal Suppository 1 Suppository(s) Rectal two times a day PRN hemorrhoidal discomfort      FAMILY HISTORY:  No pertinent family history in first degree relatives        Allergies    Cipro (Other)  Levaquin (Rash)  tetracycline (Hives)    Intolerances        SOCIAL HISTORY:    [  ] Etoh  [  ] Smoking  [  ] Substance abuse     Home Environment:  [   ] Home Alone  [ x  ] Lives with Family  [   ] Home Health Aid    Dwelling:  [   ] Apartment  [x   ] Private House  [   ] Adult Home  [   ] Skilled Nursing Facility      [   ] Short Term  [   ] Long Term  [ x  ] Stairs       Elevator [   ]    FUNCTIONAL STATUS PTA: (Check all that apply)  Ambulation: [ x   ]Independent    [   ] Dependent     [   ] Non-Ambulatory  Assistive Device: [   ] SA Cane  [   ]  Q Cane  [ x  ] Walker  [   ]  Wheelchair  ADL : [ x  ] Independent  [    ]  Dependent       Vital Signs Last 24 Hrs  T(C): 36.1 (10 Aug 2022 12:00), Max: 36.1 (10 Aug 2022 12:00)  T(F): 97 (10 Aug 2022 12:00), Max: 97 (10 Aug 2022 12:00)  HR: 60 (10 Aug 2022 14:00) (48 - 79)  BP: 110/53 (10 Aug 2022 14:00) (110/53 - 112/55)  BP(mean): 76 (10 Aug 2022 14:00) (76 - 79)  RR: 18 (10 Aug 2022 14:00) (13 - 42)  SpO2: 96% (10 Aug 2022 14:00) (94% - 100%)    Parameters below as of 10 Aug 2022 15:00  Patient On (Oxygen Delivery Method): room air          PHYSICAL EXAM: Awake & Alert  GENERAL: NAD  HEAD:  Normocephalic  CHEST/LUNG: Clear   HEART: S1S2+  ABDOMEN: Soft, Nontender  EXTREMITIES:  no calf tenderness    NERVOUS SYSTEM:  Cranial Nerves 2-12 intact [   ] Abnormal  [   ]  ROM: WFL all extremities [ x  ]  Abnormal [   ]  Motor Strength: WFL all extremities  [   ]  Abnormal [  x ]4-5/5 all ext  Sensation: intact to light touch [  x ] Abnormal [   ]    FUNCTIONAL STATUS:  Bed Mobility: Independent [   ]  Supervision [   ]  Needs Assistance [ x  ]  N/A [   ]  Transfers: Independent [   ]  Supervision [   ]  Needs Assistance [x   ]  N/A [   ]   Ambulation: Independent [   ]  Supervision [   ]  Needs Assistance [ x  ]  N/A [   ]  ADL: Independent [   ] Requires Assistance [   ] N/A [   ]      LABS:                        8.0    4.03  )-----------( 101      ( 10 Aug 2022 04:40 )             25.9     08-10    141  |  107  |  24<H>  ----------------------------<  78  4.3   |  25  |  0.7    Ca    8.7      10 Aug 2022 04:40  Phos  3.0     08-10  Mg     2.1     08-10    TPro  5.3<L>  /  Alb  3.0<L>  /  TBili  0.4  /  DBili  x   /  AST  28  /  ALT  17  /  AlkPhos  121<H>  08-10          RADIOLOGY & ADDITIONAL STUDIES:

## 2022-08-10 NOTE — CONSULT NOTE ADULT - CONSULT REASON
Sepsis secondary to pneumonia
deconditioning
Anemia
CHf
anemia
sob
poor po intake
Respiratory failure; effusions

## 2022-08-10 NOTE — PROGRESS NOTE ADULT - ASSESSMENT
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 Euvolemic  # AHRF s/p recurrent L sided effusion s/p pigtail  #Hemorrhoids with Constipation  #Hypothyroidism    Respiratory   - Moderate left pleural effusion seen on CT angio s/p thoracocentesis 8/1/22 - 1 L fluid  - Pleural effusion reoccurrence 8/2 - approximately 800ccs - Pigtail placed 8/4/22  - Tylenol and lidocaine patch for pain  - Chest tube removed  - Incentive spirometer    ID  - CXR 7/30 suspicious for PNA, with initial fever, elevated white count, and hypotension    - 7/30 procal .51, 8/4 0.18, WBC WNL since 8/1, Urine cx (-)  - 7/30 grew staph epidermidis, 8/1 gram (+) cocci in clusters, 8/5 cx - pending; pt given vanc and cefepime per ID   -per ID 8/8/22 dc abx  due to negative Bcx 8/4 and 8/5  - Hold Abx per ID      Cardiovascular  - TAVR workup with Dr. Mccollum  - Balloon aortic valvuloplasty 8/9/22  - Discontinued Torsemide  - Cont midodrine to 5mg daily for now, should be able to wean off after valvuloplasty  - Keep MAP >65    Hematologic   - Trend CBC Daily. 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 Euvolemic  # AHRF s/p recurrent L sided effusion s/p pigtail  #Hemorrhoids with Constipation  #Hypothyroidism    Respiratory   - Moderate left pleural effusion seen on CT angio s/p thoracocentesis 8/1/22 - 1 L fluid  - Pleural effusion reoccurrence 8/2 - approximately 800ccs - Pigtail placed 8/4/22  - Tylenol and lidocaine patch for pain  - Chest tube removed  - Incentive spirometer    ID  - CXR 7/30 suspicious for PNA, with initial fever, elevated white count, and hypotension    - 7/30 procal .51, 8/4 0.18, WBC WNL since 8/1, Urine cx (-)  - 7/30 grew staph epidermidis, 8/1 gram (+) cocci in clusters, 8/5 cx - pending; pt given vanc and cefepime per ID   -per ID 8/8/22 dc abx  due to negative Bcx 8/4 and 8/5  - Hold Abx per ID      Cardiovascular  - TAVR workup with Dr. Mccollum  - Balloon aortic valvuloplasty 8/9/22  - Discontinued Torsemide  - Cont midodrine to 5mg daily for now, should be able to wean off after valvuloplasty  - Keep MAP >65  - Removed TVP    Hematologic   - Trend CBC Daily. 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 Euvolemic  # AHRF s/p recurrent L sided effusion s/p pigtail  #Hemorrhoids with Constipation  #Hypothyroidism    Respiratory   - Moderate left pleural effusion seen on CT angio s/p thoracocentesis 8/1/22 - 1 L fluid  - Pleural effusion reoccurrence 8/2 - approximately 800ccs - Pigtail placed 8/4/22  - Tylenol and lidocaine patch for pain  - Chest tube removed  - Incentive spirometer    ID  - CXR 7/30 suspicious for PNA, with initial fever, elevated white count, and hypotension    - 7/30 procal .51, 8/4 0.18, WBC WNL since 8/1, Urine cx (-)  - 7/30 grew staph epidermidis, 8/1 gram (+) cocci in clusters, 8/5 cx - pending; pt given vanc and cefepime per ID   -per ID 8/8/22 dc abx  due to negative Bcx 8/4 and 8/5  - Hold Abx per ID      Cardiovascular  - TAVR workup with Dr. Mccollum  - Balloon aortic valvuloplasty 8/9/22  - Discontinued Torsemide  - Cont midodrine to 5mg daily for now, should be able to wean off after valvuloplasty  - Keep MAP >65  - Removed TVP    Hematologic   - Trend CBC Daily. 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  - Continue hemorrhoidal suppository   -Continue Senna 2 tabs HS  -Tylenol 650 mg PO PRN for rectal pain  - Protonix  - Add ensure to diet      Endocrine  - Continue Levothyroxine 100 mcg PO daily  - TSH 4.36 (6/19/22)  - Monitor FS    Renal  - Strict I+O, Daily standing weights  - maintain electrolytes, K>4 Mg >2    Neurological  - Avoid sedating medications      PT/Rehab  - On board    Lines/Gruber  - Midline: 8/4  - Peripheral IV: 8/4  - Primafit

## 2022-08-10 NOTE — CONSULT NOTE ADULT - PROVIDER SPECIALTY LIST ADULT
Gastroenterology
Nutrition Support
Physiatry
Infectious Disease
Gastroenterology
Cardiology
Pulmonology
Critical Care

## 2022-08-10 NOTE — CONSULT NOTE ADULT - REASON FOR ADMISSION
Dyspnea w/ Exertion

## 2022-08-10 NOTE — CONSULT NOTE ADULT - ASSESSMENT
IMPRESSION: Rehab of debilitation / Severe AS, mean gradient ~ s/p balloon valvuloplasty    PRECAUTIONS: [   ] Cardiac  [   ] Respiratory  [   ] Seizures [   ] Contact Isolation  [   ] Droplet Isolation  [   ] Other    Weight Bearing Status:     RECOMMENDATION:    Out of Bed to Chair     DVT/Decubiti Prophylaxis    REHAB PLAN:     [   x ] Bedside P/T 3-5 times a week   [    ]   Bedside O/T  2-3 times a week             [    ] Speech Therapy               [    ]  No Rehab Therapy Indicated   Conditioning/ROM                                    ADL  Bed Mobility                                               Conditioning/ROM  Transfers                                                     Bed Mobility  Sitting /Standing Balance                         Transfers                                        Gait Training                                               Sitting/Standing Balance  Stair Training [   ]Applicable                    Home equipment Eval                                                                        Splinting  [   ] Only      GOALS:   ADL   [    ]   Independent                    Transfers  [  x  ] Independent                          Ambulation  [  x  ] Independent     [    x ] With device                            [    ]  CG                                                         [    ]  CG                                                                  [    ] CG                            [    ] Min A                                                   [    ] Min A                                                              [    ] Min  A          DISCHARGE PLAN:   [    ]  Good candidate for Intensive Rehabilitation/Hospital based                                             Will tolerate 3hrs Intensive Rehab Daily                                       [   x  ]  Short Term Rehab in Skilled Nursing Facility                                       [     ]  Home with Outpatient or  services                                         [     ]  Possible Candidate for Intensive Hospital based Rehab

## 2022-08-10 NOTE — CONSULT NOTE ADULT - CONSULT REQUESTED DATE/TIME
01-Aug-2022 11:02
09-Jul-2022 19:37
18-Jul-2022 19:17
09-Jul-2022 09:35
11-Jul-2022 08:11
12-Jul-2022 10:52
01-Aug-2022 08:21
10-Aug-2022 14:37

## 2022-08-10 NOTE — PROGRESS NOTE ADULT - SUBJECTIVE AND OBJECTIVE BOX
Patient is a 89y old  Female who presents with a chief complaint of Dyspnea w/ Exertion (09 Aug 2022 07:11)      HPI  89 y/o woman w PMHx of HFpEF, severe AS s/p balloon valvuloplasty , anemia s/p multiple transfusions 2 months ago, MVP, HTN, hyperthyroidism, HCC s/p partial liver resection, transferred from [facility] to [place] for TAVR evaluation by Dr. Mccollum.  Patient found to be anemic most likely d/t GI bleed now s/p 1PRBC transfusion for Hgb < 8/anemia. GI, Dr. Woo consulted at Dr. Mccollum' request.  Capsule study completed.  S/p colonoscopy  with successful AVM intervention. Diuretics largely remain on hold for soft BP (SBP < 100).  Ultimately, plan is for TAVR pending conditioning. Patient was waiting for subacute rehab placement with plans for TAVR as outpatient when patient's conditioning improves.    Patient , became acutely dyspneic, hypertensive 170/79 and tachycardic to 140s w/ crackles and wheezes to lungs throughout. Bipap started, with some improvement noted and patient was on nitro drip. Transfer to CCU initiated. In CCU: became febrile 100.4, elevated WBC, CXR showed possible pneumonia, given cefepime and vanc. Blood cx () grew staph epidermidis (MR)  Then became hypotensive down to 50s systolic- d/c nitro drip, given 1 bolus, requiring pressor support. (phenylepherine), then went into afib RVR (140s-150s). Patient on bipap during event; O2 requirements have decreased during stay. Continued on phenylepherine drip and midodrine for pressor support. Patient has remained afebrile, WBC WNL, pressor requirements have decreased, she is setting well on room air, WBC count has remained WNL. Repeat cx from  grew Gram (+) cocci. Patient also has pleural effusion first thoracocentesis was on  1L fluid removed. Fluid was transudative in nature, cx were negative. Effusion reoccurred and chest tube was placed 22 - to stay in place until drainage is less than 100ccs/24 hours. Due to 2x positive cx central line was removed 22. Pt is stable off pressors. Pt had vulvuloplasty , chest tube removed 8/10.       INTERVAL HPI/OVERNIGHT EVENTS:   chest tube removed this AM.   Afebrile, hemodynamically stable     Subjective:  Physical exam:   General: Pt in NAD, resting comfortably in bed  Cardiac: normal rate and rhythm, AS murmur no longer audible.  Respiratory: vesicular breath sounds bilaterally, no accessory muscle use. no wheezes, crackles or rhonchi  abdominal: soft, nontender, nondistended  Extremities: no edema, no cyanosis, no clubbing  Neuro: A&O x3   Lines: peripheral IV line in [facility]     ICU Vital Signs Last 24 Hrs  T(C): 35.8 (10 Aug 2022 08:00), Max: 35.9 (10 Aug 2022 04:00)  T(F): 96.4 (10 Aug 2022 08:00), Max: 96.7 (10 Aug 2022 04:00)  HR: 75 (10 Aug 2022 09:00) (48 - 81)  BP: 112/55 (09 Aug 2022 16:00) (106/57 - 112/55)  BP(mean): 79 (09 Aug 2022 16:00) (74 - 79)  ABP: 148/63 (10 Aug 2022 09:00) (91/37 - 148/63)  ABP(mean): 95 (10 Aug 2022 09:00) (56 - 128)  RR: 42 (10 Aug 2022 09:00) (13 - 42)  SpO2: 96% (10 Aug 2022 09:00) (94% - 100%)    O2 Parameters below as of 10 Aug 2022 09:00  Patient On (Oxygen Delivery Method): room air          I&O's Summary    09 Aug 2022 07:01  -  10 Aug 2022 07:00  --------------------------------------------------------  IN: 1720 mL / OUT: 1100 mL / NET: 620 mL    10 Aug 2022 07:01  -  10 Aug 2022 11:54  --------------------------------------------------------  IN: 480 mL / OUT: 70 mL / NET: 410 mL          Daily Height in cm: 149.86 (09 Aug 2022 13:31)    Daily Weight in k.1 (10 Aug 2022 06:00)    Adult Advanced Hemodynamics Last 24 Hrs  CVP(mm Hg): --  CVP(cm H2O): --  CO: --  CI: --  PA: --  PA(mean): --  PCWP: --  SVR: --  SVRI: --  PVR: --  PVRI: --    EKG/Telemetry Events:    MEDICATIONS  (STANDING):  aspirin  chewable 81 milliGRAM(s) Oral daily  chlorhexidine 2% Cloths 1 Application(s) Topical daily  enoxaparin Injectable 40 milliGRAM(s) SubCutaneous every 24 hours  furosemide    Tablet 40 milliGRAM(s) Oral daily  hydrocortisone hemorrhoidal Suppository 1 Suppository(s) Rectal daily  levothyroxine 100 MICROGram(s) Oral daily  lidocaine   4% Patch 1 Patch Transdermal daily  losartan 25 milliGRAM(s) Oral daily  pantoprazole    Tablet 40 milliGRAM(s) Oral before breakfast  polyethylene glycol 3350 17 Gram(s) Oral daily  senna 2 Tablet(s) Oral at bedtime    MEDICATIONS  (PRN):  acetaminophen     Tablet .. 650 milliGRAM(s) Oral every 6 hours PRN Moderate Pain (4 - 6)  aluminum hydroxide/magnesium hydroxide/simethicone Suspension 30 milliLiter(s) Oral every 4 hours PRN Dyspepsia  hydrocortisone hemorrhoidal Suppository 1 Suppository(s) Rectal two times a day PRN hemorrhoidal discomfort        LABS:                        8.0    4.03  )-----------( 101      ( 10 Aug 2022 04:40 )             25.9     08-10    141  |  107  |  24<H>  ----------------------------<  78  4.3   |  25  |  0.7    Ca    8.7      10 Aug 2022 04:40  Phos  3.0     08-10  Mg     2.1     08-10    TPro  5.3<L>  /  Alb  3.0<L>  /  TBili  0.4  /  DBili  x   /  AST  28  /  ALT  17  /  AlkPhos  121<H>  08-10    LIVER FUNCTIONS - ( 10 Aug 2022 04:40 )  Alb: 3.0 g/dL / Pro: 5.3 g/dL / ALK PHOS: 121 U/L / ALT: 17 U/L / AST: 28 U/L / GGT: x             CAPILLARY BLOOD GLUCOSE        ABG - ( 09 Aug 2022 14:18 )  pH, Arterial: 7.43  pH, Blood: x     /  pCO2: 38    /  pO2: 219   / HCO3: 25    / Base Excess: 0.9   /  SaO2: 98.9                          RADIOLOGY & ADDITIONAL TESTS:     CXR 8/10 Stable bilateral opacities/effusions. No pneumothorax.      Care Discussed with Consultants/Other Providers [ x] YES  [ ] NO           Patient is a 89y old  Female who presents with a chief complaint of Dyspnea w/ Exertion (09 Aug 2022 07:11)        HPI  88 y/o woman w PMHx of HFpEF, severe AS s/p balloon valvuloplasty , anemia s/p multiple transfusions 2 months ago, MVP, HTN, hyperthyroidism, HCC s/p partial liver resection, transferred from [facility] to [place] for TAVR evaluation by Dr. Mccollum.  Patient found to be anemic most likely d/t GI bleed now s/p 1PRBC transfusion for Hgb < 8/anemia. GI, Dr. Woo consulted at Dr. Mccollum' request.  Capsule study completed.  S/p colonoscopy  with successful AVM intervention. Diuretics largely remain on hold for soft BP (SBP < 100).  Ultimately, plan is for TAVR pending conditioning. Patient was waiting for subacute rehab placement with plans for TAVR as outpatient when patient's conditioning improves.    Patient , became acutely dyspneic, hypertensive 170/79 and tachycardic to 140s w/ crackles and wheezes to lungs throughout. Bipap started, with some improvement noted and patient was on nitro drip. Transfer to CCU initiated. In CCU: became febrile 100.4, elevated WBC, CXR showed possible pneumonia, given cefepime and vanc. Blood cx () grew staph epidermidis (MR)  Then became hypotensive down to 50s systolic- d/c nitro drip, given 1 bolus, requiring pressor support. (phenylepherine), then went into afib RVR (140s-150s). Patient on bipap during event; O2 requirements have decreased during stay. Continued on phenylepherine drip and midodrine for pressor support. Patient has remained afebrile, WBC WNL, pressor requirements have decreased, she is setting well on room air, WBC count has remained WNL. Repeat cx from  grew Gram (+) cocci. Patient also has pleural effusion first thoracocentesis was on  1L fluid removed. Fluid was transudative in nature, cx were negative. Effusion reoccurred and chest tube was placed 22 - to stay in place until drainage is less than 100ccs/24 hours. Due to 2x positive cx central line was removed 22. Pt is stable off pressors. Pt had vulvuloplasty , chest tube removed 8/10.       INTERVAL HPI/OVERNIGHT EVENTS:   chest tube removed this AM.   Afebrile, hemodynamically stable     Subjective:  Physical exam:   General: Pt in NAD, resting comfortably in bed  Cardiac: normal rate and rhythm, AS murmur no longer audible.  Respiratory: vesicular breath sounds bilaterally, no accessory muscle use. no wheezes, crackles or rhonchi  abdominal: soft, nontender, nondistended  Extremities: no edema, no cyanosis, no clubbing  Neuro: A&O x3   Lines: peripheral IV line in [facility]     ICU Vital Signs Last 24 Hrs  T(C): 35.8 (10 Aug 2022 08:00), Max: 35.9 (10 Aug 2022 04:00)  T(F): 96.4 (10 Aug 2022 08:00), Max: 96.7 (10 Aug 2022 04:00)  HR: 75 (10 Aug 2022 09:00) (48 - 81)  BP: 112/55 (09 Aug 2022 16:00) (106/57 - 112/55)  BP(mean): 79 (09 Aug 2022 16:00) (74 - 79)  ABP: 148/63 (10 Aug 2022 09:00) (91/37 - 148/63)  ABP(mean): 95 (10 Aug 2022 09:00) (56 - 128)  RR: 42 (10 Aug 2022 09:00) (13 - 42)  SpO2: 96% (10 Aug 2022 09:00) (94% - 100%)    O2 Parameters below as of 10 Aug 2022 09:00  Patient On (Oxygen Delivery Method): room air          I&O's Summary    09 Aug 2022 07:01  -  10 Aug 2022 07:00  --------------------------------------------------------  IN: 1720 mL / OUT: 1100 mL / NET: 620 mL    10 Aug 2022 07:01  -  10 Aug 2022 11:54  --------------------------------------------------------  IN: 480 mL / OUT: 70 mL / NET: 410 mL          Daily Height in cm: 149.86 (09 Aug 2022 13:31)    Daily Weight in k.1 (10 Aug 2022 06:00)    Adult Advanced Hemodynamics Last 24 Hrs  CVP(mm Hg): --  CVP(cm H2O): --  CO: --  CI: --  PA: --  PA(mean): --  PCWP: --  SVR: --  SVRI: --  PVR: --  PVRI: --    EKG/Telemetry Events:    MEDICATIONS  (STANDING):  aspirin  chewable 81 milliGRAM(s) Oral daily  chlorhexidine 2% Cloths 1 Application(s) Topical daily  enoxaparin Injectable 40 milliGRAM(s) SubCutaneous every 24 hours  furosemide    Tablet 40 milliGRAM(s) Oral daily  hydrocortisone hemorrhoidal Suppository 1 Suppository(s) Rectal daily  levothyroxine 100 MICROGram(s) Oral daily  lidocaine   4% Patch 1 Patch Transdermal daily  losartan 25 milliGRAM(s) Oral daily  pantoprazole    Tablet 40 milliGRAM(s) Oral before breakfast  polyethylene glycol 3350 17 Gram(s) Oral daily  senna 2 Tablet(s) Oral at bedtime    MEDICATIONS  (PRN):  acetaminophen     Tablet .. 650 milliGRAM(s) Oral every 6 hours PRN Moderate Pain (4 - 6)  aluminum hydroxide/magnesium hydroxide/simethicone Suspension 30 milliLiter(s) Oral every 4 hours PRN Dyspepsia  hydrocortisone hemorrhoidal Suppository 1 Suppository(s) Rectal two times a day PRN hemorrhoidal discomfort        LABS:                        8.0    4.03  )-----------( 101      ( 10 Aug 2022 04:40 )             25.9     08-10    141  |  107  |  24<H>  ----------------------------<  78  4.3   |  25  |  0.7    Ca    8.7      10 Aug 2022 04:40  Phos  3.0     08-10  Mg     2.1     08-10    TPro  5.3<L>  /  Alb  3.0<L>  /  TBili  0.4  /  DBili  x   /  AST  28  /  ALT  17  /  AlkPhos  121<H>  08-10    LIVER FUNCTIONS - ( 10 Aug 2022 04:40 )  Alb: 3.0 g/dL / Pro: 5.3 g/dL / ALK PHOS: 121 U/L / ALT: 17 U/L / AST: 28 U/L / GGT: x             CAPILLARY BLOOD GLUCOSE        ABG - ( 09 Aug 2022 14:18 )  pH, Arterial: 7.43  pH, Blood: x     /  pCO2: 38    /  pO2: 219   / HCO3: 25    / Base Excess: 0.9   /  SaO2: 98.9                          RADIOLOGY & ADDITIONAL TESTS:     CXR 8/10 Stable bilateral opacities/effusions. No pneumothorax.      Care Discussed with Consultants/Other Providers [ x] YES  [ ] NO

## 2022-08-11 LAB
ALBUMIN SERPL ELPH-MCNC: 3.4 G/DL — LOW (ref 3.5–5.2)
ALP SERPL-CCNC: 146 U/L — HIGH (ref 30–115)
ALT FLD-CCNC: 19 U/L — SIGNIFICANT CHANGE UP (ref 0–41)
ANION GAP SERPL CALC-SCNC: 9 MMOL/L — SIGNIFICANT CHANGE UP (ref 7–14)
AST SERPL-CCNC: 28 U/L — SIGNIFICANT CHANGE UP (ref 0–41)
BASOPHILS # BLD AUTO: 0.03 K/UL — SIGNIFICANT CHANGE UP (ref 0–0.2)
BASOPHILS NFR BLD AUTO: 0.5 % — SIGNIFICANT CHANGE UP (ref 0–1)
BILIRUB SERPL-MCNC: 0.3 MG/DL — SIGNIFICANT CHANGE UP (ref 0.2–1.2)
BUN SERPL-MCNC: 32 MG/DL — HIGH (ref 10–20)
CALCIUM SERPL-MCNC: 9 MG/DL — SIGNIFICANT CHANGE UP (ref 8.5–10.1)
CHLORIDE SERPL-SCNC: 105 MMOL/L — SIGNIFICANT CHANGE UP (ref 98–110)
CO2 SERPL-SCNC: 26 MMOL/L — SIGNIFICANT CHANGE UP (ref 17–32)
CREAT SERPL-MCNC: 1 MG/DL — SIGNIFICANT CHANGE UP (ref 0.7–1.5)
CULTURE RESULTS: SIGNIFICANT CHANGE UP
EGFR: 54 ML/MIN/1.73M2 — LOW
EOSINOPHIL # BLD AUTO: 0.12 K/UL — SIGNIFICANT CHANGE UP (ref 0–0.7)
EOSINOPHIL NFR BLD AUTO: 2.2 % — SIGNIFICANT CHANGE UP (ref 0–8)
GLUCOSE SERPL-MCNC: 95 MG/DL — SIGNIFICANT CHANGE UP (ref 70–99)
HCT VFR BLD CALC: 26.4 % — LOW (ref 37–47)
HGB BLD-MCNC: 8.1 G/DL — LOW (ref 12–16)
IMM GRANULOCYTES NFR BLD AUTO: 0.5 % — HIGH (ref 0.1–0.3)
LYMPHOCYTES # BLD AUTO: 0.91 K/UL — LOW (ref 1.2–3.4)
LYMPHOCYTES # BLD AUTO: 16.4 % — LOW (ref 20.5–51.1)
MAGNESIUM SERPL-MCNC: 2 MG/DL — SIGNIFICANT CHANGE UP (ref 1.8–2.4)
MCHC RBC-ENTMCNC: 29.3 PG — SIGNIFICANT CHANGE UP (ref 27–31)
MCHC RBC-ENTMCNC: 30.7 G/DL — LOW (ref 32–37)
MCV RBC AUTO: 95.7 FL — SIGNIFICANT CHANGE UP (ref 81–99)
MONOCYTES # BLD AUTO: 0.44 K/UL — SIGNIFICANT CHANGE UP (ref 0.1–0.6)
MONOCYTES NFR BLD AUTO: 7.9 % — SIGNIFICANT CHANGE UP (ref 1.7–9.3)
NEUTROPHILS # BLD AUTO: 4.03 K/UL — SIGNIFICANT CHANGE UP (ref 1.4–6.5)
NEUTROPHILS NFR BLD AUTO: 72.5 % — SIGNIFICANT CHANGE UP (ref 42.2–75.2)
NRBC # BLD: 0 /100 WBCS — SIGNIFICANT CHANGE UP (ref 0–0)
PHOSPHATE SERPL-MCNC: 3 MG/DL — SIGNIFICANT CHANGE UP (ref 2.1–4.9)
PLATELET # BLD AUTO: 96 K/UL — LOW (ref 130–400)
POTASSIUM SERPL-MCNC: 4.9 MMOL/L — SIGNIFICANT CHANGE UP (ref 3.5–5)
POTASSIUM SERPL-SCNC: 4.9 MMOL/L — SIGNIFICANT CHANGE UP (ref 3.5–5)
PROT SERPL-MCNC: 5.9 G/DL — LOW (ref 6–8)
RBC # BLD: 2.76 M/UL — LOW (ref 4.2–5.4)
RBC # FLD: 20.5 % — HIGH (ref 11.5–14.5)
SODIUM SERPL-SCNC: 140 MMOL/L — SIGNIFICANT CHANGE UP (ref 135–146)
SPECIMEN SOURCE: SIGNIFICANT CHANGE UP
WBC # BLD: 5.56 K/UL — SIGNIFICANT CHANGE UP (ref 4.8–10.8)
WBC # FLD AUTO: 5.56 K/UL — SIGNIFICANT CHANGE UP (ref 4.8–10.8)

## 2022-08-11 PROCEDURE — 71045 X-RAY EXAM CHEST 1 VIEW: CPT | Mod: 26

## 2022-08-11 PROCEDURE — 99291 CRITICAL CARE FIRST HOUR: CPT

## 2022-08-11 PROCEDURE — 93010 ELECTROCARDIOGRAM REPORT: CPT

## 2022-08-11 RX ADMIN — Medication 100 MICROGRAM(S): at 05:29

## 2022-08-11 RX ADMIN — CHLORHEXIDINE GLUCONATE 1 APPLICATION(S): 213 SOLUTION TOPICAL at 05:28

## 2022-08-11 RX ADMIN — LIDOCAINE 1 PATCH: 4 CREAM TOPICAL at 17:32

## 2022-08-11 RX ADMIN — PANTOPRAZOLE SODIUM 40 MILLIGRAM(S): 20 TABLET, DELAYED RELEASE ORAL at 05:30

## 2022-08-11 RX ADMIN — LIDOCAINE 1 PATCH: 4 CREAM TOPICAL at 12:18

## 2022-08-11 RX ADMIN — ENOXAPARIN SODIUM 40 MILLIGRAM(S): 100 INJECTION SUBCUTANEOUS at 12:22

## 2022-08-11 RX ADMIN — Medication 81 MILLIGRAM(S): at 12:22

## 2022-08-11 RX ADMIN — Medication 40 MILLIGRAM(S): at 05:29

## 2022-08-11 RX ADMIN — Medication 1 SUPPOSITORY(S): at 12:23

## 2022-08-11 RX ADMIN — LOSARTAN POTASSIUM 25 MILLIGRAM(S): 100 TABLET, FILM COATED ORAL at 05:30

## 2022-08-11 RX ADMIN — LIDOCAINE 1 PATCH: 4 CREAM TOPICAL at 23:30

## 2022-08-11 NOTE — PROGRESS NOTE ADULT - ASSESSMENT
· Assessment:  	  #Bacteremia - staph epidermidis-resolved  #Anemia with BRBPR 2/ lower GIB from AVM s/p intervention   #Severe AS, mean gradient ~75mmHg s/p balloon valvuloplasty  # Decompensated HFrEF and severe AS. Euvolemic  # AHRF s/p recurrent L sided effusion s/p pigtail  #Hemorrhoids with Constipation  #Hypothyroidism    Respiratory   - Moderate left pleural effusion seen on CT angio s/p thoracocentesis 8/1/22 - 1 L fluid  - Pleural effusion reoccurrence 8/2 - approximately 800ccs - Pigtail placed 8/4/22  - Tylenol and lidocaine patch for pain  - Chest tube removed  - Incentive spirometer    ID  - CXR 7/30 suspicious for PNA, with initial fever, elevated white count, and hypotension    - 7/30 procal .51, 8/4 0.18, WBC WNL since 8/1, Urine cx (-)  - 7/30 grew staph epidermidis, 8/1 gram (+) cocci in clusters, 8/5 cx - pending; pt given vanc and cefepime per ID   -per ID 8/8/22 dc abx  due to negative Bcx 8/4 and 8/5  - Hold Abx per ID      Cardiovascular  - TAVR workup with Dr. Mccollum  - Balloon aortic valvuloplasty 8/9/22  - Discontinued Torsemide  - Cont midodrine to 5mg daily for now, should be able to wean off after valvuloplasty  - Keep MAP >65  - Removed TVP    Hematologic   - Trend CBC Daily. 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  - Continue hemorrhoidal suppository   -Continue Senna 2 tabs HS  -Tylenol 650 mg PO PRN for rectal pain  - Protonix  - Add ensure to diet      Endocrine  - Continue Levothyroxine 100 mcg PO daily  - TSH 4.36 (6/19/22)  - Monitor FS    Renal  - Strict I+O, Daily standing weights  - maintain electrolytes, K>4 Mg >2    Neurological  - Avoid sedating medications      PT/Rehab  - On board    Lines/Gruber  - Midline: 8/4  - Peripheral IV: 8/4  - Primafit   · Assessment:  	  #Bacteremia - staph epidermidis-resolved  #Anemia with BRBPR 2/ lower GIB from AVM s/p intervention   #Severe AS, mean gradient ~75mmHg s/p balloon valvuloplasty  # Decompensated HFrEF and severe AS. Euvolemic  # AHRF s/p recurrent L sided effusion s/p pigtail  #Hemorrhoids with Constipation  #Hypothyroidism    Respiratory   - Moderate left pleural effusion seen on CT angio s/p thoracocentesis 8/1/22 - 1 L fluid  - Pleural effusion reoccurrence 8/2 - approximately 800ccs - Pigtail placed 8/4/22  - Tylenol and lidocaine patch for pain  - Chest tube removed, pt satting well, no pleural effusion on CXR   - Incentive spirometer    ID  - CXR 7/30 suspicious for PNA, with initial fever, elevated white count, and hypotension    - 7/30 procal .51, 8/4 0.18, WBC WNL since 8/1, Urine cx (-)  - 7/30 grew staph epidermidis, 8/1 gram (+) cocci in clusters, 8/5 cx - pending; pt given vanc and cefepime per ID   -per ID 8/8/22 dc abx  due to negative Bcx 8/4 and 8/5  - Hold Abx per ID      Cardiovascular  - TAVR workup with Dr. Mccollum  - Balloon aortic valvuloplasty 8/9/22  -midodrine dc'd   - Keep MAP >65  - Removed TVP    Hematologic   - Trend CBC Daily. 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  - Continue hemorrhoidal suppository   -Continue Senna 2 tabs HS  -Tylenol 650 mg PO PRN for rectal pain  - Protonix  - Add ensure to diet      Endocrine  - Continue Levothyroxine 100 mcg PO daily  - TSH 4.36 (6/19/22)  - Monitor FS    Renal  - Strict I+O, Daily standing weights  - maintain electrolytes, K>4 Mg >2    Neurological  - Avoid sedating medications      PT/Rehab  - On board  - Physiatry recs- out of bed to chair, will follow with PT/rehab 3-5x a week, DC to short term rehab     Lines/Gruber  - Midline: 8/4  - Peripheral IV: 8/4  - Primafit   · Assessment:  	  #Bacteremia - staph epidermidis-resolved  #Anemia with BRBPR 2/ lower GIB from AVM s/p intervention   #Severe AS, mean gradient ~75mmHg s/p balloon valvuloplasty  # Decompensated HFrEF and severe AS. Euvolemic  # AHRF s/p recurrent L sided effusion s/p pigtail  #Hemorrhoids with Constipation  #Hypothyroidism    Respiratory   - Moderate left pleural effusion seen on CT angio s/p thoracocentesis 8/1/22 - 1 L fluid  - Pleural effusion reoccurrence 8/2 - approximately 800ccs - Pigtail placed 8/4/22  - Tylenol and lidocaine patch for pain  - Chest tube removed, pt satting well, no pleural effusion on CXR   - Incentive spirometer    ID  - CXR 7/30 suspicious for PNA, with initial fever, elevated white count, and hypotension    - 7/30 procal .51, 8/4 0.18, WBC WNL since 8/1, Urine cx (-)  - 7/30 grew staph epidermidis, 8/1 gram (+) cocci in clusters, 8/5 cx - pending; pt given vanc and cefepime per ID   -per ID 8/8/22 dc abx  due to negative Bcx 8/4 and 8/5  - Hold Abx per ID      Cardiovascular  - TAVR workup with Dr. Mccollum  - Balloon aortic valvuloplasty 8/9/22  -midodrine dc'd   - Keep MAP >65  - Removed TVP  - Lasix 40mg daily  - Losartan 25mg daliy    Hematologic   - Trend CBC Daily. 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  - Continue hemorrhoidal suppository   -Continue Senna 2 tabs HS  -Tylenol 650 mg PO PRN for rectal pain  - Protonix  - Add ensure to diet      Endocrine  - Continue Levothyroxine 100 mcg PO daily  - TSH 4.36 (6/19/22)  - Monitor FS    Renal  - Strict I+O, Daily standing weights  - maintain electrolytes, K>4 Mg >2    Neurological  - Avoid sedating medications      PT/Rehab  - On board  - Physiatry recs- out of bed to chair, will follow with PT/rehab 3-5x a week, DC to short term rehab     Downgrade to Cardiotelemetry  Pending SNF approval · Assessment:  	    #Bacteremia - staph epidermidis-resolved  #Anemia with BRBPR 2/ lower GIB from AVM s/p intervention   #Severe AS, mean gradient ~75mmHg s/p balloon valvuloplasty  # Decompensated HFrEF and severe AS. Euvolemic  # AHRF s/p recurrent L sided effusion s/p pigtail  #Hemorrhoids with Constipation  #Hypothyroidism    Respiratory:   - Moderate left pleural effusion seen on CT angio s/p thoracocentesis 8/1/22 - 1 L fluid  - Pleural effusion reoccurrence 8/2 - approximately 800ccs - Pigtail placed 8/4/22  - Tylenol and lidocaine patch for pain  - Chest tube removed, pt satting well, no pleural effusion on CXR   - Incentive spirometer    ID  - CXR 7/30 suspicious for PNA, with initial fever, elevated white count, and hypotension    - 7/30 procal .51, 8/4 0.18, WBC WNL since 8/1, Urine cx (-)  - 7/30 grew staph epidermidis, 8/1 gram (+) cocci in clusters, 8/5 cx - pending; pt given vanc and cefepime per ID   -per ID 8/8/22 dc abx  due to negative Bcx 8/4 and 8/5  - Hold Abx per ID      Cardiovascular  - TAVR workup with Dr. Mccollum  - Balloon aortic valvuloplasty 8/9/22  -midodrine dc'd   - Keep MAP >65  - Removed TVP  - Lasix 40mg daily  - Losartan 25mg daliy    Hematologic   - Trend CBC Daily. 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  - Continue hemorrhoidal suppository   -Continue Senna 2 tabs HS  -Tylenol 650 mg PO PRN for rectal pain  - Protonix  - Add ensure to diet      Endocrine  - Continue Levothyroxine 100 mcg PO daily  - TSH 4.36 (6/19/22)  - Monitor FS    Renal  - Strict I+O, Daily standing weights  - maintain electrolytes, K>4 Mg >2    Neurological  - Avoid sedating medications      PT/Rehab  - On board  - Physiatry recs- out of bed to chair, will follow with PT/rehab 3-5x a week, DC to short term rehab     Downgrade to Cardio telemetry vs. Stepdown Unit in the evening, if stable  Pending SNF approval

## 2022-08-11 NOTE — CHART NOTE - NSCHARTNOTESELECT_GEN_ALL_CORE
Cardiology NP/Transfer Note
Cardiology/Event Note
Gastroenterology/Event Note
Vanc Level/Event Note
Event Note
PACU Admission/Event Note
Transfer Note
vanc level/Event Note
Transfer Note

## 2022-08-11 NOTE — PROGRESS NOTE ADULT - SUBJECTIVE AND OBJECTIVE BOX
Patient is a 89y old  Female who presents with a chief complaint of Dyspnea w/ Exertion (10 Aug 2022 14:37)      HPI      INTERVAL HPI/OVERNIGHT EVENTS:   No overnight events   Afebrile, hemodynamically stable     Subjective:    ICU Vital Signs Last 24 Hrs  T(C): 36.7 (10 Aug 2022 20:00), Max: 36.8 (10 Aug 2022 16:00)  T(F): 98 (10 Aug 2022 20:00), Max: 98.2 (10 Aug 2022 16:00)  HR: 56 (11 Aug 2022 07:00) (56 - 80)  BP: 100/51 (11 Aug 2022 07:00) (84/49 - 154/63)  BP(mean): 73 (11 Aug 2022 07:) (61 - 91)  ABP: 115/42 (10 Aug 2022 13:00) (115/42 - 148/63)  ABP(mean): 67 (10 Aug 2022 13:00) (67 - 99)  RR: 33 (11 Aug 2022 07:) (16 - 51)  SpO2: 97% (11 Aug 2022 07:) (96% - 100%)    O2 Parameters below as of 11 Aug 2022 00:00  Patient On (Oxygen Delivery Method): room air          I&O's Summary    10 Aug 2022 07:01  -  11 Aug 2022 07:00  --------------------------------------------------------  IN: 480 mL / OUT: 870 mL / NET: -390 mL          Daily     Daily Weight in k.6 (11 Aug 2022 06:00)    Adult Advanced Hemodynamics Last 24 Hrs  CVP(mm Hg): --  CVP(cm H2O): --  CO: --  CI: --  PA: --  PA(mean): --  PCWP: --  SVR: --  SVRI: --  PVR: --  PVRI: --    EKG/Telemetry Events:    MEDICATIONS  (STANDING):  aspirin  chewable 81 milliGRAM(s) Oral daily  chlorhexidine 2% Cloths 1 Application(s) Topical daily  enoxaparin Injectable 40 milliGRAM(s) SubCutaneous every 24 hours  furosemide    Tablet 40 milliGRAM(s) Oral daily  hydrocortisone hemorrhoidal Suppository 1 Suppository(s) Rectal daily  levothyroxine 100 MICROGram(s) Oral daily  lidocaine   4% Patch 1 Patch Transdermal daily  losartan 25 milliGRAM(s) Oral daily  pantoprazole    Tablet 40 milliGRAM(s) Oral before breakfast  polyethylene glycol 3350 17 Gram(s) Oral daily  senna 2 Tablet(s) Oral at bedtime    MEDICATIONS  (PRN):  acetaminophen     Tablet .. 650 milliGRAM(s) Oral every 6 hours PRN Moderate Pain (4 - 6)  aluminum hydroxide/magnesium hydroxide/simethicone Suspension 30 milliLiter(s) Oral every 4 hours PRN Dyspepsia  hydrocortisone hemorrhoidal Suppository 1 Suppository(s) Rectal two times a day PRN hemorrhoidal discomfort        LABS:                        8.1    5.56  )-----------( 96       ( 11 Aug 2022 05:46 )             26.4     08-11    140  |  105  |  32<H>  ----------------------------<  95  4.9   |  26  |  1.0    Ca    9.0      11 Aug 2022 05:46  Phos  3.0     08-  Mg     2.0         TPro  5.9<L>  /  Alb  3.4<L>  /  TBili  0.3  /  DBili  x   /  AST  28  /  ALT  19  /  AlkPhos  146<H>  08-11    LIVER FUNCTIONS - ( 11 Aug 2022 05:46 )  Alb: 3.4 g/dL / Pro: 5.9 g/dL / ALK PHOS: 146 U/L / ALT: 19 U/L / AST: 28 U/L / GGT: x             CAPILLARY BLOOD GLUCOSE        ABG - ( 09 Aug 2022 14:18 )  pH, Arterial: 7.43  pH, Blood: x     /  pCO2: 38    /  pO2: 219   / HCO3: 25    / Base Excess: 0.9   /  SaO2: 98.9                          RADIOLOGY & ADDITIONAL TESTS:         Care Discussed with Consultants/Other Providers [ x] YES  [ ] NO           Patient is a 89y old  Female who presents with a chief complaint of Dyspnea w/ Exertion (10 Aug 2022 14:37)      HPI  88 y/o woman w PMHx of HFpEF, severe AS s/p balloon valvuloplasty , anemia s/p multiple transfusions 2 months ago, MVP, HTN, hyperthyroidism, HCC s/p partial liver resection, transferred from [facility] to [place] for TAVR evaluation by Dr. Mccollum.  Patient found to be anemic most likely d/t GI bleed now s/p 1PRBC transfusion for Hgb < 8/anemia. GI, Dr. Woo consulted at Dr. Mccollum' request.  Capsule study completed.  S/p colonoscopy  with successful AVM intervention. Diuretics largely remain on hold for soft BP (SBP < 100).  Ultimately, plan is for TAVR pending conditioning. Patient was waiting for subacute rehab placement with plans for TAVR as outpatient when patient's conditioning improves.    Patient , became acutely dyspneic, hypertensive 170/79 and tachycardic to 140s w/ crackles and wheezes to lungs throughout. Bipap started, with some improvement noted and patient was on nitro drip. Transfer to CCU initiated. In CCU: became febrile 100.4, elevated WBC, CXR showed possible pneumonia, given cefepime and vanc. Blood cx () grew staph epidermidis (MR)  Then became hypotensive down to 50s systolic- d/c nitro drip, given 1 bolus, requiring pressor support. (phenylepherine), then went into afib RVR (140s-150s). Patient on bipap during event; O2 requirements have decreased during stay. Continued on phenylepherine drip and midodrine for pressor support. Patient has remained afebrile, WBC WNL, pressor requirements have decreased, she is setting well on room air, WBC count has remained WNL. Repeat cx from  grew Gram (+) cocci. Patient also has pleural effusion first thoracocentesis was on  1L fluid removed. Fluid was transudative in nature, cx were negative. Effusion reoccurred and chest tube was placed 22 - to stay in place until drainage is less than 100ccs/24 hours. Due to 2x positive cx central line was removed 22. Pt is stable off pressors. Pt had valvuloplasty , chest tube removed 8/10. Pt stable and CXR improving off chest tube.       INTERVAL HPI/OVERNIGHT EVENTS:   No overnight events   Afebrile, hemodynamically stable     Subjective:  Physical exam:   General: Pt in NAD, resting comfortably in bed  Cardiac: normal rate and rhythm, systolic murmur audible.   Respiratory: vesicular breath sounds bilaterally, no accessory muscle use. no wheezes, crackles or rhonchi  abdominal: soft, nontender, nondistended  Extremities: no edema, no cyanosis, no clubbing  Neuro: A&O x3   Lines: peripheral IV line in [facility]     ICU Vital Signs Last 24 Hrs  T(C): 36.7 (10 Aug 2022 20:00), Max: 36.8 (10 Aug 2022 16:00)  T(F): 98 (10 Aug 2022 20:00), Max: 98.2 (10 Aug 2022 16:00)  HR: 56 (11 Aug 2022 07:00) (56 - 80)  BP: 100/51 (11 Aug 2022 07:00) (84/49 - 154/63)  BP(mean): 73 (11 Aug 2022 07:00) (61 - 91)  ABP: 115/42 (10 Aug 2022 13:00) (115/42 - 148/63)  ABP(mean): 67 (10 Aug 2022 13:00) (67 - 99)  RR: 33 (11 Aug 2022 07:00) (16 - 51)  SpO2: 97% (11 Aug 2022 07:00) (96% - 100%)    O2 Parameters below as of 11 Aug 2022 00:00  Patient On (Oxygen Delivery Method): room air          I&O's Summary    10 Aug 2022 07:01  -  11 Aug 2022 07:00  --------------------------------------------------------  IN: 480 mL / OUT: 870 mL / NET: -390 mL          Daily     Daily Weight in k.6 (11 Aug 2022 06:00)    Adult Advanced Hemodynamics Last 24 Hrs  CVP(mm Hg): --  CVP(cm H2O): --  CO: --  CI: --  PA: --  PA(mean): --  PCWP: --  SVR: --  SVRI: --  PVR: --  PVRI: --    EKG/Telemetry Events:    MEDICATIONS  (STANDING):  aspirin  chewable 81 milliGRAM(s) Oral daily  chlorhexidine 2% Cloths 1 Application(s) Topical daily  enoxaparin Injectable 40 milliGRAM(s) SubCutaneous every 24 hours  furosemide    Tablet 40 milliGRAM(s) Oral daily  hydrocortisone hemorrhoidal Suppository 1 Suppository(s) Rectal daily  levothyroxine 100 MICROGram(s) Oral daily  lidocaine   4% Patch 1 Patch Transdermal daily  losartan 25 milliGRAM(s) Oral daily  pantoprazole    Tablet 40 milliGRAM(s) Oral before breakfast  polyethylene glycol 3350 17 Gram(s) Oral daily  senna 2 Tablet(s) Oral at bedtime    MEDICATIONS  (PRN):  acetaminophen     Tablet .. 650 milliGRAM(s) Oral every 6 hours PRN Moderate Pain (4 - 6)  aluminum hydroxide/magnesium hydroxide/simethicone Suspension 30 milliLiter(s) Oral every 4 hours PRN Dyspepsia  hydrocortisone hemorrhoidal Suppository 1 Suppository(s) Rectal two times a day PRN hemorrhoidal discomfort        LABS:                        8.1    5.56  )-----------( 96       ( 11 Aug 2022 05:46 )             26.4         140  |  105  |  32<H>  ----------------------------<  95  4.9   |  26  |  1.0    Ca    9.0      11 Aug 2022 05:46  Phos  3.0       Mg     2.0         TPro  5.9<L>  /  Alb  3.4<L>  /  TBili  0.3  /  DBili  x   /  AST  28  /  ALT  19  /  AlkPhos  146<H>      LIVER FUNCTIONS - ( 11 Aug 2022 05:46 )  Alb: 3.4 g/dL / Pro: 5.9 g/dL / ALK PHOS: 146 U/L / ALT: 19 U/L / AST: 28 U/L / GGT: x             CAPILLARY BLOOD GLUCOSE        ABG - ( 09 Aug 2022 14:18 )  pH, Arterial: 7.43  pH, Blood: x     /  pCO2: 38    /  pO2: 219   / HCO3: 25    / Base Excess: 0.9   /  SaO2: 98.9                          RADIOLOGY & ADDITIONAL TESTS:     8/10 CXR   Stable bilateral opacities and effusions. No definite pneumothorax status   post left chest tube removal.      Care Discussed with Consultants/Other Providers [ x] YES  [ ] NO           Patient is a 89y old  Female who presents with a chief complaint of Dyspnea w/ Exertion (10 Aug 2022 14:37)      HPI:  88 y/o woman w PMHx of HFpEF, severe AS s/p balloon valvuloplasty , anemia s/p multiple transfusions 2 months ago, MVP, HTN, hyperthyroidism, HCC s/p partial liver resection, transferred from [facility] to [place] for TAVR evaluation by Dr. Mccollum.  Patient found to be anemic most likely d/t GI bleed now s/p 1PRBC transfusion for Hgb < 8/anemia. GI, Dr. Woo consulted at Dr. Mccollum' request.  Capsule study completed.  S/p colonoscopy  with successful AVM intervention. Diuretics largely remain on hold for soft BP (SBP < 100).  Ultimately, plan is for TAVR pending conditioning. Patient was waiting for subacute rehab placement with plans for TAVR as outpatient when patient's conditioning improves.    Patient , became acutely dyspneic, hypertensive 170/79 and tachycardic to 140s w/ crackles and wheezes to lungs throughout. Bipap started, with some improvement noted and patient was on nitro drip. Transfer to CCU initiated. In CCU: became febrile 100.4, elevated WBC, CXR showed possible pneumonia, given cefepime and vanc. Blood cx () grew staph epidermidis (MR)  Then became hypotensive down to 50s systolic- d/c nitro drip, given 1 bolus, requiring pressor support. (phenylepherine), then went into afib RVR (140s-150s). Patient on bipap during event; O2 requirements have decreased during stay. Continued on phenylepherine drip and midodrine for pressor support. Patient has remained afebrile, WBC WNL, pressor requirements have decreased, she is setting well on room air, WBC count has remained WNL. Repeat cx from  grew Gram (+) cocci. Patient also has pleural effusion first thoracocentesis was on  1L fluid removed. Fluid was transudative in nature, cx were negative. Effusion reoccurred and chest tube was placed 22 - to stay in place until drainage is less than 100ccs/24 hours. Due to 2x positive cx central line was removed 22. Pt is stable off pressors. Pt had valvuloplasty , chest tube removed 8/10. Pt stable and CXR improving off chest tube.       INTERVAL HPI/OVERNIGHT EVENTS:   No overnight events   Afebrile, hemodynamically stable     Subjective:  Physical exam:   General: Pt in NAD, resting comfortably in bed  Cardiac: normal rate and rhythm, systolic murmur audible.   Respiratory: vesicular breath sounds bilaterally, no accessory muscle use. no wheezes, crackles or rhonchi  abdominal: soft, nontender, nondistended  Extremities: no edema, no cyanosis, no clubbing  Neuro: A&O x3   Lines: peripheral IV line in [facility]     ICU Vital Signs Last 24 Hrs  T(C): 36.7 (10 Aug 2022 20:00), Max: 36.8 (10 Aug 2022 16:00)  T(F): 98 (10 Aug 2022 20:00), Max: 98.2 (10 Aug 2022 16:00)  HR: 56 (11 Aug 2022 07:00) (56 - 80)  BP: 100/51 (11 Aug 2022 07:00) (84/49 - 154/63)  BP(mean): 73 (11 Aug 2022 07:00) (61 - 91)  ABP: 115/42 (10 Aug 2022 13:00) (115/42 - 148/63)  ABP(mean): 67 (10 Aug 2022 13:00) (67 - 99)  RR: 33 (11 Aug 2022 07:00) (16 - 51)  SpO2: 97% (11 Aug 2022 07:00) (96% - 100%)    O2 Parameters below as of 11 Aug 2022 00:00  Patient On (Oxygen Delivery Method): room air          I&O's Summary    10 Aug 2022 07:01  -  11 Aug 2022 07:00  --------------------------------------------------------  IN: 480 mL / OUT: 870 mL / NET: -390 mL          Daily     Daily Weight in k.6 (11 Aug 2022 06:00)    Adult Advanced Hemodynamics Last 24 Hrs  CVP(mm Hg): --  CVP(cm H2O): --  CO: --  CI: --  PA: --  PA(mean): --  PCWP: --  SVR: --  SVRI: --  PVR: --  PVRI: --    EKG/Telemetry Events:    MEDICATIONS  (STANDING):  aspirin  chewable 81 milliGRAM(s) Oral daily  chlorhexidine 2% Cloths 1 Application(s) Topical daily  enoxaparin Injectable 40 milliGRAM(s) SubCutaneous every 24 hours  furosemide    Tablet 40 milliGRAM(s) Oral daily  hydrocortisone hemorrhoidal Suppository 1 Suppository(s) Rectal daily  levothyroxine 100 MICROGram(s) Oral daily  lidocaine   4% Patch 1 Patch Transdermal daily  losartan 25 milliGRAM(s) Oral daily  pantoprazole    Tablet 40 milliGRAM(s) Oral before breakfast  polyethylene glycol 3350 17 Gram(s) Oral daily  senna 2 Tablet(s) Oral at bedtime    MEDICATIONS  (PRN):  acetaminophen     Tablet .. 650 milliGRAM(s) Oral every 6 hours PRN Moderate Pain (4 - 6)  aluminum hydroxide/magnesium hydroxide/simethicone Suspension 30 milliLiter(s) Oral every 4 hours PRN Dyspepsia  hydrocortisone hemorrhoidal Suppository 1 Suppository(s) Rectal two times a day PRN hemorrhoidal discomfort        LABS:                        8.1    5.56  )-----------( 96       ( 11 Aug 2022 05:46 )             26.4     08-    140  |  105  |  32<H>  ----------------------------<  95  4.9   |  26  |  1.0    Ca    9.0      11 Aug 2022 05:46  Phos  3.0       Mg     2.0         TPro  5.9<L>  /  Alb  3.4<L>  /  TBili  0.3  /  DBili  x   /  AST  28  /  ALT  19  /  AlkPhos  146<H>      LIVER FUNCTIONS - ( 11 Aug 2022 05:46 )  Alb: 3.4 g/dL / Pro: 5.9 g/dL / ALK PHOS: 146 U/L / ALT: 19 U/L / AST: 28 U/L / GGT: x             CAPILLARY BLOOD GLUCOSE        ABG - ( 09 Aug 2022 14:18 )  pH, Arterial: 7.43  pH, Blood: x     /  pCO2: 38    /  pO2: 219   / HCO3: 25    / Base Excess: 0.9   /  SaO2: 98.9                          RADIOLOGY & ADDITIONAL TESTS:     8/10 CXR     Stable bilateral opacities and effusions. No definite pneumothorax status   post left chest tube removal.      Care Discussed with Consultants/Other Providers [ x] YES  [ ] NO

## 2022-08-11 NOTE — CHART NOTE - NSCHARTNOTEFT_GEN_A_CORE
89 y/o woman w PMHx of HFpEF, severe AS s/p balloon valvuloplasty 2021, anemia s/p multiple transfusions 2 months ago, MVP, HTN, hyperthyroidism, HCC s/p partial liver resection, transferred from [facility] to [place] for TAVR evaluation by Dr. Mccollum.  Patient found to be anemic most likely d/t GI bleed now s/p 1PRBC transfusion for Hgb < 8/anemia. GI, Dr. Woo consulted at Dr. Mccollum' request.  Capsule study completed.  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 Diuretics largely remain on hold for soft BP (SBP < 100).  Ultimately, plan is for TAVR pending conditioning. Patient was waiting for subacute rehab placement with plans for TAVR as outpatient when patient's conditioning improves.    Patient 7/30, became acutely dyspneic, hypertensive 170/79 and tachycardic to 140s w/ crackles and wheezes to lungs throughout. Bipap started, with some improvement noted and patient was on nitro drip. Transfer to CCU initiated. In CCU: became febrile 100.4, elevated WBC, CXR showed possible pneumonia, given cefepime and vanc. Blood cx (7/30) grew staph epidermidis (MR)  Then became hypotensive down to 50s systolic- d/c nitro drip, given 1 bolus, requiring pressor support. (phenylepherine), then went into afib RVR (140s-150s). Patient on bipap during event; O2 requirements have decreased during stay. Continued on phenylepherine drip and midodrine for pressor support. Patient has remained afebrile, WBC WNL, pressor requirements have decreased, she is setting well on room air, WBC count has remained WNL. Repeat cx from 8/2 grew Gram (+) cocci. Patient also has pleural effusion first thoracocentesis was on 8/1 1L fluid removed. Fluid was transudative in nature, cx were negative. Effusion reoccurred and chest tube was placed 8/4/22 - to stay in place until drainage is less than 100ccs/24 hours. Due to 2x positive cx central line was removed 8/4/22. Pt is stable off pressors. Pt had valvuloplasty 8/9, chest tube removed 8/10. Pt is now stable and CXR improving.     Pt ready to downgrade.     ICU Vital Signs Last 24 Hrs  T(C): 36.7 (10 Aug 2022 20:00), Max: 36.8 (10 Aug 2022 16:00)  T(F): 98 (10 Aug 2022 20:00), Max: 98.2 (10 Aug 2022 16:00)  HR: 56 (11 Aug 2022 07:00) (56 - 80)  BP: 100/51 (11 Aug 2022 07:00) (84/49 - 154/63)  BP(mean): 73 (11 Aug 2022 07:00) (61 - 91)  ABP: 115/42 (10 Aug 2022 13:00) (115/42 - 148/63)  ABP(mean): 67 (10 Aug 2022 13:00) (67 - 99)  RR: 33 (11 Aug 2022 07:00) (16 - 51)  SpO2: 97% (11 Aug 2022 07:00) (96% - 100%)    O2 Parameters below as of 11 Aug 2022 00:00  Patient On (Oxygen Delivery Method): room air      MEDICATIONS  (STANDING):  aspirin  chewable 81 milliGRAM(s) Oral daily  chlorhexidine 2% Cloths 1 Application(s) Topical daily  enoxaparin Injectable 40 milliGRAM(s) SubCutaneous every 24 hours  furosemide    Tablet 40 milliGRAM(s) Oral daily  hydrocortisone hemorrhoidal Suppository 1 Suppository(s) Rectal daily  levothyroxine 100 MICROGram(s) Oral daily  lidocaine   4% Patch 1 Patch Transdermal daily  losartan 25 milliGRAM(s) Oral daily  pantoprazole    Tablet 40 milliGRAM(s) Oral before breakfast  polyethylene glycol 3350 17 Gram(s) Oral daily  senna 2 Tablet(s) Oral at bedtime    MEDICATIONS  (PRN):  acetaminophen     Tablet .. 650 milliGRAM(s) Oral every 6 hours PRN Moderate Pain (4 - 6)  aluminum hydroxide/magnesium hydroxide/simethicone Suspension 30 milliLiter(s) Oral every 4 hours PRN Dyspepsia  hydrocortisone hemorrhoidal Suppository 1 Suppository(s) Rectal two times a day PRN hemorrhoidal discomfort        LABS:                        8.1    5.56  )-----------( 96       ( 11 Aug 2022 05:46 )             26.4     08-11    140  |  105  |  32<H>  ----------------------------<  95  4.9   |  26  |  1.0    Ca    9.0      11 Aug 2022 05:46  Phos  3.0     08-11  Mg     2.0     08-11    TPro  5.9<L>  /  Alb  3.4<L>  /  TBili  0.3  /  DBili  x   /  AST  28  /  ALT  19  /  AlkPhos  146<H>  08-11    LIVER FUNCTIONS - ( 11 Aug 2022 05:46 )  Alb: 3.4 g/dL / Pro: 5.9 g/dL / ALK PHOS: 146 U/L / ALT: 19 U/L / AST: 28 U/L / GGT: x             CAPILLARY BLOOD GLUCOSE        ABG - ( 09 Aug 2022 14:18 )  pH, Arterial: 7.43  pH, Blood: x     /  pCO2: 38    /  pO2: 219   / HCO3: 25    / Base Excess: 0.9   /  SaO2: 98.9            RADIOLOGY & ADDITIONAL TESTS:     8/10 CXR   Stable bilateral opacities and effusions. No definite pneumothorax status   post left chest tube removal.      · Assessment and Plan:       #Bacteremia - staph epidermidis-resolved  #Anemia with BRBPR 2/ lower GIB from AVM s/p intervention   #Severe AS, mean gradient ~75mmHg s/p balloon valvuloplasty  # Decompensated HFrEF and severe AS. Euvolemic  # AHRF s/p recurrent L sided effusion s/p pigtail  #Hemorrhoids with Constipation  #Hypothyroidism    Respiratory   - Moderate left pleural effusion seen on CT angio s/p thoracocentesis 8/1/22 - 1 L fluid  - Pleural effusion reoccurrence 8/2 - approximately 800ccs - Pigtail placed 8/4/22  - Tylenol and lidocaine patch for pain  - Chest tube removed, pt satting well, no pleural effusion on CXR   - Incentive spirometer    ID  - CXR 7/30 suspicious for PNA, with initial fever, elevated white count, and hypotension    - 7/30 procal .51, 8/4 0.18, WBC WNL since 8/1, Urine cx (-)  - 7/30 grew staph epidermidis, 8/1 gram (+) cocci in clusters, 8/5 cx - pending; pt given vanc and cefepime per ID   -per ID 8/8/22 dc abx  due to negative Bcx 8/4 and 8/5  - Hold Abx per ID      Cardiovascular  - TAVR workup with Dr. Mccollum  - Balloon aortic valvuloplasty 8/9/22  -midodrine dc'd   - Keep MAP >65  - Removed TVP    Hematologic   - Trend CBC Daily. 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  - Continue hemorrhoidal suppository   -Continue Senna 2 tabs HS  -Tylenol 650 mg PO PRN for rectal pain  - Protonix  - Add ensure to diet      Endocrine  - Continue Levothyroxine 100 mcg PO daily  - TSH 4.36 (6/19/22)  - Monitor FS    Renal  - Strict I+O, Daily standing weights  - maintain electrolytes, K>4 Mg >2    Neurological  - Avoid sedating medications      PT/Rehab  - On board  - Physiatry recs- out of bed to chair, will follow with PT/rehab 3-5x a week, DC to short term rehab     Lines/Gruber  - Midline: 8/4  - Peripheral IV: 8/4  - Primafit 89 y/o woman w PMHx of HFpEF, severe AS s/p balloon valvuloplasty 2021, anemia s/p multiple transfusions 2 months ago, MVP, HTN, hyperthyroidism, HCC s/p partial liver resection, transferred from [facility] to [place] for TAVR evaluation by Dr. Mccollum.  Patient found to be anemic most likely d/t GI bleed now s/p 1PRBC transfusion for Hgb < 8/anemia. GI, Dr. Woo consulted at Dr. Mccollum' request.  Capsule study completed.  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 Diuretics largely remain on hold for soft BP (SBP < 100).  Ultimately, plan is for TAVR pending conditioning. Patient was waiting for subacute rehab placement with plans for TAVR as outpatient when patient's conditioning improves.    Patient 7/30, became acutely dyspneic, hypertensive 170/79 and tachycardic to 140s w/ crackles and wheezes to lungs throughout. Bipap started, with some improvement noted and patient was on nitro drip. Transfer to CCU initiated. In CCU: became febrile 100.4, elevated WBC, CXR showed possible pneumonia, given cefepime and vanc. Blood cx (7/30) grew staph epidermidis (MR)  Then became hypotensive down to 50s systolic- d/c nitro drip, given 1 bolus, requiring pressor support. (phenylepherine), then went into afib RVR (140s-150s). Patient on bipap during event; O2 requirements have decreased during stay. Continued on phenylepherine drip and midodrine for pressor support. Patient has remained afebrile, WBC WNL, pressor requirements have decreased, she is setting well on room air, WBC count has remained WNL. Repeat cx from 8/2 grew Gram (+) cocci. Patient also has pleural effusion first thoracocentesis was on 8/1 1L fluid removed. Fluid was transudative in nature, cx were negative. Effusion reoccurred and chest tube was placed 8/4/22 - to stay in place until drainage is less than 100ccs/24 hours. Due to 2x positive cx central line was removed 8/4/22. Pt is stable off pressors. Pt had valvuloplasty 8/9, chest tube removed 8/10. Pt is now stable and CXR improving.     Pt ready to downgrade. Signed out to Christine on 3C.     ICU Vital Signs Last 24 Hrs  T(C): 36.7 (10 Aug 2022 20:00), Max: 36.8 (10 Aug 2022 16:00)  T(F): 98 (10 Aug 2022 20:00), Max: 98.2 (10 Aug 2022 16:00)  HR: 56 (11 Aug 2022 07:00) (56 - 80)  BP: 100/51 (11 Aug 2022 07:00) (84/49 - 154/63)  BP(mean): 73 (11 Aug 2022 07:00) (61 - 91)  ABP: 115/42 (10 Aug 2022 13:00) (115/42 - 148/63)  ABP(mean): 67 (10 Aug 2022 13:00) (67 - 99)  RR: 33 (11 Aug 2022 07:00) (16 - 51)  SpO2: 97% (11 Aug 2022 07:00) (96% - 100%)    O2 Parameters below as of 11 Aug 2022 00:00  Patient On (Oxygen Delivery Method): room air      MEDICATIONS  (STANDING):  aspirin  chewable 81 milliGRAM(s) Oral daily  chlorhexidine 2% Cloths 1 Application(s) Topical daily  enoxaparin Injectable 40 milliGRAM(s) SubCutaneous every 24 hours  furosemide    Tablet 40 milliGRAM(s) Oral daily  hydrocortisone hemorrhoidal Suppository 1 Suppository(s) Rectal daily  levothyroxine 100 MICROGram(s) Oral daily  lidocaine   4% Patch 1 Patch Transdermal daily  losartan 25 milliGRAM(s) Oral daily  pantoprazole    Tablet 40 milliGRAM(s) Oral before breakfast  polyethylene glycol 3350 17 Gram(s) Oral daily  senna 2 Tablet(s) Oral at bedtime    MEDICATIONS  (PRN):  acetaminophen     Tablet .. 650 milliGRAM(s) Oral every 6 hours PRN Moderate Pain (4 - 6)  aluminum hydroxide/magnesium hydroxide/simethicone Suspension 30 milliLiter(s) Oral every 4 hours PRN Dyspepsia  hydrocortisone hemorrhoidal Suppository 1 Suppository(s) Rectal two times a day PRN hemorrhoidal discomfort        LABS:                        8.1    5.56  )-----------( 96       ( 11 Aug 2022 05:46 )             26.4     08-11    140  |  105  |  32<H>  ----------------------------<  95  4.9   |  26  |  1.0    Ca    9.0      11 Aug 2022 05:46  Phos  3.0     08-11  Mg     2.0     08-11    TPro  5.9<L>  /  Alb  3.4<L>  /  TBili  0.3  /  DBili  x   /  AST  28  /  ALT  19  /  AlkPhos  146<H>  08-11    LIVER FUNCTIONS - ( 11 Aug 2022 05:46 )  Alb: 3.4 g/dL / Pro: 5.9 g/dL / ALK PHOS: 146 U/L / ALT: 19 U/L / AST: 28 U/L / GGT: x             CAPILLARY BLOOD GLUCOSE        ABG - ( 09 Aug 2022 14:18 )  pH, Arterial: 7.43  pH, Blood: x     /  pCO2: 38    /  pO2: 219   / HCO3: 25    / Base Excess: 0.9   /  SaO2: 98.9            RADIOLOGY & ADDITIONAL TESTS:     8/10 CXR   Stable bilateral opacities and effusions. No definite pneumothorax status   post left chest tube removal.      · Assessment and Plan:       #Bacteremia - staph epidermidis-resolved  #Anemia with BRBPR 2/ lower GIB from AVM s/p intervention   #Severe AS, mean gradient ~75mmHg s/p balloon valvuloplasty  # Decompensated HFrEF and severe AS. Euvolemic  # AHRF s/p recurrent L sided effusion s/p pigtail  #Hemorrhoids with Constipation  #Hypothyroidism    Respiratory   - Moderate left pleural effusion seen on CT angio s/p thoracocentesis 8/1/22 - 1 L fluid  - Pleural effusion reoccurrence 8/2 - approximately 800ccs - Pigtail placed 8/4/22  - Tylenol and lidocaine patch for pain  - Chest tube removed, pt satting well, no pleural effusion on CXR   - Incentive spirometer    ID  - CXR 7/30 suspicious for PNA, with initial fever, elevated white count, and hypotension    - 7/30 procal .51, 8/4 0.18, WBC WNL since 8/1, Urine cx (-)  - 7/30 grew staph epidermidis, 8/1 gram (+) cocci in clusters, 8/5 cx - pending; pt given vanc and cefepime per ID   -per ID 8/8/22 dc abx  due to negative Bcx 8/4 and 8/5  - Hold Abx per ID      Cardiovascular  - TAVR workup with Dr. Mccollum  - Balloon aortic valvuloplasty 8/9/22  -midodrine dc'd   - Keep MAP >65  - Removed TVP    Hematologic   - Trend CBC Daily. 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  - Continue hemorrhoidal suppository   -Continue Senna 2 tabs HS  -Tylenol 650 mg PO PRN for rectal pain  - Protonix  - Add ensure to diet      Endocrine  - Continue Levothyroxine 100 mcg PO daily  - TSH 4.36 (6/19/22)  - Monitor FS    Renal  - Strict I+O, Daily standing weights  - maintain electrolytes, K>4 Mg >2    Neurological  - Avoid sedating medications      PT/Rehab  - On board  - Physiatry recs- out of bed to chair, will follow with PT/rehab 3-5x a week, DC to short term rehab     Lines/Gruber  - Midline: 8/4  - Peripheral IV: 8/4  - Primafit

## 2022-08-12 ENCOUNTER — TRANSCRIPTION ENCOUNTER (OUTPATIENT)
Age: 87
End: 2022-08-12

## 2022-08-12 LAB
ALBUMIN SERPL ELPH-MCNC: 3.4 G/DL — LOW (ref 3.5–5.2)
ALP SERPL-CCNC: 133 U/L — HIGH (ref 30–115)
ALT FLD-CCNC: 16 U/L — SIGNIFICANT CHANGE UP (ref 0–41)
ANION GAP SERPL CALC-SCNC: 8 MMOL/L — SIGNIFICANT CHANGE UP (ref 7–14)
AST SERPL-CCNC: 23 U/L — SIGNIFICANT CHANGE UP (ref 0–41)
BASOPHILS # BLD AUTO: 0.02 K/UL — SIGNIFICANT CHANGE UP (ref 0–0.2)
BASOPHILS NFR BLD AUTO: 0.5 % — SIGNIFICANT CHANGE UP (ref 0–1)
BILIRUB SERPL-MCNC: 0.3 MG/DL — SIGNIFICANT CHANGE UP (ref 0.2–1.2)
BUN SERPL-MCNC: 30 MG/DL — HIGH (ref 10–20)
CALCIUM SERPL-MCNC: 9 MG/DL — SIGNIFICANT CHANGE UP (ref 8.5–10.1)
CHLORIDE SERPL-SCNC: 103 MMOL/L — SIGNIFICANT CHANGE UP (ref 98–110)
CO2 SERPL-SCNC: 28 MMOL/L — SIGNIFICANT CHANGE UP (ref 17–32)
CREAT SERPL-MCNC: 0.7 MG/DL — SIGNIFICANT CHANGE UP (ref 0.7–1.5)
EGFR: 83 ML/MIN/1.73M2 — SIGNIFICANT CHANGE UP
EOSINOPHIL # BLD AUTO: 0.11 K/UL — SIGNIFICANT CHANGE UP (ref 0–0.7)
EOSINOPHIL NFR BLD AUTO: 2.8 % — SIGNIFICANT CHANGE UP (ref 0–8)
GLUCOSE SERPL-MCNC: 92 MG/DL — SIGNIFICANT CHANGE UP (ref 70–99)
HCT VFR BLD CALC: 25.1 % — LOW (ref 37–47)
HGB BLD-MCNC: 7.8 G/DL — LOW (ref 12–16)
IMM GRANULOCYTES NFR BLD AUTO: 0.5 % — HIGH (ref 0.1–0.3)
LYMPHOCYTES # BLD AUTO: 0.66 K/UL — LOW (ref 1.2–3.4)
LYMPHOCYTES # BLD AUTO: 16.7 % — LOW (ref 20.5–51.1)
MAGNESIUM SERPL-MCNC: 1.9 MG/DL — SIGNIFICANT CHANGE UP (ref 1.8–2.4)
MCHC RBC-ENTMCNC: 29.9 PG — SIGNIFICANT CHANGE UP (ref 27–31)
MCHC RBC-ENTMCNC: 31.1 G/DL — LOW (ref 32–37)
MCV RBC AUTO: 96.2 FL — SIGNIFICANT CHANGE UP (ref 81–99)
MONOCYTES # BLD AUTO: 0.23 K/UL — SIGNIFICANT CHANGE UP (ref 0.1–0.6)
MONOCYTES NFR BLD AUTO: 5.8 % — SIGNIFICANT CHANGE UP (ref 1.7–9.3)
NEUTROPHILS # BLD AUTO: 2.92 K/UL — SIGNIFICANT CHANGE UP (ref 1.4–6.5)
NEUTROPHILS NFR BLD AUTO: 73.7 % — SIGNIFICANT CHANGE UP (ref 42.2–75.2)
NRBC # BLD: 0 /100 WBCS — SIGNIFICANT CHANGE UP (ref 0–0)
PHOSPHATE SERPL-MCNC: 2.9 MG/DL — SIGNIFICANT CHANGE UP (ref 2.1–4.9)
PLATELET # BLD AUTO: 93 K/UL — LOW (ref 130–400)
POTASSIUM SERPL-MCNC: 4.6 MMOL/L — SIGNIFICANT CHANGE UP (ref 3.5–5)
POTASSIUM SERPL-SCNC: 4.6 MMOL/L — SIGNIFICANT CHANGE UP (ref 3.5–5)
PROT SERPL-MCNC: 5.7 G/DL — LOW (ref 6–8)
RBC # BLD: 2.61 M/UL — LOW (ref 4.2–5.4)
RBC # FLD: 20.5 % — HIGH (ref 11.5–14.5)
SODIUM SERPL-SCNC: 139 MMOL/L — SIGNIFICANT CHANGE UP (ref 135–146)
WBC # BLD: 3.96 K/UL — LOW (ref 4.8–10.8)
WBC # FLD AUTO: 3.96 K/UL — LOW (ref 4.8–10.8)

## 2022-08-12 PROCEDURE — 99233 SBSQ HOSP IP/OBS HIGH 50: CPT

## 2022-08-12 PROCEDURE — 71045 X-RAY EXAM CHEST 1 VIEW: CPT | Mod: 26

## 2022-08-12 PROCEDURE — 93010 ELECTROCARDIOGRAM REPORT: CPT | Mod: 76

## 2022-08-12 RX ADMIN — PANTOPRAZOLE SODIUM 40 MILLIGRAM(S): 20 TABLET, DELAYED RELEASE ORAL at 05:45

## 2022-08-12 RX ADMIN — POLYETHYLENE GLYCOL 3350 17 GRAM(S): 17 POWDER, FOR SOLUTION ORAL at 11:34

## 2022-08-12 RX ADMIN — LIDOCAINE 1 PATCH: 4 CREAM TOPICAL at 11:35

## 2022-08-12 RX ADMIN — CHLORHEXIDINE GLUCONATE 1 APPLICATION(S): 213 SOLUTION TOPICAL at 06:01

## 2022-08-12 RX ADMIN — Medication 40 MILLIGRAM(S): at 05:45

## 2022-08-12 RX ADMIN — Medication 100 MICROGRAM(S): at 05:45

## 2022-08-12 RX ADMIN — ENOXAPARIN SODIUM 40 MILLIGRAM(S): 100 INJECTION SUBCUTANEOUS at 12:16

## 2022-08-12 RX ADMIN — LIDOCAINE 1 PATCH: 4 CREAM TOPICAL at 21:25

## 2022-08-12 RX ADMIN — Medication 81 MILLIGRAM(S): at 11:32

## 2022-08-12 RX ADMIN — LOSARTAN POTASSIUM 25 MILLIGRAM(S): 100 TABLET, FILM COATED ORAL at 05:46

## 2022-08-12 NOTE — DISCHARGE NOTE NURSING/CASE MANAGEMENT/SOCIAL WORK - PATIENT PORTAL LINK FT
You can access the FollowMyHealth Patient Portal offered by [facility] by registering at the following website: [email or web address]. By joining [facility]’s FollowMyHealth portal, you will also be able to view your health information using other applications (apps) compatible with our system.

## 2022-08-12 NOTE — DISCHARGE NOTE NURSING/CASE MANAGEMENT/SOCIAL WORK - NSDCPEFALRISK_GEN_ALL_CORE
For information on Fall & Injury Prevention, visit: [email or web address] OR  [email or web address] OR  https://www.cdc.gov/steadi/patient.html

## 2022-08-12 NOTE — PROGRESS NOTE ADULT - ASSESSMENT
Assessment:  Mrs. Silverio is a 89 y/o female with HFpEF, severe AS s/p balloon valvuloplasty 2021, anemia s/p multiple transfusions 2 months ago, MVP, HTN, hyperthyroidism, HCC s/p partial liver resection, transferred from [facility] to [place] for TAVR evaluation by Dr. Mccollum and diuretic therapy.  Patient found to be anemic most likely d/t GI bleed s/p PRBC transfusions. She had a capsule study which showed large AVMs now s/p colonoscopy 7/27 with successful AVM intervention.  Rapid response on 7/30 for respiratory distress and upgraded to CCU.  S/p BAV on 8/10.  Downgraded to Cards tele on 8/11.      #severe AS  #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%  -Euvolemic on exam  - s/p BAV on 8/10, plan for TAVR in future  -Continue losartan  - Continue furosemide 40 mg QD for volume removal   - Continue ASA 81 daily  - Strict I+O   - Daily standing weights  - maintain electrolytes, K>4 Mg >2    #Acute anemia with BRBPR, likely lower GIB, now resolved  - s/p colonoscopy with AVM intervention with Dr. Woo (7/27)  - Hgb 7.8 today, 8.1 (8/11), 8 (8/10), 9.1 (8/9)  - asymptomatic, will repeat CBC in am    #Hemorrhoids with Constipation  - c/w senna, miralax  - c/w hydrocortisone suppository    #HTN  - c/w losartan    #Hypothyroidism  - Continue Levothyroxine 100 mcg PO daily    #GERD  -Continue Pantoprazole 40 mg PO daily    #DVT ppx:  Sequential Compression Device (SCD)  #GI ppx:  Protonix  Code Status: Full Code    Dispo: Pending safe discharge home on Monday when caregiver is present    Please contact me with any questions or concerns at [phone].

## 2022-08-12 NOTE — PROGRESS NOTE ADULT - SUBJECTIVE AND OBJECTIVE BOX
Patient is a 89y old  Female who presents with a chief complaint of Dyspnea w/ Exertion (11 Aug 2022 07:17)      HPI:  This is a 89-year-old female with a past medical history significant for CHF,severe AAS, MVP, hypertension, HCC, hypothyroidism, GERD who presents with shortness of breath.  Patient states that she has been having chronic shortness of breath w/ recent hospital admission 6/18-6/22, pt reports dyspnea has been getting worse the last couple of days and did not have any resolution to sxs upon d/c.  Of note patient also states that she has been having some blood in the stool during this time.  Patient states dyspnea is worsened with any exertion- and associated with escalating chest pain.    Pt denies any cardiology or pulmonary f/u.  Pt also admits to predominantly bedbound state due to dyspnea.  Patient found to be anemic most likely d/t GI bleed now s/p 1PRBC transfusion for Hgb < 8/anemia. GI, Dr. Woo consulted at Dr. Mccollum' request.  Capsule study completed.  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 Diuretics largely remain on hold for soft BP (SBP < 100).  Ultimately, plan is for TAVR pending conditioning. Patient was waiting for subacute rehab placement with plans for TAVR as outpatient when patient's conditioning improves.    Patient 7/30, became acutely dyspneic, hypertensive 170/79 and tachycardic to 140s w/ crackles and wheezes to lungs throughout. Bipap started, with some improvement noted and patient was on nitro drip. Transfer to CCU initiated. In CCU: became febrile 100.4, elevated WBC, CXR showed possible pneumonia, given cefepime and vanc. Blood cx (7/30) grew staph epidermidis (MR)  Then became hypotensive down to 50s systolic- d/c nitro drip, given 1 bolus, requiring pressor support. (phenylepherine), then went into afib RVR (140s-150s). Patient on bipap during event; O2 requirements have decreased during stay. Continued on phenylepherine drip and midodrine for pressor support. Patient has remained afebrile, WBC WNL, pressor requirements have decreased, she is setting well on room air, WBC count has remained WNL. Repeat cx from 8/2 grew Gram (+) cocci. Patient also has pleural effusion first thoracocentesis was on 8/1 1L fluid removed. Fluid was transudative in nature, cx were negative. Effusion reoccurred and chest tube was placed 8/4/22 - to stay in place until drainage is less than 100ccs/24 hours. Due to 2x positive cx central line was removed 8/4/22. Pt is stable off pressors. Pt had valvuloplasty 8/9, chest tube removed 8/10. Pt is now stable and CXR improving.           PHYSICAL EXAM    Vital Signs Last 24 Hrs  T(C): 36.4 (12 Aug 2022 04:17), Max: 36.4 (11 Aug 2022 21:46)  T(F): 97.6 (12 Aug 2022 04:17), Max: 97.6 (12 Aug 2022 04:17)  HR: 77 (12 Aug 2022 04:17) (57 - 88)  BP: 112/53 (12 Aug 2022 04:17) (107/51 - 138/63)  BP(mean): 88 (11 Aug 2022 18:00) (74 - 90)  RR: 18 (12 Aug 2022 04:17) (15 - 24)  SpO2: 97% (12 Aug 2022 04:17) (92% - 100%)    Parameters below as of 12 Aug 2022 04:17  Patient On (Oxygen Delivery Method): room air        Constitutional - NAD  Chest - CTA  Cardiovascular - S1S2+  Abdomen -  Soft  Extremities -  No calf tenderness   Function : transfer mod A                ambulate 15'RW mod A    acetaminophen     Tablet .. 650 milliGRAM(s) Oral every 6 hours PRN  aluminum hydroxide/magnesium hydroxide/simethicone Suspension 30 milliLiter(s) Oral every 4 hours PRN  aspirin  chewable 81 milliGRAM(s) Oral daily  chlorhexidine 2% Cloths 1 Application(s) Topical daily  enoxaparin Injectable 40 milliGRAM(s) SubCutaneous every 24 hours  furosemide    Tablet 40 milliGRAM(s) Oral daily  hydrocortisone hemorrhoidal Suppository 1 Suppository(s) Rectal two times a day PRN  hydrocortisone hemorrhoidal Suppository 1 Suppository(s) Rectal daily  levothyroxine 100 MICROGram(s) Oral daily  lidocaine   4% Patch 1 Patch Transdermal daily  losartan 25 milliGRAM(s) Oral daily  pantoprazole    Tablet 40 milliGRAM(s) Oral before breakfast  polyethylene glycol 3350 17 Gram(s) Oral daily  senna 2 Tablet(s) Oral at bedtime      RECENT LABS/IMAGING                        7.8    3.96  )-----------( 93       ( 12 Aug 2022 06:52 )             25.1     08-12    139  |  103  |  30<H>  ----------------------------<  92  4.6   |  28  |  0.7    Ca    9.0      12 Aug 2022 06:52  Phos  2.9     08-12  Mg     1.9     08-12    TPro  5.7<L>  /  Alb  3.4<L>  /  TBili  0.3  /  DBili  x   /  AST  23  /  ALT  16  /  AlkPhos  133<H>  08-12

## 2022-08-12 NOTE — DISCHARGE NOTE NURSING/CASE MANAGEMENT/SOCIAL WORK - NSDCVIVACCINE_GEN_ALL_CORE_FT
Tdap; 07-Jan-2020 05:28; Kristan Lenz (RN); [employer]; [id number] (Exp. Date: 23-Oct-2020); IntraMuscular; Deltoid Left.; 0.5 milliLiter(s); VIS (VIS Published: 09-May-2013, VIS Presented: 07-Jan-2020);

## 2022-08-12 NOTE — PROGRESS NOTE ADULT - ASSESSMENT
Imp: rehab of debilitation / s/p valvuloplasty   Plan: continue bedside therapy as tolerated           consider d/c to STR when medically stable

## 2022-08-12 NOTE — PROGRESS NOTE ADULT - SUBJECTIVE AND OBJECTIVE BOX
Chief complaint: Patient is a 89y old  Female who presents with a chief complaint of dyspnea on exertion in the setting of acute GI bleeding and severe aortic stenosis.    Interval history:  - downgraded from CCU (8/12)  - DC planning to home --> declined SNF  - Seen and examined at bedside.  Appears anxious, offers multiple concerns - all concerns questions answered.  VSS, NAD.  No acute events overnight.    Review of systems: A complete 10-point review of systems was obtained and is negative except as stated in the interval history.    Vitals:  Vital Signs Last 24 Hrs  T(C): 35.9 (12 Aug 2022 14:00), Max: 36.4 (11 Aug 2022 21:46)  T(F): 96.7 (12 Aug 2022 14:00), Max: 97.6 (12 Aug 2022 04:17)  HR: 82 (12 Aug 2022 14:00) (69 - 82)  BP: 119/72 (12 Aug 2022 14:00) (111/55 - 119/72)  BP(mean): --  RR: 18 (12 Aug 2022 14:00) (18 - 22)  SpO2: 98% (12 Aug 2022 14:00) (97% - 99%)    Parameters below as of 12 Aug 2022 13:00  Patient On (Oxygen Delivery Method): room air    I&O's Summary    11 Aug 2022 07:01  -  12 Aug 2022 07:00  --------------------------------------------------------  IN: 720 mL / OUT: 700 mL / NET: 20 mL        Weight trend:  Weight (kg): 46.3 (07-27)    Physical exam:  General: No apparent distress  HEENT: Anicteric sclera. Moist mucous membranes. No JVD  Cardiac: Regular rate and rhythm. No murmurs, rubs, or gallops.   Vascular: Symmetric radial pulses. Dorsalis pedis pulses palpable.   Respiratory: Normal effort. Clear to ascultation.   Abdomen: Soft, nontender. Audible bowel sounds.   Extremities: Warm with no edema. No cyanosis or clubbing.   Skin: Warm and dry. No rash.   Neurologic: Grossly normal motor function, anxious   Psychiatric: Oriented to person, place, and time.     Data reviewed:  - Telemetry: SR, no events noted on tele    - ECG  (08.12.22 @ 07:23)   Diagnosis Line Normal sinus rhythm  Left axis deviation  Left anterior fascicular block  Right bundle branch block  Voltage criteria for left ventricular hypertrophy  T wave abnormality, consider lateral ischemia  Abnormal ECG       - TTE   (7/13/2022 1:09:45 PM)  Summary:   1. Left ventricular ejection fraction, by visual estimation, is 40 to   45%.   2. Normal right atrial size.   3. Mild mitral valve regurgitation.   4. Structurally normal mitral valve, with normal leaflet excursion.   5. Mild aortic regurgitation.   6. Severe aortic valve stenosis.   7. The aortic valve mean gradient is 75.1 mmHg consistent with severe   aortic stenosis.    s/p BAV (8/9)   (08.10.22 @ 10:18)    1. Normal global left ventricular systolic function.   2. LV Ejection Fraction by Johnson's Method with a biplane EF of 61 %.   3. Mild asymmetric left ventricular hypertrophy involving the septal   wall.   4. Spectral Doppler shows pseudonormal pattern of left ventricular   myocardial filling (Grade II diastolic dysfunction).   5. Moderately enlarged left atrium.   6. Normal right atrial size.   7. Mild mitral valve regurgitation.   8. Mild thickening of the anterior and posterior mitral valve leaflets.   9. Mild tricuspid regurgitation.  10. Aortic valve thickening with decreased leaflet opening.  11. Mild aortic regurgitation.  12. Critical aortic stenosis, peak/mean /76 mmHg, CESIA 0.3 cm^2.  13. Estimated pulmonary artery systolic pressure is 42.3 mmHg assuming a   right atrial pressure of 3 mmHg, which is consistent with mild pulmonary   hypertension.        - Labs:                        7.8    3.96  )-----------( 93       ( 12 Aug 2022 06:52 )             25.1     08-12    139  |  103  |  30<H>  ----------------------------<  92  4.6   |  28  |  0.7    Ca    9.0      12 Aug 2022 06:52  Phos  2.9     08-12  Mg     1.9     08-12    TPro  5.7<L>  /  Alb  3.4<L>  /  TBili  0.3  /  DBili  x   /  AST  23  /  ALT  16  /  AlkPhos  133<H>  08-12    LIVER FUNCTIONS - ( 12 Aug 2022 06:52 )  Alb: 3.4 g/dL / Pro: 5.7 g/dL / ALK PHOS: 133 U/L / ALT: 16 U/L / AST: 23 U/L / GGT: x             Medications:  MEDICATIONS  (STANDING):  aspirin  chewable 81 milliGRAM(s) Oral daily  chlorhexidine 2% Cloths 1 Application(s) Topical daily  enoxaparin Injectable 40 milliGRAM(s) SubCutaneous every 24 hours  furosemide    Tablet 40 milliGRAM(s) Oral daily  hydrocortisone hemorrhoidal Suppository 1 Suppository(s) Rectal daily  levothyroxine 100 MICROGram(s) Oral daily  lidocaine   4% Patch 1 Patch Transdermal daily  losartan 25 milliGRAM(s) Oral daily  pantoprazole    Tablet 40 milliGRAM(s) Oral before breakfast  polyethylene glycol 3350 17 Gram(s) Oral daily  senna 2 Tablet(s) Oral at bedtime      Allergies  Cipro (Other)  Levaquin (Rash)  tetracycline (Hives)    Assessment:  Mrs. Silverio is a 89 y/o female with HFpEF, severe AS s/p balloon valvuloplasty 2021, anemia s/p multiple transfusions 2 months ago, MVP, HTN, hyperthyroidism, HCC s/p partial liver resection, transferred from [facility] to [place] for TAVR evaluation by Dr. Mccollum and diuretic therapy.  Patient found to be anemic most likely d/t GI bleed s/p PRBC transfusions. She had a capsule study which showed large AVMs now s/p colonoscopy 7/27 with successful AVM intervention.  Rapid response on 7/30 for respiratory distress and upgraded to CCU.  S/p BAV on 8/10.  Downgraded to Cards tele on 8/11.      #severe AS  #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%  - s/p BAV on 8/10, plan for TAVR in future  - c/w furosemide 40 mg QD, ASA 81 mg QD  - Strict I+O   - Daily standing weights  - maintain electrolytes, K>4 Mg >2    #Acute anemia with BRBPR, likely lower GIB, now resolved  - s/p colonoscopy with AVM intervention with Dr. Woo (7/27)  - Hgb 7.8 today, 8.1 (8/11), 8 (8/10), 9.1 (8/9)  - asymptomatic, will repeat CBC in am    #Hemorrhoids with Constipation  - c/w senna, miralax  - c/w hydrocortisone suppository    #HTN  - c/w losartan    #Hypothyroidism  - Continue Levothyroxine 100 mcg PO daily    #GERD  -Continue Pantoprazole 40 mg PO daily    #DVT ppx:  Sequential Compression Device (SCD)  #GI ppx:  Protonix  Code Status: Full Code    Dispo:     Please contact me with any questions or concerns at [phone]. Chief complaint: Patient is a 89y old  Female who presents with a chief complaint of dyspnea on exertion in the setting of acute GI bleeding and severe aortic stenosis.    Interval history:  - downgraded from CCU (8/12)  - DC planning to home --> declined SNF  - Seen and examined at bedside.  Appears anxious, offers multiple concerns - all concerns questions answered.  VSS, NAD.  No acute events overnight.    Has no symptoms or other concerns. Denies any SOB, chest pain, or palpitations.    Review of systems: A complete 10-point review of systems was obtained and is negative except as stated in the interval history.    Vitals:  Vital Signs Last 24 Hrs  T(C): 35.9 (12 Aug 2022 14:00), Max: 36.4 (11 Aug 2022 21:46)  T(F): 96.7 (12 Aug 2022 14:00), Max: 97.6 (12 Aug 2022 04:17)  HR: 82 (12 Aug 2022 14:00) (69 - 82)  BP: 119/72 (12 Aug 2022 14:00) (111/55 - 119/72)  BP(mean): --  RR: 18 (12 Aug 2022 14:00) (18 - 22)  SpO2: 98% (12 Aug 2022 14:00) (97% - 99%)    Parameters below as of 12 Aug 2022 13:00  Patient On (Oxygen Delivery Method): room air    I&O's Summary    11 Aug 2022 07:01  -  12 Aug 2022 07:00  --------------------------------------------------------  IN: 720 mL / OUT: 700 mL / NET: 20 mL        Weight trend:  Weight (kg): 46.3 (07-27)    Physical exam:  General: No apparent distress  HEENT: Anicteric sclera. Moist mucous membranes. No JVD  Cardiac: Regular rate and rhythm. IV/VI systolic murmur at RUSB  Vascular: Symmetric radial pulses.  Respiratory: Normal effort. Clear to ascultation.   Abdomen: Soft, nontender. Audible bowel sounds.   Extremities: Warm with no edema.  Skin: Warm and dry. No rash.   Neurologic: Grossly normal motor function, anxious   Psychiatric: Oriented to person, place, and time.     Data reviewed:  - Telemetry: SR, no events noted on tele    - ECG  (08.12.22 @ 07:23)   Diagnosis Line Normal sinus rhythm  Left axis deviation  Left anterior fascicular block  Right bundle branch block  Voltage criteria for left ventricular hypertrophy  T wave abnormality, consider lateral ischemia  Abnormal ECG       - TTE   (7/13/2022 1:09:45 PM)  Summary:   1. Left ventricular ejection fraction, by visual estimation, is 40 to   45%.   2. Normal right atrial size.   3. Mild mitral valve regurgitation.   4. Structurally normal mitral valve, with normal leaflet excursion.   5. Mild aortic regurgitation.   6. Severe aortic valve stenosis.   7. The aortic valve mean gradient is 75.1 mmHg consistent with severe   aortic stenosis.    s/p BAV (8/9)   (08.10.22 @ 10:18)    1. Normal global left ventricular systolic function.   2. LV Ejection Fraction by Johnson's Method with a biplane EF of 61 %.   3. Mild asymmetric left ventricular hypertrophy involving the septal   wall.   4. Spectral Doppler shows pseudonormal pattern of left ventricular   myocardial filling (Grade II diastolic dysfunction).   5. Moderately enlarged left atrium.   6. Normal right atrial size.   7. Mild mitral valve regurgitation.   8. Mild thickening of the anterior and posterior mitral valve leaflets.   9. Mild tricuspid regurgitation.  10. Aortic valve thickening with decreased leaflet opening.  11. Mild aortic regurgitation.  12. Critical aortic stenosis, peak/mean /76 mmHg, CESIA 0.3 cm^2.  13. Estimated pulmonary artery systolic pressure is 42.3 mmHg assuming a   right atrial pressure of 3 mmHg, which is consistent with mild pulmonary   hypertension.        - Labs:                        7.8    3.96  )-----------( 93       ( 12 Aug 2022 06:52 )             25.1     08-12    139  |  103  |  30<H>  ----------------------------<  92  4.6   |  28  |  0.7    Ca    9.0      12 Aug 2022 06:52  Phos  2.9     08-12  Mg     1.9     08-12    TPro  5.7<L>  /  Alb  3.4<L>  /  TBili  0.3  /  DBili  x   /  AST  23  /  ALT  16  /  AlkPhos  133<H>  08-12    LIVER FUNCTIONS - ( 12 Aug 2022 06:52 )  Alb: 3.4 g/dL / Pro: 5.7 g/dL / ALK PHOS: 133 U/L / ALT: 16 U/L / AST: 23 U/L / GGT: x             Medications:  MEDICATIONS  (STANDING):  aspirin  chewable 81 milliGRAM(s) Oral daily  chlorhexidine 2% Cloths 1 Application(s) Topical daily  enoxaparin Injectable 40 milliGRAM(s) SubCutaneous every 24 hours  furosemide    Tablet 40 milliGRAM(s) Oral daily  hydrocortisone hemorrhoidal Suppository 1 Suppository(s) Rectal daily  levothyroxine 100 MICROGram(s) Oral daily  lidocaine   4% Patch 1 Patch Transdermal daily  losartan 25 milliGRAM(s) Oral daily  pantoprazole    Tablet 40 milliGRAM(s) Oral before breakfast  polyethylene glycol 3350 17 Gram(s) Oral daily  senna 2 Tablet(s) Oral at bedtime      Allergies  Cipro (Other)  Levaquin (Rash)  tetracycline (Hives)

## 2022-08-13 LAB
ANION GAP SERPL CALC-SCNC: 8 MMOL/L — SIGNIFICANT CHANGE UP (ref 7–14)
BLD GP AB SCN SERPL QL: SIGNIFICANT CHANGE UP
BUN SERPL-MCNC: 37 MG/DL — HIGH (ref 10–20)
CALCIUM SERPL-MCNC: 9 MG/DL — SIGNIFICANT CHANGE UP (ref 8.5–10.1)
CHLORIDE SERPL-SCNC: 104 MMOL/L — SIGNIFICANT CHANGE UP (ref 98–110)
CO2 SERPL-SCNC: 28 MMOL/L — SIGNIFICANT CHANGE UP (ref 17–32)
CREAT SERPL-MCNC: 0.7 MG/DL — SIGNIFICANT CHANGE UP (ref 0.7–1.5)
EGFR: 83 ML/MIN/1.73M2 — SIGNIFICANT CHANGE UP
GLUCOSE SERPL-MCNC: 100 MG/DL — HIGH (ref 70–99)
HCT VFR BLD CALC: 24.4 % — LOW (ref 37–47)
HCT VFR BLD CALC: 25.9 % — LOW (ref 37–47)
HGB BLD-MCNC: 7.6 G/DL — LOW (ref 12–16)
HGB BLD-MCNC: 8.1 G/DL — LOW (ref 12–16)
MAGNESIUM SERPL-MCNC: 2.5 MG/DL — HIGH (ref 1.8–2.4)
MCHC RBC-ENTMCNC: 29.6 PG — SIGNIFICANT CHANGE UP (ref 27–31)
MCHC RBC-ENTMCNC: 30.2 PG — SIGNIFICANT CHANGE UP (ref 27–31)
MCHC RBC-ENTMCNC: 31.1 G/DL — LOW (ref 32–37)
MCHC RBC-ENTMCNC: 31.3 G/DL — LOW (ref 32–37)
MCV RBC AUTO: 94.9 FL — SIGNIFICANT CHANGE UP (ref 81–99)
MCV RBC AUTO: 96.6 FL — SIGNIFICANT CHANGE UP (ref 81–99)
NRBC # BLD: 0 /100 WBCS — SIGNIFICANT CHANGE UP (ref 0–0)
NRBC # BLD: 0 /100 WBCS — SIGNIFICANT CHANGE UP (ref 0–0)
PLATELET # BLD AUTO: 119 K/UL — LOW (ref 130–400)
PLATELET # BLD AUTO: 99 K/UL — LOW (ref 130–400)
POTASSIUM SERPL-MCNC: 4.9 MMOL/L — SIGNIFICANT CHANGE UP (ref 3.5–5)
POTASSIUM SERPL-SCNC: 4.9 MMOL/L — SIGNIFICANT CHANGE UP (ref 3.5–5)
RBC # BLD: 2.57 M/UL — LOW (ref 4.2–5.4)
RBC # BLD: 2.68 M/UL — LOW (ref 4.2–5.4)
RBC # FLD: 20.7 % — HIGH (ref 11.5–14.5)
RBC # FLD: 20.8 % — HIGH (ref 11.5–14.5)
SARS-COV-2 RNA SPEC QL NAA+PROBE: SIGNIFICANT CHANGE UP
SODIUM SERPL-SCNC: 140 MMOL/L — SIGNIFICANT CHANGE UP (ref 135–146)
WBC # BLD: 4.17 K/UL — LOW (ref 4.8–10.8)
WBC # BLD: 5.84 K/UL — SIGNIFICANT CHANGE UP (ref 4.8–10.8)
WBC # FLD AUTO: 4.17 K/UL — LOW (ref 4.8–10.8)
WBC # FLD AUTO: 5.84 K/UL — SIGNIFICANT CHANGE UP (ref 4.8–10.8)

## 2022-08-13 PROCEDURE — 99232 SBSQ HOSP IP/OBS MODERATE 35: CPT

## 2022-08-13 PROCEDURE — 71045 X-RAY EXAM CHEST 1 VIEW: CPT | Mod: 26

## 2022-08-13 RX ORDER — MAGNESIUM SULFATE 500 MG/ML
2 VIAL (ML) INJECTION ONCE
Refills: 0 | Status: COMPLETED | OUTPATIENT
Start: 2022-08-13 | End: 2022-08-13

## 2022-08-13 RX ADMIN — PANTOPRAZOLE SODIUM 40 MILLIGRAM(S): 20 TABLET, DELAYED RELEASE ORAL at 05:11

## 2022-08-13 RX ADMIN — Medication 81 MILLIGRAM(S): at 11:02

## 2022-08-13 RX ADMIN — Medication 25 GRAM(S): at 03:20

## 2022-08-13 RX ADMIN — LOSARTAN POTASSIUM 25 MILLIGRAM(S): 100 TABLET, FILM COATED ORAL at 05:09

## 2022-08-13 RX ADMIN — Medication 40 MILLIGRAM(S): at 05:09

## 2022-08-13 RX ADMIN — SENNA PLUS 2 TABLET(S): 8.6 TABLET ORAL at 21:24

## 2022-08-13 RX ADMIN — POLYETHYLENE GLYCOL 3350 17 GRAM(S): 17 POWDER, FOR SOLUTION ORAL at 11:03

## 2022-08-13 RX ADMIN — ENOXAPARIN SODIUM 40 MILLIGRAM(S): 100 INJECTION SUBCUTANEOUS at 11:04

## 2022-08-13 RX ADMIN — Medication 100 MICROGRAM(S): at 05:09

## 2022-08-13 RX ADMIN — CHLORHEXIDINE GLUCONATE 1 APPLICATION(S): 213 SOLUTION TOPICAL at 04:51

## 2022-08-13 NOTE — PROGRESS NOTE ADULT - ASSESSMENT
Mrs. Silverio is a 89 y/o female with HFpEF, severe AS s/p balloon valvuloplasty 2021, anemia s/p multiple transfusions 2 months ago, MVP, HTN, hyperthyroidism, HCC s/p partial liver resection, transferred from [facility] to [facility] for TAVR evaluation by Dr. Mccollum and diuretic therapy.  Patient found to be anemic most likely d/t GI bleed s/p PRBC transfusions. She had a capsule study which showed large AVMs now s/p colonoscopy 7/27 with successful AVM intervention.  Rapid response on 7/30 for respiratory distress and upgraded to CCU.  S/p BAV on 8/10.  Downgraded to Cards tele on 8/11.      #severe AS  #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%  -Euvolemic on exam  - s/p BAV on 8/10, plan for TAVR in future  -Continue losartan  - Continue furosemide 40 mg QD for volume removal   - Continue ASA 81 daily  - Strict I+O   - Daily standing weights  - maintain electrolytes, K>4 Mg >2    #Acute anemia with BRBPR, likely lower GIB, now resolved  - s/p colonoscopy with AVM intervention with Dr. Woo (7/27)  - Hgb 7.6 today, 7.8 (8/12), 8.1 (8/11), 8 (8/10), 9.1 (8/9)  - asymptomatic, pending repeat CBC     #Hemorrhoids with Constipation  - c/w senna, miralax  - c/w hydrocortisone suppository    #HTN  - c/w losartan    #Hypothyroidism  - Continue Levothyroxine 100 mcg PO daily    #GERD  -Continue Pantoprazole 40 mg PO daily    #DVT ppx:  Sequential Compression Device (SCD)  #GI ppx:  Protonix  Code Status: Full Code    Dispo: Pending safe discharge home on Monday when caregiver is present    SPECTRA [phone]   Mrs. 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 Mccollum and diuretic therapy.  She was found to have acute on chronic anemia requiring multiple transfusions. She had a capsule study which showed large AVMs now s/p colonoscopy 7/27 with successful AVM intervention.  She required CCU admission due to acute hypoxic respiratory failure requiring diuresis and BAV on 8/10. Her respiratory failure has resolved and she was then downgraded to [facility] on 8/11.      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 Will give additional dose of lasix if pRBC transfusion is given.  - Continue ASA 81 daily  - Strict I+O   - Daily standing weights  - maintain electrolytes, K>4 Mg >2    #Chronic anemia, likely due to lower GIB, now stable  - s/p colonoscopy with AVM intervention with Dr. Woo (7/27)  - Hgb 7.6 today, 7.8 (8/12), 8.1 (8/11), 8 (8/10), 9.1 (8/9)  - asymptomatic, pending repeat CBC   -Transfuse for Hb<8.0. Will potentially transfuse today    #Hemorrhoids with Constipation  - c/w senna, miralax  - c/w hydrocortisone suppository    #HTN  - c/w losartan    #Hypothyroidism  - Continue Levothyroxine 100 mcg PO daily    #GERD  -Continue Pantoprazole 40 mg PO daily    #Physical deconditioning  -PT recommended SNF with rehab, but patient declines    #DVT ppx:  Sequential Compression Device (SCD)  #GI ppx:  Protonix  Code Status: Full Code    Dispo: Pending safe discharge home on Monday when caregiver is present    SPECTRA [phone]

## 2022-08-13 NOTE — PROGRESS NOTE ADULT - SUBJECTIVE AND OBJECTIVE BOX
Interventional Cardiology PA Adult Progress Note    Chief complaint: Patient is a 89y old  Female who presents with a chief complaint of dyspnea on exertion in the setting of acute GI bleeding and severe aortic stenosis.    Interval history:  - downgraded from CCU (8/12)  - Hgb 7.6 (8/13), awaiting repeat. If hgb low, will consider transfusion 1 U PRBC  - DC planning to home --> declined SNF    Subjective Assessment: patient seen and examined. Denies shortness of breath, dizziness, chest pain, palpitations, lightheadedness, orthopnea  	     ROS negative except per subjective and HPI    MEDICATIONS:  furosemide    Tablet 40 milliGRAM(s) Oral daily  losartan 25 milliGRAM(s) Oral daily        acetaminophen     Tablet .. 650 milliGRAM(s) Oral every 6 hours PRN    aluminum hydroxide/magnesium hydroxide/simethicone Suspension 30 milliLiter(s) Oral every 4 hours PRN  pantoprazole    Tablet 40 milliGRAM(s) Oral before breakfast  polyethylene glycol 3350 17 Gram(s) Oral daily  senna 2 Tablet(s) Oral at bedtime    levothyroxine 100 MICROGram(s) Oral daily    aspirin  chewable 81 milliGRAM(s) Oral daily  chlorhexidine 2% Cloths 1 Application(s) Topical daily  enoxaparin Injectable 40 milliGRAM(s) SubCutaneous every 24 hours  hydrocortisone hemorrhoidal Suppository 1 Suppository(s) Rectal two times a day PRN  hydrocortisone hemorrhoidal Suppository 1 Suppository(s) Rectal daily  lidocaine   4% Patch 1 Patch Transdermal daily      	    [PHYSICAL EXAM:  TELEMETRY:  T(C): 35.8 (08-13-22 @ 04:00), Max: 37 (08-12-22 @ 20:36)  HR: 69 (08-13-22 @ 04:00) (69 - 83)  BP: 111/57 (08-13-22 @ 04:00) (110/55 - 119/72)  RR: 18 (08-13-22 @ 04:00) (18 - 18)  SpO2: 95% (08-12-22 @ 20:12) (95% - 99%)  Wt(kg): --  I&O's Summary    12 Aug 2022 07:01  -  13 Aug 2022 07:00  --------------------------------------------------------  IN: 290 mL / OUT: 2000 mL / NET: -1710 mL                                          Appearance: Normal	  HEENT:   Normal oral mucosa, PERRL, EOMI	  Neck: Supple, - JVD;   Cardiovascular: Normal S1 S2, No JVD, No murmurs,   Respiratory: Lungs clear to auscultation/No Rales, Rhonchi, Wheezing	  Gastrointestinal:  Soft, Non-tender, bowel sounds present	  Skin: No rashes, No ecchymoses, No cyanosis  Extremities: Normal range of motion, No clubbing, cyanosis or edema  Neurologic: Non-focal  Psychiatry: A & O x 3, Mood & affect appropriate  	    LABS:	 	  CARDIAC MARKERS:                            7.6    4.17  )-----------( 99       ( 13 Aug 2022 05:49 )             24.4     08-13    140  |  104  |  37<H>  ----------------------------<  100<H>  4.9   |  28  |  0.7    Ca    9.0      13 Aug 2022 05:49  Phos  2.9     08-12  Mg     2.5     08-13    TPro  5.7<L>  /  Alb  3.4<L>  /  TBili  0.3  /  DBili  x   /  AST  23  /  ALT  16  /  AlkPhos  133<H>  08-12           Interventional Cardiology PA Adult Progress Note    Chief complaint: Patient is a 89y old  Female who presents with a chief complaint of dyspnea on exertion in the setting of acute GI bleeding and severe aortic stenosis.    Interval history:  - downgraded from CCU (8/12)  - Hgb 7.6 (8/13), awaiting repeat. If hgb low, will consider transfusion 1 U PRBC  - DC planning to home --> declined SNF    Subjective Assessment: patient seen and examined. Denies shortness of breath, dizziness, chest pain, palpitations, lightheadedness, orthopnea  	     ROS negative except per subjective and HPI    MEDICATIONS:  furosemide    Tablet 40 milliGRAM(s) Oral daily  losartan 25 milliGRAM(s) Oral daily        acetaminophen     Tablet .. 650 milliGRAM(s) Oral every 6 hours PRN    aluminum hydroxide/magnesium hydroxide/simethicone Suspension 30 milliLiter(s) Oral every 4 hours PRN  pantoprazole    Tablet 40 milliGRAM(s) Oral before breakfast  polyethylene glycol 3350 17 Gram(s) Oral daily  senna 2 Tablet(s) Oral at bedtime    levothyroxine 100 MICROGram(s) Oral daily    aspirin  chewable 81 milliGRAM(s) Oral daily  chlorhexidine 2% Cloths 1 Application(s) Topical daily  enoxaparin Injectable 40 milliGRAM(s) SubCutaneous every 24 hours  hydrocortisone hemorrhoidal Suppository 1 Suppository(s) Rectal two times a day PRN  hydrocortisone hemorrhoidal Suppository 1 Suppository(s) Rectal daily  lidocaine   4% Patch 1 Patch Transdermal daily      	    [PHYSICAL EXAM:  TELEMETRY:  T(C): 35.8 (08-13-22 @ 04:00), Max: 37 (08-12-22 @ 20:36)  HR: 69 (08-13-22 @ 04:00) (69 - 83)  BP: 111/57 (08-13-22 @ 04:00) (110/55 - 119/72)  RR: 18 (08-13-22 @ 04:00) (18 - 18)  SpO2: 95% (08-12-22 @ 20:12) (95% - 99%)  Wt(kg): --  I&O's Summary    12 Aug 2022 07:01  -  13 Aug 2022 07:00  --------------------------------------------------------  IN: 290 mL / OUT: 2000 mL / NET: -1710 mL                                          Appearance: Normal	  HEENT:   Normal oral mucosa, PERRL, EOMI	  Neck: Supple, - JVD;   Cardiovascular: Normal S1 S2, IV/VI systolic murmur at RUSB  Respiratory: Lungs clear to auscultation/No Rales, Rhonchi, Wheezing	  Gastrointestinal:  Soft, Non-tender, bowel sounds present	  Skin: No rashes, No ecchymoses, No cyanosis  Extremities: Normal range of motion, No clubbing, cyanosis or edema  Neurologic: Non-focal  Psychiatry: A & O x 3, Mood & affect appropriate  	    LABS:	 	  CARDIAC MARKERS:                            7.6    4.17  )-----------( 99       ( 13 Aug 2022 05:49 )             24.4     08-13    140  |  104  |  37<H>  ----------------------------<  100<H>  4.9   |  28  |  0.7    Ca    9.0      13 Aug 2022 05:49  Phos  2.9     08-12  Mg     2.5     08-13    TPro  5.7<L>  /  Alb  3.4<L>  /  TBili  0.3  /  DBili  x   /  AST  23  /  ALT  16  /  AlkPhos  133<H>  08-12      Data reviewed:  - Telemetry: SR, no events noted on tele    - ECG  (08.12.22 @ 07:23)   Diagnosis Line Normal sinus rhythm  Left axis deviation  Left anterior fascicular block  Right bundle branch block  Voltage criteria for left ventricular hypertrophy  T wave abnormality, consider lateral ischemia  Abnormal ECG       - TTE   (7/13/2022 1:09:45 PM)  Summary:   1. Left ventricular ejection fraction, by visual estimation, is 40 to   45%.   2. Normal right atrial size.   3. Mild mitral valve regurgitation.   4. Structurally normal mitral valve, with normal leaflet excursion.   5. Mild aortic regurgitation.   6. Severe aortic valve stenosis.   7. The aortic valve mean gradient is 75.1 mmHg consistent with severe   aortic stenosis.    s/p BAV (8/9)   (08.10.22 @ 10:18)    1. Normal global left ventricular systolic function.   2. LV Ejection Fraction by Johnson's Method with a biplane EF of 61 %.   3. Mild asymmetric left ventricular hypertrophy involving the septal   wall.   4. Spectral Doppler shows pseudonormal pattern of left ventricular   myocardial filling (Grade II diastolic dysfunction).   5. Moderately enlarged left atrium.   6. Normal right atrial size.   7. Mild mitral valve regurgitation.   8. Mild thickening of the anterior and posterior mitral valve leaflets.   9. Mild tricuspid regurgitation.  10. Aortic valve thickening with decreased leaflet opening.  11. Mild aortic regurgitation.  12. Critical aortic stenosis, peak/mean /76 mmHg, CESIA 0.3 cm^2.  13. Estimated pulmonary artery systolic pressure is 42.3 mmHg assuming a   right atrial pressure of 3 mmHg, which is consistent with mild pulmonary   hypertension.       Interventional Cardiology PA Adult Progress Note    Chief complaint: Patient is a 89y old  Female who presents with a chief complaint of dyspnea on exertion in the setting of acute GI bleeding and severe aortic stenosis.    Interval history:  - downgraded from CCU (8/12)  - Hgb 7.6 (8/13), awaiting repeat. If hgb low, will consider transfusion 1 U PRBC  - DC planning to home --> declined SNF    Subjective Assessment: patient seen and examined. Denies shortness of breath, dizziness, chest pain, palpitations, lightheadedness, orthopnea. She is trying to get stronger prior to going home  	     ROS negative except per subjective and HPI    MEDICATIONS:  furosemide    Tablet 40 milliGRAM(s) Oral daily  losartan 25 milliGRAM(s) Oral daily        acetaminophen     Tablet .. 650 milliGRAM(s) Oral every 6 hours PRN    aluminum hydroxide/magnesium hydroxide/simethicone Suspension 30 milliLiter(s) Oral every 4 hours PRN  pantoprazole    Tablet 40 milliGRAM(s) Oral before breakfast  polyethylene glycol 3350 17 Gram(s) Oral daily  senna 2 Tablet(s) Oral at bedtime    levothyroxine 100 MICROGram(s) Oral daily    aspirin  chewable 81 milliGRAM(s) Oral daily  chlorhexidine 2% Cloths 1 Application(s) Topical daily  enoxaparin Injectable 40 milliGRAM(s) SubCutaneous every 24 hours  hydrocortisone hemorrhoidal Suppository 1 Suppository(s) Rectal two times a day PRN  hydrocortisone hemorrhoidal Suppository 1 Suppository(s) Rectal daily  lidocaine   4% Patch 1 Patch Transdermal daily      	    [PHYSICAL EXAM:  TELEMETRY:  T(C): 35.8 (08-13-22 @ 04:00), Max: 37 (08-12-22 @ 20:36)  HR: 69 (08-13-22 @ 04:00) (69 - 83)  BP: 111/57 (08-13-22 @ 04:00) (110/55 - 119/72)  RR: 18 (08-13-22 @ 04:00) (18 - 18)  SpO2: 95% (08-12-22 @ 20:12) (95% - 99%)  Wt(kg): --  I&O's Summary    12 Aug 2022 07:01  -  13 Aug 2022 07:00  --------------------------------------------------------  IN: 290 mL / OUT: 2000 mL / NET: -1710 mL                                          Appearance: Normal	  HEENT:   Normal oral mucosa, PERRL, EOMI	  Neck: Supple, - JVD;   Cardiovascular: Normal S1 S2, IV/VI systolic murmur at RUSB  Respiratory: Lungs clear to auscultation/No Rales, Rhonchi, Wheezing	  Gastrointestinal:  Soft, Non-tender, bowel sounds present	  Skin: No rashes, No ecchymoses, No cyanosis  Extremities: Normal range of motion, No clubbing, cyanosis or edema  Neurologic: Non-focal  Psychiatry: A & O x 3, Mood & affect appropriate  	    LABS:	 	  CARDIAC MARKERS:                            7.6    4.17  )-----------( 99       ( 13 Aug 2022 05:49 )             24.4     08-13    140  |  104  |  37<H>  ----------------------------<  100<H>  4.9   |  28  |  0.7    Ca    9.0      13 Aug 2022 05:49  Phos  2.9     08-12  Mg     2.5     08-13    TPro  5.7<L>  /  Alb  3.4<L>  /  TBili  0.3  /  DBili  x   /  AST  23  /  ALT  16  /  AlkPhos  133<H>  08-12      Data reviewed:  - Telemetry: SR, no events noted on tele    - ECG  (08.12.22 @ 07:23)   Diagnosis Line Normal sinus rhythm  Left axis deviation  Left anterior fascicular block  Right bundle branch block  Voltage criteria for left ventricular hypertrophy  T wave abnormality, consider lateral ischemia  Abnormal ECG       - TTE   (7/13/2022 1:09:45 PM)  Summary:   1. Left ventricular ejection fraction, by visual estimation, is 40 to   45%.   2. Normal right atrial size.   3. Mild mitral valve regurgitation.   4. Structurally normal mitral valve, with normal leaflet excursion.   5. Mild aortic regurgitation.   6. Severe aortic valve stenosis.   7. The aortic valve mean gradient is 75.1 mmHg consistent with severe   aortic stenosis.    s/p BAV (8/9)   (08.10.22 @ 10:18)    1. Normal global left ventricular systolic function.   2. LV Ejection Fraction by Johnson's Method with a biplane EF of 61 %.   3. Mild asymmetric left ventricular hypertrophy involving the septal   wall.   4. Spectral Doppler shows pseudonormal pattern of left ventricular   myocardial filling (Grade II diastolic dysfunction).   5. Moderately enlarged left atrium.   6. Normal right atrial size.   7. Mild mitral valve regurgitation.   8. Mild thickening of the anterior and posterior mitral valve leaflets.   9. Mild tricuspid regurgitation.  10. Aortic valve thickening with decreased leaflet opening.  11. Mild aortic regurgitation.  12. Critical aortic stenosis, peak/mean /76 mmHg, CESIA 0.3 cm^2.  13. Estimated pulmonary artery systolic pressure is 42.3 mmHg assuming a   right atrial pressure of 3 mmHg, which is consistent with mild pulmonary   hypertension.

## 2022-08-14 ENCOUNTER — TRANSCRIPTION ENCOUNTER (OUTPATIENT)
Age: 87
End: 2022-08-14

## 2022-08-14 LAB
ANION GAP SERPL CALC-SCNC: 7 MMOL/L — SIGNIFICANT CHANGE UP (ref 7–14)
BUN SERPL-MCNC: 43 MG/DL — HIGH (ref 10–20)
CALCIUM SERPL-MCNC: 9.1 MG/DL — SIGNIFICANT CHANGE UP (ref 8.5–10.1)
CHLORIDE SERPL-SCNC: 102 MMOL/L — SIGNIFICANT CHANGE UP (ref 98–110)
CO2 SERPL-SCNC: 29 MMOL/L — SIGNIFICANT CHANGE UP (ref 17–32)
CREAT SERPL-MCNC: 0.8 MG/DL — SIGNIFICANT CHANGE UP (ref 0.7–1.5)
EGFR: 70 ML/MIN/1.73M2 — SIGNIFICANT CHANGE UP
GLUCOSE SERPL-MCNC: 88 MG/DL — SIGNIFICANT CHANGE UP (ref 70–99)
HCT VFR BLD CALC: 24.2 % — LOW (ref 37–47)
HGB BLD-MCNC: 7.7 G/DL — LOW (ref 12–16)
MAGNESIUM SERPL-MCNC: 2.3 MG/DL — SIGNIFICANT CHANGE UP (ref 1.8–2.4)
MCHC RBC-ENTMCNC: 30.3 PG — SIGNIFICANT CHANGE UP (ref 27–31)
MCHC RBC-ENTMCNC: 31.8 G/DL — LOW (ref 32–37)
MCV RBC AUTO: 95.3 FL — SIGNIFICANT CHANGE UP (ref 81–99)
NRBC # BLD: 0 /100 WBCS — SIGNIFICANT CHANGE UP (ref 0–0)
PLATELET # BLD AUTO: 107 K/UL — LOW (ref 130–400)
POTASSIUM SERPL-MCNC: 4.4 MMOL/L — SIGNIFICANT CHANGE UP (ref 3.5–5)
POTASSIUM SERPL-SCNC: 4.4 MMOL/L — SIGNIFICANT CHANGE UP (ref 3.5–5)
RBC # BLD: 2.54 M/UL — LOW (ref 4.2–5.4)
RBC # FLD: 20.9 % — HIGH (ref 11.5–14.5)
SODIUM SERPL-SCNC: 138 MMOL/L — SIGNIFICANT CHANGE UP (ref 135–146)
WBC # BLD: 4.09 K/UL — LOW (ref 4.8–10.8)
WBC # FLD AUTO: 4.09 K/UL — LOW (ref 4.8–10.8)

## 2022-08-14 PROCEDURE — 99232 SBSQ HOSP IP/OBS MODERATE 35: CPT

## 2022-08-14 RX ORDER — FUROSEMIDE 40 MG
40 TABLET ORAL ONCE
Refills: 0 | Status: COMPLETED | OUTPATIENT
Start: 2022-08-14 | End: 2022-08-14

## 2022-08-14 RX ADMIN — CHLORHEXIDINE GLUCONATE 1 APPLICATION(S): 213 SOLUTION TOPICAL at 05:51

## 2022-08-14 RX ADMIN — PANTOPRAZOLE SODIUM 40 MILLIGRAM(S): 20 TABLET, DELAYED RELEASE ORAL at 05:50

## 2022-08-14 RX ADMIN — Medication 40 MILLIGRAM(S): at 19:21

## 2022-08-14 RX ADMIN — Medication 40 MILLIGRAM(S): at 05:50

## 2022-08-14 RX ADMIN — Medication 100 MICROGRAM(S): at 05:50

## 2022-08-14 RX ADMIN — Medication 81 MILLIGRAM(S): at 11:51

## 2022-08-14 RX ADMIN — LOSARTAN POTASSIUM 25 MILLIGRAM(S): 100 TABLET, FILM COATED ORAL at 05:50

## 2022-08-14 RX ADMIN — ENOXAPARIN SODIUM 40 MILLIGRAM(S): 100 INJECTION SUBCUTANEOUS at 11:52

## 2022-08-14 NOTE — PROGRESS NOTE ADULT - ASSESSMENT
Assessment and Plan:   · Assessment	  Mrs. Silverio is a 89 y/o female with HFpEF, severe AS s/p balloon valvuloplasty 2021, chronic anemia due to AVMs, HTN, hyperthyroidism, HCC s/p partial liver resection who was transferred from [facility] to [place] for TAVR evaluation by Dr. Mccollum and diuretic therapy.  She was found to have acute on chronic anemia requiring multiple transfusions. She had a capsule study which showed large AVMs now s/p colonoscopy 7/27 with successful AVM intervention.  She required CCU admission due to acute hypoxic respiratory failure requiring diuresis and BAV on 8/10. Her respiratory failure has resolved and she was then downgraded to [facility] on 8/11.      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 Will give additional dose of lasix if pRBC transfusion is given.  - Continue ASA 81 daily  - Strict I+O   - Daily standing weights  - maintain electrolytes, K>4 Mg >2    #Chronic anemia, likely due to lower GIB, now stable  - s/p colonoscopy with AVM intervention with Dr. Woo (7/27)  - Hgb 7.6-.8.1-->7.7 (today)  - asymptomatic  -Transfuse for Hb<8.0. Will discuss the need for transfusion today    #Hemorrhoids with Constipation  - c/w senna, miralax  - c/w hydrocortisone suppository    #HTN  - c/w losartan    #Hypothyroidism  - Continue Levothyroxine 100 mcg PO daily    #GERD  -Continue Pantoprazole 40 mg PO daily    #Physical deconditioning  -PT recommended SNF with rehab, but patient declines    #DVT ppx:  Sequential Compression Device (SCD)  #GI ppx:  Protonix  Code Status: Full Code    Dispo: Pending safe discharge home on Monday when caregiver is present      Please contact me with any questions or concerns at [phone].   Assessment and Plan:   · Assessment	  Mrs. Silverio is a 89 y/o female with HFpEF, severe AS s/p balloon valvuloplasty 2021, chronic anemia due to AVMs, HTN, hyperthyroidism, HCC s/p partial liver resection who was transferred from [facility] to [place] for TAVR evaluation by Dr. Mccollum and diuretic therapy.  She was found to have acute on chronic anemia requiring multiple transfusions. She had a capsule study which showed large AVMs now s/p colonoscopy 7/27 with successful AVM intervention.  She required CCU admission due to acute hypoxic respiratory failure requiring diuresis and BAV on 8/10. Her respiratory failure has resolved and she was then downgraded to [facility] on 8/11.      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 Will give additional dose of lasix if pRBC transfusion is given.  - Continue ASA 81 daily  - Strict I+O   - Daily standing weights  - maintain electrolytes, K>4 Mg >2    #Chronic anemia, likely due to lower GIB, now stable  - s/p colonoscopy with AVM intervention with Dr. Woo (7/27)  - Hgb 7.6-.8.1-->7.7 (today)  - asymptomatic  -No indication for blood transfusion today, as pt is asymptomatic with stable vital signs    #Hemorrhoids with Constipation  - c/w senna, miralax  - c/w hydrocortisone suppository    #HTN  - c/w losartan    #Hypothyroidism  - Continue Levothyroxine 100 mcg PO daily    #GERD  -Continue Pantoprazole 40 mg PO daily    #Physical deconditioning  -PT recommended SNF with rehab, but patient declines    #DVT ppx:  Sequential Compression Device (SCD)  #GI ppx:  Protonix  Code Status: Full Code    Dispo: Pending safe discharge home on Monday when caregiver is present      Please contact me with any questions or concerns at [phone].   Assessment and Plan:   · Assessment	  Mrs. Silverio is a 89 y/o female with HFpEF, severe AS s/p balloon valvuloplasty 2021, chronic anemia due to AVMs, HTN, hyperthyroidism, HCC s/p partial liver resection who was transferred from [facility] to [place] for TAVR evaluation by Dr. Mccollum and diuretic therapy.  She was found to have acute on chronic anemia requiring multiple transfusions. She had a capsule study which showed large AVMs now s/p colonoscopy 7/27 with successful AVM intervention.  She required CCU admission due to acute hypoxic respiratory failure requiring diuresis and BAV on 8/10. Her respiratory failure has resolved and she was then downgraded to [facility] on 8/11.      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 Will give additional dose of lasix if pRBC transfusion is given.  - Continue ASA 81 daily  - Strict I+O   - Daily standing weights  - maintain electrolytes, K>4 Mg >2    #Chronic anemia, likely due to lower GIB, now stable  - s/p colonoscopy with AVM intervention with Dr. Woo (7/27)  - Hgb 7.6-.8.1-->7.7 (today)  - asymptomatic  -Transfusion for Hb<8, this am Hb 7.7 --> will give 1 unit pRBCs, followed by additional Lasix 40mg POx1    #Hemorrhoids with Constipation  - c/w senna, miralax  - c/w hydrocortisone suppository    #HTN  - c/w losartan    #Hypothyroidism  - Continue Levothyroxine 100 mcg PO daily    #GERD  -Continue Pantoprazole 40 mg PO daily    #Physical deconditioning  -PT recommended SNF with rehab, but patient declines    #DVT ppx:  Sequential Compression Device (SCD)  #GI ppx:  Protonix  Code Status: Full Code    Dispo: Pending safe discharge home on Monday when caregiver is present      Please contact me with any questions or concerns at [phone].   Assessment and Plan:   · Assessment	  Mrs. Silverio is a 88 y/o female with HFpEF, severe AS s/p balloon valvuloplasty 2021, chronic anemia due to AVMs, HTN, hyperthyroidism, HCC s/p partial liver resection who was transferred from [facility] to [place] for TAVR evaluation by Dr. Mccollum and diuretic therapy.  She was found to have acute on chronic anemia requiring multiple transfusions. She had a capsule study which showed large AVMs now s/p colonoscopy 7/27 with successful AVM intervention.  She required CCU admission due to acute hypoxic respiratory failure requiring diuresis and BAV on 8/10. Her respiratory failure has resolved and she was then downgraded to [facility] on 8/11.      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 Will give additional dose of lasix with pRBC transfusion..  - Continue ASA 81 daily  - Strict I+O   - Daily standing weights  - maintain electrolytes, K>4 Mg >2    #Chronic anemia, likely due to lower GIB, now stable  - s/p colonoscopy with AVM intervention with Dr. Woo (7/27)  - Hgb 7.6-.8.1-->7.7 (today)  -Transfusion for Hb<8, this am Hb 7.7 --> will give 1 unit pRBCs, followed by additional Lasix 40mg POx1. Patient is agreeable    #Hemorrhoids with Constipation  - c/w senna, miralax  - c/w hydrocortisone suppository    #HTN  - c/w losartan    #Hypothyroidism  - Continue Levothyroxine 100 mcg PO daily    #GERD  -Continue Pantoprazole 40 mg PO daily    #Physical deconditioning  -PT recommended SNF with rehab, but patient declines    #DVT ppx:  Sequential Compression Device (SCD)  #GI ppx:  Protonix  Code Status: Full Code    Dispo: Pending safe discharge home on Monday when caregiver is present      Please contact me with any questions or concerns at [phone].

## 2022-08-14 NOTE — PROGRESS NOTE ADULT - NS ATTEND BILL GEN_ALL_CORE
Attending to bill
PA/NP to bill
Attending to bill

## 2022-08-14 NOTE — PROGRESS NOTE ADULT - NS ATTEND OPT1 GEN_ALL_CORE

## 2022-08-14 NOTE — PROGRESS NOTE ADULT - TIME BILLING
I have personally seen and examined this patient.    I have reviewed all pertinent clinical information and reviewed all relevant imaging and diagnostic studies personally.   I counseled the patient about diagnostic testing and treatment plan. All questions were answered.   I discussed recommendations with the primary team.
Discussion of care with the patient and family.  Coordinating care with the care manager.  Bedside evaluation.  Chart review.
Discussion of care, in person, with patient's daughter Lavinia and son João.  Coordination of care with radiology to obtain CTA this morning.  Bedside evaluation.
Coordinating patient care with GI.  Discussing plan with patient and son.  Chart review and physical exam.
Coordination of care
Discussed plan with the patient in person, with her son over the phone, and with her daughter over the phone.  Bedside evaluation and chart review.  Coordination of care with the GI team.  Ongoing coordination of care with the structural team.
I have personally seen and examined this patient.    I have reviewed all pertinent clinical information and reviewed all relevant imaging and diagnostic studies personally.   I counseled the patient about diagnostic testing and treatment plan. All questions were answered.   I discussed recommendations with the primary team.
Interviewed and examined patient. Reviewed hospital course, ECG, tele, lab work, and medications. Had extensive discussion with patient and son.
Interviewed and examined patient. Reviewed patient hospital course, TTE, procedures, lab work, ECG, tele, and medications. Had long discussions with patient and family member.
Bedside evaluation and exam  Family updates
Bedside evaluation.  Speaking with family.  Chart review.  Coordinating care with the GI and interventional teams.
Review of data  Bedside evaluation and exam  Discussions with GI and Dr. Mccollum
Interviewed and examined patient. Reviewed tele, lab work, medications, TTE. Discussed with patient and family member.

## 2022-08-14 NOTE — PROGRESS NOTE ADULT - SUBJECTIVE AND OBJECTIVE BOX
Chief complaint: Patient is a 89y old  Female who presents with a chief complaint of Dyspnea w/ Exertion (13 Aug 2022 11:43)    Interval history:  - downgraded from CCU (8/12)  - Hgb 7.6 --.8.1--> 7.7  (8/14 AM)  - DC planning to home --> declined SNF    Subjective Assessment: patient seen and examined. Denies shortness of breath, dizziness, chest pain, palpitations, lightheadedness, orthopnea. She is trying to get stronger prior to going home    Review of systems: A complete 10-point review of systems was obtained and is negative except as stated in the interval history.    Vitals:  T(F): 97.6, Max: 97.6 (08-14 @ 05:41)  HR: 68 (68 - 81)  BP: 103/51 (86/43 - 122/55)  RR: 17 (17 - 17)  SpO2: 96% (96% - 96%)    Ins & outs:     08-11 @ 07:01  -  08-12 @ 07:00  --------------------------------------------------------  IN: 720 mL / OUT: 700 mL / NET: 20 mL    08-12 @ 07:01  -  08-13 @ 07:00  --------------------------------------------------------  IN: 290 mL / OUT: 2000 mL / NET: -1710 mL    08-13 @ 07:01  -  08-14 @ 07:00  --------------------------------------------------------  IN: 1086 mL / OUT: 1175 mL / NET: -89 mL      Weight trend:  Weight (kg): 58.5 (08-09)    Physical exam:  Appearance: Normal	  HEENT:   Normal oral mucosa, PERRL, EOMI	  Neck: Supple, - JVD;   Cardiovascular: Normal S1 S2, IV/VI systolic murmur at RUSB  Respiratory: Lungs clear to auscultation/No Rales, Rhonchi, Wheezing	  Gastrointestinal:  Soft, Non-tender, bowel sounds present	  Skin: No rashes, No ecchymoses, No cyanosis  Extremities: Normal range of motion, No clubbing, cyanosis or edema  Neurologic: Non-focal  Psychiatry: A & O x 3, Mood & affect appropriate    Data reviewed:  - Data reviewed:  - Telemetry: SR, no events noted on tele    - ECG  (08.12.22 @ 07:23)   Diagnosis Line Normal sinus rhythm  Left axis deviation  Left anterior fascicular block  Right bundle branch block  Voltage criteria for left ventricular hypertrophy  T wave abnormality, consider lateral ischemia  Abnormal ECG       - TTE   (7/13/2022 1:09:45 PM)  Summary:   1. Left ventricular ejection fraction, by visual estimation, is 40 to   45%.   2. Normal right atrial size.   3. Mild mitral valve regurgitation.   4. Structurally normal mitral valve, with normal leaflet excursion.   5. Mild aortic regurgitation.   6. Severe aortic valve stenosis.   7. The aortic valve mean gradient is 75.1 mmHg consistent with severe   aortic stenosis.    s/p BAV (8/9)   (08.10.22 @ 10:18)    1. Normal global left ventricular systolic function.   2. LV Ejection Fraction by Johnson's Method with a biplane EF of 61 %.   3. Mild asymmetric left ventricular hypertrophy involving the septal   wall.   4. Spectral Doppler shows pseudonormal pattern of left ventricular   myocardial filling (Grade II diastolic dysfunction).   5. Moderately enlarged left atrium.   6. Normal right atrial size.   7. Mild mitral valve regurgitation.   8. Mild thickening of the anterior and posterior mitral valve leaflets.   9. Mild tricuspid regurgitation.  10. Aortic valve thickening with decreased leaflet opening.  11. Mild aortic regurgitation.  12. Critical aortic stenosis, peak/mean /76 mmHg, CESIA 0.3 cm^2.  13. Estimated pulmonary artery systolic pressure is 42.3 mmHg assuming a   right atrial pressure of 3 mmHg, which is consistent with mild pulmonary   hypertension.      - Labs:                        7.7    4.09  )-----------( 107      ( 14 Aug 2022 06:27 )             24.2     08-14    138  |  102  |  43<H>  ----------------------------<  88  4.4   |  29  |  0.8    Ca    9.1      14 Aug 2022 06:27  Mg     2.3     08-14          Serum Pro-Brain Natriuretic Peptide: 08421 pg/mL (07-31)      Triglycerides, Serum: 128 mg/dL (07-26-22 @ 06:36)  LDL Cholesterol Calculated: 109 mg/dL (07-26-22 @ 06:36)      Medications:  aspirin  chewable 81 milliGRAM(s) Oral daily  chlorhexidine 2% Cloths 1 Application(s) Topical daily  enoxaparin Injectable 40 milliGRAM(s) SubCutaneous every 24 hours  furosemide    Tablet 40 milliGRAM(s) Oral daily  hydrocortisone hemorrhoidal Suppository 1 Suppository(s) Rectal daily  levothyroxine 100 MICROGram(s) Oral daily  lidocaine   4% Patch 1 Patch Transdermal daily  losartan 25 milliGRAM(s) Oral daily  pantoprazole    Tablet 40 milliGRAM(s) Oral before breakfast  polyethylene glycol 3350 17 Gram(s) Oral daily  senna 2 Tablet(s) Oral at bedtime    Allergies    Cipro (Other)  Levaquin (Rash)  tetracycline (Hives)         Chief complaint: Patient is a 89y old  Female who presents with a chief complaint of Dyspnea w/ Exertion (13 Aug 2022 11:43)    Interval history:  - downgraded from CCU (8/12)  - Hgb 7.6 --.8.1--> 7.7  (8/14 AM)  - DC planning to home --> declined SNF    Subjective Assessment: patient seen and examined. Denies shortness of breath, dizziness, chest pain, palpitations, lightheadedness, orthopnea. She is trying to get stronger prior to going home.     Review of systems: A complete 10-point review of systems was obtained and is negative except as stated in the interval history.    Vitals:  T(F): 97.6, Max: 97.6 (08-14 @ 05:41)  HR: 68 (68 - 81)  BP: 103/51 (86/43 - 122/55)  RR: 17 (17 - 17)  SpO2: 96% (96% - 96%)    Ins & outs:     08-11 @ 07:01  -  08-12 @ 07:00  --------------------------------------------------------  IN: 720 mL / OUT: 700 mL / NET: 20 mL    08-12 @ 07:01  -  08-13 @ 07:00  --------------------------------------------------------  IN: 290 mL / OUT: 2000 mL / NET: -1710 mL    08-13 @ 07:01  -  08-14 @ 07:00  --------------------------------------------------------  IN: 1086 mL / OUT: 1175 mL / NET: -89 mL      Weight trend:  Weight (kg): 58.5 (08-09)    Physical exam:  Appearance: Normal	  HEENT:   Normal oral mucosa, PERRL, EOMI	  Neck: Supple, - JVD;   Cardiovascular: Normal S1 S2, IV/VI systolic murmur at RUSB  Respiratory: Lungs clear to auscultation/No Rales, Rhonchi, Wheezing	  Gastrointestinal:  Soft, Non-tender, bowel sounds present	  Skin: No rashes, No ecchymoses, No cyanosis  Extremities: Normal range of motion,   Neurologic: Non-focal  Psychiatry: A & O x 3, Mood & affect appropriate    Data reviewed:  - Data reviewed:  - Telemetry: SR, no events noted on tele    - ECG  (08.12.22 @ 07:23)   Diagnosis Line Normal sinus rhythm  Left axis deviation  Left anterior fascicular block  Right bundle branch block  Voltage criteria for left ventricular hypertrophy  T wave abnormality, consider lateral ischemia  Abnormal ECG       - TTE   (7/13/2022 1:09:45 PM)  Summary:   1. Left ventricular ejection fraction, by visual estimation, is 40 to   45%.   2. Normal right atrial size.   3. Mild mitral valve regurgitation.   4. Structurally normal mitral valve, with normal leaflet excursion.   5. Mild aortic regurgitation.   6. Severe aortic valve stenosis.   7. The aortic valve mean gradient is 75.1 mmHg consistent with severe   aortic stenosis.    s/p BAV (8/9)   (08.10.22 @ 10:18)    1. Normal global left ventricular systolic function.   2. LV Ejection Fraction by Johnson's Method with a biplane EF of 61 %.   3. Mild asymmetric left ventricular hypertrophy involving the septal   wall.   4. Spectral Doppler shows pseudonormal pattern of left ventricular   myocardial filling (Grade II diastolic dysfunction).   5. Moderately enlarged left atrium.   6. Normal right atrial size.   7. Mild mitral valve regurgitation.   8. Mild thickening of the anterior and posterior mitral valve leaflets.   9. Mild tricuspid regurgitation.  10. Aortic valve thickening with decreased leaflet opening.  11. Mild aortic regurgitation.  12. Critical aortic stenosis, peak/mean /76 mmHg, CESIA 0.3 cm^2.  13. Estimated pulmonary artery systolic pressure is 42.3 mmHg assuming a   right atrial pressure of 3 mmHg, which is consistent with mild pulmonary   hypertension.      - Labs:                        7.7    4.09  )-----------( 107      ( 14 Aug 2022 06:27 )             24.2     08-14    138  |  102  |  43<H>  ----------------------------<  88  4.4   |  29  |  0.8    Ca    9.1      14 Aug 2022 06:27  Mg     2.3     08-14          Serum Pro-Brain Natriuretic Peptide: 26092 pg/mL (07-31)      Triglycerides, Serum: 128 mg/dL (07-26-22 @ 06:36)  LDL Cholesterol Calculated: 109 mg/dL (07-26-22 @ 06:36)      Medications:  aspirin  chewable 81 milliGRAM(s) Oral daily  chlorhexidine 2% Cloths 1 Application(s) Topical daily  enoxaparin Injectable 40 milliGRAM(s) SubCutaneous every 24 hours  furosemide    Tablet 40 milliGRAM(s) Oral daily  hydrocortisone hemorrhoidal Suppository 1 Suppository(s) Rectal daily  levothyroxine 100 MICROGram(s) Oral daily  lidocaine   4% Patch 1 Patch Transdermal daily  losartan 25 milliGRAM(s) Oral daily  pantoprazole    Tablet 40 milliGRAM(s) Oral before breakfast  polyethylene glycol 3350 17 Gram(s) Oral daily  senna 2 Tablet(s) Oral at bedtime    Allergies    Cipro (Other)  Levaquin (Rash)  tetracycline (Hives)

## 2022-08-15 VITALS
TEMPERATURE: 96 F | SYSTOLIC BLOOD PRESSURE: 129 MMHG | WEIGHT: 130.29 LBS | HEART RATE: 63 BPM | DIASTOLIC BLOOD PRESSURE: 61 MMHG

## 2022-08-15 LAB
ANION GAP SERPL CALC-SCNC: 14 MMOL/L — SIGNIFICANT CHANGE UP (ref 7–14)
BUN SERPL-MCNC: 46 MG/DL — HIGH (ref 10–20)
CALCIUM SERPL-MCNC: 9.4 MG/DL — SIGNIFICANT CHANGE UP (ref 8.5–10.1)
CHLORIDE SERPL-SCNC: 97 MMOL/L — LOW (ref 98–110)
CO2 SERPL-SCNC: 26 MMOL/L — SIGNIFICANT CHANGE UP (ref 17–32)
CREAT SERPL-MCNC: 1 MG/DL — SIGNIFICANT CHANGE UP (ref 0.7–1.5)
EGFR: 54 ML/MIN/1.73M2 — LOW
GLUCOSE SERPL-MCNC: 90 MG/DL — SIGNIFICANT CHANGE UP (ref 70–99)
HCT VFR BLD CALC: 30.6 % — LOW (ref 37–47)
HGB BLD-MCNC: 9.9 G/DL — LOW (ref 12–16)
MAGNESIUM SERPL-MCNC: 2.2 MG/DL — SIGNIFICANT CHANGE UP (ref 1.8–2.4)
MCHC RBC-ENTMCNC: 30.2 PG — SIGNIFICANT CHANGE UP (ref 27–31)
MCHC RBC-ENTMCNC: 32.4 G/DL — SIGNIFICANT CHANGE UP (ref 32–37)
MCV RBC AUTO: 93.3 FL — SIGNIFICANT CHANGE UP (ref 81–99)
NRBC # BLD: 0 /100 WBCS — SIGNIFICANT CHANGE UP (ref 0–0)
PLATELET # BLD AUTO: 127 K/UL — LOW (ref 130–400)
POTASSIUM SERPL-MCNC: 4.2 MMOL/L — SIGNIFICANT CHANGE UP (ref 3.5–5)
POTASSIUM SERPL-SCNC: 4.2 MMOL/L — SIGNIFICANT CHANGE UP (ref 3.5–5)
RBC # BLD: 3.28 M/UL — LOW (ref 4.2–5.4)
RBC # FLD: 19.8 % — HIGH (ref 11.5–14.5)
SODIUM SERPL-SCNC: 137 MMOL/L — SIGNIFICANT CHANGE UP (ref 135–146)
WBC # BLD: 4.79 K/UL — LOW (ref 4.8–10.8)
WBC # FLD AUTO: 4.79 K/UL — LOW (ref 4.8–10.8)

## 2022-08-15 PROCEDURE — 99239 HOSP IP/OBS DSCHRG MGMT >30: CPT

## 2022-08-15 RX ORDER — LEVOTHYROXINE SODIUM 125 MCG
1 TABLET ORAL
Qty: 30 | Refills: 0
Start: 2022-08-15 | End: 2022-09-13

## 2022-08-15 RX ORDER — ASPIRIN/CALCIUM CARB/MAGNESIUM 324 MG
1 TABLET ORAL
Qty: 30 | Refills: 0
Start: 2022-08-15 | End: 2022-09-13

## 2022-08-15 RX ORDER — LEVOTHYROXINE SODIUM 125 MCG
112 TABLET ORAL
Qty: 30 | Refills: 0 | DISCHARGE
Start: 2022-08-15 | End: 2022-09-13

## 2022-08-15 RX ORDER — FUROSEMIDE 40 MG
1 TABLET ORAL
Qty: 0 | Refills: 0 | DISCHARGE

## 2022-08-15 RX ORDER — PANTOPRAZOLE SODIUM 20 MG/1
0 TABLET, DELAYED RELEASE ORAL
Qty: 0 | Refills: 0 | DISCHARGE

## 2022-08-15 RX ORDER — PANTOPRAZOLE SODIUM 20 MG/1
1 TABLET, DELAYED RELEASE ORAL
Qty: 30 | Refills: 0
Start: 2022-08-15 | End: 2022-09-13

## 2022-08-15 RX ORDER — FUROSEMIDE 40 MG
1 TABLET ORAL
Qty: 30 | Refills: 0
Start: 2022-08-15 | End: 2022-09-13

## 2022-08-15 RX ORDER — LEVOTHYROXINE SODIUM 125 MCG
1 TABLET ORAL
Qty: 0 | Refills: 0 | DISCHARGE

## 2022-08-15 RX ORDER — FUROSEMIDE 40 MG
1 TABLET ORAL
Qty: 30 | Refills: 0 | DISCHARGE
Start: 2022-08-15 | End: 2022-09-13

## 2022-08-15 RX ORDER — POLYETHYLENE GLYCOL 3350 17 G/17G
17 POWDER, FOR SOLUTION ORAL
Qty: 510 | Refills: 0
Start: 2022-08-15 | End: 2022-09-13

## 2022-08-15 RX ORDER — LOSARTAN POTASSIUM 100 MG/1
1 TABLET, FILM COATED ORAL
Qty: 30 | Refills: 0
Start: 2022-08-15 | End: 2022-09-13

## 2022-08-15 RX ORDER — LOSARTAN POTASSIUM 100 MG/1
2 TABLET, FILM COATED ORAL
Qty: 30 | Refills: 0 | DISCHARGE
Start: 2022-08-15 | End: 2022-09-13

## 2022-08-15 RX ORDER — SENNA PLUS 8.6 MG/1
2 TABLET ORAL
Qty: 60 | Refills: 0
Start: 2022-08-15 | End: 2022-09-13

## 2022-08-15 RX ADMIN — PANTOPRAZOLE SODIUM 40 MILLIGRAM(S): 20 TABLET, DELAYED RELEASE ORAL at 05:44

## 2022-08-15 RX ADMIN — Medication 100 MICROGRAM(S): at 05:44

## 2022-08-15 RX ADMIN — Medication 40 MILLIGRAM(S): at 05:44

## 2022-08-15 RX ADMIN — LOSARTAN POTASSIUM 25 MILLIGRAM(S): 100 TABLET, FILM COATED ORAL at 05:43

## 2022-08-15 RX ADMIN — Medication 81 MILLIGRAM(S): at 11:40

## 2022-08-15 RX ADMIN — CHLORHEXIDINE GLUCONATE 1 APPLICATION(S): 213 SOLUTION TOPICAL at 05:44

## 2022-08-15 NOTE — DISCHARGE NOTE PROVIDER - NSDCCPCAREPLAN_GEN_ALL_CORE_FT
PRINCIPAL DISCHARGE DIAGNOSIS  Diagnosis: Shortness of breath  Assessment and Plan of Treatment:       SECONDARY DISCHARGE DIAGNOSES  Diagnosis: Acute CHF  Assessment and Plan of Treatment:     Diagnosis: Anemia  Assessment and Plan of Treatment:     Diagnosis: Elevated troponin  Assessment and Plan of Treatment:     Diagnosis: Pleural effusion  Assessment and Plan of Treatment:      PRINCIPAL DISCHARGE DIAGNOSIS  Diagnosis: Shortness of breath  Assessment and Plan of Treatment:   - Please weight yourself daily. If your weight increases by 2 lbs in one day or 5 lbs in one week, please take an extra dose of Lasix 40 mg in the afternoon.  - Please measure your blood pressure daily. If your systolic blood pressure is less than 95 mmHg, please call Dr. Mccollum to ask if you should stop losartan.      SECONDARY DISCHARGE DIAGNOSES  Diagnosis: Acute CHF  Assessment and Plan of Treatment:     Diagnosis: Anemia  Assessment and Plan of Treatment:     Diagnosis: Elevated troponin  Assessment and Plan of Treatment:     Diagnosis: Pleural effusion  Assessment and Plan of Treatment:

## 2022-08-15 NOTE — PROGRESS NOTE ADULT - REASON FOR ADMISSION
Dyspnea w/ Exertion

## 2022-08-15 NOTE — DISCHARGE NOTE PROVIDER - HOSPITAL COURSE
Mrs. 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 Mccollum and diuretic therapy.  She was found to have acute on chronic anemia requiring multiple transfusions. She had a capsule study which showed large AVMs now s/p colonoscopy 7/27 with successful AVM intervention.  She required CCU admission due to acute hypoxic respiratory failure requiring diuresis and BAV on 8/10. Her respiratory failure has resolved and she was then downgraded to [facility] on 8/11. Now stable to discharge to [facility]/home with assistance.    Mrs. 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 Mccollum.    During this hospitalization, patient was found to have acute-on-chronic anemia requesting multiple transfusions. Capsule study showed large AVMs, and patient underwent colonoscopy on 7/27/22 with successful AVM intervention.     On 7/30/22, patient developed pulmonary edema and required upgrade to the CCU. She was initially treated with BiPAP and nitroglycerin gtt. In the CCU, patient was noted to be febrile up to 100.4 with elevated WBC and CXR concerning for PNA. Blood cultures on 7/30/22 on 8/01/22 grew staph epidermidis. In the setting of infection, patient went into Afib with RVR and became hypotensive (SBP in the 50s) requiring phenylephrine. Thoracentesis on 8/01/22 with 1L of fluid removed, transudative effusion with negative culture. Her pleural effusion reaccumulated and a chest tube was placed on 8/04/22. She was treated with a 7-day course of vancomycin and cefepime, and her antibiotics were discontinued per the recommendation of Infectious Disease.     On 8/09/22, patient underwent balloon aortic valvuloplasty. Her respiratory failure and blood pressure improved after her BAV. Chest tube was removed on 8/10/22, and patient was started on losartan 25 mg daily to avoid hypertension and flash pulmonary edema. TTE on 8/10/22 with critical AS with peak gradient 120 mmHg, mean gradient 76 mmHg, and CESIA 0.3 cm^2. Patient was downgraded to Cardiac Telemetry. Her diuretics were adjusted and she will be discharged on Lasix 40 mg PO daily. Her last transfusion was on 8/14 for Hgb 7.7, with post-transfusion Hgb of 9.9. She was not started on anticoagulation for Afib given her ongoing need for transfusions.    Patient was encouraged to consider subacute rehabilitation, but Ms. Silverio and family has opted for home services. She will follow-up with Dr. Mccollum on 9/01/22.

## 2022-08-15 NOTE — DISCHARGE NOTE PROVIDER - PROVIDER TOKENS
PROVIDER:[TOKEN:[[id number]],SCHEDULEDAPPT:[09/01/2022]] PROVIDER:[TOKEN:[[id number]],SCHEDULEDAPPT:[09/01/2022],SCHEDULEDAPPTTIME:[12:40 PM],ESTABLISHEDPATIENT:[T]]

## 2022-08-15 NOTE — PROGRESS NOTE ADULT - ASSESSMENT
· Assessment	  Mrs. 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 Mccollum and diuretic therapy.  She was found to have acute on chronic anemia requiring multiple transfusions. She had a capsule study which showed large AVMs now s/p colonoscopy 7/27 with successful AVM intervention.  She required CCU admission due to acute hypoxic respiratory failure requiring diuresis and BAV on 8/10. Her respiratory failure has resolved and she was then downgraded to [facility] on 8/11.    Problems discussed and associated plan:  #severe AS  #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%  -Euvolemic on exam  - s/p BAV on 8/10, plan for TAVR in future  -Continue losartan  - Continue furosemide 40 mg QD for volume removal. Will give additional dose of lasix with pRBC transfusion..  - Continue ASA 81 daily  - Strict I+O   - Daily standing weights  - maintain electrolytes, K>4 Mg >2    #Chronic anemia, likely due to lower GIB, now stable  - s/p colonoscopy with AVM intervention with Dr. Woo (7/27)  - Hgb 7.6-.8.1-->7.7 (today)  -Transfusion for Hb<8, this am Hb 7.7 --> will give 1 unit pRBCs, followed by additional Lasix 40mg POx1. Patient is agreeable    #Hemorrhoids with Constipation  - c/w senna, miralax  - c/w hydrocortisone suppository    #HTN  - c/w losartan    #Hypothyroidism  - Continue Levothyroxine 100 mcg PO daily    #GERD  -Continue Pantoprazole 40 mg PO daily    #Physical deconditioning  -PT recommended SNF with rehab, but patient declines    #DVT ppx:  Sequential Compression Device (SCD)  #GI ppx:  Protonix  Code Status: Full Code

## 2022-08-15 NOTE — PROGRESS NOTE ADULT - SUBJECTIVE AND OBJECTIVE BOX
Chief complaint: Patient is a 89y old  Female who presents with a chief complaint of Dyspnea w/ Exertion (14 Aug 2022 08:48)    Interval history:    Patient seen and examined at bedside. No acute events overnight.  Patient without complaints. Denies CP, SOB, palpitations, or dizziness  No events on telemetry overnight    Review of systems: A complete 10-point review of systems was obtained and is negative except as stated in the interval history.    Vitals:  T(F): 96.3, Max: 97.6 (08-14 @ 05:41)  HR: 79 (68 - 79)  BP: 107/71 (97/55 - 114/49)  RR: 18 (18 - 18)  SpO2: 97% (97% - 97%)    Ins & outs:     08-11 @ 07:01  -  08-12 @ 07:00  --------------------------------------------------------  IN: 720 mL / OUT: 700 mL / NET: 20 mL    08-12 @ 07:01  -  08-13 @ 07:00  --------------------------------------------------------  IN: 290 mL / OUT: 2000 mL / NET: -1710 mL    08-13 @ 07:01 - 08-14 @ 07:00  --------------------------------------------------------  IN: 1086 mL / OUT: 1175 mL / NET: -89 mL    08-14 @ 07:01  -  08-15 @ 00:17  --------------------------------------------------------  IN: 1276 mL / OUT: 950 mL / NET: 326 mL      Weight trend:      Physical exam:  General: No apparent distress  HEENT: Anicteric sclera. Moist mucous membranes. JVP *** cm.   Cardiac: Regular rate and rhythm. No murmurs, rubs, or gallops.   Vascular: Symmetric radial pulses. Dorsalis pedis pulses palpable.   Respiratory: Normal effort. Bibasilar crackles. Clear to ascultation.   Abdomen: Soft, nontender. Audible bowel sounds.   Extremities: Warm with *** edema. No cyanosis or clubbing.   Skin: Warm and dry. No rash.   Neurologic: Grossly normal motor function.   Psychiatric: Oriented to person, place, and time.     Data reviewed:  - Telemetry:   - ECG (date***):   - TTE (date***):   - Chest x-ray (date***):   - Stress test:   - CCTA:  - Cardiac catheterization:  - Cardiac MRI:    - Labs:                        7.7    4.09  )-----------( 107      ( 14 Aug 2022 06:27 )             24.2     08-14    138  |  102  |  43<H>  ----------------------------<  88  4.4   |  29  |  0.8    Ca    9.1      14 Aug 2022 06:27  Mg     2.3     08-14          Serum Pro-Brain Natriuretic Peptide: 29605 pg/mL (07-31)      Triglycerides, Serum: 128 mg/dL (07-26-22 @ 06:36)  LDL Cholesterol Calculated: 109 mg/dL (07-26-22 @ 06:36)          Medications:  aspirin  chewable 81 milliGRAM(s) Oral daily  chlorhexidine 2% Cloths 1 Application(s) Topical daily  enoxaparin Injectable 40 milliGRAM(s) SubCutaneous every 24 hours  furosemide    Tablet 40 milliGRAM(s) Oral daily  hydrocortisone hemorrhoidal Suppository 1 Suppository(s) Rectal daily  levothyroxine 100 MICROGram(s) Oral daily  lidocaine   4% Patch 1 Patch Transdermal daily  losartan 25 milliGRAM(s) Oral daily  pantoprazole    Tablet 40 milliGRAM(s) Oral before breakfast  polyethylene glycol 3350 17 Gram(s) Oral daily  senna 2 Tablet(s) Oral at bedtime    Drips:    PRN:     Allergies    Cipro (Other)  Levaquin (Rash)  tetracycline (Hives)    Intolerances

## 2022-08-15 NOTE — DISCHARGE NOTE PROVIDER - NSDCMRMEDTOKEN_GEN_ALL_CORE_FT
aspirin 81 mg oral tablet, chewable: 1 tab(s) orally once a day  furosemide 20 mg oral tablet: 1 tab(s) orally once a day  levothyroxine 100 mcg (0.1 mg) oral tablet: 1 tab(s) orally once a day  pantoprazole 40 mg oral delayed release tablet: for 15 days  polyethylene glycol 3350 oral powder for reconstitution: 17 gram(s) orally once a day  senna oral tablet: 2 tab(s) orally once a day (at bedtime)   aspirin 81 mg oral tablet, chewable: 1 tab(s) orally once a day  furosemide 40 mg oral tablet: 1 tab(s) orally once a day  levothyroxine 100 mcg (0.1 mg) oral tablet: 1 tab(s) orally once a day  losartan 25 mg oral tablet: 1 tab(s) orally once a day  pantoprazole 40 mg oral delayed release tablet: 1 tab(s) orally once a day   polyethylene glycol 3350 oral powder for reconstitution: 17 gram(s) orally once a day  senna oral tablet: 2 tab(s) orally once a day (at bedtime)   aspirin 81 mg oral tablet, chewable: 1 tab(s) orally once a day  Ensure: 1 unit(s) orally 2 times a day   furosemide 40 mg oral tablet: 1 tab(s) orally once a day  levothyroxine 100 mcg (0.1 mg) oral tablet: 1 tab(s) orally once a day  losartan 25 mg oral tablet: 1 tab(s) orally once a day  pantoprazole 40 mg oral delayed release tablet: 1 tab(s) orally once a day   polyethylene glycol 3350 oral powder for reconstitution: 17 gram(s) orally once a day  senna oral tablet: 2 tab(s) orally once a day (at bedtime)

## 2022-08-15 NOTE — DISCHARGE NOTE PROVIDER - ATTENDING DISCHARGE PHYSICAL EXAMINATION:
Patient is feeling prepared for discharge, requesting ambulette and home services which have been arranged by Case Management. On exam, harsh systolic murmur heard throughout the chest with no S2 and clear lungs.

## 2022-08-15 NOTE — DISCHARGE NOTE PROVIDER - CARE PROVIDER_API CALL
Talat Mccollum)  Cardiovascular Disease; Internal Medicine  [address] [place] [address]  Phone: [phone]  Fax: [phone]  Scheduled Appointment: 09/01/2022   Talat Mccollum)  Cardiovascular Disease; Internal Medicine  [address] [place] [address]  Phone: [phone]  Fax: [phone]  Established Patient  Scheduled Appointment: 09/01/2022 12:40 PM

## 2022-08-15 NOTE — DISCHARGE NOTE PROVIDER - NSDCFUSCHEDAPPT_GEN_ALL_CORE_FT
Talat Mccollum  [facility] Physician [facility]  CARDIOLOGY [address] [facility]  Scheduled Appointment: 09/01/2022

## 2022-08-31 LAB
CULTURE RESULTS: SIGNIFICANT CHANGE UP
SPECIMEN SOURCE: SIGNIFICANT CHANGE UP

## 2022-08-31 NOTE — PROVIDER CONTACT NOTE (OTHER) - NAME OF MD/NP/PA/DO NOTIFIED:
Airway  Urgency: elective    Date/Time: 8/31/2022 11:19 AM  Airway not difficult    General Information and Staff    Patient location during procedure: OR  Anesthesiologist: Wali Bills MD  CRNA/CAA: Jonathan Concepcion CRNA    Indications and Patient Condition  Indications for airway management: airway protection    Preoxygenated: yes  MILS maintained throughout  Mask difficulty assessment: 2 - vent by mask + OA or adjuvant +/- NMBA    Final Airway Details  Final airway type: endotracheal airway      Successful airway: ETT  Cuffed: yes   Successful intubation technique: direct laryngoscopy  Facilitating devices/methods: intubating stylet  Endotracheal tube insertion site: oral  Blade: Choudhury  Blade size: 2  ETT size (mm): 7.0  Cormack-Lehane Classification: grade IIa - partial view of glottis  Placement verified by: chest auscultation and capnometry   Cuff volume (mL): 6  Measured from: lips  ETT/EBT  to lips (cm): 21  Number of attempts at approach: 1  Assessment: lips, teeth, and gum same as pre-op and atraumatic intubation               pa sese on [id number]

## 2022-09-01 ENCOUNTER — RESULT REVIEW (OUTPATIENT)
Age: 87
End: 2022-09-01

## 2022-09-01 ENCOUNTER — APPOINTMENT (OUTPATIENT)
Dept: CARDIOTHORACIC SURGERY | Facility: CLINIC | Age: 87
End: 2022-09-01

## 2022-09-01 ENCOUNTER — APPOINTMENT (OUTPATIENT)
Dept: CARDIOLOGY | Facility: CLINIC | Age: 87
End: 2022-09-01

## 2022-09-01 ENCOUNTER — OUTPATIENT (OUTPATIENT)
Dept: OUTPATIENT SERVICES | Facility: HOSPITAL | Age: 87
LOS: 1 days | Discharge: HOME | End: 2022-09-01

## 2022-09-01 VITALS
HEART RATE: 83 BPM | TEMPERATURE: 96 F | WEIGHT: 115.08 LBS | OXYGEN SATURATION: 99 % | DIASTOLIC BLOOD PRESSURE: 55 MMHG | RESPIRATION RATE: 15 BRPM | SYSTOLIC BLOOD PRESSURE: 115 MMHG | HEIGHT: 62 IN

## 2022-09-01 DIAGNOSIS — Z87.828 PERSONAL HISTORY OF OTHER (HEALED) PHYSICAL INJURY AND TRAUMA: Chronic | ICD-10-CM

## 2022-09-01 DIAGNOSIS — Z90.49 ACQUIRED ABSENCE OF OTHER SPECIFIED PARTS OF DIGESTIVE TRACT: Chronic | ICD-10-CM

## 2022-09-01 DIAGNOSIS — Z01.818 ENCOUNTER FOR OTHER PREPROCEDURAL EXAMINATION: ICD-10-CM

## 2022-09-01 DIAGNOSIS — Z98.890 OTHER SPECIFIED POSTPROCEDURAL STATES: Chronic | ICD-10-CM

## 2022-09-01 DIAGNOSIS — I35.0 NONRHEUMATIC AORTIC (VALVE) STENOSIS: ICD-10-CM

## 2022-09-01 DIAGNOSIS — Z87.891 PERSONAL HISTORY OF NICOTINE DEPENDENCE: ICD-10-CM

## 2022-09-01 LAB
A1C WITH ESTIMATED AVERAGE GLUCOSE RESULT: 4.3 % — SIGNIFICANT CHANGE UP (ref 4–5.6)
ALBUMIN SERPL ELPH-MCNC: 4.3 G/DL — SIGNIFICANT CHANGE UP (ref 3.5–5.2)
ALP SERPL-CCNC: 113 U/L — SIGNIFICANT CHANGE UP (ref 30–115)
ALT FLD-CCNC: 21 U/L — SIGNIFICANT CHANGE UP (ref 0–41)
ANION GAP SERPL CALC-SCNC: 15 MMOL/L — HIGH (ref 7–14)
APTT BLD: 28.2 SEC — SIGNIFICANT CHANGE UP (ref 27–39.2)
AST SERPL-CCNC: 25 U/L — SIGNIFICANT CHANGE UP (ref 0–41)
BASOPHILS # BLD AUTO: 0.02 K/UL — SIGNIFICANT CHANGE UP (ref 0–0.2)
BASOPHILS NFR BLD AUTO: 0.4 % — SIGNIFICANT CHANGE UP (ref 0–1)
BILIRUB SERPL-MCNC: 0.4 MG/DL — SIGNIFICANT CHANGE UP (ref 0.2–1.2)
BLD GP AB SCN SERPL QL: SIGNIFICANT CHANGE UP
BUN SERPL-MCNC: 54 MG/DL — HIGH (ref 10–20)
CALCIUM SERPL-MCNC: 9.9 MG/DL — SIGNIFICANT CHANGE UP (ref 8.5–10.1)
CHLORIDE SERPL-SCNC: 102 MMOL/L — SIGNIFICANT CHANGE UP (ref 98–110)
CO2 SERPL-SCNC: 25 MMOL/L — SIGNIFICANT CHANGE UP (ref 17–32)
CREAT SERPL-MCNC: 1 MG/DL — SIGNIFICANT CHANGE UP (ref 0.7–1.5)
EGFR: 54 ML/MIN/1.73M2 — LOW
EOSINOPHIL # BLD AUTO: 0.02 K/UL — SIGNIFICANT CHANGE UP (ref 0–0.7)
EOSINOPHIL NFR BLD AUTO: 0.4 % — SIGNIFICANT CHANGE UP (ref 0–8)
ESTIMATED AVERAGE GLUCOSE: 77 MG/DL — SIGNIFICANT CHANGE UP (ref 68–114)
GLUCOSE SERPL-MCNC: 87 MG/DL — SIGNIFICANT CHANGE UP (ref 70–99)
HCT VFR BLD CALC: 18.4 % — LOW (ref 37–47)
HGB BLD-MCNC: 5.7 G/DL — CRITICAL LOW (ref 12–16)
IMM GRANULOCYTES NFR BLD AUTO: 0.4 % — HIGH (ref 0.1–0.3)
INR BLD: 0.96 RATIO — SIGNIFICANT CHANGE UP (ref 0.65–1.3)
LYMPHOCYTES # BLD AUTO: 0.76 K/UL — LOW (ref 1.2–3.4)
LYMPHOCYTES # BLD AUTO: 14.8 % — LOW (ref 20.5–51.1)
MCHC RBC-ENTMCNC: 31.3 G/DL — LOW (ref 32–37)
MCHC RBC-ENTMCNC: 32.6 PG — HIGH (ref 27–31)
MCV RBC AUTO: 104 FL — HIGH (ref 81–99)
MONOCYTES # BLD AUTO: 0.29 K/UL — SIGNIFICANT CHANGE UP (ref 0.1–0.6)
MONOCYTES NFR BLD AUTO: 5.6 % — SIGNIFICANT CHANGE UP (ref 1.7–9.3)
NEUTROPHILS # BLD AUTO: 4.03 K/UL — SIGNIFICANT CHANGE UP (ref 1.4–6.5)
NEUTROPHILS NFR BLD AUTO: 78.4 % — HIGH (ref 42.2–75.2)
NRBC # BLD: 0 /100 WBCS — SIGNIFICANT CHANGE UP (ref 0–0)
NT-PROBNP SERPL-SCNC: 1913 PG/ML — HIGH (ref 0–300)
PLATELET # BLD AUTO: 182 K/UL — SIGNIFICANT CHANGE UP (ref 130–400)
POTASSIUM SERPL-MCNC: 4.1 MMOL/L — SIGNIFICANT CHANGE UP (ref 3.5–5)
POTASSIUM SERPL-SCNC: 4.1 MMOL/L — SIGNIFICANT CHANGE UP (ref 3.5–5)
PROT SERPL-MCNC: 6.9 G/DL — SIGNIFICANT CHANGE UP (ref 6–8)
PROTHROM AB SERPL-ACNC: 11.1 SEC — SIGNIFICANT CHANGE UP (ref 9.95–12.87)
RBC # BLD: 1.75 M/UL — LOW (ref 4.2–5.4)
RBC # FLD: 20.9 % — HIGH (ref 11.5–14.5)
SODIUM SERPL-SCNC: 142 MMOL/L — SIGNIFICANT CHANGE UP (ref 135–146)
T3 SERPL-MCNC: 68 NG/DL — LOW (ref 80–200)
T4 AB SER-ACNC: 9.6 UG/DL — SIGNIFICANT CHANGE UP (ref 4.6–12)
TSH SERPL-MCNC: 33.93 UIU/ML — HIGH (ref 0.27–4.2)
WBC # BLD: 5.14 K/UL — SIGNIFICANT CHANGE UP (ref 4.8–10.8)
WBC # FLD AUTO: 5.14 K/UL — SIGNIFICANT CHANGE UP (ref 4.8–10.8)

## 2022-09-01 PROCEDURE — 93010 ELECTROCARDIOGRAM REPORT: CPT

## 2022-09-01 PROCEDURE — 71045 X-RAY EXAM CHEST 1 VIEW: CPT | Mod: 26

## 2022-09-01 PROCEDURE — 99024 POSTOP FOLLOW-UP VISIT: CPT

## 2022-09-01 NOTE — H&P PST ADULT - HISTORY OF PRESENT ILLNESS
89y Female presents today accompanied by her son for presurgical testing for TAVR. Per patient and her son, patient has experienced progressively worsening SOB on exertion over the past 1+ years associated with fatigue, weakness. Reports occasional CP, inability to walk more than 10 feet on flat surface despite use of rollator walker at home. Recently in ED with c/o worsening chest pressure discomfort and worsening dyspnea x 1-2 weeks. pt found to be severely anemic w/ Hgb-5.4, s/p 3u PRBCs. Pt refused TAVR 2 years ago at [facility]. Now for scheduled procedure.  Patient denies any CP, palpitations, SOB, cough, or dysuria. No recent URI or UTI. *Recently treated in hospital for sepsis per patient's son.  Stated exercise tolerance is FOS/ unable; walks with rollator for 10 feet  ZEKE screen reviewed    Patient denies any recent personal exposure to COVID19. Denies any sick contacts. Patient denies travel within the past 30 days. Patient was instructed to quarantine until after procedure.    Anesthesia Alert  YES--Difficult Airway - CLASS IV  NO--History of neck surgery or radiation  YES--Limited ROM of neck - UNABLE TO EXTEND NECK, LIMITED LATERAL ROTATION NECK, RIGHT AND LEFT  NO--History of Malignant hyperthermia  NO--Personal or family history of Pseudocholinesterase deficiency.  YES--Prior Anesthesia Complication - PONV  NO--Latex Allergy  NO--Loose teeth  NO--History of Rheumatoid Arthritis  NO--ZEKE  NO--Bleeding risk  NO--Other_____    Patient states that this is their complete medical history and list of medications.  Patient verbalized understanding of instructions given during this visit and was given the opportunity to ask questions and have them answered. They were instructed to follow up with their surgeon/surgeon's office with any questions regarding their procedure.

## 2022-09-01 NOTE — H&P PST ADULT - ADDITIONAL PE
UNABLE TO EXTEND NECK, LIMITED LATERAL ROTATION NECK, RIGHT AND LEFT; LIMITED EXAM RE: PATIENT STATES THAT SHE IS UNABLE TO STAND, EXAMINED IN WHEELCHAIR

## 2022-09-01 NOTE — H&P PST ADULT - NSICDXPASTSURGICALHX_GEN_ALL_CORE_FT
PAST SURGICAL HISTORY:  H/O carpal tunnel repair 2021 - 2012    H/O resection of liver liver cancer (right lobe 2001); 2003 - right lobe, cholecystectomy, and bile duct    History of torn meniscus of knee right; repaired  1995    Status post cholecystectomy

## 2022-09-01 NOTE — REASON FOR VISIT
[Consultation] : a consultation visit [Structural Heart and Valve Disease] : structural heart and valve disease [FreeTextEntry1] : Aortic Stenosis

## 2022-09-01 NOTE — H&P PST ADULT - NSICDXPASTMEDICALHX_GEN_ALL_CORE_FT
PAST MEDICAL HISTORY:  Acid reflux     Aortic stenosis s/p ballon aortic valuloplasty 5/25/21; 8/2021    Arthritis     Diverticulosis     Enlarged heart     H/O tear of meniscus of knee joint left    H/O thyroid nodule bx benign - 5 years ago    Hiatal hernia     History of blood transfusion no adverse reaction    HTN (hypertension)     HTN (hypertension)     Hypothyroid     Liver cancer s/p resection and s/p chemo and radiation    Mitral valve prolapse     Murmur     Nodule of kidney     Osteoarthritis     Overactive bladder

## 2022-09-01 NOTE — ASSESSMENT
[FreeTextEntry1] : 5 Meter walk\par 1. 7\par 2. 6\par 3. 6\par \par Frailty: \par Right 9.8 10.3 10.7\par Left 8.7 9.1 8.3\par \par Beverly THAO [facility], am acting as scribe for

## 2022-09-01 NOTE — HISTORY OF PRESENT ILLNESS
[FreeTextEntry1] : Mrs. 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 Mccollum. Son and Patient refused SNF. Arrives today for evaluation of aortic stenosis. \par \par NYHA class IV\par Pt lives home with son and is Fully Dependent \par Former SMoker- 1 PPD X 40 years, quit 2021\par \par Her healthcare teams is as follows:\par PMD: Toño Yanez NP\par Cardio: Does not have\par \par \par \par \par \par

## 2022-09-01 NOTE — H&P PST ADULT - REASON FOR ADMISSION
ase Type: OP Block TimeSuite: OR HeartProceduralist: Rex Oliveira  Confirmed Surgery DateTime: 09- - 0:00PAST DateTime: 09- - 14:30Procedure: TAVR  ERP?: UnavailableLaterality: N/ALength of Procedure: 240 Minutes  Anesthesia Type: General

## 2022-09-02 ENCOUNTER — INPATIENT (INPATIENT)
Facility: HOSPITAL | Age: 87
LOS: 9 days | Discharge: HOME HEALTH | End: 2022-09-12
Attending: THORACIC SURGERY (CARDIOTHORACIC VASCULAR SURGERY) | Admitting: THORACIC SURGERY (CARDIOTHORACIC VASCULAR SURGERY)

## 2022-09-02 VITALS — HEIGHT: 62 IN

## 2022-09-02 DIAGNOSIS — Z90.49 ACQUIRED ABSENCE OF OTHER SPECIFIED PARTS OF DIGESTIVE TRACT: Chronic | ICD-10-CM

## 2022-09-02 DIAGNOSIS — I47.2 VENTRICULAR TACHYCARDIA: ICD-10-CM

## 2022-09-02 DIAGNOSIS — Z98.890 OTHER SPECIFIED POSTPROCEDURAL STATES: Chronic | ICD-10-CM

## 2022-09-02 DIAGNOSIS — Z87.828 PERSONAL HISTORY OF OTHER (HEALED) PHYSICAL INJURY AND TRAUMA: Chronic | ICD-10-CM

## 2022-09-02 PROBLEM — N28.89 OTHER SPECIFIED DISORDERS OF KIDNEY AND URETER: Chronic | Status: ACTIVE | Noted: 2022-09-01

## 2022-09-02 PROBLEM — N32.81 OVERACTIVE BLADDER: Chronic | Status: ACTIVE | Noted: 2022-09-01

## 2022-09-02 PROBLEM — Z86.39 PERSONAL HISTORY OF OTHER ENDOCRINE, NUTRITIONAL AND METABOLIC DISEASE: Chronic | Status: ACTIVE | Noted: 2022-09-01

## 2022-09-02 PROBLEM — M19.90 UNSPECIFIED OSTEOARTHRITIS, UNSPECIFIED SITE: Chronic | Status: ACTIVE | Noted: 2022-09-01

## 2022-09-02 PROBLEM — I35.0 NONRHEUMATIC AORTIC (VALVE) STENOSIS: Chronic | Status: ACTIVE | Noted: 2021-05-16

## 2022-09-02 PROBLEM — E03.9 HYPOTHYROIDISM, UNSPECIFIED: Chronic | Status: ACTIVE | Noted: 2022-09-01

## 2022-09-02 LAB
ALBUMIN SERPL ELPH-MCNC: 3.8 G/DL — SIGNIFICANT CHANGE UP (ref 3.5–5.2)
ALP SERPL-CCNC: 92 U/L — SIGNIFICANT CHANGE UP (ref 30–115)
ALT FLD-CCNC: 18 U/L — SIGNIFICANT CHANGE UP (ref 0–41)
ANION GAP SERPL CALC-SCNC: 9 MMOL/L — SIGNIFICANT CHANGE UP (ref 7–14)
ANISOCYTOSIS BLD QL: SIGNIFICANT CHANGE UP
APTT BLD: 28.2 SEC — SIGNIFICANT CHANGE UP (ref 27–39.2)
AST SERPL-CCNC: 24 U/L — SIGNIFICANT CHANGE UP (ref 0–41)
BASE EXCESS BLDV CALC-SCNC: 3.5 MMOL/L — HIGH (ref -2–3)
BASOPHILS # BLD AUTO: 0.02 K/UL — SIGNIFICANT CHANGE UP (ref 0–0.2)
BASOPHILS NFR BLD AUTO: 0.4 % — SIGNIFICANT CHANGE UP (ref 0–1)
BILIRUB SERPL-MCNC: 0.3 MG/DL — SIGNIFICANT CHANGE UP (ref 0.2–1.2)
BLD GP AB SCN SERPL QL: SIGNIFICANT CHANGE UP
BUN SERPL-MCNC: 51 MG/DL — HIGH (ref 10–20)
CA-I SERPL-SCNC: 1.22 MMOL/L — SIGNIFICANT CHANGE UP (ref 1.15–1.33)
CALCIUM SERPL-MCNC: 8.9 MG/DL — SIGNIFICANT CHANGE UP (ref 8.5–10.1)
CHLORIDE SERPL-SCNC: 104 MMOL/L — SIGNIFICANT CHANGE UP (ref 98–110)
CO2 SERPL-SCNC: 28 MMOL/L — SIGNIFICANT CHANGE UP (ref 17–32)
CREAT SERPL-MCNC: 1 MG/DL — SIGNIFICANT CHANGE UP (ref 0.7–1.5)
EGFR: 54 ML/MIN/1.73M2 — LOW
EOSINOPHIL # BLD AUTO: 0.05 K/UL — SIGNIFICANT CHANGE UP (ref 0–0.7)
EOSINOPHIL NFR BLD AUTO: 1 % — SIGNIFICANT CHANGE UP (ref 0–8)
GAS PNL BLDV: 141 MMOL/L — SIGNIFICANT CHANGE UP (ref 136–145)
GAS PNL BLDV: SIGNIFICANT CHANGE UP
GLUCOSE SERPL-MCNC: 93 MG/DL — SIGNIFICANT CHANGE UP (ref 70–99)
HCO3 BLDV-SCNC: 29 MMOL/L — SIGNIFICANT CHANGE UP (ref 22–29)
HCT VFR BLD CALC: 15.6 % — LOW (ref 37–47)
HCT VFR BLD CALC: 27.7 % — LOW (ref 37–47)
HCT VFR BLDA CALC: 21 % — CRITICAL LOW (ref 39–51)
HGB BLD CALC-MCNC: 7 G/DL — CRITICAL LOW (ref 12.6–17.4)
HGB BLD-MCNC: 4.8 G/DL — CRITICAL LOW (ref 12–16)
HGB BLD-MCNC: 9.2 G/DL — LOW (ref 12–16)
HYPOCHROMIA BLD QL: SLIGHT — SIGNIFICANT CHANGE UP
IMM GRANULOCYTES NFR BLD AUTO: 0.6 % — HIGH (ref 0.1–0.3)
INR BLD: 0.99 RATIO — SIGNIFICANT CHANGE UP (ref 0.65–1.3)
LACTATE BLDV-MCNC: 1.8 MMOL/L — SIGNIFICANT CHANGE UP (ref 0.5–2)
LYMPHOCYTES # BLD AUTO: 0.49 K/UL — LOW (ref 1.2–3.4)
LYMPHOCYTES # BLD AUTO: 10.3 % — LOW (ref 20.5–51.1)
MACROCYTES BLD QL: SLIGHT — SIGNIFICANT CHANGE UP
MCHC RBC-ENTMCNC: 30.8 G/DL — LOW (ref 32–37)
MCHC RBC-ENTMCNC: 31.4 PG — HIGH (ref 27–31)
MCHC RBC-ENTMCNC: 31.4 PG — HIGH (ref 27–31)
MCHC RBC-ENTMCNC: 33.2 G/DL — SIGNIFICANT CHANGE UP (ref 32–37)
MCV RBC AUTO: 102 FL — HIGH (ref 81–99)
MCV RBC AUTO: 94.5 FL — SIGNIFICANT CHANGE UP (ref 81–99)
MICROCYTES BLD QL: SLIGHT — SIGNIFICANT CHANGE UP
MONOCYTES # BLD AUTO: 0.36 K/UL — SIGNIFICANT CHANGE UP (ref 0.1–0.6)
MONOCYTES NFR BLD AUTO: 7.5 % — SIGNIFICANT CHANGE UP (ref 1.7–9.3)
MRSA PCR RESULT.: NEGATIVE — SIGNIFICANT CHANGE UP
NEUTROPHILS # BLD AUTO: 3.83 K/UL — SIGNIFICANT CHANGE UP (ref 1.4–6.5)
NEUTROPHILS NFR BLD AUTO: 80.2 % — HIGH (ref 42.2–75.2)
NEUTS BAND # BLD: 6 % — SIGNIFICANT CHANGE UP (ref 0–6)
NRBC # BLD: 0 /100 WBCS — SIGNIFICANT CHANGE UP (ref 0–0)
NRBC # BLD: 0 /100 WBCS — SIGNIFICANT CHANGE UP (ref 0–0)
NRBC # BLD: 0 /100 — SIGNIFICANT CHANGE UP (ref 0–0)
NT-PROBNP SERPL-SCNC: 1842 PG/ML — HIGH (ref 0–300)
PCO2 BLDV: 49 MMHG — HIGH (ref 39–42)
PH BLDV: 7.38 — SIGNIFICANT CHANGE UP (ref 7.32–7.43)
PLAT MORPH BLD: NORMAL — SIGNIFICANT CHANGE UP
PLATELET # BLD AUTO: 142 K/UL — SIGNIFICANT CHANGE UP (ref 130–400)
PLATELET # BLD AUTO: 165 K/UL — SIGNIFICANT CHANGE UP (ref 130–400)
PO2 BLDV: 21 MMHG — SIGNIFICANT CHANGE UP
POIKILOCYTOSIS BLD QL AUTO: SLIGHT — SIGNIFICANT CHANGE UP
POTASSIUM BLDV-SCNC: 4.2 MMOL/L — SIGNIFICANT CHANGE UP (ref 3.5–5.1)
POTASSIUM SERPL-MCNC: 4.2 MMOL/L — SIGNIFICANT CHANGE UP (ref 3.5–5)
POTASSIUM SERPL-SCNC: 4.2 MMOL/L — SIGNIFICANT CHANGE UP (ref 3.5–5)
PROT SERPL-MCNC: 6 G/DL — SIGNIFICANT CHANGE UP (ref 6–8)
PROTHROM AB SERPL-ACNC: 11.4 SEC — SIGNIFICANT CHANGE UP (ref 9.95–12.87)
RBC # BLD: 1.53 M/UL — LOW (ref 4.2–5.4)
RBC # BLD: 2.93 M/UL — LOW (ref 4.2–5.4)
RBC # FLD: 19.9 % — HIGH (ref 11.5–14.5)
RBC # FLD: 21.4 % — HIGH (ref 11.5–14.5)
RBC BLD AUTO: ABNORMAL
SAO2 % BLDV: 25.4 % — SIGNIFICANT CHANGE UP
SARS-COV-2 RNA SPEC QL NAA+PROBE: SIGNIFICANT CHANGE UP
SODIUM SERPL-SCNC: 141 MMOL/L — SIGNIFICANT CHANGE UP (ref 135–146)
TROPONIN T SERPL-MCNC: 0.16 NG/ML — CRITICAL HIGH
TROPONIN T SERPL-MCNC: 0.18 NG/ML — CRITICAL HIGH
WBC # BLD: 3.6 K/UL — LOW (ref 4.8–10.8)
WBC # BLD: 4.78 K/UL — LOW (ref 4.8–10.8)
WBC # FLD AUTO: 3.6 K/UL — LOW (ref 4.8–10.8)
WBC # FLD AUTO: 4.78 K/UL — LOW (ref 4.8–10.8)

## 2022-09-02 PROCEDURE — 99285 EMERGENCY DEPT VISIT HI MDM: CPT | Mod: 25,GC

## 2022-09-02 PROCEDURE — 93010 ELECTROCARDIOGRAM REPORT: CPT

## 2022-09-02 PROCEDURE — 71045 X-RAY EXAM CHEST 1 VIEW: CPT | Mod: 26

## 2022-09-02 PROCEDURE — 99222 1ST HOSP IP/OBS MODERATE 55: CPT

## 2022-09-02 PROCEDURE — 93308 TTE F-UP OR LMTD: CPT | Mod: 26

## 2022-09-02 RX ORDER — ASPIRIN/CALCIUM CARB/MAGNESIUM 324 MG
81 TABLET ORAL DAILY
Refills: 0 | Status: DISCONTINUED | OUTPATIENT
Start: 2022-09-02 | End: 2022-09-02

## 2022-09-02 RX ORDER — PANTOPRAZOLE SODIUM 20 MG/1
40 TABLET, DELAYED RELEASE ORAL EVERY 12 HOURS
Refills: 0 | Status: DISCONTINUED | OUTPATIENT
Start: 2022-09-02 | End: 2022-09-04

## 2022-09-02 RX ORDER — FUROSEMIDE 40 MG
20 TABLET ORAL ONCE
Refills: 0 | Status: COMPLETED | OUTPATIENT
Start: 2022-09-02 | End: 2022-09-02

## 2022-09-02 RX ORDER — LEVOTHYROXINE SODIUM 125 MCG
100 TABLET ORAL DAILY
Refills: 0 | Status: DISCONTINUED | OUTPATIENT
Start: 2022-09-02 | End: 2022-09-12

## 2022-09-02 RX ORDER — LANOLIN ALCOHOL/MO/W.PET/CERES
3 CREAM (GRAM) TOPICAL AT BEDTIME
Refills: 0 | Status: DISCONTINUED | OUTPATIENT
Start: 2022-09-02 | End: 2022-09-12

## 2022-09-02 RX ORDER — FUROSEMIDE 40 MG
40 TABLET ORAL DAILY
Refills: 0 | Status: DISCONTINUED | OUTPATIENT
Start: 2022-09-02 | End: 2022-09-09

## 2022-09-02 RX ORDER — LOSARTAN POTASSIUM 100 MG/1
25 TABLET, FILM COATED ORAL DAILY
Refills: 0 | Status: DISCONTINUED | OUTPATIENT
Start: 2022-09-02 | End: 2022-09-10

## 2022-09-02 RX ORDER — INFLUENZA VIRUS VACCINE 15; 15; 15; 15 UG/.5ML; UG/.5ML; UG/.5ML; UG/.5ML
0.7 SUSPENSION INTRAMUSCULAR ONCE
Refills: 0 | Status: COMPLETED | OUTPATIENT
Start: 2022-09-02 | End: 2022-09-02

## 2022-09-02 RX ORDER — PANTOPRAZOLE SODIUM 20 MG/1
40 TABLET, DELAYED RELEASE ORAL
Refills: 0 | Status: DISCONTINUED | OUTPATIENT
Start: 2022-09-02 | End: 2022-09-02

## 2022-09-02 RX ORDER — SODIUM CHLORIDE 9 MG/ML
500 INJECTION, SOLUTION INTRAVENOUS ONCE
Refills: 0 | Status: COMPLETED | OUTPATIENT
Start: 2022-09-02 | End: 2022-09-02

## 2022-09-02 RX ADMIN — Medication 20 MILLIGRAM(S): at 19:20

## 2022-09-02 RX ADMIN — SODIUM CHLORIDE 500 MILLILITER(S): 9 INJECTION, SOLUTION INTRAVENOUS at 12:40

## 2022-09-02 NOTE — PATIENT PROFILE ADULT - FUNCTIONAL ASSESSMENT - DAILY ACTIVITY 6.
Ambulatory patient arrives today with complaints of eating chicken tenders yesterday at 2300. Patient reports he has diarrhea x2 with some nausea and no vomiting.  Patient also reports he has chronic abdominal issues and takes medications (but cannot remember name). Patient reports this doesn't help.   3 = A little assistance

## 2022-09-02 NOTE — H&P ADULT - NSHPPHYSICALEXAM_GEN_ALL_CORE
LOS:     VITALS:   T(C): 36.2 (09-02-22 @ 14:05), Max: 36.2 (09-02-22 @ 14:05)  HR: 82 (09-02-22 @ 14:05) (73 - 85)  BP: 117/56 (09-02-22 @ 14:05) (99/52 - 121/60)  RR: 18 (09-02-22 @ 14:05) (18 - 20)  SpO2: 100% (09-02-22 @ 14:05) (92% - 100%)    GENERAL: NAD, lying in bed comfortably  NECK: Supple, No JVD  CHEST/LUNG: Clear to auscultation bilaterally; No rales, rhonchi, wheezing, or rubs. Unlabored respirations  HEART: Regular rate and rhythm; systolic murmur 4/6 in R parasternal area  ABDOMEN: BSx4; Soft, nontender, nondistended  EXTREMITIES:  2+ Peripheral Pulses, RLE hematoma resolving   NERVOUS SYSTEM:  A&Ox3, no focal deficits   SKIN: No rashes or lesions

## 2022-09-02 NOTE — ED ADULT NURSE NOTE - CHIEF COMPLAINT QUOTE
BIBA from home for SOB. pt pulse ox on arrival 74 RA. Pt placed on a NRB. pulse ox 94% As per EMS pt was recently d/c on 8/15 for CHF

## 2022-09-02 NOTE — ED ADULT TRIAGE NOTE - CHIEF COMPLAINT QUOTE
BIBA from home for SOB. pt pulse ox on arrival 74 RA. Pt placed on a NRB. pulse ox 94% BIBA from home for SOB. pt pulse ox on arrival 74 RA. Pt placed on a NRB. pulse ox 94% As per EMS pt was recently d/c on 8/15 for CHF

## 2022-09-02 NOTE — H&P ADULT - ASSESSMENT
Impression   # Severe AS s/p BAV in 8/2022 - Area 0.3cm2, mean gradient 76mmHg post BAV  # VALENTINE likely secondary to acute on chronic anemia   # Known to have multiple AVMs requiring tranfusions   # HFpEF - Euvolemic   # Chronic transudative left pleural effusion   # CAD   # Elevated troponins likely demand ishemia   # A fib not on AC   # HTN     Recommendations   - Transfuse 2 units of blood slowly   - Monitor SpO2 during - may need lasix in between transfusions   - GI evaluation   - Structural heart team follow up   - Trend troponins   - Daily chest x ray and ECGs   - Continue home medications   - Wean O2   - Monitor in CCU     Discussed with attending    Impression     # Severe anemia  # Severe AS s/p BAV in 8/2022 - Area 0.3cm2, mean gradient 76mmHg post BAV  # VALENTINE likely secondary to acute on chronic anemia   # Known to have multiple AVMs requiring tranfusions   # HFpEF - Euvolemic   # Chronic transudative left pleural effusion   # CAD   # Elevated troponins likely demand ishemia   # A fib not on AC   # HTN       PLAN:    NEUROLOGY: Avoid CNS depressants    HEENT: Oral care    HEME and CARDIOVASCULAR: Daily weights. Strict I&Os, Keep K>4 and Mg>2,  Monitor CBC, DVT prophylaxis, Dimer, Duplex  - Transfuse 2 units of blood slowly   - Monitor SpO2 during - may need lasix in between transfusions   - Structural heart team follow up   - Trend troponins   - Daily chest x ray and ECGs   - Continue home medications      PULMONARY: HOB @ 45 degrees. Aspiration precautions. Keep SpO2 > 95%    GASTROINTESTINAL: Pureed diet, NPO after midnight, GI prophylaxis, GI consult    RENAL: Monitor BMP. Correct lytes as needed     INFECTIOUS DISEASE: Monitor off abx    ENDOCRINE: Follow up FS.  Insulin protocol if needed.    MUSCULOSKELETAL: Bed rest     DISPO: CCU monitoring  CODE STATUS: Full code   Impression     # Severe anemia  # Severe AS s/p BAV in 8/2022 - Area 0.3cm2, mean gradient 76mmHg post BAV  # VALENTINE likely secondary to acute on chronic anemia   # Known to have multiple AVMs requiring tranfusions   # HFpEF - Euvolemic   # Chronic transudative left pleural effusion   # CAD   # Elevated troponins likely demand ishemia   # A fib not on AC   # HTN       PLAN:    NEUROLOGY: Avoid CNS depressants    HEENT: Oral care    HEME and CARDIOVASCULAR: Daily weights. Strict I&Os, Keep K>4 and Mg>2,  Monitor CBC, DVT prophylaxis, Dimer, Duplex  - Transfuse 2 units of blood slowly   - Monitor SpO2 during - may need lasix in between transfusions   - Structural heart team follow up   - Trend troponins   - Daily chest x ray and ECGs   - Continue home medications - holding aspirin    PULMONARY: HOB @ 45 degrees. Aspiration precautions. Keep SpO2 > 95%    GASTROINTESTINAL: Pureed diet, NPO after midnight, Protonix BID, GI consult    RENAL: Monitor BMP. Correct lytes as needed     INFECTIOUS DISEASE: Monitor off abx    ENDOCRINE: Follow up FS.  Insulin protocol if needed.    MUSCULOSKELETAL: Bed rest     DISPO: CCU monitoring  CODE STATUS: Full code

## 2022-09-02 NOTE — H&P ADULT - HISTORY OF PRESENT ILLNESS
Mrs. 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    She was admitted in 7/2022 for TAVR evaluation and shortness of breath. During this hospitalization, patient was found to have acute-on-chronic anemia requesting multiple transfusions. Capsule study showed large AVMs, and patient underwent colonoscopy on 7/27/22 with successful AVM intervention with multiple non bleeding AVMs as well.     On 7/30/22, patient developed pulmonary edema and required upgrade to the CCU. She was initially treated with BiPAP and nitroglycerin gtt. In the CCU, patient was noted to be febrile up to 100.4 with elevated WBC and CXR concerning for PNA. Blood cultures on 7/30/22 on 8/01/22 grew staph epidermidis. In the setting of infection, patient went into Afib with RVR and became hypotensive (SBP in the 50s) requiring phenylephrine. Thoracentesis on 8/01/22 with 1L of fluid removed, transudative effusion with negative culture. Her pleural effusion reaccumulated and a chest tube was placed on 8/04/22. She was treated with a 7-day course of vancomycin and cefepime, and her antibiotics were discontinued per the recommendation of Infectious Disease.     On 8/09/22, patient underwent balloon aortic valvuloplasty. Her respiratory failure and blood pressure improved after her BAV. Chest tube was removed on 8/10/22, and patient was started on losartan 25 mg daily to avoid hypertension and flash pulmonary edema. TTE on 8/10/22 with critical AS with peak gradient 120 mmHg, mean gradient 76 mmHg, and CESIA 0.3 cm^2. Patient was downgraded to Cardiac Telemetry. Her diuretics were adjusted and she will be discharged on Lasix 40 mg PO daily. Her last transfusion was on 8/14 for Hgb 7.7, with post-transfusion Hgb of 9.9. She was not started on anticoagulation for Afib given her ongoing need for transfusions.    After discharge she was feeling well. She saw Dr. Mccollum on 9/2/2022 and was planned for TAVR 1 week after. However over past 2 days, patient developed SOB that is acute on chronic and worsening over the past couple of days associated with a dry cough. Pt says she can only walk a few steps before having to stop to catch her breath. She has chronic chest pain that is unchanged from baseline described as a "discomfort up in my throat". She visited the [facility] and was found to have Hb of 5.7, she was transferred to [facility] and 2 units of blood was transfused. She denies melena or BRBPR.     Vital Signs Last 24 Hrs:  T(F): 96 (02 Sep 2022 18:00), Max: 97.1 (02 Sep 2022 14:05)  HR: 68 (02 Sep 2022 18:00) (68 - 85)  BP: 111/58 (02 Sep 2022 18:00) (99/52 - 131/60)  RR: 18 (02 Sep 2022 18:00) (15 - 20)  SpO2: 100% (02 Sep 2022 18:00) (92% - 100%)

## 2022-09-02 NOTE — ED PROVIDER NOTE - PRIOR HOSPITAL/ED VISITS SUMMARY FREE TEXT FOR MDM OBTAINED AND REVIEWED OLD RECORDS QUESTION
Pt discharged from hospital ~2 weeks ago after being admitted for similar complaint in addition to low Hgb and GI bleeding; had GI procedure done to address AVM, had multiple blood transfusions. Was upgraded to CCU for pulmonary edema and hypotension treated with BiPAP and phenylephrine; had a chest tube placed for pleural effusion (transudative) and removed prior to d/c. Pt being assessed for TAVR yesterday, revealed Hgb 5.8.

## 2022-09-02 NOTE — H&P ADULT - NSHPPOACENTRALVENOUSCATHETER_GEN_ALL_CORE
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 Has gained ~10 pounds since his transplant. He denies any extremity swelling. He has increased his physical activity by walking his dog frequently. He has been trying to eat healthier and is restricting his calories to ~0483-0350 per day. He has no issues eating or drinking. He is tolerating his medications well. The increased abdominal girth seems to make his activities of daily living and work activities. He takes ~6 Norco daily for his \"kidney pain.\" His pain is overall stable. He was seen by nephrology recently and thinks this weight gain is due to increased size of his native polycystic kidneys.     As for glucose control, he regularly checks his sugars and usually are in the 80-90s. Last A1c checked in May 2019 (5.3).    Transplant History:    Transplant Type:  SPK  Donor Type:  - Brain Death   Transplant Date:  2018 (Kidney / Pancreas), 10/10/2013 (Kidney)   Ureteral Stent:  yes   Crossmatch:  negative   DSA at Tx:  No  Baseline Cr: 1.1   DeNovo DSA: No    Acute Rejection Hx:  No    Present Maintenance Immunosuppression:  Tacrolimus and Mycophenolic acid      Transplant Coordinator: Alesha Hartley     Transplant Office Phone Number: [phone]     Immunsupresent Medications     Immunosuppressive Agents Disp Start End    mycophenolic acid (GENERIC EQUIVALENT) 360 MG EC tablet 120 tablet 2018     Sig - Route: Take 2 tablets (720 mg) by mouth 2 times daily - Oral    Class: E-Prescribe    tacrolimus (GENERIC EQUIVALENT) 0.5 MG capsule 60 capsule 12/3/2018     Sig: Hold, for dose change.    Class: E-Prescribe    tacrolimus (GENERIC EQUIVALENT) 1 MG capsule 180 capsule 12/3/2018     Sig - Route: Take 3 " capsules (3 mg) by mouth 2 times daily - Oral    Class: E-Prescribe          Possible Immunosuppression-related side effects:   []             headache  []             vivid dreams  []             irritability  []             cognitive difficuties  []             fine tremor  []             nausea  []             diarrhea  []             neuropathy      []             edema  []             renal calcineurin toxicity  []             hyperkalemia  []             post-transplant diabetes  []             decreased appetite  []             increased appetite  []             other:  []             none    Prescription Medications as of 11/4/2019       Rx Number Disp Refills Start End Last Dispensed Date Next Fill Date Owning Pharmacy    aspirin 81 MG EC tablet  30 tablet 5 11/21/2018    [place], [place] - [address] [place] [address]    Sig: Take 1 tablet (81 mg) by mouth daily    Class: E-Prescribe    Route: Oral    atorvastatin (LIPITOR) 20 MG tablet  90 tablet 3 1/8/2019    [facility] - [place], [place] - [address] [place] [address]    Sig: Take 1 tablet (20 mg) by mouth daily    Class: E-Prescribe    Notes to Pharmacy: Replacing Atorvastatin 5mg daily    Route: Oral    blood glucose monitoring (NO BRAND SPECIFIED) test strip  1 Box prn 1/26/2016    [place], [place] - [address] [place] [address]    Sig: Use to test blood sugar 4 times daily or as directed.    Class: E-Prescribe    cholecalciferol 2000 units CAPS  90 capsule 3 11/21/2018    [place], [place] - [address] [facility] [id number]    Sig: Take 2,000 Units by mouth daily    Class: E-Prescribe    Route: Oral    fludrocortisone (FLORINEF) 0.1 MG tablet  15 tablet 11 4/5/2019    [id number] - [place] - [address] AT [facility] [address]    Sig: Take 1 tablet (0.1 mg) by mouth three times a week    Class: E-Prescribe    Route: Oral     HYDROcodone-acetaminophen (NORCO) 5-325 MG tablet            Sig: Take 1 tablet by mouth every 6 hours as needed for severe pain    Class: Historical    Route: Oral    magnesium oxide (MAG-OX) 400 MG tablet  120 tablet 3 4/19/2019    [facility] [place] [address]     Sig: Take 2 tablets (800mg) at noon and 2 tablets (800mg) at 6pm.    Class: E-Prescribe    mycophenolic acid (GENERIC EQUIVALENT) 180 MG EC tablet  540 tablet 3 9/26/2019    [facility] [place] [address]    Sig: Take 3 tablets (540 mg) by mouth 2 times daily    Class: E-Prescribe    Route: Oral    sulfamethoxazole-trimethoprim (BACTRIM/SEPTRA) 400-80 MG tablet  30 tablet 11 4/29/2019    [id number] - [facility] [address] AT [facility] [address]    Sig: Take 1 tablet by mouth daily    Class: E-Prescribe    Route: Oral    tacrolimus (GENERIC EQUIVALENT) 0.5 MG capsule  60 capsule 11 10/8/2019    [facility] [place], [place] - [facility] [address]     Sig: Take 1 capsule (0.5 mg) by mouth 2 times daily Total dose = 2.5 mg BID    Class: E-Prescribe    Notes to Pharmacy: TxpDate:11/12/2018(Kidney/Pancreas)Discharge Date:11/20/2018 ICD10:Z94.0 Location of Transplant: [facility]    Route: Oral    tacrolimus (GENERIC EQUIVALENT) 1 MG capsule  120 capsule 11 10/8/2019    [facility] [place] [address]     Sig: Take 2 capsules (2 mg) by mouth 2 times daily Total dose = 2.5 mg BID    Class: E-Prescribe    Notes to Pharmacy: TxpDate:11/12/2018(Kidney/Pancreas)Discharge Date:11/20/2018 ICD10:Z94.0 Location of Transplant: [facility]    Route: Oral          O:   Temp:  [98.5  F (36.9  C)] 98.5  F (36.9  C)  Pulse:  [97] 97  BP: (126)/(74) 126/74  SpO2:  [94 %] 94 %  General Appearance: in no apparent distress.   Skin: Normal, no rashes or jaundice  Heart: regular rate and  rhythm  Lungs: easy respirations, no audible wheezing.  Abdomen: rounded, The wound is dry and intact, without hernia. The abdomen is non-tender. The kidney and pancreas graft is not tender.  There is no ascites.  Extremities: Tremor absent.   Edema: absent. none    Transplant Immunosuppression Labs Latest Ref Rng & Units 10/21/2019 10/7/2019 9/23/2019 9/9/2019 8/26/2019   Siro Level 5.0 - 15.0 ug/L - - - - -   Siro Level - - - - - -   Tacro Level 5.0 - 15.0 ug/L 9.7 7.3 8.7 10.1 12.2   Tacro Level - LAST DOSE 915 PM 10/20 last dose 930 pm 10/06 2,130 2,100 2130 8/25/19   Cyclo Level 50 - 400 ug/L - - - - -   Cyclo Level - - - - - -   Creat 0.66 - 1.25 mg/dL 1.18 1.07 0.88 0.92 1.01   BUN 7 - 30 mg/dL 23 18 15 17 22   WBC 4.0 - 11.0 10e9/L 3.7(L) 6.5 4.1 4.3 4.1   Neutrophil % 60.6 66.1 58.0 63.7 65.9   ANEU 1.6 - 8.3 10e9/L 2.2 4.3 2.4 2.7 2.7       Chemistries:   Recent Labs   Lab Test 10/21/19  0919   BUN 23   CR 1.18   GFRESTIMATED 69   GLC 83     Lab Results   Component Value Date    A1C 9.1 11/11/2018    CPEPT 2.8 05/31/2017     Recent Labs   Lab Test 11/11/18  0955   ALBUMIN 4.0   BILITOTAL 0.3   ALKPHOS 189*   AST 9   ALT 14     Urine Studies:  Recent Labs   Lab Test 11/16/18  1110   COLOR Yellow   APPEARANCE Clear   URINEGLC Negative   URINEBILI Negative   URINEKETONE Negative   SG 1.019   UBLD Large*   URINEPH 7.0   PROTEIN 30*   NITRITE Negative   LEUKEST Negative   RBCU >182*   WBCU 5     Recent Labs   Lab Test 11/11/18  1100 05/24/17  1424   UTPG 1.96* 1.72*     Hematology:   Recent Labs   Lab Test 10/21/19  0919 10/07/19  0912 09/23/19  0917   HGB 16.4 16.2 16.1    186 166   WBC 3.7* 6.5 4.1     Coags:   Recent Labs   Lab Test 11/12/18 2102 11/12/18  1631   INR 1.31* 1.41*     HLA antibodies:   SA1 Hi Risk Sophia   Date Value Ref Range Status   08/26/2019 None  Final     SA1 Mod Risk Sophia   Date Value Ref Range Status   08/26/2019 A:30  Final     SA2 Hi Risk Sophia   Date Value Ref Range Status    08/26/2019 No Specificity Defined  Final     SA2 Mod Risk Sophia   Date Value Ref Range Status   08/26/2019 None  Final       Assessment: Amadou Lewis is doing well s/p SPK:  Issues we addressed during his visit include:    Plan:    1. Graft function: good  2. Immunosuppression Management: no changes  3. CT A/P without contrast to evaluate interval enlargement of his native polycystic kidneys. Results to be communicated to the patient over the phone. Future plans regarding possible resection will be discussed at that time.  4. Recheck hemoglobin A1c (ordered)    Immunosuppressive regimen, management and long term risks discussed in detail. Changes, when applicable made as per orders.        Seen and discussed with Dr. Luevano.    Mega Chatman MD  General Surgery PGY-5    I have reviewed history, examined patient and discussed plan with the fellow/resident/ODALIS.  I concur with the findings in this note.    Immunosuppressive regimen, management and long term risks discussed in detail. Changes, when applicable made as per orders.    Risks of the surgical procedure including but not limited to the rare risk of mortality discussed in detail. Patient verbalized good understanding and had several pertinent questions which were answered satisfactorily.                Total Time: 25 min,   Counselling Time: 15 min.    Monique Luevano MD       no

## 2022-09-02 NOTE — CONSULT NOTE ADULT - SUBJECTIVE AND OBJECTIVE BOX
HPI:    Mrs. Silverio is an 88 yo female with HFpEF, severe AS s/p balloon valvuloplasty 21 and again 22, chronic anemia due to AVMs s/p APC in the cecum and transverse colon on 22, HTN, hyperthyroidism, and HCC s/p partial liver     She was admitted in 2022 for TAVR evaluation and shortness of breath. During this hospitalization, patient was found to have acute-on-chronic anemia requesting multiple transfusions. Capsule study showed large AVMs, and patient underwent colonoscopy on 22 with successful AVM intervention with multiple non bleeding AVMs as well.     On 22, patient developed pulmonary edema and required upgrade to the CCU. She was initially treated with BiPAP and nitroglycerin gtt. In the CCU, patient was noted to be febrile up to 100.4 with elevated WBC and CXR concerning for PNA. Blood cultures on 22 on 22 grew staph epidermidis. In the setting of infection, patient went into Afib with RVR and became hypotensive (SBP in the 50s) requiring phenylephrine. Thoracentesis on 22 with 1L of fluid removed, transudative effusion with negative culture. Her pleural effusion reaccumulated and a chest tube was placed on 22. She was treated with a 7-day course of vancomycin and cefepime, and her antibiotics were discontinued per the recommendation of Infectious Disease.     On 22, patient underwent balloon aortic valvuloplasty. Her respiratory failure and blood pressure improved after her BAV. Chest tube was removed on 8/10/22, and patient was started on losartan 25 mg daily to avoid hypertension and flash pulmonary edema. TTE on 8/10/22 with critical AS with peak gradient 120 mmHg, mean gradient 76 mmHg, and CESIA 0.3 cm^2. Patient was downgraded to Cardiac Telemetry. Her diuretics were adjusted and she will be discharged on Lasix 40 mg PO daily. Her last transfusion was on  for Hgb 7.7, with post-transfusion Hgb of 9.9. She was not started on anticoagulation for Afib given her ongoing need for transfusions.    After discharge she was feeling well. She saw Dr. Mccollum on 2022 and was planned for TAVR 1 week after. However over the past 2 days she developed worsening shortness of breath and VALENTINE associated with chest pain.   She denies melena or BRBPR.     PAST MEDICAL & SURGICAL HISTORY  HTN (hypertension)    Mitral valve prolapse    Enlarged heart    Murmur    Arthritis    HTN (hypertension)    Diverticulosis    Hiatal hernia    Acid reflux    Liver cancer  s/p resection and s/p chemo and radiation    Aortic stenosis  s/p ballon aortic valuloplasty 21; 2021    Hypothyroid    H/O thyroid nodule  bx benign - 5 years ago    Osteoarthritis    H/O tear of meniscus of knee joint  left    Nodule of kidney    Overactive bladder    History of blood transfusion  no adverse reaction    H/O resection of liver  liver cancer (right lobe );  - right lobe, cholecystectomy, and bile duct    Status post cholecystectomy    History of torn meniscus of knee  right; repaired      H/O carpal tunnel repair   -         FAMILY HISTORY:  FAMILY HISTORY:  FH: breast cancer    FH: stomach cancer    FH: skin cancer    ALLERGIES:  BLUE DYE (Stomach Upset; Nausea)  Cipro (Other)  Levaquin (Rash)  tetracycline (Hives)    HOME MEDICATIONS:  Home Medications:      VITALS:   T(F): 96.5 ( @ 13:45), Max: 96.9 ( @ 11:13)  HR: 75 ( @ 13:45) (73 - 85)  BP: 121/60 ( @ 13:45) (99/52 - 121/60)  BP(mean): 86 ( @ 13:45) (75 - 86)  RR: 20 ( @ 13:45) (15 - 20)  SpO2: 100% ( @ 13:45) (92% - 100%)    I&O's Summary      REVIEW OF SYSTEMS:  CONSTITUTIONAL: + weakness and fatigue, fevers or chills  HEENT: No visual changes, neck/ear pain  RESPIRATORY: No cough, VALENTINE  CARDIOVASCULAR: See HPI  GASTROINTESTINAL: No abdominal pain. No nausea, vomiting, diarrhea   GENITOURINARY: No dysuria, frequency or hematuria  NEUROLOGICAL: No new focal deficits  SKIN: No new rashes    PHYSICAL EXAM:  General: Not in distress.  Non-toxic appearing. Anxious. Pale   Cardio: regular, S1, S2, 3/6 systolic murmur   Pulm: B/L BS. Decreased breath sounds on the left base.  No wheezing / crackles / rales  Abdomen: Soft, non-tender, non-distended. Normoactive bowel sounds  Extremities: No edema b/l le. + healing bruise / hematoma above the right ankle laterally   Neuro: A&O x3. No focal deficits    LABS:                        4.8    4.78  )-----------( 165      ( 02 Sep 2022 10:40 )             15.6         141  |  104  |  51<H>  ----------------------------<  93  4.2   |  28  |  1.0    Ca    8.9      02 Sep 2022 10:40    TPro  6.0  /  Alb  3.8  /  TBili  0.3  /  DBili  x   /  AST  24  /  ALT  18  /  AlkPhos  92      PT/INR - ( 02 Sep 2022 12:11 )   PT: 11.40 sec;   INR: 0.99 ratio         PTT - ( 02 Sep 2022 12:11 )  PTT:28.2 sec  Troponin T, Serum: 0.18 ng/mL *HH* (22 @ 10:40)    CARDIAC MARKERS ( 02 Sep 2022 10:40 )  x     / 0.18 ng/mL / x     / x     / x            Troponin trend:    Serum Pro-Brain Natriuretic Peptide: 1842 pg/mL (22 @ 10:40)  Serum Pro-Brain Natriuretic Peptide: 1913 pg/mL (22 @ 14:24)    07-26 Chol 199 LDL -- HDL 65 Trig 128  COVID-19 PCR: NotDetec (02 Sep 2022 10:40)  COVID-19 PCR: NotDetec (13 Aug 2022 06:44)  COVID-19 PCR: NotDetec (07 Aug 2022 19:15)  COVID-19 PCR: NotDetec (02 Aug 2022 05:16)  COVID-19 PCR: NotDetec (2022 22:30)  COVID-19 PCR: NotDetec (2022 06:00)  COVID-19 PCR: NotDetec (15 Jul 2022 07:00)      IMAGING:  -CXR:  < from: Xray Chest 1 View- PORTABLE-Urgent (22 @ 10:27) >  Impression:    Small bilateral pleural effusions and possible left basilar opacity.    < end of copied text >    -TTE:  < from: TTE Echo Complete w/o Contrast w/ Doppler (08.10.22 @ 10:18) >  Summary:   1. Normal global left ventricular systolic function.   2. LV Ejection Fraction by Johnson's Method with a biplane EF of 61 %.   3. Mild asymmetric left ventricular hypertrophy involving the septal   wall.   4. Spectral Doppler shows pseudonormal pattern of left ventricular   myocardial filling (Grade II diastolic dysfunction).   5. Moderately enlarged left atrium.   6. Normal right atrial size.   7. Mild mitral valve regurgitation.   8. Mild thickening of the anterior and posterior mitral valve leaflets.   9. Mild tricuspid regurgitation.  10. Aortic valve thickening with decreased leaflet opening.  11. Mild aortic regurgitation.  12. Critical aortic stenosis, peak/mean /76 mmHg, CESIA 0.3 cm^2.  13. Estimated pulmonary artery systolic pressure is 42.3 mmHg assuming a   right atrial pressure of 3 mmHg, which is consistent with mild pulmonary   hypertension.    -CATHETERIZATION:    < from: Cardiac Cath Lab - Adult (21 @ 14:13) >  CORONARY VESSELS: The coronary circulation is right dominant.  LM:   --  Left main: Very short vessel. No significant stenosis  LAD:   --  LAD: 60-70% mid stenosis.  CX:   --  Circumflex: Mild disease.  RCA:   --  RCA: 50% mid stenosis.    EC Lead ECG:   Ventricular Rate 80 BPM    Atrial Rate 80 BPM    P-R Interval 184 ms    QRS Duration 134 ms    Q-T Interval 438 ms    QTC Calculation(Bazett) 505 ms    P Axis 66 degrees    R Axis -32 degrees    T Axis 58 degrees    Diagnosis Line Normal sinus rhythm  Left axis deviation  Right bundle branch block  Left ventricular hypertrophy with QRS widening  Abnormal ECG    Confirmed by Aries Jaquez ([id number]) on 2022 1:13:51 PM ( @ 10:09)

## 2022-09-02 NOTE — ED PROVIDER NOTE - ATTENDING CONTRIBUTION TO CARE
Patient complains of shortness of breath.  She has a history of aortic stenosis complicated by congestive heart failure.  She also was recently diagnosed with a GI bleed.  Colonoscopy showed that the patient had multiple AV malformations which was the etiology of the bleeding.  She underwent treatment to stabilize the active bleeding recently.  Patient's shortness of breath is associated with generalized weakness, but no chest pain.  Vital signs noted.  NAD.  Positive JVD.  Positive rales at the bases.  Heart regular rate 2/6 systolic murmur.  The murmur is best heard in the right upper sternal border.  Abdomen is nontender.  Rectal exam shows dark stool.  Diagnostic testing reviewed.  Case discussed with Dr. Kam Mccollum.  Patient is slated for TAVR early next week.  We mutually agreed as well as the family that stabilization prior to this procedure would be best performed at the [facility] site.  In addition, in spite of the profound anemia, since the patient is no distress at this time, and the anemia is chronic to a certain extent, that is it is in the best interest of the patient that we transfer prior to transfusion since the ambulance is now available and the blood bank has no blood at this time to release for immediate transfusion.

## 2022-09-02 NOTE — ED PROVIDER NOTE - PHYSICAL EXAMINATION
VITAL SIGNS: I have reviewed nursing notes and confirm.  CONSTITUTIONAL: Chronically ill appearing elderly female who is sitting up in bed in NAD.   SKIN: Warm dry, normal skin turgor  HEAD: NCAT  EYES: No conjunctival injection, scleral anicteric  ENT: Moist mucous membranes, normal pharynx with no erythema or exudates  NECK: Supple; full ROM.   CARD: RRR, no murmurs, rubs or gallops  RESP: Clear to ausculation bilaterally.  No rales, rhonchi, or wheezing.  ABD: Soft, non-distended, non-tender, no rebound or guarding. No CVA tenderness  Rectal: chaperoned by BEBE Peterson; there is dark stool that is non melanous. Stage II perisacral ulcer.   EXT: no pedal edema, no calf tenderness  NEURO: Normal motor, normal sensory. CN II-XII grossly intact.   PSYCH: Cooperative, appropriate. VITAL SIGNS: I have reviewed nursing notes and confirm.  CONSTITUTIONAL: Chronically ill appearing elderly female who is sitting up in bed in NAD.   SKIN: Warm dry, normal skin turgor  HEAD: NCAT  EYES: No conjunctival injection, scleral anicteric  ENT: Moist mucous membranes, normal pharynx with no erythema or exudates  NECK: Supple; full ROM.   CARD: RRR, 3/6 systolic murmur best heard at right sternal border.   RESP: Good aeration heard on ausculation bilaterally.  Mild rales to left lower lung field.   ABD: Soft, non-distended, non-tender, no rebound or guarding. No CVA tenderness  Rectal: chaperoned by BEBE Petersno; there is dark stool that is non melanous. Stage II perisacral ulcer.   EXT: no pedal edema, no calf tenderness  NEURO: Normal motor, normal sensory. CN II-XII grossly intact.   PSYCH: Cooperative, appropriate.

## 2022-09-02 NOTE — ED PROVIDER NOTE - CLINICAL SUMMARY MEDICAL DECISION MAKING FREE TEXT BOX
In my opinion, in patient treatment is medically justifiable and appropriate. See attending note for documentation of medical decision making.

## 2022-09-02 NOTE — ED ADULT NURSE REASSESSMENT NOTE - NS ED NURSE REASSESS COMMENT FT1
report given to ben byrd, [place]   pt to go to er, then transferred up to ccu
received pt in no acute distress, pt in er for complaints of shortness of breath. pt stated breathing has not changed since arriving to er. pt has unlabored breathing. pts o2 sat 93, placed on 3 liters nasal cannula. pt awaiting results of labs. pt has hematoma to the right shin, pt has bruising to the bilateral arms. bed alarm on and functioning. pt has call bell within reach. pt awaiting dispo. safety maintained

## 2022-09-02 NOTE — H&P ADULT - NSHPLABSRESULTS_GEN_ALL_CORE
4.8    4.78  )-----------( 165      ( 02 Sep 2022 10:40 )             15.6       09-02    141  |  104  |  51<H>  ----------------------------<  93  4.2   |  28  |  1.0    Ca    8.9      02 Sep 2022 10:40    TPro  6.0  /  Alb  3.8  /  TBili  0.3  /  DBili  x   /  AST  24  /  ALT  18  /  AlkPhos  92  09-02                  PT/INR - ( 02 Sep 2022 12:11 )   PT: 11.40 sec;   INR: 0.99 ratio         PTT - ( 02 Sep 2022 12:11 )  PTT:28.2 sec    Lactate Trend      CARDIAC MARKERS ( 02 Sep 2022 10:40 )  x     / 0.18 ng/mL / x     / x     / x            CAPILLARY BLOOD GLUCOSE            Culture Results:   No Growth Final (08-05 @ 17:08)  Culture Results:   No Growth Final (08-05 @ 05:03)  Culture Results:   No Growth Final (08-05 @ 05:03)  Culture Results:   No Growth Final (08-04 @ 12:24)

## 2022-09-02 NOTE — ED PROVIDER NOTE - OBJECTIVE STATEMENT
88 yo female PMHx of HFpEF and severe AS presenting with SOB that is acute on chronic and worsening over the past couple of days associated with a dry cough. Pt says she can only walk a few steps before having to stop to catch her breath. No F/C at home; +lower AP and recent diarrhea, no N/V. Pt was advised to present to ED for low Hgb from routine labs for TAVR pre-op assessment yesterday.     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 Pt says she can only walk a few steps before having to stop to catch her breath. She has chronic chest pain that is unchanged from baseline described as a "discomfort up in my throat". No F/C at home; +lower AP and recent diarrhea, no N/V. Pt was advised to present to ED for low Hgb from routine labs for TAVR pre-op assessment yesterday.     PMHX: HFpEF, severe AS s/p balloon valvuloplasty 5/25/21 and again 8/09/22, chronic anemia due to AVMs s/p APC in the cecum and transverse colon on 7/27/22, HTN, hyperthyroidism, scoliosis, and HCC s/p partial liver resection

## 2022-09-02 NOTE — ED ADULT NURSE NOTE - NSIMPLEMENTINTERV_GEN_ALL_ED
Implemented All Fall with Harm Risk Interventions:  [place] to call system. Call bell, personal items and telephone within reach. Instruct patient to call for assistance. Room bathroom lighting operational. Non-slip footwear when patient is off stretcher. Physically safe environment: no spills, clutter or unnecessary equipment. Stretcher in lowest position, wheels locked, appropriate side rails in place. Provide visual cue, wrist band, yellow gown, etc. Monitor gait and stability. Monitor for mental status changes and reorient to person, place, and time. Review medications for side effects contributing to fall risk. Reinforce activity limits and safety measures with patient and family. Provide visual clues: red socks.

## 2022-09-02 NOTE — PATIENT PROFILE ADULT - FALL HARM RISK - HARM RISK INTERVENTIONS

## 2022-09-03 LAB
A1C WITH ESTIMATED AVERAGE GLUCOSE RESULT: 4.8 % — SIGNIFICANT CHANGE UP (ref 4–5.6)
ALBUMIN SERPL ELPH-MCNC: 3.5 G/DL — SIGNIFICANT CHANGE UP (ref 3.5–5.2)
ALP SERPL-CCNC: 97 U/L — SIGNIFICANT CHANGE UP (ref 30–115)
ALT FLD-CCNC: 16 U/L — SIGNIFICANT CHANGE UP (ref 0–41)
ANION GAP SERPL CALC-SCNC: 12 MMOL/L — SIGNIFICANT CHANGE UP (ref 7–14)
APTT BLD: 27.9 SEC — SIGNIFICANT CHANGE UP (ref 27–39.2)
AST SERPL-CCNC: 25 U/L — SIGNIFICANT CHANGE UP (ref 0–41)
BASOPHILS # BLD AUTO: 0.02 K/UL — SIGNIFICANT CHANGE UP (ref 0–0.2)
BASOPHILS NFR BLD AUTO: 0.6 % — SIGNIFICANT CHANGE UP (ref 0–1)
BILIRUB SERPL-MCNC: 1.3 MG/DL — HIGH (ref 0.2–1.2)
BUN SERPL-MCNC: 37 MG/DL — HIGH (ref 10–20)
CALCIUM SERPL-MCNC: 8.7 MG/DL — SIGNIFICANT CHANGE UP (ref 8.5–10.1)
CHLORIDE SERPL-SCNC: 106 MMOL/L — SIGNIFICANT CHANGE UP (ref 98–110)
CHOLEST SERPL-MCNC: 177 MG/DL — SIGNIFICANT CHANGE UP
CO2 SERPL-SCNC: 25 MMOL/L — SIGNIFICANT CHANGE UP (ref 17–32)
CREAT SERPL-MCNC: 0.7 MG/DL — SIGNIFICANT CHANGE UP (ref 0.7–1.5)
D DIMER BLD IA.RAPID-MCNC: 526 NG/ML DDU — HIGH (ref 0–230)
EGFR: 83 ML/MIN/1.73M2 — SIGNIFICANT CHANGE UP
EOSINOPHIL # BLD AUTO: 0.1 K/UL — SIGNIFICANT CHANGE UP (ref 0–0.7)
EOSINOPHIL NFR BLD AUTO: 3 % — SIGNIFICANT CHANGE UP (ref 0–8)
ESTIMATED AVERAGE GLUCOSE: 91 MG/DL — SIGNIFICANT CHANGE UP (ref 68–114)
GLUCOSE SERPL-MCNC: 75 MG/DL — SIGNIFICANT CHANGE UP (ref 70–99)
HCT VFR BLD CALC: 27.6 % — LOW (ref 37–47)
HCT VFR BLD CALC: 27.6 % — LOW (ref 37–47)
HDLC SERPL-MCNC: 73 MG/DL — SIGNIFICANT CHANGE UP
HGB BLD-MCNC: 9.1 G/DL — LOW (ref 12–16)
HGB BLD-MCNC: 9.2 G/DL — LOW (ref 12–16)
IMM GRANULOCYTES NFR BLD AUTO: 0.3 % — SIGNIFICANT CHANGE UP (ref 0.1–0.3)
INR BLD: 0.97 RATIO — SIGNIFICANT CHANGE UP (ref 0.65–1.3)
LIPID PNL WITH DIRECT LDL SERPL: 87 MG/DL — SIGNIFICANT CHANGE UP
LYMPHOCYTES # BLD AUTO: 0.57 K/UL — LOW (ref 1.2–3.4)
LYMPHOCYTES # BLD AUTO: 17.3 % — LOW (ref 20.5–51.1)
MAGNESIUM SERPL-MCNC: 2.3 MG/DL — SIGNIFICANT CHANGE UP (ref 1.8–2.4)
MCHC RBC-ENTMCNC: 30.8 PG — SIGNIFICANT CHANGE UP (ref 27–31)
MCHC RBC-ENTMCNC: 30.8 PG — SIGNIFICANT CHANGE UP (ref 27–31)
MCHC RBC-ENTMCNC: 33 G/DL — SIGNIFICANT CHANGE UP (ref 32–37)
MCHC RBC-ENTMCNC: 33.3 G/DL — SIGNIFICANT CHANGE UP (ref 32–37)
MCV RBC AUTO: 92.3 FL — SIGNIFICANT CHANGE UP (ref 81–99)
MCV RBC AUTO: 93.6 FL — SIGNIFICANT CHANGE UP (ref 81–99)
MONOCYTES # BLD AUTO: 0.32 K/UL — SIGNIFICANT CHANGE UP (ref 0.1–0.6)
MONOCYTES NFR BLD AUTO: 9.7 % — HIGH (ref 1.7–9.3)
NEUTROPHILS # BLD AUTO: 2.27 K/UL — SIGNIFICANT CHANGE UP (ref 1.4–6.5)
NEUTROPHILS NFR BLD AUTO: 69.1 % — SIGNIFICANT CHANGE UP (ref 42.2–75.2)
NON HDL CHOLESTEROL: 104 MG/DL — SIGNIFICANT CHANGE UP
NRBC # BLD: 0 /100 WBCS — SIGNIFICANT CHANGE UP (ref 0–0)
NRBC # BLD: 0 /100 WBCS — SIGNIFICANT CHANGE UP (ref 0–0)
PHOSPHATE SERPL-MCNC: 3.3 MG/DL — SIGNIFICANT CHANGE UP (ref 2.1–4.9)
PLATELET # BLD AUTO: 133 K/UL — SIGNIFICANT CHANGE UP (ref 130–400)
PLATELET # BLD AUTO: 136 K/UL — SIGNIFICANT CHANGE UP (ref 130–400)
POTASSIUM SERPL-MCNC: 3.7 MMOL/L — SIGNIFICANT CHANGE UP (ref 3.5–5)
POTASSIUM SERPL-SCNC: 3.7 MMOL/L — SIGNIFICANT CHANGE UP (ref 3.5–5)
PROT SERPL-MCNC: 5.9 G/DL — LOW (ref 6–8)
PROTHROM AB SERPL-ACNC: 11.2 SEC — SIGNIFICANT CHANGE UP (ref 9.95–12.87)
RBC # BLD: 2.95 M/UL — LOW (ref 4.2–5.4)
RBC # BLD: 2.99 M/UL — LOW (ref 4.2–5.4)
RBC # FLD: 22.6 % — HIGH (ref 11.5–14.5)
RBC # FLD: 22.6 % — HIGH (ref 11.5–14.5)
SODIUM SERPL-SCNC: 143 MMOL/L — SIGNIFICANT CHANGE UP (ref 135–146)
TRIGL SERPL-MCNC: 80 MG/DL — SIGNIFICANT CHANGE UP
TROPONIN T SERPL-MCNC: 0.15 NG/ML — CRITICAL HIGH
WBC # BLD: 3.29 K/UL — LOW (ref 4.8–10.8)
WBC # BLD: 5.26 K/UL — SIGNIFICANT CHANGE UP (ref 4.8–10.8)
WBC # FLD AUTO: 3.29 K/UL — LOW (ref 4.8–10.8)
WBC # FLD AUTO: 5.26 K/UL — SIGNIFICANT CHANGE UP (ref 4.8–10.8)

## 2022-09-03 PROCEDURE — 99233 SBSQ HOSP IP/OBS HIGH 50: CPT

## 2022-09-03 PROCEDURE — 93970 EXTREMITY STUDY: CPT | Mod: 26

## 2022-09-03 PROCEDURE — 71045 X-RAY EXAM CHEST 1 VIEW: CPT | Mod: 26

## 2022-09-03 PROCEDURE — 99223 1ST HOSP IP/OBS HIGH 75: CPT

## 2022-09-03 PROCEDURE — 93010 ELECTROCARDIOGRAM REPORT: CPT

## 2022-09-03 RX ADMIN — LOSARTAN POTASSIUM 25 MILLIGRAM(S): 100 TABLET, FILM COATED ORAL at 06:40

## 2022-09-03 RX ADMIN — PANTOPRAZOLE SODIUM 40 MILLIGRAM(S): 20 TABLET, DELAYED RELEASE ORAL at 06:39

## 2022-09-03 RX ADMIN — PANTOPRAZOLE SODIUM 40 MILLIGRAM(S): 20 TABLET, DELAYED RELEASE ORAL at 17:02

## 2022-09-03 RX ADMIN — Medication 40 MILLIGRAM(S): at 06:39

## 2022-09-03 RX ADMIN — Medication 100 MICROGRAM(S): at 06:40

## 2022-09-03 NOTE — PROGRESS NOTE ADULT - SUBJECTIVE AND OBJECTIVE BOX
Patient is a 89y old  Female who presents with a chief complaint of Symptomatic anemia with AVMs (03 Sep 2022 03:39)    HPI:  Mrs. 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    She was admitted in 7/2022 for TAVR evaluation and shortness of breath. During this hospitalization, patient was found to have acute-on-chronic anemia requesting multiple transfusions. Capsule study showed large AVMs, and patient underwent colonoscopy on 7/27/22 with successful AVM intervention with multiple non bleeding AVMs as well.     On 7/30/22, patient developed pulmonary edema and required upgrade to the CCU. She was initially treated with BiPAP and nitroglycerin gtt. In the CCU, patient was noted to be febrile up to 100.4 with elevated WBC and CXR concerning for PNA. Blood cultures on 7/30/22 on 8/01/22 grew staph epidermidis. In the setting of infection, patient went into Afib with RVR and became hypotensive (SBP in the 50s) requiring phenylephrine. Thoracentesis on 8/01/22 with 1L of fluid removed, transudative effusion with negative culture. Her pleural effusion reaccumulated and a chest tube was placed on 8/04/22. She was treated with a 7-day course of vancomycin and cefepime, and her antibiotics were discontinued per the recommendation of Infectious Disease.     On 8/09/22, patient underwent balloon aortic valvuloplasty. Her respiratory failure and blood pressure improved after her BAV. Chest tube was removed on 8/10/22, and patient was started on losartan 25 mg daily to avoid hypertension and flash pulmonary edema. TTE on 8/10/22 with critical AS with peak gradient 120 mmHg, mean gradient 76 mmHg, and CESIA 0.3 cm^2. Patient was downgraded to Cardiac Telemetry. Her diuretics were adjusted and she will be discharged on Lasix 40 mg PO daily. Her last transfusion was on 8/14 for Hgb 7.7, with post-transfusion Hgb of 9.9. She was not started on anticoagulation for Afib given her ongoing need for transfusions.    After discharge she was feeling well. She saw Dr. Mccollum on 9/2/2022 and was planned for TAVR 1 week after. However over past 2 days, patient developed SOB that is acute on chronic and worsening over the past couple of days associated with a dry cough. Pt says she can only walk a few steps before having to stop to catch her breath. She has chronic chest pain that is unchanged from baseline described as a "discomfort up in my throat". She visited the [facility] and was found to have Hb of 5.7, she was transferred to [facility] and 2 units of blood was transfused. She denies melena or BRBPR.     Vital Signs Last 24 Hrs:  T(F): 96 (02 Sep 2022 18:00), Max: 97.1 (02 Sep 2022 14:05)  HR: 68 (02 Sep 2022 18:00) (68 - 85)  BP: 111/58 (02 Sep 2022 18:00) (99/52 - 131/60)  RR: 18 (02 Sep 2022 18:00) (15 - 20)  SpO2: 100% (02 Sep 2022 18:00) (92% - 100%)   (02 Sep 2022 15:46)       INTERVAL HPI/OVERNIGHT EVENTS:   No overnight events   Afebrile, hemodynamically stable     Subjective:    ICU Vital Signs Last 24 Hrs  T(C): 35.6 (03 Sep 2022 04:00), Max: 36.2 (02 Sep 2022 14:05)  T(F): 96 (03 Sep 2022 04:00), Max: 97.1 (02 Sep 2022 14:05)  HR: 59 (03 Sep 2022 06:00) (55 - 85)  BP: 105/52 (03 Sep 2022 06:00) (81/51 - 131/60)  BP(mean): 75 (03 Sep 2022 06:00) (61 - 86)  ABP: --  ABP(mean): --  RR: 33 (03 Sep 2022 06:00) (15 - 41)  SpO2: 100% (03 Sep 2022 06:00) (92% - 100%)    O2 Parameters below as of 03 Sep 2022 06:00  Patient On (Oxygen Delivery Method): nasal cannula  O2 Flow (L/min): 3        I&O's Summary    02 Sep 2022 07:01  -  03 Sep 2022 07:00  --------------------------------------------------------  IN: 684 mL / OUT: 1300 mL / NET: -616 mL          Daily Height in cm: 157.48 (02 Sep 2022 16:04)    Daily     Adult Advanced Hemodynamics Last 24 Hrs  CVP(mm Hg): --  CVP(cm H2O): --  CO: --  CI: --  PA: --  PA(mean): --  PCWP: --  SVR: --  SVRI: --  PVR: --  PVRI: --    EKG/Telemetry Events:    MEDICATIONS  (STANDING):  furosemide    Tablet 40 milliGRAM(s) Oral daily  influenza  Vaccine (HIGH DOSE) 0.7 milliLiter(s) IntraMuscular once  levothyroxine 100 MICROGram(s) Oral daily  losartan 25 milliGRAM(s) Oral daily  pantoprazole  Injectable 40 milliGRAM(s) IV Push every 12 hours    MEDICATIONS  (PRN):  melatonin 3 milliGRAM(s) Oral at bedtime PRN Insomnia      PHYSICAL EXAM:  GENERAL:   HEAD:  Atraumatic, Normocephalic  EYES: conjunctiva and sclera clear  NECK: Supple  NERVOUS SYSTEM:  Alert & Awake.   CHEST/LUNG: B/L good air entry; No rales, rhonchi, or wheezing  HEART: Regular rate and rhythm; No murmurs  ABDOMEN: Soft, Nontender, Nondistended; Bowel sounds present  EXTREMITIES:  No clubbing, cyanosis, or edema  SKIN: No rashes or lesions    LABS:                        9.2    3.29  )-----------( 133      ( 03 Sep 2022 04:56 )             27.6     09-03    143  |  106  |  37<H>  ----------------------------<  75  3.7   |  25  |  0.7    Ca    8.7      03 Sep 2022 04:56  Phos  3.3     09-03  Mg     2.3     09-03    TPro  5.9<L>  /  Alb  3.5  /  TBili  1.3<H>  /  DBili  x   /  AST  25  /  ALT  16  /  AlkPhos  97  09-03    LIVER FUNCTIONS - ( 03 Sep 2022 04:56 )  Alb: 3.5 g/dL / Pro: 5.9 g/dL / ALK PHOS: 97 U/L / ALT: 16 U/L / AST: 25 U/L / GGT: x           PT/INR - ( 03 Sep 2022 04:56 )   PT: 11.20 sec;   INR: 0.97 ratio         PTT - ( 03 Sep 2022 04:56 )  PTT:27.9 sec  CAPILLARY BLOOD GLUCOSE          Troponin T, Serum: 0.16 ng/mL (09-02 @ 20:40)  Troponin T, Serum: 0.18 ng/mL (09-02 @ 10:40)    CARDIAC MARKERS ( 02 Sep 2022 20:40 )  x     / 0.16 ng/mL / x     / x     / x      CARDIAC MARKERS ( 02 Sep 2022 10:40 )  x     / 0.18 ng/mL / x     / x     / x                RADIOLOGY & ADDITIONAL TESTS:  CXR:        Care Discussed with Consultants/Other Providers [ x] YES  [ ] NO

## 2022-09-03 NOTE — CONSULT NOTE ADULT - SUBJECTIVE AND OBJECTIVE BOX
Gastroenterology/Hepatology Consultation    For questions and inquiries please page [phone].  For urgent matters or after 5pm and on weekends please page the fellow on call through the GI paging system.    89y Female with   Patient is a 89y old  Female who presents with a chief complaint of Symptomatic anemia with AVMs (03 Sep 2022 07:00)    HPI:  Mrs. 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    She was admitted in 2022 for TAVR evaluation and shortness of breath. During this hospitalization, patient was found to have acute-on-chronic anemia requesting multiple transfusions. Capsule study showed large AVMs, and patient underwent colonoscopy on 22 with successful AVM intervention with multiple non bleeding AVMs as well.     On 22, patient developed pulmonary edema and required upgrade to the CCU. She was initially treated with BiPAP and nitroglycerin gtt. In the CCU, patient was noted to be febrile up to 100.4 with elevated WBC and CXR concerning for PNA. Blood cultures on 22 on 22 grew staph epidermidis. In the setting of infection, patient went into Afib with RVR and became hypotensive (SBP in the 50s) requiring phenylephrine. Thoracentesis on 22 with 1L of fluid removed, transudative effusion with negative culture. Her pleural effusion reaccumulated and a chest tube was placed on 22. She was treated with a 7-day course of vancomycin and cefepime, and her antibiotics were discontinued per the recommendation of Infectious Disease.     On 22, patient underwent balloon aortic valvuloplasty. Her respiratory failure and blood pressure improved after her BAV. Chest tube was removed on 8/10/22, and patient was started on losartan 25 mg daily to avoid hypertension and flash pulmonary edema. TTE on 8/10/22 with critical AS with peak gradient 120 mmHg, mean gradient 76 mmHg, and CESIA 0.3 cm^2. Patient was downgraded to Cardiac Telemetry. Her diuretics were adjusted and she will be discharged on Lasix 40 mg PO daily. Her last transfusion was on  for Hgb 7.7, with post-transfusion Hgb of 9.9. She was not started on anticoagulation for Afib given her ongoing need for transfusions.    After discharge she was feeling well. She saw Dr. Mccollum on 2022 and was planned for TAVR 1 week after. However over past 2 days, patient developed SOB that is acute on chronic and worsening over the past couple of days associated with a dry cough. Pt says she can only walk a few steps before having to stop to catch her breath. She has chronic chest pain that is unchanged from baseline described as a "discomfort up in my throat". She visited the [facility] and was found to have Hb of 5.7, she was transferred to [facility] and 2 units of blood was transfused. She denies melena or BRBPR.     Vital Signs Last 24 Hrs:  T(F): 96 (02 Sep 2022 18:00), Max: 97.1 (02 Sep 2022 14:05)  HR: 68 (02 Sep 2022 18:00) (68 - 85)  BP: 111/58 (02 Sep 2022 18:00) (99/52 - 131/60)  RR: 18 (02 Sep 2022 18:00) (15 - 20)  SpO2: 100% (02 Sep 2022 18:00) (92% - 100%)   (02 Sep 2022 15:46)      GI Hx:    Previous colonoscopy(ies):Colonoscopy: Colonoscopy Report    Date: 2022 6:30 PM    Patient Name: DILIP LUCAS    MRN: [id number]    Account Number:    [id number]    Gender: Female     (age): 1933 (89)    Attending/Fellow:    MD Nitin Barajas MD        Referring Physician:    ED PHYSICIAN UNASSIGNED        History of Present Illness:    H&P was previously reviewed.    Administered Medications:    As per Anesthesiology Record    Indications:    Anemia: 285.9 - D64.9    Procedure:    The procedure, indications, preparation and potential complications were  explained to the patient, who indicated understanding and signed the  corresponding consent forms. MAC was administered by the anesthesiologist.  Continuous pulse oximetry and blood pressure monitoring were used throughout the  procedure. Supplemental oxygen was used. The quality of preparation was 0-5 on  the BBP scale/inadequate. Patient was placed in left lateral decubitus position.  Digital exam was normal. The colonoscope was introducedthrough the rectum and  advanced under direct visualization until cecum and terminal ileum was reached.  The appendiceal orifice and the ileocecal valve were identified. The terminal  ileum was identified. Careful visualization was performed as the instrument was  withdrawn. Patient tolerance to procedure was excellent. The procedure was not  difficult.    [place] Bowel Preparation Score:    Using the [place] Bowel Preparation Score, each segment of the colon was graded  as follows:    Left Colon: Good, 2 | Transverse Colon: Good, 2  | Right Colon: Poor, 1    Limitations/Complications:    There were no apparent limitations or complications    Findings:    Excavated lesions Multiple diverticula with mixed openings were seen in the the  left sideof the colon. Diverticulosis appeared to be severe.    Protruding lesions A single sessile 5 mm polyp of benign appearance was found in  the sigmoid colon.A single-piece polypectomy was performed using a cold snare.  The polyp was completely removed.    Medium non-bleeding internal and external hemorrhoids were noted.    Additional findings 1 cm non-bleeding AVM noted in cecum. APCs applied for the  purpose of hemostasis..    5 mm non-bleeding AVM noted in cecum. APCs applied for the purpose of  hemostasis..    No AVM noted in terminal ileum.    1 cm non-bleeding AVM noted in transverse colon. APCs applied for the purpose of  hemostasis..    No other lesion or mass noted. Small AVMs or polyps can be missed due to fair  prep in right colon.    Impressions:    Polyp (5 mm) in the sigmoid colon. (Polypectomy).    Severe diverticulosis of the the left side of the colon.    Internal and external hemorrhoids.    1 cm non-bleeding AVM noted in cecum. APCs applied for the purpose of  hemostasis. .    5 mm non-bleeding AVM noted in cecum. APCs applied for the purpose of  hemostasis. .    No AVM noted in terminal ileum.    1 cm non-bleeding AVM noted in transverse colon. APCs applied for the purpose  of hemostasis. .    No other lesion or mass noted. Small AVMs or polyps can be missed due to fair  prep in right colon.    Plan:    High fiber diet    Await pathology results    Repeat colonoscopy as needed    Follow up with GI office    Attending Participation:    I was present and participated during the entire procedure, including non-key  portions.    Alida Leigh MD    Version 1, Electronically signed on 2022 1:21:07 PM by MD Nitin Barajas MD (22 @ 18:30)    Previous EGD(s):    No pertinent family history in first degree relatives    FH: breast cancer    FH: stomach cancer    FH: skin cancer        Review of system  General:  (-) weight loss, (-) fevers  Eyes:  (-) visual changes  CV:  (-) chest pain  Resp: (-) SOB, (-) wheezing  GI: (-) abdominal pain,  (-) nausea, (-) vomiting, (-) dysphagia, (-) diarrhea, (-) constipation, (-) rectal bleeding, (-) melena, (-) hematemesis.  Neuro: (-) confusion, (-) weakness  Psych:  (-) Hallucinations  Heme:  (-) easy bruisability    Past medical/surgical Hx:  PAST MEDICAL & SURGICAL HISTORY:  HTN (hypertension)      Mitral valve prolapse      Enlarged heart      Murmur      Arthritis      HTN (hypertension)      Diverticulosis      Hiatal hernia      Acid reflux      Liver cancer  s/p resection and s/p chemo and radiation      Aortic stenosis  s/p ballon aortic valuloplasty 21; 2021      Hypothyroid      H/O thyroid nodule  bx benign - 5 years ago      Osteoarthritis      H/O tear of meniscus of knee joint  left      Nodule of kidney      Overactive bladder      History of blood transfusion  no adverse reaction      H/O resection of liver  liver cancer (right lobe );  - right lobe, cholecystectomy, and bile duct      Status post cholecystectomy      History of torn meniscus of knee  right; repaired        H/O carpal tunnel repair   -         Home Medications:      Allergies: BLUE DYE (Stomach Upset; Nausea)  Cipro (Other)  Levaquin (Rash)  tetracycline (Hives)      Current Medications:   furosemide    Tablet 40 milliGRAM(s) Oral daily  influenza  Vaccine (HIGH DOSE) 0.7 milliLiter(s) IntraMuscular once  levothyroxine 100 MICROGram(s) Oral daily  losartan 25 milliGRAM(s) Oral daily  melatonin 3 milliGRAM(s) Oral at bedtime PRN  pantoprazole  Injectable 40 milliGRAM(s) IV Push every 12 hours      Physical exam:  T(C): 35.6 (22 @ 07:00), Max: 36.2 (22 @ 14:05)  HR: 62 (22 @ 11:00) (55 - 82)  BP: 111/56 (22 @ 11:00) (81/51 - 139/64)  RR: 35 (22 @ 11:00) (15 - 44)  SpO2: 100% (22 @ 11:00) (100% - 100%)  GENERAL: NAD  HEAD:  Atraumatic, Normocephalic  EYES: Sclera:NL  NECK: Supple, no JVD or thyromegaly  CHEST/LUNG: Good bilateral air entry  HEART: normal S1, S2. Regular  ABDOMEN: (-) distended, (-) tender, (-) rebound, (+) BS, (-)HSM  EXTREMITIES: (-) edema  NEUROLOGY: (-) asterixis  SKIN: (-) jaundice  PAVAN: (-) melena (-) brbpr    Data:                        9.2    3.29  )-----------( 133      ( 03 Sep 2022 04:56 )             27.6     MCV 92.3 (22)  94.5 (22)    RDW 22.6 (22)  21.4 (22)    HGB trend:  9.2  22 @ 04:56  9.2  22 @ 23:00  4.8  22 @ 10:40  5.7  22 @ 14:24      22 @ 07:01  -  22 @ 07:00  --------------------------------------------------------  IN: 684 mL          143  |  106  |  37<H>  ----------------------------<  75  3.7   |  25  |  0.7    Ca    8.7      03 Sep 2022 04:56  Phos  3.3       Mg     2.3         TPro  5.9<L>  /  Alb  3.5  /  TBili  1.3<H>  /  DBili  x   /  AST  25  /  ALT  16  /  AlkPhos  97      Liver panel trend:  TBili 1.3   /   AST 25   /   ALT 16   /   AlkP 97   /   Tptn 5.9   /   Alb 3.5    /   DBili --        TBili 0.3   /   AST 24   /   ALT 18   /   AlkP 92   /   Tptn 6.0   /   Alb 3.8    /   DBili --        TBili 0.4   /   AST 25   /   ALT 21   /   AlkP 113   /   Tptn 6.9   /   Alb 4.3    /   DBili --              PT/INR - ( 03 Sep 2022 04:56 )   PT: 11.20 sec;   INR: 0.97 ratio         PTT - ( 03 Sep 2022 04:56 )  PTT:27.9 sec     Gastroenterology/Hepatology Consultation        HPI:    Mrs. 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     She was admitted in 7/2022 for TAVR evaluation and shortness of breath. During this hospitalization, patient was found to have acute-on-chronic anemia requesting multiple transfusions. Capsule study showed large AVMs, and patient underwent colonoscopy on 7/27/22 with successful AVM intervention with multiple non bleeding AVMs as well. On 7/30/22, patient developed pulmonary edema and required upgrade to the CCU. She was initially treated with BiPAP and nitroglycerin gtt. In the CCU, patient was noted to be febrile up to 100.4 with elevated WBC and CXR concerning for PNA. Blood cultures on 7/30/22 on 8/01/22 grew staph epidermidis. In the setting of infection, patient went into Afib with RVR and became hypotensive (SBP in the 50s) requiring phenylephrine. Thoracentesis on 8/01/22 with 1L of fluid removed, transudative effusion with negative culture. Her pleural effusion reaccumulated and a chest tube was placed on 8/04/22. She was treated with a 7-day course of vancomycin and cefepime, and her antibiotics were discontinued per the recommendation of Infectious Disease.     On 8/09/22, patient underwent balloon aortic valvuloplasty. Her respiratory failure and blood pressure improved after her BAV. Chest tube was removed on 8/10/22, and patient was started on losartan 25 mg daily to avoid hypertension and flash pulmonary edema. TTE on 8/10/22 with critical AS with peak gradient 120 mmHg, mean gradient 76 mmHg, and CESIA 0.3 cm^2. Patient was downgraded to Cardiac Telemetry. Her diuretics were adjusted and she will be discharged on Lasix 40 mg PO daily. Her last transfusion was on 8/14 for Hgb 7.7, with post-transfusion Hgb of 9.9. She was not started on anticoagulation for Afib given her ongoing need for transfusions.    After discharge she was feeling well. She saw Dr. Mccollum on 9/2/2022 and was planned for TAVR 1 week after. However over past 2 days, patient developed SOB that is acute on chronic and worsening over the past couple of days associated with a dry cough. Pt says she can only walk a few steps before having to stop to catch her breath. She has chronic chest pain that is unchanged from baseline described as a "discomfort up in my throat". She visited the [facility] and was found to have Hb of 5.7, she was transferred to [facility] and 2 units of blood was transfused. She denies melena or BRBPR.       Previous EGD: none in chart    Previous colonoscopy(ies):Colonoscopy: Colonoscopy Report    Excavated lesions Multiple diverticula with mixed openings were seen in the the  left sideof the colon. Diverticulosis appeared to be severe.    Protruding lesions A single sessile 5 mm polyp of benign appearance was found in  the sigmoid colon.A single-piece polypectomy was performed using a cold snare.  The polyp was completely removed.    Medium non-bleeding internal and external hemorrhoids were noted.    Additional findings 1 cm non-bleeding AVM noted in cecum. APCs applied for the  purpose of hemostasis..    5 mm non-bleeding AVM noted in cecum. APCs applied for the purpose of  hemostasis..    No AVM noted in terminal ileum.    1 cm non-bleeding AVM noted in transverse colon. APCs applied for the purpose of  hemostasis..    No other lesion or mass noted. Small AVMs or polyps can be missed due to fair  prep in right colon.    Impressions:    Polyp (5 mm) in the sigmoid colon. (Polypectomy).    Severe diverticulosis of the the left side of the colon.    Internal and external hemorrhoids.    1 cm non-bleeding AVM noted in cecum. APCs applied for the purpose of  hemostasis. .    5 mm non-bleeding AVM noted in cecum. APCs applied for the purpose of  hemostasis. .    No AVM noted in terminal ileum.    1 cm non-bleeding AVM noted in transverse colon. APCs applied for the purpose  of hemostasis. .    No other lesion or mass noted. Small AVMs or polyps can be missed due to fair  prep in right colon.    No pertinent family history in first degree relatives    FH: breast cancer    FH: stomach cancer    FH: skin cancer        Review of system  General:  (-) weight loss, (-) fevers  Eyes:  (-) visual changes  CV:  (-) chest pain  Resp: (-) SOB, (-) wheezing  GI: (-) abdominal pain,  (-) nausea, (-) vomiting, (-) dysphagia, (-) diarrhea, (-) constipation, (-) rectal bleeding, (-) melena, (-) hematemesis.  Neuro: (-) confusion, (-) weakness  Psych:  (-) Hallucinations  Heme:  (-) easy bruisability    Past medical/surgical Hx:  PAST MEDICAL & SURGICAL HISTORY:  HTN (hypertension)      Mitral valve prolapse      Enlarged heart      Murmur      Arthritis      HTN (hypertension)      Diverticulosis      Hiatal hernia      Acid reflux      Liver cancer  s/p resection and s/p chemo and radiation      Aortic stenosis  s/p ballon aortic valuloplasty 5/25/21; 8/2021      Hypothyroid      H/O thyroid nodule  bx benign - 5 years ago      Osteoarthritis      H/O tear of meniscus of knee joint  left      Nodule of kidney      Overactive bladder      History of blood transfusion  no adverse reaction      H/O resection of liver  liver cancer (right lobe 2001); 2003 - right lobe, cholecystectomy, and bile duct      Status post cholecystectomy      History of torn meniscus of knee  right; repaired  1995      H/O carpal tunnel repair  2021 - 2012        Home Medications:      Allergies: BLUE DYE (Stomach Upset; Nausea)  Cipro (Other)  Levaquin (Rash)  tetracycline (Hives)      Current Medications:   furosemide    Tablet 40 milliGRAM(s) Oral daily  influenza  Vaccine (HIGH DOSE) 0.7 milliLiter(s) IntraMuscular once  levothyroxine 100 MICROGram(s) Oral daily  losartan 25 milliGRAM(s) Oral daily  melatonin 3 milliGRAM(s) Oral at bedtime PRN  pantoprazole  Injectable 40 milliGRAM(s) IV Push every 12 hours      Physical exam:  T(C): 35.6 (09-03-22 @ 07:00), Max: 36.2 (09-02-22 @ 14:05)  HR: 62 (09-03-22 @ 11:00) (55 - 82)  BP: 111/56 (09-03-22 @ 11:00) (81/51 - 139/64)  RR: 35 (09-03-22 @ 11:00) (15 - 44)  SpO2: 100% (09-03-22 @ 11:00) (100% - 100%)    GENERAL: NAD  HEAD:  Atraumatic, Normocephalic  EYES: Sclera:NL  NECK: Supple, no JVD or thyromegaly  CHEST/LUNG: Good bilateral air entry  HEART: normal S1, S2. Regular  ABDOMEN: (-) distended, (-) tender, (-) rebound, (+) BS, (-)HSM  EXTREMITIES: (-) edema  NEUROLOGY: (-) asterixis  SKIN: (-) jaundice  PAVAN: (-) melena (-) brbpr    Data:                        9.2    3.29  )-----------( 133      ( 03 Sep 2022 04:56 )             27.6     MCV 92.3 (09-03-22)  94.5 (09-02-22)    RDW 22.6 (09-03-22)  21.4 (09-02-22)    HGB trend:  9.2  09-03-22 @ 04:56  9.2  09-02-22 @ 23:00  4.8  09-02-22 @ 10:40  5.7  09-01-22 @ 14:24      09-02-22 @ 07:01  -  09-03-22 @ 07:00  --------------------------------------------------------  IN: 684 mL      09-03    143  |  106  |  37<H>  ----------------------------<  75  3.7   |  25  |  0.7    Ca    8.7      03 Sep 2022 04:56  Phos  3.3     09-03  Mg     2.3     09-03    TPro  5.9<L>  /  Alb  3.5  /  TBili  1.3<H>  /  DBili  x   /  AST  25  /  ALT  16  /  AlkPhos  97  09-03    Liver panel trend:  TBili 1.3   /   AST 25   /   ALT 16   /   AlkP 97   /   Tptn 5.9   /   Alb 3.5    /   DBili --      09-03  TBili 0.3   /   AST 24   /   ALT 18   /   AlkP 92   /   Tptn 6.0   /   Alb 3.8    /   DBili --      09-02  TBili 0.4   /   AST 25   /   ALT 21   /   AlkP 113   /   Tptn 6.9   /   Alb 4.3    /   DBili --      09-01        PT/INR - ( 03 Sep 2022 04:56 )   PT: 11.20 sec;   INR: 0.97 ratio         PTT - ( 03 Sep 2022 04:56 )  PTT:27.9 sec

## 2022-09-03 NOTE — PROGRESS NOTE ADULT - SUBJECTIVE AND OBJECTIVE BOX
DILIP LUCAS    HPI:   Reason for Admission: Symptomatic anemia with AVMs  History of Present Illness:   Mrs. 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    She was admitted in 7/2022 for TAVR evaluation and shortness of breath. During this hospitalization, patient was found to have acute-on-chronic anemia requesting multiple transfusions. Capsule study showed large AVMs, and patient underwent colonoscopy on 7/27/22 with successful AVM intervention with multiple non bleeding AVMs as well.     On 7/30/22, patient developed pulmonary edema and required upgrade to the CCU. She was initially treated with BiPAP and nitroglycerin gtt. In the CCU, patient was noted to be febrile up to 100.4 with elevated WBC and CXR concerning for PNA. Blood cultures on 7/30/22 on 8/01/22 grew staph epidermidis. In the setting of infection, patient went into Afib with RVR and became hypotensive (SBP in the 50s) requiring phenylephrine. Thoracentesis on 8/01/22 with 1L of fluid removed, transudative effusion with negative culture. Her pleural effusion reaccumulated and a chest tube was placed on 8/04/22. She was treated with a 7-day course of vancomycin and cefepime, and her antibiotics were discontinued per the recommendation of Infectious Disease.     On 8/09/22, patient underwent balloon aortic valvuloplasty. Her respiratory failure and blood pressure improved after her BAV. Chest tube was removed on 8/10/22, and patient was started on losartan 25 mg daily to avoid hypertension and flash pulmonary edema. TTE on 8/10/22 with critical AS with peak gradient 120 mmHg, mean gradient 76 mmHg, and CESIA 0.3 cm^2. Patient was downgraded to Cardiac Telemetry. Her diuretics were adjusted and she will be discharged on Lasix 40 mg PO daily. Her last transfusion was on 8/14 for Hgb 7.7, with post-transfusion Hgb of 9.9. She was not started on anticoagulation for Afib given her ongoing need for transfusions.    After discharge she was feeling well. She saw Dr. Mccollum on 9/2/2022 and was planned for TAVR 1 week after. However over past 2 days, patient developed SOB that is acute on chronic and worsening over the past couple of days associated with a dry cough. Pt says she can only walk a few steps before having to stop to catch her breath. She has chronic chest pain that is unchanged from baseline described as a "discomfort up in my throat". She visited the [facility] and was found to have Hb of 5.7, she was transferred to [facility] and 2 units of blood was transfused. She denies melena or BRBPR.     Vital Signs Last 24 Hrs:  T(F): 96 (02 Sep 2022 18:00), Max: 97.1 (02 Sep 2022 14:05)  HR: 68 (02 Sep 2022 18:00) (68 - 85)  BP: 111/58 (02 Sep 2022 18:00) (99/52 - 131/60)  RR: 18 (02 Sep 2022 18:00) (15 - 20)  SpO2: 100% (02 Sep 2022 18:00) (92% - 100%)      SUBJECTIVE:   Complaints: none  Overnight events:  Hgb 4.8 to 9.2 s/p 2 units PRBC    OBJECTIVE:   T(C): 35.6 (09-03-22 @ 00:00), Max: 36.2 (09-02-22 @ 14:05)  HR: 55 (09-03-22 @ 02:00) (55 - 85)  BP: 92/51 (09-03-22 @ 02:00) (92/51 - 131/60)  RR: 41 (09-03-22 @ 02:00) (15 - 41)  SpO2: 100% (09-03-22 @ 02:00) (92% - 100%)    Acc I&O    09-02-22 @ 07:01  -  09-03-22 @ 03:40  --------------------------------------------------------  IN: 684 mL / OUT: 1000 mL / NET: -316 mL        PHYSICAL EXAM  GENERAL: NAD  NECK: Supple, No JVD  CHEST/LUNG: CTAB   HEART: RRR, + murmur  ABDOMEN: Soft, nontender, nondistended  EXTREMITIES:  2+ radial pulses  NERVOUS SYSTEM:   no focal deficits   SKIN: No rashes or lesions grossly noted    MEDICATIONS  furosemide    Tablet 40 milliGRAM(s) Oral daily  influenza  Vaccine (HIGH DOSE) 0.7 milliLiter(s) IntraMuscular once  levothyroxine 100 MICROGram(s) Oral daily  losartan 25 milliGRAM(s) Oral daily  melatonin 3 milliGRAM(s) Oral at bedtime PRN  pantoprazole  Injectable 40 milliGRAM(s) IV Push every 12 hours      LABS:    CBC Full  -  ( 02 Sep 2022 23:00 )  WBC Count : 3.60 K/uL  RBC Count : 2.93 M/uL  Hemoglobin : 9.2 g/dL  Hematocrit : 27.7 %  Platelet Count - Automated : 142 K/uL  Mean Cell Volume : 94.5 fL  Mean Cell Hemoglobin : 31.4 pg  Mean Cell Hemoglobin Concentration : 33.2 g/dL  Auto Neutrophil # : x  Auto Lymphocyte # : x  Auto Monocyte # : x  Auto Eosinophil # : x  Auto Basophil # : x  Auto Neutrophil % : x  Auto Lymphocyte % : x  Auto Monocyte % : x  Auto Eosinophil % : x  Auto Basophil % : x    09-02    141  |  104  |  51<H>  ----------------------------<  93  4.2   |  28  |  1.0    Ca    8.9      02 Sep 2022 10:40    TPro  6.0  /  Alb  3.8  /  TBili  0.3  /  DBili  x   /  AST  24  /  ALT  18  /  AlkPhos  92  09-02

## 2022-09-03 NOTE — PROGRESS NOTE ADULT - ASSESSMENT
# Severe anemia  # Severe AS s/p BAV in 8/2022 - Area 0.3cm2, mean gradient 76mmHg post BAV  # VALENTINE likely secondary to acute on chronic anemia   # Known to have multiple AVMs requiring tranfusions   # HFpEF - Euvolemic   # Chronic transudative left pleural effusion   # CAD   # Elevated troponins likely demand ishemia   # A fib not on AC   # HTN       PLAN:    NEUROLOGY: Avoid CNS depressants    HEENT: Oral care    HEME and CARDIOVASCULAR: Daily weights. Strict I&Os, Keep K>4 and Mg>2,  Monitor CBC, DVT prophylaxis, Dimer, Duplex  - Transfuse 2 units of blood slowly ; give FPP every 4 units  - Monitor SpO2 during - may need lasix in between transfusions   - Structural heart team follow up   - Troponins stable  - Daily chest x ray and ECGs   - Continue home medications - holding aspirin      PULMONARY: HOB @ 45 degrees. Aspiration precautions. Keep SpO2 > 95%    GASTROINTESTINAL: Pureed diet, NPO after midnight, Protonix BID, GI consult    RENAL: Monitor BMP. Correct lytes as needed     INFECTIOUS DISEASE: Monitor off abx    ENDOCRINE: Follow up FS.  Insulin protocol if needed.    MUSCULOSKELETAL: Bed rest     DISPO: CCU monitoring  CODE STATUS: Full code

## 2022-09-04 LAB
ALBUMIN SERPL ELPH-MCNC: 3.4 G/DL — LOW (ref 3.5–5.2)
ALP SERPL-CCNC: 104 U/L — SIGNIFICANT CHANGE UP (ref 30–115)
ALT FLD-CCNC: 15 U/L — SIGNIFICANT CHANGE UP (ref 0–41)
ANION GAP SERPL CALC-SCNC: 8 MMOL/L — SIGNIFICANT CHANGE UP (ref 7–14)
AST SERPL-CCNC: 23 U/L — SIGNIFICANT CHANGE UP (ref 0–41)
BASOPHILS # BLD AUTO: 0.01 K/UL — SIGNIFICANT CHANGE UP (ref 0–0.2)
BASOPHILS NFR BLD AUTO: 0.1 % — SIGNIFICANT CHANGE UP (ref 0–1)
BILIRUB SERPL-MCNC: 0.9 MG/DL — SIGNIFICANT CHANGE UP (ref 0.2–1.2)
BUN SERPL-MCNC: 27 MG/DL — HIGH (ref 10–20)
CALCIUM SERPL-MCNC: 9 MG/DL — SIGNIFICANT CHANGE UP (ref 8.5–10.1)
CHLORIDE SERPL-SCNC: 104 MMOL/L — SIGNIFICANT CHANGE UP (ref 98–110)
CO2 SERPL-SCNC: 30 MMOL/L — SIGNIFICANT CHANGE UP (ref 17–32)
CREAT SERPL-MCNC: 0.8 MG/DL — SIGNIFICANT CHANGE UP (ref 0.7–1.5)
EGFR: 70 ML/MIN/1.73M2 — SIGNIFICANT CHANGE UP
EOSINOPHIL # BLD AUTO: 0.08 K/UL — SIGNIFICANT CHANGE UP (ref 0–0.7)
EOSINOPHIL NFR BLD AUTO: 1 % — SIGNIFICANT CHANGE UP (ref 0–8)
GLUCOSE SERPL-MCNC: 88 MG/DL — SIGNIFICANT CHANGE UP (ref 70–99)
HCT VFR BLD CALC: 28.7 % — LOW (ref 37–47)
HGB BLD-MCNC: 9.6 G/DL — LOW (ref 12–16)
IMM GRANULOCYTES NFR BLD AUTO: 0.3 % — SIGNIFICANT CHANGE UP (ref 0.1–0.3)
LYMPHOCYTES # BLD AUTO: 0.47 K/UL — LOW (ref 1.2–3.4)
LYMPHOCYTES # BLD AUTO: 6 % — LOW (ref 20.5–51.1)
MAGNESIUM SERPL-MCNC: 2 MG/DL — SIGNIFICANT CHANGE UP (ref 1.8–2.4)
MCHC RBC-ENTMCNC: 30.9 PG — SIGNIFICANT CHANGE UP (ref 27–31)
MCHC RBC-ENTMCNC: 33.4 G/DL — SIGNIFICANT CHANGE UP (ref 32–37)
MCV RBC AUTO: 92.3 FL — SIGNIFICANT CHANGE UP (ref 81–99)
MONOCYTES # BLD AUTO: 0.53 K/UL — SIGNIFICANT CHANGE UP (ref 0.1–0.6)
MONOCYTES NFR BLD AUTO: 6.8 % — SIGNIFICANT CHANGE UP (ref 1.7–9.3)
NEUTROPHILS # BLD AUTO: 6.7 K/UL — HIGH (ref 1.4–6.5)
NEUTROPHILS NFR BLD AUTO: 85.8 % — HIGH (ref 42.2–75.2)
NRBC # BLD: 0 /100 WBCS — SIGNIFICANT CHANGE UP (ref 0–0)
PLATELET # BLD AUTO: 128 K/UL — LOW (ref 130–400)
POTASSIUM SERPL-MCNC: 4.2 MMOL/L — SIGNIFICANT CHANGE UP (ref 3.5–5)
POTASSIUM SERPL-SCNC: 4.2 MMOL/L — SIGNIFICANT CHANGE UP (ref 3.5–5)
PROT SERPL-MCNC: 5.7 G/DL — LOW (ref 6–8)
RBC # BLD: 3.11 M/UL — LOW (ref 4.2–5.4)
RBC # FLD: 22.3 % — HIGH (ref 11.5–14.5)
SODIUM SERPL-SCNC: 142 MMOL/L — SIGNIFICANT CHANGE UP (ref 135–146)
WBC # BLD: 7.81 K/UL — SIGNIFICANT CHANGE UP (ref 4.8–10.8)
WBC # FLD AUTO: 7.81 K/UL — SIGNIFICANT CHANGE UP (ref 4.8–10.8)

## 2022-09-04 PROCEDURE — 71045 X-RAY EXAM CHEST 1 VIEW: CPT | Mod: 26

## 2022-09-04 PROCEDURE — 99233 SBSQ HOSP IP/OBS HIGH 50: CPT

## 2022-09-04 RX ORDER — PANTOPRAZOLE SODIUM 20 MG/1
40 TABLET, DELAYED RELEASE ORAL
Refills: 0 | Status: DISCONTINUED | OUTPATIENT
Start: 2022-09-04 | End: 2022-09-12

## 2022-09-04 RX ADMIN — LOSARTAN POTASSIUM 25 MILLIGRAM(S): 100 TABLET, FILM COATED ORAL at 06:31

## 2022-09-04 RX ADMIN — PANTOPRAZOLE SODIUM 40 MILLIGRAM(S): 20 TABLET, DELAYED RELEASE ORAL at 06:31

## 2022-09-04 RX ADMIN — Medication 40 MILLIGRAM(S): at 06:31

## 2022-09-04 RX ADMIN — Medication 100 MICROGRAM(S): at 06:31

## 2022-09-04 RX ADMIN — PANTOPRAZOLE SODIUM 40 MILLIGRAM(S): 20 TABLET, DELAYED RELEASE ORAL at 12:13

## 2022-09-04 NOTE — PROGRESS NOTE ADULT - ASSESSMENT
# Severe anemia  # Severe AS s/p BAV in 8/2022 - Area 0.3cm2, mean gradient 76mmHg post BAV  # VALENTINE likely secondary to acute on chronic anemia   # Known to have multiple AVMs requiring tranfusions   # HFpEF - Euvolemic   # Chronic transudative left pleural effusion   # CAD   # Elevated troponins likely demand ishemia   # A fib not on AC   # HTN       PLAN:    NEUROLOGY: Avoid CNS depressants    HEENT: Oral care    HEME and CARDIOVASCULAR: Daily weights. Strict I&Os, Keep K>4 and Mg>2,  Monitor CBC, DVT prophylaxis, Dimer, Duplex  - Transfuse 2 units of blood slowly ; give FPP every 4 units  - Monitor SpO2 during - may need lasix in between transfusions   - Structural heart team follow up   - Troponins stable  - Daily chest x ray and ECGs   - Continue home medications - holding aspirin      PULMONARY: HOB @ 45 degrees. Aspiration precautions. Keep SpO2 > 95%    GASTROINTESTINAL: Pureed diet, NPO after midnight, Protonix BID, GI consult    RENAL: Monitor BMP. Correct lytes as needed     INFECTIOUS DISEASE: Monitor off abx    ENDOCRINE: Follow up FS.  Insulin protocol if needed.    MUSCULOSKELETAL: Bed rest     DISPO: CCU monitoring  CODE STATUS: Full code     # Severe anemia  # Severe AS s/p BAV in 8/2022 - Area 0.3cm2, mean gradient 76mmHg post BAV  # VALENTINE likely secondary to acute on chronic anemia   # Known to have multiple AVMs requiring tranfusions   # HFpEF - Euvolemic   # Chronic transudative left pleural effusion   # CAD   # Elevated troponins likely demand ishemia   # A fib not on AC   # HTN       PLAN:    NEUROLOGY: Avoid CNS depressants    HEENT: Oral care    HEME and CARDIOVASCULAR: Daily weights. Strict I&Os, Keep K>4 and Mg>2,  Monitor CBC, DVT prophylaxis, Dimer, Duplex  - Transfuse 2 units of blood slowly ; give FPP every 4 units  - Monitor SpO2 during - may need lasix in between transfusions   - Structural heart team follow up   - Troponins stable  - Daily chest x ray and ECGs   - Continue home medications - holding aspirin      PULMONARY: HOB @ 45 degrees. Aspiration precautions. Keep SpO2 > 95%    GASTROINTESTINAL: Pureed diet, NPO after midnight, Protonix BID, GI consult    RENAL: Monitor BMP. Correct lytes as needed     INFECTIOUS DISEASE: Monitor off abx    ENDOCRINE: Follow up FS.  Insulin protocol if needed.    MUSCULOSKELETAL: Bed rest     DISPO: downgrade to stepdown  CODE STATUS: Full code

## 2022-09-04 NOTE — PROGRESS NOTE ADULT - SUBJECTIVE AND OBJECTIVE BOX
Gastroenterology progress note:     Patient is a 89y old  Female who presents with a chief complaint of Symptomatic anemia with AVMs (04 Sep 2022 07:31)       Admitted on: 09-02-22    We are following the patient for:       Interval History:    No acute events overnight.   - Diet - tolerating regular diet  - last BM - 1 day ago brown  - Abdominal pain - none      PAST MEDICAL & SURGICAL HISTORY:  HTN (hypertension)      Mitral valve prolapse      Enlarged heart      Murmur      Arthritis      HTN (hypertension)      Diverticulosis      Hiatal hernia      Acid reflux      Liver cancer  s/p resection and s/p chemo and radiation      Aortic stenosis  s/p ballon aortic valuloplasty 5/25/21; 8/2021      Hypothyroid      H/O thyroid nodule  bx benign - 5 years ago      Osteoarthritis      H/O tear of meniscus of knee joint  left      Nodule of kidney      Overactive bladder      History of blood transfusion  no adverse reaction      H/O resection of liver  liver cancer (right lobe 2001); 2003 - right lobe, cholecystectomy, and bile duct      Status post cholecystectomy      History of torn meniscus of knee  right; repaired  1995      H/O carpal tunnel repair  2021 - 2012          MEDICATIONS  (STANDING):  furosemide    Tablet 40 milliGRAM(s) Oral daily  influenza  Vaccine (HIGH DOSE) 0.7 milliLiter(s) IntraMuscular once  levothyroxine 100 MICROGram(s) Oral daily  losartan 25 milliGRAM(s) Oral daily  pantoprazole    Tablet 40 milliGRAM(s) Oral before breakfast    MEDICATIONS  (PRN):  melatonin 3 milliGRAM(s) Oral at bedtime PRN Insomnia      Allergies  BLUE DYE (Stomach Upset; Nausea)  Cipro (Other)  Levaquin (Rash)  tetracycline (Hives)      Review of Systems:   Cardiovascular:  No Chest Pain, No Palpitations  Respiratory:  +Dyspnea  Gastrointestinal:  As described in HPI  Skin:  No Skin Lesions, No Jaundice  Neuro:  No Syncope, No Dizziness    Physical Examination:  T(C): 36 (09-04-22 @ 14:58), Max: 37.1 (09-03-22 @ 16:00)  HR: 67 (09-04-22 @ 14:58) (57 - 73)  BP: 85/47 (09-04-22 @ 14:58) (85/47 - 121/60)  RR: 24 (09-04-22 @ 14:58) (19 - 37)  SpO2: 100% (09-04-22 @ 14:00) (100% - 100%)      09-03-22 @ 07:01  -  09-04-22 @ 07:00  --------------------------------------------------------  IN: 120 mL / OUT: 2100 mL / NET: -1980 mL    09-04-22 @ 07:01  -  09-04-22 @ 15:24  --------------------------------------------------------  IN: 340 mL / OUT: 700 mL / NET: -360 mL        GENERAL: AAOx3, no acute distress.  HEAD:  Atraumatic, Normocephalic  EYES: conjunctiva and sclera clear  NECK: Supple, no JVD or thyromegaly  CHEST/LUNG: reduced breath sounds  HEART: Regular rate and rhythm;   ABDOMEN: Soft, nontender, nondistended; Bowel sounds present  NEUROLOGY: No asterixis or tremor.   SKIN: Intact, no jaundice     Data:                        9.6    7.81  )-----------( 128      ( 04 Sep 2022 04:46 )             28.7     Hgb trend:  9.6  09-04-22 @ 04:46  9.1  09-03-22 @ 18:02  9.2  09-03-22 @ 04:56  9.2  09-02-22 @ 23:00  4.8  09-02-22 @ 10:40      09-02-22 @ 07:01  -  09-03-22 @ 07:00  --------------------------------------------------------  IN: 684 mL      09-04    142  |  104  |  27<H>  ----------------------------<  88  4.2   |  30  |  0.8    Ca    9.0      04 Sep 2022 04:46  Phos  3.3     09-03  Mg     2.0     09-04    TPro  5.7<L>  /  Alb  3.4<L>  /  TBili  0.9  /  DBili  x   /  AST  23  /  ALT  15  /  AlkPhos  104  09-04    Liver panel trend:  TBili 0.9   /   AST 23   /   ALT 15   /   AlkP 104   /   Tptn 5.7   /   Alb 3.4    /   DBili --      09-04  TBili 1.3   /   AST 25   /   ALT 16   /   AlkP 97   /   Tptn 5.9   /   Alb 3.5    /   DBili --      09-03  TBili 0.3   /   AST 24   /   ALT 18   /   AlkP 92   /   Tptn 6.0   /   Alb 3.8    /   DBili --      09-02  TBili 0.4   /   AST 25   /   ALT 21   /   AlkP 113   /   Tptn 6.9   /   Alb 4.3    /   DBili --      09-01      PT/INR - ( 03 Sep 2022 04:56 )   PT: 11.20 sec;   INR: 0.97 ratio         PTT - ( 03 Sep 2022 04:56 )  PTT:27.9 sec       Radiology:       Gastroenterology progress note:     Patient is a 89y old  Female who presents with a chief complaint of Symptomatic anemia with AVMs (04 Sep 2022 07:31)       Admitted on: 09-02-22    We are following the patient for:       Interval History:    No acute events overnight.   - Diet - tolerating regular diet  - last BM - 1 day ago brown  - Abdominal pain - none      PAST MEDICAL & SURGICAL HISTORY:  HTN (hypertension)      Mitral valve prolapse      Enlarged heart      Murmur      Arthritis      HTN (hypertension)      Diverticulosis      Hiatal hernia      Acid reflux      Liver cancer  s/p resection and s/p chemo and radiation      Aortic stenosis  s/p ballon aortic valuloplasty 5/25/21; 8/2021      Hypothyroid      H/O thyroid nodule  bx benign - 5 years ago      Osteoarthritis      H/O tear of meniscus of knee joint  left      Nodule of kidney      Overactive bladder      History of blood transfusion  no adverse reaction      H/O resection of liver  liver cancer (right lobe 2001); 2003 - right lobe, cholecystectomy, and bile duct      Status post cholecystectomy      History of torn meniscus of knee  right; repaired  1995      H/O carpal tunnel repair  2021 - 2012          MEDICATIONS  (STANDING):  furosemide    Tablet 40 milliGRAM(s) Oral daily  influenza  Vaccine (HIGH DOSE) 0.7 milliLiter(s) IntraMuscular once  levothyroxine 100 MICROGram(s) Oral daily  losartan 25 milliGRAM(s) Oral daily  pantoprazole    Tablet 40 milliGRAM(s) Oral before breakfast    MEDICATIONS  (PRN):  melatonin 3 milliGRAM(s) Oral at bedtime PRN Insomnia      Allergies  BLUE DYE (Stomach Upset; Nausea)  Cipro (Other)  Levaquin (Rash)  tetracycline (Hives)      Review of Systems:   Cardiovascular:  No Chest Pain, No Palpitations  Respiratory:  +Dyspnea  Gastrointestinal:  As described in HPI  Skin:  No Skin Lesions, No Jaundice  Neuro:  No Syncope, No Dizziness    Physical Examination:  T(C): 36 (09-04-22 @ 14:58), Max: 37.1 (09-03-22 @ 16:00)  HR: 67 (09-04-22 @ 14:58) (57 - 73)  BP: 85/47 (09-04-22 @ 14:58) (85/47 - 121/60)  RR: 24 (09-04-22 @ 14:58) (19 - 37)  SpO2: 100% (09-04-22 @ 14:00) (100% - 100%)      09-03-22 @ 07:01  -  09-04-22 @ 07:00  --------------------------------------------------------  IN: 120 mL / OUT: 2100 mL / NET: -1980 mL    09-04-22 @ 07:01  -  09-04-22 @ 15:24  --------------------------------------------------------  IN: 340 mL / OUT: 700 mL / NET: -360 mL        GENERAL: AAOx3, SOB while speaking   HEAD:  Atraumatic, Normocephalic  EYES: conjunctiva and sclera clear  NECK: Supple, no JVD or thyromegaly  CHEST/LUNG: reduced breath sounds  HEART: Regular rate and rhythm;   ABDOMEN: Soft, nontender, nondistended; Bowel sounds present  NEUROLOGY: No asterixis or tremor.   SKIN: Intact, no jaundice     Data:                        9.6    7.81  )-----------( 128      ( 04 Sep 2022 04:46 )             28.7     Hgb trend:  9.6  09-04-22 @ 04:46  9.1  09-03-22 @ 18:02  9.2  09-03-22 @ 04:56  9.2  09-02-22 @ 23:00  4.8  09-02-22 @ 10:40      09-02-22 @ 07:01  -  09-03-22 @ 07:00  --------------------------------------------------------  IN: 684 mL      09-04    142  |  104  |  27<H>  ----------------------------<  88  4.2   |  30  |  0.8    Ca    9.0      04 Sep 2022 04:46  Phos  3.3     09-03  Mg     2.0     09-04    TPro  5.7<L>  /  Alb  3.4<L>  /  TBili  0.9  /  DBili  x   /  AST  23  /  ALT  15  /  AlkPhos  104  09-04    Liver panel trend:  TBili 0.9   /   AST 23   /   ALT 15   /   AlkP 104   /   Tptn 5.7   /   Alb 3.4    /   DBili --      09-04  TBili 1.3   /   AST 25   /   ALT 16   /   AlkP 97   /   Tptn 5.9   /   Alb 3.5    /   DBili --      09-03  TBili 0.3   /   AST 24   /   ALT 18   /   AlkP 92   /   Tptn 6.0   /   Alb 3.8    /   DBili --      09-02  TBili 0.4   /   AST 25   /   ALT 21   /   AlkP 113   /   Tptn 6.9   /   Alb 4.3    /   DBili --      09-01      PT/INR - ( 03 Sep 2022 04:56 )   PT: 11.20 sec;   INR: 0.97 ratio         PTT - ( 03 Sep 2022 04:56 )  PTT:27.9 sec       Radiology:

## 2022-09-04 NOTE — PROGRESS NOTE ADULT - ASSESSMENT
88 yo female with HFpEF, severe AS s/p balloon valvuloplasty 5/25/21 and again 8/09/22, chronic anemia due to AVMs s/p APC in the cecum and transverse colon on 7/27/22, HTN, hyperthyroidism, and HCC s/p partial liver resection presents for anemia.     #Acute on chronic anemia - no overt bleeding  - H&H baseline 8-9  - on admission 5.7 - 3 units - 9.2 - stable without bleeding  - kameron; negative for blood  - CF 7/27 - 5mm TA removed. 1cm and 5mm AVM in cecum s/p APC, 1cm AVM in TC s/p APC  - VCE 7/19 - non bleeding AVM in cecum/TI    Plan  - resume diet  - monitor cbc target hb >9  - protonix 40mg qdaily  - Pt is high risk due to co-morbidities and given Severe AS pt likely to develop AVMs without TAVR. Considering recent endoscopic intervention and no overt bleeding at this time. will continue with conservative management and if overt bleeding with drop in H&H can consider repeat capsule endoscopy    - TAVR plans per cardio   88 yo female with HFpEF, severe AS s/p balloon valvuloplasty 5/25/21 and again 8/09/22, chronic anemia due to AVMs s/p APC in the cecum and transverse colon on 7/27/22, HTN, hyperthyroidism, and HCC s/p partial liver resection presents for anemia.     #Acute on chronic anemia - no overt bleeding  - H&H baseline 8-9  - on admission 5.7 - 3 units - 9.2 - stable without bleeding  - kameron; negative for blood  - CF 7/27 - 5mm TA removed. 1cm and 5mm AVM in cecum s/p APC, 1cm AVM in TC s/p APC  - VCE 7/19 - non bleeding AVM in cecum/TI    Plan  - resume diet  - monitor cbc target hb >9  - protonix 40mg qdaily  - Pt is high risk due to co-morbidities and given Severe AS pt likely to develop AVMs without TAVR. Discussed with the patient repeating capsule endoscopy and possible endoscopic intervention thereafter, patient does not want to repeat capsule endoscopy or endoscopic intervention for now, she understands the risks and benefits. Considering recent endoscopic intervention and no overt bleeding at this time, will continue with conservative management and if overt bleeding with drop in H&H can consider repeat capsule endoscopy if patient is agreeable   - TAVR plans per cardio

## 2022-09-04 NOTE — DIETITIAN INITIAL EVALUATION ADULT - ORAL INTAKE PTA/DIET HISTORY
Fair appetite & po intake reported until day of admission, during which appetite & po intake declined d/t increasing SOB. Reportedly followed a heart healthy diet PTP. Consumes 3 meals/day. NKFA. No supplements reported. No dietary restrictions reported. No food preferences related to culture/Cheondoism per report. No chewing/swallowing difficulty reported. UBW reported to be 54.5 kg. No known h/o unintentional wt loss. No signs of muscle wasting/subcutaneous fat loss at this time. Reviewed heart healthy nutrition therapy concepts. Discussed nutrition & wound healing. Pt receptive to aforementioned verbal education.

## 2022-09-04 NOTE — DIETITIAN INITIAL EVALUATION ADULT - ADD RECOMMEND
Recommendation:  (1) Order Ensure Enlive two times daily (700 kcal, 40 g protein).  (2) Order MVI q24hrs  (3) Order 250 mg Vit C q12hrs  (4) Order 220 mg Zinc Sulfate q24hrs (d/c after 10-14 days of use)

## 2022-09-04 NOTE — PROGRESS NOTE ADULT - SUBJECTIVE AND OBJECTIVE BOX
Patient is a 89y old  Female who presents with a chief complaint of Symptomatic anemia with AVMs (03 Sep 2022 11:48)      HPI      INTERVAL HPI/OVERNIGHT EVENTS:   No overnight events   Afebrile, hemodynamically stable     Subjective:    ICU Vital Signs Last 24 Hrs  T(C): 36.1 (04 Sep 2022 04:00), Max: 37.1 (03 Sep 2022 16:00)  T(F): 97 (04 Sep 2022 04:00), Max: 98.8 (03 Sep 2022 16:00)  HR: 64 (04 Sep 2022 06:00) (57 - 76)  BP: 100/58 (04 Sep 2022 06:00) (87/49 - 139/64)  BP(mean): 76 (04 Sep 2022 06:00) (62 - 92)  ABP: --  ABP(mean): --  RR: 20 (04 Sep 2022 06:00) (19 - 44)  SpO2: 100% (04 Sep 2022 06:00) (100% - 100%)    O2 Parameters below as of 04 Sep 2022 06:00  Patient On (Oxygen Delivery Method): nasal cannula  O2 Flow (L/min): 3        I&O's Summary    03 Sep 2022 07:01  -  04 Sep 2022 07:00  --------------------------------------------------------  IN: 120 mL / OUT: 2100 mL / NET: -1980 mL          Daily     Daily Weight in k (04 Sep 2022 06:00)    Adult Advanced Hemodynamics Last 24 Hrs  CVP(mm Hg): --  CVP(cm H2O): --  CO: --  CI: --  PA: --  PA(mean): --  PCWP: --  SVR: --  SVRI: --  PVR: --  PVRI: --    EKG/Telemetry Events:    MEDICATIONS  (STANDING):  furosemide    Tablet 40 milliGRAM(s) Oral daily  influenza  Vaccine (HIGH DOSE) 0.7 milliLiter(s) IntraMuscular once  levothyroxine 100 MICROGram(s) Oral daily  losartan 25 milliGRAM(s) Oral daily  pantoprazole  Injectable 40 milliGRAM(s) IV Push every 12 hours    MEDICATIONS  (PRN):  melatonin 3 milliGRAM(s) Oral at bedtime PRN Insomnia        LABS:                        9.6    7.81  )-----------( 128      ( 04 Sep 2022 04:46 )             28.7     -04    142  |  104  |  27<H>  ----------------------------<  88  4.2   |  30  |  0.8    Ca    9.0      04 Sep 2022 04:46  Phos  3.3     09-03  Mg     2.0     04    TPro  5.7<L>  /  Alb  3.4<L>  /  TBili  0.9  /  DBili  x   /  AST  23  /  ALT  15  /  AlkPhos  104  09-04    LIVER FUNCTIONS - ( 04 Sep 2022 04:46 )  Alb: 3.4 g/dL / Pro: 5.7 g/dL / ALK PHOS: 104 U/L / ALT: 15 U/L / AST: 23 U/L / GGT: x           PT/INR - ( 03 Sep 2022 04:56 )   PT: 11.20 sec;   INR: 0.97 ratio         PTT - ( 03 Sep 2022 04:56 )  PTT:27.9 sec  CAPILLARY BLOOD GLUCOSE            CARDIAC MARKERS ( 03 Sep 2022 04:56 )  x     / 0.15 ng/mL / x     / x     / x      CARDIAC MARKERS ( 02 Sep 2022 20:40 )  x     / 0.16 ng/mL / x     / x     / x      CARDIAC MARKERS ( 02 Sep 2022 10:40 )  x     / 0.18 ng/mL / x     / x     / x                RADIOLOGY & ADDITIONAL TESTS:         Care Discussed with Consultants/Other Providers [ x] YES  [ ] NO           Patient is a 89y old  Female who presents with a chief complaint of Symptomatic anemia with AVMs (03 Sep 2022 11:48)      HPI  Mrs. 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    She was admitted in 2022 for TAVR evaluation and shortness of breath. During this hospitalization, patient was found to have acute-on-chronic anemia requesting multiple transfusions. Capsule study showed large AVMs, and patient underwent colonoscopy on 22 with successful AVM intervention with multiple non bleeding AVMs as well.     On 22, patient developed pulmonary edema and required upgrade to the CCU. She was initially treated with BiPAP and nitroglycerin gtt. In the CCU, patient was noted to be febrile up to 100.4 with elevated WBC and CXR concerning for PNA. Blood cultures on 22 on 22 grew staph epidermidis. In the setting of infection, patient went into Afib with RVR and became hypotensive (SBP in the 50s) requiring phenylephrine. Thoracentesis on 22 with 1L of fluid removed, transudative effusion with negative culture. Her pleural effusion reaccumulated and a chest tube was placed on 22. She was treated with a 7-day course of vancomycin and cefepime, and her antibiotics were discontinued per the recommendation of Infectious Disease.     On 22, patient underwent balloon aortic valvuloplasty. Her respiratory failure and blood pressure improved after her BAV. Chest tube was removed on 8/10/22, and patient was started on losartan 25 mg daily to avoid hypertension and flash pulmonary edema. TTE on 8/10/22 with critical AS with peak gradient 120 mmHg, mean gradient 76 mmHg, and CESIA 0.3 cm^2. Patient was downgraded to Cardiac Telemetry. Her diuretics were adjusted and she will be discharged on Lasix 40 mg PO daily. Her last transfusion was on  for Hgb 7.7, with post-transfusion Hgb of 9.9. She was not started on anticoagulation for Afib given her ongoing need for transfusions.    After discharge she was feeling well. She saw Dr. Mccollum on 2022 and was planned for TAVR 1 week after. However over past 2 days, patient developed SOB that is acute on chronic and worsening over the past couple of days associated with a dry cough. Pt says she can only walk a few steps before having to stop to catch her breath. She has chronic chest pain that is unchanged from baseline described as a "discomfort up in my throat". She visited the [facility] and was found to have Hb of 5.7, she was transferred to [facility] and 2 units of blood was transfused. She denies melena or BRBPR.     Vital Signs Last 24 Hrs:  T(F): 96 (02 Sep 2022 18:00), Max: 97.1 (02 Sep 2022 14:05)  HR: 68 (02 Sep 2022 18:00) (68 - 85)  BP: 111/58 (02 Sep 2022 18:00) (99/52 - 131/60)  RR: 18 (02 Sep 2022 18:00) (15 - 20)  SpO2: 100% (02 Sep 2022 18:00) (92% - 100%)      INTERVAL HPI/OVERNIGHT EVENTS:   No overnight events   Afebrile, hemodynamically stable     Subjective:  Physical exam:   General: Pt in NAD, resting comfortably in bed  Cardiac: normal rate and rhythm, systolic murmur audible.   Respiratory: vesicular breath sounds bilaterally, no accessory muscle use. no wheezes, crackles or rhonchi  abdominal: soft, nontender, nondistended  Extremities: no edema, no cyanosis, no clubbing. focal swollen lesion with ecchymosis on the RLE   Neuro: A&O x3       ICU Vital Signs Last 24 Hrs  T(C): 36.1 (04 Sep 2022 04:00), Max: 37.1 (03 Sep 2022 16:00)  T(F): 97 (04 Sep 2022 04:00), Max: 98.8 (03 Sep 2022 16:00)  HR: 64 (04 Sep 2022 06:00) (57 - 76)  BP: 100/58 (04 Sep 2022 06:00) (87/49 - 139/64)  BP(mean): 76 (04 Sep 2022 06:00) (62 - 92)  ABP: --  ABP(mean): --  RR: 20 (04 Sep 2022 06:00) (19 - 44)  SpO2: 100% (04 Sep 2022 06:00) (100% - 100%)    O2 Parameters below as of 04 Sep 2022 06:00  Patient On (Oxygen Delivery Method): nasal cannula  O2 Flow (L/min): 3        I&O's Summary    03 Sep 2022 07:01  -  04 Sep 2022 07:00  --------------------------------------------------------  IN: 120 mL / OUT: 2100 mL / NET: -1980 mL          Daily     Daily Weight in k (04 Sep 2022 06:00)      EKG/Telemetry Events:    MEDICATIONS  (STANDING):  furosemide    Tablet 40 milliGRAM(s) Oral daily  influenza  Vaccine (HIGH DOSE) 0.7 milliLiter(s) IntraMuscular once  levothyroxine 100 MICROGram(s) Oral daily  losartan 25 milliGRAM(s) Oral daily  pantoprazole  Injectable 40 milliGRAM(s) IV Push every 12 hours    MEDICATIONS  (PRN):  melatonin 3 milliGRAM(s) Oral at bedtime PRN Insomnia        LABS:                        9.6    7.81  )-----------( 128      ( 04 Sep 2022 04:46 )             28.7     09-04    142  |  104  |  27<H>  ----------------------------<  88  4.2   |  30  |  0.8    Ca    9.0      04 Sep 2022 04:46  Phos  3.3     09-03  Mg     2.0     -04    TPro  5.7<L>  /  Alb  3.4<L>  /  TBili  0.9  /  DBili  x   /  AST  23  /  ALT  15  /  AlkPhos  104  09-04    LIVER FUNCTIONS - ( 04 Sep 2022 04:46 )  Alb: 3.4 g/dL / Pro: 5.7 g/dL / ALK PHOS: 104 U/L / ALT: 15 U/L / AST: 23 U/L / GGT: x           PT/INR - ( 03 Sep 2022 04:56 )   PT: 11.20 sec;   INR: 0.97 ratio         PTT - ( 03 Sep 2022 04:56 )  PTT:27.9 sec  CAPILLARY BLOOD GLUCOSE        CARDIAC MARKERS ( 03 Sep 2022 04:56 )  x     / 0.15 ng/mL / x     / x     / x      CARDIAC MARKERS ( 02 Sep 2022 20:40 )  x     / 0.16 ng/mL / x     / x     / x      CARDIAC MARKERS ( 02 Sep 2022 10:40 )  x     / 0.18 ng/mL / x     / x     / x                RADIOLOGY & ADDITIONAL TESTS:     VA duplex LE vein : No evidence of deep venous thrombosis in either lower extremity.  Right lower extremity hematoma measuring 2.9 x 1 x 3 cm    Chest x ray: No radiographic evidence of acute cardiopulmonary disease.      Care Discussed with Consultants/Other Providers [ x] YES  [ ] NO

## 2022-09-04 NOTE — DIETITIAN INITIAL EVALUATION ADULT - NS FNS DIET ORDER
Receiving DASH/TLC diet. Reports fair appetite at this time; consuming 50-75% of meals. Appetite remains decreased d/t SOB, however reportedly improved today. No chewing/swallowing difficulty reported.

## 2022-09-04 NOTE — DIETITIAN INITIAL EVALUATION ADULT - NUTRITIONGOAL OUTCOME1
Pt to demonstrate tolerance to diet order, with at least 75% po intake maintained over next 6 days. Pt at moderate nutrition risk.    Monitor: Skin, labs, BM, wt, po, diet, nutrition focused physical findings, body composition.

## 2022-09-04 NOTE — CHART NOTE - NSCHARTNOTEFT_GEN_A_CORE
HPI  Mrs. 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    She was admitted in 2022 for TAVR evaluation and shortness of breath. During this hospitalization, patient was found to have acute-on-chronic anemia requesting multiple transfusions. Capsule study showed large AVMs, and patient underwent colonoscopy on 22 with successful AVM intervention with multiple non bleeding AVMs as well.     On 22, patient developed pulmonary edema and required upgrade to the CCU. She was initially treated with BiPAP and nitroglycerin gtt. In the CCU, patient was noted to be febrile up to 100.4 with elevated WBC and CXR concerning for PNA. Blood cultures on 22 on 22 grew staph epidermidis. In the setting of infection, patient went into Afib with RVR and became hypotensive (SBP in the 50s) requiring phenylephrine. Thoracentesis on 22 with 1L of fluid removed, transudative effusion with negative culture. Her pleural effusion reaccumulated and a chest tube was placed on 22. She was treated with a 7-day course of vancomycin and cefepime, and her antibiotics were discontinued per the recommendation of Infectious Disease.     On 22, patient underwent balloon aortic valvuloplasty. Her respiratory failure and blood pressure improved after her BAV. Chest tube was removed on 8/10/22, and patient was started on losartan 25 mg daily to avoid hypertension and flash pulmonary edema. TTE on 8/10/22 with critical AS with peak gradient 120 mmHg, mean gradient 76 mmHg, and CESIA 0.3 cm^2. Patient was downgraded to Cardiac Telemetry. Her diuretics were adjusted and she will be discharged on Lasix 40 mg PO daily. Her last transfusion was on  for Hgb 7.7, with post-transfusion Hgb of 9.9. She was not started on anticoagulation for Afib given her ongoing need for transfusions.    After discharge she was feeling well. She saw Dr. Mccollum on 2022 and was planned for TAVR 1 week after. However over past 2 days, patient developed SOB that is acute on chronic and worsening over the past couple of days associated with a dry cough. Pt says she can only walk a few steps before having to stop to catch her breath. She has chronic chest pain that is unchanged from baseline described as a "discomfort up in my throat". She visited the [facility] and was found to have Hb of 5.7, she was transferred to [facility] and 2 units of blood was transfused. She denies melena or BRBPR.     Vital Signs Last 24 Hrs:  T(F): 96 (02 Sep 2022 18:00), Max: 97.1 (02 Sep 2022 14:05)  HR: 68 (02 Sep 2022 18:00) (68 - 85)  BP: 111/58 (02 Sep 2022 18:00) (99/52 - 131/60)  RR: 18 (02 Sep 2022 18:00) (15 - 20)  SpO2: 100% (02 Sep 2022 18:00) (92% - 100%)      INTERVAL HPI/OVERNIGHT EVENTS:   No overnight events   Afebrile, hemodynamically stable     Subjective:  Physical exam:   General: Pt in NAD, resting comfortably in bed  Cardiac: normal rate and rhythm, systolic murmur audible.   Respiratory: vesicular breath sounds bilaterally, no accessory muscle use. no wheezes, crackles or rhonchi  abdominal: soft, nontender, nondistended  Extremities: no edema, no cyanosis, no clubbing. focal swollen lesion with ecchymosis on the RLE   Neuro: A&O x3       ICU Vital Signs Last 24 Hrs  T(C): 36.1 (04 Sep 2022 04:00), Max: 37.1 (03 Sep 2022 16:00)  T(F): 97 (04 Sep 2022 04:00), Max: 98.8 (03 Sep 2022 16:00)  HR: 64 (04 Sep 2022 06:00) (57 - 76)  BP: 100/58 (04 Sep 2022 06:00) (87/49 - 139/64)  BP(mean): 76 (04 Sep 2022 06:00) (62 - 92)  ABP: --  ABP(mean): --  RR: 20 (04 Sep 2022 06:00) (19 - 44)  SpO2: 100% (04 Sep 2022 06:00) (100% - 100%)    O2 Parameters below as of 04 Sep 2022 06:00  Patient On (Oxygen Delivery Method): nasal cannula  O2 Flow (L/min): 3        I&O's Summary    03 Sep 2022 07:01  -  04 Sep 2022 07:00  --------------------------------------------------------  IN: 120 mL / OUT: 2100 mL / NET: -1980 mL          Daily     Daily Weight in k (04 Sep 2022 06:00)      EKG/Telemetry Events:    MEDICATIONS  (STANDING):  furosemide    Tablet 40 milliGRAM(s) Oral daily  influenza  Vaccine (HIGH DOSE) 0.7 milliLiter(s) IntraMuscular once  levothyroxine 100 MICROGram(s) Oral daily  losartan 25 milliGRAM(s) Oral daily  pantoprazole  Injectable 40 milliGRAM(s) IV Push every 12 hours    MEDICATIONS  (PRN):  melatonin 3 milliGRAM(s) Oral at bedtime PRN Insomnia        LABS:                        9.6    7.81  )-----------( 128      ( 04 Sep 2022 04:46 )             28.7     09-04    142  |  104  |  27<H>  ----------------------------<  88  4.2   |  30  |  0.8    Ca    9.0      04 Sep 2022 04:46  Phos  3.3     09-03  Mg     2.0     09-04    TPro  5.7<L>  /  Alb  3.4<L>  /  TBili  0.9  /  DBili  x   /  AST  23  /  ALT  15  /  AlkPhos  104  09-04    LIVER FUNCTIONS - ( 04 Sep 2022 04:46 )  Alb: 3.4 g/dL / Pro: 5.7 g/dL / ALK PHOS: 104 U/L / ALT: 15 U/L / AST: 23 U/L / GGT: x           PT/INR - ( 03 Sep 2022 04:56 )   PT: 11.20 sec;   INR: 0.97 ratio         PTT - ( 03 Sep 2022 04:56 )  PTT:27.9 sec  CAPILLARY BLOOD GLUCOSE        CARDIAC MARKERS ( 03 Sep 2022 04:56 )  x     / 0.15 ng/mL / x     / x     / x      CARDIAC MARKERS ( 02 Sep 2022 20:40 )  x     / 0.16 ng/mL / x     / x     / x      CARDIAC MARKERS ( 02 Sep 2022 10:40 )  x     / 0.18 ng/mL / x     / x     / x                RADIOLOGY & ADDITIONAL TESTS:     VA duplex LE vein : No evidence of deep venous thrombosis in either lower extremity.  Right lower extremity hematoma measuring 2.9 x 1 x 3 cm    Chest x ray: No radiographic evidence of acute cardiopulmonary disease.      Care Discussed with Consultants/Other Providers [ x] YES  [ ] NO            Assessment and Plan:   · Assessment      # Severe anemia  # Severe AS s/p BAV in 2022 - Area 0.3cm2, mean gradient 76mmHg post BAV  # VALENTINE likely secondary to acute on chronic anemia   # Known to have multiple AVMs requiring tranfusions   # HFpEF - Euvolemic   # Chronic transudative left pleural effusion   # CAD   # Elevated troponins likely demand ishemia   # A fib not on AC   # HTN       PLAN:    NEUROLOGY: Avoid CNS depressants    HEENT: Oral care    HEME and CARDIOVASCULAR: Daily weights. Strict I&Os, Keep K>4 and Mg>2,  Monitor CBC, DVT prophylaxis, Dimer, Duplex  - Transfuse 2 units of blood slowly ; give FPP every 4 units  - Monitor SpO2 during - may need lasix in between transfusions   - Structural heart team follow up   - Troponins stable  - Daily chest x ray and ECGs   - Continue home medications - holding aspirin      PULMONARY: HOB @ 45 degrees. Aspiration precautions. Keep SpO2 > 95%    GASTROINTESTINAL: Pureed diet, NPO after midnight, Protonix BID, GI consult    RENAL: Monitor BMP. Correct lytes as needed     INFECTIOUS DISEASE: Monitor off abx    ENDOCRINE: Follow up FS.  Insulin protocol if needed.    MUSCULOSKELETAL: Bed rest     DISPO: downgrade to stepdown  CODE STATUS: Full code

## 2022-09-04 NOTE — DIETITIAN INITIAL EVALUATION ADULT - PERTINENT MEDS FT
MEDICATIONS  (STANDING):  furosemide    Tablet 40 milliGRAM(s) Oral daily  influenza  Vaccine (HIGH DOSE) 0.7 milliLiter(s) IntraMuscular once  levothyroxine 100 MICROGram(s) Oral daily  losartan 25 milliGRAM(s) Oral daily  pantoprazole    Tablet 40 milliGRAM(s) Oral before breakfast    MEDICATIONS  (PRN):  melatonin 3 milliGRAM(s) Oral at bedtime PRN Insomnia

## 2022-09-04 NOTE — DIETITIAN INITIAL EVALUATION ADULT - PERTINENT LABORATORY DATA
09-04    142  |  104  |  27<H>  ----------------------------<  88  4.2   |  30  |  0.8    Ca    9.0      04 Sep 2022 04:46  Mg     2.0     09-04    TPro  5.7<L>  /  Alb  3.4<L>  /  TBili  0.9  /  DBili  x   /  AST  23  /  ALT  15  /  AlkPhos  104  09-04  A1C with Estimated Average Glucose Result: 4.8 % (09-03-22 @ 04:56)  A1C with Estimated Average Glucose Result: 4.3 % (09-01-22 @ 14:24)

## 2022-09-04 NOTE — DIETITIAN INITIAL EVALUATION ADULT - OTHER INFO
p/w symptomatic anemia with AVMs. Recent admit 7/2022 for TAVR evaluation and shortness of breath. During this hospitalization, patient was found to have acute-on-chronic anemia requesting multiple transfusions. s/p APC in the cecum and transverse colon on 7/27/22. After discharge, pt was reportedly feeling well feeling well until the past 2 days, during which ptdeveloped SOB that is acute on chronic and worsening over the past couple of days associated with a dry cough. Found to have Hb of 5.7, she was transferred to [facility] and 2 units of blood was transfused. VALENTINE noted likely 2/2 severe anemia. No overt bleeding noted per GI.

## 2022-09-05 LAB
ALBUMIN SERPL ELPH-MCNC: 3.6 G/DL — SIGNIFICANT CHANGE UP (ref 3.5–5.2)
ALP SERPL-CCNC: 102 U/L — SIGNIFICANT CHANGE UP (ref 30–115)
ALT FLD-CCNC: 15 U/L — SIGNIFICANT CHANGE UP (ref 0–41)
ANION GAP SERPL CALC-SCNC: 9 MMOL/L — SIGNIFICANT CHANGE UP (ref 7–14)
AST SERPL-CCNC: 22 U/L — SIGNIFICANT CHANGE UP (ref 0–41)
BASOPHILS # BLD AUTO: 0.01 K/UL — SIGNIFICANT CHANGE UP (ref 0–0.2)
BASOPHILS NFR BLD AUTO: 0.2 % — SIGNIFICANT CHANGE UP (ref 0–1)
BILIRUB SERPL-MCNC: 0.5 MG/DL — SIGNIFICANT CHANGE UP (ref 0.2–1.2)
BUN SERPL-MCNC: 30 MG/DL — HIGH (ref 10–20)
CALCIUM SERPL-MCNC: 9.2 MG/DL — SIGNIFICANT CHANGE UP (ref 8.5–10.1)
CHLORIDE SERPL-SCNC: 105 MMOL/L — SIGNIFICANT CHANGE UP (ref 98–110)
CO2 SERPL-SCNC: 28 MMOL/L — SIGNIFICANT CHANGE UP (ref 17–32)
CREAT SERPL-MCNC: 1 MG/DL — SIGNIFICANT CHANGE UP (ref 0.7–1.5)
EGFR: 54 ML/MIN/1.73M2 — LOW
EOSINOPHIL # BLD AUTO: 0.07 K/UL — SIGNIFICANT CHANGE UP (ref 0–0.7)
EOSINOPHIL NFR BLD AUTO: 1.6 % — SIGNIFICANT CHANGE UP (ref 0–8)
GLUCOSE SERPL-MCNC: 100 MG/DL — HIGH (ref 70–99)
HCT VFR BLD CALC: 30.3 % — LOW (ref 37–47)
HGB BLD-MCNC: 10.1 G/DL — LOW (ref 12–16)
IMM GRANULOCYTES NFR BLD AUTO: 0.2 % — SIGNIFICANT CHANGE UP (ref 0.1–0.3)
LYMPHOCYTES # BLD AUTO: 0.64 K/UL — LOW (ref 1.2–3.4)
LYMPHOCYTES # BLD AUTO: 14.8 % — LOW (ref 20.5–51.1)
MAGNESIUM SERPL-MCNC: 2 MG/DL — SIGNIFICANT CHANGE UP (ref 1.8–2.4)
MCHC RBC-ENTMCNC: 31.2 PG — HIGH (ref 27–31)
MCHC RBC-ENTMCNC: 33.3 G/DL — SIGNIFICANT CHANGE UP (ref 32–37)
MCV RBC AUTO: 93.5 FL — SIGNIFICANT CHANGE UP (ref 81–99)
MONOCYTES # BLD AUTO: 0.48 K/UL — SIGNIFICANT CHANGE UP (ref 0.1–0.6)
MONOCYTES NFR BLD AUTO: 11.1 % — HIGH (ref 1.7–9.3)
NEUTROPHILS # BLD AUTO: 3.1 K/UL — SIGNIFICANT CHANGE UP (ref 1.4–6.5)
NEUTROPHILS NFR BLD AUTO: 72.1 % — SIGNIFICANT CHANGE UP (ref 42.2–75.2)
NRBC # BLD: 0 /100 WBCS — SIGNIFICANT CHANGE UP (ref 0–0)
PLATELET # BLD AUTO: 137 K/UL — SIGNIFICANT CHANGE UP (ref 130–400)
POTASSIUM SERPL-MCNC: 3.9 MMOL/L — SIGNIFICANT CHANGE UP (ref 3.5–5)
POTASSIUM SERPL-SCNC: 3.9 MMOL/L — SIGNIFICANT CHANGE UP (ref 3.5–5)
PROT SERPL-MCNC: 5.9 G/DL — LOW (ref 6–8)
RBC # BLD: 3.24 M/UL — LOW (ref 4.2–5.4)
RBC # FLD: 20.8 % — HIGH (ref 11.5–14.5)
SODIUM SERPL-SCNC: 142 MMOL/L — SIGNIFICANT CHANGE UP (ref 135–146)
WBC # BLD: 4.31 K/UL — LOW (ref 4.8–10.8)
WBC # FLD AUTO: 4.31 K/UL — LOW (ref 4.8–10.8)

## 2022-09-05 PROCEDURE — 99232 SBSQ HOSP IP/OBS MODERATE 35: CPT

## 2022-09-05 RX ORDER — ZINC SULFATE TAB 220 MG (50 MG ZINC EQUIVALENT) 220 (50 ZN) MG
220 TAB ORAL DAILY
Refills: 0 | Status: DISCONTINUED | OUTPATIENT
Start: 2022-09-06 | End: 2022-09-12

## 2022-09-05 RX ORDER — ASCORBIC ACID 60 MG
500 TABLET,CHEWABLE ORAL DAILY
Refills: 0 | Status: DISCONTINUED | OUTPATIENT
Start: 2022-09-06 | End: 2022-09-12

## 2022-09-05 RX ADMIN — PANTOPRAZOLE SODIUM 40 MILLIGRAM(S): 20 TABLET, DELAYED RELEASE ORAL at 06:32

## 2022-09-05 RX ADMIN — Medication 100 MICROGRAM(S): at 06:31

## 2022-09-05 RX ADMIN — Medication 40 MILLIGRAM(S): at 06:32

## 2022-09-05 RX ADMIN — LOSARTAN POTASSIUM 25 MILLIGRAM(S): 100 TABLET, FILM COATED ORAL at 06:32

## 2022-09-05 NOTE — PROGRESS NOTE ADULT - ATTENDING COMMENTS
Patient seen, case d/w CCU team on rounds.  Agree with assessment and plan.  Clear liquid diet started, tolerating so far.   H/H stable.  Continue CCU monitoring.  Will coordinate plan with structural team.
Patient seen, case d/w CCU team on rounds.  Agree with assessment and plan.  Continue treatment.  Transfer to SDU.  Structural team to coordinate plan with GI. Probably, TAVR this week.   OOB to chair. Ambulate with assistance.
Patient seen, case d/w cardiology fellow.  Patient with severe AS, recurrent GIB due to AVMs.  No evidence of bleeding in the last 24h, Hb stable.  Appears euvolemic (on Furosemide 40 mg daily).  TAVR scheduled, structural  team will coordinate with GI.  Continue cardiac monitoring.
I edited the note

## 2022-09-05 NOTE — PROGRESS NOTE ADULT - SUBJECTIVE AND OBJECTIVE BOX
Patient is a 89y old  Female who presents with a chief complaint of ANEMIA;SHORTNESS OF BREATH     (04 Sep 2022 22:15)      INTERVAL HPI/OVERNIGHT EVENTS:   No overnight events. Planned TAVR on wednesday. on 3 L NC. Had a run of Vtach, no interventions but stable     ICU Vital Signs Last 24 Hrs  T(C): 36.2 (05 Sep 2022 12:00), Max: 36.9 (04 Sep 2022 20:23)  T(F): 97.1 (05 Sep 2022 12:00), Max: 98.5 (04 Sep 2022 20:23)  HR: 88 (05 Sep 2022 12:00) (64 - 88)  BP: 124/63 (05 Sep 2022 12:00) (91/46 - 124/63)  BP(mean): --  ABP: --  ABP(mean): --  RR: 18 (05 Sep 2022 12:00) (18 - 20)  SpO2: 100% (05 Sep 2022 12:00) (99% - 100%)    O2 Parameters below as of 05 Sep 2022 12:00    O2 Flow (L/min): 3        I&O's Summary    04 Sep 2022 07:01  -  05 Sep 2022 07:00  --------------------------------------------------------  IN: 460 mL / OUT: 1500 mL / NET: -1040 mL    05 Sep 2022 07:01  -  05 Sep 2022 15:27  --------------------------------------------------------  IN: 694 mL / OUT: 0 mL / NET: 694 mL          LABS:                        10.1   4.31  )-----------( 137      ( 05 Sep 2022 06:46 )             30.3     09-05    142  |  105  |  30<H>  ----------------------------<  100<H>  3.9   |  28  |  1.0    Ca    9.2      05 Sep 2022 06:46  Mg     2.0     09-05    TPro  5.9<L>  /  Alb  3.6  /  TBili  0.5  /  DBili  x   /  AST  22  /  ALT  15  /  AlkPhos  102  09-05        CAPILLARY BLOOD GLUCOSE            RADIOLOGY & ADDITIONAL TESTS:    Consultant(s) Notes Reviewed:  [x ] YES  [ ] NO    MEDICATIONS  (STANDING):  furosemide    Tablet 40 milliGRAM(s) Oral daily  influenza  Vaccine (HIGH DOSE) 0.7 milliLiter(s) IntraMuscular once  levothyroxine 100 MICROGram(s) Oral daily  losartan 25 milliGRAM(s) Oral daily  pantoprazole    Tablet 40 milliGRAM(s) Oral before breakfast    MEDICATIONS  (PRN):  melatonin 3 milliGRAM(s) Oral at bedtime PRN Insomnia      PHYSICAL EXAM:  GENERAL: NAD in bed.   HEAD:  Atraumatic, Normocephalic  EYES: EOMI, PERRLA, conjunctiva and sclera clear  NECK: Supple, No JVD,   NERVOUS SYSTEM:  Alert & Awake.   CHEST/LUNG: B/L good air entry; No rales, rhonchi, or wheezing  HEART: S1S2 normal, no S3, Regular rate and rhythm; No murmurs  ABDOMEN: Soft, Nontender, Nondistended; Bowel sounds present  EXTREMITIES:  2+ Peripheral Pulses, No clubbing, cyanosis, or edema  LYMPH: No lymphadenopathy noted  SKIN: No rashes or lesions    Care Discussed with Consultants/Other Providers [ x] YES  [ ] NO Patient is a 89y old  Female who presents with a chief complaint of ANEMIA;SHORTNESS OF BREATH     (04 Sep 2022 22:15)      INTERVAL HPI/OVERNIGHT EVENTS:   No overnight events. Planned TAVR on wednesday. on 3 L NC. Had a run of Vtach, no interventions but stable     ICU Vital Signs Last 24 Hrs  T(C): 36.2 (05 Sep 2022 12:00), Max: 36.9 (04 Sep 2022 20:23)  T(F): 97.1 (05 Sep 2022 12:00), Max: 98.5 (04 Sep 2022 20:23)  HR: 88 (05 Sep 2022 12:00) (64 - 88)  BP: 124/63 (05 Sep 2022 12:00) (91/46 - 124/63)  BP(mean): --  ABP: --  ABP(mean): --  RR: 18 (05 Sep 2022 12:00) (18 - 20)  SpO2: 100% (05 Sep 2022 12:00) (99% - 100%)    O2 Parameters below as of 05 Sep 2022 12:00    O2 Flow (L/min): 3        I&O's Summary    04 Sep 2022 07:01  -  05 Sep 2022 07:00  --------------------------------------------------------  IN: 460 mL / OUT: 1500 mL / NET: -1040 mL    05 Sep 2022 07:01  -  05 Sep 2022 15:27  --------------------------------------------------------  IN: 694 mL / OUT: 0 mL / NET: 694 mL          LABS:                        10.1   4.31  )-----------( 137      ( 05 Sep 2022 06:46 )             30.3     09-05    142  |  105  |  30<H>  ----------------------------<  100<H>  3.9   |  28  |  1.0    Ca    9.2      05 Sep 2022 06:46  Mg     2.0     09-05    TPro  5.9<L>  /  Alb  3.6  /  TBili  0.5  /  DBili  x   /  AST  22  /  ALT  15  /  AlkPhos  102  09-05        CAPILLARY BLOOD GLUCOSE            RADIOLOGY & ADDITIONAL TESTS:    Consultant(s) Notes Reviewed:  [x ] YES  [ ] NO    MEDICATIONS  (STANDING):  furosemide    Tablet 40 milliGRAM(s) Oral daily  influenza  Vaccine (HIGH DOSE) 0.7 milliLiter(s) IntraMuscular once  levothyroxine 100 MICROGram(s) Oral daily  losartan 25 milliGRAM(s) Oral daily  pantoprazole    Tablet 40 milliGRAM(s) Oral before breakfast    MEDICATIONS  (PRN):  melatonin 3 milliGRAM(s) Oral at bedtime PRN Insomnia      PHYSICAL EXAM:    GENERAL: NAD in bed.   HEAD:  Atraumatic, Normocephalic  EYES: EOMI, PERRLA, conjunctiva and sclera clear  NECK: Supple, No JVD,   NERVOUS SYSTEM:  Alert & Awake.   CHEST/LUNG: B/L good air entry; Non labored breathing.  HEART: S1S2 normal, no S3, Regular rate and rhythm; +systolic murmur  ABDOMEN: Soft, Nontender, Nondistended; Bowel sounds present  EXTREMITIES:  2+ Peripheral Pulses, No clubbing, cyanosis, or edema +RLE hematoma?      Care Discussed with Consultants/Other Providers [ x] YES  [ ] NO

## 2022-09-05 NOTE — PROGRESS NOTE ADULT - NUTRITIONAL ASSESSMENT
Patient complains of shortness of breath.  She has a history of aortic stenosis complicated by congestive heart failure.  She also was recently diagnosed with a GI bleed.  Colonoscopy showed that the patient had multiple AV malformations which was the etiology of the bleeding.  She underwent treatment to stabilize the active bleeding recently.  Patient's shortness of breath is associated with generalized weakness, but no chest pain.  Vital signs noted.  NAD.  Positive JVD.  Positive rales at the bases.  Heart regular rate 2/6 systolic murmur.  The murmur is best heard in the right upper sternal border.  Abdomen is nontender.  Rectal exam shows dark stool.  Diagnostic testing reviewed.  Case discussed with Dr. Kam Mccollum.  Patient is slated for TAVR early next week.  We mutually agreed as well as the family that stabilization prior to this procedure would be best performed at the [place] site.  In addition, in spite of the profound anemia, since the patient is no distress at this time, and the anemia is chronic to a certain extent, that is it is in the best interest of the patient that we transfer prior to transfusion since the ambulance is now available and the blood bank has no blood at this time to release for immediate transfusion.

## 2022-09-05 NOTE — PROGRESS NOTE ADULT - ASSESSMENT
Impression     # Severe anemia  # Severe AS s/p BAV in 8/2022 - Area 0.3cm2, mean gradient 76mmHg post BAV  # VALENTINE likely secondary to acute on chronic anemia   # Known to have multiple AVMs requiring tranfusions   # HFpEF - Euvolemic   # Chronic transudative left pleural effusion   # CAD   # Elevated troponins likely demand ishemia   # A fib not on AC   # HTN       PLAN:    NEUROLOGY: Avoid CNS depressants    HEENT: Oral care    HEME and CARDIOVASCULAR: Daily weights. Strict I&Os, Keep K>4 and Mg>2,  Monitor CBC, DVT prophylaxis, Dimer, Duplex  - Transfuse 2 units of blood slowly   - Monitor SpO2 during - may need lasix in between transfusions   - Structural heart team follow up   - Trend troponins   - Daily chest x ray and ECGs   - Continue home medications - holding aspirin    PULMONARY: HOB @ 45 degrees. Aspiration precautions. Keep SpO2 > 95%    GASTROINTESTINAL: Pureed diet, NPO after midnight, Protonix BID, GI consult    RENAL: Monitor BMP. Correct lytes as needed     INFECTIOUS DISEASE: Monitor off abx    ENDOCRINE: Follow up FS.  Insulin protocol if needed.    MUSCULOSKELETAL: Bed rest     DISPO: CCU monitoring  CODE STATUS: Full code   89 yo female PMHx of HFpEF and severe AS presenting with SOB that is acute on chronic and worsening over the past couple of days associated with a dry cough.    # Severe AS s/p BAV in 8/2022 - Area 0.3cm2, mean gradient 76mmHg post BAV  # HFpEF - Euvolemic   # CAD   # Elevated troponin likely demand ishemia     - Daily weights. Strict I&Os,   - Keep K>4 and Mg>2,    - Monitor CBC,    - Dimer, Duplex  - Trend troponins   - Daily chest x ray and ECGs     # VALENTINE likely secondary to acute on chronic anemia   # Known to have multiple AVMs requiring tranfusions   # Severe anemia    - Transfuse 2 units of blood slowly   - Monitor SpO2 during - may need lasix in between transfusions     # Chronic transudative left pleural effusion   -F/U repeat CXR    # A fib not on AC   -On tele     # HTN   -    diet: Pureed diet  GI PPX: Protonix BID, GI consult  Code: full code       89 yo female PMHx of HFpEF and severe AS presenting with SOB that is acute on chronic and worsening over the past couple of days associated with a dry cough.    # Severe AS s/p BAV in 8/2022 - Area 0.3cm2, mean gradient 76mmHg post BAV  # HFpEF - Euvolemic   # CAD   # Elevated troponin likely demand ishemia     - Daily weights. Strict I&Os,   - Keep K>4 and Mg>2,    - Monitor CBC,    - Dimer, Duplex-negative  - Trend troponins   - Daily chest x ray and ECGs     # VALENTINE likely secondary to acute on chronic anemia   # Known to have multiple AVMs requiring tranfusions   # Severe anemia    - S/P 3 units of blood slowly   - Monitor SpO2 during - may need lasix in between transfusions     # Chronic transudative left pleural effusion   -F/U repeat CXR    # A fib not on AC   -On tele     # HTN   - cont. w/ Losartan    diet: Pureed diet  GI PPX: Protonix BID, GI consult  DVT PX:SCD  Code: full code       88 yo female PMHx of HFpEF and severe AS presenting with SOB that is acute on chronic and worsening over the past couple of days associated with a dry cough.    # Severe AS s/p BAV in 8/2022 - Area 0.3cm2, mean gradient 76mmHg post BAV  # HFpEF - Euvolemic   # CAD   # Elevated troponin likely demand ishemia     - Daily weights. Strict I&Os,   - Keep K>4 and Mg>2,    - Monitor CBC,    - Dimer, Duplex-negative  - Trend troponins   - Daily chest x ray and ECGs  - Pending GI okay for TAVR     # VALENTINE likely secondary to acute on chronic anemia   # Known to have multiple AVMs requiring tranfusions   # Severe anemia    - S/P 3 units of blood slowly   - Monitor SpO2 during - may need lasix in between transfusions     # Chronic transudative left pleural effusion   -F/U repeat CXR    # A fib not on AC   -On tele     # HTN   - cont. w/ Losartan    diet: Pureed diet  GI PPX: Protonix BID, GI consult  DVT PX:SCD  Code: full code

## 2022-09-06 ENCOUNTER — NON-APPOINTMENT (OUTPATIENT)
Age: 87
End: 2022-09-06

## 2022-09-06 DIAGNOSIS — Z02.9 ENCOUNTER FOR ADMINISTRATIVE EXAMINATIONS, UNSPECIFIED: ICD-10-CM

## 2022-09-06 LAB
ALBUMIN SERPL ELPH-MCNC: 3.4 G/DL — LOW (ref 3.5–5.2)
ALBUMIN SERPL ELPH-MCNC: 4.1 G/DL — SIGNIFICANT CHANGE UP (ref 3.5–5.2)
ALP SERPL-CCNC: 105 U/L — SIGNIFICANT CHANGE UP (ref 30–115)
ALP SERPL-CCNC: 124 U/L — HIGH (ref 30–115)
ALT FLD-CCNC: 14 U/L — SIGNIFICANT CHANGE UP (ref 0–41)
ALT FLD-CCNC: 16 U/L — SIGNIFICANT CHANGE UP (ref 0–41)
ANION GAP SERPL CALC-SCNC: 15 MMOL/L — HIGH (ref 7–14)
ANION GAP SERPL CALC-SCNC: 9 MMOL/L — SIGNIFICANT CHANGE UP (ref 7–14)
APTT BLD: 30.3 SEC — SIGNIFICANT CHANGE UP (ref 27–39.2)
AST SERPL-CCNC: 25 U/L — SIGNIFICANT CHANGE UP (ref 0–41)
AST SERPL-CCNC: 25 U/L — SIGNIFICANT CHANGE UP (ref 0–41)
BASOPHILS # BLD AUTO: 0.01 K/UL — SIGNIFICANT CHANGE UP (ref 0–0.2)
BASOPHILS # BLD AUTO: 0.02 K/UL — SIGNIFICANT CHANGE UP (ref 0–0.2)
BASOPHILS NFR BLD AUTO: 0.3 % — SIGNIFICANT CHANGE UP (ref 0–1)
BASOPHILS NFR BLD AUTO: 0.3 % — SIGNIFICANT CHANGE UP (ref 0–1)
BILIRUB SERPL-MCNC: 0.5 MG/DL — SIGNIFICANT CHANGE UP (ref 0.2–1.2)
BILIRUB SERPL-MCNC: 0.5 MG/DL — SIGNIFICANT CHANGE UP (ref 0.2–1.2)
BUN SERPL-MCNC: 32 MG/DL — HIGH (ref 10–20)
BUN SERPL-MCNC: 38 MG/DL — HIGH (ref 10–20)
CALCIUM SERPL-MCNC: 9 MG/DL — SIGNIFICANT CHANGE UP (ref 8.5–10.1)
CALCIUM SERPL-MCNC: 9.3 MG/DL — SIGNIFICANT CHANGE UP (ref 8.5–10.1)
CHLORIDE SERPL-SCNC: 100 MMOL/L — SIGNIFICANT CHANGE UP (ref 98–110)
CHLORIDE SERPL-SCNC: 97 MMOL/L — LOW (ref 98–110)
CO2 SERPL-SCNC: 25 MMOL/L — SIGNIFICANT CHANGE UP (ref 17–32)
CO2 SERPL-SCNC: 28 MMOL/L — SIGNIFICANT CHANGE UP (ref 17–32)
CREAT SERPL-MCNC: 0.9 MG/DL — SIGNIFICANT CHANGE UP (ref 0.7–1.5)
CREAT SERPL-MCNC: 1.1 MG/DL — SIGNIFICANT CHANGE UP (ref 0.7–1.5)
EGFR: 48 ML/MIN/1.73M2 — LOW
EGFR: 61 ML/MIN/1.73M2 — SIGNIFICANT CHANGE UP
EOSINOPHIL # BLD AUTO: 0.07 K/UL — SIGNIFICANT CHANGE UP (ref 0–0.7)
EOSINOPHIL # BLD AUTO: 0.09 K/UL — SIGNIFICANT CHANGE UP (ref 0–0.7)
EOSINOPHIL NFR BLD AUTO: 1.2 % — SIGNIFICANT CHANGE UP (ref 0–8)
EOSINOPHIL NFR BLD AUTO: 2.5 % — SIGNIFICANT CHANGE UP (ref 0–8)
GLUCOSE BLDC GLUCOMTR-MCNC: 143 MG/DL — HIGH (ref 70–99)
GLUCOSE BLDC GLUCOMTR-MCNC: 160 MG/DL — HIGH (ref 70–99)
GLUCOSE BLDC GLUCOMTR-MCNC: 166 MG/DL — HIGH (ref 70–99)
GLUCOSE BLDC GLUCOMTR-MCNC: 98 MG/DL — SIGNIFICANT CHANGE UP (ref 70–99)
GLUCOSE SERPL-MCNC: 132 MG/DL — HIGH (ref 70–99)
GLUCOSE SERPL-MCNC: 78 MG/DL — SIGNIFICANT CHANGE UP (ref 70–99)
HCT VFR BLD CALC: 28.7 % — LOW (ref 37–47)
HCT VFR BLD CALC: 32.9 % — LOW (ref 37–47)
HGB BLD-MCNC: 10.7 G/DL — LOW (ref 12–16)
HGB BLD-MCNC: 9.4 G/DL — LOW (ref 12–16)
IMM GRANULOCYTES NFR BLD AUTO: 0.3 % — SIGNIFICANT CHANGE UP (ref 0.1–0.3)
IMM GRANULOCYTES NFR BLD AUTO: 0.3 % — SIGNIFICANT CHANGE UP (ref 0.1–0.3)
INR BLD: 0.9 RATIO — SIGNIFICANT CHANGE UP (ref 0.65–1.3)
LYMPHOCYTES # BLD AUTO: 0.8 K/UL — LOW (ref 1.2–3.4)
LYMPHOCYTES # BLD AUTO: 0.82 K/UL — LOW (ref 1.2–3.4)
LYMPHOCYTES # BLD AUTO: 14.1 % — LOW (ref 20.5–51.1)
LYMPHOCYTES # BLD AUTO: 22.2 % — SIGNIFICANT CHANGE UP (ref 20.5–51.1)
MAGNESIUM SERPL-MCNC: 2.1 MG/DL — SIGNIFICANT CHANGE UP (ref 1.8–2.4)
MAGNESIUM SERPL-MCNC: 2.2 MG/DL — SIGNIFICANT CHANGE UP (ref 1.8–2.4)
MANUAL DIF COMMENT BLD-IMP: SIGNIFICANT CHANGE UP
MCHC RBC-ENTMCNC: 30.7 PG — SIGNIFICANT CHANGE UP (ref 27–31)
MCHC RBC-ENTMCNC: 30.8 PG — SIGNIFICANT CHANGE UP (ref 27–31)
MCHC RBC-ENTMCNC: 32.5 G/DL — SIGNIFICANT CHANGE UP (ref 32–37)
MCHC RBC-ENTMCNC: 32.8 G/DL — SIGNIFICANT CHANGE UP (ref 32–37)
MCV RBC AUTO: 94.1 FL — SIGNIFICANT CHANGE UP (ref 81–99)
MCV RBC AUTO: 94.5 FL — SIGNIFICANT CHANGE UP (ref 81–99)
MONOCYTES # BLD AUTO: 0.39 K/UL — SIGNIFICANT CHANGE UP (ref 0.1–0.6)
MONOCYTES # BLD AUTO: 0.46 K/UL — SIGNIFICANT CHANGE UP (ref 0.1–0.6)
MONOCYTES NFR BLD AUTO: 10.8 % — HIGH (ref 1.7–9.3)
MONOCYTES NFR BLD AUTO: 7.9 % — SIGNIFICANT CHANGE UP (ref 1.7–9.3)
NEUTROPHILS # BLD AUTO: 2.31 K/UL — SIGNIFICANT CHANGE UP (ref 1.4–6.5)
NEUTROPHILS # BLD AUTO: 4.42 K/UL — SIGNIFICANT CHANGE UP (ref 1.4–6.5)
NEUTROPHILS NFR BLD AUTO: 63.9 % — SIGNIFICANT CHANGE UP (ref 42.2–75.2)
NEUTROPHILS NFR BLD AUTO: 76.2 % — HIGH (ref 42.2–75.2)
NRBC # BLD: 0 /100 WBCS — SIGNIFICANT CHANGE UP (ref 0–0)
NRBC # BLD: 0 /100 WBCS — SIGNIFICANT CHANGE UP (ref 0–0)
PHOSPHATE SERPL-MCNC: 3.2 MG/DL — SIGNIFICANT CHANGE UP (ref 2.1–4.9)
PHOSPHATE SERPL-MCNC: 3.9 MG/DL — SIGNIFICANT CHANGE UP (ref 2.1–4.9)
PLATELET # BLD AUTO: 137 K/UL — SIGNIFICANT CHANGE UP (ref 130–400)
PLATELET # BLD AUTO: 173 K/UL — SIGNIFICANT CHANGE UP (ref 130–400)
POTASSIUM SERPL-MCNC: 4.6 MMOL/L — SIGNIFICANT CHANGE UP (ref 3.5–5)
POTASSIUM SERPL-MCNC: 5.1 MMOL/L — HIGH (ref 3.5–5)
POTASSIUM SERPL-SCNC: 4.6 MMOL/L — SIGNIFICANT CHANGE UP (ref 3.5–5)
POTASSIUM SERPL-SCNC: 5.1 MMOL/L — HIGH (ref 3.5–5)
PROT SERPL-MCNC: 5.8 G/DL — LOW (ref 6–8)
PROT SERPL-MCNC: 6.7 G/DL — SIGNIFICANT CHANGE UP (ref 6–8)
PROTHROM AB SERPL-ACNC: 10.4 SEC — SIGNIFICANT CHANGE UP (ref 9.95–12.87)
RBC # BLD: 3.05 M/UL — LOW (ref 4.2–5.4)
RBC # BLD: 3.48 M/UL — LOW (ref 4.2–5.4)
RBC # FLD: 19.8 % — HIGH (ref 11.5–14.5)
RBC # FLD: 19.9 % — HIGH (ref 11.5–14.5)
SARS-COV-2 RNA SPEC QL NAA+PROBE: SIGNIFICANT CHANGE UP
SODIUM SERPL-SCNC: 137 MMOL/L — SIGNIFICANT CHANGE UP (ref 135–146)
SODIUM SERPL-SCNC: 137 MMOL/L — SIGNIFICANT CHANGE UP (ref 135–146)
TROPONIN T SERPL-MCNC: 0.15 NG/ML — CRITICAL HIGH
WBC # BLD: 3.61 K/UL — LOW (ref 4.8–10.8)
WBC # BLD: 5.81 K/UL — SIGNIFICANT CHANGE UP (ref 4.8–10.8)
WBC # FLD AUTO: 3.61 K/UL — LOW (ref 4.8–10.8)
WBC # FLD AUTO: 5.81 K/UL — SIGNIFICANT CHANGE UP (ref 4.8–10.8)

## 2022-09-06 PROCEDURE — 93010 ELECTROCARDIOGRAM REPORT: CPT

## 2022-09-06 PROCEDURE — 71045 X-RAY EXAM CHEST 1 VIEW: CPT | Mod: 26

## 2022-09-06 RX ORDER — SODIUM CHLORIDE 9 MG/ML
250 INJECTION INTRAMUSCULAR; INTRAVENOUS; SUBCUTANEOUS
Refills: 0 | Status: DISCONTINUED | OUTPATIENT
Start: 2022-09-06 | End: 2022-09-12

## 2022-09-06 RX ORDER — CHLORHEXIDINE GLUCONATE 213 G/1000ML
15 SOLUTION TOPICAL ONCE
Refills: 0 | Status: COMPLETED | OUTPATIENT
Start: 2022-09-06 | End: 2022-09-07

## 2022-09-06 RX ORDER — CHLORHEXIDINE GLUCONATE 213 G/1000ML
1 SOLUTION TOPICAL ONCE
Refills: 0 | Status: COMPLETED | OUTPATIENT
Start: 2022-09-06 | End: 2022-09-06

## 2022-09-06 RX ADMIN — LOSARTAN POTASSIUM 25 MILLIGRAM(S): 100 TABLET, FILM COATED ORAL at 06:08

## 2022-09-06 RX ADMIN — Medication 500 MILLIGRAM(S): at 13:00

## 2022-09-06 RX ADMIN — Medication 1 TABLET(S): at 12:59

## 2022-09-06 RX ADMIN — PANTOPRAZOLE SODIUM 40 MILLIGRAM(S): 20 TABLET, DELAYED RELEASE ORAL at 06:08

## 2022-09-06 RX ADMIN — CHLORHEXIDINE GLUCONATE 1 APPLICATION(S): 213 SOLUTION TOPICAL at 21:13

## 2022-09-06 RX ADMIN — Medication 40 MILLIGRAM(S): at 06:08

## 2022-09-06 RX ADMIN — Medication 100 MICROGRAM(S): at 06:08

## 2022-09-06 NOTE — PROGRESS NOTE ADULT - SUBJECTIVE AND OBJECTIVE BOX
NAME: DILIP LUCAS  MRN: [id number]  LOCATION: [facility] T5-3C 007 A    Patient is a 89y old  Female who presents with a chief complaint of Symptomatic anemia with AVMs (05 Sep 2022 15:25)      HPI:  Mrs. 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    She was admitted in 7/2022 for TAVR evaluation and shortness of breath. During this hospitalization, patient was found to have acute-on-chronic anemia requesting multiple transfusions. Capsule study showed large AVMs, and patient underwent colonoscopy on 7/27/22 with successful AVM intervention with multiple non bleeding AVMs as well.     On 7/30/22, patient developed pulmonary edema and required upgrade to the CCU. She was initially treated with BiPAP and nitroglycerin gtt. In the CCU, patient was noted to be febrile up to 100.4 with elevated WBC and CXR concerning for PNA. Blood cultures on 7/30/22 on 8/01/22 grew staph epidermidis. In the setting of infection, patient went into Afib with RVR and became hypotensive (SBP in the 50s) requiring phenylephrine. Thoracentesis on 8/01/22 with 1L of fluid removed, transudative effusion with negative culture. Her pleural effusion reaccumulated and a chest tube was placed on 8/04/22. She was treated with a 7-day course of vancomycin and cefepime, and her antibiotics were discontinued per the recommendation of Infectious Disease.     On 8/09/22, patient underwent balloon aortic valvuloplasty. Her respiratory failure and blood pressure improved after her BAV. Chest tube was removed on 8/10/22, and patient was started on losartan 25 mg daily to avoid hypertension and flash pulmonary edema. TTE on 8/10/22 with critical AS with peak gradient 120 mmHg, mean gradient 76 mmHg, and CESIA 0.3 cm^2. Patient was downgraded to Cardiac Telemetry. Her diuretics were adjusted and she will be discharged on Lasix 40 mg PO daily. Her last transfusion was on 8/14 for Hgb 7.7, with post-transfusion Hgb of 9.9. She was not started on anticoagulation for Afib given her ongoing need for transfusions.    After discharge she was feeling well. She saw Dr. Mccollum on 9/2/2022 and was planned for TAVR 1 week after. However over past 2 days, patient developed SOB that is acute on chronic and worsening over the past couple of days associated with a dry cough. Pt says she can only walk a few steps before having to stop to catch her breath. She has chronic chest pain that is unchanged from baseline described as a "discomfort up in my throat". She visited the [facility] and was found to have Hb of 5.7, she was transferred to [facility] and 2 units of blood was transfused. She denies melena or BRBPR.     Vital Signs Last 24 Hrs:  T(F): 96 (02 Sep 2022 18:00), Max: 97.1 (02 Sep 2022 14:05)  HR: 68 (02 Sep 2022 18:00) (68 - 85)  BP: 111/58 (02 Sep 2022 18:00) (99/52 - 131/60)  RR: 18 (02 Sep 2022 18:00) (15 - 20)  SpO2: 100% (02 Sep 2022 18:00) (92% - 100%)   (02 Sep 2022 15:46)      OVERNIGHT EVENTS: TABATHA overnight. Patient on 3LNC initially and now on RA sating at 95%.  INTERVAL HPI: Patient seen and examined at bedside.  Patient has no complaints at this time.    T(F): 97.5 (09-06-22 @ 06:00), Max: 97.9 (09-05-22 @ 16:00)  HR: 63 (09-06-22 @ 06:00) (63 - 88)  BP: 114/51 (09-06-22 @ 06:00) (114/51 - 124/63)  RR: 18 (09-06-22 @ 06:00) (18 - 18)  SpO2: 96% (09-06-22 @ 06:00) (96% - 100%)  I&O's Summary    05 Sep 2022 07:01  -  06 Sep 2022 07:00  --------------------------------------------------------  IN: 694 mL / OUT: 1000 mL / NET: -306 mL    06 Sep 2022 07:01  -  06 Sep 2022 11:30  --------------------------------------------------------  IN: 565 mL / OUT: 200 mL / NET: 365 mL      PHYSICAL EXAM:    GENERAL: NAD in bed.   HEAD:  Atraumatic, Normocephalic  EYES: EOMI, PERRLA, conjunctiva and sclera clear  NECK: Supple, No JVD,   NERVOUS SYSTEM:  Alert & Awake.   CHEST/LUNG: B/L good air entry; Non labored breathing.  HEART: S1S2 normal, no S3, Regular rate and rhythm; +systolic murmur  ABDOMEN: Soft, Nontender, Nondistended; Bowel sounds present  EXTREMITIES:  2+ Peripheral Pulses, No clubbing, cyanosis, or edema +RLE hematoma?    LABS:                        9.4    3.61  )-----------( 137      ( 06 Sep 2022 05:59 )             28.7     09-06    137  |  100  |  32<H>  ----------------------------<  78  4.6   |  28  |  0.9    Ca    9.0      06 Sep 2022 05:59  Phos  3.2     09-06  Mg     2.1     09-06    TPro  5.8<L>  /  Alb  3.4<L>  /  TBili  0.5  /  DBili  x   /  AST  25  /  ALT  14  /  AlkPhos  105  09-06        CAPILLARY BLOOD GLUCOSE      POCT Blood Glucose.: 98 mg/dL (06 Sep 2022 07:28)              MEDICATIONS  (STANDING):  ascorbic acid 500 milliGRAM(s) Oral daily  furosemide    Tablet 40 milliGRAM(s) Oral daily  influenza  Vaccine (HIGH DOSE) 0.7 milliLiter(s) IntraMuscular once  levothyroxine 100 MICROGram(s) Oral daily  losartan 25 milliGRAM(s) Oral daily  multivitamin 1 Tablet(s) Oral daily  pantoprazole    Tablet 40 milliGRAM(s) Oral before breakfast  zinc sulfate 220 milliGRAM(s) Oral daily    MEDICATIONS  (PRN):  melatonin 3 milliGRAM(s) Oral at bedtime PRN Insomnia

## 2022-09-06 NOTE — HISTORY OF PRESENT ILLNESS
[FreeTextEntry1] : Mrs. 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 Mccollum. Son and Patient refused SNF. Arrives today for evaluation of aortic stenosis. \par \par NYHA class IV\par Pt lives home with son and is Fully Dependent \par Former SMoker- 1 PPD X 40 years, quit 2021\par \par Her healthcare teams is as follows:\par PMD: Toño Yanez NP\par Cardio: Does not have\par \par \par \par \par \par

## 2022-09-06 NOTE — END OF VISIT
[FreeTextEntry3] : Seen / examined and above reviewed.\par Long hospitalization.  GIB s/p cautery AVM's.  Refractory CHF s/p redo BAV.\par Recovering well.\par Compensated.\par Another discussion with Tara and son, João.  Likely last window to intervene with TAVR (she previously refused).  They wish to proceed.

## 2022-09-06 NOTE — PROGRESS NOTE ADULT - ASSESSMENT
88 yo female PMHx of HFpEF and severe AS presenting with SOB that is acute on chronic and worsening over the past couple of days associated with a dry cough.    # Severe AS s/p BAV in 8/2022 - Area 0.3cm2, mean gradient 76mmHg post BAV  # HFpEF - Euvolemic   # CAD   # Elevated troponin likely demand ishemia     - Daily weights. Strict I&Os,   - Keep K>4 and Mg>2,    - Monitor CBC,    - Dimer, Duplex-negative  - Trend troponins- stable at 15   - Daily chest x ray and ECGs     # VALENTINE likely secondary to acute on chronic anemia   # Known to have multiple AVMs requiring tranfusions   # Severe anemia    - S/P 3 units of blood, no BBM since she has been here  - Monitor SpO2 during - may need lasix in between transfusions     # Chronic transudative left pleural effusion   -F/U repeat CXR    # A fib not on AC   -On tele     # HTN   - cont. w/ Losartan    diet: Pureed diet  GI PPX: Protonix BID, GI consult  DVT PX:SCD  Code: full code       89 yo female PMHx of HFpEF and severe AS presenting with SOB that is acute on chronic and worsening over the past couple of days associated with a dry cough.    # Severe AS s/p BAV in 8/2022 - Area 0.3cm2, mean gradient 76mmHg post BAV  # HFpEF - Euvolemic   # CAD   # Elevated troponin likely demand ishemia   - planned for TAVR 9/7/2022  - Will send preop labs  - NPO after midnight     # VALENTINE likely secondary to acute on chronic anemia   # Known to have multiple AVMs requiring tranfusions   # Severe anemia    - S/P 3 units of blood, no BBM since she has been here  - Discussed with GI. No further interventions required.    # Chronic transudative left pleural effusion   - Monitor CXR    # A fib not on AC   -On tele     # HTN   - cont. w/ Losartan    diet: Pureed diet  GI PPX: Protonix BID, GI consult  DVT PX:SCD  Code: full code

## 2022-09-06 NOTE — ASSESSMENT
[FreeTextEntry1] : Mrs. 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 Mccollum. Son and Patient refused SNF. Arrives today for evaluation of aortic stenosis. \par \par 5 Meter walk\par 1. 7\par 2. 6\par 3. 6\par \par Frailty: \par Right 9.8 10.3 10.7\par Left 8.7 9.1 8.3\par \par Plan:\par Reviewed all preop workup\par Patient is going to PAST today, COVID swab will be done at home by his PCP NP\par TAVR as planned next week\par \par Beverly THAO FNP-BC, am acting as scribe for

## 2022-09-07 ENCOUNTER — TRANSCRIPTION ENCOUNTER (OUTPATIENT)
Age: 87
End: 2022-09-07

## 2022-09-07 ENCOUNTER — APPOINTMENT (OUTPATIENT)
Dept: CARDIOTHORACIC SURGERY | Facility: HOSPITAL | Age: 87
End: 2022-09-07

## 2022-09-07 LAB
ALBUMIN SERPL ELPH-MCNC: 3.3 G/DL — LOW (ref 3.5–5.2)
ALBUMIN SERPL ELPH-MCNC: 3.4 G/DL — LOW (ref 3.5–5.2)
ALP SERPL-CCNC: 101 U/L — SIGNIFICANT CHANGE UP (ref 30–115)
ALP SERPL-CCNC: 84 U/L — SIGNIFICANT CHANGE UP (ref 30–115)
ALT FLD-CCNC: 11 U/L — SIGNIFICANT CHANGE UP (ref 0–41)
ALT FLD-CCNC: 13 U/L — SIGNIFICANT CHANGE UP (ref 0–41)
ANION GAP SERPL CALC-SCNC: 11 MMOL/L — SIGNIFICANT CHANGE UP (ref 7–14)
ANION GAP SERPL CALC-SCNC: 9 MMOL/L — SIGNIFICANT CHANGE UP (ref 7–14)
APTT BLD: 34.5 SEC — SIGNIFICANT CHANGE UP (ref 27–39.2)
AST SERPL-CCNC: 19 U/L — SIGNIFICANT CHANGE UP (ref 0–41)
AST SERPL-CCNC: 21 U/L — SIGNIFICANT CHANGE UP (ref 0–41)
BASOPHILS # BLD AUTO: 0.02 K/UL — SIGNIFICANT CHANGE UP (ref 0–0.2)
BASOPHILS NFR BLD AUTO: 0.5 % — SIGNIFICANT CHANGE UP (ref 0–1)
BILIRUB SERPL-MCNC: 0.3 MG/DL — SIGNIFICANT CHANGE UP (ref 0.2–1.2)
BILIRUB SERPL-MCNC: 0.5 MG/DL — SIGNIFICANT CHANGE UP (ref 0.2–1.2)
BLD GP AB SCN SERPL QL: SIGNIFICANT CHANGE UP
BUN SERPL-MCNC: 25 MG/DL — HIGH (ref 10–20)
BUN SERPL-MCNC: 38 MG/DL — HIGH (ref 10–20)
CALCIUM SERPL-MCNC: 7.1 MG/DL — LOW (ref 8.5–10.1)
CALCIUM SERPL-MCNC: 9.1 MG/DL — SIGNIFICANT CHANGE UP (ref 8.5–10.1)
CHLORIDE SERPL-SCNC: 104 MMOL/L — SIGNIFICANT CHANGE UP (ref 98–110)
CHLORIDE SERPL-SCNC: 107 MMOL/L — SIGNIFICANT CHANGE UP (ref 98–110)
CO2 SERPL-SCNC: 22 MMOL/L — SIGNIFICANT CHANGE UP (ref 17–32)
CO2 SERPL-SCNC: 28 MMOL/L — SIGNIFICANT CHANGE UP (ref 17–32)
CREAT SERPL-MCNC: 0.7 MG/DL — SIGNIFICANT CHANGE UP (ref 0.7–1.5)
CREAT SERPL-MCNC: 0.9 MG/DL — SIGNIFICANT CHANGE UP (ref 0.7–1.5)
EGFR: 61 ML/MIN/1.73M2 — SIGNIFICANT CHANGE UP
EGFR: 83 ML/MIN/1.73M2 — SIGNIFICANT CHANGE UP
EOSINOPHIL # BLD AUTO: 0.11 K/UL — SIGNIFICANT CHANGE UP (ref 0–0.7)
EOSINOPHIL NFR BLD AUTO: 2.9 % — SIGNIFICANT CHANGE UP (ref 0–8)
GAS PNL BLDA: SIGNIFICANT CHANGE UP
GLUCOSE BLDC GLUCOMTR-MCNC: 83 MG/DL — SIGNIFICANT CHANGE UP (ref 70–99)
GLUCOSE SERPL-MCNC: 115 MG/DL — HIGH (ref 70–99)
GLUCOSE SERPL-MCNC: 96 MG/DL — SIGNIFICANT CHANGE UP (ref 70–99)
HCT VFR BLD CALC: 28.1 % — LOW (ref 37–47)
HCT VFR BLD CALC: 30.3 % — LOW (ref 37–47)
HGB BLD-MCNC: 10.1 G/DL — LOW (ref 12–16)
HGB BLD-MCNC: 9.2 G/DL — LOW (ref 12–16)
IMM GRANULOCYTES NFR BLD AUTO: 0.3 % — SIGNIFICANT CHANGE UP (ref 0.1–0.3)
INR BLD: 1.1 RATIO — SIGNIFICANT CHANGE UP (ref 0.65–1.3)
LYMPHOCYTES # BLD AUTO: 0.81 K/UL — LOW (ref 1.2–3.4)
LYMPHOCYTES # BLD AUTO: 21.4 % — SIGNIFICANT CHANGE UP (ref 20.5–51.1)
MAGNESIUM SERPL-MCNC: 1.8 MG/DL — SIGNIFICANT CHANGE UP (ref 1.8–2.4)
MAGNESIUM SERPL-MCNC: 2.2 MG/DL — SIGNIFICANT CHANGE UP (ref 1.8–2.4)
MCHC RBC-ENTMCNC: 31 PG — SIGNIFICANT CHANGE UP (ref 27–31)
MCHC RBC-ENTMCNC: 31.2 PG — HIGH (ref 27–31)
MCHC RBC-ENTMCNC: 32.7 G/DL — SIGNIFICANT CHANGE UP (ref 32–37)
MCHC RBC-ENTMCNC: 33.3 G/DL — SIGNIFICANT CHANGE UP (ref 32–37)
MCV RBC AUTO: 92.9 FL — SIGNIFICANT CHANGE UP (ref 81–99)
MCV RBC AUTO: 95.3 FL — SIGNIFICANT CHANGE UP (ref 81–99)
MONOCYTES # BLD AUTO: 0.42 K/UL — SIGNIFICANT CHANGE UP (ref 0.1–0.6)
MONOCYTES NFR BLD AUTO: 11.1 % — HIGH (ref 1.7–9.3)
NEUTROPHILS # BLD AUTO: 2.41 K/UL — SIGNIFICANT CHANGE UP (ref 1.4–6.5)
NEUTROPHILS NFR BLD AUTO: 63.8 % — SIGNIFICANT CHANGE UP (ref 42.2–75.2)
NRBC # BLD: 0 /100 WBCS — SIGNIFICANT CHANGE UP (ref 0–0)
NRBC # BLD: 0 /100 WBCS — SIGNIFICANT CHANGE UP (ref 0–0)
PHOSPHATE SERPL-MCNC: 3.8 MG/DL — SIGNIFICANT CHANGE UP (ref 2.1–4.9)
PLATELET # BLD AUTO: 116 K/UL — LOW (ref 130–400)
PLATELET # BLD AUTO: 133 K/UL — SIGNIFICANT CHANGE UP (ref 130–400)
POTASSIUM SERPL-MCNC: 4 MMOL/L — SIGNIFICANT CHANGE UP (ref 3.5–5)
POTASSIUM SERPL-MCNC: 4.3 MMOL/L — SIGNIFICANT CHANGE UP (ref 3.5–5)
POTASSIUM SERPL-SCNC: 4 MMOL/L — SIGNIFICANT CHANGE UP (ref 3.5–5)
POTASSIUM SERPL-SCNC: 4.3 MMOL/L — SIGNIFICANT CHANGE UP (ref 3.5–5)
PROT SERPL-MCNC: 5 G/DL — LOW (ref 6–8)
PROT SERPL-MCNC: 5.7 G/DL — LOW (ref 6–8)
PROTHROM AB SERPL-ACNC: 12.6 SEC — SIGNIFICANT CHANGE UP (ref 9.95–12.87)
RBC # BLD: 2.95 M/UL — LOW (ref 4.2–5.4)
RBC # BLD: 3.26 M/UL — LOW (ref 4.2–5.4)
RBC # FLD: 18.4 % — HIGH (ref 11.5–14.5)
RBC # FLD: 19.2 % — HIGH (ref 11.5–14.5)
SODIUM SERPL-SCNC: 140 MMOL/L — SIGNIFICANT CHANGE UP (ref 135–146)
SODIUM SERPL-SCNC: 141 MMOL/L — SIGNIFICANT CHANGE UP (ref 135–146)
WBC # BLD: 3.78 K/UL — LOW (ref 4.8–10.8)
WBC # BLD: 6.86 K/UL — SIGNIFICANT CHANGE UP (ref 4.8–10.8)
WBC # FLD AUTO: 3.78 K/UL — LOW (ref 4.8–10.8)
WBC # FLD AUTO: 6.86 K/UL — SIGNIFICANT CHANGE UP (ref 4.8–10.8)

## 2022-09-07 PROCEDURE — 33361 REPLACE AORTIC VALVE PERQ: CPT | Mod: 62,Q0

## 2022-09-07 PROCEDURE — 99223 1ST HOSP IP/OBS HIGH 75: CPT | Mod: 57

## 2022-09-07 PROCEDURE — 93306 TTE W/DOPPLER COMPLETE: CPT | Mod: 26

## 2022-09-07 PROCEDURE — 71045 X-RAY EXAM CHEST 1 VIEW: CPT | Mod: 26

## 2022-09-07 PROCEDURE — 33361 REPLACE AORTIC VALVE PERQ: CPT | Mod: 78,62,Q0

## 2022-09-07 RX ORDER — CEFAZOLIN SODIUM 1 G
1000 VIAL (EA) INJECTION EVERY 8 HOURS
Refills: 0 | Status: COMPLETED | OUTPATIENT
Start: 2022-09-07 | End: 2022-09-08

## 2022-09-07 RX ORDER — OXYCODONE HYDROCHLORIDE 5 MG/1
10 TABLET ORAL EVERY 4 HOURS
Refills: 0 | Status: DISCONTINUED | OUTPATIENT
Start: 2022-09-07 | End: 2022-09-08

## 2022-09-07 RX ORDER — VASOPRESSIN 20 [USP'U]/ML
0.04 INJECTION INTRAVENOUS
Qty: 50 | Refills: 0 | Status: DISCONTINUED | OUTPATIENT
Start: 2022-09-07 | End: 2022-09-10

## 2022-09-07 RX ORDER — CLOPIDOGREL BISULFATE 75 MG/1
75 TABLET, FILM COATED ORAL DAILY
Refills: 0 | Status: DISCONTINUED | OUTPATIENT
Start: 2022-09-07 | End: 2022-09-10

## 2022-09-07 RX ORDER — MAGNESIUM SULFATE 500 MG/ML
2 VIAL (ML) INJECTION ONCE
Refills: 0 | Status: COMPLETED | OUTPATIENT
Start: 2022-09-07 | End: 2022-09-07

## 2022-09-07 RX ORDER — ACETAMINOPHEN 500 MG
650 TABLET ORAL EVERY 6 HOURS
Refills: 0 | Status: DISCONTINUED | OUTPATIENT
Start: 2022-09-07 | End: 2022-09-12

## 2022-09-07 RX ORDER — ALBUMIN HUMAN 25 %
500 VIAL (ML) INTRAVENOUS ONCE
Refills: 0 | Status: COMPLETED | OUTPATIENT
Start: 2022-09-07 | End: 2022-09-07

## 2022-09-07 RX ORDER — NITROGLYCERIN 6.5 MG
30 CAPSULE, EXTENDED RELEASE ORAL
Qty: 50 | Refills: 0 | Status: DISCONTINUED | OUTPATIENT
Start: 2022-09-07 | End: 2022-09-08

## 2022-09-07 RX ORDER — ACETAMINOPHEN 500 MG
650 TABLET ORAL EVERY 6 HOURS
Refills: 0 | Status: DISCONTINUED | OUTPATIENT
Start: 2022-09-10 | End: 2022-09-12

## 2022-09-07 RX ORDER — NOREPINEPHRINE BITARTRATE/D5W 8 MG/250ML
0.05 PLASTIC BAG, INJECTION (ML) INTRAVENOUS
Qty: 8 | Refills: 0 | Status: DISCONTINUED | OUTPATIENT
Start: 2022-09-07 | End: 2022-09-12

## 2022-09-07 RX ORDER — SODIUM CHLORIDE 9 MG/ML
1000 INJECTION INTRAMUSCULAR; INTRAVENOUS; SUBCUTANEOUS
Refills: 0 | Status: DISCONTINUED | OUTPATIENT
Start: 2022-09-07 | End: 2022-09-12

## 2022-09-07 RX ORDER — INSULIN HUMAN 100 [IU]/ML
10 INJECTION, SOLUTION SUBCUTANEOUS
Qty: 100 | Refills: 0 | Status: DISCONTINUED | OUTPATIENT
Start: 2022-09-07 | End: 2022-09-11

## 2022-09-07 RX ORDER — SENNA PLUS 8.6 MG/1
2 TABLET ORAL AT BEDTIME
Refills: 0 | Status: DISCONTINUED | OUTPATIENT
Start: 2022-09-08 | End: 2022-09-12

## 2022-09-07 RX ORDER — HYDROMORPHONE HYDROCHLORIDE 2 MG/ML
0.5 INJECTION INTRAMUSCULAR; INTRAVENOUS; SUBCUTANEOUS EVERY 6 HOURS
Refills: 0 | Status: DISCONTINUED | OUTPATIENT
Start: 2022-09-07 | End: 2022-09-08

## 2022-09-07 RX ORDER — OXYCODONE HYDROCHLORIDE 5 MG/1
5 TABLET ORAL EVERY 4 HOURS
Refills: 0 | Status: DISCONTINUED | OUTPATIENT
Start: 2022-09-07 | End: 2022-09-08

## 2022-09-07 RX ORDER — DEXTROSE 50 % IN WATER 50 %
50 SYRINGE (ML) INTRAVENOUS
Refills: 0 | Status: DISCONTINUED | OUTPATIENT
Start: 2022-09-07 | End: 2022-09-12

## 2022-09-07 RX ORDER — CHLORHEXIDINE GLUCONATE 213 G/1000ML
5 SOLUTION TOPICAL
Refills: 0 | Status: DISCONTINUED | OUTPATIENT
Start: 2022-09-07 | End: 2022-09-12

## 2022-09-07 RX ORDER — DEXTROSE 50 % IN WATER 50 %
25 SYRINGE (ML) INTRAVENOUS
Refills: 0 | Status: DISCONTINUED | OUTPATIENT
Start: 2022-09-07 | End: 2022-09-12

## 2022-09-07 RX ORDER — MEPERIDINE HYDROCHLORIDE 50 MG/ML
25 INJECTION INTRAMUSCULAR; INTRAVENOUS; SUBCUTANEOUS ONCE
Refills: 0 | Status: DISCONTINUED | OUTPATIENT
Start: 2022-09-07 | End: 2022-09-10

## 2022-09-07 RX ORDER — NICARDIPINE HYDROCHLORIDE 30 MG/1
5 CAPSULE, EXTENDED RELEASE ORAL
Qty: 40 | Refills: 0 | Status: DISCONTINUED | OUTPATIENT
Start: 2022-09-07 | End: 2022-09-08

## 2022-09-07 RX ORDER — SODIUM CHLORIDE 9 MG/ML
1000 INJECTION INTRAMUSCULAR; INTRAVENOUS; SUBCUTANEOUS
Refills: 0 | Status: DISCONTINUED | OUTPATIENT
Start: 2022-09-08 | End: 2022-09-12

## 2022-09-07 RX ORDER — POLYETHYLENE GLYCOL 3350 17 G/17G
17 POWDER, FOR SOLUTION ORAL DAILY
Refills: 0 | Status: DISCONTINUED | OUTPATIENT
Start: 2022-09-08 | End: 2022-09-12

## 2022-09-07 RX ADMIN — CHLORHEXIDINE GLUCONATE 5 MILLILITER(S): 213 SOLUTION TOPICAL at 19:07

## 2022-09-07 RX ADMIN — Medication 250 MILLILITER(S): at 16:29

## 2022-09-07 RX ADMIN — CHLORHEXIDINE GLUCONATE 15 MILLILITER(S): 213 SOLUTION TOPICAL at 05:58

## 2022-09-07 RX ADMIN — Medication 100 MICROGRAM(S): at 05:39

## 2022-09-07 RX ADMIN — CLOPIDOGREL BISULFATE 75 MILLIGRAM(S): 75 TABLET, FILM COATED ORAL at 11:29

## 2022-09-07 RX ADMIN — Medication 100 MILLIGRAM(S): at 18:00

## 2022-09-07 RX ADMIN — Medication 25 GRAM(S): at 19:27

## 2022-09-07 RX ADMIN — SODIUM CHLORIDE 50 MILLILITER(S): 9 INJECTION INTRAMUSCULAR; INTRAVENOUS; SUBCUTANEOUS at 00:09

## 2022-09-07 NOTE — DISCHARGE NOTE PROVIDER - PROVIDER TOKENS
PROVIDER:[TOKEN:[[id number]]],FREE:[LAST:[[facility]],PHONE:[phone]],FAX:[(   )    -],ADDRESS:[[address] [place] [address]],FOLLOWUP:[1 week]] PROVIDER:[TOKEN:[[id number]]],FREE:[LAST:[[facility]],PHONE:[phone]],FAX:[(   )    -],ADDRESS:[[address] [place] [address]],SCHEDULEDAPPT:[09/15/2022],SCHEDULEDAPPTTIME:[01:00 PM]]

## 2022-09-07 NOTE — DISCHARGE NOTE PROVIDER - HOSPITAL COURSE
Mrs. 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 She was admitted in 7/2022 for TAVR evaluation and shortness of breath. During this hospitalization, patient was found to have acute-on-chronic anemia requesting multiple transfusions. Capsule study showed large AVMs, and patient underwent colonoscopy on 7/27/22 with successful AVM intervention with multiple non bleeding AVMs as well. On 7/30/22, patient developed pulmonary edema and required upgrade to the CCU. She was initially treated with BiPAP. In the CCU, patient was noted to be febrile up to 100.4 with elevated WBC and CXR concerning for PNA. Blood cultures on 7/30/22 on 8/01/22 grew staph epidermidis. In the setting of infection, patient went into Afib with RVR and became hypotensive (SBP in the 50s) requiring phenylephrine. Thoracentesis on 8/01/22 with 1L of fluid removed, transudative effusion with negative culture. Her pleural effusion reaccumulated and a chest tube was placed on 8/04/22. She was treated with a 7-day course of vancomycin and cefepime, and her antibiotics were discontinued per the recommendation of Infectious Disease.   On 8/09/22, patient underwent balloon aortic valvuloplasty. Her respiratory failure and blood pressure improved after her BAV. TTE on 8/10/22 with critical AS with peak gradient 120 mmHg, mean gradient 76 mmHg, and CESIA 0.3 cm^2. After discharge she was feeling well. She saw Dr. Mccollum on 9/2/2022 and was planned for TAVR 1 week after. However, patient developed SOB that is acute on chronic and worsening over the past couple of days associated with a dry cough. Pt says she can only walk a few steps before having to stop to catch her breath. She was found to have Hb of 5.7, she was admitted to the hospital and 2 units of blood was transfused. Her hemoglobin stayed stable. On 09/07/22, she underwent TAVR. Post-operatively, Mrs. 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 She was admitted in 7/2022 for TAVR evaluation and shortness of breath. During this hospitalization, patient was found to have acute-on-chronic anemia requesting multiple transfusions. Capsule study showed large AVMs, and patient underwent colonoscopy on 7/27/22 with successful AVM intervention with multiple non bleeding AVMs as well. On 7/30/22, patient developed pulmonary edema and required upgrade to the CCU. She was initially treated with BiPAP. In the CCU, patient was noted to be febrile up to 100.4 with elevated WBC and CXR concerning for PNA. Blood cultures on 7/30/22 on 8/01/22 grew staph epidermidis. In the setting of infection, patient went into Afib with RVR and became hypotensive (SBP in the 50s) requiring phenylephrine. Thoracentesis on 8/01/22 with 1L of fluid removed, transudative effusion with negative culture. Her pleural effusion reaccumulated and a chest tube was placed on 8/04/22. She was treated with a 7-day course of vancomycin and cefepime, and her antibiotics were discontinued per the recommendation of Infectious Disease.   On 8/09/22, patient underwent balloon aortic valvuloplasty. Her respiratory failure and blood pressure improved after her BAV. TTE on 8/10/22 with critical AS with peak gradient 120 mmHg, mean gradient 76 mmHg, and CESIA 0.3 cm^2. After discharge she was feeling well. She saw Dr. Mccollum on 9/2/2022 and was planned for TAVR 1 week after. However, patient developed SOB that is acute on chronic and worsening over the past couple of days associated with a dry cough. Pt says she can only walk a few steps before having to stop to catch her breath. She was found to have Hb of 5.7, she was admitted to the hospital and 2 units of blood was transfused. Her hemoglobin stayed stable. On 09/07/22, she underwent TAVR. Post-operatively, patient had complete heart block. She underwent micra pacemaker on 9/8/2022. Patient had anemia secondary to acute surgical blood loss and was transfused appropriately. She developed thrombocytoenia, which was improving prior to discharge, however the decision to hold asa, plavix was made until platelets improve. Patient otherwise did well. She is discharged home in stable condition on POD#5 with instructions to follow [facility] on 9/15/2022 @ 1:00pm.

## 2022-09-07 NOTE — DISCHARGE NOTE PROVIDER - NSDCMRMEDTOKEN_GEN_ALL_CORE_FT
aspirin 81 mg oral tablet, chewable: 1 tab(s) orally once a day  furosemide 40 mg oral tablet: 1 tab(s) orally once a day  levothyroxine 100 mcg (0.1 mg) oral tablet: 1 tab(s) orally once a day  losartan 25 mg oral tablet: 1 tab(s) orally once a day  pantoprazole 40 mg oral delayed release tablet: 1 tab(s) orally once a day    ascorbic acid 500 mg oral tablet: 1 tab(s) orally once a day  furosemide 40 mg oral tablet: 1 tab(s) orally once a day  levothyroxine 100 mcg (0.1 mg) oral tablet: 1 tab(s) orally once a day  losartan 25 mg oral tablet: 1 tab(s) orally once a day  Multiple Vitamins oral tablet: 1 tab(s) orally once a day  pantoprazole 40 mg oral delayed release tablet: 1 tab(s) orally once a day

## 2022-09-07 NOTE — DISCHARGE NOTE PROVIDER - NSDCCPTREATMENT_GEN_ALL_CORE_FT
PRINCIPAL PROCEDURE  Procedure: TAVR, percutaneous  Findings and Treatment: 26mm Medtronic Valve      SECONDARY PROCEDURE  Procedure: Insertion, pacemaker, leadless  Findings and Treatment:     Procedure: TAVR, percutaneous  Findings and Treatment: 26mm Medtronic Valve

## 2022-09-07 NOTE — CONSULT NOTE ADULT - ASSESSMENT
Impression   # Severe AS s/p BAV in 8/2022 - Area 0.3cm2, mean gradient 76mmHg post BAV  # VALENTINE likely secondary to acute on chronic anemia   # Known to have multiple AVMs requiring tranfusions   # HFpEF - Euvolemic   # Chronic transudative left pleural effusion   # CAD   # Elevated troponins likely demand ishemia   # A fib not on AC   # HTN     Recommendations   - Transfuse 2 units of blood slowly   - Monitor SpO2 during - may need lasix in between transfusions   - GI evaluation   - Structural heart team follow up   - Trend troponins   - Daily chest x ray and ECGs   - Continue home medications   - Wean O2   - Monitor in CCU     Discussed with attending 
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 Pt admitted for TAVR. Underwent TAVR placement 09/07 and developed complete heart block requiring temporary pacemaker     Complete heart block post TAVR   Severe aortic stenosis s/p TAVR  Hx RBBB  Hx HFpEF  Hx Chronic anemia s/p to AVM    - Pt is pacemaker dependent post TAVR  - EKG shows CHB  - NPO after midnight  - Last neg covid 09/06  - Will schedule for [employer] pacemaker tomorrow
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     #Acute on chronic anemia - no overt bleeding  - H&H baseline 8-9  - on admission 5.7 - 3 units - 9.2  - kameron; negative for blood  - CF 7/27 - 5mm TA removed. 1cm and 5mm AVM in cecum s/p APC, 1cm AVM in TC s/p APC  - VCE 7/19 - non bleeding AVM in cecum/TI    Plan  - clear liquid diet  - monitor cbc target hb >9  - protonix 40mg qdaily  - if H&H stable and no overt bleeding advance diet  - Pt is high risk due to co-morbidities and given Severe AS pt likely to develop AVMs without TAVR. Consider recent endoscopic intervention and no overt bleeding at this time. will continue with conservative management and if overt bleeding with drop in H&H can consider repeat capsule endoscopy

## 2022-09-07 NOTE — CHART NOTE - NSCHARTNOTEFT_GEN_A_CORE
The TR band balloon was deflated 3cc at a time(no bleeding), with 15 mins  each step, the step was repeat 2 more times, no bleeding was noted, the band was removed and sterile dressing applied. No hematoma or bleeding at the site, the pulse oximetry waveform is normal on the right index finger.

## 2022-09-07 NOTE — CONSULT NOTE ADULT - SUBJECTIVE AND OBJECTIVE BOX
HPI:  Mrs. 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    She was admitted in 7/2022 for TAVR evaluation and shortness of breath. During this hospitalization, patient was found to have acute-on-chronic anemia requesting multiple transfusions. Capsule study showed large AVMs, and patient underwent colonoscopy on 7/27/22 with successful AVM intervention with multiple non bleeding AVMs as well.     On 7/30/22, patient developed pulmonary edema and required upgrade to the CCU. She was initially treated with BiPAP and nitroglycerin gtt. In the CCU, patient was noted to be febrile up to 100.4 with elevated WBC and CXR concerning for PNA. Blood cultures on 7/30/22 on 8/01/22 grew staph epidermidis. In the setting of infection, patient went into Afib with RVR and became hypotensive (SBP in the 50s) requiring phenylephrine. Thoracentesis on 8/01/22 with 1L of fluid removed, transudative effusion with negative culture. Her pleural effusion reaccumulated and a chest tube was placed on 8/04/22. She was treated with a 7-day course of vancomycin and cefepime, and her antibiotics were discontinued per the recommendation of Infectious Disease.     On 8/09/22, patient underwent balloon aortic valvuloplasty. Her respiratory failure and blood pressure improved after her BAV. Chest tube was removed on 8/10/22, and patient was started on losartan 25 mg daily to avoid hypertension and flash pulmonary edema. TTE on 8/10/22 with critical AS with peak gradient 120 mmHg, mean gradient 76 mmHg, and CESIA 0.3 cm^2. Patient was downgraded to Cardiac Telemetry. Her diuretics were adjusted and she will be discharged on Lasix 40 mg PO daily. Her last transfusion was on 8/14 for Hgb 7.7, with post-transfusion Hgb of 9.9. She was not started on anticoagulation for Afib given her ongoing need for transfusions.    After discharge she was feeling well. She saw Dr. Mccollum on 9/2/2022 and was planned for TAVR 1 week after. However over past 2 days, patient developed SOB that is acute on chronic and worsening over the past couple of days associated with a dry cough. Pt says she can only walk a few steps before having to stop to catch her breath. She has chronic chest pain that is unchanged from baseline described as a "discomfort up in my throat". She visited the [facility] and was found to have Hb of 5.7, she was transferred to [facility] and 2 units of blood was transfused. She denies melena or BRBPR.     Pt seen and examined at bedside. Pt underwent TAVR today and developed complete heart block. EP consulted for possible pacemaker      PAST MEDICAL & SURGICAL HISTORY  HTN (hypertension)    Mitral valve prolapse    Enlarged heart    Murmur    Arthritis    HTN (hypertension)    Diverticulosis    Hiatal hernia    Acid reflux    Liver cancer  s/p resection and s/p chemo and radiation    Aortic stenosis  s/p ballon aortic valuloplasty 5/25/21; 8/2021    Hypothyroid    H/O thyroid nodule  bx benign - 5 years ago    Osteoarthritis    H/O tear of meniscus of knee joint  left    Nodule of kidney    Overactive bladder    History of blood transfusion  no adverse reaction    H/O resection of liver  liver cancer (right lobe 2001); 2003 - right lobe, cholecystectomy, and bile duct    Status post cholecystectomy    History of torn meniscus of knee  right; repaired  1995    H/O carpal tunnel repair  2021 - 2012        FAMILY HISTORY:  FAMILY HISTORY:  FH: breast cancer    FH: stomach cancer    FH: skin cancer        SOCIAL HISTORY:  []smoker  []Alcohol  []Drug    ALLERGIES:  BLUE DYE (Stomach Upset; Nausea)  Cipro (Other)  Levaquin (Rash)  tetracycline (Hives)      MEDICATIONS:  MEDICATIONS  (STANDING):  acetaminophen     Tablet .. 650 milliGRAM(s) Oral every 6 hours  ascorbic acid 500 milliGRAM(s) Oral daily  ceFAZolin   IVPB 1000 milliGRAM(s) IV Intermittent every 8 hours  chlorhexidine 0.12% Liquid 5 milliLiter(s) Oral Mucosa two times a day  clopidogrel Tablet 75 milliGRAM(s) Oral daily  dextrose 50% Injectable 50 milliLiter(s) IV Push every 15 minutes  dextrose 50% Injectable 25 milliLiter(s) IV Push every 15 minutes  furosemide    Tablet 40 milliGRAM(s) Oral daily  influenza  Vaccine (HIGH DOSE) 0.7 milliLiter(s) IntraMuscular once  insulin regular Infusion 10 Unit(s)/Hr (10 mL/Hr) IV Continuous <Continuous>  levothyroxine 100 MICROGram(s) Oral daily  losartan 25 milliGRAM(s) Oral daily  meperidine     Injectable 25 milliGRAM(s) IV Push once  multivitamin 1 Tablet(s) Oral daily  niCARdipine Infusion 5 mG/Hr (25 mL/Hr) IV Continuous <Continuous>  nitroglycerin  Infusion 30 MICROgram(s)/Min (9 mL/Hr) IV Continuous <Continuous>  norepinephrine Infusion 0.05 MICROgram(s)/kG/Min (5.17 mL/Hr) IV Continuous <Continuous>  pantoprazole    Tablet 40 milliGRAM(s) Oral before breakfast  sodium chloride 0.9%. 250 milliLiter(s) (50 mL/Hr) IV Continuous <Continuous>  sodium chloride 0.9%. 1000 milliLiter(s) (20 mL/Hr) IV Continuous <Continuous>  vasopressin Infusion 0.04 Unit(s)/Min (2.4 mL/Hr) IV Continuous <Continuous>  zinc sulfate 220 milliGRAM(s) Oral daily    MEDICATIONS  (PRN):  HYDROmorphone  Injectable 0.5 milliGRAM(s) IV Push every 6 hours PRN Breakthrough Pain  melatonin 3 milliGRAM(s) Oral at bedtime PRN Insomnia  oxyCODONE    IR 5 milliGRAM(s) Oral every 4 hours PRN Moderate Pain (4 - 6)  oxyCODONE    IR 10 milliGRAM(s) Oral every 4 hours PRN Severe Pain (7 - 10)      HOME MEDICATIONS:  Home Medications:      VITALS:   T(F): 92 (09-07 @ 15:45), Max: 98.5 (09-04 @ 20:23)  HR: 59 (09-07 @ 17:15) (54 - 92)  BP: 124/57 (09-07 @ 15:45) (62/30 - 124/63)  BP(mean): 82 (09-07 @ 15:45) (42 - 98)  RR: 22 (09-07 @ 17:15) (10 - 39)  SpO2: 100% (09-07 @ 17:15) (95% - 100%)    I&O's Summary    06 Sep 2022 07:01  -  07 Sep 2022 07:00  --------------------------------------------------------  IN: 1140 mL / OUT: 1050 mL / NET: 90 mL    07 Sep 2022 07:01  -  07 Sep 2022 18:17  --------------------------------------------------------  IN: 583 mL / OUT: 1325 mL / NET: -742 mL        REVIEW OF SYSTEMS:  CONSTITUTIONAL: Weakness  EYES: No visual changes  ENT: No vertigo or throat pain   NECK: No pain or stiffness  RESPIRATORY: No cough, wheezing, hemoptysis; No shortness of breath  CARDIOVASCULAR: No chest pain or palpitations  GASTROINTESTINAL: No abdominal or epigastric pain. No nausea, vomiting, or hematemesis; No diarrhea or constipation. No melena or hematochezia.  GENITOURINARY: No dysuria, frequency or hematuria  NEUROLOGICAL: No numbness or weakness  SKIN: No itching, no rashes  MSK: no    PHYSICAL EXAM:  NEURO: patient is awake , alert and oriented  GEN: Not in acute distress  NECK: no thyroid enlargement, no JVD  LUNGS: Clear to auscultation bilaterally   CARDIOVASCULAR: S1/S2 present, RRR , Systolic murmur appreciated.  ABD: Soft, non-tender, non-distended, +BS  EXT: No MICHELLE  SKIN: Intact    LABS:                        10.1   6.86  )-----------( 116      ( 07 Sep 2022 16:22 )             30.3     09-07    140  |  107  |  25<H>  ----------------------------<  115<H>  4.0   |  22  |  0.7    Ca    7.1<L>      07 Sep 2022 16:22  Phos  3.8     09-07  Mg     1.8     09-07    TPro  5.0<L>  /  Alb  3.3<L>  /  TBili  0.5  /  DBili  x   /  AST  21  /  ALT  11  /  AlkPhos  84  09-07    PT/INR - ( 07 Sep 2022 16:22 )   PT: 12.60 sec;   INR: 1.10 ratio         PTT - ( 07 Sep 2022 16:22 )  PTT:34.5 sec    CARDIAC MARKERS ( 06 Sep 2022 05:59 )  x     / 0.15 ng/mL / x     / x     / x            Troponin trend:      09-03 Chol 177 LDL -- HDL 73 Trig 80, 07-26 Chol 199 LDL -- HDL 65 Trig 128      RADIOLOGY:  -CXR:  -TTE:  < from: TTE Echo Complete w/o Contrast w/ Doppler (08.10.22 @ 10:18) >   1. Normal global left ventricular systolic function.   2. LV Ejection Fraction by Johnson's Method with a biplane EF of 61 %.   3. Mild asymmetric left ventricular hypertrophy involving the septal   wall.   4. Spectral Doppler shows pseudonormal pattern of left ventricular   myocardial filling (Grade II diastolic dysfunction).   5. Moderately enlarged left atrium.   6. Normal right atrial size.   7. Mild mitral valve regurgitation.   8. Mild thickening of the anterior and posterior mitral valve leaflets.   9. Mild tricuspid regurgitation.  10. Aortic valve thickening with decreased leaflet opening.  11. Mild aortic regurgitation.  12. Critical aortic stenosis, peak/mean /76 mmHg, CESIA 0.3 cm^2.  13. Estimated pulmonary artery systolic pressure is 42.3 mmHg assuming a   right atrial pressure of 3 mmHg, which is consistent with mild pulmonary   hypertension.    < end of copied text >    -CCTA:  -STRESS TEST:  -CATHETERIZATION:    ECG:    TELEMETRY EVENTS:

## 2022-09-07 NOTE — PROGRESS NOTE ADULT - SUBJECTIVE AND OBJECTIVE BOX
OPERATIVE PROCEDURE(s):                POD #                       89yFemale  SURGEON(s): ANTONELLA Oliveira  SUBJECTIVE ASSESSMENT:  Patient has no complaints at this time.    Vital Signs Last 24 Hrs  T(F): 97 (07 Sep 2022 04:00), Max: 98 (06 Sep 2022 22:00)  HR: 66 (07 Sep 2022 07:00) (54 - 92)  BP: 104/55 (07 Sep 2022 07:00) (62/30 - 123/84)  BP(mean): 77 (07 Sep 2022 07:00) (42 - 98)  ABP: --  ABP(mean): --  RR: 17 (07 Sep 2022 07:00) (15 - 20)  SpO2: 100% (07 Sep 2022 07:00) (95% - 100%)  CVP(mm Hg): --  CVP(cm H2O): --  CO: --  CI: --  PA: --  SVR: --    I&O's Detail    06 Sep 2022 07:01  -  07 Sep 2022 07:00  --------------------------------------------------------  IN:    Oral Fluid: 890 mL    sodium chloride 0.9%: 250 mL  Total IN: 1140 mL    OUT:    Voided (mL): 1050 mL  Total OUT: 1050 mL        Net:   I&O's Detail    05 Sep 2022 07:01  -  06 Sep 2022 07:00  --------------------------------------------------------  Total NET: -306 mL      06 Sep 2022 07:01  -  07 Sep 2022 07:00  --------------------------------------------------------  Total NET: 90 mL        CAPILLARY BLOOD GLUCOSE      POCT Blood Glucose.: 83 mg/dL (07 Sep 2022 06:32)  POCT Blood Glucose.: 160 mg/dL (06 Sep 2022 22:46)  POCT Blood Glucose.: 166 mg/dL (06 Sep 2022 16:43)  POCT Blood Glucose.: 143 mg/dL (06 Sep 2022 11:40)    Physical Exam:  General: NAD; A&Ox3/Patient is intubated and sedated  Cardiac: S1/S2, RRR, no murmur, no rubs  Lungs: unlabored respirations, CTA b/l, no wheeze, no rales, no crackles  Abdomen: Soft/NT/ND; positive bowel sounds x 4  Sternum: Intact, no click, incision healing well with no drainage  Incisions: Incisions clean/dry/intact  Extremities: No edema b/l lower extremities; good capillary refill; no cyanosis; palpable 1+ pedal pulses b/l    Central Venous Catheter: Yes[]  No[] , If Yes indication:           Day #  Gruber Catheter: Yes  [] , No  [] , If yes indication:                      Day #  NGT: Yes [] No [] ,    If Yes Placement:                                     Day #  EPICARDIAL WIRES:  [] YES [] NO                                              Day #  BOWEL MOVEMENT:  [] YES [] NO, If No, Timing since last BM:      Day #  CHEST TUBE(Left/Right):  [] YES [] NO, If yes -  AIR LEAKS:  [] YES [] NO        LABS:                        9.2<L>  3.78<L> )-----------( 133      ( 07 Sep 2022 04:20 )             28.1<L>                        10.7<L>  5.81  )-----------( 173      ( 06 Sep 2022 17:19 )             32.9<L>        141  |  104  |  38<H>  ----------------------------<  96  4.3   |  28  |  0.9      137  |  97<L>  |  38<H>  ----------------------------<  132<H>  5.1<H>   |  25  |  1.1    Ca    9.1      07 Sep 2022 04:20  Phos  3.8       Mg     2.2         TPro  5.7<L> [6.0 - 8.0]  /  Alb  3.4<L> [3.5 - 5.2]  /  TBili  0.3 [0.2 - 1.2]  /  DBili  x   /  AST  19 [0 - 41]  /  ALT  13 [0 - 41]  /  AlkPhos  101 [30 - 115]      PT/INR - ( 06 Sep 2022 17:19 )   PT: ;   INR: 0.90 ratio         PTT - ( 06 Sep 2022 17:19 )  PTT:30.3 sec      RADIOLOGY & ADDITIONAL TESTS:  CXR:   EK Lead ECG:   Ventricular Rate 68 BPM    Atrial Rate 68 BPM    P-R Interval 178 ms    QRS Duration 134 ms    Q-T Interval 430 ms    QTC Calculation(Bazett) 457 ms    P Axis 86 degrees    R Axis -13 degrees    T Axis -3 degrees    Diagnosis Line Normal sinus rhythm  Right bundle branch block  Voltage criteria for left ventricular hypertrophy  Abnormal ECG    Confirmed by Roger Colvin ([id number]) on 2022 5:30:44 PM (22 @ 08:43)    MEDICATIONS  (STANDING):  ascorbic acid 500 milliGRAM(s) Oral daily  furosemide    Tablet 40 milliGRAM(s) Oral daily  influenza  Vaccine (HIGH DOSE) 0.7 milliLiter(s) IntraMuscular once  levothyroxine 100 MICROGram(s) Oral daily  losartan 25 milliGRAM(s) Oral daily  multivitamin 1 Tablet(s) Oral daily  pantoprazole    Tablet 40 milliGRAM(s) Oral before breakfast  sodium chloride 0.9%. 250 milliLiter(s) (50 mL/Hr) IV Continuous <Continuous>  zinc sulfate 220 milliGRAM(s) Oral daily    MEDICATIONS  (PRN):  melatonin 3 milliGRAM(s) Oral at bedtime PRN Insomnia    HEPARIN:  [] YES [] NO  Dose: XX UNITS/HR UNITS Q8H  LOVENOX:[] YES [] NO  Dose: XX mg Q24H  COUMADIN: []  YES [] NO  Dose: XX mg  Q24H  SCD's: YES b/l  GI Prophylaxis: Protonix [], Pepcid [], None [], (Contra-indication:.....)    Post-Op Aspirin: Yes [],  No [], If No, then contra-indication:  Post-Op Statin: Yes [], No[], If No, then contra-indication:  Post-Op Beta-Blockers: Yes [], No [], If No, then contra-indication:    Allergies:  BLUE DYE (Stomach Upset; Nausea)  Cipro (Other)  Levaquin (Rash)  tetracycline (Hives)      Ambulation/Activity Status: Ambulates several times daily with assistance.    Assessment/Plan:  89y Female status-post .....  - Case and plan discussed with CTU Intensivist and CT Surgeon - Dr. Rodriguez/Grant/Roxanne  - Continue CTU supportive care    - Continue DVT/GI prophylaxis  - Incentive Spirometry 10 times an hour  - Continue to advance physical activity as tolerated and continue PT/OT as directed  1. CAD: Continue ASA, statin, BB  2. HTN:   3. A. Fib:   4. COPD/Hypoxia:   5. DM/Glucose Control:

## 2022-09-07 NOTE — DISCHARGE NOTE PROVIDER - NSDCCPCAREPLAN_GEN_ALL_CORE_FT
PRINCIPAL DISCHARGE DIAGNOSIS  Diagnosis: Aortic stenosis  Assessment and Plan of Treatment:       SECONDARY DISCHARGE DIAGNOSES  Diagnosis: Shortness of breath  Assessment and Plan of Treatment:

## 2022-09-07 NOTE — CONSULT NOTE ADULT - ATTENDING COMMENTS
I saw and examined patient on 9/7/2022  complex patient   CHB s/p TAVR   I recommend leadless pacemaker to be placed in am  We discussed the risks/benefits/alternatives, nature of procedure, and follow up care after device is implanted. We also discussed remote monitoring in details. Patient is in agreement with proceeding with the device implant. We discussed the risks of bleeding, hematoma, injury to vessels and heart, perforation, tamponade, pneumothorax, infection, lead dislodgment, device malfunction, and rare risks of stroke/heart attack/death. Patient expressed understanding of the discussion. I answered all the questions in details.
Profound anemia no overt bleeding Hx of LGIB from vascular ectasias in the cecum and transverse SP hemostasis. We recommend repeat VCE to be followed by possible endoscopic intervention if needed.
Patient seen, case d/w CCU team on rounds.  Patient with severe AS, s/p BAV presented with GIB due to AVMs. S/p cauterization.   Will transfuse 2 units tonight with 1 dose of IV Furosemide.  Continue CCU observation overnight.  Repeat CXR, BW in AM.

## 2022-09-07 NOTE — DISCHARGE NOTE PROVIDER - NSDCFUSCHEDAPPT_GEN_ALL_CORE_FT
MA received voicemail from [place] with [address] home requesting a follow up appointment with surgery. MA routing message to April, [place] for scheduling with Dr Eliel Albright for follow up.     Electronically signed by Emily Stahl on 9/15/20 at 3:07 PM EDT Hanna Hull  [facility] Physician [facility]  CARDIOLOGY [address] [facility]  Scheduled Appointment: 10/24/2022

## 2022-09-07 NOTE — DISCHARGE NOTE PROVIDER - CARE PROVIDER_API CALL
Mejia Cardenas S  INTERNAL MEDICINE  [address] [place] [address] [address]  Phone: [phone]  Fax: [phone]  Follow Up Time:     [facility],   [address] [place] [address]  Phone: [phone]  Fax: (   )    -  Follow Up Time: 1 week   Mejia Cardenas S  INTERNAL MEDICINE  [address] [place] [address]. [place] [address]  Phone: [phone]  Fax: [phone]  Follow Up Time:     [facility],   [address] [place] [address]  Phone: [phone]  Fax: (   )    -  Scheduled Appointment: 09/15/2022 01:00 PM

## 2022-09-08 PROBLEM — Z92.89 PERSONAL HISTORY OF OTHER MEDICAL TREATMENT: Chronic | Status: ACTIVE | Noted: 2022-09-01

## 2022-09-08 LAB
ALBUMIN SERPL ELPH-MCNC: 3.5 G/DL — SIGNIFICANT CHANGE UP (ref 3.5–5.2)
ALP SERPL-CCNC: 98 U/L — SIGNIFICANT CHANGE UP (ref 30–115)
ALT FLD-CCNC: 11 U/L — SIGNIFICANT CHANGE UP (ref 0–41)
ANION GAP SERPL CALC-SCNC: 12 MMOL/L — SIGNIFICANT CHANGE UP (ref 7–14)
APTT BLD: 29.9 SEC — SIGNIFICANT CHANGE UP (ref 27–39.2)
AST SERPL-CCNC: 22 U/L — SIGNIFICANT CHANGE UP (ref 0–41)
BASOPHILS # BLD AUTO: 0.02 K/UL — SIGNIFICANT CHANGE UP (ref 0–0.2)
BASOPHILS NFR BLD AUTO: 0.2 % — SIGNIFICANT CHANGE UP (ref 0–1)
BILIRUB SERPL-MCNC: 0.6 MG/DL — SIGNIFICANT CHANGE UP (ref 0.2–1.2)
BUN SERPL-MCNC: 27 MG/DL — HIGH (ref 10–20)
CALCIUM SERPL-MCNC: 8.1 MG/DL — LOW (ref 8.5–10.1)
CHLORIDE SERPL-SCNC: 107 MMOL/L — SIGNIFICANT CHANGE UP (ref 98–110)
CO2 SERPL-SCNC: 20 MMOL/L — SIGNIFICANT CHANGE UP (ref 17–32)
CREAT SERPL-MCNC: 0.8 MG/DL — SIGNIFICANT CHANGE UP (ref 0.7–1.5)
EGFR: 70 ML/MIN/1.73M2 — SIGNIFICANT CHANGE UP
EOSINOPHIL # BLD AUTO: 0.07 K/UL — SIGNIFICANT CHANGE UP (ref 0–0.7)
EOSINOPHIL NFR BLD AUTO: 0.7 % — SIGNIFICANT CHANGE UP (ref 0–8)
GAS PNL BLDA: SIGNIFICANT CHANGE UP
GLUCOSE BLDC GLUCOMTR-MCNC: 100 MG/DL — HIGH (ref 70–99)
GLUCOSE BLDC GLUCOMTR-MCNC: 106 MG/DL — HIGH (ref 70–99)
GLUCOSE BLDC GLUCOMTR-MCNC: 98 MG/DL — SIGNIFICANT CHANGE UP (ref 70–99)
GLUCOSE SERPL-MCNC: 104 MG/DL — HIGH (ref 70–99)
HCT VFR BLD CALC: 32.5 % — LOW (ref 37–47)
HGB BLD-MCNC: 11.1 G/DL — LOW (ref 12–16)
IMM GRANULOCYTES NFR BLD AUTO: 0.4 % — HIGH (ref 0.1–0.3)
INR BLD: 1 RATIO — SIGNIFICANT CHANGE UP (ref 0.65–1.3)
LYMPHOCYTES # BLD AUTO: 0.37 K/UL — LOW (ref 1.2–3.4)
LYMPHOCYTES # BLD AUTO: 3.5 % — LOW (ref 20.5–51.1)
MAGNESIUM SERPL-MCNC: 2.6 MG/DL — HIGH (ref 1.8–2.4)
MCHC RBC-ENTMCNC: 31.4 PG — HIGH (ref 27–31)
MCHC RBC-ENTMCNC: 34.2 G/DL — SIGNIFICANT CHANGE UP (ref 32–37)
MCV RBC AUTO: 92.1 FL — SIGNIFICANT CHANGE UP (ref 81–99)
MONOCYTES # BLD AUTO: 0.6 K/UL — SIGNIFICANT CHANGE UP (ref 0.1–0.6)
MONOCYTES NFR BLD AUTO: 5.7 % — SIGNIFICANT CHANGE UP (ref 1.7–9.3)
NEUTROPHILS # BLD AUTO: 9.48 K/UL — HIGH (ref 1.4–6.5)
NEUTROPHILS NFR BLD AUTO: 89.5 % — HIGH (ref 42.2–75.2)
NRBC # BLD: 0 /100 WBCS — SIGNIFICANT CHANGE UP (ref 0–0)
PLATELET # BLD AUTO: 156 K/UL — SIGNIFICANT CHANGE UP (ref 130–400)
POTASSIUM SERPL-MCNC: 4.5 MMOL/L — SIGNIFICANT CHANGE UP (ref 3.5–5)
POTASSIUM SERPL-SCNC: 4.5 MMOL/L — SIGNIFICANT CHANGE UP (ref 3.5–5)
PROT SERPL-MCNC: 5.3 G/DL — LOW (ref 6–8)
PROTHROM AB SERPL-ACNC: 11.5 SEC — SIGNIFICANT CHANGE UP (ref 9.95–12.87)
RBC # BLD: 3.53 M/UL — LOW (ref 4.2–5.4)
RBC # FLD: 19 % — HIGH (ref 11.5–14.5)
SODIUM SERPL-SCNC: 139 MMOL/L — SIGNIFICANT CHANGE UP (ref 135–146)
WBC # BLD: 10.58 K/UL — SIGNIFICANT CHANGE UP (ref 4.8–10.8)
WBC # FLD AUTO: 10.58 K/UL — SIGNIFICANT CHANGE UP (ref 4.8–10.8)

## 2022-09-08 PROCEDURE — 93010 ELECTROCARDIOGRAM REPORT: CPT

## 2022-09-08 PROCEDURE — 33274 TCAT INSJ/RPL PERM LDLS PM: CPT | Mod: Q0

## 2022-09-08 PROCEDURE — 71045 X-RAY EXAM CHEST 1 VIEW: CPT | Mod: 26

## 2022-09-08 PROCEDURE — 93306 TTE W/DOPPLER COMPLETE: CPT | Mod: 26

## 2022-09-08 RX ORDER — VANCOMYCIN HCL 1 G
750 VIAL (EA) INTRAVENOUS ONCE
Refills: 0 | Status: COMPLETED | OUTPATIENT
Start: 2022-09-09 | End: 2022-09-09

## 2022-09-08 RX ORDER — VANCOMYCIN HCL 1 G
1000 VIAL (EA) INTRAVENOUS EVERY 12 HOURS
Refills: 0 | Status: DISCONTINUED | OUTPATIENT
Start: 2022-09-08 | End: 2022-09-08

## 2022-09-08 RX ORDER — VANCOMYCIN HCL 1 G
1000 VIAL (EA) INTRAVENOUS ONCE
Refills: 0 | Status: DISCONTINUED | OUTPATIENT
Start: 2022-09-08 | End: 2022-09-08

## 2022-09-08 RX ORDER — VANCOMYCIN HCL 1 G
1000 VIAL (EA) INTRAVENOUS ONCE
Refills: 0 | Status: COMPLETED | OUTPATIENT
Start: 2022-09-08 | End: 2022-09-09

## 2022-09-08 RX ADMIN — Medication 5.17 MICROGRAM(S)/KG/MIN: at 21:24

## 2022-09-08 RX ADMIN — Medication 100 MILLIGRAM(S): at 02:32

## 2022-09-08 RX ADMIN — SENNA PLUS 2 TABLET(S): 8.6 TABLET ORAL at 21:25

## 2022-09-08 RX ADMIN — Medication 100 MILLIGRAM(S): at 09:28

## 2022-09-08 NOTE — CHART NOTE - NSCHARTNOTEFT_GEN_A_CORE
PACU ANESTHESIA ADMISSION NOTE      Procedure: TAVR, percutaneous  26mm Medtronic Valve      Post op diagnosis:  Aortic stenosis        ____  Intubated  TV:______       Rate: ______      FiO2: ______    _x___  Patent Airway    _x___  Full return of protective reflexes    _x___  Full recovery from anesthesia / back to baseline status    Vitals  SPO2:-100%  HR:-70   RR:-12  B.P:-123/53  TEMP:-97.8    Mental Status:  _x___ Awake   ___x_ Alert   _____ Drowsy   _____ Sedated    Nausea/Vomiting:  _x___  NO       ______Yes,   See Post - Op Orders         Pain Scale (0-10):  __0___    Treatment: _x___ None    __x__ See Post - Op/PCA Orders    Post - Operative Fluids:   ___ Oral   ____x See Post - Op Orders    Plan: Discharge:   ____Home       ____Floor     ___x__Critical Care    _____  Other:_________________    Comments:  Report endorsed to RN in CTICU  Vitals stable  No anesthesia issues or complications noted.

## 2022-09-08 NOTE — PRE-ANESTHESIA EVALUATION ADULT - NSRADCARDRESULTSFT_GEN_ALL_CORE
< from: TTE Echo Complete w/o Contrast w/ Doppler (08.10.22 @ 10:18) >   1. Normal global left ventricular systolic function.   2. LV Ejection Fraction by Johnson's Method with a biplane EF of 61 %.   3. Mild asymmetric left ventricular hypertrophy involving the septal   wall.   4. Spectral Doppler shows pseudonormal pattern of left ventricular   myocardial filling (Grade II diastolic dysfunction).   5. Moderately enlarged left atrium.   6. Normal right atrial size.   7. Mild mitral valve regurgitation.   8. Mild thickening of the anterior and posterior mitral valve leaflets.   9. Mild tricuspid regurgitation.  10. Aortic valve thickening with decreased leaflet opening.  11. Mild aortic regurgitation.  12. Critical aortic stenosis, peak/mean /76 mmHg, CESIA 0.3 cm^2.  13. Estimated pulmonary artery systolic pressure is 42.3 mmHg assuming a   right atrial pressure of 3 mmHg, which is consistent with mild pulmonary   hypertension.    < end of copied text >    CXR 9/7:  "Impression:    Retrocardiac opacification. Support devices as described."

## 2022-09-08 NOTE — PROGRESS NOTE ADULT - SUBJECTIVE AND OBJECTIVE BOX
OPERATIVE PROCEDURE(s):                POD #                       89yFemale  SURGEON(s): ANTONELLA Oliveira  SUBJECTIVE ASSESSMENT: pt seen and examined.   Vital Signs Last 24 Hrs  T(F): 97.8 (08 Sep 2022 04:00), Max: 97.8 (08 Sep 2022 04:00)  HR: 59 (08 Sep 2022 10:00) (59 - 73)  BP: 125/57 (07 Sep 2022 22:00) (98/42 - 147/52)  BP(mean): 82 (07 Sep 2022 22:00) (61 - 82)  ABP: 117/36 (08 Sep 2022 10:00) (86/31 - 150/50)  ABP(mean): 61 (08 Sep 2022 10:00)  RR: 30 (08 Sep 2022 10:00) (8 - 30)  SpO2: 99% (08 Sep 2022 10:00) (95% - 100%)  CVP(mm Hg): --  CVP(cm H2O): --  CO: --  CI: --  PA: --  SVR: --    I&O's Detail    07 Sep 2022 07:01  -  08 Sep 2022 07:00  --------------------------------------------------------  IN:    Albumin 5%  - 500 mL: 500 mL    IV PiggyBack: 50 mL    Norepinephrine: 222 mL    PRBCs (Packed Red Blood Cells): 1 mL    sodium chloride 0.9%: 340 mL  Total IN: 1113 mL    OUT:    Indwelling Catheter - Urethral (mL): 1840 mL    Voided (mL): 300 mL  Total OUT: 2140 mL        Net:   I&O's Detail    06 Sep 2022 07:01  -  07 Sep 2022 07:00  --------------------------------------------------------  Total NET: 90 mL      07 Sep 2022 07:01  -  08 Sep 2022 07:00  --------------------------------------------------------  Total NET: -1027 mL        CAPILLARY BLOOD GLUCOSE      POCT Blood Glucose.: 98 mg/dL (08 Sep 2022 07:28)  POCT Blood Glucose.: 106 mg/dL (08 Sep 2022 02:32)    Physical Exam:  General: NAD; A&Ox3/Patient is intubated and sedated  Cardiac: S1/S2, RRR, no murmur, no rubs  Lungs: unlabored respirations, CTA b/l, no wheeze, no rales, no crackles  Abdomen: Soft/NT/ND; positive bowel sounds x 4  Sternum: Intact, no click, incision healing well with no drainage  Incisions: Incisions clean/dry/intact  Extremities: No edema b/l lower extremities; good capillary refill; no cyanosis; palpable 1+ pedal pulses b/l    Central Venous Catheter: Yes[]  No[] , If Yes indication:           Day #  Gruber Catheter: Yes  [] , No  [] , If yes indication:                      Day #  NGT: Yes [] No [] ,    If Yes Placement:                                     Day #  EPICARDIAL WIRES:  [] YES [] NO                                              Day #  BOWEL MOVEMENT:  [] YES [] NO, If No, Timing since last BM:      Day #  CHEST TUBE(Left/Right):  [] YES [] NO, If yes -  AIR LEAKS:  [] YES [] NO        LABS:                        11.1<L>  10.58 )-----------( 156      ( 08 Sep 2022 01:35 )             32.5<L>                        10.1<L>  6.86  )-----------( 116<L>    ( 07 Sep 2022 16:22 )             30.3<L>    09-08    139  |  107  |  27<H>  ----------------------------<  104<H>  4.5   |  20  |  0.8  09-07    140  |  107  |  25<H>  ----------------------------<  115<H>  4.0   |  22  |  0.7    Ca    8.1<L>      08 Sep 2022 01:35  Phos  3.8     09-07  Mg     2.6     09-08    TPro  5.3<L> [6.0 - 8.0]  /  Alb  3.5 [3.5 - 5.2]  /  TBili  0.6 [0.2 - 1.2]  /  DBili  x   /  AST  22 [0 - 41]  /  ALT  11 [0 - 41]  /  AlkPhos  98 [30 - 115]  09-08    PT/INR - ( 08 Sep 2022 01:35 )   PT: ;   INR: 1.00 ratio         PT/INR - ( 07 Sep 2022 16:22 )   PT: ;   INR: 1.10 ratio         PTT - ( 08 Sep 2022 01:35 )  PTT:29.9 sec, PTT - ( 07 Sep 2022 16:22 )  PTT:34.5 sec    ABG - ( 08 Sep 2022 02:32 )  pH: 7.35  /  pCO2: 43    /  pO2: 113   / HCO3: 24    / Base Excess: -1.9  /  SaO2: 98.7  /  LA: 0.60             RADIOLOGY & ADDITIONAL TESTS:  CXR:  EKG:  MEDICATIONS  (STANDING):  acetaminophen     Tablet .. 650 milliGRAM(s) Oral every 6 hours  ascorbic acid 500 milliGRAM(s) Oral daily  chlorhexidine 0.12% Liquid 5 milliLiter(s) Oral Mucosa two times a day  clopidogrel Tablet 75 milliGRAM(s) Oral daily  dextrose 50% Injectable 50 milliLiter(s) IV Push every 15 minutes  dextrose 50% Injectable 25 milliLiter(s) IV Push every 15 minutes  furosemide    Tablet 40 milliGRAM(s) Oral daily  influenza  Vaccine (HIGH DOSE) 0.7 milliLiter(s) IntraMuscular once  insulin regular Infusion 10 Unit(s)/Hr (10 mL/Hr) IV Continuous <Continuous>  levothyroxine 100 MICROGram(s) Oral daily  losartan 25 milliGRAM(s) Oral daily  meperidine     Injectable 25 milliGRAM(s) IV Push once  multivitamin 1 Tablet(s) Oral daily  niCARdipine Infusion 5 mG/Hr (25 mL/Hr) IV Continuous <Continuous>  nitroglycerin  Infusion 30 MICROgram(s)/Min (9 mL/Hr) IV Continuous <Continuous>  norepinephrine Infusion 0.05 MICROgram(s)/kG/Min (5.17 mL/Hr) IV Continuous <Continuous>  pantoprazole    Tablet 40 milliGRAM(s) Oral before breakfast  polyethylene glycol 3350 17 Gram(s) Oral daily  senna 2 Tablet(s) Oral at bedtime  sodium chloride 0.9%. 250 milliLiter(s) (50 mL/Hr) IV Continuous <Continuous>  sodium chloride 0.9%. 1000 milliLiter(s) (20 mL/Hr) IV Continuous <Continuous>  sodium chloride 0.9%. 1000 milliLiter(s) (50 mL/Hr) IV Continuous <Continuous>  vasopressin Infusion 0.04 Unit(s)/Min (2.4 mL/Hr) IV Continuous <Continuous>  zinc sulfate 220 milliGRAM(s) Oral daily    MEDICATIONS  (PRN):  HYDROmorphone  Injectable 0.5 milliGRAM(s) IV Push every 6 hours PRN Breakthrough Pain  melatonin 3 milliGRAM(s) Oral at bedtime PRN Insomnia  oxyCODONE    IR 5 milliGRAM(s) Oral every 4 hours PRN Moderate Pain (4 - 6)  oxyCODONE    IR 10 milliGRAM(s) Oral every 4 hours PRN Severe Pain (7 - 10)    HEPARIN:  [] YES [] NO  Dose: XX UNITS/HR UNITS Q8H  LOVENOX:[] YES [] NO  Dose: XX mg Q24H  COUMADIN: []  YES [] NO  Dose: XX mg  Q24H  SCD's: YES b/l  GI Prophylaxis: Protonix [], Pepcid [], None [], (Contra-indication:.....)    Post-Op Beta-Blockers: Yes [], No[], If No, then contraindication:  Post-Op Aspirin: Yes [],  No [], If No, then contraindication:  Post-Op Statin: Yes [], No[], If No, then contraindication:  Allergies    BLUE DYE (Stomach Upset; Nausea)  Cipro (Other)  Levaquin (Rash)  tetracycline (Hives)    Intolerances      Ambulation/Activity Status:    Assessment/Plan:  89y Female status-post .....  - Case and plan discussed with CTU Intensivist and CT Surgeon - Dr. Rodriguez/Grant/Roxanne  - Continue CTU supportive care    - Continue DVT/GI prophylaxis  - Incentive Spirometry 10 times an hour  - Continue to advance physical activity as tolerated and continue PT/OT as directed  1. CAD: Continue ASA, statin, BB  2. HTN:   3. A. Fib:   4. COPD/Hypoxia:   5. DM/Glucose Control:     Social Service Disposition:     OPERATIVE PROCEDURE(s):   TAVR             POD #     1                  89yFemale  SURGEON(s): ANTONELLA Oliveira  SUBJECTIVE ASSESSMENT: pt seen and examined. no acute complaints   Vital Signs Last 24 Hrs  T(F): 97.8 (08 Sep 2022 04:00), Max: 97.8 (08 Sep 2022 04:00)  HR: 59 (08 Sep 2022 10:00) (59 - 73)  BP: 125/57 (07 Sep 2022 22:00) (98/42 - 147/52)  BP(mean): 82 (07 Sep 2022 22:00) (61 - 82)  ABP: 117/36 (08 Sep 2022 10:00) (86/31 - 150/50)  ABP(mean): 61 (08 Sep 2022 10:00)  RR: 30 (08 Sep 2022 10:00) (8 - 30)  SpO2: 99% (08 Sep 2022 10:00) (95% - 100%)      I&O's Detail    07 Sep 2022 07:01  -  08 Sep 2022 07:00  --------------------------------------------------------  IN:    Albumin 5%  - 500 mL: 500 mL    IV PiggyBack: 50 mL    Norepinephrine: 222 mL    PRBCs (Packed Red Blood Cells): 1 mL    sodium chloride 0.9%: 340 mL  Total IN: 1113 mL    OUT:    Indwelling Catheter - Urethral (mL): 1840 mL    Voided (mL): 300 mL  Total OUT: 2140 mL        Net:   I&O's Detail    06 Sep 2022 07:01  -  07 Sep 2022 07:00  --------------------------------------------------------  Total NET: 90 mL      07 Sep 2022 07:01  -  08 Sep 2022 07:00  --------------------------------------------------------  Total NET: -1027 mL        CAPILLARY BLOOD GLUCOSE      POCT Blood Glucose.: 98 mg/dL (08 Sep 2022 07:28)  POCT Blood Glucose.: 106 mg/dL (08 Sep 2022 02:32)    Physical Exam:  General: NAD; A&Ox3   Cardiac: S1/S2, RRR, no murmur, no rubs  Lungs: unlabored respirations, CTA b/l, no wheeze, no rales, no crackles  Abdomen: Soft/NT/ND; positive bowel sounds x 4  Incisions: Incisions clean/dry/intact  Extremities: No edema b/l lower extremities; good capillary refill; no cyanosis; palpable 1+ pedal pulses b/l    Central Venous Catheter: Yes[x]  No[] , If Yes indication:     critical with TVP      Day #1  Gruber Catheter: Yes  [x] , No  [] , If yes indication:   strict i.o                   Day #1  TVP:  [x] YES [] NO                                              Day #1  BOWEL MOVEMENT:  [] YES [x] NO, If No, Timing since last BM:      Day #        LABS:                        11.1<L>  10.58 )-----------( 156      ( 08 Sep 2022 01:35 )             32.5<L>                        10.1<L>  6.86  )-----------( 116<L>    ( 07 Sep 2022 16:22 )             30.3<L>    09-08    139  |  107  |  27<H>  ----------------------------<  104<H>  4.5   |  20  |  0.8  09-07    140  |  107  |  25<H>  ----------------------------<  115<H>  4.0   |  22  |  0.7    Ca    8.1<L>      08 Sep 2022 01:35  Phos  3.8     09-07  Mg     2.6     09-08    TPro  5.3<L> [6.0 - 8.0]  /  Alb  3.5 [3.5 - 5.2]  /  TBili  0.6 [0.2 - 1.2]  /  DBili  x   /  AST  22 [0 - 41]  /  ALT  11 [0 - 41]  /  AlkPhos  98 [30 - 115]  09-08    PT/INR - ( 08 Sep 2022 01:35 )   PT: ;   INR: 1.00 ratio         PT/INR - ( 07 Sep 2022 16:22 )   PT: ;   INR: 1.10 ratio         PTT - ( 08 Sep 2022 01:35 )  PTT:29.9 sec, PTT - ( 07 Sep 2022 16:22 )  PTT:34.5 sec    ABG - ( 08 Sep 2022 02:32 )  pH: 7.35  /  pCO2: 43    /  pO2: 113   / HCO3: 24    / Base Excess: -1.9  /  SaO2: 98.7  /  LA: 0.60             RADIOLOGY & ADDITIONAL TESTS:  CXR: < from: Xray Chest 1 View-PORTABLE IMMEDIATE (Xray Chest 1 View-PORTABLE IMMEDIATE .) (09.07.22 @ 16:36) >  Impression:    Retrocardiac opacification. Support devices as described.    < end of copied text >    EKG: < from: 12 Lead ECG (09.08.22 @ 07:17) >    Ventricular Rate 60 BPM    Atrial Rate 87 BPM    QRS Duration 164 ms    Q-T Interval 492 ms    QTC Calculation(Bazett) 492 ms    P Axis 66 degrees    R Axis 102 degrees    T Axis 108 degrees    Diagnosis Line Sinus rhythm with complete heart block and Ventricular-paced rhythm  Abnormal ECG    < end of copied text >    MEDICATIONS  (STANDING):  acetaminophen     Tablet .. 650 milliGRAM(s) Oral every 6 hours  ascorbic acid 500 milliGRAM(s) Oral daily  chlorhexidine 0.12% Liquid 5 milliLiter(s) Oral Mucosa two times a day  clopidogrel Tablet 75 milliGRAM(s) Oral daily  dextrose 50% Injectable 50 milliLiter(s) IV Push every 15 minutes  dextrose 50% Injectable 25 milliLiter(s) IV Push every 15 minutes  furosemide    Tablet 40 milliGRAM(s) Oral daily  influenza  Vaccine (HIGH DOSE) 0.7 milliLiter(s) IntraMuscular once  insulin regular Infusion 10 Unit(s)/Hr (10 mL/Hr) IV Continuous <Continuous>  levothyroxine 100 MICROGram(s) Oral daily  losartan 25 milliGRAM(s) Oral daily  meperidine     Injectable 25 milliGRAM(s) IV Push once  multivitamin 1 Tablet(s) Oral daily  niCARdipine Infusion 5 mG/Hr (25 mL/Hr) IV Continuous <Continuous>  nitroglycerin  Infusion 30 MICROgram(s)/Min (9 mL/Hr) IV Continuous <Continuous>  norepinephrine Infusion 0.05 MICROgram(s)/kG/Min (5.17 mL/Hr) IV Continuous <Continuous>  pantoprazole    Tablet 40 milliGRAM(s) Oral before breakfast  polyethylene glycol 3350 17 Gram(s) Oral daily  senna 2 Tablet(s) Oral at bedtime  sodium chloride 0.9%. 250 milliLiter(s) (50 mL/Hr) IV Continuous <Continuous>  sodium chloride 0.9%. 1000 milliLiter(s) (20 mL/Hr) IV Continuous <Continuous>  sodium chloride 0.9%. 1000 milliLiter(s) (50 mL/Hr) IV Continuous <Continuous>  vasopressin Infusion 0.04 Unit(s)/Min (2.4 mL/Hr) IV Continuous <Continuous>  zinc sulfate 220 milliGRAM(s) Oral daily    MEDICATIONS  (PRN):  HYDROmorphone  Injectable 0.5 milliGRAM(s) IV Push every 6 hours PRN Breakthrough Pain  melatonin 3 milliGRAM(s) Oral at bedtime PRN Insomnia  oxyCODONE    IR 5 milliGRAM(s) Oral every 4 hours PRN Moderate Pain (4 - 6)  oxyCODONE    IR 10 milliGRAM(s) Oral every 4 hours PRN Severe Pain (7 - 10)      Allergies    BLUE DYE (Stomach Upset; Nausea)  Cipro (Other)  Levaquin (Rash)  tetracycline (Hives)    Intolerances      Ambulation/Activity Status: bedrest    Assessment/Plan:  89y Female status-post TAVR POD # 1   - Case and plan discussed with CTU Intensivist and CT Surgeon - Dr. Burns/Roxanne  - Continue CTU supportive care    - Continue DVT/GI prophylaxis  - Incentive Spirometry 10 times an hour  - Continue to advance physical activity as tolerated and continue PT/OT as directed  1. Continue cont plavix  2. complete heart block s/p TAVR- npo for micra today     Social Service Disposition:  snf when stable

## 2022-09-08 NOTE — PRE-ANESTHESIA EVALUATION ADULT - NSANTHPMHFT_GEN_ALL_CORE
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 Pt admitted for TAVR. Underwent TAVR placement 09/07 and developed complete heart block requiring temporary pacemaker.
TAVR

## 2022-09-08 NOTE — CHART NOTE - NSCHARTNOTEFT_GEN_A_CORE
Electrophysiology Brief Post-Op Note      I have personally seen and examined the patient.  I agree with the history and physical which I have reviewed and noted any changes below.      PRE-OP DIAGNOSIS:  -Complete AV block with symptoms    POST-OP DIAGNOSIS:   -Complete AV block with symptoms    PROCEDURE:  -Implant of leadless pacemaker (Medtronic)    Vascular Access used (using Ultrasound Guidance and micropuncture needle)  -Right Femoral Vein: 11F (upgraded to 23 Fr)  -Left Femoral Vein: 6F  -Right Femoral Artery: none    All sheaths and wires removed and figure 8 suture applied followed by manual pressure.    Physician: Andrew  Assistant: None    ANESTHESIA TYPE:  [  ]General Anesthesia  [X] Sedation  [X] Local/Regional      CONDITION  [  ] Critical  [  ] Serious  [  ]Fair  [X]Good      SPECIMENS REMOVED (IF APPLICABLE): NONE  All wires were removed    IMPLANTS (IF APPLICABLE):  MICRA-AV (Medtronic Leadless pacemaker)    FINDINGS (see below)  -Successful implant of leadless pacemaker  -No immediate complications  -Perclose suture x2 deployed at Micra sheath femoral access site + figure 8 right groin  -Contrast used: 8 cc  -ESTIMATED BLOOD LOSS:  25 mL      PLAN OF CARE  -	Bed rest overnight  -	Admit to CTU

## 2022-09-08 NOTE — PRE-ANESTHESIA EVALUATION ADULT - NSANTHOSAYNRD_GEN_A_CORE
No. ZEKE screening performed.  STOP BANG Legend: 0-2 = LOW Risk; 3-4 = INTERMEDIATE Risk; 5-8 = HIGH Risk
No. ZEKE screening performed.  STOP BANG Legend: 0-2 = LOW Risk; 3-4 = INTERMEDIATE Risk; 5-8 = HIGH Risk

## 2022-09-09 LAB
ALBUMIN SERPL ELPH-MCNC: 3.1 G/DL — LOW (ref 3.5–5.2)
ALP SERPL-CCNC: 136 U/L — HIGH (ref 30–115)
ALT FLD-CCNC: 10 U/L — SIGNIFICANT CHANGE UP (ref 0–41)
ANION GAP SERPL CALC-SCNC: 6 MMOL/L — LOW (ref 7–14)
APTT BLD: 32.5 SEC — SIGNIFICANT CHANGE UP (ref 27–39.2)
AST SERPL-CCNC: 32 U/L — SIGNIFICANT CHANGE UP (ref 0–41)
BASOPHILS # BLD AUTO: 0.04 K/UL — SIGNIFICANT CHANGE UP (ref 0–0.2)
BASOPHILS NFR BLD AUTO: 0.5 % — SIGNIFICANT CHANGE UP (ref 0–1)
BILIRUB SERPL-MCNC: 0.7 MG/DL — SIGNIFICANT CHANGE UP (ref 0.2–1.2)
BUN SERPL-MCNC: 21 MG/DL — HIGH (ref 10–20)
CALCIUM SERPL-MCNC: 8.1 MG/DL — LOW (ref 8.5–10.1)
CHLORIDE SERPL-SCNC: 110 MMOL/L — SIGNIFICANT CHANGE UP (ref 98–110)
CO2 SERPL-SCNC: 22 MMOL/L — SIGNIFICANT CHANGE UP (ref 17–32)
CREAT SERPL-MCNC: 0.7 MG/DL — SIGNIFICANT CHANGE UP (ref 0.7–1.5)
EGFR: 83 ML/MIN/1.73M2 — SIGNIFICANT CHANGE UP
EOSINOPHIL # BLD AUTO: 0.18 K/UL — SIGNIFICANT CHANGE UP (ref 0–0.7)
EOSINOPHIL NFR BLD AUTO: 2.4 % — SIGNIFICANT CHANGE UP (ref 0–8)
GLUCOSE SERPL-MCNC: 124 MG/DL — HIGH (ref 70–99)
HCT VFR BLD CALC: 28.5 % — LOW (ref 37–47)
HGB BLD-MCNC: 9.4 G/DL — LOW (ref 12–16)
IMM GRANULOCYTES NFR BLD AUTO: 0.5 % — HIGH (ref 0.1–0.3)
INR BLD: 1.16 RATIO — SIGNIFICANT CHANGE UP (ref 0.65–1.3)
LYMPHOCYTES # BLD AUTO: 0.53 K/UL — LOW (ref 1.2–3.4)
LYMPHOCYTES # BLD AUTO: 7 % — LOW (ref 20.5–51.1)
MAGNESIUM SERPL-MCNC: 2.3 MG/DL — SIGNIFICANT CHANGE UP (ref 1.8–2.4)
MCHC RBC-ENTMCNC: 30.5 PG — SIGNIFICANT CHANGE UP (ref 27–31)
MCHC RBC-ENTMCNC: 33 G/DL — SIGNIFICANT CHANGE UP (ref 32–37)
MCV RBC AUTO: 92.5 FL — SIGNIFICANT CHANGE UP (ref 81–99)
MONOCYTES # BLD AUTO: 0.7 K/UL — HIGH (ref 0.1–0.6)
MONOCYTES NFR BLD AUTO: 9.2 % — SIGNIFICANT CHANGE UP (ref 1.7–9.3)
NEUTROPHILS # BLD AUTO: 6.09 K/UL — SIGNIFICANT CHANGE UP (ref 1.4–6.5)
NEUTROPHILS NFR BLD AUTO: 80.4 % — HIGH (ref 42.2–75.2)
NRBC # BLD: 0 /100 WBCS — SIGNIFICANT CHANGE UP (ref 0–0)
PLATELET # BLD AUTO: 118 K/UL — LOW (ref 130–400)
POTASSIUM SERPL-MCNC: 4.3 MMOL/L — SIGNIFICANT CHANGE UP (ref 3.5–5)
POTASSIUM SERPL-SCNC: 4.3 MMOL/L — SIGNIFICANT CHANGE UP (ref 3.5–5)
PROT SERPL-MCNC: 5 G/DL — LOW (ref 6–8)
PROTHROM AB SERPL-ACNC: 13.3 SEC — HIGH (ref 9.95–12.87)
RBC # BLD: 3.08 M/UL — LOW (ref 4.2–5.4)
RBC # FLD: 18.5 % — HIGH (ref 11.5–14.5)
SODIUM SERPL-SCNC: 138 MMOL/L — SIGNIFICANT CHANGE UP (ref 135–146)
WBC # BLD: 7.58 K/UL — SIGNIFICANT CHANGE UP (ref 4.8–10.8)
WBC # FLD AUTO: 7.58 K/UL — SIGNIFICANT CHANGE UP (ref 4.8–10.8)

## 2022-09-09 PROCEDURE — 71045 X-RAY EXAM CHEST 1 VIEW: CPT | Mod: 26,59

## 2022-09-09 PROCEDURE — 99024 POSTOP FOLLOW-UP VISIT: CPT

## 2022-09-09 PROCEDURE — 93010 ELECTROCARDIOGRAM REPORT: CPT

## 2022-09-09 PROCEDURE — 71046 X-RAY EXAM CHEST 2 VIEWS: CPT | Mod: 26

## 2022-09-09 RX ORDER — ALBUMIN HUMAN 25 %
250 VIAL (ML) INTRAVENOUS ONCE
Refills: 0 | Status: COMPLETED | OUTPATIENT
Start: 2022-09-09 | End: 2022-09-09

## 2022-09-09 RX ORDER — HEPARIN SODIUM 5000 [USP'U]/ML
5000 INJECTION INTRAVENOUS; SUBCUTANEOUS EVERY 12 HOURS
Refills: 0 | Status: DISCONTINUED | OUTPATIENT
Start: 2022-09-09 | End: 2022-09-10

## 2022-09-09 RX ADMIN — Medication 650 MILLIGRAM(S): at 11:19

## 2022-09-09 RX ADMIN — PANTOPRAZOLE SODIUM 40 MILLIGRAM(S): 20 TABLET, DELAYED RELEASE ORAL at 06:19

## 2022-09-09 RX ADMIN — HEPARIN SODIUM 5000 UNIT(S): 5000 INJECTION INTRAVENOUS; SUBCUTANEOUS at 18:12

## 2022-09-09 RX ADMIN — Medication 500 MILLIGRAM(S): at 11:18

## 2022-09-09 RX ADMIN — SENNA PLUS 2 TABLET(S): 8.6 TABLET ORAL at 22:37

## 2022-09-09 RX ADMIN — Medication 5.17 MICROGRAM(S)/KG/MIN: at 22:39

## 2022-09-09 RX ADMIN — Medication 650 MILLIGRAM(S): at 06:19

## 2022-09-09 RX ADMIN — Medication 250 MILLIGRAM(S): at 11:14

## 2022-09-09 RX ADMIN — CLOPIDOGREL BISULFATE 75 MILLIGRAM(S): 75 TABLET, FILM COATED ORAL at 11:18

## 2022-09-09 RX ADMIN — Medication 1 TABLET(S): at 11:18

## 2022-09-09 RX ADMIN — Medication 250 MILLIGRAM(S): at 22:42

## 2022-09-09 RX ADMIN — POLYETHYLENE GLYCOL 3350 17 GRAM(S): 17 POWDER, FOR SOLUTION ORAL at 11:19

## 2022-09-09 RX ADMIN — ZINC SULFATE TAB 220 MG (50 MG ZINC EQUIVALENT) 220 MILLIGRAM(S): 220 (50 ZN) TAB at 11:18

## 2022-09-09 RX ADMIN — Medication 3 MILLIGRAM(S): at 22:36

## 2022-09-09 RX ADMIN — Medication 650 MILLIGRAM(S): at 23:34

## 2022-09-09 RX ADMIN — Medication 125 MILLILITER(S): at 22:38

## 2022-09-09 RX ADMIN — Medication 100 MICROGRAM(S): at 06:19

## 2022-09-09 RX ADMIN — Medication 650 MILLIGRAM(S): at 18:43

## 2022-09-09 RX ADMIN — Medication 650 MILLIGRAM(S): at 11:49

## 2022-09-09 RX ADMIN — Medication 40 MILLIGRAM(S): at 06:19

## 2022-09-09 RX ADMIN — Medication 650 MILLIGRAM(S): at 18:13

## 2022-09-09 NOTE — PROGRESS NOTE ADULT - SUBJECTIVE AND OBJECTIVE BOX
INTERVAL HPI/OVERNIGHT EVENTS:    Patietn s/p Micra PPM implant  Pt with oozing at groin    MEDICATIONS  (STANDING):  acetaminophen     Tablet .. 650 milliGRAM(s) Oral every 6 hours  ascorbic acid 500 milliGRAM(s) Oral daily  bisacodyl Suppository 10 milliGRAM(s) Rectal once  chlorhexidine 0.12% Liquid 5 milliLiter(s) Oral Mucosa two times a day  clopidogrel Tablet 75 milliGRAM(s) Oral daily  dextrose 50% Injectable 50 milliLiter(s) IV Push every 15 minutes  dextrose 50% Injectable 25 milliLiter(s) IV Push every 15 minutes  furosemide    Tablet 40 milliGRAM(s) Oral daily  influenza  Vaccine (HIGH DOSE) 0.7 milliLiter(s) IntraMuscular once  insulin regular Infusion 10 Unit(s)/Hr (10 mL/Hr) IV Continuous <Continuous>  levothyroxine 100 MICROGram(s) Oral daily  losartan 25 milliGRAM(s) Oral daily  meperidine     Injectable 25 milliGRAM(s) IV Push once  multivitamin 1 Tablet(s) Oral daily  norepinephrine Infusion 0.05 MICROgram(s)/kG/Min (5.17 mL/Hr) IV Continuous <Continuous>  pantoprazole    Tablet 40 milliGRAM(s) Oral before breakfast  polyethylene glycol 3350 17 Gram(s) Oral daily  senna 2 Tablet(s) Oral at bedtime  sodium chloride 0.9%. 1000 milliLiter(s) (20 mL/Hr) IV Continuous <Continuous>  sodium chloride 0.9%. 250 milliLiter(s) (50 mL/Hr) IV Continuous <Continuous>  sodium chloride 0.9%. 1000 milliLiter(s) (50 mL/Hr) IV Continuous <Continuous>  vancomycin  IVPB 1000 milliGRAM(s) IV Intermittent once  vancomycin  IVPB 750 milliGRAM(s) IV Intermittent once  vasopressin Infusion 0.04 Unit(s)/Min (2.4 mL/Hr) IV Continuous <Continuous>  zinc sulfate 220 milliGRAM(s) Oral daily    MEDICATIONS  (PRN):  melatonin 3 milliGRAM(s) Oral at bedtime PRN Insomnia      Allergies    BLUE DYE (Stomach Upset; Nausea)  Cipro (Other)  Levaquin (Rash)  tetracycline (Hives)    Intolerances      Vital Signs Last 24 Hrs  T(C): 36.3 (09 Sep 2022 04:00), Max: 36.8 (08 Sep 2022 12:00)  T(F): 97.4 (09 Sep 2022 04:00), Max: 98.3 (08 Sep 2022 12:00)  HR: 62 (09 Sep 2022 06:00) (59 - 64)  BP: 131/51 (09 Sep 2022 01:00) (114/51 - 131/54)  BP(mean): 74 (09 Sep 2022 01:00) (66 - 78)  RR: 20 (09 Sep 2022 06:00) (13 - 31)  SpO2: 100% (09 Sep 2022 06:00) (97% - 100%)    Parameters below as of 09 Sep 2022 06:00  Patient On (Oxygen Delivery Method): nasal cannula  O2 Flow (L/min): 3    Groin: stitch still in.  Pt with oozing at groin    RADIOLOGY & ADDITIONAL TESTS:  < from: Xray Chest 2 Views PA/Lat (09.09.22 @ 08:02) >  Impression:    1. Unchanged bilateral opacities.       INTERVAL HPI/OVERNIGHT EVENTS:    Patietn s/p Micra PPM implant  Patient with skin oozing at site of figure 8 stitch at groin venous access. no hematoma, no femoral venous bleeding, no vascular complications, only minor skin oozing    MEDICATIONS  (STANDING):  acetaminophen     Tablet .. 650 milliGRAM(s) Oral every 6 hours  ascorbic acid 500 milliGRAM(s) Oral daily  bisacodyl Suppository 10 milliGRAM(s) Rectal once  chlorhexidine 0.12% Liquid 5 milliLiter(s) Oral Mucosa two times a day  clopidogrel Tablet 75 milliGRAM(s) Oral daily  dextrose 50% Injectable 50 milliLiter(s) IV Push every 15 minutes  dextrose 50% Injectable 25 milliLiter(s) IV Push every 15 minutes  furosemide    Tablet 40 milliGRAM(s) Oral daily  influenza  Vaccine (HIGH DOSE) 0.7 milliLiter(s) IntraMuscular once  insulin regular Infusion 10 Unit(s)/Hr (10 mL/Hr) IV Continuous <Continuous>  levothyroxine 100 MICROGram(s) Oral daily  losartan 25 milliGRAM(s) Oral daily  meperidine     Injectable 25 milliGRAM(s) IV Push once  multivitamin 1 Tablet(s) Oral daily  norepinephrine Infusion 0.05 MICROgram(s)/kG/Min (5.17 mL/Hr) IV Continuous <Continuous>  pantoprazole    Tablet 40 milliGRAM(s) Oral before breakfast  polyethylene glycol 3350 17 Gram(s) Oral daily  senna 2 Tablet(s) Oral at bedtime  sodium chloride 0.9%. 1000 milliLiter(s) (20 mL/Hr) IV Continuous <Continuous>  sodium chloride 0.9%. 250 milliLiter(s) (50 mL/Hr) IV Continuous <Continuous>  sodium chloride 0.9%. 1000 milliLiter(s) (50 mL/Hr) IV Continuous <Continuous>  vancomycin  IVPB 1000 milliGRAM(s) IV Intermittent once  vancomycin  IVPB 750 milliGRAM(s) IV Intermittent once  vasopressin Infusion 0.04 Unit(s)/Min (2.4 mL/Hr) IV Continuous <Continuous>  zinc sulfate 220 milliGRAM(s) Oral daily    MEDICATIONS  (PRN):  melatonin 3 milliGRAM(s) Oral at bedtime PRN Insomnia      Allergies    BLUE DYE (Stomach Upset; Nausea)  Cipro (Other)  Levaquin (Rash)  tetracycline (Hives)    Intolerances      Vital Signs Last 24 Hrs  T(C): 36.3 (09 Sep 2022 04:00), Max: 36.8 (08 Sep 2022 12:00)  T(F): 97.4 (09 Sep 2022 04:00), Max: 98.3 (08 Sep 2022 12:00)  HR: 62 (09 Sep 2022 06:00) (59 - 64)  BP: 131/51 (09 Sep 2022 01:00) (114/51 - 131/54)  BP(mean): 74 (09 Sep 2022 01:00) (66 - 78)  RR: 20 (09 Sep 2022 06:00) (13 - 31)  SpO2: 100% (09 Sep 2022 06:00) (97% - 100%)    Parameters below as of 09 Sep 2022 06:00  Patient On (Oxygen Delivery Method): nasal cannula  O2 Flow (L/min): 3    Groin: stitch still in.  Pt with oozing at groin    RADIOLOGY & ADDITIONAL TESTS:  < from: Xray Chest 2 Views PA/Lat (09.09.22 @ 08:02) >  Impression:    1. Unchanged bilateral opacities.

## 2022-09-09 NOTE — PHYSICAL THERAPY INITIAL EVALUATION ADULT - GENERAL OBSERVATIONS, REHAB EVAL
b/s ultrasound being performed at this time. Pt is scheduled for TAVR today, PT to f/u when pt is medically cleared and appropriate for OOB with PT.
...until EP revaluated groin this PM. PT to f/u as appropriate.
13:59-14:38 Pt encountered semifowler in bed with tele, IV, radial A-line, son & daughter, & RN Tye @ b/s in NAD. BP: 130/37, HR: 71bpm, SpO2 99%RA. Pt left sitting in b/s chair with tele, IV, radial A-line, son & daughter, & RN Tye @ b/s in NAD. BP: 126/37, HR: 70bpm, SpO2 100%RA.

## 2022-09-09 NOTE — PROGRESS NOTE ADULT - SUBJECTIVE AND OBJECTIVE BOX
OPERATIVE PROCEDURE(s):                POD #                       89yFemale  SURGEON(s): ANTONELLA Oliveira  SUBJECTIVE ASSESSMENT: pt seen and examined.   Vital Signs Last 24 Hrs  T(F): 97.4 (09 Sep 2022 04:00), Max: 98.3 (08 Sep 2022 12:00)  HR: 62 (09 Sep 2022 06:00) (59 - 64)  BP: 131/51 (09 Sep 2022 01:00) (114/51 - 131/54)  BP(mean): 74 (09 Sep 2022 01:00) (66 - 78)  ABP: 131/38 (09 Sep 2022 06:00) (81/25 - 147/42)  ABP(mean): 67 (09 Sep 2022 06:00)  RR: 20 (09 Sep 2022 06:00) (13 - 31)  SpO2: 100% (09 Sep 2022 06:00) (97% - 100%)    I&O's Detail    08 Sep 2022 07:01  -  09 Sep 2022 07:00  --------------------------------------------------------  IN:    IV PiggyBack: 50 mL    Norepinephrine: 368 mL    sodium chloride 0.9%: 420 mL  Total IN: 838 mL    OUT:    Indwelling Catheter - Urethral (mL): 1310 mL  Total OUT: 1310 mL        Net:   I&O's Detail    07 Sep 2022 07:01  -  08 Sep 2022 07:00  --------------------------------------------------------  Total NET: -1027 mL      08 Sep 2022 07:01  -  09 Sep 2022 07:00  --------------------------------------------------------  Total NET: -472 mL        CAPILLARY BLOOD GLUCOSE      POCT Blood Glucose.: 100 mg/dL (08 Sep 2022 17:04)    Physical Exam:  General: NAD; A&Ox3/Patient is intubated and sedated  Cardiac: S1/S2, RRR, no murmur, no rubs  Lungs: unlabored respirations, CTA b/l, no wheeze, no rales, no crackles  Abdomen: Soft/NT/ND; positive bowel sounds x 4  Sternum: Intact, no click, incision healing well with no drainage  Incisions: Incisions clean/dry/intact  Extremities: No edema b/l lower extremities; good capillary refill; no cyanosis; palpable 1+ pedal pulses b/l    Central Venous Catheter: Yes[]  No[] , If Yes indication:           Day #  Gruber Catheter: Yes  [] , No  [] , If yes indication:                      Day #  NGT: Yes [] No [] ,    If Yes Placement:                                     Day #  EPICARDIAL WIRES:  [] YES [] NO                                              Day #  BOWEL MOVEMENT:  [] YES [] NO, If No, Timing since last BM:      Day #  CHEST TUBE(Left/Right):  [] YES [] NO, If yes -  AIR LEAKS:  [] YES [] NO        LABS:                        9.4<L>  7.58  )-----------( 118<L>    ( 09 Sep 2022 02:30 )             28.5<L>                        11.1<L>  10.58 )-----------( 156      ( 08 Sep 2022 01:35 )             32.5<L>    09-09    138  |  110  |  21<H>  ----------------------------<  124<H>  4.3   |  22  |  0.7  09-08    139  |  107  |  27<H>  ----------------------------<  104<H>  4.5   |  20  |  0.8    Ca    8.1<L>      09 Sep 2022 02:30  Phos  3.8     09-07  Mg     2.3     09-09    TPro  5.0<L> [6.0 - 8.0]  /  Alb  3.1<L> [3.5 - 5.2]  /  TBili  0.7 [0.2 - 1.2]  /  DBili  x   /  AST  32 [0 - 41]  /  ALT  10 [0 - 41]  /  AlkPhos  136<H> [30 - 115]  09-09    PT/INR - ( 09 Sep 2022 02:30 )   PT: ;   INR: 1.16 ratio         PT/INR - ( 08 Sep 2022 01:35 )   PT: ;   INR: 1.00 ratio         PTT - ( 09 Sep 2022 02:30 )  PTT:32.5 sec, PTT - ( 08 Sep 2022 01:35 )  PTT:29.9 sec    ABG - ( 08 Sep 2022 02:32 )  pH: 7.35  /  pCO2: 43    /  pO2: 113   / HCO3: 24    / Base Excess: -1.9  /  SaO2: 98.7  /  LA: 0.60             RADIOLOGY & ADDITIONAL TESTS:  CXR:  EKG:  MEDICATIONS  (STANDING):  acetaminophen     Tablet .. 650 milliGRAM(s) Oral every 6 hours  ascorbic acid 500 milliGRAM(s) Oral daily  bisacodyl Suppository 10 milliGRAM(s) Rectal once  chlorhexidine 0.12% Liquid 5 milliLiter(s) Oral Mucosa two times a day  clopidogrel Tablet 75 milliGRAM(s) Oral daily  dextrose 50% Injectable 50 milliLiter(s) IV Push every 15 minutes  dextrose 50% Injectable 25 milliLiter(s) IV Push every 15 minutes  furosemide    Tablet 40 milliGRAM(s) Oral daily  influenza  Vaccine (HIGH DOSE) 0.7 milliLiter(s) IntraMuscular once  levothyroxine 100 MICROGram(s) Oral daily  losartan 25 milliGRAM(s) Oral daily  meperidine     Injectable 25 milliGRAM(s) IV Push once  multivitamin 1 Tablet(s) Oral daily  norepinephrine Infusion 0.05 MICROgram(s)/kG/Min (5.17 mL/Hr) IV Continuous <Continuous>  pantoprazole    Tablet 40 milliGRAM(s) Oral before breakfast  polyethylene glycol 3350 17 Gram(s) Oral daily  senna 2 Tablet(s) Oral at bedtime  sodium chloride 0.9%. 1000 milliLiter(s) (20 mL/Hr) IV Continuous <Continuous>  sodium chloride 0.9%. 250 milliLiter(s) (50 mL/Hr) IV Continuous <Continuous>  sodium chloride 0.9%. 1000 milliLiter(s) (50 mL/Hr) IV Continuous <Continuous>  vancomycin  IVPB 1000 milliGRAM(s) IV Intermittent once  vancomycin  IVPB 750 milliGRAM(s) IV Intermittent once  vasopressin Infusion 0.04 Unit(s)/Min (2.4 mL/Hr) IV Continuous <Continuous>  zinc sulfate 220 milliGRAM(s) Oral daily    MEDICATIONS  (PRN):  melatonin 3 milliGRAM(s) Oral at bedtime PRN Insomnia    HEPARIN:  [] YES [] NO  Dose: XX UNITS/HR UNITS Q8H  LOVENOX:[] YES [] NO  Dose: XX mg Q24H  COUMADIN: []  YES [] NO  Dose: XX mg  Q24H  SCD's: YES b/l  GI Prophylaxis: Protonix [], Pepcid [], None [], (Contra-indication:.....)    Post-Op Beta-Blockers: Yes [], No[], If No, then contraindication:  Post-Op Aspirin: Yes [],  No [], If No, then contraindication:  Post-Op Statin: Yes [], No[], If No, then contraindication:  Allergies    BLUE DYE (Stomach Upset; Nausea)  Cipro (Other)  Levaquin (Rash)  tetracycline (Hives)    Intolerances      Ambulation/Activity Status:    Assessment/Plan:  89y Female status-post .....  - Case and plan discussed with CTU Intensivist and CT Surgeon - Dr. Rodriguez/Grant/Roxanne  - Continue CTU supportive care    - Continue DVT/GI prophylaxis  - Incentive Spirometry 10 times an hour  - Continue to advance physical activity as tolerated and continue PT/OT as directed  1. CAD: Continue ASA, statin, BB  2. HTN:   3. A. Fib:   4. COPD/Hypoxia:   5. DM/Glucose Control:     Social Service Disposition:     OPERATIVE PROCEDURE(s):  TAVR/ micra                POD # 2 / 1                       89yFemale  SURGEON(s): ANTONELLA Oliveira  SUBJECTIVE ASSESSMENT: pt seen and examined. no acute complaints at this time.   Vital Signs Last 24 Hrs  T(F): 97.4 (09 Sep 2022 04:00), Max: 98.3 (08 Sep 2022 12:00)  HR: 62 (09 Sep 2022 06:00) (59 - 64)  BP: 131/51 (09 Sep 2022 01:00) (114/51 - 131/54)  BP(mean): 74 (09 Sep 2022 01:00) (66 - 78)  ABP: 131/38 (09 Sep 2022 06:00) (81/25 - 147/42)  ABP(mean): 67 (09 Sep 2022 06:00)  RR: 20 (09 Sep 2022 06:00) (13 - 31)  SpO2: 100% (09 Sep 2022 06:00) (97% - 100%)    I&O's Detail    08 Sep 2022 07:01  -  09 Sep 2022 07:00  --------------------------------------------------------  IN:    IV PiggyBack: 50 mL    Norepinephrine: 368 mL    sodium chloride 0.9%: 420 mL  Total IN: 838 mL    OUT:    Indwelling Catheter - Urethral (mL): 1310 mL  Total OUT: 1310 mL        Net:   I&O's Detail    07 Sep 2022 07:01  -  08 Sep 2022 07:00  --------------------------------------------------------  Total NET: -1027 mL      08 Sep 2022 07:01  -  09 Sep 2022 07:00  --------------------------------------------------------  Total NET: -472 mL        CAPILLARY BLOOD GLUCOSE      POCT Blood Glucose.: 100 mg/dL (08 Sep 2022 17:04)    Physical Exam:  General: NAD; A&Ox3  Cardiac: S1/S2, RRR, no murmur, no rubs  Lungs: unlabored respirations, CTA b/l, no wheeze, no rales, no crackles  Abdomen: Soft/NT/ND; positive bowel sounds x 4  Incisions: Incisions clean/dry/intact  Extremities: No edema b/l lower extremities; good capillary refill; no cyanosis; palpable 1+ pedal pulses b/l    Central Venous Catheter: Yes[x]  No[] , If Yes indication:    critical      Day #2  Gruber Catheter: Yes  [x] , No  [] , If yes indication:    critical                  Day #2  BOWEL MOVEMENT:  [] YES [x] NO, If No, Timing since last BM:      Day #        LABS:                        9.4<L>  7.58  )-----------( 118<L>    ( 09 Sep 2022 02:30 )             28.5<L>                        11.1<L>  10.58 )-----------( 156      ( 08 Sep 2022 01:35 )             32.5<L>    09-09    138  |  110  |  21<H>  ----------------------------<  124<H>  4.3   |  22  |  0.7  09-08    139  |  107  |  27<H>  ----------------------------<  104<H>  4.5   |  20  |  0.8    Ca    8.1<L>      09 Sep 2022 02:30  Phos  3.8     09-07  Mg     2.3     09-09    TPro  5.0<L> [6.0 - 8.0]  /  Alb  3.1<L> [3.5 - 5.2]  /  TBili  0.7 [0.2 - 1.2]  /  DBili  x   /  AST  32 [0 - 41]  /  ALT  10 [0 - 41]  /  AlkPhos  136<H> [30 - 115]  09-09    PT/INR - ( 09 Sep 2022 02:30 )   PT: ;   INR: 1.16 ratio         PT/INR - ( 08 Sep 2022 01:35 )   PT: ;   INR: 1.00 ratio         PTT - ( 09 Sep 2022 02:30 )  PTT:32.5 sec, PTT - ( 08 Sep 2022 01:35 )  PTT:29.9 sec    ABG - ( 08 Sep 2022 02:32 )  pH: 7.35  /  pCO2: 43    /  pO2: 113   / HCO3: 24    / Base Excess: -1.9  /  SaO2: 98.7  /  LA: 0.60             RADIOLOGY & ADDITIONAL TESTS:  CXR: < from: Xray Chest 2 Views PA/Lat (09.09.22 @ 08:02) >  Impression:    1. Unchanged bilateral opacities.    < end of copied text >    EKG: < from: 12 Lead ECG (09.08.22 @ 07:17) >    Ventricular Rate 60 BPM    Atrial Rate 87 BPM    QRS Duration 164 ms    Q-T Interval 492 ms    QTC Calculation(Bazett) 492 ms    P Axis 66 degrees    R Axis 102 degrees    T Axis 108 degrees    Diagnosis Line Sinus rhythm with complete heart block and Ventricular-paced rhythm  Abnormal ECG    < end of copied text >    MEDICATIONS  (STANDING):  acetaminophen     Tablet .. 650 milliGRAM(s) Oral every 6 hours  ascorbic acid 500 milliGRAM(s) Oral daily  bisacodyl Suppository 10 milliGRAM(s) Rectal once  chlorhexidine 0.12% Liquid 5 milliLiter(s) Oral Mucosa two times a day  clopidogrel Tablet 75 milliGRAM(s) Oral daily  dextrose 50% Injectable 50 milliLiter(s) IV Push every 15 minutes  dextrose 50% Injectable 25 milliLiter(s) IV Push every 15 minutes  furosemide    Tablet 40 milliGRAM(s) Oral daily  influenza  Vaccine (HIGH DOSE) 0.7 milliLiter(s) IntraMuscular once  levothyroxine 100 MICROGram(s) Oral daily  losartan 25 milliGRAM(s) Oral daily  meperidine     Injectable 25 milliGRAM(s) IV Push once  multivitamin 1 Tablet(s) Oral daily  norepinephrine Infusion 0.05 MICROgram(s)/kG/Min (5.17 mL/Hr) IV Continuous <Continuous>  pantoprazole    Tablet 40 milliGRAM(s) Oral before breakfast  polyethylene glycol 3350 17 Gram(s) Oral daily  senna 2 Tablet(s) Oral at bedtime  sodium chloride 0.9%. 1000 milliLiter(s) (20 mL/Hr) IV Continuous <Continuous>  sodium chloride 0.9%. 250 milliLiter(s) (50 mL/Hr) IV Continuous <Continuous>  sodium chloride 0.9%. 1000 milliLiter(s) (50 mL/Hr) IV Continuous <Continuous>  vancomycin  IVPB 1000 milliGRAM(s) IV Intermittent once  vancomycin  IVPB 750 milliGRAM(s) IV Intermittent once  vasopressin Infusion 0.04 Unit(s)/Min (2.4 mL/Hr) IV Continuous <Continuous>  zinc sulfate 220 milliGRAM(s) Oral daily    MEDICATIONS  (PRN):  melatonin 3 milliGRAM(s) Oral at bedtime PRN Insomnia    HEPARIN:  [x] YES [] NO  Dose: 5000 UNITS Q8H    SCD's: YES b/l  GI Prophylaxis: Protonix [x], Pepcid [], None [], (Contra-indication:.....)    Allergies    BLUE DYE (Stomach Upset; Nausea)  Cipro (Other)  Levaquin (Rash)  tetracycline (Hives)    Intolerances      Ambulation/Activity Status: ambulate     Assessment/Plan:  89y Female status-post TAVR / Micra    POD # 2 / 1  - Case and plan discussed with CTU Intensivist and CT Surgeon - Dr. Burns/Roxanne  - Continue CTU supportive care    - Continue DVT/GI prophylaxis  - Incentive Spirometry 10 times an hour  - Continue to advance physical activity as tolerated and continue PT/OT as directed  1. Continue cont plavix  2. complete heart block s/p micra  3. cont home meds     Social Service Disposition:  snf when stable

## 2022-09-09 NOTE — PHYSICAL THERAPY INITIAL EVALUATION ADULT - SPECIFY REASON(S)
Pt s/p TAVR 9/7/22, now with complete heart block, pacemaker dependent. Pt remains on bedrest @ this time & is scheduled for PPM today. PT to f/u as appropriate.
Pt s/p TAVR on 9/7 & s/p Micra PPM on 9/8. Case discussed with CRISTIANO Lester. Pt with oozing from groin from yesterday's procedure.  Pt evaluated by EP. Per PA pt to remain on bedrest...

## 2022-09-09 NOTE — PHYSICAL THERAPY INITIAL EVALUATION ADULT - ADDITIONAL COMMENTS
Lives in pvt home with 4 BRE, however pt was not negotiating stairs @ baseline. Pt required ambulette when leaving the house.

## 2022-09-09 NOTE — PROGRESS NOTE ADULT - ASSESSMENT
A/P   Patient s/p Micra PPM for CHB s/p TAVR    - CXR reviewed  - Device interrogation shows no events.  Device is functioning properly  - pt with oozing from groin.  Cont pressure bag  - FU in 3-4 weeks with me   A/P   Patient s/p Micra PPM for CHB s/p TAVR    - CXR reviewed appropriate device position  - skin oozing at site of figure 8 stitch at groin venous access. no hematoma, no femoral venous bleeding, no vascular complications, only minor skin oozing; will do lido/epi injection  - Device interrogation shows no events.  Device is functioning properly  - FU in 3-4 weeks with me

## 2022-09-09 NOTE — PHYSICAL THERAPY INITIAL EVALUATION ADULT - PERTINENT HX OF CURRENT PROBLEM, REHAB EVAL
Pt was pre op for TAVR, however presented to ED 2* worsening SOB.  She has chronic chest pain that is unchanged from baseline described as a "discomfort up in my throat". She visited the [facility] and was found to have Hb of 5.7, she was transferred to [facility] and 2 units of blood was transfused. Pt underwent TAVR 9/7  & developed complete heart block. EP consulted for possible pacemaker. Pt now s/p Micra PPM.

## 2022-09-10 LAB
ALBUMIN SERPL ELPH-MCNC: 3.2 G/DL — LOW (ref 3.5–5.2)
ALP SERPL-CCNC: 131 U/L — HIGH (ref 30–115)
ALT FLD-CCNC: 11 U/L — SIGNIFICANT CHANGE UP (ref 0–41)
ANION GAP SERPL CALC-SCNC: 7 MMOL/L — SIGNIFICANT CHANGE UP (ref 7–14)
ANISOCYTOSIS BLD QL: SLIGHT — SIGNIFICANT CHANGE UP
AST SERPL-CCNC: 31 U/L — SIGNIFICANT CHANGE UP (ref 0–41)
BILIRUB SERPL-MCNC: 0.5 MG/DL — SIGNIFICANT CHANGE UP (ref 0.2–1.2)
BLD GP AB SCN SERPL QL: SIGNIFICANT CHANGE UP
BUN SERPL-MCNC: 20 MG/DL — SIGNIFICANT CHANGE UP (ref 10–20)
BURR CELLS BLD QL SMEAR: PRESENT — SIGNIFICANT CHANGE UP
CALCIUM SERPL-MCNC: 8.2 MG/DL — LOW (ref 8.5–10.1)
CHLORIDE SERPL-SCNC: 108 MMOL/L — SIGNIFICANT CHANGE UP (ref 98–110)
CO2 SERPL-SCNC: 24 MMOL/L — SIGNIFICANT CHANGE UP (ref 17–32)
CREAT SERPL-MCNC: 0.8 MG/DL — SIGNIFICANT CHANGE UP (ref 0.7–1.5)
EGFR: 70 ML/MIN/1.73M2 — SIGNIFICANT CHANGE UP
GLUCOSE SERPL-MCNC: 142 MG/DL — HIGH (ref 70–99)
HCT VFR BLD CALC: 23 % — LOW (ref 37–47)
HCT VFR BLD CALC: 29.4 % — LOW (ref 37–47)
HGB BLD-MCNC: 7.7 G/DL — LOW (ref 12–16)
HGB BLD-MCNC: 9.5 G/DL — LOW (ref 12–16)
MAGNESIUM SERPL-MCNC: 1.9 MG/DL — SIGNIFICANT CHANGE UP (ref 1.8–2.4)
MANUAL SMEAR VERIFICATION: SIGNIFICANT CHANGE UP
MCHC RBC-ENTMCNC: 30.3 PG — SIGNIFICANT CHANGE UP (ref 27–31)
MCHC RBC-ENTMCNC: 30.8 PG — SIGNIFICANT CHANGE UP (ref 27–31)
MCHC RBC-ENTMCNC: 32.3 G/DL — SIGNIFICANT CHANGE UP (ref 32–37)
MCHC RBC-ENTMCNC: 33.5 G/DL — SIGNIFICANT CHANGE UP (ref 32–37)
MCV RBC AUTO: 92 FL — SIGNIFICANT CHANGE UP (ref 81–99)
MCV RBC AUTO: 93.6 FL — SIGNIFICANT CHANGE UP (ref 81–99)
MICROCYTES BLD QL: SLIGHT — SIGNIFICANT CHANGE UP
NRBC # BLD: 0 /100 WBCS — SIGNIFICANT CHANGE UP (ref 0–0)
NRBC # BLD: 0 /100 WBCS — SIGNIFICANT CHANGE UP (ref 0–0)
OVALOCYTES BLD QL SMEAR: SLIGHT — SIGNIFICANT CHANGE UP
PLAT MORPH BLD: NORMAL — SIGNIFICANT CHANGE UP
PLATELET # BLD AUTO: 64 K/UL — LOW (ref 130–400)
PLATELET # BLD AUTO: 88 K/UL — LOW (ref 130–400)
POIKILOCYTOSIS BLD QL AUTO: SLIGHT — SIGNIFICANT CHANGE UP
POLYCHROMASIA BLD QL SMEAR: SLIGHT — SIGNIFICANT CHANGE UP
POTASSIUM SERPL-MCNC: 4 MMOL/L — SIGNIFICANT CHANGE UP (ref 3.5–5)
POTASSIUM SERPL-SCNC: 4 MMOL/L — SIGNIFICANT CHANGE UP (ref 3.5–5)
PROT SERPL-MCNC: 5.1 G/DL — LOW (ref 6–8)
RBC # BLD: 2.5 M/UL — LOW (ref 4.2–5.4)
RBC # BLD: 3.14 M/UL — LOW (ref 4.2–5.4)
RBC # FLD: 17.5 % — HIGH (ref 11.5–14.5)
RBC # FLD: 19.2 % — HIGH (ref 11.5–14.5)
RBC BLD AUTO: ABNORMAL
SODIUM SERPL-SCNC: 139 MMOL/L — SIGNIFICANT CHANGE UP (ref 135–146)
WBC # BLD: 5.65 K/UL — SIGNIFICANT CHANGE UP (ref 4.8–10.8)
WBC # BLD: 5.74 K/UL — SIGNIFICANT CHANGE UP (ref 4.8–10.8)
WBC # FLD AUTO: 5.65 K/UL — SIGNIFICANT CHANGE UP (ref 4.8–10.8)
WBC # FLD AUTO: 5.74 K/UL — SIGNIFICANT CHANGE UP (ref 4.8–10.8)

## 2022-09-10 PROCEDURE — 99232 SBSQ HOSP IP/OBS MODERATE 35: CPT

## 2022-09-10 PROCEDURE — 93010 ELECTROCARDIOGRAM REPORT: CPT

## 2022-09-10 PROCEDURE — 71045 X-RAY EXAM CHEST 1 VIEW: CPT | Mod: 26

## 2022-09-10 PROCEDURE — 99024 POSTOP FOLLOW-UP VISIT: CPT

## 2022-09-10 RX ORDER — HEPARIN SODIUM 5000 [USP'U]/ML
3000 INJECTION INTRAVENOUS; SUBCUTANEOUS EVERY 12 HOURS
Refills: 0 | Status: DISCONTINUED | OUTPATIENT
Start: 2022-09-10 | End: 2022-09-11

## 2022-09-10 RX ORDER — ASPIRIN/CALCIUM CARB/MAGNESIUM 324 MG
325 TABLET ORAL DAILY
Refills: 0 | Status: DISCONTINUED | OUTPATIENT
Start: 2022-09-10 | End: 2022-09-10

## 2022-09-10 RX ADMIN — LOSARTAN POTASSIUM 25 MILLIGRAM(S): 100 TABLET, FILM COATED ORAL at 06:32

## 2022-09-10 RX ADMIN — POLYETHYLENE GLYCOL 3350 17 GRAM(S): 17 POWDER, FOR SOLUTION ORAL at 12:23

## 2022-09-10 RX ADMIN — HEPARIN SODIUM 3000 UNIT(S): 5000 INJECTION INTRAVENOUS; SUBCUTANEOUS at 17:11

## 2022-09-10 RX ADMIN — Medication 500 MILLIGRAM(S): at 12:23

## 2022-09-10 RX ADMIN — SENNA PLUS 2 TABLET(S): 8.6 TABLET ORAL at 21:05

## 2022-09-10 RX ADMIN — PANTOPRAZOLE SODIUM 40 MILLIGRAM(S): 20 TABLET, DELAYED RELEASE ORAL at 06:32

## 2022-09-10 RX ADMIN — Medication 100 MICROGRAM(S): at 06:32

## 2022-09-10 RX ADMIN — Medication 650 MILLIGRAM(S): at 00:00

## 2022-09-10 RX ADMIN — HEPARIN SODIUM 5000 UNIT(S): 5000 INJECTION INTRAVENOUS; SUBCUTANEOUS at 06:31

## 2022-09-10 RX ADMIN — CHLORHEXIDINE GLUCONATE 5 MILLILITER(S): 213 SOLUTION TOPICAL at 06:32

## 2022-09-10 RX ADMIN — Medication 1 TABLET(S): at 12:23

## 2022-09-10 RX ADMIN — ZINC SULFATE TAB 220 MG (50 MG ZINC EQUIVALENT) 220 MILLIGRAM(S): 220 (50 ZN) TAB at 12:23

## 2022-09-10 NOTE — PROGRESS NOTE ADULT - SUBJECTIVE AND OBJECTIVE BOX
SUBJECTIVE:    Breathing comfortable.  Weaning Levophed.  Hb drop.  No overt bleed.    VITALS:  T(C): 36.2 (09-10-22 @ 08:00), Max: 36.6 (09-09-22 @ 16:45)  HR: 72 (09-10-22 @ 13:00) (63 - 78)  BP: 137/61 (09-10-22 @ 12:00) (116/52 - 209/83)  RR: 15 (09-10-22 @ 13:00) (6 - 39)  SpO2: 99% (09-10-22 @ 13:00) (98% - 100%)    I&O's Summary:    09 Sep 2022 07:01  -  10 Sep 2022 07:00  --------------------------------------------------------  IN: 2158.2 mL / OUT: 1335 mL / NET: 823.2 mL    10 Sep 2022 07:01  -  10 Sep 2022 13:47  --------------------------------------------------------  IN: 589 mL / OUT: 480 mL / NET: 109 mL    TELEMETRY: NSR, V-PCK    LABS:                        7.7    5.65  )-----------( 88       ( 10 Sep 2022 01:40 )             23.0     09-10    139  |  108  |  20  ----------------------------<  142<H>  4.0   |  24  |  0.8    Ca    8.2<L>      10 Sep 2022 01:40  Mg     1.9     09-10    TPro  5.1<L>  /  Alb  3.2<L>  /  TBili  0.5  /  DBili  x   /  AST  31  /  ALT  11  /  AlkPhos  131<H>  09-10    PT/INR - ( 09 Sep 2022 02:30 )   PT: 13.30 sec;   INR: 1.16 ratio         PTT - ( 09 Sep 2022 02:30 )  PTT:32.5 sec    MEDICATIONS  (STANDING):  acetaminophen     Tablet .. 650 milliGRAM(s) Oral every 6 hours  ascorbic acid 500 milliGRAM(s) Oral daily  bisacodyl Suppository 10 milliGRAM(s) Rectal once  chlorhexidine 0.12% Liquid 5 milliLiter(s) Oral Mucosa two times a day  dextrose 50% Injectable 50 milliLiter(s) IV Push every 15 minutes  dextrose 50% Injectable 25 milliLiter(s) IV Push every 15 minutes  heparin   Injectable 5000 Unit(s) SubCutaneous every 12 hours  influenza  Vaccine (HIGH DOSE) 0.7 milliLiter(s) IntraMuscular once  insulin regular Infusion 10 Unit(s)/Hr (10 mL/Hr) IV Continuous <Continuous>  levothyroxine 100 MICROGram(s) Oral daily  losartan 25 milliGRAM(s) Oral daily  meperidine     Injectable 25 milliGRAM(s) IV Push once  multivitamin 1 Tablet(s) Oral daily  norepinephrine Infusion 0.05 MICROgram(s)/kG/Min (5.17 mL/Hr) IV Continuous <Continuous>  pantoprazole    Tablet 40 milliGRAM(s) Oral before breakfast  polyethylene glycol 3350 17 Gram(s) Oral daily  senna 2 Tablet(s) Oral at bedtime  sodium chloride 0.9%. 1000 milliLiter(s) (20 mL/Hr) IV Continuous <Continuous>  sodium chloride 0.9%. 250 milliLiter(s) (50 mL/Hr) IV Continuous <Continuous>  sodium chloride 0.9%. 1000 milliLiter(s) (50 mL/Hr) IV Continuous <Continuous>  vasopressin Infusion 0.04 Unit(s)/Min (2.4 mL/Hr) IV Continuous <Continuous>  zinc sulfate 220 milliGRAM(s) Oral daily    IMPRESSION:  1) Severe AS s/p TAVR (9/7/22)  - nL THV function.  Mild PVL.  - No complications.  No hematomas.    2) Iatrogenic CHB (underlying RBBB):  - s/p AV MICRA    3) Chronic Diastolic CHF:  - Near euvolemic.  - Chronic pleural effusion,    4) Chronic GIB:  - Colonic AVM s/p endoscopic therapy.  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  - Admitted prior to TAVR with recurrent anemia for transfusion support and TAVR.    RECOMMEND:  - Hold antiplatelets today.  - Transfuse 1 unit PRBC.  - ASA monotherapy when Hb stable.  - Stop Levophed  - Remove Gruber and A-Line  - OOB

## 2022-09-10 NOTE — CHART NOTE - NSCHARTNOTESELECT_GEN_ALL_CORE
Electrophysiology EP
Pre-Procedure EP note
Registered Dietitian Follow-Up/Event Note
TR band removal/Event Note
Transfer Note
Brief Operative EP report
Transfer Note

## 2022-09-10 NOTE — PROGRESS NOTE ADULT - SUBJECTIVE AND OBJECTIVE BOX
OPERATIVE PROCEDURE(s):                POD # 3 TAVR #2 MICRA                      89yFemale  SURGEON(s): ANTONELLA Oliveira  SUBJECTIVE ASSESSMENT: no complaints    Vital Signs Last 24 Hrs  T(F): 97.8 (09 Sep 2022 16:45), Max: 97.8 (09 Sep 2022 08:00)  HR: 67 (10 Sep 2022 06:00) (61 - 78)  BP: 163/68 (10 Sep 2022 06:00) (103/45 - 209/83)  BP(mean): 98 (10 Sep 2022 06:00) (48 - 119)  ABP: 156/47 (10 Sep 2022 06:00) (98/32 - 199/60)  ABP(mean): 86 (10 Sep 2022 06:00)  RR: 14 (10 Sep 2022 06:00) (6 - 39)  SpO2: 100% (10 Sep 2022 06:00) (97% - 100%)    I&O's Detail    09 Sep 2022 07:01  -  10 Sep 2022 07:00  --------------------------------------------------------  IN:    Albumin 5%  - 500 mL: 500 mL    IV PiggyBack: 250 mL    Norepinephrine: 272.2 mL    sodium chloride 0.9%: 720 mL    sodium chloride 0.9%: 240 mL  Total IN: 1982.2 mL    OUT:    Indwelling Catheter - Urethral (mL): 1035 mL  Total OUT: 1035 mL    Net:   I&O's Detail    08 Sep 2022 07:01  -  09 Sep 2022 07:00  --------------------------------------------------------  Total NET: -472 mL      09 Sep 2022 07:01  -  10 Sep 2022 07:00  --------------------------------------------------------  Total NET: 947.2 mL      CAPILLARY BLOOD GLUCOSE    Physical Exam:  General: NAD; A&Ox3  Cardiac: S1/S2, RRR, no murmur, no rubs  Lungs: unlabored respirations, CTA b/l, no wheeze, no rales, no crackles  Abdomen: Soft/NT/protuberant  Extremities: No edema b/l lower extremities    Central Venous Catheter: Yes[]  critical patient   Gruber Catheter: Yes  [] , critical patient strict I&O      LABS:                        7.7<L>  5.65  )-----------( 88<L>    ( 10 Sep 2022 01:40 )             23.0<L>                        9.4<L>  7.58  )-----------( 118<L>    ( 09 Sep 2022 02:30 )             28.5<L>    09-10    139  |  108  |  20  ----------------------------<  142<H>  4.0   |  24  |  0.8  09-09    138  |  110  |  21<H>  ----------------------------<  124<H>  4.3   |  22  |  0.7    Ca    8.2<L>      10 Sep 2022 01:40  Mg     1.9     09-10    TPro  5.1<L> [6.0 - 8.0]  /  Alb  3.2<L> [3.5 - 5.2]  /  TBili  0.5 [0.2 - 1.2]  /  DBili  x   /  AST  31 [0 - 41]  /  ALT  11 [0 - 41]  /  AlkPhos  131<H> [30 - 115]  09-10    PT/INR - ( 09 Sep 2022 02:30 )   PT: ;   INR: 1.16 ratio         PTT - ( 09 Sep 2022 02:30 )  PTT:32.5 sec    RADIOLOGY & ADDITIONAL TESTS:  CXR:   EKG:  MEDICATIONS  (STANDING):  acetaminophen     Tablet .. 650 milliGRAM(s) Oral every 6 hours  ascorbic acid 500 milliGRAM(s) Oral daily  bisacodyl Suppository 10 milliGRAM(s) Rectal once  chlorhexidine 0.12% Liquid 5 milliLiter(s) Oral Mucosa two times a day  clopidogrel Tablet 75 milliGRAM(s) Oral daily  dextrose 50% Injectable 50 milliLiter(s) IV Push every 15 minutes  dextrose 50% Injectable 25 milliLiter(s) IV Push every 15 minutes  heparin   Injectable 5000 Unit(s) SubCutaneous every 12 hours  influenza  Vaccine (HIGH DOSE) 0.7 milliLiter(s) IntraMuscular once  insulin regular Infusion 10 Unit(s)/Hr (10 mL/Hr) IV Continuous <Continuous>  levothyroxine 100 MICROGram(s) Oral daily  losartan 25 milliGRAM(s) Oral daily  meperidine     Injectable 25 milliGRAM(s) IV Push once  multivitamin 1 Tablet(s) Oral daily  norepinephrine Infusion 0.05 MICROgram(s)/kG/Min (5.17 mL/Hr) IV Continuous <Continuous>  pantoprazole    Tablet 40 milliGRAM(s) Oral before breakfast  polyethylene glycol 3350 17 Gram(s) Oral daily  senna 2 Tablet(s) Oral at bedtime  sodium chloride 0.9%. 1000 milliLiter(s) (20 mL/Hr) IV Continuous <Continuous>  sodium chloride 0.9%. 250 milliLiter(s) (50 mL/Hr) IV Continuous <Continuous>  sodium chloride 0.9%. 1000 milliLiter(s) (50 mL/Hr) IV Continuous <Continuous>  vasopressin Infusion 0.04 Unit(s)/Min (2.4 mL/Hr) IV Continuous <Continuous>  zinc sulfate 220 milliGRAM(s) Oral daily    MEDICATIONS  (PRN):  acetaminophen     Tablet .. 650 milliGRAM(s) Oral every 6 hours PRN Mild Pain (1 - 3)  melatonin 3 milliGRAM(s) Oral at bedtime PRN Insomnia    Allergies    BLUE DYE (Stomach Upset; Nausea)  Cipro (Other)  Levaquin (Rash)  tetracycline (Hives)    Intolerances      Ambulation/Activity Status: ambulate with assistance    Assessment/Plan:  89y Female status-post TAVR and MICRA for complete HB post TAVR  - Case and plan discussed with CTU Intensivist and CT Surgeon - Dr. Oliveira  - Continue CTU supportive care    - Continue DVT prophylaxis  - Continue GI prophylaxis  - Incentive Spirometry 10 times an hour  -   Social Service Disposition:     OPERATIVE PROCEDURE(s):                POD # 3 TAVR #2 MICRA                      89yFemale  SURGEON(s): ANTONELLA Oliveira  SUBJECTIVE ASSESSMENT: no complaints, feels weak    Vital Signs Last 24 Hrs  T(F): 97.8 (09 Sep 2022 16:45), Max: 97.8 (09 Sep 2022 08:00)  HR: 67 (10 Sep 2022 06:00) (61 - 78)  BP: 163/68 (10 Sep 2022 06:00) (103/45 - 209/83)  BP(mean): 98 (10 Sep 2022 06:00) (48 - 119)  ABP: 156/47 (10 Sep 2022 06:00) (98/32 - 199/60)  ABP(mean): 86 (10 Sep 2022 06:00)  RR: 14 (10 Sep 2022 06:00) (6 - 39)  SpO2: 100% (10 Sep 2022 06:00) (97% - 100%)    I&O's Detail    09 Sep 2022 07:01  -  10 Sep 2022 07:00  --------------------------------------------------------  IN:    Albumin 5%  - 500 mL: 500 mL    IV PiggyBack: 250 mL    Norepinephrine: 272.2 mL    sodium chloride 0.9%: 720 mL    sodium chloride 0.9%: 240 mL  Total IN: 1982.2 mL    OUT:    Indwelling Catheter - Urethral (mL): 1035 mL  Total OUT: 1035 mL    Net:   I&O's Detail    08 Sep 2022 07:01  -  09 Sep 2022 07:00  --------------------------------------------------------  Total NET: -472 mL      09 Sep 2022 07:01  -  10 Sep 2022 07:00  --------------------------------------------------------  Total NET: 947.2 mL      CAPILLARY BLOOD GLUCOSE    Physical Exam:  General: NAD; A&Ox3  Cardiac: S1/S2, RRR, no murmur, no rubs  Lungs: unlabored respirations, CTA b/l, no wheeze, no rales, no crackles  Abdomen: Soft/NT/protuberant  Extremities: No significant edema b/l lower extremities  Groins - no gross hematoma, sore to palpation    Central Venous Catheter: Yes[]  critical patient   Gallegos Catheter: Yes  [] , critical patient strict I&O      LABS:                        7.7<L>  5.65  )-----------( 88<L>    ( 10 Sep 2022 01:40 )             23.0<L>                        9.4<L>  7.58  )-----------( 118<L>    ( 09 Sep 2022 02:30 )             28.5<L>    09-10    139  |  108  |  20  ----------------------------<  142<H>  4.0   |  24  |  0.8  09-09    138  |  110  |  21<H>  ----------------------------<  124<H>  4.3   |  22  |  0.7    Ca    8.2<L>      10 Sep 2022 01:40  Mg     1.9     09-10    TPro  5.1<L> [6.0 - 8.0]  /  Alb  3.2<L> [3.5 - 5.2]  /  TBili  0.5 [0.2 - 1.2]  /  DBili  x   /  AST  31 [0 - 41]  /  ALT  11 [0 - 41]  /  AlkPhos  131<H> [30 - 115]  09-10    PT/INR - ( 09 Sep 2022 02:30 )   PT: ;   INR: 1.16 ratio         PTT - ( 09 Sep 2022 02:30 )  PTT:32.5 sec    RADIOLOGY & ADDITIONAL TESTS:  CXR:  < from: Xray Chest 1 View- PORTABLE-Routine (09.10.22 @ 07:18) >  Bilateral opacities/left pleural effusion and cardiomegaly, unchanged.    MEDICATIONS  (STANDING):  acetaminophen     Tablet .. 650 milliGRAM(s) Oral every 6 hours  ascorbic acid 500 milliGRAM(s) Oral daily  bisacodyl Suppository 10 milliGRAM(s) Rectal once  chlorhexidine 0.12% Liquid 5 milliLiter(s) Oral Mucosa two times a day  clopidogrel Tablet 75 milliGRAM(s) Oral daily  dextrose 50% Injectable 50 milliLiter(s) IV Push every 15 minutes  dextrose 50% Injectable 25 milliLiter(s) IV Push every 15 minutes  heparin   Injectable 5000 Unit(s) SubCutaneous every 12 hours  influenza  Vaccine (HIGH DOSE) 0.7 milliLiter(s) IntraMuscular once  insulin regular Infusion 10 Unit(s)/Hr (10 mL/Hr) IV Continuous <Continuous>  levothyroxine 100 MICROGram(s) Oral daily  losartan 25 milliGRAM(s) Oral daily  meperidine     Injectable 25 milliGRAM(s) IV Push once  multivitamin 1 Tablet(s) Oral daily  norepinephrine Infusion 0.05 MICROgram(s)/kG/Min (5.17 mL/Hr) IV Continuous <Continuous>  pantoprazole    Tablet 40 milliGRAM(s) Oral before breakfast  polyethylene glycol 3350 17 Gram(s) Oral daily  senna 2 Tablet(s) Oral at bedtime  sodium chloride 0.9%. 1000 milliLiter(s) (20 mL/Hr) IV Continuous <Continuous>  sodium chloride 0.9%. 250 milliLiter(s) (50 mL/Hr) IV Continuous <Continuous>  sodium chloride 0.9%. 1000 milliLiter(s) (50 mL/Hr) IV Continuous <Continuous>  vasopressin Infusion 0.04 Unit(s)/Min (2.4 mL/Hr) IV Continuous <Continuous>  zinc sulfate 220 milliGRAM(s) Oral daily    MEDICATIONS  (PRN):  acetaminophen     Tablet .. 650 milliGRAM(s) Oral every 6 hours PRN Mild Pain (1 - 3)  melatonin 3 milliGRAM(s) Oral at bedtime PRN Insomnia    Allergies    BLUE DYE (Stomach Upset; Nausea)  Cipro (Other)  Levaquin (Rash)  tetracycline (Hives)    Intolerances      Ambulation/Activity Status: ambulate with assistance    Assessment/Plan:  89y Female with severe AS, admitted with anemia 2/2 to GIB - transfused is now  status-post TAVR day 3  and MICRA for complete HB post TAVR - Day 2 MICRA  - Case and plan discussed with CTU Intensivist and CT Surgeon - Dr. Oliveira / and Dr. Mccollum  - Continue CTU supportive care    - Continue DVT prophylaxis - with SCD's , lower SC heparin to 3000 units Q 12  - Continue GI prophylaxis  - Incentive Spirometry 10 times an hour  - remove arterial line and gallegos  - Acute PO blood loss anemia -  transfuse 1 unit of PRBC  - hold oral anticoagulation today and until H/H is stable  - repeat cbc at 18:00  -   Social Service Disposition:  to be determined anticipating home with services   OPERATIVE PROCEDURE(s):                POD # 3 TAVR #2 MICRA                      89yFemale  SURGEON(s): ANTONELLA Oliveira  SUBJECTIVE ASSESSMENT: no complaints, feels weak    Vital Signs Last 24 Hrs  T(F): 97.8 (09 Sep 2022 16:45), Max: 97.8 (09 Sep 2022 08:00)  HR: 67 (10 Sep 2022 06:00) (61 - 78)  BP: 163/68 (10 Sep 2022 06:00) (103/45 - 209/83)  BP(mean): 98 (10 Sep 2022 06:00) (48 - 119)  ABP: 156/47 (10 Sep 2022 06:00) (98/32 - 199/60)  ABP(mean): 86 (10 Sep 2022 06:00)  RR: 14 (10 Sep 2022 06:00) (6 - 39)  SpO2: 100% (10 Sep 2022 06:00) (97% - 100%)    I&O's Detail    09 Sep 2022 07:01  -  10 Sep 2022 07:00  --------------------------------------------------------  IN:    Albumin 5%  - 500 mL: 500 mL    IV PiggyBack: 250 mL    Norepinephrine: 272.2 mL    sodium chloride 0.9%: 720 mL    sodium chloride 0.9%: 240 mL  Total IN: 1982.2 mL    OUT:    Indwelling Catheter - Urethral (mL): 1035 mL  Total OUT: 1035 mL    Net:   I&O's Detail    08 Sep 2022 07:01  -  09 Sep 2022 07:00  --------------------------------------------------------  Total NET: -472 mL      09 Sep 2022 07:01  -  10 Sep 2022 07:00  --------------------------------------------------------  Total NET: 947.2 mL      CAPILLARY BLOOD GLUCOSE    Physical Exam:  General: NAD; A&Ox3  Cardiac: S1/S2, RRR, no murmur, no rubs  Lungs: unlabored respirations, CTA b/l, no wheeze, no rales, no crackles  Abdomen: Soft/NT/protuberant  Extremities: No significant edema b/l lower extremities  Groins - no gross hematoma, sore to palpation    Central Venous Catheter: Yes[]  critical patient   Gallegos Catheter: Yes  [] , critical patient strict I&O      LABS:                        7.7<L>  5.65  )-----------( 88<L>    ( 10 Sep 2022 01:40 )             23.0<L>                        9.4<L>  7.58  )-----------( 118<L>    ( 09 Sep 2022 02:30 )             28.5<L>    09-10    139  |  108  |  20  ----------------------------<  142<H>  4.0   |  24  |  0.8  09-09    138  |  110  |  21<H>  ----------------------------<  124<H>  4.3   |  22  |  0.7    Ca    8.2<L>      10 Sep 2022 01:40  Mg     1.9     09-10    TPro  5.1<L> [6.0 - 8.0]  /  Alb  3.2<L> [3.5 - 5.2]  /  TBili  0.5 [0.2 - 1.2]  /  DBili  x   /  AST  31 [0 - 41]  /  ALT  11 [0 - 41]  /  AlkPhos  131<H> [30 - 115]  09-10    PT/INR - ( 09 Sep 2022 02:30 )   PT: ;   INR: 1.16 ratio         PTT - ( 09 Sep 2022 02:30 )  PTT:32.5 sec    RADIOLOGY & ADDITIONAL TESTS:  CXR:  < from: Xray Chest 1 View- PORTABLE-Routine (09.10.22 @ 07:18) >  Bilateral opacities/left pleural effusion and cardiomegaly, unchanged.    MEDICATIONS  (STANDING):  acetaminophen     Tablet .. 650 milliGRAM(s) Oral every 6 hours  ascorbic acid 500 milliGRAM(s) Oral daily  bisacodyl Suppository 10 milliGRAM(s) Rectal once  chlorhexidine 0.12% Liquid 5 milliLiter(s) Oral Mucosa two times a day  clopidogrel Tablet 75 milliGRAM(s) Oral daily  dextrose 50% Injectable 50 milliLiter(s) IV Push every 15 minutes  dextrose 50% Injectable 25 milliLiter(s) IV Push every 15 minutes  heparin   Injectable 5000 Unit(s) SubCutaneous every 12 hours  influenza  Vaccine (HIGH DOSE) 0.7 milliLiter(s) IntraMuscular once  insulin regular Infusion 10 Unit(s)/Hr (10 mL/Hr) IV Continuous <Continuous>  levothyroxine 100 MICROGram(s) Oral daily  losartan 25 milliGRAM(s) Oral daily  meperidine     Injectable 25 milliGRAM(s) IV Push once  multivitamin 1 Tablet(s) Oral daily  norepinephrine Infusion 0.05 MICROgram(s)/kG/Min (5.17 mL/Hr) IV Continuous <Continuous>  pantoprazole    Tablet 40 milliGRAM(s) Oral before breakfast  polyethylene glycol 3350 17 Gram(s) Oral daily  senna 2 Tablet(s) Oral at bedtime  sodium chloride 0.9%. 1000 milliLiter(s) (20 mL/Hr) IV Continuous <Continuous>  sodium chloride 0.9%. 250 milliLiter(s) (50 mL/Hr) IV Continuous <Continuous>  sodium chloride 0.9%. 1000 milliLiter(s) (50 mL/Hr) IV Continuous <Continuous>  vasopressin Infusion 0.04 Unit(s)/Min (2.4 mL/Hr) IV Continuous <Continuous>  zinc sulfate 220 milliGRAM(s) Oral daily    MEDICATIONS  (PRN):  acetaminophen     Tablet .. 650 milliGRAM(s) Oral every 6 hours PRN Mild Pain (1 - 3)  melatonin 3 milliGRAM(s) Oral at bedtime PRN Insomnia    Allergies    BLUE DYE (Stomach Upset; Nausea)  Cipro (Other)  Levaquin (Rash)  tetracycline (Hives)    Intolerances      Ambulation/Activity Status: ambulate with assistance    Assessment/Plan:  89y Female with severe AS, admitted with anemia 2/2 to GIB - transfused is now  status-post TAVR day 3  and MICRA for complete HB post TAVR - Day 2   - Case and plan discussed with CTU Intensivist and CT Surgeon - Dr. Oliveira / and Dr. Mccollum  - Continue CTU supportive care    - Continue DVT prophylaxis - with SCD's , lowered SC heparin to 3000 units Q 12 due to thrombocytopenia   - Continue GI prophylaxis  - Incentive Spirometry 10 times an hour  - remove arterial line and gallegos  - Acute PO blood loss anemia with pre-op GIB -  transfuse 1 unit of PRBC - repeat CBC 18:00  - hold oral anticoagulation today and until H/H is stable / Dr. Mccollum  - episode hypotension after losartan may be related to hypovolemia - will discontinue for now    -   Social Service Disposition:  to be determined anticipating home with services

## 2022-09-10 NOTE — CHART NOTE - NSCHARTNOTEFT_GEN_A_CORE
Registered Dietitian Follow-Up     Patient Profile Reviewed                           Yes []   No []     Nutrition History Previously Obtained        Yes []  No []       Pertinent Subjective Information:     Pertinent Medical Interventions:     Diet order:     Anthropometrics:  Height (cm): 157.5 (22 @ 16:43), 157.5 (22 @ 18:22)  Weight (kg): 55.1 (22 @ 16:43), 55.1 (22 @ 18:22)  BMI (kg/m2): 22.2 (22 @ 16:43), 22.2 (22 @ 18:22)  IBW:     Daily Weight in k (), Weight in k.5 (), Weight in k.8 (), Weight in k ()  % Weight Change    MEDICATIONS  (STANDING):  acetaminophen     Tablet .. 650 milliGRAM(s) Oral every 6 hours  ascorbic acid 500 milliGRAM(s) Oral daily  bisacodyl Suppository 10 milliGRAM(s) Rectal once  chlorhexidine 0.12% Liquid 5 milliLiter(s) Oral Mucosa two times a day  dextrose 50% Injectable 50 milliLiter(s) IV Push every 15 minutes  dextrose 50% Injectable 25 milliLiter(s) IV Push every 15 minutes  heparin   Injectable 3000 Unit(s) SubCutaneous every 12 hours  influenza  Vaccine (HIGH DOSE) 0.7 milliLiter(s) IntraMuscular once  insulin regular Infusion 10 Unit(s)/Hr (10 mL/Hr) IV Continuous <Continuous>  levothyroxine 100 MICROGram(s) Oral daily  multivitamin 1 Tablet(s) Oral daily  norepinephrine Infusion 0.05 MICROgram(s)/kG/Min (5.17 mL/Hr) IV Continuous <Continuous>  pantoprazole    Tablet 40 milliGRAM(s) Oral before breakfast  polyethylene glycol 3350 17 Gram(s) Oral daily  senna 2 Tablet(s) Oral at bedtime  sodium chloride 0.9%. 1000 milliLiter(s) (20 mL/Hr) IV Continuous <Continuous>  sodium chloride 0.9%. 250 milliLiter(s) (50 mL/Hr) IV Continuous <Continuous>  sodium chloride 0.9%. 1000 milliLiter(s) (50 mL/Hr) IV Continuous <Continuous>  zinc sulfate 220 milliGRAM(s) Oral daily    MEDICATIONS  (PRN):  acetaminophen     Tablet .. 650 milliGRAM(s) Oral every 6 hours PRN Mild Pain (1 - 3)  melatonin 3 milliGRAM(s) Oral at bedtime PRN Insomnia    Pertinent Labs: 09-10 @ 01:40: Na 139, BUN 20, Cr 0.8, <H>, K+ 4.0, Phos --, Mg 1.9, Alk Phos 131<H>, ALT/SGPT 11, AST/SGOT 31, HbA1c --    Finger Sticks:    Physical Findings:  - Appearance:  - GI function:  - Tubes:  - Oral/Mouth cavity:  - Skin:     Nutrition Requirements:  Weight Used:     Estimated Energy Needs    Continue []  Adjust []  Adjusted Energy Recommendations:   kcal/day        Estimated Protein Needs    Continue []  Adjust []  Adjusted Protein Recommendations:   gm/day        Estimated Fluid Needs        Continue []  Adjust []  Adjusted Fluid Recommendations:   mL/day     Nutrient Intake:     [] Previous Nutrition Diagnosis:            [] Ongoing          [] Resolved    [] No active nutrition diagnosis identified at this time     Nutrition Intervention      Goal/Expected Outcome:      Indicator/Monitoring:      Recommendation: Registered Dietitian Follow-Up     Patient Profile Reviewed                           Yes [x]   No []     Nutrition History Previously Obtained        Yes [x]  No []       Pertinent Medical Interventions: Asymptomatic anemia with AVMs POD # 3 TAVR #2 MICRA. Chronic Diastolic CHF noted. 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     Diet order: Regular diet    Anthropometrics:  Height (cm): 157.5 (22 @ 16:43), 157.5 (22 @ 18:22)  Weight (kg): 55.1 (22 @ 16:43), 55.1 (22 @ 18:22)  BMI (kg/m2): 22.2 (22 @ 16:43), 22.2 (22 @ 18:22)  IBW: 50    Daily Weight in k (), Weight in k.5 (), Weight in k.8 (), Weight in k ()  Initial wt 55.1 kg (). Trend of wt loss observed this admit    MEDICATIONS  (STANDING):  acetaminophen     Tablet .. 650 milliGRAM(s) Oral every 6 hours  ascorbic acid 500 milliGRAM(s) Oral daily  bisacodyl Suppository 10 milliGRAM(s) Rectal once  chlorhexidine 0.12% Liquid 5 milliLiter(s) Oral Mucosa two times a day  dextrose 50% Injectable 50 milliLiter(s) IV Push every 15 minutes  dextrose 50% Injectable 25 milliLiter(s) IV Push every 15 minutes  heparin   Injectable 3000 Unit(s) SubCutaneous every 12 hours  influenza  Vaccine (HIGH DOSE) 0.7 milliLiter(s) IntraMuscular once  insulin regular Infusion 10 Unit(s)/Hr (10 mL/Hr) IV Continuous <Continuous>  levothyroxine 100 MICROGram(s) Oral daily  multivitamin 1 Tablet(s) Oral daily  norepinephrine Infusion 0.05 MICROgram(s)/kG/Min (5.17 mL/Hr) IV Continuous <Continuous>  pantoprazole    Tablet 40 milliGRAM(s) Oral before breakfast  polyethylene glycol 3350 17 Gram(s) Oral daily  senna 2 Tablet(s) Oral at bedtime  sodium chloride 0.9%. 1000 milliLiter(s) (20 mL/Hr) IV Continuous <Continuous>  sodium chloride 0.9%. 250 milliLiter(s) (50 mL/Hr) IV Continuous <Continuous>  sodium chloride 0.9%. 1000 milliLiter(s) (50 mL/Hr) IV Continuous <Continuous>  zinc sulfate 220 milliGRAM(s) Oral daily    MEDICATIONS  (PRN):  acetaminophen     Tablet .. 650 milliGRAM(s) Oral every 6 hours PRN Mild Pain (1 - 3)  melatonin 3 milliGRAM(s) Oral at bedtime PRN Insomnia    Pertinent Labs: 09-10 @ 01:40: Na 139, BUN 20, Cr 0.8, <H>, K+ 4.0, Phos --, Mg 1.9, Alk Phos 131<H>, ALT/SGPT 11, AST/SGOT 31,     Physical Findings:  - Appearance: no edema noted; alert  - GI function: last BM . Constipation reported. No nausea/vomiting reported.  - Tubes: no feeding tubes  - Oral/Mouth cavity:  - Skin: B/L buttock stage II pressure ulcer     Nutrition Requirements:  Weight Used: 55.1 kg.     Estimated Energy Needs    Continue [x]  Adjust []  6482-3594 kcal/day (30-35 kcal/kg ABW)        Estimated Protein Needs    Continue [x]  Adjust []  69-83 g/day (1.25-1.5 g/kg ABW)        Estimated Fluid Needs        Continue [x]  Adjust []  4761-9533 mL/day (30-35 mL/kg ABW)     Nutrient Intake: Appetite reportedly decreased; consuming 50% of meals at this time. Trend of wt loss observed from  -  alongside inadequate oral intake. Reviewed recommendations for supplementations with LIP on unit to optimize nutrient intake.     [x] Previous Nutrition Diagnosis: Increased nutrient needs            [x] Ongoing          [] Resolved    Nutrition Intervention: Meals & Snacks, Medical Food Supplements     Goal/Expected Outcome: Pt to demonstrate tolerance to diet order, with at least 75% po intake maintained over next 4 days. Pt at high nutrition risk d/t observed wt loss.     Indicator/Monitoring: Skin, labs, BM, wt, po, diet, nutrition focused physical findings, body composition.    Recommendation:  (1) Order Ensure Enlive two times daily (700 kcal, 40 g protein).  (2) Order MVI q24hrs  (3) Order 250 mg Vit C q12hrs  (4) Order 220 mg Zinc Sulfate q24hrs (d/c after 10-14 days of use)

## 2022-09-11 LAB
ANION GAP SERPL CALC-SCNC: 7 MMOL/L — SIGNIFICANT CHANGE UP (ref 7–14)
BUN SERPL-MCNC: 21 MG/DL — HIGH (ref 10–20)
CALCIUM SERPL-MCNC: 8.5 MG/DL — SIGNIFICANT CHANGE UP (ref 8.5–10.1)
CHLORIDE SERPL-SCNC: 107 MMOL/L — SIGNIFICANT CHANGE UP (ref 98–110)
CO2 SERPL-SCNC: 24 MMOL/L — SIGNIFICANT CHANGE UP (ref 17–32)
CREAT SERPL-MCNC: 0.7 MG/DL — SIGNIFICANT CHANGE UP (ref 0.7–1.5)
EGFR: 83 ML/MIN/1.73M2 — SIGNIFICANT CHANGE UP
GLUCOSE SERPL-MCNC: 84 MG/DL — SIGNIFICANT CHANGE UP (ref 70–99)
HCT VFR BLD CALC: 25.2 % — LOW (ref 37–47)
HGB BLD-MCNC: 8.4 G/DL — LOW (ref 12–16)
MAGNESIUM SERPL-MCNC: 1.8 MG/DL — SIGNIFICANT CHANGE UP (ref 1.8–2.4)
MCHC RBC-ENTMCNC: 30.2 PG — SIGNIFICANT CHANGE UP (ref 27–31)
MCHC RBC-ENTMCNC: 33.3 G/DL — SIGNIFICANT CHANGE UP (ref 32–37)
MCV RBC AUTO: 90.6 FL — SIGNIFICANT CHANGE UP (ref 81–99)
NRBC # BLD: 0 /100 WBCS — SIGNIFICANT CHANGE UP (ref 0–0)
PLATELET # BLD AUTO: 59 K/UL — LOW (ref 130–400)
POTASSIUM SERPL-MCNC: 4.4 MMOL/L — SIGNIFICANT CHANGE UP (ref 3.5–5)
POTASSIUM SERPL-SCNC: 4.4 MMOL/L — SIGNIFICANT CHANGE UP (ref 3.5–5)
RBC # BLD: 2.78 M/UL — LOW (ref 4.2–5.4)
RBC # FLD: 18.6 % — HIGH (ref 11.5–14.5)
SODIUM SERPL-SCNC: 138 MMOL/L — SIGNIFICANT CHANGE UP (ref 135–146)
WBC # BLD: 4.01 K/UL — LOW (ref 4.8–10.8)
WBC # FLD AUTO: 4.01 K/UL — LOW (ref 4.8–10.8)

## 2022-09-11 PROCEDURE — 93010 ELECTROCARDIOGRAM REPORT: CPT

## 2022-09-11 PROCEDURE — 71045 X-RAY EXAM CHEST 1 VIEW: CPT | Mod: 26

## 2022-09-11 PROCEDURE — 99232 SBSQ HOSP IP/OBS MODERATE 35: CPT

## 2022-09-11 RX ORDER — HYDROCORTISONE 1 %
1 OINTMENT (GRAM) TOPICAL DAILY
Refills: 0 | Status: DISCONTINUED | OUTPATIENT
Start: 2022-09-11 | End: 2022-09-12

## 2022-09-11 RX ORDER — MAGNESIUM SULFATE 500 MG/ML
2 VIAL (ML) INJECTION ONCE
Refills: 0 | Status: COMPLETED | OUTPATIENT
Start: 2022-09-11 | End: 2022-09-11

## 2022-09-11 RX ORDER — PHENYLEPHRINE-SHARK LIVER OIL-MINERAL OIL-PETROLATUM RECTAL OINTMENT
1 OINTMENT (GRAM) RECTAL EVERY 12 HOURS
Refills: 0 | Status: DISCONTINUED | OUTPATIENT
Start: 2022-09-11 | End: 2022-09-11

## 2022-09-11 RX ADMIN — PANTOPRAZOLE SODIUM 40 MILLIGRAM(S): 20 TABLET, DELAYED RELEASE ORAL at 06:51

## 2022-09-11 RX ADMIN — POLYETHYLENE GLYCOL 3350 17 GRAM(S): 17 POWDER, FOR SOLUTION ORAL at 12:11

## 2022-09-11 RX ADMIN — HEPARIN SODIUM 3000 UNIT(S): 5000 INJECTION INTRAVENOUS; SUBCUTANEOUS at 05:10

## 2022-09-11 RX ADMIN — Medication 100 MICROGRAM(S): at 05:10

## 2022-09-11 RX ADMIN — Medication 25 GRAM(S): at 06:53

## 2022-09-11 RX ADMIN — CHLORHEXIDINE GLUCONATE 5 MILLILITER(S): 213 SOLUTION TOPICAL at 05:10

## 2022-09-11 RX ADMIN — CHLORHEXIDINE GLUCONATE 5 MILLILITER(S): 213 SOLUTION TOPICAL at 17:10

## 2022-09-11 RX ADMIN — Medication 500 MILLIGRAM(S): at 12:11

## 2022-09-11 RX ADMIN — Medication 1 TABLET(S): at 12:12

## 2022-09-11 RX ADMIN — ZINC SULFATE TAB 220 MG (50 MG ZINC EQUIVALENT) 220 MILLIGRAM(S): 220 (50 ZN) TAB at 12:11

## 2022-09-11 RX ADMIN — SENNA PLUS 2 TABLET(S): 8.6 TABLET ORAL at 22:47

## 2022-09-11 NOTE — PROGRESS NOTE ADULT - SUBJECTIVE AND OBJECTIVE BOX
OPERATIVE PROCEDURE(s):                POD #                       89yFemale  SURGEON(s): ANTONELLA Oliveira  SUBJECTIVE ASSESSMENT:   Vital Signs Last 24 Hrs  T(F): 96 (10 Sep 2022 20:00), Max: 97.5 (10 Sep 2022 12:00)  HR: 67 (11 Sep 2022 07:00) (63 - 83)  BP: 109/49 (11 Sep 2022 07:00) (97/46 - 144/61)  BP(mean): 70 (11 Sep 2022 07:00) (66 - 88)  ABP: 131/44 (10 Sep 2022 13:00) (115/35 - 133/42)  ABP(mean): 74 (10 Sep 2022 13:00)  RR: 16 (11 Sep 2022 07:00) (12 - 35)  SpO2: 100% (11 Sep 2022 07:00) (87% - 100%)    I&O's Detail    10 Sep 2022 07:01  -  11 Sep 2022 07:00  --------------------------------------------------------  IN:    Norepinephrine: 45 mL    Oral Fluid: 530 mL    PRBCs (Packed Red Blood Cells): 306 mL    sodium chloride 0.9%: 140 mL  Total IN: 1021 mL    OUT:    Indwelling Catheter - Urethral (mL): 965 mL  Total OUT: 965 mL        Net:   I&O's Detail    09 Sep 2022 07:01  -  10 Sep 2022 07:00  --------------------------------------------------------  Total NET: 823.2 mL      10 Sep 2022 07:01  -  11 Sep 2022 07:00  --------------------------------------------------------  Total NET: 56 mL        CAPILLARY BLOOD GLUCOSE        Physical Exam:  General: NAD; A&Ox3/Patient is intubated and sedated  Cardiac: S1/S2, RRR, no murmur, no rubs  Lungs: unlabored respirations, CTA b/l, no wheeze, no rales, no crackles  Abdomen: Soft/NT/ND; positive bowel sounds x 4  Sternum: Intact, no click, incision healing well with no drainage  Incisions: Incisions clean/dry/intact  Extremities: No edema b/l lower extremities; good capillary refill; no cyanosis; palpable 1+ pedal pulses b/l    Central Venous Catheter: Yes[]  No[] , If Yes indication:           Day #  Gruber Catheter: Yes  [] , No  [] , If yes indication:                      Day #  NGT: Yes [] No [] ,    If Yes Placement:                                     Day #  EPICARDIAL WIRES:  [] YES [] NO                                              Day #  BOWEL MOVEMENT:  [] YES [] NO, If No, Timing since last BM:      Day #  CHEST TUBE(Left/Right):  [] YES [] NO, If yes -  AIR LEAKS:  [] YES [] NO        LABS:                        8.4<L>  4.01<L> )-----------( 59<L>    ( 11 Sep 2022 04:44 )             25.2<L>                        9.5<L>  5.74  )-----------( 64<L>    ( 10 Sep 2022 18:40 )             29.4<L>    09-11    138  |  107  |  21<H>  ----------------------------<  84  4.4   |  24  |  0.7  09-10    139  |  108  |  20  ----------------------------<  142<H>  4.0   |  24  |  0.8    Ca    8.5      11 Sep 2022 04:44  Mg     1.8     09-11    TPro  5.1<L> [6.0 - 8.0]  /  Alb  3.2<L> [3.5 - 5.2]  /  TBili  0.5 [0.2 - 1.2]  /  DBili  x   /  AST  31 [0 - 41]  /  ALT  11 [0 - 41]  /  AlkPhos  131<H> [30 - 115]  09-10          RADIOLOGY & ADDITIONAL TESTS:  CXR:  EKG:  MEDICATIONS  (STANDING):  acetaminophen     Tablet .. 650 milliGRAM(s) Oral every 6 hours  ascorbic acid 500 milliGRAM(s) Oral daily  bisacodyl Suppository 10 milliGRAM(s) Rectal once  chlorhexidine 0.12% Liquid 5 milliLiter(s) Oral Mucosa two times a day  dextrose 50% Injectable 50 milliLiter(s) IV Push every 15 minutes  dextrose 50% Injectable 25 milliLiter(s) IV Push every 15 minutes  influenza  Vaccine (HIGH DOSE) 0.7 milliLiter(s) IntraMuscular once  levothyroxine 100 MICROGram(s) Oral daily  multivitamin 1 Tablet(s) Oral daily  pantoprazole    Tablet 40 milliGRAM(s) Oral before breakfast  polyethylene glycol 3350 17 Gram(s) Oral daily  senna 2 Tablet(s) Oral at bedtime  sodium chloride 0.9%. 1000 milliLiter(s) (20 mL/Hr) IV Continuous <Continuous>  sodium chloride 0.9%. 250 milliLiter(s) (50 mL/Hr) IV Continuous <Continuous>  sodium chloride 0.9%. 1000 milliLiter(s) (50 mL/Hr) IV Continuous <Continuous>  zinc sulfate 220 milliGRAM(s) Oral daily    MEDICATIONS  (PRN):  acetaminophen     Tablet .. 650 milliGRAM(s) Oral every 6 hours PRN Mild Pain (1 - 3)  melatonin 3 milliGRAM(s) Oral at bedtime PRN Insomnia    HEPARIN:  [] YES [] NO  Dose: XX UNITS/HR UNITS Q8H  LOVENOX:[] YES [] NO  Dose: XX mg Q24H  COUMADIN: []  YES [] NO  Dose: XX mg  Q24H  SCD's: YES b/l  GI Prophylaxis: Protonix [], Pepcid [], None [], (Contra-indication:.....)    Post-Op Beta-Blockers: Yes [], No[], If No, then contraindication:  Post-Op Aspirin: Yes [],  No [], If No, then contraindication:  Post-Op Statin: Yes [], No[], If No, then contraindication:  Allergies    BLUE DYE (Stomach Upset; Nausea)  Cipro (Other)  Levaquin (Rash)  tetracycline (Hives)    Intolerances      Ambulation/Activity Status:    Assessment/Plan:  89y Female status-post .....  - Case and plan discussed with CTU Intensivist and CT Surgeon - Dr. Rodriguez/Grant/Roxanne  - Continue CTU supportive care    - Continue DVT/GI prophylaxis  - Incentive Spirometry 10 times an hour  - Continue to advance physical activity as tolerated and continue PT/OT as directed  1. CAD: Continue ASA, statin, BB  2. HTN:   3. A. Fib:   4. COPD/Hypoxia:   5. DM/Glucose Control:     Social Service Disposition:     OPERATIVE PROCEDURE(s):   TAVR/ micra                POD # 4 / 3                       89yFemale  SURGEON(s): ANTONELLA Oliveira  SUBJECTIVE ASSESSMENT: pt seen and examined. no acute complaints   Vital Signs Last 24 Hrs  T(F): 96 (10 Sep 2022 20:00), Max: 97.5 (10 Sep 2022 12:00)  HR: 67 (11 Sep 2022 07:00) (63 - 83)  BP: 109/49 (11 Sep 2022 07:00) (97/46 - 144/61)  BP(mean): 70 (11 Sep 2022 07:00) (66 - 88)  ABP: 131/44 (10 Sep 2022 13:00) (115/35 - 133/42)  ABP(mean): 74 (10 Sep 2022 13:00)  RR: 16 (11 Sep 2022 07:00) (12 - 35)  SpO2: 100% (11 Sep 2022 07:00) (87% - 100%)    I&O's Detail    10 Sep 2022 07:01  -  11 Sep 2022 07:00  --------------------------------------------------------  IN:    Norepinephrine: 45 mL    Oral Fluid: 530 mL    PRBCs (Packed Red Blood Cells): 306 mL    sodium chloride 0.9%: 140 mL  Total IN: 1021 mL    OUT:    Indwelling Catheter - Urethral (mL): 965 mL  Total OUT: 965 mL        Net:   I&O's Detail    09 Sep 2022 07:01  -  10 Sep 2022 07:00  --------------------------------------------------------  Total NET: 823.2 mL      10 Sep 2022 07:01  -  11 Sep 2022 07:00  --------------------------------------------------------  Total NET: 56 mL        CAPILLARY BLOOD GLUCOSE        Physical Exam:  General: NAD; A&Ox3  Cardiac: S1/S2, RRR, no murmur, no rubs  Lungs: unlabored respirations, CTA b/l, no wheeze, no rales, no crackles  Abdomen: Soft/NT/ND; positive bowel sounds x 4  Incisions: Incisions clean/dry/intact  Extremities: No edema b/l lower extremities; good capillary refill; no cyanosis; palpable 1+ pedal pulses b/l    Central Venous Catheter: Yes[x]  No[] , If Yes indication:    critical      Day #3  Gruber Catheter: Yes  [x] , No  [] , If yes indication:    critical                  Day #3  BOWEL MOVEMENT:  [] YES [x] NO, If No, Timing since last BM:      Day #      LABS:                        8.4<L>  4.01<L> )-----------( 59<L>    ( 11 Sep 2022 04:44 )             25.2<L>                        9.5<L>  5.74  )-----------( 64<L>    ( 10 Sep 2022 18:40 )             29.4<L>    09-11    138  |  107  |  21<H>  ----------------------------<  84  4.4   |  24  |  0.7  09-10    139  |  108  |  20  ----------------------------<  142<H>  4.0   |  24  |  0.8    Ca    8.5      11 Sep 2022 04:44  Mg     1.8     09-11    TPro  5.1<L> [6.0 - 8.0]  /  Alb  3.2<L> [3.5 - 5.2]  /  TBili  0.5 [0.2 - 1.2]  /  DBili  x   /  AST  31 [0 - 41]  /  ALT  11 [0 - 41]  /  AlkPhos  131<H> [30 - 115]  09-10          RADIOLOGY & ADDITIONAL TESTS:  CXR: < from: Xray Chest 1 View- PORTABLE-Routine (09.10.22 @ 07:18) >  Impression:    Bilateral opacities/left pleural effusion and cardiomegaly, unchanged.    < end of copied text >    EKG: < from: 12 Lead ECG (09.09.22 @ 07:29) >    Ventricular Rate 68 BPM    Atrial Rate 75 BPM    QRS Duration 158 ms    Q-T Interval 486 ms    QTC Calculation(Bazett) 516 ms    R Axis 162 degrees    T Axis 21 degrees    Diagnosis Line Ventricular-paced rhythm  Abnormal ECG    < end of copied text >    MEDICATIONS  (STANDING):  acetaminophen     Tablet .. 650 milliGRAM(s) Oral every 6 hours  ascorbic acid 500 milliGRAM(s) Oral daily  bisacodyl Suppository 10 milliGRAM(s) Rectal once  chlorhexidine 0.12% Liquid 5 milliLiter(s) Oral Mucosa two times a day  dextrose 50% Injectable 50 milliLiter(s) IV Push every 15 minutes  dextrose 50% Injectable 25 milliLiter(s) IV Push every 15 minutes  influenza  Vaccine (HIGH DOSE) 0.7 milliLiter(s) IntraMuscular once  levothyroxine 100 MICROGram(s) Oral daily  multivitamin 1 Tablet(s) Oral daily  pantoprazole    Tablet 40 milliGRAM(s) Oral before breakfast  polyethylene glycol 3350 17 Gram(s) Oral daily  senna 2 Tablet(s) Oral at bedtime  sodium chloride 0.9%. 1000 milliLiter(s) (20 mL/Hr) IV Continuous <Continuous>  sodium chloride 0.9%. 250 milliLiter(s) (50 mL/Hr) IV Continuous <Continuous>  sodium chloride 0.9%. 1000 milliLiter(s) (50 mL/Hr) IV Continuous <Continuous>  zinc sulfate 220 milliGRAM(s) Oral daily    MEDICATIONS  (PRN):  acetaminophen     Tablet .. 650 milliGRAM(s) Oral every 6 hours PRN Mild Pain (1 - 3)  melatonin 3 milliGRAM(s) Oral at bedtime PRN Insomnia    SCD's: YES b/l  GI Prophylaxis: Protonix [x], Pepcid [], None [], (Contra-indication:.....)      Allergies    BLUE DYE (Stomach Upset; Nausea)  Cipro (Other)  Levaquin (Rash)  tetracycline (Hives)    Intolerances      Ambulation/Activity Status: ambulate     Assessment/Plan:  89y Female status-post TAVR / Micra    POD #4/3  - Case and plan discussed with CTU Intensivist and CT Surgeon - Dr. Burns/Roxanne  - Continue CTU supportive care    - Continue DVT/GI prophylaxis  - Incentive Spirometry 10 times an hour  - Continue to advance physical activity as tolerated and continue PT/OT as directed  1. hold asa and plavix as pt is thrombocytopenic  2. complete heart block s/p micra- stable  3. cont home meds     Social Service Disposition:  home tomorrow

## 2022-09-11 NOTE — PROGRESS NOTE ADULT - ASSESSMENT
CT surgery attending note    Patient seen and examined on morning rounds along with the physician assistant and the intensivist as described    Status post transcatheter aortic valve replacement  89-year-old lady  Extremely complex past medical history  Multiple GI bleeds  Chronic anemia  Severe frailty    Overall she has done extremely well  Continues to make slow and steady progress    Hemodynamically stable  Did require blood transfusion for a low hematocrit    Patient and family with multiple questions and we have to spend an extreme amount of time with the patient and her family as they have questions that need to be answered almost every minute that they are in the ICU.    The patient, her son João and the extended family updated regarding the plan and progress as with the care team and the intensivist as well as Dr. Mccollum who has primarily been managing the patient.

## 2022-09-12 ENCOUNTER — TRANSCRIPTION ENCOUNTER (OUTPATIENT)
Age: 87
End: 2022-09-12

## 2022-09-12 VITALS
HEART RATE: 73 BPM | OXYGEN SATURATION: 99 % | SYSTOLIC BLOOD PRESSURE: 108 MMHG | RESPIRATION RATE: 28 BRPM | DIASTOLIC BLOOD PRESSURE: 54 MMHG

## 2022-09-12 LAB
ANION GAP SERPL CALC-SCNC: 9 MMOL/L — SIGNIFICANT CHANGE UP (ref 7–14)
BUN SERPL-MCNC: 20 MG/DL — SIGNIFICANT CHANGE UP (ref 10–20)
CALCIUM SERPL-MCNC: 8.8 MG/DL — SIGNIFICANT CHANGE UP (ref 8.5–10.1)
CHLORIDE SERPL-SCNC: 106 MMOL/L — SIGNIFICANT CHANGE UP (ref 98–110)
CO2 SERPL-SCNC: 22 MMOL/L — SIGNIFICANT CHANGE UP (ref 17–32)
CREAT SERPL-MCNC: 0.7 MG/DL — SIGNIFICANT CHANGE UP (ref 0.7–1.5)
EGFR: 83 ML/MIN/1.73M2 — SIGNIFICANT CHANGE UP
GLUCOSE SERPL-MCNC: 76 MG/DL — SIGNIFICANT CHANGE UP (ref 70–99)
HCT VFR BLD CALC: 28 % — LOW (ref 37–47)
HGB BLD-MCNC: 9.4 G/DL — LOW (ref 12–16)
MAGNESIUM SERPL-MCNC: 2 MG/DL — SIGNIFICANT CHANGE UP (ref 1.8–2.4)
MCHC RBC-ENTMCNC: 30.6 PG — SIGNIFICANT CHANGE UP (ref 27–31)
MCHC RBC-ENTMCNC: 33.6 G/DL — SIGNIFICANT CHANGE UP (ref 32–37)
MCV RBC AUTO: 91.2 FL — SIGNIFICANT CHANGE UP (ref 81–99)
NRBC # BLD: 0 /100 WBCS — SIGNIFICANT CHANGE UP (ref 0–0)
PLATELET # BLD AUTO: 77 K/UL — LOW (ref 130–400)
POTASSIUM SERPL-MCNC: 4.2 MMOL/L — SIGNIFICANT CHANGE UP (ref 3.5–5)
POTASSIUM SERPL-SCNC: 4.2 MMOL/L — SIGNIFICANT CHANGE UP (ref 3.5–5)
RBC # BLD: 3.07 M/UL — LOW (ref 4.2–5.4)
RBC # FLD: 18.2 % — HIGH (ref 11.5–14.5)
SODIUM SERPL-SCNC: 137 MMOL/L — SIGNIFICANT CHANGE UP (ref 135–146)
WBC # BLD: 3.92 K/UL — LOW (ref 4.8–10.8)
WBC # FLD AUTO: 3.92 K/UL — LOW (ref 4.8–10.8)

## 2022-09-12 PROCEDURE — 93010 ELECTROCARDIOGRAM REPORT: CPT

## 2022-09-12 PROCEDURE — 71045 X-RAY EXAM CHEST 1 VIEW: CPT | Mod: 26

## 2022-09-12 RX ORDER — ASCORBIC ACID 60 MG
1 TABLET,CHEWABLE ORAL
Qty: 0 | Refills: 0 | DISCHARGE
Start: 2022-09-12

## 2022-09-12 RX ADMIN — PANTOPRAZOLE SODIUM 40 MILLIGRAM(S): 20 TABLET, DELAYED RELEASE ORAL at 06:18

## 2022-09-12 RX ADMIN — CHLORHEXIDINE GLUCONATE 5 MILLILITER(S): 213 SOLUTION TOPICAL at 06:19

## 2022-09-12 RX ADMIN — Medication 100 MICROGRAM(S): at 06:19

## 2022-09-12 NOTE — DISCHARGE NOTE NURSING/CASE MANAGEMENT/SOCIAL WORK - [id number]_GEN_ALL_CORE
For information on Fall & Injury Prevention, visit: [email or web address]  [email or web address] OR  https://www.cdc.gov/steadi/patient.html

## 2022-09-12 NOTE — PROGRESS NOTE ADULT - SUBJECTIVE AND OBJECTIVE BOX
OPERATIVE PROCEDURE(s):                POD #                       89yFemale  SURGEON(s): ANTONELLA Oliveira  SUBJECTIVE ASSESSMENT:   Vital Signs Last 24 Hrs  T(F): 97.2 (12 Sep 2022 04:00), Max: 97.6 (11 Sep 2022 20:00)  HR: 68 (12 Sep 2022 06:30) (62 - 80)  BP: 145/64 (12 Sep 2022 06:30) (96/41 - 159/63)  BP(mean): 92 (12 Sep 2022 06:30) (59 - 95)  ABP: --  ABP(mean): --  RR: 14 (12 Sep 2022 06:30) (12 - 34)  SpO2: 98% (12 Sep 2022 06:30) (97% - 100%)  CVP(mm Hg): --  CVP(cm H2O): --  CO: --  CI: --  PA: --  SVR: --    I&O's Detail    11 Sep 2022 07:01  -  12 Sep 2022 07:00  --------------------------------------------------------  IN:    Oral Fluid: 660 mL  Total IN: 660 mL    OUT:    Indwelling Catheter - Urethral (mL): 1200 mL    Voided (mL): 300 mL  Total OUT: 1500 mL        Net:   I&O's Detail    10 Sep 2022 07:01  -  11 Sep 2022 07:00  --------------------------------------------------------  Total NET: 56 mL      11 Sep 2022 07:01  -  12 Sep 2022 07:00  --------------------------------------------------------  Total NET: -840 mL        CAPILLARY BLOOD GLUCOSE        Physical Exam:  General: NAD; A&Ox3/Patient is intubated and sedated  Cardiac: S1/S2, RRR, no murmur, no rubs  Lungs: unlabored respirations, CTA b/l, no wheeze, no rales, no crackles  Abdomen: Soft/NT/ND; positive bowel sounds x 4  Sternum: Intact, no click, incision healing well with no drainage  Incisions: Incisions clean/dry/intact  Extremities: No edema b/l lower extremities; good capillary refill; no cyanosis; palpable 1+ pedal pulses b/l    Central Venous Catheter: Yes[]  No[] , If Yes indication:           Day #  Gruber Catheter: Yes  [] , No  [] , If yes indication:                      Day #  NGT: Yes [] No [] ,    If Yes Placement:                                     Day #  EPICARDIAL WIRES:  [] YES [] NO                                              Day #  BOWEL MOVEMENT:  [] YES [] NO, If No, Timing since last BM:      Day #  CHEST TUBE(Left/Right):  [] YES [] NO, If yes -  AIR LEAKS:  [] YES [] NO        LABS:                        9.4<L>  3.92<L> )-----------( 77<L>    ( 12 Sep 2022 06:23 )             28.0<L>                        8.4<L>  4.01<L> )-----------( 59<L>    ( 11 Sep 2022 04:44 )             25.2<L>    09-12    137  |  106  |  20  ----------------------------<  76  4.2   |  22  |  0.7  09-11    138  |  107  |  21<H>  ----------------------------<  84  4.4   |  24  |  0.7    Ca    8.8      12 Sep 2022 06:23  Mg     2.0     09-12    TPro  5.1<L> [6.0 - 8.0]  /  Alb  3.2<L> [3.5 - 5.2]  /  TBili  0.5 [0.2 - 1.2]  /  DBili  x   /  AST  31 [0 - 41]  /  ALT  11 [0 - 41]  /  AlkPhos  131<H> [30 - 115]  09-10          RADIOLOGY & ADDITIONAL TESTS:  CXR:  EKG:  MEDICATIONS  (STANDING):  acetaminophen     Tablet .. 650 milliGRAM(s) Oral every 6 hours  ascorbic acid 500 milliGRAM(s) Oral daily  bisacodyl Suppository 10 milliGRAM(s) Rectal once  chlorhexidine 0.12% Liquid 5 milliLiter(s) Oral Mucosa two times a day  dextrose 50% Injectable 50 milliLiter(s) IV Push every 15 minutes  dextrose 50% Injectable 25 milliLiter(s) IV Push every 15 minutes  hydrocortisone hemorrhoidal Suppository 1 Suppository(s) Rectal daily  influenza  Vaccine (HIGH DOSE) 0.7 milliLiter(s) IntraMuscular once  levothyroxine 100 MICROGram(s) Oral daily  multivitamin 1 Tablet(s) Oral daily  norepinephrine Infusion 0.05 MICROgram(s)/kG/Min (5.17 mL/Hr) IV Continuous <Continuous>  pantoprazole    Tablet 40 milliGRAM(s) Oral before breakfast  polyethylene glycol 3350 17 Gram(s) Oral daily  senna 2 Tablet(s) Oral at bedtime  sodium chloride 0.9%. 250 milliLiter(s) (50 mL/Hr) IV Continuous <Continuous>  sodium chloride 0.9%. 1000 milliLiter(s) (20 mL/Hr) IV Continuous <Continuous>  sodium chloride 0.9%. 1000 milliLiter(s) (50 mL/Hr) IV Continuous <Continuous>  zinc sulfate 220 milliGRAM(s) Oral daily    MEDICATIONS  (PRN):  acetaminophen     Tablet .. 650 milliGRAM(s) Oral every 6 hours PRN Mild Pain (1 - 3)  melatonin 3 milliGRAM(s) Oral at bedtime PRN Insomnia    HEPARIN:  [] YES [] NO  Dose: XX UNITS/HR UNITS Q8H  LOVENOX:[] YES [] NO  Dose: XX mg Q24H  COUMADIN: []  YES [] NO  Dose: XX mg  Q24H  SCD's: YES b/l  GI Prophylaxis: Protonix [], Pepcid [], None [], (Contra-indication:.....)    Post-Op Beta-Blockers: Yes [], No[], If No, then contraindication:  Post-Op Aspirin: Yes [],  No [], If No, then contraindication:  Post-Op Statin: Yes [], No[], If No, then contraindication:  Allergies    BLUE DYE (Stomach Upset; Nausea)  Cipro (Other)  Levaquin (Rash)  tetracycline (Hives)    Intolerances      Ambulation/Activity Status:    Assessment/Plan:  89y Female status-post .....  - Case and plan discussed with CTU Intensivist and CT Surgeon - Dr. Rodriguez/Grant/Roxanne  - Continue CTU supportive care    - Continue DVT/GI prophylaxis  - Incentive Spirometry 10 times an hour  - Continue to advance physical activity as tolerated and continue PT/OT as directed  1. CAD: Continue ASA, statin, BB  2. HTN:   3. A. Fib:   4. COPD/Hypoxia:   5. DM/Glucose Control:     Social Service Disposition:        d/c home today    OPERATIVE PROCEDURE(s):     TAVR/Micra           POD #      5/4                 89yFemale  SURGEON(s): ANTONELLA Oliveira  SUBJECTIVE ASSESSMENT: pt seen and examined. no acute complaints   Vital Signs Last 24 Hrs  T(F): 97.2 (12 Sep 2022 04:00), Max: 97.6 (11 Sep 2022 20:00)  HR: 68 (12 Sep 2022 06:30) (62 - 80)  BP: 145/64 (12 Sep 2022 06:30) (96/41 - 159/63)  BP(mean): 92 (12 Sep 2022 06:30) (59 - 95)  RR: 14 (12 Sep 2022 06:30) (12 - 34)  SpO2: 98% (12 Sep 2022 06:30) (97% - 100%)      I&O's Detail    11 Sep 2022 07:01  -  12 Sep 2022 07:00  --------------------------------------------------------  IN:    Oral Fluid: 660 mL  Total IN: 660 mL    OUT:    Indwelling Catheter - Urethral (mL): 1200 mL    Voided (mL): 300 mL  Total OUT: 1500 mL        Net:   I&O's Detail    10 Sep 2022 07:01  -  11 Sep 2022 07:00  --------------------------------------------------------  Total NET: 56 mL      11 Sep 2022 07:01  -  12 Sep 2022 07:00  --------------------------------------------------------  Total NET: -840 mL        CAPILLARY BLOOD GLUCOSE          Physical Exam:  General: NAD; A&Ox3  Cardiac: S1/S2, RRR, no murmur, no rubs  Lungs: unlabored respirations, CTA b/l, no wheeze, no rales, no crackles  Abdomen: Soft/NT/ND; positive bowel sounds x 4  Incisions: Incisions clean/dry/intact  Extremities: No edema b/l lower extremities; good capillary refill; no cyanosis; palpable 1+ pedal pulses b/l    Central Venous Catheter: Yes[x]  No[] , If Yes indication:    critical      Day #5 - d/c today  BOWEL MOVEMENT:  [x] YES [] NO, If No, Timing since last BM:      Day #    LABS:                        9.4<L>  3.92<L> )-----------( 77<L>    ( 12 Sep 2022 06:23 )             28.0<L>                        8.4<L>  4.01<L> )-----------( 59<L>    ( 11 Sep 2022 04:44 )             25.2<L>    09-12    137  |  106  |  20  ----------------------------<  76  4.2   |  22  |  0.7  09-11    138  |  107  |  21<H>  ----------------------------<  84  4.4   |  24  |  0.7    Ca    8.8      12 Sep 2022 06:23  Mg     2.0     09-12    TPro  5.1<L> [6.0 - 8.0]  /  Alb  3.2<L> [3.5 - 5.2]  /  TBili  0.5 [0.2 - 1.2]  /  DBili  x   /  AST  31 [0 - 41]  /  ALT  11 [0 - 41]  /  AlkPhos  131<H> [30 - 115]  09-10          RADIOLOGY & ADDITIONAL TESTS:  CXR: < from: Xray Chest 1 View- PORTABLE-Routine (Xray Chest 1 View- PORTABLE-Routine in AM.) (09.12.22 @ 06:07) >  IMPRESSION:    Left basal pleural-parenchymal opacity without significant change. No   pneumothorax.    Stable cardiomediastinal silhouette.    Unchanged osseous structures.    < end of copied text >     EKG: e< from: 12 Lead ECG (09.12.22 @ 07:18) >    Ventricular Rate 65 BPM    Atrial Rate 65 BPM    P-R Interval 182 ms    QRS Duration 182 ms    Q-T Interval 500 ms    QTC Calculation(Bazett) 520 ms    P Axis 27 degrees    R Axis -4 degrees    T Axis 27 degrees    Diagnosis Line Atrial-sensed ventricular-paced rhythm  Abnormal ECG    < end of copied text >    MEDICATIONS  (STANDING):  acetaminophen     Tablet .. 650 milliGRAM(s) Oral every 6 hours  ascorbic acid 500 milliGRAM(s) Oral daily  bisacodyl Suppository 10 milliGRAM(s) Rectal once  chlorhexidine 0.12% Liquid 5 milliLiter(s) Oral Mucosa two times a day  dextrose 50% Injectable 50 milliLiter(s) IV Push every 15 minutes  dextrose 50% Injectable 25 milliLiter(s) IV Push every 15 minutes  hydrocortisone hemorrhoidal Suppository 1 Suppository(s) Rectal daily  influenza  Vaccine (HIGH DOSE) 0.7 milliLiter(s) IntraMuscular once  levothyroxine 100 MICROGram(s) Oral daily  multivitamin 1 Tablet(s) Oral daily  norepinephrine Infusion 0.05 MICROgram(s)/kG/Min (5.17 mL/Hr) IV Continuous <Continuous>  pantoprazole    Tablet 40 milliGRAM(s) Oral before breakfast  polyethylene glycol 3350 17 Gram(s) Oral daily  senna 2 Tablet(s) Oral at bedtime  sodium chloride 0.9%. 250 milliLiter(s) (50 mL/Hr) IV Continuous <Continuous>  sodium chloride 0.9%. 1000 milliLiter(s) (20 mL/Hr) IV Continuous <Continuous>  sodium chloride 0.9%. 1000 milliLiter(s) (50 mL/Hr) IV Continuous <Continuous>  zinc sulfate 220 milliGRAM(s) Oral daily    MEDICATIONS  (PRN):  acetaminophen     Tablet .. 650 milliGRAM(s) Oral every 6 hours PRN Mild Pain (1 - 3)  melatonin 3 milliGRAM(s) Oral at bedtime PRN Insomnia    no dvt prophylaxis due to thrombocytopenia  SCD's: YES b/l  GI Prophylaxis: Protonix [x], Pepcid [], None [], (Contra-indication:.....)      Allergies    BLUE DYE (Stomach Upset; Nausea)  Cipro (Other)  Levaquin (Rash)  tetracycline (Hives)    Intolerances      Ambulation/Activity Status: ambulate    Assessment/Plan:  89y Female status-post TAVR / Micra    POD #5/4  - Case and plan discussed with CTU Intensivist and CT Surgeon - Dr. Burns/Roxanne  - Continue CTU supportive care    - Continue DVT/GI prophylaxis  - Incentive Spirometry 10 times an hour  - Continue to advance physical activity as tolerated and continue PT/OT as directed  1. hold asa and plavix as pt is thrombocytopenic- improving though today, will f/u [facility] as outpatient  2. complete heart block s/p micra- stable  3. cont home meds     Social Service Disposition:  home

## 2022-09-12 NOTE — DISCHARGE NOTE NURSING/CASE MANAGEMENT/SOCIAL WORK - PATIENT PORTAL LINK FT
You can access the FollowMyHealth Patient Portal offered by [facility] by registering at the following website: [email or web address]. By joining [facility]’s FollowMyHealth portal, you will also be able to view your health information using other applications (apps) compatible with our system.

## 2022-09-12 NOTE — PROGRESS NOTE ADULT - PROVIDER SPECIALTY LIST ADULT
CCU
CT Surgery
CTU
Electrophysiology
Internal Medicine
CCU
Cardiology
Gastroenterology
Internal Medicine
CCU
CT Surgery

## 2022-09-12 NOTE — PROGRESS NOTE ADULT - REASON FOR ADMISSION
Symptomatic anemia with AVMs
Anemia
Symptomatic anemia with AVMs

## 2022-09-13 ENCOUNTER — NON-APPOINTMENT (OUTPATIENT)
Age: 87
End: 2022-09-13

## 2022-09-15 ENCOUNTER — APPOINTMENT (OUTPATIENT)
Dept: CARDIOLOGY | Facility: CLINIC | Age: 87
End: 2022-09-15

## 2022-09-15 ENCOUNTER — APPOINTMENT (OUTPATIENT)
Dept: CARDIOTHORACIC SURGERY | Facility: CLINIC | Age: 87
End: 2022-09-15

## 2022-09-15 ENCOUNTER — LABORATORY RESULT (OUTPATIENT)
Age: 87
End: 2022-09-15

## 2022-09-15 VITALS
HEIGHT: 62 IN | RESPIRATION RATE: 12 BRPM | BODY MASS INDEX: 22.08 KG/M2 | OXYGEN SATURATION: 90 % | SYSTOLIC BLOOD PRESSURE: 108 MMHG | WEIGHT: 120 LBS | HEART RATE: 62 BPM | DIASTOLIC BLOOD PRESSURE: 67 MMHG

## 2022-09-15 PROCEDURE — 99024 POSTOP FOLLOW-UP VISIT: CPT

## 2022-09-15 NOTE — DISCHARGE NOTE PROVIDER - REASON FOR ADMISSION

## 2022-09-16 ENCOUNTER — NON-APPOINTMENT (OUTPATIENT)
Age: 87
End: 2022-09-16

## 2022-09-19 ENCOUNTER — APPOINTMENT (OUTPATIENT)
Dept: CARE COORDINATION | Facility: HOME HEALTH | Age: 87
End: 2022-09-19

## 2022-09-19 ENCOUNTER — NON-APPOINTMENT (OUTPATIENT)
Age: 87
End: 2022-09-19

## 2022-09-19 VITALS
HEART RATE: 73 BPM | BODY MASS INDEX: 21.95 KG/M2 | SYSTOLIC BLOOD PRESSURE: 110 MMHG | RESPIRATION RATE: 12 BRPM | WEIGHT: 120 LBS | DIASTOLIC BLOOD PRESSURE: 56 MMHG | OXYGEN SATURATION: 95 %

## 2022-09-19 DIAGNOSIS — I24.8 OTHER FORMS OF ACUTE ISCHEMIC HEART DISEASE: ICD-10-CM

## 2022-09-19 DIAGNOSIS — Z00.6 ENCOUNTER FOR EXAMINATION FOR NORMAL COMPARISON AND CONTROL IN CLINICAL RESEARCH PROGRAM: ICD-10-CM

## 2022-09-19 DIAGNOSIS — I95.9 HYPOTENSION, UNSPECIFIED: ICD-10-CM

## 2022-09-19 DIAGNOSIS — I47.2 VENTRICULAR TACHYCARDIA: ICD-10-CM

## 2022-09-19 DIAGNOSIS — I35.0 NONRHEUMATIC AORTIC (VALVE) STENOSIS: ICD-10-CM

## 2022-09-19 DIAGNOSIS — D62 ACUTE POSTHEMORRHAGIC ANEMIA: ICD-10-CM

## 2022-09-19 DIAGNOSIS — I48.91 UNSPECIFIED ATRIAL FIBRILLATION: ICD-10-CM

## 2022-09-19 DIAGNOSIS — Z85.05 PERSONAL HISTORY OF MALIGNANT NEOPLASM OF LIVER: ICD-10-CM

## 2022-09-19 DIAGNOSIS — Z98.890 OTHER SPECIFIED POSTPROCEDURAL STATES: ICD-10-CM

## 2022-09-19 DIAGNOSIS — I44.2 ATRIOVENTRICULAR BLOCK, COMPLETE: ICD-10-CM

## 2022-09-19 DIAGNOSIS — I73.9 PERIPHERAL VASCULAR DISEASE, UNSPECIFIED: ICD-10-CM

## 2022-09-19 DIAGNOSIS — E03.9 HYPOTHYROIDISM, UNSPECIFIED: ICD-10-CM

## 2022-09-19 DIAGNOSIS — I97.790 OTHER INTRAOPERATIVE CARDIAC FUNCTIONAL DISTURBANCES DURING CARDIAC SURGERY: ICD-10-CM

## 2022-09-19 DIAGNOSIS — I50.32 CHRONIC DIASTOLIC (CONGESTIVE) HEART FAILURE: ICD-10-CM

## 2022-09-19 DIAGNOSIS — E78.00 PURE HYPERCHOLESTEROLEMIA, UNSPECIFIED: ICD-10-CM

## 2022-09-19 DIAGNOSIS — I45.10 UNSPECIFIED RIGHT BUNDLE-BRANCH BLOCK: ICD-10-CM

## 2022-09-19 DIAGNOSIS — I25.10 ATHEROSCLEROTIC HEART DISEASE OF NATIVE CORONARY ARTERY WITHOUT ANGINA PECTORIS: ICD-10-CM

## 2022-09-19 DIAGNOSIS — D69.6 THROMBOCYTOPENIA, UNSPECIFIED: ICD-10-CM

## 2022-09-19 DIAGNOSIS — I11.0 HYPERTENSIVE HEART DISEASE WITH HEART FAILURE: ICD-10-CM

## 2022-09-19 DIAGNOSIS — K55.20 ANGIODYSPLASIA OF COLON WITHOUT HEMORRHAGE: ICD-10-CM

## 2022-09-19 RX ORDER — LACTULOSE 10 G/15ML
10 SOLUTION ORAL
Qty: 90 | Refills: 0 | Status: DISCONTINUED | COMMUNITY
Start: 2021-06-02 | End: 2022-09-19

## 2022-09-19 RX ORDER — CYANOCOBALAMIN 1000 UG/ML
1000 INJECTION INTRAMUSCULAR; SUBCUTANEOUS
Qty: 3 | Refills: 0 | Status: DISCONTINUED | COMMUNITY
Start: 2021-08-02 | End: 2022-09-19

## 2022-09-19 NOTE — PHYSICAL EXAM
[Neck Appearance] : the appearance of the neck was normal [Neck Cervical Mass (___cm)] : no neck mass was observed [Jugular Venous Distention Increased] : there was no jugular-venous distention [Thyroid Diffuse Enlargement] : the thyroid was not enlarged [Thyroid Nodule] : there were no palpable thyroid nodules [Auscultation Breath Sounds / Voice Sounds] : lungs were clear to auscultation bilaterally [Heart Rate And Rhythm] : heart rate was normal and rhythm regular [Heart Sounds] : normal S1 and S2 [Heart Sounds Gallop] : no gallops [Murmurs] : no murmurs [Heart Sounds Pericardial Friction Rub] : no pericardial rub [Examination Of The Chest] : the chest was normal in appearance [Chest Visual Inspection Thoracic Asymmetry] : no chest asymmetry [Diminished Respiratory Excursion] : normal chest expansion [Right Carotid Bruit] : no bruit heard over the right carotid [Left Carotid Bruit] : no bruit heard over the left carotid [2+] : left 2+ [Right Femoral Bruit] : no bruit heard over the right femoral artery [Left Femoral Bruit] : no bruit heard over the left femoral artery [1+] : left 1+ [No Abnormalities] : the abdominal aorta was not enlarged and no bruit was heard [FreeTextEntry1] : inscions without erythema, warmth or drainage. Resolving soft, NT ecchymosis to thigh area. [Skin Color & Pigmentation] : normal skin color and pigmentation [Skin Turgor] : normal skin turgor [] : no rash [Deep Tendon Reflexes (DTR)] : deep tendon reflexes were 2+ and symmetric [Sensation] : the sensory exam was normal to light touch and pinprick [No Focal Deficits] : no focal deficits [Oriented To Time, Place, And Person] : oriented to person, place, and time [Impaired Insight] : insight and judgment were intact [Affect] : the affect was normal

## 2022-09-19 NOTE — END OF VISIT
[FreeTextEntry3] : Seen / examined and above reviewed.\par Presents with son.\par Doing well post TAVR.\par Breathing comfortable.  No overt bleeding.\par Normotensive.  Compensated.\par - Labs today.  ASA if Hb stable.\par - Home PT.\par - Follow-up / studies 1-month.

## 2022-09-19 NOTE — ASSESSMENT
[FreeTextEntry1] : Pt recovering well at home s/p Tavr . Reviewed all medications and dosages with pt understanding. Pt dispenses meds appropriately into med dispenser each week. Pt has all medications in home and is taking as prescribed. Denies pain at this time. No new symptoms, issues or concerns to report at this time. Appt schedule reviewed with pt verbalizing understanding.\par \par \par Resides at home with son and home health aide. \par Doing well post TAVR.\par Breathing comfortable. No overt bleeding.\par \par \par - Home PT was ordered due to insurance issues, son states he still is trying to obtain. \par - Follow-up / studies 1-month. \par

## 2022-09-19 NOTE — HISTORY OF PRESENT ILLNESS
[FreeTextEntry1] : Mrs. 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 She was admitted in 7/2022 for TAVR evaluation and shortness of\par breath. During this hospitalization, patient was found to have acute-on-chronic\par anemia requesting multiple transfusions. Capsule study showed large AVMs, and\par patient underwent colonoscopy on 7/27/22 with successful AVM intervention with\par multiple non bleeding AVMs as well. On 7/30/22, patient developed pulmonary\par edema and required upgrade to the CCU. She was initially treated with BiPAP. In\par the CCU, patient was noted to be febrile up to 100.4 with elevated WBC and CXR\par concerning for PNA. Blood cultures on 7/30/22 on 8/01/22 grew staph\par epidermidis. In the setting of infection, patient went into Afib with RVR and\par became hypotensive (SBP in the 50s) requiring phenylephrine. Thoracentesis on\par 8/01/22 with 1L of fluid removed, transudative effusion with negative culture.\par Her pleural effusion reaccumulated and a chest tube was placed on 8/04/22. She\par was treated with a 7-day course of vancomycin and cefepime, and her antibiotics\par were discontinued per the recommendation of Infectious Disease.\par On 8/09/22, patient underwent balloon aortic valvuloplasty. Her respiratory\par failure and blood pressure improved after her BAV. TTE on 8/10/22 with critical\par AS with peak gradient 120 mmHg, mean gradient 76 mmHg, and CESIA 0.3 cm S 2. After\par discharge she was feeling well. She saw Dr. Mccollum on 9/2/2022 and was\par planned for TAVR 1 week after. However, patient developed SOB that is acute on\par chronic and worsening over the past couple of days associated with a dry cough.\par Pt says she can only walk a few steps before having to stop to catch her\par breath. She was found to have Hb of 5.7, she was admitted to the hospital and 2\par units of blood was transfused. Her hemoglobin stayed stable. On 09/07/22, she\par underwent TAVR. Post-operatively, patient had complete heart block. She\par underwent micra pacemaker on 9/8/2022. Patient had anemia secondary to acute\par surgical blood loss and was transfused appropriately. She developed\par thrombocytoenia, which was improving prior to discharge, however the decision\par to hold asa, plavix was made until platelets improve. Patient otherwise did\par well. She is discharged home in stable condition on POD#5. Patient had follow up in Heart valve clinic with Dr. Mccollum on 9/15. Patient recovering well post procerdure.

## 2022-09-19 NOTE — HISTORY OF PRESENT ILLNESS
[FreeTextEntry1] : Mrs. 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 She was admitted in 7/2022 for TAVR evaluation and shortness of \par breath. During this hospitalization, patient was found to have acute-on-chronic \par anemia requesting multiple transfusions. Capsule study showed large AVMs, and \par patient underwent colonoscopy on 7/27/22 with successful AVM intervention with \par multiple non bleeding AVMs as well. On 7/30/22, patient developed pulmonary \par edema and required upgrade to the CCU. She was initially treated with BiPAP. In \par the CCU, patient was noted to be febrile up to 100.4 with elevated WBC and CXR \par concerning for PNA. Blood cultures on 7/30/22 on 8/01/22 grew staph \par epidermidis. In the setting of infection, patient went into Afib with RVR and \par became hypotensive (SBP in the 50s) requiring phenylephrine. Thoracentesis on \par 8/01/22 with 1L of fluid removed, transudative effusion with negative culture. \par Her pleural effusion reaccumulated and a chest tube was placed on 8/04/22. She \par was treated with a 7-day course of vancomycin and cefepime, and her antibiotics \par were discontinued per the recommendation of Infectious Disease. \par On 8/09/22, patient underwent balloon aortic valvuloplasty. Her respiratory \par failure and blood pressure improved after her BAV. TTE on 8/10/22 with critical \par AS with peak gradient 120 mmHg, mean gradient 76 mmHg, and CESIA 0.3 cm^2. After \par discharge she was feeling well. She saw Dr. Mccollum on 9/2/2022 and was \par planned for TAVR 1 week after. However, patient developed SOB that is acute on \par chronic and worsening over the past couple of days associated with a dry cough. \par Pt says she can only walk a few steps before having to stop to catch her \par breath. She was found to have Hb of 5.7, she was admitted to the hospital and 2 \par units of blood was transfused. Her hemoglobin stayed stable. On 09/07/22, she \par underwent TAVR. Post-operatively, patient had complete heart block. She \par underwent micra pacemaker on 9/8/2022. Patient had anemia secondary to acute \par surgical blood loss and was transfused appropriately. She developed \par thrombocytoenia, which was improving prior to discharge, however the decision \par to hold asa, plavix was made until platelets improve. Patient otherwise did \par well. She is discharged home in stable condition on POD#5 with instructions to \par follow up in the Heart Valve Clinic on 9/15/2022 @ 1:00pm. \par \par

## 2022-09-19 NOTE — ASSESSMENT
[FreeTextEntry1] : Ms. DILIP LUCAS is a 89 year F that arrives today for a post op visit. Patient is status post TAVR on 9/7/2022 via TF approach. Patient arrives to the office for the first visit. On arrival patient denies fever, chills nausea, and vomiting. Denies SOB or palpitation. Patient states that they feel overall improvement post procedure \par \par \par Pt lives home has aid\par NYHA class I\par \par Stop losartan -  today - off x 2 days \par Decrease lasix to 20mg - pts previous dose prior to TAVR \par Labs pending if CBC ok can continue ASA 81mg\par \par F/U in 30 day with echo, labs EKG \par

## 2022-09-22 ENCOUNTER — TRANSCRIPTION ENCOUNTER (OUTPATIENT)
Age: 87
End: 2022-09-22

## 2022-10-06 ENCOUNTER — APPOINTMENT (OUTPATIENT)
Dept: CARDIOLOGY | Facility: CLINIC | Age: 87
End: 2022-10-06

## 2022-10-06 ENCOUNTER — APPOINTMENT (OUTPATIENT)
Dept: CARDIOTHORACIC SURGERY | Facility: CLINIC | Age: 87
End: 2022-10-06

## 2022-10-06 VITALS
TEMPERATURE: 98 F | HEART RATE: 75 BPM | HEIGHT: 62 IN | BODY MASS INDEX: 22.08 KG/M2 | DIASTOLIC BLOOD PRESSURE: 70 MMHG | OXYGEN SATURATION: 98 % | WEIGHT: 120 LBS | SYSTOLIC BLOOD PRESSURE: 160 MMHG | RESPIRATION RATE: 23 BRPM

## 2022-10-06 DIAGNOSIS — I35.0 NONRHEUMATIC AORTIC (VALVE) STENOSIS: ICD-10-CM

## 2022-10-06 DIAGNOSIS — I10 ESSENTIAL (PRIMARY) HYPERTENSION: ICD-10-CM

## 2022-10-06 PROCEDURE — 93000 ELECTROCARDIOGRAM COMPLETE: CPT

## 2022-10-06 PROCEDURE — 99212 OFFICE O/P EST SF 10 MIN: CPT

## 2022-10-06 PROCEDURE — 93306 TTE W/DOPPLER COMPLETE: CPT

## 2022-10-06 NOTE — ASSESSMENT
[FreeTextEntry1] : Ms. DILIP LUCAS is a 89 year F that arrives today for a her one month visit s/p TAVR d/t critical AS. Patient is status post TAVR on 9/7/2022 via TF approach.Patient arrives to the office for their one month visit. On arrival patient patient states she feels a difference since her valve procedure. Denies SOB or palpitation. NYHA class II.  Patient currently living at  Access site looks good. No hematoma. Denies fever, chills, nausea or vomiting. \par \par \par S/P TAVR\par Echo images from today looks good.\par Labs done today will review\par \par HTN\par Continue current medication\par F/U with general cardiology for medical management\par \par

## 2022-10-06 NOTE — HISTORY OF PRESENT ILLNESS
[FreeTextEntry1] : Ms. DILIP LUCAS is a 89 year F that arrives today for a post op visit. Patient is status post TAVR on 9/7/2022 via TF approach.Patient arrives to the office for their one month visit. On arrival patient patient states she feels a difference since her valve procedure. Denies SOB or palpitation. NYHA class II.  Patient currently living at  Access site looks good. No hematoma. \par \par Patient was recommended to follow up with their cardiologist for final clearance to undergo Cardiac Rehab post procedure to improve their functional capacity.\par \par \par NYHA class II\par Lives at home with son no aid

## 2022-10-07 LAB
ALBUMIN SERPL ELPH-MCNC: 4 G/DL
ALP BLD-CCNC: 214 U/L
ALT SERPL-CCNC: 20 U/L
ANION GAP SERPL CALC-SCNC: 13 MMOL/L
AST SERPL-CCNC: 29 U/L
BASOPHILS # BLD AUTO: 0.02 K/UL
BASOPHILS NFR BLD AUTO: 0.5 %
BILIRUB SERPL-MCNC: 0.4 MG/DL
BUN SERPL-MCNC: 30 MG/DL
CALCIUM SERPL-MCNC: 9.8 MG/DL
CHLORIDE SERPL-SCNC: 104 MMOL/L
CO2 SERPL-SCNC: 25 MMOL/L
CREAT SERPL-MCNC: 0.9 MG/DL
EGFR: 61 ML/MIN/1.73M2
EOSINOPHIL # BLD AUTO: 0.03 K/UL
EOSINOPHIL NFR BLD AUTO: 0.7 %
GLUCOSE SERPL-MCNC: 101 MG/DL
HCT VFR BLD CALC: 32.1 %
HGB BLD-MCNC: 10.4 G/DL
IMM GRANULOCYTES NFR BLD AUTO: 0.2 %
LYMPHOCYTES # BLD AUTO: 0.61 K/UL
LYMPHOCYTES NFR BLD AUTO: 15 %
MAN DIFF?: NORMAL
MCHC RBC-ENTMCNC: 31.9 PG
MCHC RBC-ENTMCNC: 32.4 G/DL
MCV RBC AUTO: 98.5 FL
MONOCYTES # BLD AUTO: 0.31 K/UL
MONOCYTES NFR BLD AUTO: 7.6 %
NEUTROPHILS # BLD AUTO: 3.1 K/UL
NEUTROPHILS NFR BLD AUTO: 76 %
PLATELET # BLD AUTO: 114 K/UL
POTASSIUM SERPL-SCNC: 5 MMOL/L
PROT SERPL-MCNC: 7 G/DL
RBC # BLD: 3.26 M/UL
RBC # FLD: 17.2 %
SODIUM SERPL-SCNC: 142 MMOL/L
WBC # FLD AUTO: 4.08 K/UL

## 2022-10-12 ENCOUNTER — TRANSCRIPTION ENCOUNTER (OUTPATIENT)
Age: 87
End: 2022-10-12

## 2022-10-15 NOTE — CONSULT NOTE ADULT - ATTENDING COMMENTS
Attending Statement: I have personally performed a face to face diagnostic evaluation on this patient. The patient is suffering from hypotension  Severe anemia.  I have reviewed the above note and agree with the history, exam and suggestions for care, except as I have noted in the text. I have amended the treatment plans as necessary.
Will benefit from EGD/Colonoscopy once medically optimized.
PAST MEDICAL HISTORY:  No pertinent past medical history

## 2022-10-17 ENCOUNTER — APPOINTMENT (OUTPATIENT)
Dept: CARDIOLOGY | Facility: CLINIC | Age: 87
End: 2022-10-17

## 2022-10-17 VITALS — DIASTOLIC BLOOD PRESSURE: 74 MMHG | SYSTOLIC BLOOD PRESSURE: 130 MMHG

## 2022-10-17 VITALS
SYSTOLIC BLOOD PRESSURE: 159 MMHG | WEIGHT: 120 LBS | HEIGHT: 62 IN | HEART RATE: 69 BPM | TEMPERATURE: 98 F | RESPIRATION RATE: 16 BRPM | DIASTOLIC BLOOD PRESSURE: 73 MMHG | BODY MASS INDEX: 22.08 KG/M2

## 2022-10-17 DIAGNOSIS — Z82.61 FAMILY HISTORY OF ARTHRITIS: ICD-10-CM

## 2022-10-17 DIAGNOSIS — Z78.9 OTHER SPECIFIED HEALTH STATUS: ICD-10-CM

## 2022-10-17 DIAGNOSIS — Z48.89 ENCOUNTER FOR OTHER SPECIFIED SURGICAL AFTERCARE: ICD-10-CM

## 2022-10-17 DIAGNOSIS — Z45.018 ENCOUNTER FOR ADJUSTMENT AND MANAGEMENT OF OTHER PART OF CARDIAC PACEMAKER: ICD-10-CM

## 2022-10-17 DIAGNOSIS — Z87.39 PERSONAL HISTORY OF OTHER DISEASES OF THE MUSCULOSKELETAL SYSTEM AND CONNECTIVE TISSUE: ICD-10-CM

## 2022-10-17 DIAGNOSIS — K57.90 DIVERTICULOSIS OF INTESTINE, PART UNSPECIFIED, W/OUT PERFORATION OR ABSCESS W/OUT BLEEDING: ICD-10-CM

## 2022-10-17 DIAGNOSIS — R00.2 PALPITATIONS: ICD-10-CM

## 2022-10-17 DIAGNOSIS — D64.9 ANEMIA, UNSPECIFIED: ICD-10-CM

## 2022-10-17 DIAGNOSIS — Z85.05 PERSONAL HISTORY OF MALIGNANT NEOPLASM OF LIVER: ICD-10-CM

## 2022-10-17 DIAGNOSIS — Z82.49 FAMILY HISTORY OF ISCHEMIC HEART DISEASE AND OTHER DISEASES OF THE CIRCULATORY SYSTEM: ICD-10-CM

## 2022-10-17 DIAGNOSIS — I44.2 ATRIOVENTRICULAR BLOCK, COMPLETE: ICD-10-CM

## 2022-10-17 PROCEDURE — 93279 PRGRMG DEV EVAL PM/LDLS PM: CPT

## 2022-10-17 PROCEDURE — 93000 ELECTROCARDIOGRAM COMPLETE: CPT | Mod: 59

## 2022-10-17 PROCEDURE — 99024 POSTOP FOLLOW-UP VISIT: CPT

## 2022-10-17 RX ORDER — GLUC/MSM/COLGN2/HYAL/ANTIARTH3 375-375-20
TABLET ORAL DAILY
Refills: 0 | Status: ACTIVE | COMMUNITY

## 2022-10-17 RX ORDER — MULTIVIT-MIN/IRON/FOLIC ACID/K 18-600-40
CAPSULE ORAL DAILY
Refills: 0 | Status: ACTIVE | COMMUNITY

## 2022-10-17 RX ORDER — LEVOTHYROXINE SODIUM 100 UG/1
100 CAPSULE ORAL DAILY
Refills: 0 | Status: ACTIVE | COMMUNITY
Start: 2022-09-19

## 2022-10-17 RX ORDER — PANTOPRAZOLE 40 MG/1
40 TABLET, DELAYED RELEASE ORAL DAILY
Qty: 15 | Refills: 1 | Status: ACTIVE | COMMUNITY
Start: 2021-07-03

## 2022-10-17 RX ORDER — MULTIVIT-MIN/FOLIC/VIT K/LYCOP 400-300MCG
500 TABLET ORAL
Refills: 0 | Status: ACTIVE | COMMUNITY
Start: 2022-09-19

## 2022-10-17 RX ORDER — LOSARTAN POTASSIUM 25 MG/1
25 TABLET, FILM COATED ORAL DAILY
Refills: 0 | Status: ACTIVE | COMMUNITY

## 2022-10-17 RX ORDER — FUROSEMIDE 20 MG/1
20 TABLET ORAL
Refills: 0 | Status: ACTIVE | COMMUNITY
Start: 2022-09-19

## 2022-10-17 RX ORDER — ZINC SULFATE 50(220)MG
CAPSULE ORAL DAILY
Refills: 0 | Status: ACTIVE | COMMUNITY

## 2022-10-17 RX ORDER — UBIDECARENONE/VIT E ACET 100MG-5
CAPSULE ORAL DAILY
Refills: 0 | Status: ACTIVE | COMMUNITY

## 2022-10-19 PROBLEM — R00.2 PALPITATION: Status: ACTIVE | Noted: 2022-10-17

## 2022-10-19 NOTE — CARDIOLOGY SUMMARY
[de-identified] : (10/17/2022 )71 BPM sinus rhythm with occ. supraventricular complexes, 1st degree AV block with ME of 210 milliseconds.\par  [de-identified] : (10/6/2022) LVEF is 53%, grade 1 diastolic dysfunction, Medtronic valve is in aortic position. [de-identified] : (5/25/2021) coronary vessels left min has no significant stenosis, LAD is 60-70 mid stenosis and circumflex mild disease, RCA is 50% in stenosis, a BAV was done on the same setting

## 2022-10-19 NOTE — PROCEDURE
[No] : not [Complete Heart Block] : complete heart block [Pacemaker] : pacemaker [Voltage: ___ volts] : Voltage was [unfilled] volts [Longevity: ___ months] : The estimated remaining battery life is [unfilled] months [Lead Imp:  ___ohms] : lead impedance was [unfilled] ohms [Sensing Amplitude ___mv] : sensing amplitude was [unfilled] mv [___V @] : [unfilled] V [___ ms] : [unfilled] ms [de-identified] : BHAVYA [de-identified] : [id number] [de-identified] : [id number] [de-identified] : 9/8/22 [de-identified] : YESSICA [de-identified] : 60 [de-identified] : Normal device function\par AM-: 76.3%\par  only: 23.4%

## 2022-10-19 NOTE — PHYSICAL EXAM
[Auscultation Breath Sounds / Voice Sounds] : lungs were clear to auscultation bilaterally [General Appearance - Well Developed] : well developed [Normal Appearance] : normal appearance [Well Groomed] : well groomed [General Appearance - Well Nourished] : well nourished [No Deformities] : no deformities [General Appearance - In No Acute Distress] : no acute distress [Heart Rate And Rhythm] : heart rate and rhythm were normal [Heart Sounds] : normal S1 and S2 [Murmurs] : no murmurs present [Abdomen Soft] : soft [Abdomen Tenderness] : non-tender [Abdomen Mass (___ Cm)] : no abdominal mass palpated [Nail Clubbing] : no clubbing of the fingernails [Cyanosis, Localized] : no localized cyanosis [Petechial Hemorrhages (___cm)] : no petechial hemorrhages [] : no ischemic changes [FreeTextEntry2] : right femoral access [FreeTextEntry1] : DAISHA

## 2022-10-19 NOTE — ASSESSMENT
[FreeTextEntry1] : Patient is s/p Leadless Pacemaker Implant on 9/8/2022, secondary to complete heart block after TAVR procedure\par . She came in today for device interrogation and check of access site via the right femoral vein.\par # The device was interrogated and functioning properly.\par  #The right groin incision has healed, no hematoma\par .# She is enrolled in remote monitoring, she knows that this is a manual transmission every 3 months. The son is  present during the discussion and will assist his mother for the manual download.\par  Discussed with the  schedule and process of remote monitoring,presence and  availability of remote team.I also mentioned that device doesn't monitor if patient is having a heart attack, if they have symptoms/cardiac event they should not wait for a call from remote team, but proceed to ED.\par I added that they will received bills for remote monitoring for co-payment that is not covered by their insurance.\par \par # She will continue all current medications. \par # She will return to the office in 6 months time.

## 2022-10-19 NOTE — HISTORY OF PRESENT ILLNESS
[de-identified] : 89 years old female with HFpEF, severe AS, chronic anemia due to AVM s/p APC in the cecum and transverse colon. , HTN, hyperthyroidism, HCC s/p partial liver resection. Patient underwent BAV on on 8/9/2022 and had  TAVR on 9/7/2022. Developed complete heart block post operatively, therefore a micra AV was implanted on 9/8/2022

## 2022-10-19 NOTE — ADDENDUM
[FreeTextEntry1] : ICarlos assisted in documentation on 10/19/2022 acting as a scribe for Dr. Carina Mabry.\par \par

## 2022-10-24 ENCOUNTER — APPOINTMENT (OUTPATIENT)
Age: 87
End: 2022-10-24

## 2023-01-25 ENCOUNTER — APPOINTMENT (OUTPATIENT)
Dept: CARDIOLOGY | Facility: CLINIC | Age: 88
End: 2023-01-25

## 2023-02-03 NOTE — PACU DISCHARGE NOTE - NAUSEA/VOMITING:
Spoke with patient and would like to see neurologist in [facility] vs [facility] [place] as patient works at [facility].    Patient is scheduled on 02/23/2023 at 10:20am   None

## 2023-02-08 NOTE — PRE-ANESTHESIA EVALUATION ADULT - NSANTHRISKNONERD_GEN_ALL_CORE
Risk Alerts: No Repair - Repaired With Adjacent Surgical Defect Text (Leave Blank If You Do Not Want): After the excision the defect was repaired concurrently with another surgical defect which was in close approximation.

## 2023-02-09 NOTE — PROGRESS NOTE ADULT - NS ATTEND AMEND GEN_ALL_CORE FT
89F with HFrEF with LVEF 40-45%, severe AS and melena requiring transfusions who presented with ADHF. Patient being evaluated for TAVR.     SBP 90s. Breathing comfortably. No signs of respiratory distress.     Hgb 8.1 -> 8.8.     GI on board, capsule study findings are pending  Transfuse PRN to maintain Hgb >8, Lasix to be given with transfusion  Lasix held again today for SBP 90-95    Carey Zavala MD  Vascular Cardiology Attending  Please text or call via MS Teams with questions
Patient seen and examined during the GI-Advanced endoscopy rounds, case discussed, assessment and plan above, doing well post AVM ablation and control of bleeding
89F with HFrEF with LVEF 40-45%, severe AS and melena requiring transfusions who presented with ADHF. Patient being evaluated for TAVR.     Hgb trending down. SBP 90s.     GI on board, capsule study in process  Transfuse PRN to maintain Hgb >8, Lasix to be given with transfusion  IV iron sucrose  Lasix held 7/19 for SBP 90-95    Carey Zavala MD  Vascular Cardiology Attending  Please text or call via MS Teams with questions.
Ms. Silverio is an 89-year-old woman with history of HFmrEF (40-45%), severe AS s/p BAV in 2021, blood loss anemia requiring transfusions 2/2 terminal ileum AVM, HCC s/p partial liver resection, hyperthyroidism, and HTN who was transferred from [facility] to [facility] for TAVR evaluation.     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 Hgb stable with last transfusion of 1u pRBCs 7/21/22. Followed by GI and planned for high-risk colonoscopy intervention.   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   (6) Hx of hypothyroidism   (7) Hx of GERD     Plan:   - Planning for colonoscopy with AVM ablation by Dr. Woo with cardiac anesthesia due to concurrent severe aortic stenosis.   - Will discuss TAVR planning with Dr. Mccollum after colonoscopy.   - Transfuse to Hgb >8, maintain active T&S, Lasix IV to be given with transfusions.   - GDMT for HFmrEF: should be on a beta blocker and RAAS inhibitor prior to discharge. Ok to hold for now due to tenuous hemodynamics and severe AS.  - Obtain lipid panel in the AM. Goal LDL <70 for hx CAD
Ms. Silverio is an 89-year-old woman with history of HFmrEF (40-45%), severe AS s/p BAV in 2021, blood loss anemia requiring transfusions 2/2 terminal ileum AVM, HCC s/p partial liver resection, hyperthyroidism, and HTN who was transferred from [facility] to [facility] for TAVR evaluation.     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 Hgb stable with last transfusion of 1u pRBCs 7/21/22. Followed by GI and planned for high-risk colonoscopy intervention.   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   (6) Hx of hypothyroidism   (7) Hx of GERD     Plan:   - Planning for colonoscopy today with AVM ablation by Dr. Woo with cardiac anesthesia due to concurrent severe aortic stenosis.   - Will discuss TAVR planning with Dr. Mccollum after colonoscopy.   - Transfuse to Hgb >8, maintain active T&S, Lasix IV to be given with transfusions.   - GDMT for HFmrEF: should be on a beta blocker and RAAS inhibitor prior to discharge. Ok to hold for now due to tenuous hemodynamics and severe AS.
This afternoon patient was noted to be tachypneic and tachycardic (sinus tach on telemetry) to the 130s. Examination revealed diffuse rales. Patient was promptly given torsemide 20mg PO (so as to not significantly decrease preload in critical AS) and was started on BiPAP with some improvement of symptoms and improvement of heart rate. I discussed her care with the CCU team and recommended transfer for closer monitoring and for possible initiation of nitroglycerin drip (her BP at the time was SBP >180). Patient was accepted to CCU and will be transferred there as soon as possible.    Going forward may require repeat BAV as a temporizing measure prior to eventual TAVR.
Agree with above. Although patient with BRBPR yesterday, I would continue ASA 81 mg daily, as patient would need to be on this medication post-TAVR and therefore her bleeding risk should be assessed on aspirin.     Team to reach out to Dr. Woo tomorrow to assess anemia and GIB.
Agree with above. Son updated by ANIBAL Mir. I spoke to the daughter Araceli on the phone.
Agree with assessment and plan as documented above.
Ms. Silverio is an 89-year-old woman with history of HFmrEF (40-45%), severe AS s/p BAV in 2021, blood loss anemia requiring transfusions 2/2 terminal ileum AVM, HCC s/p partial liver resection, hyperthyroidism, and HTN who was transferred from [facility] to [facility] for TAVR evaluation.     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 Hgb stable with last transfusion of 1u pRBCs 7/21/22. S/p high risk colonoscopy with intervention 7/27/22.  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   (6) Hx of hypothyroidism   (7) Hx of GERD     Plan:   - Patient has since undergone colonoscopy with successful AVM intervention.  - Will discuss TAVR planning with Dr. Mccollum, patient will likely need to be discharged to a SNF or home with services prior to TAVR.  - Transfuse to Hgb >8, maintain active T&S, Lasix IV to be given with transfusions.   - GDMT for HFmrEF: should be on a beta blocker and RAAS inhibitor prior to discharge. Ok to hold for now due to tenuous hemodynamics and severe AS.    Otherwise as per the plan above.    Dispo pending physical therapy evaluation
89F with HFrEF with LVEF 40-45%, severe AS and melena requiring transfusions who presented with ADHF. Patient being evaluated for TAVR.     SBP 90s. Breathing comfortably. No signs of respiratory distress.     Hgb 8.8 -> 7.9 for which she was given 1 uPRBC    Capsule study with large AVM at the terminal ileum    Ongoing discussion regarding colonoscopy and AVM ablation before TAVR  Cardiac anesthesia to evaluate patient  Transfuse PRN to maintain Hgb >8, Lasix to be given with transfusion  Discussed with Tre Woo and Chun Zavala MD  Vascular Cardiology Attending  Please text or call via MS Teams with questions.
Agree with assessment and plan as I have documented above.
Agree with assessment and plan as documented above.
89F with HFrEF with LVEF 40-45%, severe AS and melena requiring transfusions who presented with ADHF. Patient being evaluated for TAVR.     SBP 90s-100s. Breathing comfortably. No signs of respiratory distress.     Hgb 9.6 -> 9.8     Capsule study with large AVM at the terminal ileum    Planning on colonoscopy and AVM ablation with Dr. Woo and cardiac anesthesia before TAVR, date TBD  Continue Lasix 20 mg daily, hold for SBP<100  Transfuse PRN to maintain Hgb >8, Lasix to be given with transfusion    Carey Zavala MD  Vascular Cardiology Attending  Please text or call via MS Teams with questions
89F with HFrEF with LVEF 40-45%, severe AS and melena requiring transfusions who presented with ADHF. Patient being evaluated for TAVR.     SBP 90s-100s. Breathing comfortably. No signs of respiratory distress.     Hgb 9.8 ->10    Capsule study with large AVM at the terminal ileum    Planning on colonoscopy and AVM ablation with Dr. Woo and cardiac anesthesia before TAVR, date TBD  Continue Lasix 20 mg daily, hold for SBP<100  Transfuse PRN to maintain Hgb >8, Lasix to be given with transfusion    Carey Zavala MD  Vascular Cardiology Attending  Please text or call via MS Teams with questions
I edited the note
89F with HFrEF with LVEF 40-45%, severe AS and melena requiring transfusions who presented with ADHF. Patient being evaluated for TAVR.     Hgb trending down. SBP 90s.     Dr. Woo consulted, appreciate recommendations  Transfuse PRN to maintain Hgb >8  IV iron sucrose  Resume Lasix at 20 mg daily on 7/19/22  Discussed with Dr. Chun Zavala MD  Vascular Cardiology Attending  Please text or call via MS Teams with questions
Agree with above. Per GI, anemia is most likley due to hemolysis from AS as well as AVMs. If patient with no alec BRBPR or melena, GI recommends proceeding with TAVR first and then GI work-up after her valvulopathy has been treated.
Stable

## 2023-03-06 ENCOUNTER — APPOINTMENT (OUTPATIENT)
Dept: CARDIOLOGY | Facility: CLINIC | Age: 88
End: 2023-03-06
Payer: MEDICARE

## 2023-03-06 ENCOUNTER — NON-APPOINTMENT (OUTPATIENT)
Age: 88
End: 2023-03-06

## 2023-03-06 ENCOUNTER — FORM ENCOUNTER (OUTPATIENT)
Age: 88
End: 2023-03-06

## 2023-03-06 PROCEDURE — 93294 REM INTERROG EVL PM/LDLS PM: CPT

## 2023-03-06 PROCEDURE — 93296 REM INTERROG EVL PM/IDS: CPT

## 2023-03-17 NOTE — CONSULT NOTE ADULT - NS ATTEND AMEND GEN_ALL_CORE FT
89 year old female admitted for shortness of breath and blood in stool.  The patient was found to be anemic with a hemoglobin of 7 and was transfused.  The patient is known to have critical AS (CESIA 0.48 cm2 and mean AV gradient of 96.6 mm. Hg.)  and is s/p BAV.  Mild AI was also noted.    Impression    - Critical AS - probable cause for shortness of breath exacerbated by anemia  - Anemia - probably due to GI bleedin.  Note AS is associated with GI bleeding secondary to bowel telangectasia.  - Type 2 demand ischemia due to AS and anemia  - h/o liver Ca    Recommend    - correction or anemia  - Structural heart consultation with Dr. Hood ENDOCRINE FOLLOW UP     Chief Complaint: craniopharyngioma    History:   Patient had hyperglycemia to 400s overnight, placed on insulin gtt and hypernatremia to >170, placed on vasopressin gtt.    MEDICATIONS  (STANDING):  amLODIPine   Tablet 10 milliGRAM(s) Oral daily  atorvastatin 80 milliGRAM(s) Oral at bedtime  cefTRIAXone   IVPB 1000 milliGRAM(s) IV Intermittent every 24 hours  chlorhexidine 4% Liquid 1 Application(s) Topical daily  dextrose 5%. 1000 milliLiter(s) (75 mL/Hr) IV Continuous <Continuous>  dextrose 50% Injectable 50 milliLiter(s) IV Push every 15 minutes  glucagon  Injectable 1 milliGRAM(s) IntraMuscular once  hydrocortisone sodium succinate Injectable 50 milliGRAM(s) IV Push every 8 hours  insulin regular Infusion 5 Unit(s)/Hr (5 mL/Hr) IV Continuous <Continuous>  levothyroxine Injectable 37.5 MICROGram(s) IV Push <User Schedule>  metroNIDAZOLE  IVPB 500 milliGRAM(s) IV Intermittent every 8 hours  pantoprazole  Injectable 40 milliGRAM(s) IV Push daily  polyethylene glycol 3350 17 Gram(s) Oral daily  senna 2 Tablet(s) Oral at bedtime  vancomycin  IVPB 1250 milliGRAM(s) IV Intermittent every 12 hours  vasopressin Infusion 0.04 Unit(s)/Min (6 mL/Hr) IV Continuous <Continuous>    MEDICATIONS  (PRN):  acetaminophen     Tablet .. 650 milliGRAM(s) Oral every 6 hours PRN Temp greater or equal to 38C (100.4F), Mild Pain (1 - 3)  ondansetron Injectable 4 milliGRAM(s) IV Push every 6 hours PRN Nausea and/or Vomiting  oxyCODONE    IR 5 milliGRAM(s) Oral every 4 hours PRN Moderate Pain (4 - 6)  sodium chloride 0.65% Nasal 1 Spray(s) Both Nostrils three times a day PRN Nasal Congestion      Allergies    No Known Drug Allergies  S/P TRANSSPHENOIDAL SURGERY. SINONASAL PRECAUTIONS! NO NASAL SWABS OR NG TUBES. (Unknown)    Intolerances        ROS: All other systems reviewed and negative    PHYSICAL EXAM:  VITALS: T(C): 37.2 (03-17-23 @ 07:00)  T(F): 99 (03-17-23 @ 07:00), Max: 99.2 (03-17-23 @ 03:00)  HR: 52 (03-17-23 @ 10:00) (41 - 79)  BP: 98/45 (03-17-23 @ 04:15) (94/55 - 159/65)  RR:  (12 - 23)  SpO2:  (94% - 100%)  Wt(kg): --  GENERAL: NAD, resting comfortably   EYES: No proptosis,  anicteric  HEENT:  Atraumatic, Normocephalic, moist mucous membranes  RESPIRATORY: Nonlabored respirations on room air, normal rate/effort   CARDIOVASCULAR: Did not appear cyanotic, no lower extremity swelling visualized  GI: Soft, nontender, non distended  NEURO: Answering questions appropriately, moves all extremities spontaneously  PSYCH:  reactive affect, euthymic mood    POCT Blood Glucose.: 106 mg/dL (03-17-23 @ 10:05)  POCT Blood Glucose.: 175 mg/dL (03-17-23 @ 09:03)  POCT Blood Glucose.: 219 mg/dL (03-17-23 @ 08:09)  POCT Blood Glucose.: 206 mg/dL (03-17-23 @ 07:10)  POCT Blood Glucose.: 216 mg/dL (03-17-23 @ 06:25)  POCT Blood Glucose.: 230 mg/dL (03-17-23 @ 05:21)  POCT Blood Glucose.: 240 mg/dL (03-17-23 @ 04:19)  POCT Blood Glucose.: 298 mg/dL (03-17-23 @ 03:08)  POCT Blood Glucose.: 329 mg/dL (03-17-23 @ 02:05)  POCT Blood Glucose.: 319 mg/dL (03-17-23 @ 01:11)  POCT Blood Glucose.: 210 mg/dL (03-17-23 @ 00:26)  POCT Blood Glucose.: 358 mg/dL (03-16-23 @ 23:00)  POCT Blood Glucose.: 373 mg/dL (03-16-23 @ 22:01)  POCT Blood Glucose.: 464 mg/dL (03-16-23 @ 21:13)  POCT Blood Glucose.: 338 mg/dL (03-16-23 @ 20:32)  POCT Blood Glucose.: 190 mg/dL (03-16-23 @ 18:21)  POCT Blood Glucose.: 107 mg/dL (03-16-23 @ 05:04)  POCT Blood Glucose.: 111 mg/dL (03-16-23 @ 03:10)  POCT Blood Glucose.: 127 mg/dL (03-16-23 @ 02:02)  POCT Blood Glucose.: 112 mg/dL (03-16-23 @ 01:13)  POCT Blood Glucose.: 107 mg/dL (03-15-23 @ 23:59)  POCT Blood Glucose.: 124 mg/dL (03-15-23 @ 23:08)  POCT Blood Glucose.: 108 mg/dL (03-15-23 @ 22:05)  POCT Blood Glucose.: 123 mg/dL (03-15-23 @ 21:08)  POCT Blood Glucose.: 111 mg/dL (03-15-23 @ 20:03)  POCT Blood Glucose.: 111 mg/dL (03-15-23 @ 18:53)  POCT Blood Glucose.: 129 mg/dL (03-15-23 @ 18:00)  POCT Blood Glucose.: 169 mg/dL (03-15-23 @ 16:59)  POCT Blood Glucose.: 169 mg/dL (03-15-23 @ 16:05)  POCT Blood Glucose.: 215 mg/dL (03-15-23 @ 14:58)  POCT Blood Glucose.: 287 mg/dL (03-15-23 @ 13:54)  POCT Blood Glucose.: 265 mg/dL (03-15-23 @ 13:02)  POCT Blood Glucose.: 298 mg/dL (03-15-23 @ 12:14)  POCT Blood Glucose.: 104 mg/dL (03-14-23 @ 16:25)  POCT Blood Glucose.: 133 mg/dL (03-14-23 @ 11:37)      03-17    156<H>  |  124<H>  |  9   ----------------------------<  219<H>  3.3<L>   |  24  |  0.56    eGFR: 109    Ca    8.3<L>      03-17  Mg     2.2     03-17  Phos  3.2     03-17        A1C with Estimated Average Glucose Result: 8.0 % (03-13-23 @ 06:23)      Thyroid Stimulating Hormone, Serum: 4.43 uIU/mL (03-16-23 @ 04:02)  Thyroid Stimulating Hormone, Serum: 3.46 uIU/mL (03-15-23 @ 09:09)  Thyroid Stimulating Hormone, Serum: 8.85 uIU/mL (03-11-23 @ 05:39)   ENDOCRINE FOLLOW UP     Chief Complaint: craniopharyngioma    History:   Patient had hyperglycemia to 400s overnight, placed on insulin gtt and hypernatremia to >170, placed on vasopressin gtt. [employer]  ID [id number] used. The patient was very lethargic but reports ongoing headache and fatigue. She denied chest pain, nausea, palpitations.    MEDICATIONS  (STANDING):  amLODIPine   Tablet 10 milliGRAM(s) Oral daily  atorvastatin 80 milliGRAM(s) Oral at bedtime  cefTRIAXone   IVPB 1000 milliGRAM(s) IV Intermittent every 24 hours  chlorhexidine 4% Liquid 1 Application(s) Topical daily  dextrose 5%. 1000 milliLiter(s) (75 mL/Hr) IV Continuous <Continuous>  dextrose 50% Injectable 50 milliLiter(s) IV Push every 15 minutes  glucagon  Injectable 1 milliGRAM(s) IntraMuscular once  hydrocortisone sodium succinate Injectable 50 milliGRAM(s) IV Push every 8 hours  insulin regular Infusion 5 Unit(s)/Hr (5 mL/Hr) IV Continuous <Continuous>  levothyroxine Injectable 37.5 MICROGram(s) IV Push <User Schedule>  metroNIDAZOLE  IVPB 500 milliGRAM(s) IV Intermittent every 8 hours  pantoprazole  Injectable 40 milliGRAM(s) IV Push daily  polyethylene glycol 3350 17 Gram(s) Oral daily  senna 2 Tablet(s) Oral at bedtime  vancomycin  IVPB 1250 milliGRAM(s) IV Intermittent every 12 hours  vasopressin Infusion 0.04 Unit(s)/Min (6 mL/Hr) IV Continuous <Continuous>    MEDICATIONS  (PRN):  acetaminophen     Tablet .. 650 milliGRAM(s) Oral every 6 hours PRN Temp greater or equal to 38C (100.4F), Mild Pain (1 - 3)  ondansetron Injectable 4 milliGRAM(s) IV Push every 6 hours PRN Nausea and/or Vomiting  oxyCODONE    IR 5 milliGRAM(s) Oral every 4 hours PRN Moderate Pain (4 - 6)  sodium chloride 0.65% Nasal 1 Spray(s) Both Nostrils three times a day PRN Nasal Congestion      Allergies    No Known Drug Allergies  S/P TRANSSPHENOIDAL SURGERY. SINONASAL PRECAUTIONS! NO NASAL SWABS OR NG TUBES. (Unknown)    Intolerances        ROS: All other systems reviewed and negative    PHYSICAL EXAM:  VITALS: T(C): 37.2 (03-17-23 @ 07:00)  T(F): 99 (03-17-23 @ 07:00), Max: 99.2 (03-17-23 @ 03:00)  HR: 52 (03-17-23 @ 10:00) (41 - 79)  BP: 98/45 (03-17-23 @ 04:15) (94/55 - 159/65)  RR:  (12 - 23)  SpO2:  (94% - 100%)  Wt(kg): --  GENERAL: ill appearing, lying in bed  EYES: No proptosis,  anicteric  HEENT:  Atraumatic, Normocephalic, dry mucous membranes  RESPIRATORY: Nonlabored respirations on room air, normal rate/effort   CARDIOVASCULAR: Did not appear cyanotic, bradycardic, no lower extremity swelling visualized  GI: Soft, nontender, non distended  NEURO: sleepy, very sluggish to answer questions moves all extremities spontaneously    POCT Blood Glucose.: 106 mg/dL (03-17-23 @ 10:05)  POCT Blood Glucose.: 175 mg/dL (03-17-23 @ 09:03)  POCT Blood Glucose.: 219 mg/dL (03-17-23 @ 08:09)  POCT Blood Glucose.: 206 mg/dL (03-17-23 @ 07:10)  POCT Blood Glucose.: 216 mg/dL (03-17-23 @ 06:25)  POCT Blood Glucose.: 230 mg/dL (03-17-23 @ 05:21)  POCT Blood Glucose.: 240 mg/dL (03-17-23 @ 04:19)  POCT Blood Glucose.: 298 mg/dL (03-17-23 @ 03:08)  POCT Blood Glucose.: 329 mg/dL (03-17-23 @ 02:05)  POCT Blood Glucose.: 319 mg/dL (03-17-23 @ 01:11)  POCT Blood Glucose.: 210 mg/dL (03-17-23 @ 00:26)  POCT Blood Glucose.: 358 mg/dL (03-16-23 @ 23:00)  POCT Blood Glucose.: 373 mg/dL (03-16-23 @ 22:01)  POCT Blood Glucose.: 464 mg/dL (03-16-23 @ 21:13)  POCT Blood Glucose.: 338 mg/dL (03-16-23 @ 20:32)  POCT Blood Glucose.: 190 mg/dL (03-16-23 @ 18:21)  POCT Blood Glucose.: 107 mg/dL (03-16-23 @ 05:04)  POCT Blood Glucose.: 111 mg/dL (03-16-23 @ 03:10)  POCT Blood Glucose.: 127 mg/dL (03-16-23 @ 02:02)  POCT Blood Glucose.: 112 mg/dL (03-16-23 @ 01:13)  POCT Blood Glucose.: 107 mg/dL (03-15-23 @ 23:59)  POCT Blood Glucose.: 124 mg/dL (03-15-23 @ 23:08)  POCT Blood Glucose.: 108 mg/dL (03-15-23 @ 22:05)  POCT Blood Glucose.: 123 mg/dL (03-15-23 @ 21:08)  POCT Blood Glucose.: 111 mg/dL (03-15-23 @ 20:03)  POCT Blood Glucose.: 111 mg/dL (03-15-23 @ 18:53)  POCT Blood Glucose.: 129 mg/dL (03-15-23 @ 18:00)  POCT Blood Glucose.: 169 mg/dL (03-15-23 @ 16:59)  POCT Blood Glucose.: 169 mg/dL (03-15-23 @ 16:05)  POCT Blood Glucose.: 215 mg/dL (03-15-23 @ 14:58)  POCT Blood Glucose.: 287 mg/dL (03-15-23 @ 13:54)  POCT Blood Glucose.: 265 mg/dL (03-15-23 @ 13:02)  POCT Blood Glucose.: 298 mg/dL (03-15-23 @ 12:14)  POCT Blood Glucose.: 104 mg/dL (03-14-23 @ 16:25)  POCT Blood Glucose.: 133 mg/dL (03-14-23 @ 11:37)      03-17    156<H>  |  124<H>  |  9   ----------------------------<  219<H>  3.3<L>   |  24  |  0.56    eGFR: 109    Ca    8.3<L>      03-17  Mg     2.2     03-17  Phos  3.2     03-17        A1C with Estimated Average Glucose Result: 8.0 % (03-13-23 @ 06:23)      Thyroid Stimulating Hormone, Serum: 4.43 uIU/mL (03-16-23 @ 04:02)  Thyroid Stimulating Hormone, Serum: 3.46 uIU/mL (03-15-23 @ 09:09)  Thyroid Stimulating Hormone, Serum: 8.85 uIU/mL (03-11-23 @ 05:39)

## 2023-03-22 NOTE — H&P ADULT - NSHPPOADEEPVENOUSTHROMB_GEN_A_CORE
well developed, well nourished , in no acute distress , ambulating without difficulty , normal communication ability no No

## 2023-03-31 ENCOUNTER — INPATIENT (INPATIENT)
Facility: HOSPITAL | Age: 88
LOS: 4 days | Discharge: NURSING, REHAB OR LONG-TERM CARE | DRG: 537 | End: 2023-04-05
Attending: HOSPITALIST | Admitting: HOSPITALIST
Payer: MEDICARE

## 2023-03-31 VITALS
HEART RATE: 73 BPM | TEMPERATURE: 96 F | OXYGEN SATURATION: 99 % | DIASTOLIC BLOOD PRESSURE: 59 MMHG | RESPIRATION RATE: 18 BRPM | SYSTOLIC BLOOD PRESSURE: 126 MMHG

## 2023-03-31 DIAGNOSIS — Z90.49 ACQUIRED ABSENCE OF OTHER SPECIFIED PARTS OF DIGESTIVE TRACT: Chronic | ICD-10-CM

## 2023-03-31 DIAGNOSIS — Z95.2 PRESENCE OF PROSTHETIC HEART VALVE: Chronic | ICD-10-CM

## 2023-03-31 DIAGNOSIS — Z95.0 PRESENCE OF CARDIAC PACEMAKER: Chronic | ICD-10-CM

## 2023-03-31 DIAGNOSIS — Z98.890 OTHER SPECIFIED POSTPROCEDURAL STATES: Chronic | ICD-10-CM

## 2023-03-31 DIAGNOSIS — M25.551 PAIN IN RIGHT HIP: ICD-10-CM

## 2023-03-31 DIAGNOSIS — Z87.828 PERSONAL HISTORY OF OTHER (HEALED) PHYSICAL INJURY AND TRAUMA: Chronic | ICD-10-CM

## 2023-03-31 LAB
ALBUMIN SERPL ELPH-MCNC: 3.8 G/DL — SIGNIFICANT CHANGE UP (ref 3.5–5.2)
ALP SERPL-CCNC: 417 U/L — HIGH (ref 30–115)
ALT FLD-CCNC: 69 U/L — HIGH (ref 0–41)
ANION GAP SERPL CALC-SCNC: 11 MMOL/L — SIGNIFICANT CHANGE UP (ref 7–14)
ANION GAP SERPL CALC-SCNC: 9 MMOL/L — SIGNIFICANT CHANGE UP (ref 7–14)
APTT BLD: 34.8 SEC — SIGNIFICANT CHANGE UP (ref 27–39.2)
AST SERPL-CCNC: 66 U/L — HIGH (ref 0–41)
BASOPHILS # BLD AUTO: 0.02 K/UL — SIGNIFICANT CHANGE UP (ref 0–0.2)
BASOPHILS NFR BLD AUTO: 0.3 % — SIGNIFICANT CHANGE UP (ref 0–1)
BILIRUB SERPL-MCNC: 0.3 MG/DL — SIGNIFICANT CHANGE UP (ref 0.2–1.2)
BUN SERPL-MCNC: 46 MG/DL — HIGH (ref 10–20)
BUN SERPL-MCNC: 50 MG/DL — HIGH (ref 10–20)
CALCIUM SERPL-MCNC: 10 MG/DL — SIGNIFICANT CHANGE UP (ref 8.4–10.5)
CALCIUM SERPL-MCNC: 9.4 MG/DL — SIGNIFICANT CHANGE UP (ref 8.4–10.5)
CHLORIDE SERPL-SCNC: 108 MMOL/L — SIGNIFICANT CHANGE UP (ref 98–110)
CHLORIDE SERPL-SCNC: 110 MMOL/L — SIGNIFICANT CHANGE UP (ref 98–110)
CO2 SERPL-SCNC: 22 MMOL/L — SIGNIFICANT CHANGE UP (ref 17–32)
CO2 SERPL-SCNC: 24 MMOL/L — SIGNIFICANT CHANGE UP (ref 17–32)
CREAT SERPL-MCNC: 1.1 MG/DL — SIGNIFICANT CHANGE UP (ref 0.7–1.5)
CREAT SERPL-MCNC: 1.2 MG/DL — SIGNIFICANT CHANGE UP (ref 0.7–1.5)
EGFR: 43 ML/MIN/1.73M2 — LOW
EGFR: 48 ML/MIN/1.73M2 — LOW
EOSINOPHIL # BLD AUTO: 0.11 K/UL — SIGNIFICANT CHANGE UP (ref 0–0.7)
EOSINOPHIL NFR BLD AUTO: 1.7 % — SIGNIFICANT CHANGE UP (ref 0–8)
ETHANOL SERPL-MCNC: <10 MG/DL — SIGNIFICANT CHANGE UP
GLUCOSE SERPL-MCNC: 126 MG/DL — HIGH (ref 70–99)
GLUCOSE SERPL-MCNC: 195 MG/DL — HIGH (ref 70–99)
HCT VFR BLD CALC: 35.9 % — LOW (ref 37–47)
HGB BLD-MCNC: 11.6 G/DL — LOW (ref 12–16)
IMM GRANULOCYTES NFR BLD AUTO: 0.5 % — HIGH (ref 0.1–0.3)
INR BLD: 0.98 RATIO — SIGNIFICANT CHANGE UP (ref 0.65–1.3)
LACTATE SERPL-SCNC: 1.8 MMOL/L — SIGNIFICANT CHANGE UP (ref 0.7–2)
LIDOCAIN IGE QN: 62 U/L — HIGH (ref 7–60)
LYMPHOCYTES # BLD AUTO: 0.51 K/UL — LOW (ref 1.2–3.4)
LYMPHOCYTES # BLD AUTO: 7.8 % — LOW (ref 20.5–51.1)
MCHC RBC-ENTMCNC: 32.1 PG — HIGH (ref 27–31)
MCHC RBC-ENTMCNC: 32.3 G/DL — SIGNIFICANT CHANGE UP (ref 32–37)
MCV RBC AUTO: 99.4 FL — HIGH (ref 81–99)
MONOCYTES # BLD AUTO: 0.47 K/UL — SIGNIFICANT CHANGE UP (ref 0.1–0.6)
MONOCYTES NFR BLD AUTO: 7.1 % — SIGNIFICANT CHANGE UP (ref 1.7–9.3)
NEUTROPHILS # BLD AUTO: 5.44 K/UL — SIGNIFICANT CHANGE UP (ref 1.4–6.5)
NEUTROPHILS NFR BLD AUTO: 82.6 % — HIGH (ref 42.2–75.2)
NRBC # BLD: 0 /100 WBCS — SIGNIFICANT CHANGE UP (ref 0–0)
PLATELET # BLD AUTO: 104 K/UL — LOW (ref 130–400)
POTASSIUM SERPL-MCNC: 5.1 MMOL/L — HIGH (ref 3.5–5)
POTASSIUM SERPL-MCNC: 5.7 MMOL/L — HIGH (ref 3.5–5)
POTASSIUM SERPL-SCNC: 5.1 MMOL/L — HIGH (ref 3.5–5)
POTASSIUM SERPL-SCNC: 5.7 MMOL/L — HIGH (ref 3.5–5)
PROT SERPL-MCNC: 7.1 G/DL — SIGNIFICANT CHANGE UP (ref 6–8)
PROTHROM AB SERPL-ACNC: 11.2 SEC — SIGNIFICANT CHANGE UP (ref 9.95–12.87)
RBC # BLD: 3.61 M/UL — LOW (ref 4.2–5.4)
RBC # FLD: 13.4 % — SIGNIFICANT CHANGE UP (ref 11.5–14.5)
SARS-COV-2 RNA SPEC QL NAA+PROBE: SIGNIFICANT CHANGE UP
SODIUM SERPL-SCNC: 141 MMOL/L — SIGNIFICANT CHANGE UP (ref 135–146)
SODIUM SERPL-SCNC: 143 MMOL/L — SIGNIFICANT CHANGE UP (ref 135–146)
WBC # BLD: 6.58 K/UL — SIGNIFICANT CHANGE UP (ref 4.8–10.8)
WBC # FLD AUTO: 6.58 K/UL — SIGNIFICANT CHANGE UP (ref 4.8–10.8)

## 2023-03-31 PROCEDURE — 84100 ASSAY OF PHOSPHORUS: CPT

## 2023-03-31 PROCEDURE — 84466 ASSAY OF TRANSFERRIN: CPT

## 2023-03-31 PROCEDURE — 82728 ASSAY OF FERRITIN: CPT

## 2023-03-31 PROCEDURE — 87077 CULTURE AEROBIC IDENTIFY: CPT

## 2023-03-31 PROCEDURE — 83735 ASSAY OF MAGNESIUM: CPT

## 2023-03-31 PROCEDURE — 97110 THERAPEUTIC EXERCISES: CPT | Mod: GP

## 2023-03-31 PROCEDURE — 87086 URINE CULTURE/COLONY COUNT: CPT

## 2023-03-31 PROCEDURE — 72131 CT LUMBAR SPINE W/O DYE: CPT | Mod: MA

## 2023-03-31 PROCEDURE — 72170 X-RAY EXAM OF PELVIS: CPT | Mod: 26

## 2023-03-31 PROCEDURE — 73552 X-RAY EXAM OF FEMUR 2/>: CPT | Mod: 26,RT

## 2023-03-31 PROCEDURE — 81003 URINALYSIS AUTO W/O SCOPE: CPT

## 2023-03-31 PROCEDURE — 73560 X-RAY EXAM OF KNEE 1 OR 2: CPT | Mod: 26,RT

## 2023-03-31 PROCEDURE — 87186 SC STD MICRODIL/AGAR DIL: CPT

## 2023-03-31 PROCEDURE — 83550 IRON BINDING TEST: CPT

## 2023-03-31 PROCEDURE — 36415 COLL VENOUS BLD VENIPUNCTURE: CPT

## 2023-03-31 PROCEDURE — 73030 X-RAY EXAM OF SHOULDER: CPT | Mod: 26,LT

## 2023-03-31 PROCEDURE — 85027 COMPLETE CBC AUTOMATED: CPT

## 2023-03-31 PROCEDURE — 71045 X-RAY EXAM CHEST 1 VIEW: CPT | Mod: 26

## 2023-03-31 PROCEDURE — 97116 GAIT TRAINING THERAPY: CPT | Mod: GP

## 2023-03-31 PROCEDURE — 80053 COMPREHEN METABOLIC PANEL: CPT

## 2023-03-31 PROCEDURE — 99222 1ST HOSP IP/OBS MODERATE 55: CPT

## 2023-03-31 PROCEDURE — 80048 BASIC METABOLIC PNL TOTAL CA: CPT

## 2023-03-31 PROCEDURE — 99285 EMERGENCY DEPT VISIT HI MDM: CPT | Mod: FS

## 2023-03-31 PROCEDURE — 80074 ACUTE HEPATITIS PANEL: CPT

## 2023-03-31 PROCEDURE — 72192 CT PELVIS W/O DYE: CPT | Mod: MA

## 2023-03-31 PROCEDURE — 72192 CT PELVIS W/O DYE: CPT | Mod: 26

## 2023-03-31 PROCEDURE — 72131 CT LUMBAR SPINE W/O DYE: CPT | Mod: 26

## 2023-03-31 PROCEDURE — 83540 ASSAY OF IRON: CPT

## 2023-03-31 PROCEDURE — 97162 PT EVAL MOD COMPLEX 30 MIN: CPT | Mod: GP

## 2023-03-31 PROCEDURE — U0005: CPT

## 2023-03-31 PROCEDURE — 76705 ECHO EXAM OF ABDOMEN: CPT

## 2023-03-31 PROCEDURE — U0003: CPT

## 2023-03-31 PROCEDURE — 85025 COMPLETE CBC W/AUTO DIFF WBC: CPT

## 2023-03-31 RX ORDER — HEPARIN SODIUM 5000 [USP'U]/ML
5000 INJECTION INTRAVENOUS; SUBCUTANEOUS EVERY 12 HOURS
Refills: 0 | Status: DISCONTINUED | OUTPATIENT
Start: 2023-03-31 | End: 2023-04-05

## 2023-03-31 RX ORDER — LOSARTAN POTASSIUM 100 MG/1
50 TABLET, FILM COATED ORAL DAILY
Refills: 0 | Status: DISCONTINUED | OUTPATIENT
Start: 2023-03-31 | End: 2023-03-31

## 2023-03-31 RX ORDER — CHLORHEXIDINE GLUCONATE 213 G/1000ML
1 SOLUTION TOPICAL
Refills: 0 | Status: DISCONTINUED | OUTPATIENT
Start: 2023-03-31 | End: 2023-04-05

## 2023-03-31 RX ORDER — FUROSEMIDE 40 MG
20 TABLET ORAL
Refills: 0 | Status: COMPLETED | OUTPATIENT
Start: 2023-03-31 | End: 2023-04-01

## 2023-03-31 RX ORDER — LEVOTHYROXINE SODIUM 125 MCG
112 TABLET ORAL DAILY
Refills: 0 | Status: DISCONTINUED | OUTPATIENT
Start: 2023-03-31 | End: 2023-04-01

## 2023-03-31 RX ORDER — PANTOPRAZOLE SODIUM 20 MG/1
40 TABLET, DELAYED RELEASE ORAL
Refills: 0 | Status: DISCONTINUED | OUTPATIENT
Start: 2023-03-31 | End: 2023-04-05

## 2023-03-31 RX ORDER — INFLUENZA VIRUS VACCINE 15; 15; 15; 15 UG/.5ML; UG/.5ML; UG/.5ML; UG/.5ML
0.7 SUSPENSION INTRAMUSCULAR ONCE
Refills: 0 | Status: COMPLETED | OUTPATIENT
Start: 2023-03-31 | End: 2023-03-31

## 2023-03-31 RX ORDER — SODIUM ZIRCONIUM CYCLOSILICATE 10 G/10G
5 POWDER, FOR SUSPENSION ORAL ONCE
Refills: 0 | Status: COMPLETED | OUTPATIENT
Start: 2023-03-31 | End: 2023-04-01

## 2023-03-31 RX ORDER — MORPHINE SULFATE 50 MG/1
2 CAPSULE, EXTENDED RELEASE ORAL EVERY 6 HOURS
Refills: 0 | Status: DISCONTINUED | OUTPATIENT
Start: 2023-03-31 | End: 2023-04-05

## 2023-03-31 RX ORDER — FUROSEMIDE 40 MG
20 TABLET ORAL
Refills: 0 | Status: COMPLETED | OUTPATIENT
Start: 2023-03-31 | End: 2023-04-05

## 2023-03-31 RX ADMIN — HEPARIN SODIUM 5000 UNIT(S): 5000 INJECTION INTRAVENOUS; SUBCUTANEOUS at 18:15

## 2023-03-31 RX ADMIN — CHLORHEXIDINE GLUCONATE 1 APPLICATION(S): 213 SOLUTION TOPICAL at 18:15

## 2023-03-31 NOTE — H&P ADULT - ASSESSMENT
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      Plan:    Admit to inpatient level of care-medicine.  CHG ordered.  Ambulate with assistance.  Fall risk.  VTE ppx: Heparin  GI ppx: Protonix  Diet: DASH    #Mechanical trip and fall 4 days ago  X-rays negative for fractures.  Follow up CT lumbar spine and pelvis   Physical therapy consult  Ketorolac for mild pain.  Morphine 2mg q6hrs for severe pain.     #Chronic numbness and tingling secondary to restless leg syndrome.  Unchanged from baseline.     #Hyperkalemia  K 5.7-hemolyzed.  Will repeat BMP @ 1900    #Transaminitis  AST/ALT 66/69- hemolyzed.  Will repeat @ 1900.    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  Denies SOB, or lower extremity edema.         Above discussed with Dr. 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      Plan:  Admit to inpatient level of care-medicine.  CHG ordered.  Ambulate with assistance.  Fall risk.  VTE ppx: Heparin  GI ppx: Protonix  Diet: DASH      #Difficulty ambulating s/p mechanical trip and fall 4 days ago  X-rays negative for fractures.  Follow up CT lumbar spine and pelvis   Physical therapy consult  Ketorolac for mild pain.  Morphine 2mg q6hrs for severe pain.       #Chronic numbness and tingling secondary to restless leg syndrome.  Unchanged from baseline.       #Hyperkalemia  K 5.7-hemolyzed.  Will repeat BMP @ 1900      #Transaminitis  AST/ALT 66/69- hemolyzed.  Will repeat @ 1900.      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  Denies SOB, or lower extremity edema.         Above discussed with Dr. 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      Plan:  Admit to inpatient level of care-medicine.  CHG ordered.  Ambulate with assistance.  Fall risk.  VTE ppx: Heparin  GI ppx: Protonix  Diet: DASH      #Difficulty ambulating s/p mechanical trip and fall 4 days ago  X-rays negative for fractures.  Follow up CT lumbar spine and pelvis   Physical therapy consult  Ketorolac for mild pain.  Morphine 2mg q6hrs for severe pain.       #Chronic numbness and tingling secondary to restless leg syndrome.  Unchanged from baseline.       #Hyperkalemia  K 5.7-hemolyzed.  Will repeat BMP @ 1900      #Transaminitis  AST/ALT 66/69- hemolyzed.  Will repeat @ 1900.      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  As per patients son, patient has been noncompliant with the Lasix 20mg. States cardiologist Dr. Mccollum prescribed it for q 72hrs however patient refuses to take it d/t frequent urination.         Above discussed with Dr. 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      Plan:  Admit to inpatient level of care-medicine.  CHG ordered.  Ambulate with assistance.  Fall risk.  VTE ppx: Heparin  GI ppx: Protonix  Diet: DASH      #Difficulty ambulating s/p mechanical trip and fall 4 days ago  X-rays negative for fractures.  Follow up CT lumbar spine and pelvis   Physical therapy consult  Ketorolac for mild pain.  Morphine 2mg q6hrs for severe pain.       #Chronic numbness and tingling secondary to restless leg syndrome.  Unchanged from baseline.       #Hyperkalemia  K 5.7-hemolyzed.  Will repeat BMP @ 1900      #Transaminitis  AST/ALT 66/69- hemolyzed.  Will repeat @ 1900.      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  As per patients son, patient has been noncompliant with the Lasix 20mg. States cardiologist Dr. Mccollum prescribed it for q 72hrs however patient refuses to take it d/t side effects of frequent urination. States she last took the Lasix on Wednesday (2 days ago). Will order next dose for tomorrow (4/1/23)         Above discussed with Dr. 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      Plan:  Admit to inpatient level of care-medicine.  CHG ordered.  Ambulate with assistance.  Fall risk.  VTE ppx: Heparin  GI ppx: Protonix  Diet: DASH      #inability to ambulating s/p mechanical trip and fall 4 days ago  X-rays negative for fractures.  Follow up CT lumbar spine and pelvis   Physical therapy consult  Ketorolac for mild pain.  Morphine 2mg q6hrs for severe pain.   fall risk      #Chronic numbness and tingling secondary to restless leg syndrome.  Unchanged from baseline.       #Hyperkalemia  K 5.7-hemolyzed.  Will repeat BMP @ 1900      #Transaminitis  AST/ALT 66/69- hemolyzed.  Will repeat @ 1900.      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  As per patients son, patient has been noncompliant with the Lasix 20mg. States cardiologist Dr. Mccollum prescribed it for q 72hrs however patient refuses to take it d/t side effects of frequent urination. States she last took the Lasix on Wednesday (2 days ago). Will order next dose for tomorrow (4/1/23)         Above discussed with Dr. 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      Plan:  Admit to inpatient level of care-medicine.  CHG ordered.  Ambulate with assistance.  Fall risk.  VTE ppx: Heparin  GI ppx: Protonix  Diet: DASH      #inability to ambulating s/p mechanical trip and fall 4 days ago  X-rays negative for fractures.  Follow up CT lumbar spine and pelvis   Physical therapy consult  Ketorolac for mild pain.  Morphine 2mg q6hrs for severe pain.   fall risk      #Chronic numbness and tingling secondary to restless leg syndrome.  Unchanged from baseline.       #Hyperkalemia  K 5.7-hemolyzed.      #Transaminitis  AST/ALT 66/69- hemolyzed.  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  As per patients son, patient has been noncompliant with the Lasix 20mg. States cardiologist Dr. Mccollum prescribed it for q 72hrs however patient refuses to take it d/t side effects of frequent urination. States she last took the Lasix on Wednesday (2 days ago). Will order next dose for tomorrow (4/1/23)         Above discussed with Dr. Diaz

## 2023-03-31 NOTE — CHART NOTE - NSCHARTNOTEFT_GEN_A_CORE
Pt's Potassium noted to be 5.1, will give lokelma 5gm, f/u repeat CMP in the AM. Will hold losartan due to hyperkalemia.

## 2023-03-31 NOTE — PHARMACOTHERAPY INTERVENTION NOTE - COMMENTS
Spoke with CRISTIANO Garcia, aware patient's potassium is hemolyzed to 5.7, which may not be an accurate reading. Losartan can increase levels of potassium further, so potassium is being reordered from 7PM, and will monitor the patient while continuing therapy

## 2023-03-31 NOTE — ED ADULT NURSE NOTE - NSICDXPASTSURGICALHX_GEN_ALL_CORE_FT
PAST SURGICAL HISTORY:  Aortic valve replaced     H/O carpal tunnel repair 2021 - 2012    H/O resection of liver liver cancer (right lobe 2001); 2003 - right lobe, cholecystectomy, and bile duct    History of torn meniscus of knee right; repaired  1995    Pacemaker     Status post cholecystectomy

## 2023-03-31 NOTE — ED ADULT NURSE NOTE - NSIMPLEMENTINTERV_GEN_ALL_ED
Implemented All Fall with Harm Risk Interventions:  [place] to call system. Call bell, personal items and telephone within reach. Instruct patient to call for assistance. Room bathroom lighting operational. Non-slip footwear when patient is off stretcher. Physically safe environment: no spills, clutter or unnecessary equipment. Stretcher in lowest position, wheels locked, appropriate side rails in place. Provide visual cue, wrist band, yellow gown, etc. Monitor gait and stability. Monitor for mental status changes and reorient to person, place, and time. Review medications for side effects contributing to fall risk. Reinforce activity limits and safety measures with patient and family. Provide visual clues: red socks.

## 2023-03-31 NOTE — ED PROVIDER NOTE - PHYSICAL EXAMINATION
Physical Exam    Constitutional: No acute distress.   Head: Atraumatic normocephalic   Eyes: Conjunctiva pink, Sclera clear, PERRLA, EOMI.  ENT: No sinus tenderness. No nasal discharge. No oropharyngeal erythema, edema, or exudates. Uvula midline.   Cardiovascular: Regular rate, regular rhythm. +systolic murmur  Respiratory: unlabored respiratory effort, clear to auscultation bilaterally no wheezing, rales or rhonchi  Gastrointestinal: Normal bowel sounds. soft, non distended, non-tender abdomen. No ecchymosis.   Musculoskeletal: supple neck, no midline tenderness. tenderness to right hip/groin inability to range passively or actively. No discoloration or bruising of the pelvis/lower extremities.   Integumentary: warm, dry, no rash  Neurologic: awake, alert, cranial nerves II-XII grossly intact, extremities’ motor and sensory functions grossly intact  Psychiatric: appropriate mood, appropriate affect Physical Exam    Constitutional: No acute distress.   Head: Atraumatic normocephalic   Eyes: Conjunctiva pink, Sclera clear, PERRLA, EOMI.  ENT: No sinus tenderness. No nasal discharge. No oropharyngeal erythema, edema, or exudates. Uvula midline.   Cardiovascular: Regular rate, regular rhythm. +systolic murmur  Respiratory: unlabored respiratory effort, clear to auscultation bilaterally no wheezing, rales or rhonchi  Gastrointestinal: Normal bowel sounds. soft, non distended, non-tender abdomen. No ecchymosis.   Musculoskeletal: supple neck, no midline tenderness. tenderness to right hip/groin inability to range passively or actively. right low paraspinal tenderness. No discoloration or bruising of the pelvis/lower extremities.   Integumentary: warm, dry, no rash  Neurologic: awake, alert, cranial nerves II-XII grossly intact, extremities’ motor and sensory functions grossly intact  Psychiatric: appropriate mood, appropriate affect

## 2023-03-31 NOTE — PATIENT PROFILE ADULT - FUNCTIONAL ASSESSMENT - BASIC MOBILITY 6.
1-calculated by average/Not able to assess (calculate score using [facility] averaging method)

## 2023-03-31 NOTE — H&P ADULT - HISTORY OF PRESENT ILLNESS
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 States pain is worse when attempting to sit upright from a supine position. Additionally patient reports numbness and tingling to bilateral lower extremities which is chronic as per patient secondary to restless leg syndrome. Patient states 4 days ago she tripped over the saddle of the door in the bathroom. Denies LOC or head trauma. Reports since then she's had difficulty ambulating with worsening pain prompting visit to the ER. States she tried Advil and lidocaine patch without relief. Patient denies fever, chills, nausea, vomiting, diarrhea, chest pain or sob.

## 2023-03-31 NOTE — PATIENT PROFILE ADULT - IS THERE A SUSPICION OF ABUSE/NEGLIGENCE?
[Born at ___ Wks Gestation] : The patient was born at [unfilled] weeks gestation [] : via normal spontaneous vaginal delivery [[facility]] : at [facility] [BW: _____] : weight of [unfilled] [Length: _____] : length of [unfilled] [HC: _____] : head circumference of [unfilled] [G: ___] : G [unfilled] [P: ___] : P [unfilled] [Breast milk] : breast milk [Normal] : Normal [In Bassinet/Crib] : sleeps in bassinet/crib [On back] : sleeps on back [No] : Household members not COVID-19 positive or suspected COVID-19 [Water heater temperature set at <120 degrees F] : Water heater temperature set at <120 degrees F [Rear facing car seat in back seat] : Rear facing car seat in back seat [Carbon Monoxide Detectors] : Carbon monoxide detectors at home [Smoke Detectors] : Smoke detectors at home. [Hepatitis B Vaccine Given] : Hepatitis B vaccine given [(1) _____] : [unfilled] [(5) _____] : [unfilled] [None] : There were no delivery complications [DW: _____] : Discharge weight was [unfilled] [Age: ___] : [unfilled] year old mother [TotalSerumBilirubin] : 5.4 TC [FreeTextEntry7] : 36 [Vitamins ___] : Patient takes no vitamins [Co-sleeping] : no co-sleeping [Loose bedding, pillow, toys, and/or bumpers in crib] : no loose bedding, pillow, toys, and/or bumpers in crib [Pacifier] : Not using pacifier [Exposure to electronic nicotine delivery system] : No exposure to electronic nicotine delivery system [Gun in Home] : No gun in home [FreeTextEntry1] : Baby doing well. No concerns no

## 2023-03-31 NOTE — PATIENT PROFILE ADULT - FALL HARM RISK - HARM RISK INTERVENTIONS

## 2023-03-31 NOTE — H&P ADULT - NSHPPHYSICALEXAM_GEN_ALL_CORE
Vital Signs Last 24 Hrs  T(C): 35.6 (31 Mar 2023 11:54), Max: 35.6 (31 Mar 2023 11:54)  T(F): 96 (31 Mar 2023 11:54), Max: 96 (31 Mar 2023 11:54)  HR: 73 (31 Mar 2023 11:54) (73 - 73)  BP: 126/59 (31 Mar 2023 11:54) (126/59 - 126/59)  RR: 18 (31 Mar 2023 11:54) (18 - 18)  SpO2: 99% (31 Mar 2023 11:54) (99% - 99%)    Parameters below as of 31 Mar 2023 11:54  Patient On (Oxygen Delivery Method): room air      GENERAL: NAD, lying in bed comfortably  HEAD:  Atraumatic, Normocephalic  EYES: EOMI, PERRLA, conjunctiva and sclera clear  ENT: Moist mucous membranes  NECK: Supple, No JVD  CHEST/LUNG: Clear to auscultation bilaterally; No rales, rhonchi, wheezing, or rubs. Unlabored respirations  HEART: Regular rate and rhythm; No murmurs, rubs, or gallops  ABDOMEN: Bowel sounds present; Soft, Nontender, Nondistended. No hepatomegally  EXTREMITIES:  2+ Peripheral Pulses, (+) mild tenderness to palpation over the right and left hip joints.   NERVOUS SYSTEM:  Alert & Oriented X3, speech clear. No deficits   MSK: FROM all 4 extremities, full and equal strength  SKIN: No rashes or lesions

## 2023-03-31 NOTE — ED PROVIDER NOTE - OBJECTIVE STATEMENT
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 Patient experienced Mechanical fall on Tuesday 03.28 in which she tripped and fell over the saddle of the door going into her bathroom. She denies syncope, head trauma, LOC and was able to get herself up onto the toilet after the incident however was not able to stand on her own after that. Since the incident she reports right hip and groin pain now unable to bear weight on her right leg. Admits to numbness and tingling of her lower extremities which is chronic. Also reports left shoulder pain and limited mobility which is new since the fall. Otherwise Denies headache, lightheadedness, dizziness, fever, chills, chest pain, shortness of breath, abdominal pain, nausea, vomiting, diarrhea, constipation, dysuria, hematuria, lower extremity swelling, rash. 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 Patient experienced Mechanical fall on Tuesday 03/28 in which she tripped and fell over the saddle of the door going into her bathroom. She denies syncope, head trauma, LOC and was able to get herself up onto the toilet after the incident however was not able to stand on her own after that. Since the incident she reports right hip and groin pain now unable to bear weight on her right leg. Admits to numbness and tingling of her lower extremities which is chronic. Also reports left shoulder pain and limited mobility which is new since the fall. Otherwise Denies headache, lightheadedness, dizziness, fever, chills, chest pain, shortness of breath, abdominal pain, nausea, vomiting, diarrhea, constipation, dysuria, hematuria, lower extremity swelling, rash.

## 2023-03-31 NOTE — H&P ADULT - NSHPLABSRESULTS_GEN_ALL_CORE
LABS:                        11.6   6.58  )-----------( 104      ( 31 Mar 2023 12:07 )             35.9     03-31    143  |  108  |  50<H>  ----------------------------<  126<H>  5.7<H>   |  24  |  1.1    Ca    10.0      31 Mar 2023 12:07    TPro  7.1  /  Alb  3.8  /  TBili  0.3  /  DBili  x   /  AST  66<H>  /  ALT  69<H>  /  AlkPhos  417<H>  03-31    LIVER FUNCTIONS - ( 31 Mar 2023 12:07 )  Alb: 3.8 g/dL / Pro: 7.1 g/dL / ALK PHOS: 417 U/L / ALT: 69 U/L / AST: 66 U/L / GGT: x           PT/INR - ( 31 Mar 2023 12:07 )   PT: 11.20 sec;   INR: 0.98 ratio      PTT - ( 31 Mar 2023 12:07 )  PTT:34.8 sec  +++++++++++++++++++++++++++++++++++++++++++++++++++++++++++++++++++++++++++++++++++++++  < from: Xray Pelvis AP only (03.31.23 @ 12:51) >  Findings/  impression:  Bones are osteopenic. Degenerative changes of the lower lumbar spine and   hips. No fractures. Moderate fecal retention.    --- End of Report ---  RENAN WATERS MD; Attending Radiologist  This document has been electronically signed. Mar [id number]  1:40PM  +++++++++++++++++++++++++++++++++++++++++++++++++++++++++++++++++++++++++++++++++++++++++  < end of copied text >    < from: Xray Chest 1 View AP/PA (03.31.23 @ 12:51) >  Impression:  Biapical pleural thickening. Left pleural/parenchymal opacity, unchanged    --- End of Report ---    RENAN WATERS MD; Attending Radiologist  This document has been electronicallysigned. Mar 31 2023 12:56PM  +++++++++++++++++++++++++++++++++++++++++++++++++++++++++++++++++++++++++++++++++++++++++++  < from: Xray Shoulder 2 Views, Left (03.31.23 @ 12:50) >  Findings/  impression:  The bones are osteopenic.  There are degenerative changes of the glenohumeral joint and   acromioclavicular joint. No acute fracture. Valvular prosthesis within   the heart.  --- End of Report ---    RENAN WATERS MD; Attending Radiologist  This document has been electronically signed. Mar 31 2023  1:00PM  ++++++++++++++++++++++++++++++++++++++++++++++++++++++++++++++++++++++++++++++++++++++++++++

## 2023-03-31 NOTE — ED PROVIDER NOTE - ATTENDING APP SHARED VISIT CONTRIBUTION OF CARE
Patient is an 89-year-old female who fell 4 days ago but since then has had right hip pain and inability to bear weight.  Comes in due to symptoms not improving.  Denies any radiation of pain or numbness of feet.    Exam: Soft nontender abdomen, stable pelvis, right hip tenderness, 2+ DP pulse, normal sensation of toes  Plan: Labs, x-ray, admission if failure to ambulate

## 2023-04-01 LAB
ALBUMIN SERPL ELPH-MCNC: 3.5 G/DL — SIGNIFICANT CHANGE UP (ref 3.5–5.2)
ALP SERPL-CCNC: 390 U/L — HIGH (ref 30–115)
ALT FLD-CCNC: 55 U/L — HIGH (ref 0–41)
ANION GAP SERPL CALC-SCNC: 11 MMOL/L — SIGNIFICANT CHANGE UP (ref 7–14)
AST SERPL-CCNC: 45 U/L — HIGH (ref 0–41)
BILIRUB SERPL-MCNC: 0.5 MG/DL — SIGNIFICANT CHANGE UP (ref 0.2–1.2)
BUN SERPL-MCNC: 43 MG/DL — HIGH (ref 10–20)
CALCIUM SERPL-MCNC: 9.6 MG/DL — SIGNIFICANT CHANGE UP (ref 8.4–10.5)
CHLORIDE SERPL-SCNC: 109 MMOL/L — SIGNIFICANT CHANGE UP (ref 98–110)
CO2 SERPL-SCNC: 23 MMOL/L — SIGNIFICANT CHANGE UP (ref 17–32)
CREAT SERPL-MCNC: 1 MG/DL — SIGNIFICANT CHANGE UP (ref 0.7–1.5)
EGFR: 54 ML/MIN/1.73M2 — LOW
GLUCOSE SERPL-MCNC: 70 MG/DL — SIGNIFICANT CHANGE UP (ref 70–99)
HCT VFR BLD CALC: 35.3 % — LOW (ref 37–47)
HGB BLD-MCNC: 11.2 G/DL — LOW (ref 12–16)
MAGNESIUM SERPL-MCNC: 1.9 MG/DL — SIGNIFICANT CHANGE UP (ref 1.8–2.4)
MCHC RBC-ENTMCNC: 31.7 G/DL — LOW (ref 32–37)
MCHC RBC-ENTMCNC: 31.7 PG — HIGH (ref 27–31)
MCV RBC AUTO: 100 FL — HIGH (ref 81–99)
NRBC # BLD: 0 /100 WBCS — SIGNIFICANT CHANGE UP (ref 0–0)
PHOSPHATE SERPL-MCNC: 3 MG/DL — SIGNIFICANT CHANGE UP (ref 2.1–4.9)
PLATELET # BLD AUTO: 103 K/UL — LOW (ref 130–400)
POTASSIUM SERPL-MCNC: 5.1 MMOL/L — HIGH (ref 3.5–5)
POTASSIUM SERPL-SCNC: 5.1 MMOL/L — HIGH (ref 3.5–5)
PROT SERPL-MCNC: 6.6 G/DL — SIGNIFICANT CHANGE UP (ref 6–8)
RBC # BLD: 3.53 M/UL — LOW (ref 4.2–5.4)
RBC # FLD: 13.4 % — SIGNIFICANT CHANGE UP (ref 11.5–14.5)
SODIUM SERPL-SCNC: 143 MMOL/L — SIGNIFICANT CHANGE UP (ref 135–146)
WBC # BLD: 5.15 K/UL — SIGNIFICANT CHANGE UP (ref 4.8–10.8)
WBC # FLD AUTO: 5.15 K/UL — SIGNIFICANT CHANGE UP (ref 4.8–10.8)

## 2023-04-01 PROCEDURE — 76705 ECHO EXAM OF ABDOMEN: CPT | Mod: 26

## 2023-04-01 PROCEDURE — 99233 SBSQ HOSP IP/OBS HIGH 50: CPT

## 2023-04-01 RX ORDER — ASCORBIC ACID 60 MG
500 TABLET,CHEWABLE ORAL DAILY
Refills: 0 | Status: DISCONTINUED | OUTPATIENT
Start: 2023-04-01 | End: 2023-04-05

## 2023-04-01 RX ORDER — IBUPROFEN 200 MG
400 TABLET ORAL ONCE
Refills: 0 | Status: COMPLETED | OUTPATIENT
Start: 2023-04-01 | End: 2023-04-01

## 2023-04-01 RX ORDER — LEVOTHYROXINE SODIUM 125 MCG
112 TABLET ORAL DAILY
Refills: 0 | Status: DISCONTINUED | OUTPATIENT
Start: 2023-04-01 | End: 2023-04-01

## 2023-04-01 RX ORDER — LEVOTHYROXINE SODIUM 125 MCG
100 TABLET ORAL DAILY
Refills: 0 | Status: DISCONTINUED | OUTPATIENT
Start: 2023-04-02 | End: 2023-04-02

## 2023-04-01 RX ADMIN — HEPARIN SODIUM 5000 UNIT(S): 5000 INJECTION INTRAVENOUS; SUBCUTANEOUS at 18:00

## 2023-04-01 RX ADMIN — SODIUM ZIRCONIUM CYCLOSILICATE 5 GRAM(S): 10 POWDER, FOR SUSPENSION ORAL at 12:07

## 2023-04-01 RX ADMIN — HEPARIN SODIUM 5000 UNIT(S): 5000 INJECTION INTRAVENOUS; SUBCUTANEOUS at 05:47

## 2023-04-01 RX ADMIN — Medication 1 TABLET(S): at 12:07

## 2023-04-01 RX ADMIN — PANTOPRAZOLE SODIUM 40 MILLIGRAM(S): 20 TABLET, DELAYED RELEASE ORAL at 05:47

## 2023-04-01 RX ADMIN — Medication 20 MILLIGRAM(S): at 12:06

## 2023-04-01 RX ADMIN — Medication 112 MICROGRAM(S): at 05:48

## 2023-04-01 RX ADMIN — Medication 400 MILLIGRAM(S): at 06:44

## 2023-04-01 NOTE — PHYSICAL THERAPY INITIAL EVALUATION ADULT - IMPAIRMENTS CONTRIBUTING IMPAIRED BED MOBILITY, REHAB EVAL
Subjective   Patient ID: Maura is a female 32 year old year old  who delivered at 40w1d by [facility].  RTR delivering clinician.      She is here for her 6 week post partum visit.    Her postpartum course has been uncomplicated. She denies vaginal bleeding, and her bowel and bladder functioning is normal. She denies postpartum depression or blues.    Chief Complaint   Patient presents with   • Postpartum     Doing well.  No concerns.  Breast feeding.  Menses have not returned.        Patient's medications, allergies, past medical, surgical, social and family histories were reviewed and updated as appropriate.    Review of Systems   Constitutional: Negative for activity change, appetite change and unexpected weight change.   Eyes: Negative for photophobia and visual disturbance.   Respiratory: Negative for cough, chest tightness and shortness of breath.    Cardiovascular: Negative for leg swelling.   Gastrointestinal: Negative for abdominal distention, abdominal pain and constipation.   Genitourinary: Negative for difficulty urinating, dysuria, frequency, genital sores, menstrual problem, urgency, vaginal bleeding, vaginal discharge and vaginal pain.   Skin: Negative for color change and rash.   Neurological: Negative for dizziness, syncope and headaches.   Hematological: Does not bruise/bleed easily.   Psychiatric/Behavioral: Negative for confusion and sleep disturbance. The patient is not nervous/anxious.        Objective   Physical Exam  Constitutional:       General: She is awake.      Appearance: Normal appearance. She is well-developed and well-groomed.   Genitourinary:      Vulva, vagina, uterus, right adnexa and left adnexa normal.   HENT:      Head: Normocephalic and atraumatic.      Right Ear: Hearing normal.      Left Ear: Hearing normal.      Nose: Nose normal.      Mouth/Throat:      Lips: Pink.   Eyes:      General: Lids are normal. Vision grossly intact.      Extraocular Movements: Extraocular  movements intact.   Neck:      Musculoskeletal: Neck supple.      Thyroid: No thyroid mass or thyromegaly.   Pulmonary:      Effort: Pulmonary effort is normal. No respiratory distress.   Abdominal:      General: Abdomen is flat. There is no distension.      Palpations: Abdomen is soft. There is no hepatomegaly or mass.   Neurological:      General: No focal deficit present.      Mental Status: She is alert and oriented to person, place, and time.   Skin:     General: Skin is warm and dry.   Psychiatric:         Attention and Perception: Attention normal.         Mood and Affect: Mood normal.         Speech: Speech normal.         Behavior: Behavior is cooperative.         Thought Content: Thought content normal.   Vitals signs and nursing note reviewed. Exam conducted with a chaperone present.         Assessment   Options for postpartum family planning were discussed.  These included combination oral contraceptive options, progesterone only pills, implantable contraception, intrauterine devices, barrier methods, sterilization, and the rhythm method.  The patient's questions were answered.  She decided on condoms as her preferred method of contraception.   Follow up: 3 months for annual well-woman care.    Problem List Items Addressed This Visit     None      Visit Diagnoses     Routine postpartum follow-up    -  Primary           impaired balance/pain/decreased strength

## 2023-04-01 NOTE — PROGRESS NOTE ADULT - SUBJECTIVE AND OBJECTIVE BOX
SUBJECTIVE:    Patient is a 89y old Female who presents with a chief complaint of   Currently admitted to medicine with the primary diagnosis of Right hip pain       Today is hospital day 1d. This morning she is resting comfortably in bed and reports no new issues or overnight events.     ROS:   CONSTITUTIONAL: No weakness, fevers or chills   EYES/ENT: No visual changes; No vertigo or throat pain   NECK: No pain or stiffness   RESPIRATORY: No cough, wheezing, hemoptysis; No shortness of breath   CARDIOVASCULAR: No chest pain or palpitations   GASTROINTESTINAL: No abdominal or epigastric pain. No nausea, vomiting, or hematemesis; No diarrhea or constipation. No melena or hematochezia.  GENITOURINARY: No dysuria, frequency or hematuria  NEUROLOGICAL: No numbness or weakness  SKIN: No itching, rashes      PAST MEDICAL & SURGICAL HISTORY  HTN (hypertension)    Mitral valve prolapse    Enlarged heart    Murmur    Arthritis    HTN (hypertension)    Diverticulosis    Hiatal hernia    Acid reflux    Liver cancer  s/p resection and s/p chemo and radiation    Aortic stenosis  s/p ballon aortic valuloplasty 5/25/21; 8/2021    Hypothyroid    H/O thyroid nodule  bx benign - 5 years ago    Osteoarthritis    H/O tear of meniscus of knee joint  left    Nodule of kidney    Overactive bladder    History of blood transfusion  no adverse reaction    H/O resection of liver  liver cancer (right lobe 2001); 2003 - right lobe, cholecystectomy, and bile duct    Status post cholecystectomy    History of torn meniscus of knee  right; repaired  1995    H/O carpal tunnel repair  2021 - 2012    Pacemaker    Aortic valve replaced      SOCIAL HISTORY:    ALLERGIES:  BLUE DYE (Stomach Upset; Nausea)  Cipro (Other)  Levaquin (Rash)  tetracycline (Hives)    MEDICATIONS:  STANDING MEDICATIONS  chlorhexidine 2% Cloths 1 Application(s) Topical <User Schedule>  furosemide    Tablet 20 milliGRAM(s) Oral <User Schedule>  heparin   Injectable 5000 Unit(s) SubCutaneous every 12 hours  influenza  Vaccine (HIGH DOSE) 0.7 milliLiter(s) IntraMuscular once  levothyroxine 112 MICROGram(s) Oral daily  multivitamin 1 Tablet(s) Oral daily  pantoprazole    Tablet 40 milliGRAM(s) Oral before breakfast    PRN MEDICATIONS  morphine  - Injectable 2 milliGRAM(s) IV Push every 6 hours PRN    VITALS:   T(F): 97.2  HR: 66  BP: 124/56  RR: 18  SpO2: 96%    LABS:  Negative for smoking/alcohol/drug use.                         11.2   5.15  )-----------( 103      ( 01 Apr 2023 06:27 )             35.3     04-01    143  |  109  |  43<H>  ----------------------------<  70  5.1<H>   |  23  |  1.0    Ca    9.6      01 Apr 2023 06:27  Phos  3.0     04-01  Mg     1.9     04-01    TPro  6.6  /  Alb  3.5  /  TBili  0.5  /  DBili  x   /  AST  45<H>  /  ALT  55<H>  /  AlkPhos  390<H>  04-01    PT/INR - ( 31 Mar 2023 12:07 )   PT: 11.20 sec;   INR: 0.98 ratio         PTT - ( 31 Mar 2023 12:07 )  PTT:34.8 sec    RADIOLOGY:    PHYSICAL EXAM:  GEN: No acute distress  HEENT: normocephalic, atraumatic, aniceteric  LUNGS: Clear to auscultation bilaterally, no rales/wheezing/ rhonchi  HEART: S1/S2 present. RRR, no murmurs  ABD: Soft, non-tender, non-distended. 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        # Acute partial tear of the right iliopsoas insertion   # Difficulty ambulating s/p mechanical trip and fall 4 days ago  - X-rays negative for fractures  - WBAT RLE   - PT - [facility]   - Pain control  - Fall risk   - Supportive care       #H/O Chronic numbness and tingling secondary to restless leg syndrome  Unchanged from baseline  check iron panel     #Hyperkalemia, mild, monitor     #Transaminitis, improved   - no abd pain  - hepatitis panel, RUQ sono    #H/O HFpEF , not in exacerbation   #H/O Chronic anemia due to AVMs s/p APC in the cecum and transverse colon on 7/27/22, stable  #H/O HTN  #H/O Hypothyroidism   #H/O HCC s/p partial liver resection   #H/O Severe AS s/p balloon valvuloplasty 5/25/21 and again 8/09/22, s/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

## 2023-04-01 NOTE — PHYSICAL THERAPY INITIAL EVALUATION ADULT - ADDITIONAL COMMENTS
Patient lives with adult son in  with 5 BRE, HR on R. Patient ambulates with rollator and does not typically leave home. Patient has private home attendant 4 hours per day who assists with bathing, dressing. Son prepares meals, completes IADLs.

## 2023-04-01 NOTE — PHYSICAL THERAPY INITIAL EVALUATION ADULT - GENERAL OBSERVATIONS, REHAB EVAL
Teaching-Supervisory Addendum-Brief   I participated in the following activities of this patients care: the medical history, the physical exam, medical decision making, the procedure.     I personally performed: supervision of the patient's care, the medical history, the physical exam, the medical decision making.     The case was discussed with: the resident    Procedures: I directly supervised any procedure(s) noted by resident    Evaluation and management service: I agree with the evaluation and management decisions made in this patient's care.       Please see Dr. Soliman's note.     Essentially, 4 day old male, born  at 39w6d on 2023, at 2315, sent in for evaluation of jaundice.  Patient with initial difficulty with breast-feeding, mother's milk did not come in until yesterday evening.  Patient is now feeding vigorously, with normal stooling and urine output.  No fever.  Patient seen in clinic today, referred for evaluation of jaundice..    On exam, initial vital signs noted, afebrile, active, crying, vigorous, intact suck and root, jaundice noticed with scleral icterus, umbilical stump well-healing, circumcision well-healing, capillary refill 2 seconds.    Assessment:  jaundice, likely secondary to difficulty with breast-feeding, plan of dehydration secondary to this.    Plan: Patient breast-feeding in the room, with BiliBlanket on.  We will add bili light, and pursue work-up, with appropriate laboratory studies.  Patient also will be given a fluid bolus.  Formal disposition pending this work-up.    Dada Healy MD  [id number]     8:40-9:10. Chart reviewed, Pt available for PT, BEBE Bass cleared pt.  Pt notes R hip pain, but is willing to participate.  Pt encountered   in bed +prima-fit, +heplock R UE.

## 2023-04-02 LAB
ALBUMIN SERPL ELPH-MCNC: 3.4 G/DL — LOW (ref 3.5–5.2)
ALP SERPL-CCNC: 356 U/L — HIGH (ref 30–115)
ALT FLD-CCNC: 46 U/L — HIGH (ref 0–41)
ANION GAP SERPL CALC-SCNC: 12 MMOL/L — SIGNIFICANT CHANGE UP (ref 7–14)
APPEARANCE UR: CLEAR — SIGNIFICANT CHANGE UP
AST SERPL-CCNC: 40 U/L — SIGNIFICANT CHANGE UP (ref 0–41)
BASOPHILS # BLD AUTO: 0.01 K/UL — SIGNIFICANT CHANGE UP (ref 0–0.2)
BASOPHILS NFR BLD AUTO: 0.2 % — SIGNIFICANT CHANGE UP (ref 0–1)
BILIRUB SERPL-MCNC: 0.5 MG/DL — SIGNIFICANT CHANGE UP (ref 0.2–1.2)
BILIRUB UR-MCNC: NEGATIVE — SIGNIFICANT CHANGE UP
BUN SERPL-MCNC: 42 MG/DL — HIGH (ref 10–20)
CALCIUM SERPL-MCNC: 9.2 MG/DL — SIGNIFICANT CHANGE UP (ref 8.4–10.5)
CHLORIDE SERPL-SCNC: 108 MMOL/L — SIGNIFICANT CHANGE UP (ref 98–110)
CO2 SERPL-SCNC: 22 MMOL/L — SIGNIFICANT CHANGE UP (ref 17–32)
COLOR SPEC: YELLOW — SIGNIFICANT CHANGE UP
CREAT SERPL-MCNC: 1 MG/DL — SIGNIFICANT CHANGE UP (ref 0.7–1.5)
DIFF PNL FLD: NEGATIVE — SIGNIFICANT CHANGE UP
EGFR: 54 ML/MIN/1.73M2 — LOW
EOSINOPHIL # BLD AUTO: 0.15 K/UL — SIGNIFICANT CHANGE UP (ref 0–0.7)
EOSINOPHIL NFR BLD AUTO: 2.6 % — SIGNIFICANT CHANGE UP (ref 0–8)
GLUCOSE SERPL-MCNC: 71 MG/DL — SIGNIFICANT CHANGE UP (ref 70–99)
GLUCOSE UR QL: NEGATIVE MG/DL — SIGNIFICANT CHANGE UP
HCT VFR BLD CALC: 32.2 % — LOW (ref 37–47)
HGB BLD-MCNC: 10.6 G/DL — LOW (ref 12–16)
IMM GRANULOCYTES NFR BLD AUTO: 0.2 % — SIGNIFICANT CHANGE UP (ref 0.1–0.3)
IRON SATN MFR SERPL: 31 % — SIGNIFICANT CHANGE UP (ref 15–50)
IRON SATN MFR SERPL: 72 UG/DL — SIGNIFICANT CHANGE UP (ref 35–150)
KETONES UR-MCNC: NEGATIVE — SIGNIFICANT CHANGE UP
LEUKOCYTE ESTERASE UR-ACNC: NEGATIVE — SIGNIFICANT CHANGE UP
LYMPHOCYTES # BLD AUTO: 0.84 K/UL — LOW (ref 1.2–3.4)
LYMPHOCYTES # BLD AUTO: 14.6 % — LOW (ref 20.5–51.1)
MCHC RBC-ENTMCNC: 32.4 PG — HIGH (ref 27–31)
MCHC RBC-ENTMCNC: 32.9 G/DL — SIGNIFICANT CHANGE UP (ref 32–37)
MCV RBC AUTO: 98.5 FL — SIGNIFICANT CHANGE UP (ref 81–99)
MONOCYTES # BLD AUTO: 0.59 K/UL — SIGNIFICANT CHANGE UP (ref 0.1–0.6)
MONOCYTES NFR BLD AUTO: 10.3 % — HIGH (ref 1.7–9.3)
NEUTROPHILS # BLD AUTO: 4.14 K/UL — SIGNIFICANT CHANGE UP (ref 1.4–6.5)
NEUTROPHILS NFR BLD AUTO: 72.1 % — SIGNIFICANT CHANGE UP (ref 42.2–75.2)
NITRITE UR-MCNC: NEGATIVE — SIGNIFICANT CHANGE UP
NRBC # BLD: 0 /100 WBCS — SIGNIFICANT CHANGE UP (ref 0–0)
PH UR: 6 — SIGNIFICANT CHANGE UP (ref 5–8)
PLATELET # BLD AUTO: 96 K/UL — LOW (ref 130–400)
POTASSIUM SERPL-MCNC: 4.7 MMOL/L — SIGNIFICANT CHANGE UP (ref 3.5–5)
POTASSIUM SERPL-SCNC: 4.7 MMOL/L — SIGNIFICANT CHANGE UP (ref 3.5–5)
PROT SERPL-MCNC: 6.3 G/DL — SIGNIFICANT CHANGE UP (ref 6–8)
PROT UR-MCNC: NEGATIVE MG/DL — SIGNIFICANT CHANGE UP
RBC # BLD: 3.27 M/UL — LOW (ref 4.2–5.4)
RBC # FLD: 13.3 % — SIGNIFICANT CHANGE UP (ref 11.5–14.5)
SODIUM SERPL-SCNC: 142 MMOL/L — SIGNIFICANT CHANGE UP (ref 135–146)
SP GR SPEC: 1.02 — SIGNIFICANT CHANGE UP (ref 1.01–1.03)
TIBC SERPL-MCNC: 232 UG/DL — SIGNIFICANT CHANGE UP (ref 220–430)
TRANSFERRIN SERPL-MCNC: 192 MG/DL — LOW (ref 200–360)
UIBC SERPL-MCNC: 160 UG/DL — SIGNIFICANT CHANGE UP (ref 110–370)
UROBILINOGEN FLD QL: 0.2 MG/DL — SIGNIFICANT CHANGE UP
WBC # BLD: 5.74 K/UL — SIGNIFICANT CHANGE UP (ref 4.8–10.8)
WBC # FLD AUTO: 5.74 K/UL — SIGNIFICANT CHANGE UP (ref 4.8–10.8)

## 2023-04-02 PROCEDURE — 99221 1ST HOSP IP/OBS SF/LOW 40: CPT

## 2023-04-02 PROCEDURE — 99233 SBSQ HOSP IP/OBS HIGH 50: CPT

## 2023-04-02 RX ORDER — CEFTRIAXONE 500 MG/1
1000 INJECTION, POWDER, FOR SOLUTION INTRAMUSCULAR; INTRAVENOUS EVERY 24 HOURS
Refills: 0 | Status: COMPLETED | OUTPATIENT
Start: 2023-04-02 | End: 2023-04-04

## 2023-04-02 RX ORDER — LEVOTHYROXINE SODIUM 125 MCG
100 TABLET ORAL
Refills: 0 | Status: DISCONTINUED | OUTPATIENT
Start: 2023-04-02 | End: 2023-04-05

## 2023-04-02 RX ADMIN — Medication 500 MILLIGRAM(S): at 11:09

## 2023-04-02 RX ADMIN — Medication 100 MICROGRAM(S): at 10:15

## 2023-04-02 RX ADMIN — Medication 1 TABLET(S): at 11:09

## 2023-04-02 RX ADMIN — PANTOPRAZOLE SODIUM 40 MILLIGRAM(S): 20 TABLET, DELAYED RELEASE ORAL at 05:27

## 2023-04-02 RX ADMIN — HEPARIN SODIUM 5000 UNIT(S): 5000 INJECTION INTRAVENOUS; SUBCUTANEOUS at 05:27

## 2023-04-02 RX ADMIN — HEPARIN SODIUM 5000 UNIT(S): 5000 INJECTION INTRAVENOUS; SUBCUTANEOUS at 17:56

## 2023-04-02 RX ADMIN — CHLORHEXIDINE GLUCONATE 1 APPLICATION(S): 213 SOLUTION TOPICAL at 05:27

## 2023-04-02 NOTE — CONSULT NOTE ADULT - SUBJECTIVE AND OBJECTIVE BOX
admitted for progressively worsening hip pain.  fell abt 4 days prior to admission.  has groin pain.  multiple medical problems.      rle: groin pain, pain with rom, neg log roll, nvid    x rays neg  ct scan possible right iliopsoas partial tear    partial iliopsoas tear  consider mri for definitive imaging and dx  wbat  pain control  no acute orthopaedic intervention  admitted for progressively worsening hip pain.  fell abt 4 days prior to admission.  has groin pain.  multiple medical problems.      PAST MEDICAL & SURGICAL HISTORY:  HTN (hypertension)      Mitral valve prolapse      Enlarged heart      Murmur      Arthritis      HTN (hypertension)      Diverticulosis      Hiatal hernia      Acid reflux      Liver cancer  s/p resection and s/p chemo and radiation      Aortic stenosis  s/p ballon aortic valuloplasty 5/25/21; 8/2021      Hypothyroid      H/O thyroid nodule  bx benign - 5 years ago      Osteoarthritis      H/O tear of meniscus of knee joint  left      Nodule of kidney      Overactive bladder      History of blood transfusion  no adverse reaction      H/O resection of liver  liver cancer (right lobe 2001); 2003 - right lobe, cholecystectomy, and bile duct      Status post cholecystectomy      History of torn meniscus of knee  right; repaired  1995      H/O carpal tunnel repair  2021 - 2012      Pacemaker      Aortic valve replaced      Allergies    BLUE DYE (Stomach Upset; Nausea)  Cipro (Other)  Levaquin (Rash)  tetracycline (Hives)    Intolerances    MEDICATIONS  (STANDING):  ascorbic acid 500 milliGRAM(s) Oral daily  cefTRIAXone   IVPB 1000 milliGRAM(s) IV Intermittent every 24 hours  chlorhexidine 2% Cloths 1 Application(s) Topical <User Schedule>  furosemide    Tablet 20 milliGRAM(s) Oral <User Schedule>  heparin   Injectable 5000 Unit(s) SubCutaneous every 12 hours  influenza  Vaccine (HIGH DOSE) 0.7 milliLiter(s) IntraMuscular once  levothyroxine 100 MICROGram(s) Oral <User Schedule>  multivitamin 1 Tablet(s) Oral daily  pantoprazole    Tablet 40 milliGRAM(s) Oral before breakfast    MEDICATIONS  (PRN):  morphine  - Injectable 2 milliGRAM(s) IV Push every 6 hours PRN Severe Pain (7 - 10)      PE  rle: groin pain, pain with rom, neg log roll, nvid    x rays neg  ct scan possible right iliopsoas partial tear    partial iliopsoas tear  consider mri for definitive imaging and dx  wbat  pain control  no acute orthopaedic intervention

## 2023-04-02 NOTE — PROGRESS NOTE ADULT - SUBJECTIVE AND OBJECTIVE BOX
SUBJECTIVE:    Patient is a 89y old Female who presents with a chief complaint of   Currently admitted to medicine with the primary diagnosis of Right hip pain       Today is hospital day 2d. This morning she is resting comfortably in bed and reports no new issues or overnight events.     ROS:   CONSTITUTIONAL: No weakness, fevers or chills   EYES/ENT: No visual changes; No vertigo or throat pain   NECK: No pain or stiffness   RESPIRATORY: No cough, wheezing, hemoptysis; No shortness of breath   CARDIOVASCULAR: No chest pain or palpitations   GASTROINTESTINAL: No abdominal or epigastric pain. No nausea, vomiting, or hematemesis; No diarrhea or constipation. No melena or hematochezia.  GENITOURINARY: No dysuria, frequency or hematuria  NEUROLOGICAL: No numbness or weakness        PAST MEDICAL & SURGICAL HISTORY  HTN (hypertension)    Mitral valve prolapse    Enlarged heart    Murmur    Arthritis    HTN (hypertension)    Diverticulosis    Hiatal hernia    Acid reflux    Liver cancer  s/p resection and s/p chemo and radiation    Aortic stenosis  s/p ballon aortic valuloplasty 5/25/21; 8/2021    Hypothyroid    H/O thyroid nodule  bx benign - 5 years ago    Osteoarthritis    H/O tear of meniscus of knee joint  left    Nodule of kidney    Overactive bladder    History of blood transfusion  no adverse reaction    H/O resection of liver  liver cancer (right lobe 2001); 2003 - right lobe, cholecystectomy, and bile duct    Status post cholecystectomy    History of torn meniscus of knee  right; repaired  1995    H/O carpal tunnel repair  2021 - 2012    Pacemaker    Aortic valve replaced      SOCIAL HISTORY:    ALLERGIES:  BLUE DYE (Stomach Upset; Nausea)  Cipro (Other)  Levaquin (Rash)  tetracycline (Hives)    MEDICATIONS:  STANDING MEDICATIONS  ascorbic acid 500 milliGRAM(s) Oral daily  chlorhexidine 2% Cloths 1 Application(s) Topical <User Schedule>  furosemide    Tablet 20 milliGRAM(s) Oral <User Schedule>  heparin   Injectable 5000 Unit(s) SubCutaneous every 12 hours  influenza  Vaccine (HIGH DOSE) 0.7 milliLiter(s) IntraMuscular once  levothyroxine 100 MICROGram(s) Oral <User Schedule>  multivitamin 1 Tablet(s) Oral daily  pantoprazole    Tablet 40 milliGRAM(s) Oral before breakfast    PRN MEDICATIONS  morphine  - Injectable 2 milliGRAM(s) IV Push every 6 hours PRN    VITALS:   T(F): 95.6  HR: 70  BP: 144/62  RR: 16  SpO2: 97%    LABS:  Negative for smoking/alcohol/drug use.                         10.6   5.74  )-----------( 96       ( 02 Apr 2023 06:42 )             32.2     04-02    142  |  108  |  42<H>  ----------------------------<  71  4.7   |  22  |  1.0    Ca    9.2      02 Apr 2023 06:42  Phos  3.0     04-01  Mg     1.9     04-01    TPro  6.3  /  Alb  3.4<L>  /  TBili  0.5  /  DBili  x   /  AST  40  /  ALT  46<H>  /  AlkPhos  356<H>  04-02    RADIOLOGY:    PHYSICAL EXAM:  GEN: No acute distress  HEENT: normocephalic, atraumatic, aniceteric  LUNGS: Clear to auscultation bilaterally, no rales/wheezing/ rhonchi  HEART: S1/S2 present. RRR, no murmurs  ABD: Soft, non-tender, non-distended. 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      # Acute partial tear of the right iliopsoas insertion   # Difficulty ambulating s/p mechanical trip and fall 4 days ago  - X-rays negative for fractures  - WBAT RLE   - PT - [facility]   - Pain control  - Fall risk   - Supportive care     # Dysuria   - possible uti  - start rocephin  - send urinalysis / uclx  - start rocephin after urine sent      #H/O Chronic numbness and tingling secondary to restless leg syndrome  Unchanged from baseline  check iron panel     #Hyperkalemia, mild, monitor       #H/O HFpEF , not in exacerbation   #H/O HTN   #H/O Severe AS s/p balloon valvuloplasty 5/25/21 and again 8/09/22, s/p TAVR 09/07/22   - cw lasix 20 mg  q72 hrs (patient non compliant at home given frequent urination)  - holding losartan   - dash diet     #H/O Hypothyroidism, cw synthroid    #H/O Chronic anemia due to AVMs s/p APC in the cecum and transverse colon on 7/27/22  - stable  - monitor Hb   - outpatient follow up with GI     #Transaminitis, improved    #H/O HCC s/p partial liver resection   - no abd pain  - hepatitis panel  - RUQ sono with cirrhotic liver   - avoid hepatotoxic medications  - outpatient follow up with onc and gi     # dvt/gi ppx  # diet: dash  # dispo: [facility]  # Handoff: placement to [facility]

## 2023-04-03 LAB
ALBUMIN SERPL ELPH-MCNC: 3.4 G/DL — LOW (ref 3.5–5.2)
ALP SERPL-CCNC: 324 U/L — HIGH (ref 30–115)
ALT FLD-CCNC: 40 U/L — SIGNIFICANT CHANGE UP (ref 0–41)
ANION GAP SERPL CALC-SCNC: 9 MMOL/L — SIGNIFICANT CHANGE UP (ref 7–14)
AST SERPL-CCNC: 37 U/L — SIGNIFICANT CHANGE UP (ref 0–41)
BASOPHILS # BLD AUTO: 0.01 K/UL — SIGNIFICANT CHANGE UP (ref 0–0.2)
BASOPHILS NFR BLD AUTO: 0.2 % — SIGNIFICANT CHANGE UP (ref 0–1)
BILIRUB SERPL-MCNC: 0.5 MG/DL — SIGNIFICANT CHANGE UP (ref 0.2–1.2)
BUN SERPL-MCNC: 37 MG/DL — HIGH (ref 10–20)
CALCIUM SERPL-MCNC: 9 MG/DL — SIGNIFICANT CHANGE UP (ref 8.4–10.5)
CHLORIDE SERPL-SCNC: 108 MMOL/L — SIGNIFICANT CHANGE UP (ref 98–110)
CO2 SERPL-SCNC: 24 MMOL/L — SIGNIFICANT CHANGE UP (ref 17–32)
CREAT SERPL-MCNC: 1 MG/DL — SIGNIFICANT CHANGE UP (ref 0.7–1.5)
EGFR: 54 ML/MIN/1.73M2 — LOW
EOSINOPHIL # BLD AUTO: 0.17 K/UL — SIGNIFICANT CHANGE UP (ref 0–0.7)
EOSINOPHIL NFR BLD AUTO: 3.4 % — SIGNIFICANT CHANGE UP (ref 0–8)
FERRITIN SERPL-MCNC: 115 NG/ML — SIGNIFICANT CHANGE UP (ref 15–150)
GLUCOSE SERPL-MCNC: 68 MG/DL — LOW (ref 70–99)
HAV IGM SER-ACNC: SIGNIFICANT CHANGE UP
HBV CORE IGM SER-ACNC: SIGNIFICANT CHANGE UP
HBV SURFACE AG SER-ACNC: SIGNIFICANT CHANGE UP
HCT VFR BLD CALC: 31.2 % — LOW (ref 37–47)
HCV AB S/CO SERPL IA: 0.11 S/CO — SIGNIFICANT CHANGE UP (ref 0–0.99)
HCV AB SERPL-IMP: SIGNIFICANT CHANGE UP
HGB BLD-MCNC: 10.2 G/DL — LOW (ref 12–16)
IMM GRANULOCYTES NFR BLD AUTO: 0.2 % — SIGNIFICANT CHANGE UP (ref 0.1–0.3)
LYMPHOCYTES # BLD AUTO: 0.8 K/UL — LOW (ref 1.2–3.4)
LYMPHOCYTES # BLD AUTO: 16.2 % — LOW (ref 20.5–51.1)
MCHC RBC-ENTMCNC: 32 PG — HIGH (ref 27–31)
MCHC RBC-ENTMCNC: 32.7 G/DL — SIGNIFICANT CHANGE UP (ref 32–37)
MCV RBC AUTO: 97.8 FL — SIGNIFICANT CHANGE UP (ref 81–99)
MONOCYTES # BLD AUTO: 0.57 K/UL — SIGNIFICANT CHANGE UP (ref 0.1–0.6)
MONOCYTES NFR BLD AUTO: 11.5 % — HIGH (ref 1.7–9.3)
NEUTROPHILS # BLD AUTO: 3.38 K/UL — SIGNIFICANT CHANGE UP (ref 1.4–6.5)
NEUTROPHILS NFR BLD AUTO: 68.5 % — SIGNIFICANT CHANGE UP (ref 42.2–75.2)
NRBC # BLD: 0 /100 WBCS — SIGNIFICANT CHANGE UP (ref 0–0)
PLATELET # BLD AUTO: 97 K/UL — LOW (ref 130–400)
POTASSIUM SERPL-MCNC: 4.8 MMOL/L — SIGNIFICANT CHANGE UP (ref 3.5–5)
POTASSIUM SERPL-SCNC: 4.8 MMOL/L — SIGNIFICANT CHANGE UP (ref 3.5–5)
PROT SERPL-MCNC: 6 G/DL — SIGNIFICANT CHANGE UP (ref 6–8)
RBC # BLD: 3.19 M/UL — LOW (ref 4.2–5.4)
RBC # FLD: 13.2 % — SIGNIFICANT CHANGE UP (ref 11.5–14.5)
SODIUM SERPL-SCNC: 141 MMOL/L — SIGNIFICANT CHANGE UP (ref 135–146)
WBC # BLD: 4.94 K/UL — SIGNIFICANT CHANGE UP (ref 4.8–10.8)
WBC # FLD AUTO: 4.94 K/UL — SIGNIFICANT CHANGE UP (ref 4.8–10.8)

## 2023-04-03 PROCEDURE — 99233 SBSQ HOSP IP/OBS HIGH 50: CPT

## 2023-04-03 RX ADMIN — Medication 1 TABLET(S): at 14:00

## 2023-04-03 RX ADMIN — HEPARIN SODIUM 5000 UNIT(S): 5000 INJECTION INTRAVENOUS; SUBCUTANEOUS at 05:59

## 2023-04-03 RX ADMIN — Medication 100 MICROGRAM(S): at 08:10

## 2023-04-03 RX ADMIN — Medication 500 MILLIGRAM(S): at 13:59

## 2023-04-03 RX ADMIN — PANTOPRAZOLE SODIUM 40 MILLIGRAM(S): 20 TABLET, DELAYED RELEASE ORAL at 07:05

## 2023-04-03 RX ADMIN — CEFTRIAXONE 100 MILLIGRAM(S): 500 INJECTION, POWDER, FOR SOLUTION INTRAMUSCULAR; INTRAVENOUS at 13:59

## 2023-04-03 RX ADMIN — CHLORHEXIDINE GLUCONATE 1 APPLICATION(S): 213 SOLUTION TOPICAL at 06:00

## 2023-04-03 RX ADMIN — HEPARIN SODIUM 5000 UNIT(S): 5000 INJECTION INTRAVENOUS; SUBCUTANEOUS at 18:36

## 2023-04-03 NOTE — PROGRESS NOTE ADULT - SUBJECTIVE AND OBJECTIVE BOX
SUBJECTIVE:    Patient is a 89y old Female who presents with a chief complaint of   Currently admitted to medicine with the primary diagnosis of Right hip pain       Today is hospital day 3d. This morning she is resting comfortably in bed and reports no new issues or overnight events.     ROS:   CONSTITUTIONAL: No weakness, fevers or chills   EYES/ENT: No visual changes; No vertigo or throat pain   NECK: No pain or stiffness   RESPIRATORY: No cough, wheezing, hemoptysis; No shortness of breath   CARDIOVASCULAR: No chest pain or palpitations   GASTROINTESTINAL: No abdominal or epigastric pain. No nausea, vomiting, or hematemesis; No diarrhea or constipation. No melena or hematochezia.  GENITOURINARY: No dysuria, frequency or hematuria  NEUROLOGICAL: No numbness or weakness        PAST MEDICAL & SURGICAL HISTORY  HTN (hypertension)    Mitral valve prolapse    Enlarged heart    Murmur    Arthritis    HTN (hypertension)    Diverticulosis    Hiatal hernia    Acid reflux    Liver cancer  s/p resection and s/p chemo and radiation    Aortic stenosis  s/p ballon aortic valuloplasty 21; 2021    Hypothyroid    H/O thyroid nodule  bx benign - 5 years ago    Osteoarthritis    H/O tear of meniscus of knee joint  left    Nodule of kidney    Overactive bladder    History of blood transfusion  no adverse reaction    H/O resection of liver  liver cancer (right lobe );  - right lobe, cholecystectomy, and bile duct    Status post cholecystectomy    History of torn meniscus of knee  right; repaired      H/O carpal tunnel repair   -     Pacemaker    Aortic valve replaced      SOCIAL HISTORY:    ALLERGIES:  BLUE DYE (Stomach Upset; Nausea)  Cipro (Other)  Levaquin (Rash)  tetracycline (Hives)    MEDICATIONS:  STANDING MEDICATIONS  ascorbic acid 500 milliGRAM(s) Oral daily  cefTRIAXone   IVPB 1000 milliGRAM(s) IV Intermittent every 24 hours  chlorhexidine 2% Cloths 1 Application(s) Topical <User Schedule>  furosemide    Tablet 20 milliGRAM(s) Oral <User Schedule>  heparin   Injectable 5000 Unit(s) SubCutaneous every 12 hours  influenza  Vaccine (HIGH DOSE) 0.7 milliLiter(s) IntraMuscular once  levothyroxine 100 MICROGram(s) Oral <User Schedule>  multivitamin 1 Tablet(s) Oral daily  pantoprazole    Tablet 40 milliGRAM(s) Oral before breakfast    PRN MEDICATIONS  morphine  - Injectable 2 milliGRAM(s) IV Push every 6 hours PRN    VITALS:   T(F): 96.8  HR: 83  BP: 124/55  RR: 16  SpO2: 96%    LABS:  Negative for smoking/alcohol/drug use.                         10.6   5.74  )-----------( 96       ( 2023 06:42 )             32.2         142  |  108  |  42<H>  ----------------------------<  71  4.7   |  22  |  1.0    Ca    9.2      2023 06:42    TPro  6.3  /  Alb  3.4<L>  /  TBili  0.5  /  DBili  x   /  AST  40  /  ALT  46<H>  /  AlkPhos  356<H>        Urinalysis Basic - ( 2023 14:00 )    Color: Yellow / Appearance: Clear / S.020 / pH: x  Gluc: x / Ketone: Negative  / Bili: Negative / Urobili: 0.2 mg/dL   Blood: x / Protein: Negative mg/dL / Nitrite: Negative   Leuk Esterase: Negative / RBC: x / WBC x   Sq Epi: x / Non Sq Epi: x / Bacteria: x                RADIOLOGY:    PHYSICAL EXAM:  GEN: No acute distress  HEENT: normocephalic, atraumatic, aniceteric  LUNGS: Clear to auscultation bilaterally, no rales/wheezing/ rhonchi  HEART: S1/S2 present. RRR, no murmurs  ABD: Soft, non-tender, non-distended. 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      # Acute partial tear of the right iliopsoas insertion   # Difficulty ambulating s/p mechanical trip and fall 4 days ago  - X-rays negative for fractures  - WBAT RLE   - PT - [facility]   - Pain control  - Fall risk   - Supportive care   - Ortho- no inpatient intervention - can obtain MRI for definitive dx , will discuss with family    # Dysuria   - possible uti  - start rocephin  - u/a negative, fu urine clx   - rocephin       #H/O Chronic numbness and tingling secondary to restless leg syndrome  Unchanged from baseline  check iron panel     #Hyperkalemia, mild, monitor       #H/O HFpEF , not in exacerbation   #H/O HTN   #H/O Severe AS s/p balloon valvuloplasty 21 and again 22, s/p TAVR 22   - cw lasix 20 mg  q72 hrs (patient non compliant at home given frequent urination)  - holding losartan   - dash diet     #H/O Hypothyroidism, cw synthroid    #H/O Chronic anemia due to AVMs s/p APC in the cecum and transverse colon on 22  - stable  - monitor Hb   - outpatient follow up with GI     #Transaminitis, improved    #H/O HCC s/p partial liver resection   - no abd pain  - hepatitis panel  - RUQ sono with cirrhotic liver   - avoid hepatotoxic medications  - outpatient follow up with onc and gi     # dvt/gi ppx  # diet: dash  # dispo: [facility]  # Handoff: urine clx , possible MRI hip SUBJECTIVE:    Patient is a 89y old Female who presents with a chief complaint of   Currently admitted to medicine with the primary diagnosis of Right hip pain       Today is hospital day 3d. This morning she is resting comfortably in bed and reports no new issues or overnight events.     ROS:   CONSTITUTIONAL: No weakness, fevers or chills   EYES/ENT: No visual changes; No vertigo or throat pain   NECK: No pain or stiffness   RESPIRATORY: No cough, wheezing, hemoptysis; No shortness of breath   CARDIOVASCULAR: No chest pain or palpitations   GASTROINTESTINAL: No abdominal or epigastric pain. No nausea, vomiting, or hematemesis; No diarrhea or constipation. No melena or hematochezia.  GENITOURINARY: No dysuria, frequency or hematuria  NEUROLOGICAL: No numbness or weakness        PAST MEDICAL & SURGICAL HISTORY  HTN (hypertension)    Mitral valve prolapse    Enlarged heart    Murmur    Arthritis    HTN (hypertension)    Diverticulosis    Hiatal hernia    Acid reflux    Liver cancer  s/p resection and s/p chemo and radiation    Aortic stenosis  s/p ballon aortic valuloplasty 21; 2021    Hypothyroid    H/O thyroid nodule  bx benign - 5 years ago    Osteoarthritis    H/O tear of meniscus of knee joint  left    Nodule of kidney    Overactive bladder    History of blood transfusion  no adverse reaction    H/O resection of liver  liver cancer (right lobe );  - right lobe, cholecystectomy, and bile duct    Status post cholecystectomy    History of torn meniscus of knee  right; repaired      H/O carpal tunnel repair   -     Pacemaker    Aortic valve replaced      SOCIAL HISTORY:    ALLERGIES:  BLUE DYE (Stomach Upset; Nausea)  Cipro (Other)  Levaquin (Rash)  tetracycline (Hives)    MEDICATIONS:  STANDING MEDICATIONS  ascorbic acid 500 milliGRAM(s) Oral daily  cefTRIAXone   IVPB 1000 milliGRAM(s) IV Intermittent every 24 hours  chlorhexidine 2% Cloths 1 Application(s) Topical <User Schedule>  furosemide    Tablet 20 milliGRAM(s) Oral <User Schedule>  heparin   Injectable 5000 Unit(s) SubCutaneous every 12 hours  influenza  Vaccine (HIGH DOSE) 0.7 milliLiter(s) IntraMuscular once  levothyroxine 100 MICROGram(s) Oral <User Schedule>  multivitamin 1 Tablet(s) Oral daily  pantoprazole    Tablet 40 milliGRAM(s) Oral before breakfast    PRN MEDICATIONS  morphine  - Injectable 2 milliGRAM(s) IV Push every 6 hours PRN    VITALS:   T(F): 96.8  HR: 83  BP: 124/55  RR: 16  SpO2: 96%    LABS:  Negative for smoking/alcohol/drug use.                         10.6   5.74  )-----------( 96       ( 2023 06:42 )             32.2         142  |  108  |  42<H>  ----------------------------<  71  4.7   |  22  |  1.0    Ca    9.2      2023 06:42    TPro  6.3  /  Alb  3.4<L>  /  TBili  0.5  /  DBili  x   /  AST  40  /  ALT  46<H>  /  AlkPhos  356<H>        Urinalysis Basic - ( 2023 14:00 )    Color: Yellow / Appearance: Clear / S.020 / pH: x  Gluc: x / Ketone: Negative  / Bili: Negative / Urobili: 0.2 mg/dL   Blood: x / Protein: Negative mg/dL / Nitrite: Negative   Leuk Esterase: Negative / RBC: x / WBC x   Sq Epi: x / Non Sq Epi: x / Bacteria: x                RADIOLOGY:    PHYSICAL EXAM:  GEN: No acute distress  HEENT: normocephalic, atraumatic, aniceteric  LUNGS: Clear to auscultation bilaterally, no rales/wheezing/ rhonchi  HEART: S1/S2 present. RRR, no murmurs  ABD: Soft, non-tender, non-distended. 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      # Acute partial tear of the right iliopsoas insertion   # Difficulty ambulating s/p mechanical trip and fall 4 days ago  - X-rays negative for fractures  - WBAT RLE   - PT - [facility]   - Pain control - family refusing opioid medications, toradol for now , avoid tylenol given history of HCC and liver cirrhosis   - Fall risk   - Supportive care   - Ortho- no inpatient intervention - can obtain MRI for definitive dx , will discuss with family    # Dysuria   - possible uti  - start rocephin  - U/A negative, fu urine clx   - rocephin       #H/O Chronic numbness and tingling secondary to restless leg syndrome  Unchanged from baseline  check iron panel     #Hyperkalemia, mild, monitor       #H/O HFpEF , not in exacerbation   #H/O HTN   #H/O Severe AS s/p balloon valvuloplasty 21 and again 22, s/p TAVR 22   - cw lasix 20 mg  q72 hrs (patient non compliant at home given frequent urination)  - holding losartan   - dash diet     #H/O Hypothyroidism, cw synthroid    #H/O Chronic anemia due to AVMs s/p APC in the cecum and transverse colon on 22  - stable  - monitor Hb   - outpatient follow up with GI     #Transaminitis, improved    #H/O HCC s/p partial liver resection   - no abd pain  - hepatitis panel  - RUQ sono with cirrhotic liver   - avoid hepatotoxic medications  - outpatient follow up with onc and gi     # dvt/gi ppx  # diet: dash  # dispo: [facility]  # Handoff: urine clx , possible MRI hip SUBJECTIVE:    Patient is a 89y old Female who presents with a chief complaint of   Currently admitted to medicine with the primary diagnosis of Right hip pain       Today is hospital day 3d. This morning she is resting comfortably in bed and reports no new issues or overnight events.     ROS:   CONSTITUTIONAL: No weakness, fevers or chills   EYES/ENT: No visual changes; No vertigo or throat pain   NECK: No pain or stiffness   RESPIRATORY: No cough, wheezing, hemoptysis; No shortness of breath   CARDIOVASCULAR: No chest pain or palpitations   GASTROINTESTINAL: No abdominal or epigastric pain. No nausea, vomiting, or hematemesis; No diarrhea or constipation. No melena or hematochezia.  GENITOURINARY: No dysuria, frequency or hematuria  NEUROLOGICAL: No numbness or weakness        PAST MEDICAL & SURGICAL HISTORY  HTN (hypertension)    Mitral valve prolapse    Enlarged heart    Murmur    Arthritis    HTN (hypertension)    Diverticulosis    Hiatal hernia    Acid reflux    Liver cancer  s/p resection and s/p chemo and radiation    Aortic stenosis  s/p ballon aortic valuloplasty 21; 2021    Hypothyroid    H/O thyroid nodule  bx benign - 5 years ago    Osteoarthritis    H/O tear of meniscus of knee joint  left    Nodule of kidney    Overactive bladder    History of blood transfusion  no adverse reaction    H/O resection of liver  liver cancer (right lobe );  - right lobe, cholecystectomy, and bile duct    Status post cholecystectomy    History of torn meniscus of knee  right; repaired      H/O carpal tunnel repair   -     Pacemaker    Aortic valve replaced      SOCIAL HISTORY:    ALLERGIES:  BLUE DYE (Stomach Upset; Nausea)  Cipro (Other)  Levaquin (Rash)  tetracycline (Hives)    MEDICATIONS:  STANDING MEDICATIONS  ascorbic acid 500 milliGRAM(s) Oral daily  cefTRIAXone   IVPB 1000 milliGRAM(s) IV Intermittent every 24 hours  chlorhexidine 2% Cloths 1 Application(s) Topical <User Schedule>  furosemide    Tablet 20 milliGRAM(s) Oral <User Schedule>  heparin   Injectable 5000 Unit(s) SubCutaneous every 12 hours  influenza  Vaccine (HIGH DOSE) 0.7 milliLiter(s) IntraMuscular once  levothyroxine 100 MICROGram(s) Oral <User Schedule>  multivitamin 1 Tablet(s) Oral daily  pantoprazole    Tablet 40 milliGRAM(s) Oral before breakfast    PRN MEDICATIONS  morphine  - Injectable 2 milliGRAM(s) IV Push every 6 hours PRN    VITALS:   T(F): 96.8  HR: 83  BP: 124/55  RR: 16  SpO2: 96%    LABS:  Negative for smoking/alcohol/drug use.                         10.6   5.74  )-----------( 96       ( 2023 06:42 )             32.2         142  |  108  |  42<H>  ----------------------------<  71  4.7   |  22  |  1.0    Ca    9.2      2023 06:42    TPro  6.3  /  Alb  3.4<L>  /  TBili  0.5  /  DBili  x   /  AST  40  /  ALT  46<H>  /  AlkPhos  356<H>        Urinalysis Basic - ( 2023 14:00 )    Color: Yellow / Appearance: Clear / S.020 / pH: x  Gluc: x / Ketone: Negative  / Bili: Negative / Urobili: 0.2 mg/dL   Blood: x / Protein: Negative mg/dL / Nitrite: Negative   Leuk Esterase: Negative / RBC: x / WBC x   Sq Epi: x / Non Sq Epi: x / Bacteria: x                RADIOLOGY:    PHYSICAL EXAM:  GEN: No acute distress  HEENT: normocephalic, atraumatic, aniceteric  LUNGS: Clear to auscultation bilaterally, no rales/wheezing/ rhonchi  HEART: S1/S2 present. RRR, no murmurs  ABD: Soft, non-tender, non-distended. 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      # Acute partial tear of the right iliopsoas insertion   # Difficulty ambulating s/p mechanical trip and fall 4 days ago  - X-rays negative for fractures  - WBAT RLE   - PT - [facility]   - Pain control - family refusing opioid medications, toradol for now , avoid tylenol given history of HCC and liver cirrhosis   - Fall risk   - Supportive care   - Ortho- no inpatient intervention - can obtain MRI for definitive dx , will discuss with family    # Dysuria   - possible uti  - start rocephin  - U/A negative, fu urine clx   - rocephin       #H/O Chronic numbness and tingling secondary to restless leg syndrome  Unchanged from baseline  check iron panel     #Hyperkalemia, mild, monitor       #H/O HFpEF , not in exacerbation   #H/O HTN   #H/O Severe AS s/p balloon valvuloplasty 21 and again 22, s/p TAVR 22   - cw lasix 20 mg  q72 hrs (patient non compliant at home given frequent urination)  - holding losartan   - dash diet     #H/O Hypothyroidism, cw synthroid    #H/O Chronic anemia due to AVMs s/p APC in the cecum and transverse colon on 22  - stable  - monitor Hb   - outpatient follow up with GI     #Transaminitis, improved    #H/O HCC s/p partial liver resection   - no abd pain  - hepatitis panel  - RUQ bita with cirrhotic liver   - avoid hepatotoxic medications  - outpatient follow up with onc and gi     # dvt/gi ppx  # diet: dash  # dispo: [facility]  # Handoff: urine clx , possible MRI hip  # Family discussion: spoke to kathrin Moyer , all questions answered and concerns addressed

## 2023-04-04 ENCOUNTER — TRANSCRIPTION ENCOUNTER (OUTPATIENT)
Age: 88
End: 2023-04-04

## 2023-04-04 LAB
-  AMIKACIN: SIGNIFICANT CHANGE UP
-  AMOXICILLIN/CLAVULANIC ACID: SIGNIFICANT CHANGE UP
-  AMPICILLIN/SULBACTAM: SIGNIFICANT CHANGE UP
-  AMPICILLIN: SIGNIFICANT CHANGE UP
-  AZTREONAM: SIGNIFICANT CHANGE UP
-  CEFAZOLIN: SIGNIFICANT CHANGE UP
-  CEFEPIME: SIGNIFICANT CHANGE UP
-  CEFOXITIN: SIGNIFICANT CHANGE UP
-  CEFTRIAXONE: SIGNIFICANT CHANGE UP
-  CEFUROXIME: SIGNIFICANT CHANGE UP
-  CIPROFLOXACIN: SIGNIFICANT CHANGE UP
-  ERTAPENEM: SIGNIFICANT CHANGE UP
-  GENTAMICIN: SIGNIFICANT CHANGE UP
-  IMIPENEM: SIGNIFICANT CHANGE UP
-  LEVOFLOXACIN: SIGNIFICANT CHANGE UP
-  MEROPENEM: SIGNIFICANT CHANGE UP
-  NITROFURANTOIN: SIGNIFICANT CHANGE UP
-  PIPERACILLIN/TAZOBACTAM: SIGNIFICANT CHANGE UP
-  TOBRAMYCIN: SIGNIFICANT CHANGE UP
-  TRIMETHOPRIM/SULFAMETHOXAZOLE: SIGNIFICANT CHANGE UP
ALBUMIN SERPL ELPH-MCNC: 3.2 G/DL — LOW (ref 3.5–5.2)
ALP SERPL-CCNC: 323 U/L — HIGH (ref 30–115)
ALT FLD-CCNC: 36 U/L — SIGNIFICANT CHANGE UP (ref 0–41)
ANION GAP SERPL CALC-SCNC: 10 MMOL/L — SIGNIFICANT CHANGE UP (ref 7–14)
AST SERPL-CCNC: 34 U/L — SIGNIFICANT CHANGE UP (ref 0–41)
BASOPHILS # BLD AUTO: 0.03 K/UL — SIGNIFICANT CHANGE UP (ref 0–0.2)
BASOPHILS NFR BLD AUTO: 0.6 % — SIGNIFICANT CHANGE UP (ref 0–1)
BILIRUB SERPL-MCNC: 0.4 MG/DL — SIGNIFICANT CHANGE UP (ref 0.2–1.2)
BUN SERPL-MCNC: 39 MG/DL — HIGH (ref 10–20)
CALCIUM SERPL-MCNC: 9.1 MG/DL — SIGNIFICANT CHANGE UP (ref 8.4–10.5)
CHLORIDE SERPL-SCNC: 108 MMOL/L — SIGNIFICANT CHANGE UP (ref 98–110)
CO2 SERPL-SCNC: 24 MMOL/L — SIGNIFICANT CHANGE UP (ref 17–32)
CREAT SERPL-MCNC: 1.1 MG/DL — SIGNIFICANT CHANGE UP (ref 0.7–1.5)
EGFR: 48 ML/MIN/1.73M2 — LOW
EOSINOPHIL # BLD AUTO: 0.23 K/UL — SIGNIFICANT CHANGE UP (ref 0–0.7)
EOSINOPHIL NFR BLD AUTO: 4.8 % — SIGNIFICANT CHANGE UP (ref 0–8)
GLUCOSE SERPL-MCNC: 75 MG/DL — SIGNIFICANT CHANGE UP (ref 70–99)
HCT VFR BLD CALC: 32.3 % — LOW (ref 37–47)
HGB BLD-MCNC: 10.5 G/DL — LOW (ref 12–16)
IMM GRANULOCYTES NFR BLD AUTO: 0.2 % — SIGNIFICANT CHANGE UP (ref 0.1–0.3)
LYMPHOCYTES # BLD AUTO: 0.82 K/UL — LOW (ref 1.2–3.4)
LYMPHOCYTES # BLD AUTO: 17 % — LOW (ref 20.5–51.1)
MCHC RBC-ENTMCNC: 32.2 PG — HIGH (ref 27–31)
MCHC RBC-ENTMCNC: 32.5 G/DL — SIGNIFICANT CHANGE UP (ref 32–37)
MCV RBC AUTO: 99.1 FL — HIGH (ref 81–99)
METHOD TYPE: SIGNIFICANT CHANGE UP
MONOCYTES # BLD AUTO: 0.58 K/UL — SIGNIFICANT CHANGE UP (ref 0.1–0.6)
MONOCYTES NFR BLD AUTO: 12 % — HIGH (ref 1.7–9.3)
NEUTROPHILS # BLD AUTO: 3.15 K/UL — SIGNIFICANT CHANGE UP (ref 1.4–6.5)
NEUTROPHILS NFR BLD AUTO: 65.4 % — SIGNIFICANT CHANGE UP (ref 42.2–75.2)
NRBC # BLD: 0 /100 WBCS — SIGNIFICANT CHANGE UP (ref 0–0)
PLATELET # BLD AUTO: 101 K/UL — LOW (ref 130–400)
POTASSIUM SERPL-MCNC: 4.6 MMOL/L — SIGNIFICANT CHANGE UP (ref 3.5–5)
POTASSIUM SERPL-SCNC: 4.6 MMOL/L — SIGNIFICANT CHANGE UP (ref 3.5–5)
PROT SERPL-MCNC: 6.1 G/DL — SIGNIFICANT CHANGE UP (ref 6–8)
RBC # BLD: 3.26 M/UL — LOW (ref 4.2–5.4)
RBC # FLD: 13 % — SIGNIFICANT CHANGE UP (ref 11.5–14.5)
SARS-COV-2 RNA SPEC QL NAA+PROBE: SIGNIFICANT CHANGE UP
SODIUM SERPL-SCNC: 142 MMOL/L — SIGNIFICANT CHANGE UP (ref 135–146)
WBC # BLD: 4.82 K/UL — SIGNIFICANT CHANGE UP (ref 4.8–10.8)
WBC # FLD AUTO: 4.82 K/UL — SIGNIFICANT CHANGE UP (ref 4.8–10.8)

## 2023-04-04 PROCEDURE — 99231 SBSQ HOSP IP/OBS SF/LOW 25: CPT

## 2023-04-04 RX ORDER — FUROSEMIDE 40 MG
1 TABLET ORAL
Qty: 0 | Refills: 0 | DISCHARGE
Start: 2023-04-04

## 2023-04-04 RX ORDER — LOSARTAN POTASSIUM 100 MG/1
1 TABLET, FILM COATED ORAL
Qty: 0 | Refills: 0 | DISCHARGE
Start: 2023-04-04

## 2023-04-04 RX ADMIN — HEPARIN SODIUM 5000 UNIT(S): 5000 INJECTION INTRAVENOUS; SUBCUTANEOUS at 18:57

## 2023-04-04 RX ADMIN — CEFTRIAXONE 100 MILLIGRAM(S): 500 INJECTION, POWDER, FOR SOLUTION INTRAMUSCULAR; INTRAVENOUS at 13:50

## 2023-04-04 RX ADMIN — Medication 100 MICROGRAM(S): at 06:50

## 2023-04-04 RX ADMIN — Medication 1 TABLET(S): at 13:48

## 2023-04-04 RX ADMIN — CHLORHEXIDINE GLUCONATE 1 APPLICATION(S): 213 SOLUTION TOPICAL at 05:39

## 2023-04-04 RX ADMIN — HEPARIN SODIUM 5000 UNIT(S): 5000 INJECTION INTRAVENOUS; SUBCUTANEOUS at 05:38

## 2023-04-04 RX ADMIN — PANTOPRAZOLE SODIUM 40 MILLIGRAM(S): 20 TABLET, DELAYED RELEASE ORAL at 05:39

## 2023-04-04 RX ADMIN — Medication 500 MILLIGRAM(S): at 13:46

## 2023-04-04 NOTE — DISCHARGE NOTE PROVIDER - NSDCMRMEDTOKEN_GEN_ALL_CORE_FT
ascorbic acid 500 mg oral tablet: 1 tab(s) orally once a day  furosemide 20 mg oral tablet: 1 tab(s) orally every 72 hours  levothyroxine 100 mcg (0.1 mg) oral tablet: 1 tab(s) orally once a day  losartan 25 mg oral tablet: 1 tab(s) orally once a day  Multiple Vitamins oral tablet: 1 tab(s) orally once a day  Myrbetriq 25 mg oral tablet, extended release: 1 orally  pantoprazole 40 mg oral delayed release tablet: 1 tab(s) orally once a day    ascorbic acid 500 mg oral tablet: 1 tab(s) orally once a day  cephalexin 500 mg oral tablet: 1 tab(s) orally every 12 hours  furosemide 20 mg oral tablet: 1 tab(s) orally every 72 hours  levothyroxine 100 mcg (0.1 mg) oral tablet: 1 tab(s) orally once a day  losartan 25 mg oral tablet: 1 tab(s) orally once a day  Multiple Vitamins oral tablet: 1 tab(s) orally once a day  Myrbetriq 25 mg oral tablet, extended release: 1 orally  pantoprazole 40 mg oral delayed release tablet: 1 tab(s) orally once a day

## 2023-04-04 NOTE — DISCHARGE NOTE PROVIDER - NSDCCPCAREPLAN_GEN_ALL_CORE_FT
PRINCIPAL DISCHARGE DIAGNOSIS  Diagnosis: Right hip pain  Assessment and Plan of Treatment: You have right hip pain secondary to a muscle tear.  You were seen by PT and will be discahrged to rehab for further physical therapy.  You were seen by Ortho and if the pain persists, will need an MRI outpatient.  Pain can be controlled by oral medications but try to avoid tylenol given your history of liver problems.      SECONDARY DISCHARGE DIAGNOSES  Diagnosis: Inability to walk  Assessment and Plan of Treatment: Physical therapy as above    Diagnosis: UTI (urinary tract infection), bacterial  Assessment and Plan of Treatment: You were treated with IV antibiotics and will be discahrged on oral.  Make sure to increase your oral fluid intake to prevent reoccurence.

## 2023-04-04 NOTE — DISCHARGE NOTE PROVIDER - CARE PROVIDERS DIRECT ADDRESSES
,DirectAddress_Unknown ,DirectAddress_Unknown,[email or web address]

## 2023-04-04 NOTE — DISCHARGE NOTE PROVIDER - PROVIDER TOKENS
PROVIDER:[TOKEN:[[id number]]] PROVIDER:[TOKEN:[[id number]]],PROVIDER:[TOKEN:[[id number]],FOLLOWUP:[1 week]]

## 2023-04-04 NOTE — DISCHARGE NOTE PROVIDER - CARE PROVIDER_API CALL
Mejia Cardenas  INTERNAL MEDICINE  [address] [place] [address]  Phone: [phone]  Fax: [phone]  Follow Up Time:    Mejia Cardenas S  INTERNAL MEDICINE  [address] [place] [address]  Phone: [phone]  Fax: [phone]  Follow Up Time:     Chloe Villeda)  Orthopaedic Surgery; Surgery of the Hand  [address] [place] [address]  Phone: [phone]  Fax: [phone]  Follow Up Time: 1 week

## 2023-04-04 NOTE — PROGRESS NOTE ADULT - SUBJECTIVE AND OBJECTIVE BOX
SUBJECTIVE:    Patient is a 89y old Female who presents with a chief complaint of Difficulty ambulatin (04 Apr 2023 11:00)    Currently admitted to medicine with the primary diagnosis of Right hip pain       Today is hospital day 4d. This morning she is resting comfortably in bed and reports no new issues or overnight events.     ROS:   CONSTITUTIONAL: No weakness, fevers or chills   EYES/ENT: No visual changes; No vertigo or throat pain   NECK: No pain or stiffness   RESPIRATORY: No cough, wheezing, hemoptysis; No shortness of breath   CARDIOVASCULAR: No chest pain or palpitations   GASTROINTESTINAL: No abdominal or epigastric pain. No nausea, vomiting, or hematemesis; No diarrhea or constipation. No melena or hematochezia.  GENITOURINARY: No dysuria, frequency or hematuria  NEUROLOGICAL: No numbness or weakness  SKIN: No itching, rashes      PAST MEDICAL & SURGICAL HISTORY  HTN (hypertension)    Mitral valve prolapse    Enlarged heart    Murmur    Arthritis    HTN (hypertension)    Diverticulosis    Hiatal hernia    Acid reflux    Liver cancer  s/p resection and s/p chemo and radiation    Aortic stenosis  s/p ballon aortic valuloplasty 5/25/21; 8/2021    Hypothyroid    H/O thyroid nodule  bx benign - 5 years ago    Osteoarthritis    H/O tear of meniscus of knee joint  left    Nodule of kidney    Overactive bladder    History of blood transfusion  no adverse reaction    H/O resection of liver  liver cancer (right lobe 2001); 2003 - right lobe, cholecystectomy, and bile duct    Status post cholecystectomy    History of torn meniscus of knee  right; repaired  1995    H/O carpal tunnel repair  2021 - 2012    Pacemaker    Aortic valve replaced      SOCIAL HISTORY:    ALLERGIES:  BLUE DYE (Stomach Upset; Nausea)  Cipro (Other)  Levaquin (Rash)  tetracycline (Hives)    MEDICATIONS:  STANDING MEDICATIONS  ascorbic acid 500 milliGRAM(s) Oral daily  chlorhexidine 2% Cloths 1 Application(s) Topical <User Schedule>  furosemide    Tablet 20 milliGRAM(s) Oral <User Schedule>  heparin   Injectable 5000 Unit(s) SubCutaneous every 12 hours  influenza  Vaccine (HIGH DOSE) 0.7 milliLiter(s) IntraMuscular once  levothyroxine 100 MICROGram(s) Oral <User Schedule>  multivitamin 1 Tablet(s) Oral daily  pantoprazole    Tablet 40 milliGRAM(s) Oral before breakfast    PRN MEDICATIONS  morphine  - Injectable 2 milliGRAM(s) IV Push every 6 hours PRN    VITALS:   T(F): 96.2  HR: 72  BP: 142/60  RR: 16  SpO2: 97%    LABS:  Negative for smoking/alcohol/drug use.                         10.5   4.82  )-----------( 101      ( 04 Apr 2023 06:34 )             32.3     04-04    142  |  108  |  39<H>  ----------------------------<  75  4.6   |  24  |  1.1    Ca    9.1      04 Apr 2023 06:34    TPro  6.1  /  Alb  3.2<L>  /  TBili  0.4  /  DBili  x   /  AST  34  /  ALT  36  /  AlkPhos  323<H>  04-04              Culture - Urine (collected 02 Apr 2023 14:00)  Source: Clean Catch Clean Catch (Midstream)  Preliminary Report (03 Apr 2023 23:09):    >100,000 CFU/ml Escherichia coli          RADIOLOGY:    PHYSICAL EXAM:  GEN: No acute distress  HEENT: normocephalic, atraumatic, aniceteric  LUNGS: Clear to auscultation bilaterally, no rales/wheezing/ rhonchi  HEART: S1/S2 present. RRR, no murmurs  ABD: Soft, non-tender, non-distended. 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      # Acute partial tear of the right iliopsoas insertion   # Difficulty ambulating s/p mechanical trip and fall 4 days ago  - X-rays negative for fractures  - WBAT RLE   - PT - GEOFF   - Pain control - family refusing opioid medications, toradol for now , avoid tylenol given history of HCC and liver cirrhosis   - Fall risk   - Supportive care   - Ortho- no inpatient intervention - can obtain MRI for definitive dx- family and patient refused     # Dysuria   - possible uti  - start rocephin  - U/A negative, fu urine clx E.Coli  sensitivities   - rocephin     #H/O Chronic numbness and tingling secondary to restless leg syndrome  Unchanged from baseline  check iron panel     #Hyperkalemia, mild, monitor       #H/O HFpEF , not in exacerbation   #H/O HTN   #H/O Severe AS s/p balloon valvuloplasty 5/25/21 and again 8/09/22, s/p TAVR 09/07/22   - cw lasix 20 mg  q72 hrs (patient non compliant at home given frequent urination)  - holding losartan   - dash diet     #H/O Hypothyroidism, cw synthroid    #H/O Chronic anemia due to AVMs s/p APC in the cecum and transverse colon on 7/27/22  - stable  - monitor Hb   - outpatient follow up with GI     #Transaminitis, improved    #H/O HCC s/p partial liver resection   - no abd pain  - hepatitis panel  - RUQ kathrino with cirrhotic liver   - avoid hepatotoxic medications  - outpatient follow up with onc and gi     # dvt/gi ppx  # diet: dash  # dispo: GEOFF  # Family discussion: spoke to kathrin Moyer , all questions answered and concerns addressed  # Handoff: urine clx sensitivities

## 2023-04-04 NOTE — DISCHARGE NOTE PROVIDER - NSDCFUSCHEDAPPT_GEN_ALL_CORE_FT
[facility] Physician [facility]  CARDIOLOGY [address] [facility]  Scheduled Appointment: 06/06/2023

## 2023-04-04 NOTE — DISCHARGE NOTE PROVIDER - HOSPITAL COURSE
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  Patient admitted to medicine service and monitored.  X-rays negative on admission and patient was WBAT.  Patient's pain controlled, seen by Ortho who recommended outpatient MRI.  Patient evaluated by PT and will need discharge to [facility].  Patient w/complaints of dysuria, started on Rocephin empirically.  UCx + for E.Coli, sensitivity pending and discharged on *** to complete course.  Patient also with chronic paresthesias, Fe stores checked and WNL.  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  Patient admitted to medicine service and monitored.  X-rays negative on admission and patient was WBAT.  Patient's pain controlled, seen by Ortho who recommended outpatient MRI.  Patient evaluated by PT and will need discharge to [facility].  Patient w/complaints of dysuria, started on Rocephin empirically.  UCx + for E.Coli, pansensitive to Keflex, Cpro, Levaquin, Rocephin.  Patient also with chronic paresthesias, Fe stores checked and WNL.  Losartan held 2/2 hyperkalemia, monitor BP and K+ on discharge

## 2023-04-05 ENCOUNTER — TRANSCRIPTION ENCOUNTER (OUTPATIENT)
Age: 88
End: 2023-04-05

## 2023-04-05 VITALS
TEMPERATURE: 99 F | HEART RATE: 76 BPM | SYSTOLIC BLOOD PRESSURE: 154 MMHG | RESPIRATION RATE: 16 BRPM | DIASTOLIC BLOOD PRESSURE: 58 MMHG

## 2023-04-05 LAB
-  AMPICILLIN: SIGNIFICANT CHANGE UP
-  CIPROFLOXACIN: SIGNIFICANT CHANGE UP
-  LEVOFLOXACIN: SIGNIFICANT CHANGE UP
-  NITROFURANTOIN: SIGNIFICANT CHANGE UP
-  TETRACYCLINE: SIGNIFICANT CHANGE UP
-  VANCOMYCIN: SIGNIFICANT CHANGE UP
CULTURE RESULTS: SIGNIFICANT CHANGE UP
METHOD TYPE: SIGNIFICANT CHANGE UP
ORGANISM # SPEC MICROSCOPIC CNT: SIGNIFICANT CHANGE UP
SPECIMEN SOURCE: SIGNIFICANT CHANGE UP

## 2023-04-05 PROCEDURE — 99239 HOSP IP/OBS DSCHRG MGMT >30: CPT

## 2023-04-05 RX ORDER — CEFTRIAXONE 500 MG/1
1000 INJECTION, POWDER, FOR SOLUTION INTRAMUSCULAR; INTRAVENOUS EVERY 24 HOURS
Refills: 0 | Status: DISCONTINUED | OUTPATIENT
Start: 2023-04-05 | End: 2023-04-05

## 2023-04-05 RX ORDER — CEPHALEXIN 500 MG
1 CAPSULE ORAL
Qty: 4 | Refills: 0
Start: 2023-04-05 | End: 2023-04-06

## 2023-04-05 RX ADMIN — PANTOPRAZOLE SODIUM 40 MILLIGRAM(S): 20 TABLET, DELAYED RELEASE ORAL at 06:15

## 2023-04-05 RX ADMIN — Medication 1 TABLET(S): at 11:23

## 2023-04-05 RX ADMIN — Medication 20 MILLIGRAM(S): at 08:23

## 2023-04-05 RX ADMIN — Medication 500 MILLIGRAM(S): at 11:22

## 2023-04-05 RX ADMIN — HEPARIN SODIUM 5000 UNIT(S): 5000 INJECTION INTRAVENOUS; SUBCUTANEOUS at 06:14

## 2023-04-05 RX ADMIN — Medication 100 MICROGRAM(S): at 06:14

## 2023-04-05 RX ADMIN — CEFTRIAXONE 100 MILLIGRAM(S): 500 INJECTION, POWDER, FOR SOLUTION INTRAMUSCULAR; INTRAVENOUS at 12:12

## 2023-04-05 NOTE — DISCHARGE NOTE NURSING/CASE MANAGEMENT/SOCIAL WORK - PATIENT PORTAL LINK FT
You can access the FollowMyHealth Patient Portal offered by [facility] by registering at the following website: [email or web address]. By joining [employer]s FollowMyHealth portal, you will also be able to view your health information using other applications (apps) compatible with our system.

## 2023-04-05 NOTE — PROGRESS NOTE ADULT - SUBJECTIVE AND OBJECTIVE BOX
Progress note    INTERVAL HPI/OVERNIGHT EVENTS:    Patient seen and examined at bedside. No acute distress.      REVIEW OF SYSTEMS:  All other 13 Review of systems were reviewed and are negative    FAMILY HISTORY:  FH: breast cancer    FH: stomach cancer    FH: skin cancer      T(C): 35.7 (04-05-23 @ 04:40), Max: 35.7 (04-04-23 @ 14:14)  HR: 70 (04-05-23 @ 04:40) (70 - 72)  BP: 132/65 (04-05-23 @ 04:40) (132/65 - 166/68)  RR: 16 (04-05-23 @ 04:40) (16 - 16)  SpO2: 97% (04-05-23 @ 04:40) (97% - 98%)  Wt(kg): --Vital Signs Last 24 Hrs  T(C): 35.7 (05 Apr 2023 04:40), Max: 35.7 (04 Apr 2023 14:14)  T(F): 96.2 (05 Apr 2023 04:40), Max: 96.2 (04 Apr 2023 14:14)  HR: 70 (05 Apr 2023 04:40) (70 - 72)  BP: 132/65 (05 Apr 2023 04:40) (132/65 - 166/68)  BP(mean): --  RR: 16 (05 Apr 2023 04:40) (16 - 16)  SpO2: 97% (05 Apr 2023 04:40) (97% - 98%)      BLUE DYE (Stomach Upset; Nausea)  Cipro (Other)  Levaquin (Rash)  tetracycline (Hives)      PHYSICAL EXAM:  GENERAL: NAD, well-groomed, well-developed  HEAD:  Atraumatic, Normocephalic  EYES: EOMI, PERRLA, conjunctiva and sclera clear  ENMT: No tonsillar erythema, exudates, or enlargement; Moist mucous membranes, Good dentition, No lesions  NECK: Supple, No JVD, Normal thyroid  NERVOUS SYSTEM:  Alert & Oriented X3, Good concentration; Motor Strength 5/5 B/L upper and lower extremities; DTRs 2+ intact and symmetric  CHEST/LUNG: Clear to percussion bilaterally; No rales, rhonchi, wheezing, or rubs  HEART: Regular rate and rhythm; No murmurs, rubs, or gallops  ABDOMEN: Soft, Nontender, Nondistended; Bowel sounds present  EXTREMITIES:  2+ Peripheral Pulses, No clubbing, cyanosis, or edema  LYMPH: No lymphadenopathy noted  SKIN: No rashes or lesions    Consultant(s) Notes Reviewed:  [x ] YES  [ ] NO  Care Discussed with Consultants/Other Providers [ x] YES  [ ] NO    LABS:      RADIOLOGY & ADDITIONAL TESTS:    Imaging Personally Reviewed:  [ ] YES  [ ] NO  ascorbic acid 500 milliGRAM(s) Oral daily  cefTRIAXone   IVPB 1000 milliGRAM(s) IV Intermittent every 24 hours  chlorhexidine 2% Cloths 1 Application(s) Topical <User Schedule>  heparin   Injectable 5000 Unit(s) SubCutaneous every 12 hours  influenza  Vaccine (HIGH DOSE) 0.7 milliLiter(s) IntraMuscular once  levothyroxine 100 MICROGram(s) Oral <User Schedule>  morphine  - Injectable 2 milliGRAM(s) IV Push every 6 hours PRN  multivitamin 1 Tablet(s) Oral daily  pantoprazole    Tablet 40 milliGRAM(s) Oral before breakfast      HEALTH ISSUES - PROBLEM Dx:    88 y/o F with a pmhx of HFpEF, 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      # Acute partial tear of the right iliopsoas insertion   # Difficulty ambulating s/p mechanical trip and fall 4 days ago  - X-rays negative for fractures  - WBAT RLE   - PT - GEOFF   - Pain control - family refusing opioid medications, toradol for now , avoid tylenol given history of HCC and liver cirrhosis   - Fall risk   - Supportive care   - Ortho- no inpatient intervention - can obtain MRI for definitive dx- family and patient refused     # Dysuria   - possible uti  - start rocephin  - U/A negative, fu urine clx E.Coli  sensitivities   - rocephin     #H/O Chronic numbness and tingling secondary to restless leg syndrome  Unchanged from baseline    #Hyperkalemia, mild, monitor     #H/O HFpEF , not in exacerbation   #H/O HTN   #H/O Severe AS s/p balloon valvuloplasty 5/25/21 and again 8/09/22, s/p TAVR 09/07/22   - cw lasix 20 mg  q72 hrs (patient non compliant at home given frequent urination)  - holding losartan   - dash diet     #H/O Hypothyroidism, cw synthroid    #H/O Chronic anemia due to AVMs s/p APC in the cecum and transverse colon on 7/27/22  - stable  - monitor Hb   - outpatient follow up with GI     #Transaminitis, improved    #H/O HCC s/p partial liver resection   - no abd pain  - hepatitis panel  - RUQ sono with cirrhotic liver   - avoid hepatotoxic medications  - outpatient follow up with onc and gi     FOr discharge today on PO abx

## 2023-04-05 NOTE — DISCHARGE NOTE NURSING/CASE MANAGEMENT/SOCIAL WORK - NSDCVIVACCINE_GEN_ALL_CORE_FT
Tdap; 07-Jan-2020 05:28; Kristan Lenz (RN); [employer]; [id number] (Exp. Date: 23-Oct-2020); IntraMuscular; Deltoid Left.; 0.5 milliLiter(s); VIS (VIS Published: 09-May-2013, VIS Presented: 07-Jan-2020);

## 2023-04-05 NOTE — DISCHARGE NOTE NURSING/CASE MANAGEMENT/SOCIAL WORK - NSDCPEFALRISK_GEN_ALL_CORE
For information on Fall & Injury Prevention, visit: [email or web address] OR  [email or web address] OR  https://www.cdc.gov/steadi/patient.html

## 2023-04-10 DIAGNOSIS — R74.01 ELEVATION OF LEVELS OF LIVER TRANSAMINASE LEVELS: ICD-10-CM
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
## 2023-04-10 DIAGNOSIS — R26.2 DIFFICULTY IN WALKING, NOT ELSEWHERE CLASSIFIED: ICD-10-CM

## 2023-04-10 DIAGNOSIS — G25.81 RESTLESS LEGS SYNDROME: ICD-10-CM

## 2023-04-10 DIAGNOSIS — D64.9 ANEMIA, UNSPECIFIED: ICD-10-CM

## 2023-04-10 DIAGNOSIS — Z95.2 PRESENCE OF PROSTHETIC HEART VALVE: ICD-10-CM

## 2023-04-10 DIAGNOSIS — Z91.041 RADIOGRAPHIC DYE ALLERGY STATUS: ICD-10-CM

## 2023-04-10 DIAGNOSIS — I50.32 CHRONIC DIASTOLIC (CONGESTIVE) HEART FAILURE: ICD-10-CM
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
## 2023-04-10 DIAGNOSIS — E87.5 HYPERKALEMIA: ICD-10-CM

## 2023-04-10 DIAGNOSIS — R20.2 PARESTHESIA OF SKIN: ICD-10-CM

## 2023-04-10 DIAGNOSIS — I11.0 HYPERTENSIVE HEART DISEASE WITH HEART FAILURE: ICD-10-CM

## 2023-04-10 DIAGNOSIS — N39.0 URINARY TRACT INFECTION, SITE NOT SPECIFIED: ICD-10-CM

## 2023-04-10 DIAGNOSIS — E03.9 HYPOTHYROIDISM, UNSPECIFIED: ICD-10-CM

## 2023-04-10 DIAGNOSIS — Z90.49 ACQUIRED ABSENCE OF OTHER SPECIFIED PARTS OF DIGESTIVE TRACT: ICD-10-CM

## 2023-04-10 DIAGNOSIS — Z95.0 PRESENCE OF CARDIAC PACEMAKER: ICD-10-CM

## 2023-04-10 DIAGNOSIS — S76.011A STRAIN OF MUSCLE, FASCIA AND TENDON OF RIGHT HIP, INITIAL ENCOUNTER: ICD-10-CM

## 2023-04-10 DIAGNOSIS — B96.20 UNSPECIFIED ESCHERICHIA COLI [E. COLI] AS THE CAUSE OF DISEASES CLASSIFIED ELSEWHERE: ICD-10-CM

## 2023-04-10 DIAGNOSIS — Z88.1 ALLERGY STATUS TO OTHER ANTIBIOTIC AGENTS STATUS: ICD-10-CM

## 2023-04-10 DIAGNOSIS — K55.20 ANGIODYSPLASIA OF COLON WITHOUT HEMORRHAGE: ICD-10-CM

## 2023-04-10 DIAGNOSIS — Z79.890 HORMONE REPLACEMENT THERAPY: ICD-10-CM

## 2023-04-10 DIAGNOSIS — Z85.05 PERSONAL HISTORY OF MALIGNANT NEOPLASM OF LIVER: ICD-10-CM

## 2023-05-09 NOTE — ED ADULT TRIAGE NOTE - SPO2 (%)
100 H Plasty Text: Given the location of the defect, shape of the defect and the proximity to free margins a H-plasty was deemed most appropriate for repair.  Using a sterile surgical marker, the appropriate advancement arms of the H-plasty were drawn incorporating the defect and placing the expected incisions within the relaxed skin tension lines where possible. The area thus outlined was incised deep to adipose tissue with a #15 scalpel blade. The skin margins were undermined to an appropriate distance in all directions utilizing iris scissors.  The opposing advancement arms were then advanced into place in opposite direction and anchored with interrupted buried subcutaneous sutures.

## 2023-06-06 ENCOUNTER — APPOINTMENT (OUTPATIENT)
Dept: CARDIOLOGY | Facility: CLINIC | Age: 88
End: 2023-06-06

## 2023-06-06 ENCOUNTER — FORM ENCOUNTER (OUTPATIENT)
Age: 88
End: 2023-06-06

## 2023-06-07 ENCOUNTER — NON-APPOINTMENT (OUTPATIENT)
Age: 88
End: 2023-06-07

## 2023-06-07 ENCOUNTER — APPOINTMENT (OUTPATIENT)
Dept: CARDIOLOGY | Facility: CLINIC | Age: 88
End: 2023-06-07
Payer: MEDICARE

## 2023-06-07 PROCEDURE — 93296 REM INTERROG EVL PM/IDS: CPT

## 2023-06-07 PROCEDURE — 93294 REM INTERROG EVL PM/LDLS PM: CPT

## 2023-07-05 NOTE — PROGRESS NOTE ADULT - TIME BILLING
[facility]  Urology  Progress Note    Patient Name: Hermelindo Garza III  MRN: [id number]  Admission Date: 7/4/2023  Hospital Length of Stay: 0 days  Code Status: Full Code   Attending Provider: Cuhckie Hall MD   Primary Care Physician: Suzi Sanches MD    Subjective:     HPI:  Hermelindo Garza III is a 41 y.o. M with pmh DM2, HTN, LUBA, nephrolithiasis presents to the ED for the second time in 24 hours for persistent left abdominal pain, nausea and vomiting. Urology consulted for 6mm L UVJ stone and hydronephrosis.    Patient with 2 days hx of severe LLQ pain and vomitting. Seen in the ED this morning and discharged home on MTOP after pain was controlled in the ED. Once home patient unable to take any po meds and had worsening LLQ pain and persistent vomiting. He returned to the ED later that night where he was given morphine x2, toradol x2, 2L bolus, and flomax with no resolution of symptoms. Denies hematuria, dysuria, fever, chills.    On assessment AFVSS, Cr 1.0 from 0.8 earlier today, wbc 13.2 from 10.5. Urine nitrite negative, no bacteria. CT shows 6mm L UVJ stone, L hydroureteronephrosis to the level of the UVJ stone, no stones or hydro present on the right, multiple phleboliths.        Interval History: NAEO. AFVSS. Pain better controlled this am. Strained urine and didn't pass the stone. NPO. OR today    Review of Systems  Objective:     Temp:  [96.8 °F (36 °C)-98.1 °F (36.7 °C)] 97.2 °F (36.2 °C)  Pulse:  [65-92] 75  Resp:  [16-22] 20  SpO2:  [91 %-98 %] 94 %  BP: (135-165)/(65-92) 135/83     Body mass index is 41.46 kg/m².           Drains       None                    Physical Exam  Constitutional:       Appearance: Normal appearance. He is obese.   HENT:      Head: Normocephalic.   Eyes:      Pupils: Pupils are equal, round, and reactive to light.   Cardiovascular:      Rate and Rhythm: Normal rate.   Pulmonary:      Effort: Pulmonary effort is normal.   Chest:      Chest wall: No  tenderness.   Abdominal:      General: Abdomen is flat. There is no distension.      Palpations: Abdomen is soft.      Tenderness: There is abdominal tenderness (LLQ (improved from yesterday)). There is no right CVA tenderness or left CVA tenderness.   Musculoskeletal:         General: Normal range of motion.      Cervical back: Normal range of motion.   Skin:     General: Skin is warm.   Neurological:      General: No focal deficit present.      Mental Status: He is alert and oriented to person, place, and time.   Psychiatric:         Mood and Affect: Mood normal.         Behavior: Behavior normal.         Significant Labs:    BMP:  Recent Labs   Lab 07/04/23  1141 07/04/23  2317    141   K 4.4 3.9    104   CO2 25 25   BUN 21* 21*   CREATININE 0.8 1.0   CALCIUM 9.6 9.4       CBC:   Recent Labs   Lab 07/04/23  1141 07/04/23  2217 07/04/23  2317   WBC 10.45  --  13.22*   HGB 15.9  --  15.2   HCT 47.7 45 45.2     --  192       All pertinent labs results from the past 24 hours have been reviewed.    Significant Imaging:  All pertinent imaging results/findings from the past 24 hours have been reviewed.                    Assessment/Plan:     * Ureteral stone with hydronephrosis  -Admit to Urology under obs  -Plan for left ureteral stent today  -NPO  -pain meds  -antiemetics  -flomax  -strain all urine  -please call Urology if pt becomes unstable or develops a temperature        VTE Risk Mitigation (From admission, onward)    None          Flaco Aguila MD  Urology  [facility] - Surgery   direct patient care

## 2023-07-12 NOTE — PRE-ANESTHESIA EVALUATION ADULT - HEIGHT IN INCHES
NWB LLE    Activity as tolerated    Posterior Hip Precautions     Maintain wound vac 7 days, thereafter   apply an Aquacel dressing  
0

## 2023-09-01 ENCOUNTER — APPOINTMENT (OUTPATIENT)
Dept: CARDIOTHORACIC SURGERY | Facility: CLINIC | Age: 88
End: 2023-09-01
Payer: MEDICARE

## 2023-09-01 DIAGNOSIS — Z95.2 PRESENCE OF PROSTHETIC HEART VALVE: ICD-10-CM

## 2023-09-01 PROCEDURE — 99441: CPT | Mod: 95

## 2023-09-01 NOTE — ASSESSMENT
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 She is called today for her 1 year follow up.  [facility] Team. Kelin Post  Adamantly refuses Echo.  NYHA class II Lives home alone no aid with son Does not want to come to office. refuses echo. follows with her healthcare team.

## 2023-09-01 NOTE — REASON FOR VISIT
[Home] : at home, [unfilled] , at the time of the visit. [Medical Office: ([facility])___] : at the medical office located in  [Patient] : the patient [Follow-Up: _____] : a [unfilled] follow-up visit

## 2023-09-06 ENCOUNTER — NON-APPOINTMENT (OUTPATIENT)
Age: 88
End: 2023-09-06

## 2023-09-06 ENCOUNTER — APPOINTMENT (OUTPATIENT)
Dept: CARDIOLOGY | Facility: CLINIC | Age: 88
End: 2023-09-06
Payer: MEDICARE

## 2023-09-06 PROCEDURE — 93294 REM INTERROG EVL PM/LDLS PM: CPT

## 2023-09-06 PROCEDURE — 93296 REM INTERROG EVL PM/IDS: CPT

## 2023-09-14 NOTE — ED PROVIDER NOTE - TOBACCO USE
· Undergoing active Chemo  · Last treatment 9/5  · Following treatment he completed 5 days of prednisone 100 mg daily  · Follows with [facility] - Oncology - Plan for admission to [facility] for continuity of care. Never smoker

## 2023-09-28 ENCOUNTER — APPOINTMENT (OUTPATIENT)
Dept: CARDIOLOGY | Facility: CLINIC | Age: 88
End: 2023-09-28

## 2023-10-16 ENCOUNTER — APPOINTMENT (OUTPATIENT)
Dept: ELECTROPHYSIOLOGY | Facility: CLINIC | Age: 88
End: 2023-10-16

## 2023-10-25 NOTE — ED ADULT NURSE NOTE - DRUG PRE-SCREENING (DAST -1)
What Type Of Note Output Would You Prefer (Optional)?: Standard Output What Is The Reason For Today's Visit?: Skin Lesions What Is The Reason For Today's Visit? (Being Monitored For X): the development of new lesions How Severe Are Your Spot(S)?: moderate Statement Selected

## 2023-11-07 ENCOUNTER — INPATIENT (INPATIENT)
Facility: HOSPITAL | Age: 88
LOS: 2 days | Discharge: NURSING, REHAB OR LONG-TERM CARE | DRG: 556 | End: 2023-11-10
Attending: STUDENT IN AN ORGANIZED HEALTH CARE EDUCATION/TRAINING PROGRAM | Admitting: INTERNAL MEDICINE
Payer: MEDICARE

## 2023-11-07 VITALS
HEART RATE: 95 BPM | DIASTOLIC BLOOD PRESSURE: 70 MMHG | OXYGEN SATURATION: 98 % | RESPIRATION RATE: 18 BRPM | SYSTOLIC BLOOD PRESSURE: 157 MMHG | TEMPERATURE: 98 F

## 2023-11-07 DIAGNOSIS — Z95.2 PRESENCE OF PROSTHETIC HEART VALVE: Chronic | ICD-10-CM

## 2023-11-07 DIAGNOSIS — Z90.49 ACQUIRED ABSENCE OF OTHER SPECIFIED PARTS OF DIGESTIVE TRACT: Chronic | ICD-10-CM

## 2023-11-07 DIAGNOSIS — Z88.1 ALLERGY STATUS TO OTHER ANTIBIOTIC AGENTS STATUS: ICD-10-CM

## 2023-11-07 DIAGNOSIS — Z79.890 HORMONE REPLACEMENT THERAPY: ICD-10-CM

## 2023-11-07 DIAGNOSIS — Z94.4 LIVER TRANSPLANT STATUS: Chronic | ICD-10-CM

## 2023-11-07 DIAGNOSIS — Z92.21 PERSONAL HISTORY OF ANTINEOPLASTIC CHEMOTHERAPY: ICD-10-CM

## 2023-11-07 DIAGNOSIS — Z87.828 PERSONAL HISTORY OF OTHER (HEALED) PHYSICAL INJURY AND TRAUMA: Chronic | ICD-10-CM

## 2023-11-07 DIAGNOSIS — Z95.0 PRESENCE OF CARDIAC PACEMAKER: Chronic | ICD-10-CM

## 2023-11-07 DIAGNOSIS — Z92.3 PERSONAL HISTORY OF IRRADIATION: ICD-10-CM

## 2023-11-07 DIAGNOSIS — R26.2 DIFFICULTY IN WALKING, NOT ELSEWHERE CLASSIFIED: ICD-10-CM

## 2023-11-07 DIAGNOSIS — Z98.890 OTHER SPECIFIED POSTPROCEDURAL STATES: Chronic | ICD-10-CM

## 2023-11-07 DIAGNOSIS — Z90.49 ACQUIRED ABSENCE OF OTHER SPECIFIED PARTS OF DIGESTIVE TRACT: ICD-10-CM

## 2023-11-07 LAB
ALBUMIN SERPL ELPH-MCNC: 4 G/DL — SIGNIFICANT CHANGE UP (ref 3.5–5.2)
ALBUMIN SERPL ELPH-MCNC: 4 G/DL — SIGNIFICANT CHANGE UP (ref 3.5–5.2)
ALP SERPL-CCNC: 154 U/L — HIGH (ref 30–115)
ALP SERPL-CCNC: 154 U/L — HIGH (ref 30–115)
ALT FLD-CCNC: 21 U/L — SIGNIFICANT CHANGE UP (ref 0–41)
ALT FLD-CCNC: 21 U/L — SIGNIFICANT CHANGE UP (ref 0–41)
ANION GAP SERPL CALC-SCNC: 12 MMOL/L — SIGNIFICANT CHANGE UP (ref 7–14)
ANION GAP SERPL CALC-SCNC: 12 MMOL/L — SIGNIFICANT CHANGE UP (ref 7–14)
APPEARANCE UR: CLEAR — SIGNIFICANT CHANGE UP
APPEARANCE UR: CLEAR — SIGNIFICANT CHANGE UP
APTT BLD: 30.5 SEC — SIGNIFICANT CHANGE UP (ref 27–39.2)
APTT BLD: 30.5 SEC — SIGNIFICANT CHANGE UP (ref 27–39.2)
AST SERPL-CCNC: 35 U/L — SIGNIFICANT CHANGE UP (ref 0–41)
AST SERPL-CCNC: 35 U/L — SIGNIFICANT CHANGE UP (ref 0–41)
BASOPHILS # BLD AUTO: 0.02 K/UL — SIGNIFICANT CHANGE UP (ref 0–0.2)
BASOPHILS # BLD AUTO: 0.02 K/UL — SIGNIFICANT CHANGE UP (ref 0–0.2)
BASOPHILS NFR BLD AUTO: 0.3 % — SIGNIFICANT CHANGE UP (ref 0–1)
BASOPHILS NFR BLD AUTO: 0.3 % — SIGNIFICANT CHANGE UP (ref 0–1)
BILIRUB SERPL-MCNC: 0.4 MG/DL — SIGNIFICANT CHANGE UP (ref 0.2–1.2)
BILIRUB SERPL-MCNC: 0.4 MG/DL — SIGNIFICANT CHANGE UP (ref 0.2–1.2)
BILIRUB UR-MCNC: NEGATIVE — SIGNIFICANT CHANGE UP
BILIRUB UR-MCNC: NEGATIVE — SIGNIFICANT CHANGE UP
BUN SERPL-MCNC: 40 MG/DL — HIGH (ref 10–20)
BUN SERPL-MCNC: 40 MG/DL — HIGH (ref 10–20)
CALCIUM SERPL-MCNC: 9.3 MG/DL — SIGNIFICANT CHANGE UP (ref 8.4–10.5)
CALCIUM SERPL-MCNC: 9.3 MG/DL — SIGNIFICANT CHANGE UP (ref 8.4–10.5)
CHLORIDE SERPL-SCNC: 103 MMOL/L — SIGNIFICANT CHANGE UP (ref 98–110)
CHLORIDE SERPL-SCNC: 103 MMOL/L — SIGNIFICANT CHANGE UP (ref 98–110)
CO2 SERPL-SCNC: 24 MMOL/L — SIGNIFICANT CHANGE UP (ref 17–32)
CO2 SERPL-SCNC: 24 MMOL/L — SIGNIFICANT CHANGE UP (ref 17–32)
COLOR SPEC: YELLOW — SIGNIFICANT CHANGE UP
COLOR SPEC: YELLOW — SIGNIFICANT CHANGE UP
CREAT SERPL-MCNC: 1 MG/DL — SIGNIFICANT CHANGE UP (ref 0.7–1.5)
CREAT SERPL-MCNC: 1 MG/DL — SIGNIFICANT CHANGE UP (ref 0.7–1.5)
DIFF PNL FLD: NEGATIVE — SIGNIFICANT CHANGE UP
DIFF PNL FLD: NEGATIVE — SIGNIFICANT CHANGE UP
EGFR: 54 ML/MIN/1.73M2 — LOW
EGFR: 54 ML/MIN/1.73M2 — LOW
EOSINOPHIL # BLD AUTO: 0.03 K/UL — SIGNIFICANT CHANGE UP (ref 0–0.7)
EOSINOPHIL # BLD AUTO: 0.03 K/UL — SIGNIFICANT CHANGE UP (ref 0–0.7)
EOSINOPHIL NFR BLD AUTO: 0.5 % — SIGNIFICANT CHANGE UP (ref 0–8)
EOSINOPHIL NFR BLD AUTO: 0.5 % — SIGNIFICANT CHANGE UP (ref 0–8)
GLUCOSE SERPL-MCNC: 149 MG/DL — HIGH (ref 70–99)
GLUCOSE SERPL-MCNC: 149 MG/DL — HIGH (ref 70–99)
GLUCOSE UR QL: NEGATIVE MG/DL — SIGNIFICANT CHANGE UP
GLUCOSE UR QL: NEGATIVE MG/DL — SIGNIFICANT CHANGE UP
HCT VFR BLD CALC: 37.5 % — SIGNIFICANT CHANGE UP (ref 37–47)
HCT VFR BLD CALC: 37.5 % — SIGNIFICANT CHANGE UP (ref 37–47)
HGB BLD-MCNC: 12.4 G/DL — SIGNIFICANT CHANGE UP (ref 12–16)
HGB BLD-MCNC: 12.4 G/DL — SIGNIFICANT CHANGE UP (ref 12–16)
IMM GRANULOCYTES NFR BLD AUTO: 0.2 % — SIGNIFICANT CHANGE UP (ref 0.1–0.3)
IMM GRANULOCYTES NFR BLD AUTO: 0.2 % — SIGNIFICANT CHANGE UP (ref 0.1–0.3)
INR BLD: 1.05 RATIO — SIGNIFICANT CHANGE UP (ref 0.65–1.3)
INR BLD: 1.05 RATIO — SIGNIFICANT CHANGE UP (ref 0.65–1.3)
KETONES UR-MCNC: NEGATIVE MG/DL — SIGNIFICANT CHANGE UP
KETONES UR-MCNC: NEGATIVE MG/DL — SIGNIFICANT CHANGE UP
LEUKOCYTE ESTERASE UR-ACNC: NEGATIVE — SIGNIFICANT CHANGE UP
LEUKOCYTE ESTERASE UR-ACNC: NEGATIVE — SIGNIFICANT CHANGE UP
LYMPHOCYTES # BLD AUTO: 0.53 K/UL — LOW (ref 1.2–3.4)
LYMPHOCYTES # BLD AUTO: 0.53 K/UL — LOW (ref 1.2–3.4)
LYMPHOCYTES # BLD AUTO: 8.8 % — LOW (ref 20.5–51.1)
LYMPHOCYTES # BLD AUTO: 8.8 % — LOW (ref 20.5–51.1)
MCHC RBC-ENTMCNC: 31.7 PG — HIGH (ref 27–31)
MCHC RBC-ENTMCNC: 31.7 PG — HIGH (ref 27–31)
MCHC RBC-ENTMCNC: 33.1 G/DL — SIGNIFICANT CHANGE UP (ref 32–37)
MCHC RBC-ENTMCNC: 33.1 G/DL — SIGNIFICANT CHANGE UP (ref 32–37)
MCV RBC AUTO: 95.9 FL — SIGNIFICANT CHANGE UP (ref 81–99)
MCV RBC AUTO: 95.9 FL — SIGNIFICANT CHANGE UP (ref 81–99)
MONOCYTES # BLD AUTO: 0.3 K/UL — SIGNIFICANT CHANGE UP (ref 0.1–0.6)
MONOCYTES # BLD AUTO: 0.3 K/UL — SIGNIFICANT CHANGE UP (ref 0.1–0.6)
MONOCYTES NFR BLD AUTO: 5 % — SIGNIFICANT CHANGE UP (ref 1.7–9.3)
MONOCYTES NFR BLD AUTO: 5 % — SIGNIFICANT CHANGE UP (ref 1.7–9.3)
NEUTROPHILS # BLD AUTO: 5.11 K/UL — SIGNIFICANT CHANGE UP (ref 1.4–6.5)
NEUTROPHILS # BLD AUTO: 5.11 K/UL — SIGNIFICANT CHANGE UP (ref 1.4–6.5)
NEUTROPHILS NFR BLD AUTO: 85.2 % — HIGH (ref 42.2–75.2)
NEUTROPHILS NFR BLD AUTO: 85.2 % — HIGH (ref 42.2–75.2)
NITRITE UR-MCNC: NEGATIVE — SIGNIFICANT CHANGE UP
NITRITE UR-MCNC: NEGATIVE — SIGNIFICANT CHANGE UP
NRBC # BLD: 0 /100 WBCS — SIGNIFICANT CHANGE UP (ref 0–0)
NRBC # BLD: 0 /100 WBCS — SIGNIFICANT CHANGE UP (ref 0–0)
PH UR: 6 — SIGNIFICANT CHANGE UP (ref 5–8)
PH UR: 6 — SIGNIFICANT CHANGE UP (ref 5–8)
PLATELET # BLD AUTO: 124 K/UL — LOW (ref 130–400)
PLATELET # BLD AUTO: 124 K/UL — LOW (ref 130–400)
PMV BLD: 11.8 FL — HIGH (ref 7.4–10.4)
PMV BLD: 11.8 FL — HIGH (ref 7.4–10.4)
POTASSIUM SERPL-MCNC: 5 MMOL/L — SIGNIFICANT CHANGE UP (ref 3.5–5)
POTASSIUM SERPL-MCNC: 5 MMOL/L — SIGNIFICANT CHANGE UP (ref 3.5–5)
POTASSIUM SERPL-SCNC: 5 MMOL/L — SIGNIFICANT CHANGE UP (ref 3.5–5)
POTASSIUM SERPL-SCNC: 5 MMOL/L — SIGNIFICANT CHANGE UP (ref 3.5–5)
PROT SERPL-MCNC: 6.7 G/DL — SIGNIFICANT CHANGE UP (ref 6–8)
PROT SERPL-MCNC: 6.7 G/DL — SIGNIFICANT CHANGE UP (ref 6–8)
PROT UR-MCNC: NEGATIVE MG/DL — SIGNIFICANT CHANGE UP
PROT UR-MCNC: NEGATIVE MG/DL — SIGNIFICANT CHANGE UP
PROTHROM AB SERPL-ACNC: 12 SEC — SIGNIFICANT CHANGE UP (ref 9.95–12.87)
PROTHROM AB SERPL-ACNC: 12 SEC — SIGNIFICANT CHANGE UP (ref 9.95–12.87)
RBC # BLD: 3.91 M/UL — LOW (ref 4.2–5.4)
RBC # BLD: 3.91 M/UL — LOW (ref 4.2–5.4)
RBC # FLD: 12.7 % — SIGNIFICANT CHANGE UP (ref 11.5–14.5)
RBC # FLD: 12.7 % — SIGNIFICANT CHANGE UP (ref 11.5–14.5)
SODIUM SERPL-SCNC: 139 MMOL/L — SIGNIFICANT CHANGE UP (ref 135–146)
SODIUM SERPL-SCNC: 139 MMOL/L — SIGNIFICANT CHANGE UP (ref 135–146)
SP GR SPEC: 1.01 — SIGNIFICANT CHANGE UP (ref 1–1.03)
SP GR SPEC: 1.01 — SIGNIFICANT CHANGE UP (ref 1–1.03)
UROBILINOGEN FLD QL: 0.2 MG/DL — SIGNIFICANT CHANGE UP (ref 0.2–1)
UROBILINOGEN FLD QL: 0.2 MG/DL — SIGNIFICANT CHANGE UP (ref 0.2–1)
WBC # BLD: 6 K/UL — SIGNIFICANT CHANGE UP (ref 4.8–10.8)
WBC # BLD: 6 K/UL — SIGNIFICANT CHANGE UP (ref 4.8–10.8)
WBC # FLD AUTO: 6 K/UL — SIGNIFICANT CHANGE UP (ref 4.8–10.8)
WBC # FLD AUTO: 6 K/UL — SIGNIFICANT CHANGE UP (ref 4.8–10.8)

## 2023-11-07 PROCEDURE — 99223 1ST HOSP IP/OBS HIGH 75: CPT

## 2023-11-07 PROCEDURE — 73700 CT LOWER EXTREMITY W/O DYE: CPT | Mod: 26,LT,MA

## 2023-11-07 PROCEDURE — 73110 X-RAY EXAM OF WRIST: CPT | Mod: 26,LT

## 2023-11-07 PROCEDURE — 87635 SARS-COV-2 COVID-19 AMP PRB: CPT

## 2023-11-07 PROCEDURE — 73502 X-RAY EXAM HIP UNI 2-3 VIEWS: CPT | Mod: 26,LT

## 2023-11-07 PROCEDURE — 99285 EMERGENCY DEPT VISIT HI MDM: CPT | Mod: FS

## 2023-11-07 PROCEDURE — 80048 BASIC METABOLIC PNL TOTAL CA: CPT

## 2023-11-07 PROCEDURE — 83735 ASSAY OF MAGNESIUM: CPT

## 2023-11-07 PROCEDURE — 85027 COMPLETE CBC AUTOMATED: CPT

## 2023-11-07 PROCEDURE — 73552 X-RAY EXAM OF FEMUR 2/>: CPT | Mod: 26,LT

## 2023-11-07 PROCEDURE — 72170 X-RAY EXAM OF PELVIS: CPT | Mod: 26,59

## 2023-11-07 PROCEDURE — 97162 PT EVAL MOD COMPLEX 30 MIN: CPT | Mod: GP

## 2023-11-07 PROCEDURE — 71045 X-RAY EXAM CHEST 1 VIEW: CPT

## 2023-11-07 PROCEDURE — 84100 ASSAY OF PHOSPHORUS: CPT

## 2023-11-07 PROCEDURE — 71045 X-RAY EXAM CHEST 1 VIEW: CPT | Mod: 26

## 2023-11-07 RX ORDER — CHOLECALCIFEROL (VITAMIN D3) 125 MCG
4000 CAPSULE ORAL DAILY
Refills: 0 | Status: DISCONTINUED | OUTPATIENT
Start: 2023-11-07 | End: 2023-11-10

## 2023-11-07 RX ORDER — LOSARTAN POTASSIUM 100 MG/1
25 TABLET, FILM COATED ORAL DAILY
Refills: 0 | Status: DISCONTINUED | OUTPATIENT
Start: 2023-11-07 | End: 2023-11-10

## 2023-11-07 RX ORDER — ASCORBIC ACID 60 MG
500 TABLET,CHEWABLE ORAL DAILY
Refills: 0 | Status: DISCONTINUED | OUTPATIENT
Start: 2023-11-07 | End: 2023-11-10

## 2023-11-07 RX ORDER — LEVOTHYROXINE SODIUM 125 MCG
112 TABLET ORAL DAILY
Refills: 0 | Status: DISCONTINUED | OUTPATIENT
Start: 2023-11-07 | End: 2023-11-10

## 2023-11-07 RX ORDER — ERGOCALCIFEROL 1.25 MG/1
2 CAPSULE ORAL
Refills: 0 | DISCHARGE

## 2023-11-07 RX ORDER — ZINC ACETATE DIHYDRATE 100 %
1 CRYSTALS MISCELLANEOUS
Refills: 0 | DISCHARGE

## 2023-11-07 RX ORDER — LANOLIN ALCOHOL/MO/W.PET/CERES
3 CREAM (GRAM) TOPICAL AT BEDTIME
Refills: 0 | Status: DISCONTINUED | OUTPATIENT
Start: 2023-11-07 | End: 2023-11-10

## 2023-11-07 RX ORDER — ENOXAPARIN SODIUM 100 MG/ML
40 INJECTION SUBCUTANEOUS EVERY 24 HOURS
Refills: 0 | Status: DISCONTINUED | OUTPATIENT
Start: 2023-11-07 | End: 2023-11-10

## 2023-11-07 RX ORDER — PANTOPRAZOLE SODIUM 20 MG/1
40 TABLET, DELAYED RELEASE ORAL
Refills: 0 | Status: DISCONTINUED | OUTPATIENT
Start: 2023-11-07 | End: 2023-11-10

## 2023-11-07 RX ORDER — HYDRALAZINE HCL 50 MG
25 TABLET ORAL ONCE
Refills: 0 | Status: COMPLETED | OUTPATIENT
Start: 2023-11-07 | End: 2023-11-07

## 2023-11-07 RX ORDER — MIRABEGRON 50 MG/1
1 TABLET, EXTENDED RELEASE ORAL
Refills: 0 | DISCHARGE

## 2023-11-07 RX ORDER — ZINC SULFATE TAB 220 MG (50 MG ZINC EQUIVALENT) 220 (50 ZN) MG
220 TAB ORAL DAILY
Refills: 0 | Status: DISCONTINUED | OUTPATIENT
Start: 2023-11-07 | End: 2023-11-10

## 2023-11-07 RX ORDER — VITAMIN E 100 UNIT
1 CAPSULE ORAL
Refills: 0 | DISCHARGE

## 2023-11-07 RX ORDER — ACETAMINOPHEN 500 MG
650 TABLET ORAL EVERY 6 HOURS
Refills: 0 | Status: DISCONTINUED | OUTPATIENT
Start: 2023-11-07 | End: 2023-11-07

## 2023-11-07 RX ADMIN — Medication 25 MILLIGRAM(S): at 23:58

## 2023-11-07 RX ADMIN — LOSARTAN POTASSIUM 25 MILLIGRAM(S): 100 TABLET, FILM COATED ORAL at 21:11

## 2023-11-07 NOTE — PATIENT PROFILE ADULT - FALL HARM RISK - HARM RISK INTERVENTIONS

## 2023-11-07 NOTE — H&P ADULT - NSHPLABSRESULTS_GEN_ALL_CORE
.                           12.4   6.00  )-----------( 124      ( 2023 11:40 )              37.5           139  |  103  |  40<H>  ----------------------------<  149<H>  5.0   |  24  |  1.0    Ca    9.3      2023 11:40    TPro  6.7  /  Alb  4.0  /  TBili  0.4  /  DBili  x   /  AST  35  /  ALT  21  /  AlkPhos  154<H>                Urinalysis Basic - ( 2023 15:45 )    Color: Yellow / Appearance: Clear / S.012 / pH: x  Gluc: x / Ketone: Negative mg/dL  / Bili: Negative / Urobili: 0.2 mg/dL   Blood: x / Protein: Negative mg/dL / Nitrite: Negative   Leuk Esterase: Negative / RBC: x / WBC x   Sq Epi: x / Non Sq Epi: x / Bacteria: x        PT/INR - ( 2023 11:40 )   PT: 12.00 sec;   INR: 1.05 ratio    PTT - ( 2023 11:40 )  PTT:30.5 sec          CT Hip No Cont, Left (23 @ 14:19) >    IMPRESSION:  1.  Osteopenia without acute fracture or dislocation.  2.  Chronic/degenerative change as above.          Xray Pelvis AP only (23 @ 13:54) >    IMPRESSION:    No evidence of acute fracture or dislocation.    Osteopenia and degenerative osseous changes, including moderate to severe   changes of the hips.           Xray Chest 1 View-PORTABLE IMMEDIATE (Xray Chest 1 View-PORTABLE IMMEDIATE .) (23 @ 13:49) >    IMPRESSION:    Left basilar opacity with likely associated volume loss.          Xray Femur 2 Views, Left (23 @ 13:10) >    IMPRESSION:    No acute fracture or dislocation.    Osteopenia with degenerative changes visualized knee. Vascular   calcifications.

## 2023-11-07 NOTE — PATIENT PROFILE ADULT - FUNCTIONAL ASSESSMENT - BASIC MOBILITY 6.
3-calculated by average/Not able to assess (calculate score using [facility] averaging method)

## 2023-11-07 NOTE — ED PROVIDER NOTE - DIFFERENTIAL DIAGNOSIS
The differential diagnosis for patients clinical presentation includes but is not limited to:  sepsis, hip fracture, dislocation, pelvic fracture, sprain, strain, contusion Differential Diagnosis

## 2023-11-07 NOTE — ED PROVIDER NOTE - OBJECTIVE STATEMENT
89 y/o female with hx of scoliosis, liver ca, urinary incontinence, PPM ambulates with walker presents to the ED s/p fall earlier this am. patient unable to ambulate s/p fall. patient denies any chest pain , palpitations, head injury. no neck or back pain. patient denies any abdominal pain, vomiting , diarrhea. patient c/o pain to left groin and hip. no tingling distally, +decreased rom of leg.

## 2023-11-07 NOTE — H&P ADULT - NS ATTEND AMEND GEN_ALL_CORE FT
Seen at bedside soon after arrival to medical floor, agree with above assessment and plan. Separately, patient understands concept of resuscitation, intubation but doesn't want to decide on advance directives at this time (therefore remains FULL CODE)

## 2023-11-07 NOTE — H&P ADULT - ASSESSMENT
.  Impression:  Patient is a 89 y/o female with past medical history of HTN, Hx Permanent pacemaker, Hx of Aortic valve replacement, Hypothyroidism, Liver cancer (s/p Liver transplant in 2003), Scoliosis, Urinary incontinence, Osteoarthritis who presents after a mechanical fall this morning and now inability to ambulate.  Patient denies precipitating CP, SOB, dizziness.  Denies head trauma or LOC.  Reports some bruising on her lower legs but denies other injury.  Patient reports unable to ambulate after the fall so her son and aide helped her up.   Patient admitted for inability to ambulate after the fall.          Plan:  # Inability to ambulate / s/p Mechanical fall / No fracture on Xrays or CT scan.  - Admit to Medicine service under Dr. Abreu.  - Physical therapy consult--> Patient without evidence of fracture on Xray or CT scan so can be weight bearing as tolerated with assistance and walker.    - Physiatry consult for possible need of GEOFF placement.  - Case manger for possible need of GEOFF placement.   - Patient refuses pain medications secondary to her liver transplant.   - Ice compress to left hip.  - DASH diet.  - Case d/w Dr. Abreu and agrees with plan.          # Sacral and Buttock, stage 1-2 and Skin tear lateral left ankle:  - Continue Silvadene cream and dressings BID to left lateral ankle.    - Continue Clotrimazole cream to buttock and sacral areas.  - Frequent unloading off back/buttock.        # Hypertension:  - Monitor vital signs per unit protocol.  - Continue home medication with Losartan 25 mg daily.    - Hold Lasix since only takes once every 72 hours.   - Dash diet.         # Hypothyroidism:  - Continue Levothyroxine 112 mcg daily.        # Liver cancer --> s/p Liver transplant 2003:  - Stable.  - Patient refuses pain medications secondary to her liver transplant.         # Urinary incontinence:  - Myrbetriq non-formulary.  - Continue Becca-fit.

## 2023-11-07 NOTE — PATIENT PROFILE ADULT - NSPRESCRUSEDDRG_GEN_A_NUR
[Medication education provided] : Medication education provided. [Rationale for medication choices, possible risks/precautions, benefits, alternative treatment choices, and consequences of non-treatment discussed] : Rationale for medication choices, possible risks/precautions, benefits, alternative treatment choices, and consequences of non-treatment discussed with patient/family/caregiver  No

## 2023-11-07 NOTE — H&P ADULT - NSHPPHYSICALEXAM_GEN_ALL_CORE
PHYSICAL EXAM:    General:  WD, Elderly female in NAD.    Skin:  + Ecchymosis to anterior tibial area,  + Skin tear to left lateral malleolus.   + Stage 1 pressure wounds to sacral and buttocks with anti-fungal cream in place.     Eyes:  PERRLA, EOM intact.    Neck:  No tenderness.    Back:  No spinal tenderness.    Respiratory:  CTA B/L, No wheezes, rales or rhonchi.    Cardiovascular:  S1 & S2, PRIYA,  no rubs or gallops.     Gastrointestinal:   Soft, No distention, Non-tender, No rebound, guarding or Peritoneal signs.    Genitourinary:  + Becca-fit in place draining yellow urine.  No suprapubic tenderness,  No CVA tenderness.    Extremities:  Decreased ROM with left hip and can lift slightly off bed, limited by pain.  + Pain with palpation to left hip and groin.  + Ecchymosis to anterior tibial area with band aids in place.  No broken skin.  + Small skin tear to left lateral malleolus with dressing in place.  No edema.      Vascular:  No cyanosis, + Palpable distal pulses,  No calf tenderness.     Lymph Nodes:  No lymphadenopathy.    Musculoskeletal:  + Pain with ROM of left hip.  Decreased ROM bilateral hands secondary to Osteoarthritis.      Neurological:   A&Ox4, No focal deficits.

## 2023-11-07 NOTE — H&P ADULT - NSICDXPASTSURGICALHX_GEN_ALL_CORE_FT
PAST SURGICAL HISTORY:  Aortic valve replaced     H/O cardiac pacemaker     H/O carpal tunnel repair 2021 - 2012    H/O liver transplant     H/O resection of liver liver cancer (right lobe 2001); 2003 - right lobe, cholecystectomy, and bile duct    History of torn meniscus of knee right; repaired  1995    Pacemaker     Status post cholecystectomy     Transplanted liver

## 2023-11-07 NOTE — ED ADULT TRIAGE NOTE - CHIEF COMPLAINT QUOTE
Pt BIBA EMS states " Pt had a witness mechanical fall at home. Denies hitting head. Denies LOC. Pt is complaining about Left leg pain"

## 2023-11-07 NOTE — ED PROVIDER NOTE - PHYSICAL EXAMINATION
generalized: comfortable, elderly and frail  eyes: PERRLA, EOMI, no orbital tenderness, no bony step off  ENT: no nasal bone tenderness, no dental injury, oropharynx clear,   resp: CTA, no bony step off , no chest wall tenderness, no bruising to chest wall  cardiac: RRR S1S2  abd: non tender RUQ or LUQ, non distended, no bruising   msk: neck supple, no cervical tenderness, pelvis stable , no vertebral tenderness- flexion and extension in tact, gait steady, upper extremities - good rom no tenderness, lower extremities- +ttp left pelvis , decreased flexion at left hip, no shortening or ext rotation of extremity , pulses distally in tact   skin: no lacerations, +bruising to left wrist  neuro: alert and oriented, follows commands, no sensory deficits

## 2023-11-07 NOTE — ED ADULT TRIAGE NOTE - ARRIVAL FROM
02/07/22 1225   Readmission Assessment   Number of Days since last admission? 1-7 days   Previous Disposition SNF   Who is being Interviewed   (Patient's Spouse)   What was the patient's/caregiver's perception as to why they think they needed to return back to the hospital? Other (Comment)  (Rectal Bleeding)   Did you visit your Primary Care Physician after you left the hospital, before you returned this time? No   Why weren't you able to visit your PCP? Did not have an appointment   Did you see a specialist, such as Cardiac, Pulmonary, Orthopedic Physician, etc. after you left the hospital? No   Who advised the patient to return to the hospital? Skilled Unit   Does the patient report anything that got in the way of taking their medications? No   In our efforts to provide the best possible care to you and others like you, can you think of anything that we could have done to help you after you left the hospital the first time, so that you might not have needed to return so soon?  Other (Comment)  (No.) Home

## 2023-11-07 NOTE — H&P ADULT - HISTORY OF PRESENT ILLNESS
Patient is a 89 y/o female with past medical history of HTN, Hx Permanent pacemaker, Hx of Aortic valve replacement, Hypothyroidism, Liver cancer (s/p Liver transplant in 2003), Scoliosis, Urinary incontinence, Osteoarthritis who presents after a mechanical fall this morning and now inability to ambulate.  Patient denies precipitating CP, SOB, dizziness.  Denies head trauma or LOC.  Reports some bruising on her lower legs but denies other injury.  Patient reports unable to ambulate after the fall so her son and aide helped her up.     Reports pain to lateral hip and groin with movement or palpation.    Patient reports she is usually unsteady on her feet with balance issues and she uses a rollator to ambulate at home. Patient lives with her son and has an aide to help her.  Patient states usually in bed and a chair and only ambulates to bathroom and rarely leaves her home.    Patient denies fever, chills, N/V/D/C, hematemesis, hemoptysis, hematochezia, melena, abdominal pain or urinary complaints.

## 2023-11-07 NOTE — ED ADULT NURSE NOTE - NSFALLHARMRISKINTERV_ED_ALL_ED

## 2023-11-07 NOTE — H&P ADULT - NSHPREVIEWOFSYSTEMS_GEN_ALL_CORE
REVIEW OF SYSTEMS:    CONSTITUTIONAL:  No weakness, fevers or chills  EYES/ENT:  No visual changes;  No vertigo or throat pain   NECK:  No pain or stiffness  RESPIRATORY:  No cough, wheezing, hemoptysis; No shortness of breath  CARDIOVASCULAR:  No chest pain or palpitations  GASTROINTESTINAL:  No abdominal or epigastric pain. No nausea, vomiting, or hematemesis; No diarrhea or constipation. No melena or hematochezia.  GENITOURINARY:  No dysuria, frequency or hematuria  MUSCULOSKELETAL:  + Pain and inability to lift left leg off bed.  Decreased ROM bilateral hands and shoulders. adequate strength, No calf tenderness  NEUROLOGICAL:  + Unsteady gait and poor balance.  No numbness or weakness  SKIN:  + Bruising to B/L lower legs,  + Skin tear left lateral ankle,  + Bed sores on buttock.  No itching, rashes

## 2023-11-07 NOTE — ED PROVIDER NOTE - CLINICAL SUMMARY MEDICAL DECISION MAKING FREE TEXT BOX
89-year-old female past medical history of liver cancer status post transplant, PPM, scoliosis, urinary incontinence presents to the emergency department status post trip and fall this morning.  Patient generally has balance issues and has a history of falls and ambulates with a walker.  Patient tripped today and had no chest pain or preceding symptoms and fell on her left thigh.  Patient unable to walk afterwards.  Patient has any head trauma or LOC.  Patient has any numbness, tingling to the leg.  Patient unable to lift leg off bed due to pain.    On exam, vital signs reviewed.  GCS 15.  Patient appears younger than stated age.  Patient has no signs of external trauma to the head neck or spine.  Patient has normal heart and lung exam and benign abdominal exam.  Pelvis is stable.  Patient has tenderness to left hip and painful range of motion to the left hip and left proximal thigh.  Patient has good pulses and has no calf tenderness.  IV placed and labs sent, imaging performed.  X-ray of hip does not show an acute fracture.  Patient CT scan of left hip to look for occult fracture.  CT scan shows no evidence of fracture and severe osteoarthritis.  Patient declining pain medication given her liver transplant.  Patient is unable to walk.  Patient to be admitted to medicine for rehab evaluation for inability to ambulate status post fall.  Wrist x-rays reviewed and do not show an acute fracture.    ED work up reviewed and results and plan of care discussed with patient. Patient requires admission for further work up, monitoring, and management. Need for admission discussed with patient.

## 2023-11-07 NOTE — ED PROVIDER NOTE - ATTENDING APP SHARED VISIT CONTRIBUTION OF CARE
I personally evaluated patient. I agree with the findings and plan with all documentation on chart except as documented  in my note.    89-year-old female past medical history of liver cancer status post transplant, PPM, scoliosis, urinary incontinence presents to the emergency department status post trip and fall this morning.  Patient generally has balance issues and has a history of falls and ambulates with a walker.  Patient tripped today and had no chest pain or preceding symptoms and fell on her left thigh.  Patient unable to walk afterwards.  Patient has any head trauma or LOC.  Patient has any numbness, tingling to the leg.  Patient unable to lift leg off bed due to pain.    On exam, vital signs reviewed.  GCS 15.  Patient appears younger than stated age.  Patient has no signs of external trauma to the head neck or spine.  Patient has normal heart and lung exam and benign abdominal exam.  Pelvis is stable.  Patient has tenderness to left hip and painful range of motion to the left hip and left proximal thigh.  Patient has good pulses and has no calf tenderness.  IV placed and labs sent, imaging performed.  X-ray of hip does not show an acute fracture.  Patient CT scan of left hip to look for occult fracture.  CT scan shows no evidence of fracture and severe osteoarthritis.  Patient declining pain medication given her liver transplant.  Patient is unable to walk.  Patient to be admitted to medicine for rehab evaluation for inability to ambulate status post fall.  Wrist x-rays reviewed and do not show an acute fracture.    ED work up reviewed and results and plan of care discussed with patient. Patient requires admission for further work up, monitoring, and management. Need for admission discussed with patient.

## 2023-11-08 LAB
ANION GAP SERPL CALC-SCNC: 15 MMOL/L — HIGH (ref 7–14)
ANION GAP SERPL CALC-SCNC: 15 MMOL/L — HIGH (ref 7–14)
BUN SERPL-MCNC: 30 MG/DL — HIGH (ref 10–20)
BUN SERPL-MCNC: 30 MG/DL — HIGH (ref 10–20)
CALCIUM SERPL-MCNC: 9.5 MG/DL — SIGNIFICANT CHANGE UP (ref 8.4–10.5)
CALCIUM SERPL-MCNC: 9.5 MG/DL — SIGNIFICANT CHANGE UP (ref 8.4–10.5)
CHLORIDE SERPL-SCNC: 105 MMOL/L — SIGNIFICANT CHANGE UP (ref 98–110)
CHLORIDE SERPL-SCNC: 105 MMOL/L — SIGNIFICANT CHANGE UP (ref 98–110)
CO2 SERPL-SCNC: 22 MMOL/L — SIGNIFICANT CHANGE UP (ref 17–32)
CO2 SERPL-SCNC: 22 MMOL/L — SIGNIFICANT CHANGE UP (ref 17–32)
CREAT SERPL-MCNC: 0.8 MG/DL — SIGNIFICANT CHANGE UP (ref 0.7–1.5)
CREAT SERPL-MCNC: 0.8 MG/DL — SIGNIFICANT CHANGE UP (ref 0.7–1.5)
CULTURE RESULTS: SIGNIFICANT CHANGE UP
CULTURE RESULTS: SIGNIFICANT CHANGE UP
EGFR: 70 ML/MIN/1.73M2 — SIGNIFICANT CHANGE UP
EGFR: 70 ML/MIN/1.73M2 — SIGNIFICANT CHANGE UP
GLUCOSE SERPL-MCNC: 81 MG/DL — SIGNIFICANT CHANGE UP (ref 70–99)
GLUCOSE SERPL-MCNC: 81 MG/DL — SIGNIFICANT CHANGE UP (ref 70–99)
HCT VFR BLD CALC: 38.4 % — SIGNIFICANT CHANGE UP (ref 37–47)
HCT VFR BLD CALC: 38.4 % — SIGNIFICANT CHANGE UP (ref 37–47)
HGB BLD-MCNC: 12.6 G/DL — SIGNIFICANT CHANGE UP (ref 12–16)
HGB BLD-MCNC: 12.6 G/DL — SIGNIFICANT CHANGE UP (ref 12–16)
MAGNESIUM SERPL-MCNC: 2.1 MG/DL — SIGNIFICANT CHANGE UP (ref 1.8–2.4)
MAGNESIUM SERPL-MCNC: 2.1 MG/DL — SIGNIFICANT CHANGE UP (ref 1.8–2.4)
MCHC RBC-ENTMCNC: 32.4 PG — HIGH (ref 27–31)
MCHC RBC-ENTMCNC: 32.4 PG — HIGH (ref 27–31)
MCHC RBC-ENTMCNC: 32.8 G/DL — SIGNIFICANT CHANGE UP (ref 32–37)
MCHC RBC-ENTMCNC: 32.8 G/DL — SIGNIFICANT CHANGE UP (ref 32–37)
MCV RBC AUTO: 98.7 FL — SIGNIFICANT CHANGE UP (ref 81–99)
MCV RBC AUTO: 98.7 FL — SIGNIFICANT CHANGE UP (ref 81–99)
NRBC # BLD: 0 /100 WBCS — SIGNIFICANT CHANGE UP (ref 0–0)
NRBC # BLD: 0 /100 WBCS — SIGNIFICANT CHANGE UP (ref 0–0)
PHOSPHATE SERPL-MCNC: 3 MG/DL — SIGNIFICANT CHANGE UP (ref 2.1–4.9)
PHOSPHATE SERPL-MCNC: 3 MG/DL — SIGNIFICANT CHANGE UP (ref 2.1–4.9)
PLATELET # BLD AUTO: 143 K/UL — SIGNIFICANT CHANGE UP (ref 130–400)
PLATELET # BLD AUTO: 143 K/UL — SIGNIFICANT CHANGE UP (ref 130–400)
PMV BLD: 11.8 FL — HIGH (ref 7.4–10.4)
PMV BLD: 11.8 FL — HIGH (ref 7.4–10.4)
POTASSIUM SERPL-MCNC: 5 MMOL/L — SIGNIFICANT CHANGE UP (ref 3.5–5)
POTASSIUM SERPL-MCNC: 5 MMOL/L — SIGNIFICANT CHANGE UP (ref 3.5–5)
POTASSIUM SERPL-SCNC: 5 MMOL/L — SIGNIFICANT CHANGE UP (ref 3.5–5)
POTASSIUM SERPL-SCNC: 5 MMOL/L — SIGNIFICANT CHANGE UP (ref 3.5–5)
RBC # BLD: 3.89 M/UL — LOW (ref 4.2–5.4)
RBC # BLD: 3.89 M/UL — LOW (ref 4.2–5.4)
RBC # FLD: 12.6 % — SIGNIFICANT CHANGE UP (ref 11.5–14.5)
RBC # FLD: 12.6 % — SIGNIFICANT CHANGE UP (ref 11.5–14.5)
SODIUM SERPL-SCNC: 142 MMOL/L — SIGNIFICANT CHANGE UP (ref 135–146)
SODIUM SERPL-SCNC: 142 MMOL/L — SIGNIFICANT CHANGE UP (ref 135–146)
SPECIMEN SOURCE: SIGNIFICANT CHANGE UP
SPECIMEN SOURCE: SIGNIFICANT CHANGE UP
WBC # BLD: 6.09 K/UL — SIGNIFICANT CHANGE UP (ref 4.8–10.8)
WBC # BLD: 6.09 K/UL — SIGNIFICANT CHANGE UP (ref 4.8–10.8)
WBC # FLD AUTO: 6.09 K/UL — SIGNIFICANT CHANGE UP (ref 4.8–10.8)
WBC # FLD AUTO: 6.09 K/UL — SIGNIFICANT CHANGE UP (ref 4.8–10.8)

## 2023-11-08 PROCEDURE — 71045 X-RAY EXAM CHEST 1 VIEW: CPT | Mod: 26

## 2023-11-08 PROCEDURE — 99232 SBSQ HOSP IP/OBS MODERATE 35: CPT

## 2023-11-08 RX ORDER — POLYETHYLENE GLYCOL 3350 17 G/17G
17 POWDER, FOR SOLUTION ORAL ONCE
Refills: 0 | Status: COMPLETED | OUTPATIENT
Start: 2023-11-08 | End: 2023-11-09

## 2023-11-08 RX ORDER — TRAMADOL HYDROCHLORIDE 50 MG/1
25 TABLET ORAL EVERY 8 HOURS
Refills: 0 | Status: DISCONTINUED | OUTPATIENT
Start: 2023-11-08 | End: 2023-11-10

## 2023-11-08 RX ADMIN — TRAMADOL HYDROCHLORIDE 25 MILLIGRAM(S): 50 TABLET ORAL at 21:04

## 2023-11-08 RX ADMIN — Medication 1 APPLICATION(S): at 09:00

## 2023-11-08 RX ADMIN — Medication 500 MILLIGRAM(S): at 12:15

## 2023-11-08 RX ADMIN — TRAMADOL HYDROCHLORIDE 25 MILLIGRAM(S): 50 TABLET ORAL at 20:11

## 2023-11-08 RX ADMIN — Medication 4000 UNIT(S): at 12:17

## 2023-11-08 RX ADMIN — Medication 1 APPLICATION(S): at 18:24

## 2023-11-08 RX ADMIN — PANTOPRAZOLE SODIUM 40 MILLIGRAM(S): 20 TABLET, DELAYED RELEASE ORAL at 06:09

## 2023-11-08 RX ADMIN — Medication 1 APPLICATION(S): at 06:08

## 2023-11-08 RX ADMIN — ZINC SULFATE TAB 220 MG (50 MG ZINC EQUIVALENT) 220 MILLIGRAM(S): 220 (50 ZN) TAB at 12:13

## 2023-11-08 RX ADMIN — Medication 1 TABLET(S): at 12:14

## 2023-11-08 RX ADMIN — LOSARTAN POTASSIUM 25 MILLIGRAM(S): 100 TABLET, FILM COATED ORAL at 06:09

## 2023-11-08 RX ADMIN — ENOXAPARIN SODIUM 40 MILLIGRAM(S): 100 INJECTION SUBCUTANEOUS at 06:08

## 2023-11-08 RX ADMIN — Medication 112 MICROGRAM(S): at 06:09

## 2023-11-08 NOTE — PHYSICAL THERAPY INITIAL EVALUATION ADULT - ADDITIONAL COMMENTS
Pt lives with son in house where there are no steps for her to climb. Pt uses Rollator for amb. Pt has HHA x 7 days x 4 hrs. Pt is dependent on all activities.

## 2023-11-08 NOTE — PROGRESS NOTE ADULT - ASSESSMENT
Patient is a 89 y/o female with past medical history of HTN, Hx Permanent pacemaker, Hx of Aortic valve replacement, Hypothyroidism, Liver cancer (s/p Liver transplant in 2003), Scoliosis, Urinary incontinence, Osteoarthritis who presents after a mechanical fall this morning and now inability to ambulate.  Patient denies precipitating CP, SOB, dizziness.  Denies head trauma or LOC.  Reports some bruising on her lower legs but denies other injury.  Patient reports unable to ambulate after the fall so her son and aide helped her up.   Patient admitted for inability to ambulate after the fall.       Inability to ambulate  s/p Mechanical fall   -Complicated by Worsening left Hip pain   - No fracture on Xrays or CT scan,  -found to have Mod-sev degenerative disease on Left Hip   - Patient refuses pain medications secondary to her liver transplant.   -Now agreeable to tramadol, continue with Ibuprofen as needed  -Cold compress and Lidocaine patch compress to left hip.    Sacral and Buttock, stage 1-2 and Skin tear lateral left ankle:  - Continue Silvadene cream and dressings BID to left lateral ankle.    - Continue Clotrimazole cream to buttock and sacral areas.  - Frequent unloading off back/buttock.     Hypertension  - Continue Losartan and Lasix every 72 hours  - Dash diet.       Hypothyroidism: Continue Levothyroxine 112 mcg daily.  Liver cancer --> s/p Liver transplant 2003:Stable.  - Patient refuses pain medications secondary to her liver transplant.     Urinary incontinence  - Myrbetriq non-formulary.  - Continue Becca-fit.     DVT PPX: Lovenox   GI PPX: PPI   Full Code  Dispo: from home  Pending SNF placement

## 2023-11-08 NOTE — PHYSICAL THERAPY INITIAL EVALUATION ADULT - GENERAL OBSERVATIONS, REHAB EVAL
10:20-10:45 Chart reviewed. Patient available to be seen for physical therapy, denies pain, confirmed with RN. Pt rec'd in bed +Primafit in NAD.

## 2023-11-08 NOTE — PHYSICAL THERAPY INITIAL EVALUATION ADULT - PERTINENT HX OF CURRENT PROBLEM, REHAB EVAL
Reason for Admission: Inability to ambulate after mechanical fall.  History of Present Illness:   Patient is a 89 y/o female with past medical history of HTN, Hx Permanent pacemaker, Hx of Aortic valve replacement, Hypothyroidism, Liver cancer (s/p Liver transplant in 2003), Scoliosis, Urinary incontinence, Osteoarthritis who presents after a mechanical fall this morning and now inability to ambulate.  Patient denies precipitating CP, SOB, dizziness.  Denies head trauma or LOC.  Reports some bruising on her lower legs but denies other injury.  Patient reports unable to ambulate after the fall so her son and aide helped her up.     Reports pain to lateral hip and groin with movement or palpation.    Patient reports she is usually unsteady on her feet with balance issues and she uses a rollator to ambulate at home. Patient lives with her son and has an aide to help her.  Patient states usually in bed and a chair and only ambulates to bathroom and rarely leaves her home.    Patient denies fever, chills, N/V/D/C, hematemesis, hemoptysis, hematochezia, melena, abdominal pain or urinary complaints.

## 2023-11-09 ENCOUNTER — TRANSCRIPTION ENCOUNTER (OUTPATIENT)
Age: 88
End: 2023-11-09

## 2023-11-09 LAB
SARS-COV-2 RNA SPEC QL NAA+PROBE: SIGNIFICANT CHANGE UP
SARS-COV-2 RNA SPEC QL NAA+PROBE: SIGNIFICANT CHANGE UP

## 2023-11-09 PROCEDURE — 99232 SBSQ HOSP IP/OBS MODERATE 35: CPT

## 2023-11-09 RX ORDER — TRAMADOL HYDROCHLORIDE 50 MG/1
0.5 TABLET ORAL
Qty: 0 | Refills: 0 | DISCHARGE
Start: 2023-11-09

## 2023-11-09 RX ADMIN — LOSARTAN POTASSIUM 25 MILLIGRAM(S): 100 TABLET, FILM COATED ORAL at 05:22

## 2023-11-09 RX ADMIN — Medication 1 APPLICATION(S): at 10:27

## 2023-11-09 RX ADMIN — Medication 1 APPLICATION(S): at 05:28

## 2023-11-09 RX ADMIN — Medication 112 MICROGRAM(S): at 05:22

## 2023-11-09 RX ADMIN — Medication 1 TABLET(S): at 11:15

## 2023-11-09 RX ADMIN — Medication 500 MILLIGRAM(S): at 11:15

## 2023-11-09 RX ADMIN — TRAMADOL HYDROCHLORIDE 25 MILLIGRAM(S): 50 TABLET ORAL at 10:47

## 2023-11-09 RX ADMIN — PANTOPRAZOLE SODIUM 40 MILLIGRAM(S): 20 TABLET, DELAYED RELEASE ORAL at 05:22

## 2023-11-09 RX ADMIN — Medication 4000 UNIT(S): at 11:15

## 2023-11-09 RX ADMIN — POLYETHYLENE GLYCOL 3350 17 GRAM(S): 17 POWDER, FOR SOLUTION ORAL at 05:23

## 2023-11-09 RX ADMIN — ENOXAPARIN SODIUM 40 MILLIGRAM(S): 100 INJECTION SUBCUTANEOUS at 05:22

## 2023-11-09 RX ADMIN — ZINC SULFATE TAB 220 MG (50 MG ZINC EQUIVALENT) 220 MILLIGRAM(S): 220 (50 ZN) TAB at 11:15

## 2023-11-09 NOTE — DISCHARGE NOTE PROVIDER - ATTENDING DISCHARGE PHYSICAL EXAMINATION:
GENERAL: No acute distress, well-developed  HEAD:  Atraumatic, Normocephalic  EYES: EOMI, PERRLA, conjunctiva and sclera clear  NECK: Supple, no lymphadenopathy, no JVD  CHEST/LUNG: CTAB; No wheezes, rales, or rhonchi  HEART: Regular rate and rhythm; No murmurs, rubs, or gallops  ABDOMEN: Soft, non-tender, non-distended; normal bowel sounds, no organomegaly  EXTREMITIES:  2+ peripheral pulses b/l, No clubbing, cyanosis, or edema, Left Hip Pain   NEUROLOGY: A&O x 3, no focal deficits  SKIN: No rashes or lesions

## 2023-11-09 NOTE — PROGRESS NOTE ADULT - ASSESSMENT
Patient is a 89 y/o female with past medical history of HTN, Hx Permanent pacemaker, Hx of Aortic valve replacement, Hypothyroidism, Liver cancer (s/p Liver transplant in 2003), Scoliosis, Urinary incontinence, Osteoarthritis who presents after a mechanical fall this morning and now inability to ambulate.  Patient denies precipitating CP, SOB, dizziness.  Denies head trauma or LOC.  Reports some bruising on her lower legs but denies other injury.  Patient reports unable to ambulate after the fall so her son and aide helped her up.   Patient admitted for inability to ambulate after the fall.       Inability to ambulate  s/p Mechanical fall   -Complicated by Worsening left Hip pain   - No fracture on Xrays or CT scan,  -found to have Mod-sev degenerative disease on Left Hip   - Patient refuses pain medications secondary to her liver transplant.   -Now agreeable to tramadol, continue with Ibuprofen as needed  -Cold compress and Lidocaine patch compress to left hip.    Sacral and Buttock, stage 1-2 and Skin tear lateral left ankle:  - Continue Silvadene cream and dressings BID to left lateral ankle.    - Continue Clotrimazole cream to buttock and sacral areas.  - Frequent unloading off back/buttock.     Hypertension  - Continue Losartan and Lasix every 72 hours  - Dash diet.       Hypothyroidism: Continue Levothyroxine 112 mcg daily.  Liver cancer --> s/p Liver transplant 2003:Stable.  - Patient refuses pain medications secondary to her liver transplant.     Urinary incontinence  - Myrbetriq non-formulary.  - Continue Becca-fit.     DVT PPX: Lovenox   GI PPX: PPI   Full Code  Dispo: from home  Pending SNF placement   Anticipate in AM

## 2023-11-09 NOTE — PROGRESS NOTE ADULT - SUBJECTIVE AND OBJECTIVE BOX
Patient is a 89y old  Female who presents with a chief complaint of Inability to ambulate after mechanical fall. (07 Nov 2023 18:30)      MEDICATIONS  (STANDING):  ascorbic acid 500 milliGRAM(s) Oral daily  cholecalciferol 4000 Unit(s) Oral daily  clotrimazole 1% Cream 1 Application(s) Topical <User Schedule>  enoxaparin Injectable 40 milliGRAM(s) SubCutaneous every 24 hours  levothyroxine 112 MICROGram(s) Oral daily  losartan 25 milliGRAM(s) Oral daily  multivitamin 1 Tablet(s) Oral daily  pantoprazole    Tablet 40 milliGRAM(s) Oral before breakfast  silver sulfADIAZINE 1% Cream 1 Application(s) Topical two times a day  zinc sulfate 220 milliGRAM(s) Oral daily    MEDICATIONS  (PRN):  melatonin 3 milliGRAM(s) Oral at bedtime PRN Insomnia  traMADol 25 milliGRAM(s) Oral every 8 hours PRN Moderate Pain (4 - 6)      CAPILLARY BLOOD GLUCOSE    I&O's Summary    07 Nov 2023 07:01  -  08 Nov 2023 07:00  --------------------------------------------------------  IN: 0 mL / OUT: 700 mL / NET: -700 mL    08 Nov 2023 07:01  -  08 Nov 2023 15:42  --------------------------------------------------------  IN: 0 mL / OUT: 600 mL / NET: -600 mL        PHYSICAL EXAM:  Vital Signs Last 24 Hrs  T(C): 36 (08 Nov 2023 14:45), Max: 36.1 (08 Nov 2023 05:30)  T(F): 96.8 (08 Nov 2023 14:45), Max: 96.9 (08 Nov 2023 05:30)  HR: 72 (08 Nov 2023 14:45) (68 - 72)  BP: 177/74 (08 Nov 2023 14:45) (137/63 - 177/74)  BP(mean): --  RR: 18 (08 Nov 2023 14:45) (18 - 18)  SpO2: 99% (08 Nov 2023 14:45) (96% - 99%)    Parameters below as of 08 Nov 2023 14:45  Patient On (Oxygen Delivery Method): room air        GENERAL: No acute distress, well-developed  HEAD:  Atraumatic, Normocephalic  EYES: EOMI, PERRLA, conjunctiva and sclera clear  NECK: Supple, no lymphadenopathy, no JVD  CHEST/LUNG: CTAB; No wheezes, rales, or rhonchi  HEART: Regular rate and rhythm; No murmurs, rubs, or gallops  ABDOMEN: Soft, non-tender, non-distended; normal bowel sounds, no organomegaly  EXTREMITIES:  2+ peripheral pulses b/l, No clubbing, cyanosis, or edema, left Hip Pain   NEUROLOGY: A&O x 3, no focal deficits  SKIN: No rashes or lesions    LABS:                        12.6   6.09  )-----------( 143      ( 08 Nov 2023 04:30 )             38.4     11-08    142  |  105  |  30<H>  ----------------------------<  81  5.0   |  22  |  0.8    Ca    9.5      08 Nov 2023 04:30  Phos  3.0     11-08  Mg     2.1     11-08    TPro  6.7  /  Alb  4.0  /  TBili  0.4  /  DBili  x   /  AST  35  /  ALT  21  /  AlkPhos  154<H>  11-07    PT/INR - ( 07 Nov 2023 11:40 )   PT: 12.00 sec;   INR: 1.05 ratio         PTT - ( 07 Nov 2023 11:40 )  PTT:30.5 sec      Urinalysis Basic - ( 08 Nov 2023 04:30 )    Color: x / Appearance: x / SG: x / pH: x  Gluc: 81 mg/dL / Ketone: x  / Bili: x / Urobili: x   Blood: x / Protein: x / Nitrite: x   Leuk Esterase: x / RBC: x / WBC x   Sq Epi: x / Non Sq Epi: x / Bacteria: x          
Patient is a 89y old  Female who presents with a chief complaint of Inability to ambulate after mechanical fall.       MEDICATIONS  (STANDING):  ascorbic acid 500 milliGRAM(s) Oral daily  cholecalciferol 4000 Unit(s) Oral daily  clotrimazole 1% Cream 1 Application(s) Topical <User Schedule>  enoxaparin Injectable 40 milliGRAM(s) SubCutaneous every 24 hours  levothyroxine 112 MICROGram(s) Oral daily  losartan 25 milliGRAM(s) Oral daily  multivitamin 1 Tablet(s) Oral daily  pantoprazole    Tablet 40 milliGRAM(s) Oral before breakfast  silver sulfADIAZINE 1% Cream 1 Application(s) Topical two times a day  zinc sulfate 220 milliGRAM(s) Oral daily    MEDICATIONS  (PRN):  melatonin 3 milliGRAM(s) Oral at bedtime PRN Insomnia  traMADol 25 milliGRAM(s) Oral every 8 hours PRN Moderate Pain (4 - 6)    CAPILLARY BLOOD GLUCOSE  I&O's Summary    08 Nov 2023 07:01  -  09 Nov 2023 07:00  --------------------------------------------------------  IN: 0 mL / OUT: 1100 mL / NET: -1100 mL      PHYSICAL EXAM:  Vital Signs Last 24 Hrs  T(C): 36 (09 Nov 2023 05:00), Max: 36.1 (08 Nov 2023 21:12)  T(F): 96.8 (09 Nov 2023 05:00), Max: 97 (08 Nov 2023 21:12)  HR: 64 (09 Nov 2023 05:00) (64 - 72)  BP: 140/65 (09 Nov 2023 05:00) (140/65 - 177/74)  BP(mean): --  RR: 16 (09 Nov 2023 05:00) (16 - 18)  SpO2: 99% (09 Nov 2023 05:00) (99% - 100%)    Parameters below as of 09 Nov 2023 05:00  Patient On (Oxygen Delivery Method): room air        GENERAL: No acute distress, well-developed  HEAD:  Atraumatic, Normocephalic  EYES: EOMI, PERRLA, conjunctiva and sclera clear  NECK: Supple, no lymphadenopathy, no JVD  CHEST/LUNG: CTAB; No wheezes, rales, or rhonchi  HEART: Regular rate and rhythm; No murmurs, rubs, or gallops  ABDOMEN: Soft, non-tender, non-distended; normal bowel sounds, no organomegaly  EXTREMITIES:  2+ peripheral pulses b/l, No clubbing, cyanosis, or edema, Left Hip Pain   NEUROLOGY: A&O x 3, no focal deficits  SKIN: No rashes or lesions    LABS:                        12.6   6.09  )-----------( 143      ( 08 Nov 2023 04:30 )             38.4     11-08    142  |  105  |  30<H>  ----------------------------<  81  5.0   |  22  |  0.8    Ca    9.5      08 Nov 2023 04:30  Phos  3.0     11-08  Mg     2.1     11-08    Urinalysis Basic - ( 08 Nov 2023 04:30 )    Color: x / Appearance: x / SG: x / pH: x  Gluc: 81 mg/dL / Ketone: x  / Bili: x / Urobili: x   Blood: x / Protein: x / Nitrite: x   Leuk Esterase: x / RBC: x / WBC x   Sq Epi: x / Non Sq Epi: x / Bacteria: x        Culture - Urine (collected 07 Nov 2023 15:45)  Source: Clean Catch Clean Catch (Midstream)  Final Report (08 Nov 2023 22:14):    >=3 organisms. Probable collection contamination.

## 2023-11-09 NOTE — DISCHARGE NOTE PROVIDER - NSDCFUSCHEDAPPT_GEN_ALL_CORE_FT
[facility]  CARDIOLOGY [address] [facility]  Scheduled Appointment: 01/16/2024

## 2023-11-09 NOTE — DISCHARGE NOTE PROVIDER - CARE PROVIDER_API CALL
Mejia Cardenas  Internal Medicine  [address] [place] [address], [place] [address]  Phone: [phone]  Fax: [phone]  Follow Up Time: 1 week

## 2023-11-09 NOTE — DISCHARGE NOTE PROVIDER - NSDCMRMEDTOKEN_GEN_ALL_CORE_FT
ascorbic acid 500 mg oral tablet: 1 tab(s) orally once a day  clotrimazole 1% topical cream: Apply topically to affected area once a day To sacrum and buttock pressure sores  furosemide 20 mg oral tablet: 1 tab(s) orally every 72 hours  levothyroxine 100 mcg (0.1 mg) oral tablet: 112 milligram(s) orally once a day  losartan 25 mg oral tablet: 1 tab(s) orally once a day  Multiple Vitamins oral tablet: 1 tab(s) orally once a day  Myrbetriq 25 mg oral tablet, extended release: 1 orally  pantoprazole 40 mg oral delayed release tablet: 1 tab(s) orally once a day   Silvadene 1% topical cream: Apply topically to affected area 2 times a day To Left lateral ankle  traMADol 50 mg oral tablet: 0.5 tab(s) orally every 8 hours As needed Moderate Pain (4 - 6)  Vitamin D2 50 mcg (2000 intl units) oral capsule: 2 orally once a day  zinc (as acetate) 50 mg oral capsule: 1 cap(s) orally once a day   ascorbic acid 500 mg oral tablet: 1 tab(s) orally once a day  clotrimazole 1% topical cream: Apply topically to affected area once a day To sacrum and buttock pressure sores  furosemide 20 mg oral tablet: 1 tab(s) orally every 72 hours  levothyroxine 100 mcg (0.1 mg) oral tablet: 112 milligram(s) orally once a day  losartan 25 mg oral tablet: 1 tab(s) orally once a day  Multiple Vitamins oral tablet: 1 tab(s) orally once a day  Myrbetriq 25 mg oral tablet, extended release: 1 orally  pantoprazole 40 mg oral delayed release tablet: 1 tab(s) orally once a day   Silvadene 1% topical cream: Apply topically to affected area 2 times a day To Left lateral ankle  Vitamin D2 50 mcg (2000 intl units) oral capsule: 2 orally once a day  zinc (as acetate) 50 mg oral capsule: 1 cap(s) orally once a day

## 2023-11-09 NOTE — DISCHARGE NOTE PROVIDER - HOSPITAL COURSE
89 y/o female with past medical history of HTN, Hx Permanent pacemaker, Hx of Aortic valve replacement, Hypothyroidism, Liver cancer (s/p Liver transplant in 2003), Scoliosis, Urinary incontinence, Osteoarthritis who presents after a mechanical fall with inability to ambulate. Patient denied precipitating CP, SOB, dizziness. Denied head trauma or LOC.  Reported some bruising on her lower legs but denies other injury. Unable to ambulate after the fall so her son and aide helped her up. Patient admitted for inability to ambulate after the fall. No fractures on XR or CT scan. Pain managed with lidocaine patches, tramadol and prn ibuprofen (refused tylenol given Liver transplant). PT evaluated and recommended STR. Patient stable for transfer to STR.        Inability to ambulate  s/p Mechanical fall   - Complicated by Worsening left Hip pain   - No fracture on Xrays or CT scan,  - found to have Mod-sev degenerative disease on Left Hip   - agreeable to tramadol, continue with Ibuprofen as needed  - Cold compress and Lidocaine patch compress to left hip.    Sacral and Buttock, stage 1-2 and Skin tear lateral left ankle:  - Continue Silvadene cream and dressings BID to left lateral ankle.    - Continue Clotrimazole cream to buttock and sacral areas.  - Frequent unloading off back/buttock.     Hypertension  - Continue Losartan and Lasix every 72 hours  - Dash diet.     Hypothyroidism: Continue Levothyroxine 112 mcg daily.    Liver cancer --> s/p Liver transplant 2003:Stable.    Urinary incontinence  - Myrbetriq non-formulary.  - Continue Becca-fit.

## 2023-11-09 NOTE — DISCHARGE NOTE PROVIDER - NSDCCPCAREPLAN_GEN_ALL_CORE_FT
PRINCIPAL DISCHARGE DIAGNOSIS  Diagnosis: Inability to ambulate due to hip  Assessment and Plan of Treatment: Admitted after a fall and iability to [facility]. Imaging was negative for a fracture. Pain was managed with lidocaine patches, ibuprofen and tramadoL. Physical therapy recommended short term rehab     PRINCIPAL DISCHARGE DIAGNOSIS  Diagnosis: Inability to ambulate due to hip  Assessment and Plan of Treatment: Admitted after a fall and iability to [facility]. Imaging was negative for a fracture. Pain was managed with lidocaine patches, ibuprofen and tramadoL. Physical therapy recommended short term rehab      SECONDARY DISCHARGE DIAGNOSES  Diagnosis: Degenerative joint disease of left hip  Assessment and Plan of Treatment: Moderate to severe, , see an orthopedic doctor if no improvement with Physical Therapy    Diagnosis: Sacral decubitus ulcer  Assessment and Plan of Treatment: Sacral and Buttock, stage 1-2 and Skin tear lateral left ankle:  - Continue Silvadene cream and dressings BID to left lateral ankle.    - Continue Clotrimazole cream to buttock and sacral areas.  - Frequent unloading off back/buttock.

## 2023-11-10 ENCOUNTER — TRANSCRIPTION ENCOUNTER (OUTPATIENT)
Age: 88
End: 2023-11-10

## 2023-11-10 VITALS
TEMPERATURE: 97 F | DIASTOLIC BLOOD PRESSURE: 59 MMHG | RESPIRATION RATE: 18 BRPM | HEART RATE: 81 BPM | SYSTOLIC BLOOD PRESSURE: 120 MMHG

## 2023-11-10 PROCEDURE — 99239 HOSP IP/OBS DSCHRG MGMT >30: CPT

## 2023-11-10 RX ORDER — IBUPROFEN 200 MG
1 TABLET ORAL
Qty: 15 | Refills: 0
Start: 2023-11-10 | End: 2023-11-14

## 2023-11-10 RX ADMIN — Medication 112 MICROGRAM(S): at 06:10

## 2023-11-10 RX ADMIN — LOSARTAN POTASSIUM 25 MILLIGRAM(S): 100 TABLET, FILM COATED ORAL at 06:11

## 2023-11-10 RX ADMIN — Medication 1 TABLET(S): at 11:19

## 2023-11-10 RX ADMIN — PANTOPRAZOLE SODIUM 40 MILLIGRAM(S): 20 TABLET, DELAYED RELEASE ORAL at 06:11

## 2023-11-10 RX ADMIN — Medication 4000 UNIT(S): at 11:19

## 2023-11-10 RX ADMIN — ZINC SULFATE TAB 220 MG (50 MG ZINC EQUIVALENT) 220 MILLIGRAM(S): 220 (50 ZN) TAB at 11:19

## 2023-11-10 RX ADMIN — ENOXAPARIN SODIUM 40 MILLIGRAM(S): 100 INJECTION SUBCUTANEOUS at 06:10

## 2023-11-10 RX ADMIN — Medication 500 MILLIGRAM(S): at 11:20

## 2023-11-10 NOTE — DISCHARGE NOTE NURSING/CASE MANAGEMENT/SOCIAL WORK - NSDCVIVACCINE_GEN_ALL_CORE_FT
Tdap; 07-Jan-2020 05:28; Kristan Lenz (RN); [employer]; [id number] (Exp. Date: 23-Oct-2020); IntraMuscular; Deltoid Left.; 0.5 milliLiter(s); VIS (VIS Published: 09-May-2013, VIS Presented: 07-Jan-2020);

## 2023-11-10 NOTE — DISCHARGE NOTE NURSING/CASE MANAGEMENT/SOCIAL WORK - PATIENT PORTAL LINK FT
You can access the FollowMyHealth Patient Portal offered by [facility] by registering at the following website: [email or web address]. By joining [employer]’s FollowMyHealth portal, you will also be able to view your health information using other applications (apps) compatible with our system.

## 2023-11-14 DIAGNOSIS — K21.9 GASTRO-ESOPHAGEAL REFLUX DISEASE WITHOUT ESOPHAGITIS: ICD-10-CM

## 2023-11-14 DIAGNOSIS — Z85.05 PERSONAL HISTORY OF MALIGNANT NEOPLASM OF LIVER: ICD-10-CM

## 2023-11-14 DIAGNOSIS — R32 UNSPECIFIED URINARY INCONTINENCE: ICD-10-CM

## 2023-11-14 DIAGNOSIS — M41.9 SCOLIOSIS, UNSPECIFIED: ICD-10-CM

## 2023-11-14 DIAGNOSIS — S91.002A UNSPECIFIED OPEN WOUND, LEFT ANKLE, INITIAL ENCOUNTER: ICD-10-CM

## 2023-11-14 DIAGNOSIS — R26.2 DIFFICULTY IN WALKING, NOT ELSEWHERE CLASSIFIED: ICD-10-CM
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
## 2023-11-14 DIAGNOSIS — M19.90 UNSPECIFIED OSTEOARTHRITIS, UNSPECIFIED SITE: ICD-10-CM

## 2023-11-14 DIAGNOSIS — Z95.2 PRESENCE OF PROSTHETIC HEART VALVE: ICD-10-CM

## 2023-11-14 DIAGNOSIS — Z95.0 PRESENCE OF CARDIAC PACEMAKER: ICD-10-CM
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
## 2023-11-14 DIAGNOSIS — I10 ESSENTIAL (PRIMARY) HYPERTENSION: ICD-10-CM

## 2023-11-14 DIAGNOSIS — L89.309 PRESSURE ULCER OF UNSPECIFIED BUTTOCK, UNSPECIFIED STAGE: ICD-10-CM

## 2023-11-14 DIAGNOSIS — G89.11 ACUTE PAIN DUE TO TRAUMA: ICD-10-CM

## 2023-11-14 DIAGNOSIS — E03.9 HYPOTHYROIDISM, UNSPECIFIED: ICD-10-CM

## 2023-11-14 DIAGNOSIS — M25.552 PAIN IN LEFT HIP: ICD-10-CM

## 2023-11-14 DIAGNOSIS — Z94.4 LIVER TRANSPLANT STATUS: ICD-10-CM

## 2023-11-14 DIAGNOSIS — L89.152 PRESSURE ULCER OF SACRAL REGION, STAGE 2: ICD-10-CM

## 2023-12-13 NOTE — H&P PST ADULT - BP NONINVASIVE SYSTOLIC (MM HG)
Medicare Wellness Visit  Plan for Preventive Care    A good way for you to stay healthy is to use preventive care.  Medicare covers many services that can help you stay healthy.* The goal of these services is to find any health problems as quickly as possible. Finding problems early can help make them easier to treat.  Your personal plan below lists the services you may need and when they are due.      Health Maintenance Summary     COVID-19 Vaccine (1)  Overdue - never done    Lung Cancer Screening (Yearly)  Overdue - never done    Shingles Vaccine (2 of 3)  Overdue since 2/2/2016    DTaP/Tdap/Td Vaccine (2 - Td or Tdap)  Overdue since 1/1/2018    Abdominal Aortic Aneurysm (AAA) Screening (Once)  Overdue - never done    Traditional Medicare- Medicare Wellness Visit (Yearly)  Overdue - never done    Depression Screening (Yearly)  Next due on 12/13/2024    Colorectal Cancer Screen- (Colonoscopy - Every 10 Years)  Next due on 2/14/2029    Hepatitis C Screening   Completed    Influenza Vaccine   Completed    Pneumococcal Vaccine 65+   Completed    Meningococcal Vaccine   Aged Out    Hepatitis B Vaccine (For Physician/APC Discussion)   Aged Out    HPV Vaccine   Aged Out           Preventive Care for Women and Men    Heart Screenings (Cardiovascular):  Blood tests are used to check your cholesterol, lipid and triglyceride levels. High levels can increase your risk for heart disease and stroke. High levels can be treated with medications, diet and exercise. Lowering your levels can help keep your heart and blood vessels healthy.  Your provider will order these tests if they are needed.    An ultrasound is done to see if you have an abdominal aortic aneurysm (AAA).  This is an enlargement of one of the main blood vessels that delivers blood to the body.   In the [place], 9,000 deaths are caused by AAA.  You may not even know you have this problem and as many as 1 in 3 people will have a serious problem if it is not  treated.  Early diagnosis allows for more effective treatment and cure.  If you have a family history of AAA or are a male age 65-75 who has smoked, you are at higher risk of an AAA.  Your provider can order this test, if needed.    Colorectal Screening:  There are many tests that are used to check for cancer of your colon and rectum. You and your provider should discuss what test is best for you and when to have it done.  Options include:  Screening Colonoscopy: exam of the entire colon, seen through a flexible lighted tube.  Flexible Sigmoidoscopy: exam of the last third (sigmoid portion) of the colon and rectum, seen through a flexible lighted tube.  Cologuard DNA stool test: a sample of your stool is used to screen for cancer and unseen blood in your stool.  Fecal Occult Blood Test: a sample of your stool is studied to find any unseen blood    Flu Shot:  An immunization that helps to prevent influenza (the flu). You should get this every year. The best time to get the shot is in the fall.    Pneumococcal Shot:  Vaccines help prevent pneumococcal disease, which is any type of illness caused by Streptococcus pneumoniae bacteria. There are two kinds of pneumococcal vaccines available in the [place]:   Pneumococcal conjugate vaccines (PCV20 or Gewrowr02®)  Pneumococcal polysaccharide vaccine (PPSV23 or Ivmxunjyi35®)  For those who have never received any pneumococcal conjugate vaccine, CDC recommends PVC20 for adults 65 years or older and adults 19 through 64 years old with certain medical conditions or risk factors.   For those who have previously received PCV13, this should be followed by a dose of PPSV23.     Hepatitis B Shot:  An immunization that helps to protect people from getting Hepatitis B. Hepatitis B is a virus that spreads through contact with infected blood or body fluids. Many people with the virus do not have symptoms.  The virus can lead to serious problems, such as liver disease. Some people  are at higher risk than others. Your doctor will tell you if you need this shot.     Diabetes Screening:  A test to measure sugar (glucose) in your blood is called a fasting blood sugar. Fasting means you cannot have food or drink for at least 8 hours before the test. This test can detect diabetes long before you may notice symptoms.    Glaucoma Screening:  Glaucoma screening is performed by your eye doctor. The test measures the fluid pressure inside your eyes to determine if you have glaucoma.     Hepatitis C Screening:  A blood test to see if you have the hepatitis C virus.  Hepatitis C attacks the liver and is a major cause of chronic liver disease.  Medicare will cover a single screening for all adults born between 1945 & 1965, or high risk patients (people who have injected illegal drugs or people who have had blood transfusions).  High risk patients who continue to inject illegal drugs can be screened for Hepatitis C every year.    Smoking and Tobacco-Use Cessation Counseling:  Tobacco is the single greatest cause of disease and early death in our country today. Medication and counseling together can increase a person’s chance of quitting for good.   Medicare covers two quitting attempts per year, with four counseling sessions per attempt (eight sessions in a 12 month period)    Preventive Screening tests for Women    Screening Mammograms and Breast Exams:  An x-ray of your breasts to check for breast cancer before you or your doctor may be able to feel it.  If breast cancer is found early it can usually be treated with success.    Pelvic Exams and Pap Tests:  An exam to check for cervical and vaginal cancer. A Pap test is a lab test in which cells are taken from your cervix and sent to the lab to look for signs of cervical cancer. If cancer of the cervix is found early, chances for a cure are good. Testing can generally end at age 65, or if a woman has a hysterectomy for a benign condition. Your provider may  recommend more frequent testing if certain abnormal results are found.    Bone Mass Measurements:  A painless x-ray of your bone density to see if you are at risk for a broken bone. Bone density refers to the thickness of bones or how tightly the bone tissue is packed.    Preventive Screening tests for Men    Prostate Screening:  Should you have a prostate cancer test (PSA)?  It is up to you to decide if you want a prostate cancer test. Talk to your clinician to find out if the test is right for you.  Things for you to consider and talk about should include:  Benefits and harms of the test  Your family history  How your race/ethnicity may influence the test  If the test may impact other medical conditions you have  Your values on screenings and treatments    *Medicare pays for many preventive services to keep you healthy. For some of these services, you might have to pay a deductible, coinsurance, and / or copayment.  The amounts vary depending on the type of services you need and the kind of Medicare health plan you have.    For further details on screenings offered by Medicare please visit: https://www.medicare.gov/coverage/preventive-screening-services      115

## 2023-12-26 NOTE — H&P ADULT - PROBLEM/PLAN-3
Anal Surgery Post Op Instructions:    You had a hemorrhoidectomy and botulinum toxin injection performed today.     Wound care:  Expect some drainage over the next few weeks as the wound heals.  Please wear a pad to help with drainage.     You have no activity restrictions.   No dietary restrictions.    Medications:  A narcotic pain medication has been prescribed.  Please start to wean the pain medication over the following few days.  You can take tylenol instead of the pain medication.      Please take miralax 1 capful at night to prevent constipation associated with narcotic pain medications.      Follow up:  Return to clinic in 3-4 weeks for follow up and wound check.        DISPLAY PLAN FREE TEXT

## 2024-01-16 ENCOUNTER — APPOINTMENT (OUTPATIENT)
Dept: VASCULAR SURGERY | Facility: CLINIC | Age: 89
End: 2024-01-16

## 2024-01-17 ENCOUNTER — APPOINTMENT (OUTPATIENT)
Dept: CARDIOLOGY | Facility: CLINIC | Age: 89
End: 2024-01-17

## 2024-02-05 ENCOUNTER — APPOINTMENT (OUTPATIENT)
Dept: VASCULAR SURGERY | Facility: CLINIC | Age: 89
End: 2024-02-05

## 2024-02-07 NOTE — PROGRESS NOTE ADULT - ASSESSMENT
Please let patient know that the concerta 36 mg was sent in for her. I removed the 18mg from her medication list.   Impression:  Acute Respiratory Failure secondary CHF exacerbation, resolved  Recurrent left pleural effusion (prior thoracentesis)  Severe aortic stenosis, planned for TAVR      Recommendations:  Continue diuresis  Maximize cardiac therapy and volume status  Avoid volume overload  Target POx > 92%  Aspiration precautions  Incentive spirometry  DVT ppx

## 2024-02-09 ENCOUNTER — APPOINTMENT (OUTPATIENT)
Dept: CARDIOLOGY | Facility: CLINIC | Age: 89
End: 2024-02-09
Payer: MEDICARE

## 2024-02-09 ENCOUNTER — NON-APPOINTMENT (OUTPATIENT)
Age: 89
End: 2024-02-09

## 2024-02-09 PROCEDURE — 93296 REM INTERROG EVL PM/IDS: CPT

## 2024-02-09 PROCEDURE — 93294 REM INTERROG EVL PM/LDLS PM: CPT

## 2024-02-15 ENCOUNTER — APPOINTMENT (OUTPATIENT)
Dept: VASCULAR SURGERY | Facility: CLINIC | Age: 89
End: 2024-02-15

## 2024-04-09 NOTE — DISCHARGE NOTE PROVIDER - NS AS DC PROVIDER CONTACT Y/N MULTI
Addended by: MAGDALENA BAI on: 4/9/2024 11:09 AM     Modules accepted: Orders     FU changed to day 14 per  protocol.    Yes

## 2024-04-27 NOTE — ED PROVIDER NOTE - PATIENT PORTAL LINK FT
Alert and oriented to person, place, time/situation. normal mood and affect. no apparent risk to self or others. You can access the FollowMyHealth Patient Portal offered by [facility] by registering at the following website: [email or web address]. By joining [employer]’s FollowMyHealth portal, you will also be able to view your health information using other applications (apps) compatible with our system.

## 2024-05-10 ENCOUNTER — APPOINTMENT (OUTPATIENT)
Dept: CARDIOLOGY | Facility: CLINIC | Age: 89
End: 2024-05-10

## 2024-06-13 ENCOUNTER — APPOINTMENT (OUTPATIENT)
Dept: CARDIOLOGY | Facility: CLINIC | Age: 89
End: 2024-06-13

## 2024-07-23 NOTE — PHYSICAL THERAPY INITIAL EVALUATION ADULT - LEVEL OF INDEPENDENCE: SUPINE/SIT, REHAB EVAL
ANTICOAGULATION MANAGEMENT     Trisha Fernando 64 year old female is on warfarin with subtherapeutic INR result. (Goal INR 2.0-3.0)    Recent labs: (last 7 days)     07/22/24  1712   INR 1.9*       ASSESSMENT     Source(s): Chart Review  Previous INR was Subtherapeutic  Medication, diet, health changes since last INR chart reviewed; none identified         PLAN     Recommended plan for no diet, medication or health factor changes affecting INR     Dosing Instructions: Increase your warfarin dose (7.7% change) with next INR in 1 week       Summary  As of 7/22/2024      Full warfarin instructions:  5 mg every day   Next INR check:  7/29/2024               Detailed voice message left for Trisha with dosing instructions and follow up date.     Patient to recheck with home meter    Education provided: Please call back if any changes to your diet, medications or how you've been taking warfarin    Plan made per [facility] anticoagulation protocol    Smitha Nair, RN  Anticoagulation Clinic  7/23/2024    _______________________________________________________________________     Anticoagulation Episode Summary       Current INR goal:  2.0-3.0   TTR:  85.1% (1 y)   Target end date:  Indefinite   Send INR reminders to:  [facility]    Indications    Long term (current) use of anticoagulants (Resolved) [Z79.01]  Embolism and thrombosis (H) (Resolved) [I74.9]  Anticoagulation monitoring  INR range 2-3 [Z79.01]  Embolism and thrombosis (H) [I74.9]             Comments:  MDINR needs weekly INR done             Anticoagulation Care Providers       Provider Role Specialty Phone number    Maria Guadalupe Ramon DO Referring Internal Medicine [phone]              
Did not occur this session

## 2024-07-25 NOTE — ED ADULT NURSE NOTE - ISOLATION TYPE:
Subjective   Aamina Mohsin is a 7 Musculoskeletal:  Negative for arthralgias and gait problem.   Skin:  Negative for rash.   Neurological:  Negative for headaches.   Psychiatric/Behavioral:  Negative for behavioral problems, decreased concentration and sleep disturbance. The patient is not nervous/anxious.      Patient's medications, allergies, past medical, surgical, social and family histories were reviewed and updated as appropriate.    Objective   /61   Pulse 118   Temp 98.4 °F (36.9 °C) (Temporal)   Ht 3' 8.88\" (1.14 m)   Wt 18.2 kg (40 lb 1.6 oz)   BMI 14.00 kg/m²   BSA 0.76 m²   BMI Percentile: 13.3 %ile (Z= -1.11) based on CDC (Girls, 2-20 Years) BMI-for-age based on BMI available as of 7/26/2024.  Growth parameters appropriate for age? Yes    Physical Exam  Constitutional:       General: She is active. She is not in acute distress.     Appearance: Normal appearance. She is well-developed.   HENT:      Head: Normocephalic and atraumatic.      Right Ear: Tympanic membrane, ear canal and external ear normal. Tympanic membrane is not erythematous.      Left Ear: Tympanic membrane, ear canal and external ear normal. Tympanic membrane is not erythematous.      Nose: Nose normal.      Mouth/Throat:      Mouth: Mucous membranes are moist.      Pharynx: Oropharynx is clear. No oropharyngeal exudate or posterior oropharyngeal erythema.      Tonsils: No tonsillar exudate.      Neck: Neck supple.   Eyes:      General: Visual tracking is normal.      Extraocular Movements: Extraocular movements intact.      Conjunctiva/sclera: Conjunctivae normal.      Pupils: Pupils are equal, round, and reactive to light.   Cardiovascular:      Rate and Rhythm: Normal rate and regular rhythm.      Pulses: Normal pulses.           Radial pulses are 2+ on the right side and 2+ on the left side.        Femoral pulses are 2+ on the right side and 2+ on the left side.     Heart sounds: Normal heart sounds, S1 normal and S2 normal. No murmur heard.  Pulmonary:       Atopic dermatitis     Eczema seems well managed at this time with current regimen, will continue to be followed by derm. Refills provided today.          Relevant Medications    triamcinolone (ARISTOCORT) 0.1 % ointment       Toxicities and Envenomations    Local reaction to insect sting     Reassurance was provided and recommend giving hydroxyzine as needed for irritation.          Other Visit Diagnoses       Encounter for routine child health examination without abnormal findings    -  Primary    Vulvar dermatitis        Relevant Medications    tacrolimus (Protopic) 0.03 % ointment    Eczema of eyelid, unspecified laterality        Relevant Medications    tacrolimus (Protopic) 0.03 % ointment          Aamina Mohsin is a 7 year old female with a history of atopic dermatitis here for a [facility], growing and developing appropriately.    - Anticipatory Guidance provided  - Nutrition counseling provided   - continue offering several sources of iron daily paired with vit c sources  - Activity counseling provided  - Reviewed growth charts with family  - Reviewed immunizations and recommended vaccines as appropriate for age  - Discussed safety issues/concerns  - Behavior counseling provided  - Answered all questions and concerns    Counseling  Nutrition/Weight Management:  Assessment and discussion of current Nutrition behaviors performed:  Yes  Assessment and discussion of current Physical Activity behaviors performed: Yes    Immunizations: per orders.  History of previous adverse reactions to immunizations? no  Immunizations given today: No    Follow-up yearly for a well visit, or sooner as needed.    On 7/26/2024, IAmy scribed the services personally performed by Adrienne Huffman DO.     I have reviewed and revised the note above. The documentation recorded by the scribe accurately reflects history obtained, actions preformed and decisions made by me.      Adrienne Huffman DO        Note to patient: The 21st Century  Cures Act requires that medical notes like this be available to patients in the interest of transparency. However, be advised this is a medical document. It is intended as peer to peer communication. It is written in medical language and may contain abbreviations or verbiage that are unfamiliar. It may appear blunt or direct. Medical documents are intended to carry relevant information, facts as evident, and the clinical opinion of the practitioner.    None

## 2025-03-20 NOTE — PATIENT PROFILE ADULT - FOOD INSECURITY
ELICIA VILLATORO [address] [address]          Constantine Stein  [id number]  1959    COLON DISCHARGE INSTRUCTIONS    DISCOMFORT:  Redness at IV site- apply warm compress to area; if redness or soreness persist- contact your physician  There may be a slight amount of blood passed from the rectum  Gaseous discomfort- walking, belching will help relieve any discomfort    DIET:   High Fiber diet.     ACTIVITY:  You may resume your normal daily activities it is recommended that you spend the remainder of the day resting -  avoid any strenuous activity.  You may not operate a vehicle for 12 hours  You may not engage in an occupation involving machinery or appliances for rest of today  You may not drink alcoholic beverages for at least 12 hours  Avoid making any critical decisions for at least 24 hour    CALL M.D.  ANY SIGN OF:   Increasing pain, nausea, vomiting  Abdominal distension (swelling)  New increased bleeding (oral or rectal)  Fever (chills)  Pain in chest area  Bloody discharge from nose or mouth  Shortness of breath     Follow-up Instructions:   Call Dr. Catracho Lopez for any questions or problems.   Telephone # [phone]  Biopsy results will be available in  5 to 7 days    Impression:  -Single small (2 mm) polyp found in the descending colon - removed and sent for pathology  -Mild sigmoid colon diverticulosis and medium sized internal hemorrhoids.     Recommendations:   -Resume normal medication(s).  -A high fiber diet with plenty of fluids (up to 8 glasses of water daily) is suggested to relieve these symptoms.  Metamucil, 1 tablespoon once or twice daily can be used to keep bowels regular if needed.  -Await pathology.  -Repeat colonoscopy in 5 years.      Catracho Lopez MD       High-Fiber Diet: Care Instructions  Overview     A high-fiber diet may help you relieve constipation and feel less bloated.  Your doctor and dietitian will help you make a high-fiber eating  no
